# Patient Record
Sex: FEMALE | Race: WHITE | NOT HISPANIC OR LATINO | URBAN - METROPOLITAN AREA
[De-identification: names, ages, dates, MRNs, and addresses within clinical notes are randomized per-mention and may not be internally consistent; named-entity substitution may affect disease eponyms.]

---

## 2021-04-28 ENCOUNTER — INPATIENT (INPATIENT)
Facility: HOSPITAL | Age: 86
LOS: 2 days | Discharge: HOME | DRG: 871 | End: 2021-05-01
Attending: INTERNAL MEDICINE | Admitting: STUDENT IN AN ORGANIZED HEALTH CARE EDUCATION/TRAINING PROGRAM
Payer: MEDICARE

## 2021-04-28 VITALS
DIASTOLIC BLOOD PRESSURE: 73 MMHG | RESPIRATION RATE: 18 BRPM | OXYGEN SATURATION: 95 % | HEIGHT: 64 IN | WEIGHT: 119.93 LBS | TEMPERATURE: 99 F | SYSTOLIC BLOOD PRESSURE: 117 MMHG | HEART RATE: 101 BPM

## 2021-04-28 LAB — GAS PNL BLDV: SIGNIFICANT CHANGE UP

## 2021-04-28 PROCEDURE — 99285 EMERGENCY DEPT VISIT HI MDM: CPT

## 2021-04-28 RX ORDER — SODIUM CHLORIDE 9 MG/ML
1000 INJECTION INTRAMUSCULAR; INTRAVENOUS; SUBCUTANEOUS ONCE
Refills: 0 | Status: COMPLETED | OUTPATIENT
Start: 2021-04-28 | End: 2021-04-28

## 2021-04-28 NOTE — ED ADULT NURSE NOTE - OBJECTIVE STATEMENT
89 year old female presents to ED via walk-in complaining of high WBC count. No PMH, denies daily medications. States she went to primary care doctor and was sent for high WBC count. Upon assessment A&O x4, ambulatory and well appearing. Breathing comfortably on room air, oxygen saturation 97% on room air. Lungs clear. Afebrile at bedside. Refuses rectal temperature at this time. Bed locked and lowered. Comfort and safety measures maintained.

## 2021-04-28 NOTE — ED ADULT NURSE NOTE - NSIMPLEMENTINTERV_GEN_ALL_ED
Implemented All Fall Risk Interventions:  [place] to call system. Call bell, personal items and telephone within reach. Instruct patient to call for assistance. Room bathroom lighting operational. Non-slip footwear when patient is off stretcher. Physically safe environment: no spills, clutter or unnecessary equipment. Stretcher in lowest position, wheels locked, appropriate side rails in place. Provide visual cue, wrist band, yellow gown, etc. Monitor gait and stability. Monitor for mental status changes and reorient to person, place, and time. Review medications for side effects contributing to fall risk. Reinforce activity limits and safety measures with patient and family.

## 2021-04-29 DIAGNOSIS — J90 PLEURAL EFFUSION, NOT ELSEWHERE CLASSIFIED: ICD-10-CM

## 2021-04-29 DIAGNOSIS — K21.9 GASTRO-ESOPHAGEAL REFLUX DISEASE WITHOUT ESOPHAGITIS: ICD-10-CM

## 2021-04-29 DIAGNOSIS — J18.9 PNEUMONIA, UNSPECIFIED ORGANISM: ICD-10-CM

## 2021-04-29 DIAGNOSIS — Z79.899 OTHER LONG TERM (CURRENT) DRUG THERAPY: ICD-10-CM

## 2021-04-29 DIAGNOSIS — D64.9 ANEMIA, UNSPECIFIED: ICD-10-CM

## 2021-04-29 DIAGNOSIS — N39.0 URINARY TRACT INFECTION, SITE NOT SPECIFIED: ICD-10-CM

## 2021-04-29 DIAGNOSIS — Z29.9 ENCOUNTER FOR PROPHYLACTIC MEASURES, UNSPECIFIED: ICD-10-CM

## 2021-04-29 DIAGNOSIS — F39 UNSPECIFIED MOOD [AFFECTIVE] DISORDER: ICD-10-CM

## 2021-04-29 DIAGNOSIS — R05 COUGH: ICD-10-CM

## 2021-04-29 DIAGNOSIS — A41.9 SEPSIS, UNSPECIFIED ORGANISM: ICD-10-CM

## 2021-04-29 LAB
ALBUMIN SERPL ELPH-MCNC: 3.3 G/DL — SIGNIFICANT CHANGE UP (ref 3.3–5)
ALBUMIN SERPL ELPH-MCNC: 3.5 G/DL — SIGNIFICANT CHANGE UP (ref 3.3–5)
ALP SERPL-CCNC: 175 U/L — HIGH (ref 40–120)
ALP SERPL-CCNC: 202 U/L — HIGH (ref 40–120)
ALT FLD-CCNC: 16 U/L — SIGNIFICANT CHANGE UP (ref 10–45)
ALT FLD-CCNC: 18 U/L — SIGNIFICANT CHANGE UP (ref 10–45)
ANION GAP SERPL CALC-SCNC: 14 MMOL/L — SIGNIFICANT CHANGE UP (ref 5–17)
ANION GAP SERPL CALC-SCNC: 17 MMOL/L — SIGNIFICANT CHANGE UP (ref 5–17)
APPEARANCE UR: ABNORMAL
APTT BLD: 33.5 SEC — SIGNIFICANT CHANGE UP (ref 27.5–35.5)
AST SERPL-CCNC: 12 U/L — SIGNIFICANT CHANGE UP (ref 10–40)
AST SERPL-CCNC: 18 U/L — SIGNIFICANT CHANGE UP (ref 10–40)
BACTERIA # UR AUTO: NEGATIVE — SIGNIFICANT CHANGE UP
BASE EXCESS BLDV CALC-SCNC: 1.1 MMOL/L — SIGNIFICANT CHANGE UP (ref -2–2)
BASOPHILS # BLD AUTO: 0.06 K/UL — SIGNIFICANT CHANGE UP (ref 0–0.2)
BASOPHILS # BLD AUTO: 0.07 K/UL — SIGNIFICANT CHANGE UP (ref 0–0.2)
BASOPHILS NFR BLD AUTO: 0.3 % — SIGNIFICANT CHANGE UP (ref 0–2)
BASOPHILS NFR BLD AUTO: 0.3 % — SIGNIFICANT CHANGE UP (ref 0–2)
BILIRUB SERPL-MCNC: 0.3 MG/DL — SIGNIFICANT CHANGE UP (ref 0.2–1.2)
BILIRUB SERPL-MCNC: 0.3 MG/DL — SIGNIFICANT CHANGE UP (ref 0.2–1.2)
BILIRUB UR-MCNC: NEGATIVE — SIGNIFICANT CHANGE UP
BUN SERPL-MCNC: 12 MG/DL — SIGNIFICANT CHANGE UP (ref 7–23)
BUN SERPL-MCNC: 14 MG/DL — SIGNIFICANT CHANGE UP (ref 7–23)
CA-I SERPL-SCNC: 1.33 MMOL/L — HIGH (ref 1.12–1.3)
CALCIUM SERPL-MCNC: 10.5 MG/DL — SIGNIFICANT CHANGE UP (ref 8.4–10.5)
CALCIUM SERPL-MCNC: 9.7 MG/DL — SIGNIFICANT CHANGE UP (ref 8.4–10.5)
CHLORIDE BLDV-SCNC: 105 MMOL/L — SIGNIFICANT CHANGE UP (ref 96–108)
CHLORIDE SERPL-SCNC: 101 MMOL/L — SIGNIFICANT CHANGE UP (ref 96–108)
CHLORIDE SERPL-SCNC: 105 MMOL/L — SIGNIFICANT CHANGE UP (ref 96–108)
CO2 BLDV-SCNC: 27 MMOL/L — SIGNIFICANT CHANGE UP (ref 22–30)
CO2 SERPL-SCNC: 20 MMOL/L — LOW (ref 22–31)
CO2 SERPL-SCNC: 20 MMOL/L — LOW (ref 22–31)
COLOR SPEC: YELLOW — SIGNIFICANT CHANGE UP
CREAT SERPL-MCNC: 0.76 MG/DL — SIGNIFICANT CHANGE UP (ref 0.5–1.3)
CREAT SERPL-MCNC: 0.83 MG/DL — SIGNIFICANT CHANGE UP (ref 0.5–1.3)
DIFF PNL FLD: ABNORMAL
EOSINOPHIL # BLD AUTO: 0.29 K/UL — SIGNIFICANT CHANGE UP (ref 0–0.5)
EOSINOPHIL # BLD AUTO: 0.29 K/UL — SIGNIFICANT CHANGE UP (ref 0–0.5)
EOSINOPHIL NFR BLD AUTO: 1.4 % — SIGNIFICANT CHANGE UP (ref 0–6)
EOSINOPHIL NFR BLD AUTO: 1.7 % — SIGNIFICANT CHANGE UP (ref 0–6)
EPI CELLS # UR: 4 /HPF — SIGNIFICANT CHANGE UP
GAS PNL BLDV: 137 MMOL/L — SIGNIFICANT CHANGE UP (ref 135–145)
GAS PNL BLDV: SIGNIFICANT CHANGE UP
GLUCOSE BLDV-MCNC: 118 MG/DL — HIGH (ref 70–99)
GLUCOSE SERPL-MCNC: 119 MG/DL — HIGH (ref 70–99)
GLUCOSE SERPL-MCNC: 121 MG/DL — HIGH (ref 70–99)
GLUCOSE UR QL: NEGATIVE — SIGNIFICANT CHANGE UP
GRAM STN FLD: SIGNIFICANT CHANGE UP
HCO3 BLDV-SCNC: 26 MMOL/L — SIGNIFICANT CHANGE UP (ref 21–29)
HCT VFR BLD CALC: 32.5 % — LOW (ref 34.5–45)
HCT VFR BLD CALC: 35.7 % — SIGNIFICANT CHANGE UP (ref 34.5–45)
HCT VFR BLDA CALC: 37 % — LOW (ref 39–50)
HGB BLD CALC-MCNC: 11.9 G/DL — SIGNIFICANT CHANGE UP (ref 11.5–15.5)
HGB BLD-MCNC: 10.3 G/DL — LOW (ref 11.5–15.5)
HGB BLD-MCNC: 11.1 G/DL — LOW (ref 11.5–15.5)
HYALINE CASTS # UR AUTO: 7 /LPF — SIGNIFICANT CHANGE UP (ref 0–7)
IMM GRANULOCYTES NFR BLD AUTO: 0.4 % — SIGNIFICANT CHANGE UP (ref 0–1.5)
IMM GRANULOCYTES NFR BLD AUTO: 0.6 % — SIGNIFICANT CHANGE UP (ref 0–1.5)
INR BLD: 1.25 RATIO — HIGH (ref 0.88–1.16)
KETONES UR-MCNC: NEGATIVE — SIGNIFICANT CHANGE UP
LACTATE BLDV-MCNC: 1.5 MMOL/L — SIGNIFICANT CHANGE UP (ref 0.7–2)
LEGIONELLA AG UR QL: NEGATIVE — SIGNIFICANT CHANGE UP
LEUKOCYTE ESTERASE UR-ACNC: ABNORMAL
LYMPHOCYTES # BLD AUTO: 1.47 K/UL — SIGNIFICANT CHANGE UP (ref 1–3.3)
LYMPHOCYTES # BLD AUTO: 10.1 % — LOW (ref 13–44)
LYMPHOCYTES # BLD AUTO: 2.02 K/UL — SIGNIFICANT CHANGE UP (ref 1–3.3)
LYMPHOCYTES # BLD AUTO: 8.6 % — LOW (ref 13–44)
MAGNESIUM SERPL-MCNC: 1.8 MG/DL — SIGNIFICANT CHANGE UP (ref 1.6–2.6)
MCHC RBC-ENTMCNC: 26.2 PG — LOW (ref 27–34)
MCHC RBC-ENTMCNC: 26.2 PG — LOW (ref 27–34)
MCHC RBC-ENTMCNC: 31.1 GM/DL — LOW (ref 32–36)
MCHC RBC-ENTMCNC: 31.7 GM/DL — LOW (ref 32–36)
MCV RBC AUTO: 82.7 FL — SIGNIFICANT CHANGE UP (ref 80–100)
MCV RBC AUTO: 84.4 FL — SIGNIFICANT CHANGE UP (ref 80–100)
MONOCYTES # BLD AUTO: 0.95 K/UL — HIGH (ref 0–0.9)
MONOCYTES # BLD AUTO: 1.22 K/UL — HIGH (ref 0–0.9)
MONOCYTES NFR BLD AUTO: 5.5 % — SIGNIFICANT CHANGE UP (ref 2–14)
MONOCYTES NFR BLD AUTO: 6.1 % — SIGNIFICANT CHANGE UP (ref 2–14)
NEUTROPHILS # BLD AUTO: 14.28 K/UL — HIGH (ref 1.8–7.4)
NEUTROPHILS # BLD AUTO: 16.39 K/UL — HIGH (ref 1.8–7.4)
NEUTROPHILS NFR BLD AUTO: 81.7 % — HIGH (ref 43–77)
NEUTROPHILS NFR BLD AUTO: 83.3 % — HIGH (ref 43–77)
NITRITE UR-MCNC: NEGATIVE — SIGNIFICANT CHANGE UP
NRBC # BLD: 0 /100 WBCS — SIGNIFICANT CHANGE UP (ref 0–0)
NRBC # BLD: 0 /100 WBCS — SIGNIFICANT CHANGE UP (ref 0–0)
PCO2 BLDV: 43 MMHG — SIGNIFICANT CHANGE UP (ref 35–50)
PH BLDV: 7.4 — SIGNIFICANT CHANGE UP (ref 7.35–7.45)
PH UR: 6 — SIGNIFICANT CHANGE UP (ref 5–8)
PHOSPHATE SERPL-MCNC: 2.5 MG/DL — SIGNIFICANT CHANGE UP (ref 2.5–4.5)
PLATELET # BLD AUTO: 325 K/UL — SIGNIFICANT CHANGE UP (ref 150–400)
PLATELET # BLD AUTO: 414 K/UL — HIGH (ref 150–400)
PO2 BLDV: 21 MMHG — LOW (ref 25–45)
POTASSIUM BLDV-SCNC: 3.5 MMOL/L — SIGNIFICANT CHANGE UP (ref 3.5–5.3)
POTASSIUM SERPL-MCNC: 3.8 MMOL/L — SIGNIFICANT CHANGE UP (ref 3.5–5.3)
POTASSIUM SERPL-MCNC: 3.9 MMOL/L — SIGNIFICANT CHANGE UP (ref 3.5–5.3)
POTASSIUM SERPL-SCNC: 3.8 MMOL/L — SIGNIFICANT CHANGE UP (ref 3.5–5.3)
POTASSIUM SERPL-SCNC: 3.9 MMOL/L — SIGNIFICANT CHANGE UP (ref 3.5–5.3)
PROCALCITONIN SERPL-MCNC: 0.19 NG/ML — HIGH (ref 0.02–0.1)
PROT SERPL-MCNC: 6 G/DL — SIGNIFICANT CHANGE UP (ref 6–8.3)
PROT SERPL-MCNC: 6.9 G/DL — SIGNIFICANT CHANGE UP (ref 6–8.3)
PROT UR-MCNC: ABNORMAL
PROTHROM AB SERPL-ACNC: 14.8 SEC — HIGH (ref 10.6–13.6)
RAPID RVP RESULT: SIGNIFICANT CHANGE UP
RBC # BLD: 3.93 M/UL — SIGNIFICANT CHANGE UP (ref 3.8–5.2)
RBC # BLD: 4.23 M/UL — SIGNIFICANT CHANGE UP (ref 3.8–5.2)
RBC # FLD: 17 % — HIGH (ref 10.3–14.5)
RBC # FLD: 17.2 % — HIGH (ref 10.3–14.5)
RBC CASTS # UR COMP ASSIST: 4 /HPF — SIGNIFICANT CHANGE UP (ref 0–4)
SAO2 % BLDV: 29 % — LOW (ref 67–88)
SARS-COV-2 RNA SPEC QL NAA+PROBE: SIGNIFICANT CHANGE UP
SARS-COV-2 RNA SPEC QL NAA+PROBE: SIGNIFICANT CHANGE UP
SODIUM SERPL-SCNC: 138 MMOL/L — SIGNIFICANT CHANGE UP (ref 135–145)
SODIUM SERPL-SCNC: 139 MMOL/L — SIGNIFICANT CHANGE UP (ref 135–145)
SP GR SPEC: 1.03 — HIGH (ref 1.01–1.02)
SPECIMEN SOURCE: SIGNIFICANT CHANGE UP
UROBILINOGEN FLD QL: NEGATIVE — SIGNIFICANT CHANGE UP
WBC # BLD: 17.15 K/UL — HIGH (ref 3.8–10.5)
WBC # BLD: 20.08 K/UL — HIGH (ref 3.8–10.5)
WBC # FLD AUTO: 17.15 K/UL — HIGH (ref 3.8–10.5)
WBC # FLD AUTO: 20.08 K/UL — HIGH (ref 3.8–10.5)
WBC UR QL: 86 /HPF — HIGH (ref 0–5)

## 2021-04-29 PROCEDURE — 71045 X-RAY EXAM CHEST 1 VIEW: CPT | Mod: 26

## 2021-04-29 PROCEDURE — 99223 1ST HOSP IP/OBS HIGH 75: CPT | Mod: GC

## 2021-04-29 PROCEDURE — 71250 CT THORAX DX C-: CPT | Mod: 26

## 2021-04-29 RX ORDER — PANTOPRAZOLE SODIUM 20 MG/1
40 TABLET, DELAYED RELEASE ORAL
Refills: 0 | Status: DISCONTINUED | OUTPATIENT
Start: 2021-04-29 | End: 2021-05-01

## 2021-04-29 RX ORDER — CEFTRIAXONE 500 MG/1
1000 INJECTION, POWDER, FOR SOLUTION INTRAMUSCULAR; INTRAVENOUS ONCE
Refills: 0 | Status: COMPLETED | OUTPATIENT
Start: 2021-04-29 | End: 2021-04-29

## 2021-04-29 RX ORDER — AZITHROMYCIN 500 MG/1
500 TABLET, FILM COATED ORAL EVERY 24 HOURS
Refills: 0 | Status: DISCONTINUED | OUTPATIENT
Start: 2021-04-29 | End: 2021-05-01

## 2021-04-29 RX ORDER — CEFTRIAXONE 500 MG/1
1000 INJECTION, POWDER, FOR SOLUTION INTRAMUSCULAR; INTRAVENOUS EVERY 24 HOURS
Refills: 0 | Status: DISCONTINUED | OUTPATIENT
Start: 2021-04-29 | End: 2021-05-01

## 2021-04-29 RX ORDER — IPRATROPIUM/ALBUTEROL SULFATE 18-103MCG
3 AEROSOL WITH ADAPTER (GRAM) INHALATION EVERY 6 HOURS
Refills: 0 | Status: DISCONTINUED | OUTPATIENT
Start: 2021-04-29 | End: 2021-04-29

## 2021-04-29 RX ORDER — ACETAMINOPHEN 500 MG
650 TABLET ORAL EVERY 6 HOURS
Refills: 0 | Status: DISCONTINUED | OUTPATIENT
Start: 2021-04-29 | End: 2021-05-01

## 2021-04-29 RX ORDER — ESCITALOPRAM OXALATE 10 MG/1
10 TABLET, FILM COATED ORAL DAILY
Refills: 0 | Status: DISCONTINUED | OUTPATIENT
Start: 2021-04-29 | End: 2021-05-01

## 2021-04-29 RX ORDER — AZITHROMYCIN 500 MG/1
500 TABLET, FILM COATED ORAL ONCE
Refills: 0 | Status: COMPLETED | OUTPATIENT
Start: 2021-04-29 | End: 2021-04-29

## 2021-04-29 RX ORDER — IPRATROPIUM/ALBUTEROL SULFATE 18-103MCG
3 AEROSOL WITH ADAPTER (GRAM) INHALATION EVERY 6 HOURS
Refills: 0 | Status: DISCONTINUED | OUTPATIENT
Start: 2021-04-29 | End: 2021-05-01

## 2021-04-29 RX ORDER — ENOXAPARIN SODIUM 100 MG/ML
40 INJECTION SUBCUTANEOUS DAILY
Refills: 0 | Status: DISCONTINUED | OUTPATIENT
Start: 2021-04-29 | End: 2021-05-01

## 2021-04-29 RX ADMIN — ESCITALOPRAM OXALATE 10 MILLIGRAM(S): 10 TABLET, FILM COATED ORAL at 12:08

## 2021-04-29 RX ADMIN — SODIUM CHLORIDE 1000 MILLILITER(S): 9 INJECTION INTRAMUSCULAR; INTRAVENOUS; SUBCUTANEOUS at 00:13

## 2021-04-29 RX ADMIN — ENOXAPARIN SODIUM 40 MILLIGRAM(S): 100 INJECTION SUBCUTANEOUS at 14:05

## 2021-04-29 RX ADMIN — PANTOPRAZOLE SODIUM 40 MILLIGRAM(S): 20 TABLET, DELAYED RELEASE ORAL at 12:09

## 2021-04-29 RX ADMIN — Medication 3 MILLILITER(S): at 14:04

## 2021-04-29 RX ADMIN — Medication 3 MILLILITER(S): at 20:53

## 2021-04-29 RX ADMIN — CEFTRIAXONE 100 MILLIGRAM(S): 500 INJECTION, POWDER, FOR SOLUTION INTRAMUSCULAR; INTRAVENOUS at 01:00

## 2021-04-29 RX ADMIN — Medication 3 MILLILITER(S): at 09:52

## 2021-04-29 RX ADMIN — AZITHROMYCIN 250 MILLIGRAM(S): 500 TABLET, FILM COATED ORAL at 01:29

## 2021-04-29 NOTE — H&P ADULT - NSHPPHYSICALEXAM_GEN_ALL_CORE
Vital Signs Last 24 Hrs  T(C): 37.7 (29 Apr 2021 04:31), Max: 37.7 (29 Apr 2021 04:31)  T(F): 99.9 (29 Apr 2021 04:31), Max: 99.9 (29 Apr 2021 04:31)  HR: 98 (29 Apr 2021 04:31) (97 - 101)  BP: 131/81 (29 Apr 2021 04:31) (116/65 - 131/81)  BP(mean): --  RR: 17 (29 Apr 2021 04:31) (17 - 18)  SpO2: 95% (29 Apr 2021 04:31) (95% - 97%) Vital Signs Last 24 Hrs  T(C): 37.7 (29 Apr 2021 04:31), Max: 37.7 (29 Apr 2021 04:31)  T(F): 99.9 (29 Apr 2021 04:31), Max: 99.9 (29 Apr 2021 04:31)  HR: 98 (29 Apr 2021 04:31) (97 - 101)  BP: 131/81 (29 Apr 2021 04:31) (116/65 - 131/81)  BP(mean): --  RR: 17 (29 Apr 2021 04:31) (17 - 18)  SpO2: 95% (29 Apr 2021 04:31) (95% - 97%)    GENERAL: NAD  HEAD:  Atraumatic, Normocephalic  HEENT: scleral anicteric.   CV: Regular rate and rhythm. No murmurs, rubs, or gallops  Respiratory: + expiratory wheezes   ABDOMEN: Soft, Nontender, Nondistended; Bowel sounds normal  EXTREMITIES: No lower extremity edema. Bilateral LE symmetric in size.   PSYCH: AAOx3.   NEURO: Symmetric facial expressions. Moves 4 extremities spontaneously   Skin: No rashes

## 2021-04-29 NOTE — H&P ADULT - NSHPLABSRESULTS_GEN_ALL_CORE
11.1   20.08 )-----------( 414      ( 2021 23:47 )             35.7     Hgb Trend: 11.1<--      138  |  101  |  14  ----------------------------<  119<H>  3.9   |  20<L>  |  0.83    Ca    10.5      2021 23:47    TPro  6.9  /  Alb  3.5  /  TBili  0.3  /  DBili  x   /  AST  18  /  ALT  18  /  AlkPhos  202<H>      Creatinine Trend: 0.83<--  PT/INR - ( 2021 23:47 )   PT: 14.8 sec;   INR: 1.25 ratio         PTT - ( 2021 23:47 )  PTT:33.5 sec      Urinalysis Basic - ( 2021 01:15 )    Color: Yellow / Appearance: Slightly Turbid / S.026 / pH: x  Gluc: x / Ketone: Negative  / Bili: Negative / Urobili: Negative   Blood: x / Protein: 30 mg/dL / Nitrite: Negative   Leuk Esterase: Large / RBC: 4 /hpf / WBC 86 /HPF   Sq Epi: x / Non Sq Epi: 4 /hpf / Bacteria: Negative    < from: Xray Chest 1 View- PORTABLE-Urgent (21 @ 00:21) >    INTERPRETATION:  Patchy right lung opacities which may represent atelectasis or pneumonia. Associated right pleural effusion.        < end of copied text > 11.1   20.08 )-----------( 414      ( 2021 23:47 )             35.7     Hgb Trend: 11.1<--      138  |  101  |  14  ----------------------------<  119<H>  3.9   |  20<L>  |  0.83    Ca    10.5      2021 23:47    TPro  6.9  /  Alb  3.5  /  TBili  0.3  /  DBili  x   /  AST  18  /  ALT  18  /  AlkPhos  202<H>      Creatinine Trend: 0.83<--  PT/INR - ( 2021 23:47 )   PT: 14.8 sec;   INR: 1.25 ratio         PTT - ( 2021 23:47 )  PTT:33.5 sec      Urinalysis Basic - ( 2021 01:15 )    Color: Yellow / Appearance: Slightly Turbid / S.026 / pH: x  Gluc: x / Ketone: Negative  / Bili: Negative / Urobili: Negative   Blood: x / Protein: 30 mg/dL / Nitrite: Negative   Leuk Esterase: Large / RBC: 4 /hpf / WBC 86 /HPF   Sq Epi: x / Non Sq Epi: 4 /hpf / Bacteria: Negative    < from: Xray Chest 1 View- PORTABLE-Urgent (21 @ 00:21) >    INTERPRETATION:  Patchy right lung opacities which may represent atelectasis or pneumonia. Associated right pleural effusion.  < end of copied text >    -tachycardia, leukocytosis  -CXR notable for

## 2021-04-29 NOTE — H&P ADULT - PROBLEM SELECTOR PLAN 2
-tachycardia, leukocytosis  - source: CXR  patchy R lung opacities vs. UA positive for large leuk esterase and WBC 86 though patient denies dysuria/urinary frequency   - COVID PCR negative   -f/u blood culture, urine culture   -f/u RVP, sputum culture, urine legionella   - s/p 1L NS in ED   -s/p azithromycin + ceftriaxone for community acquired pneumonia  - c/w azithromycin + ceftriaxone -CXR notable for R pleural effusion, unclear chronicity  - repeat CT chest, and obtain outpatient records of CT chest  - consider consult pulm for thoracentesis

## 2021-04-29 NOTE — PROGRESS NOTE ADULT - PROBLEM SELECTOR PLAN 5
-CXR notable for R pleural effusion, unclear chronicity  - repeat CT chest, and obtain outpatient records of CT chest  - most likely parapneumonic effusion  - monitor clinical improvement and consider outpt imaging for resolution.

## 2021-04-29 NOTE — ED PROVIDER NOTE - PROGRESS NOTE DETAILS
Pt does not have any outpatient results or imaging with her, will contact daughter to bring it in. SUZE Parker PGY2

## 2021-04-29 NOTE — ED PROVIDER NOTE - NS ED ROS FT
CONST: no fevers, no chills, no trauma  EYES: no pain, no blurry vision   ENT: no sore throat, no epistaxis, no rhinorrhea  CV: no chest pain, no palpitations, no orthopnea, no extremity pain or swelling  RESP: no shortness of breath, + cough, + sputum, no pleurisy, no wheezing  ABD: no abdominal pain, no nausea, no vomiting, no diarrhea, no black or bloody stool  : no dysuria, no hematuria, no frequency, no urgency  MSK: no back pain, no neck pain, no extremity pain  NEURO: no headache, no sensory disturbances, no focal weakness, no dizziness  HEME: no easy bleeding or bruising  SKIN: no diaphoresis, no rash

## 2021-04-29 NOTE — CHART NOTE - NSCHARTNOTEFT_GEN_A_CORE
16:00 - Called Dr. Zev Larkin, the pt's outpatient pulmonologist, and received the following information:  Pt was seen since 2019 for chronic cough. PFT showing obstructive and restrictive pattern (FEV1 42%, FVC 50, FEV/FVC 24%).  CT was showing persistent RLL consolidation that never cleared despite abx use (most recent 01/2021 doxy and cefdinir), finished prednisone course recently 20mg (3/24-4/23).  She underwent bronchoscopy that was normal with lavage 11/16/2020. Cytology was negative for malignancy but positive for non-specific inflammation, so as biopsy.  One culture out of 2 grew NED, but the smear was negative. Dr. Larkin perceived that as unusual as usually the smear would be positive and culture negative, so regarded as false positive. 16:00 - Called Dr. Zev Larkin, the pt's outpatient pulmonologist, and obtained the following information:  Pt was seen since 2019 for chronic cough. PFT showing obstructive and restrictive pattern (FEV1 42%, FVC 50, FEV/FVC 24%).  CT was showing persistent RLL consolidation that never cleared despite abx use (most recent 01/2021 doxy and cefdinir), finished prednisone course recently 20mg (3/24-4/23).  She underwent bronchoscopy that was normal structurally with lavage 11/16/2020. Cytology was negative for malignancy but positive for non-specific inflammation, so as biopsy (see previous chart note).  One culture out of 2 grew NED, but the smear was negative. Dr. Larkin perceived that as unusual as usually the smear would be positive and culture negative, so regarded as false positive.

## 2021-04-29 NOTE — ED PROVIDER NOTE - ATTENDING CONTRIBUTION TO CARE
pt sent in by pcp due to "high wbc". pt with a cough present for several months otherwise without symptoms. pt afebrile but initially tachycardic, otherwise stable vitals, well appearing, breathing comfortably, not hypoxic. sepsis w/u done which showed RLL effusion vs opacity, equivocal UA. pt received iv abx to cover cap and urinary source, admitted for further care.

## 2021-04-29 NOTE — ED PROVIDER NOTE - PHYSICAL EXAMINATION
Const: Well-nourished, Well-developed, appearing stated age.  Eyes: no conjunctival injection, and symmetrical lids.  HEENT: Head NCAT, no lesions. Atraumatic external nose and ears. Moist MM.  Neck: Symmetric, trachea midline.   CVS: +S1/S2, Peripheral pulses 2+ and equal in all extremities.  RESP: Unlabored respiratory effort. Clear to auscultation bilaterally. No crackles or wheezes b/l.   GI: Nontender/Nondistended, No CVA tenderness b/l.   MSK: Normocephalic/Atraumatic, Lower Extremities w/o calf tenderness or edema b/l.   Skin: Warm, dry and intact.   Neuro: CNs II-XII grossly intact. Motor & Sensation grossly intact.  Psych: Awake, Alert, & Oriented (AAO) x3. Appropriate mood and affect.

## 2021-04-29 NOTE — PROGRESS NOTE ADULT - ASSESSMENT
89F nonsmoker with no significant pmhx pw acute on chronic productive cough, found to be in sepsis 2/2 pneumonia vs. UTI

## 2021-04-29 NOTE — PROGRESS NOTE ADULT - PROBLEM SELECTOR PLAN 1
Tachycardia and leukocytosis, pt is HD stable and afebrile.  -s/p 1L NS in the ED  -Lactate nl 1.5  - DDx: PNA vs. UTI   -COVID PCR negative   -f/u blood culture, urine culture   -s/p azithromycin + ceftriaxone

## 2021-04-29 NOTE — H&P ADULT - PROBLEM SELECTOR PLAN 1
- etiology: COPD vs. community acquired pneumonia   -obtain outpatient records of CT chest, bronchoscopy  -management of CAP as per below - etiology: COPD vs. empyema vs. community acquired pneumonia   -obtain outpatient records of CT chest, bronchoscopy  - repeat CT chest   -management of CAP/pleural effusion as per below  - DUOned q6h standing, tessalon pearls

## 2021-04-29 NOTE — H&P ADULT - ASSESSMENT
89F nonsmoker with no significant pmhx presenting with acute on chronic productive cough, and found to have sepsis likely source pneumonia vs. UTI

## 2021-04-29 NOTE — H&P ADULT - PROBLEM SELECTOR PLAN 3
Patient does not remember her medication list.  Her pharmacy: [facility] [address] [place]  Will to obtain outpatient med list in AM -tachycardia, leukocytosis  - source: CXR  patchy R lung opacities vs empyema vs. UA positive for large leuk esterase and WBC 86 though patient denies dysuria/urinary frequency   - COVID PCR negative   -f/u blood culture, urine culture   -f/u RVP, sputum culture, urine legionella   - s/p 1L NS in ED   -s/p azithromycin + ceftriaxone for community acquired pneumonia  - c/w azithromycin + ceftriaxone

## 2021-04-29 NOTE — H&P ADULT - NSHPREVIEWOFSYSTEMS_GEN_ALL_CORE
CONSTITUTIONAL: No fever/chills.   HEENT: + cough with white sputum. No runny nose.   RESPIRATORY: + wheezes No shortness of breath  CARDIOVASCULAR: No chest pain  GASTROINTESTINAL: No abdominal pain. No nausea/ vomiting. No diarrhea.   GENITOURINARY: No dysuria, frequency or hematuria  NEUROLOGICAL: No headache. No blurry vision.   MSK: No joint pain  HEME: No easy bruising/bleeding   SKIN: No rashes

## 2021-04-29 NOTE — H&P ADULT - ATTENDING COMMENTS
Patient with supposedly no known medical history was being worked up as an outpatient for chronic cough (weeks?) and sent in by PMD for leukocytosis. Patient with productive cough, but does not appear to have much else in terms of systemic symptoms. Patient here not hypoxic and afebrile, but redemonstrated the leukocytosis which is neutrophilic predominant. Labs are otherwise unremarkable.    CXR personally reviewed by me: RLL consolidation with right sided pleural effusion    In the ED patient was given Ceftriaxone & Azithromycin for possible CAP.     Patient is a poor historian and we need to obtain further collateral. I am most interested in the CT Chest and Bronchoscopy results and when it was done to determine if there was any interval change. For now I would cover for CAP, try to obtain urine legionella, sputum cultures. Follow up blood and urine cultures. She does have significant pyuria on UA, but ceftriaxone should cover this as well. I would pursue CT Chest w/o contrast for better delineation. Some concern for empyema given the chronicity of symptoms and the fact that she is a poor historian. If large pleural effusion would attempt a thoracentesis to rule out empyema, otherwise may just need repeat imaging as an outpatient. She has diffuse wheezing on exam so standing DuoNebs.    Rest of plan as documented above, DVT PPx.

## 2021-04-29 NOTE — PROGRESS NOTE ADULT - PROBLEM SELECTOR PLAN 3
-Pt denies any urinary symptoms, however UA moderately positive  -cw CTX to cover common gram neg bacteria  -f/u urine cx  -monitor urine output

## 2021-04-29 NOTE — PROGRESS NOTE ADULT - SUBJECTIVE AND OBJECTIVE BOX
Jordi Reynolds, PGY-1  Pager: [phone] / [phone]    CHIEF COMPLAINT: Patient is a 89y old  Female who presents with a chief complaint of Cough (2021 05:27)    INTERVAL HPI/OVERNIGHT EVENTS:   89F nonsmoker with no significant pmhx presenting with acute on chronic productive cough.    Patient first developed a cough with white sputum production several months ago, associated with wheezing, no hemoptysis or SOB, no fever. Patient was prescribed oral steroid taper x 2 weeks (last steroid taken 1 week ago) which improved her cough. She reported had outpatient workup including CT and bronch though unable to tell the findings. Patient denied ever been prescribed antibiotics. Patient sent in by her PMD due to leukocytosis in outpatient lab and persistent cough.   +weight loss over months    MEDICATIONS (STANDING):  albuterol/ipratropium for Nebulization 3 milliLiter(s) Nebulizer every 6 hours  azithromycin  IVPB 500 milliGRAM(s) IV Intermittent every 24 hours  cefTRIAXone   IVPB 1000 milliGRAM(s) IV Intermittent every 24 hours  enoxaparin Injectable 40 milliGRAM(s) SubCutaneous daily  escitalopram 10 milliGRAM(s) Oral daily  pantoprazole    Tablet 40 milliGRAM(s) Oral before breakfast    MEDICATIONS  (PRN):  acetaminophen   Tablet .. 650 milliGRAM(s) Oral every 6 hours PRN  benzonatate 100 milliGRAM(s) Oral three times a day PRN    REVIEW OF SYSTEMS:  CONSTITUTIONAL: No fever, weight loss, or fatigue  EYES: No eye pain, visual disturbances, or discharge  ENMT:  No difficulty hearing, tinnitus, vertigo; No sinus or throat pain  NECK: No pain or stiffness  RESPIRATORY: +cough, +wheezing, no chills or hemoptysis; No shortness of breath  CARDIOVASCULAR: No chest pain, palpitations, dizziness, or leg swelling  GASTROINTESTINAL: No abdominal or epigastric pain. No nausea, vomiting, or hematemesis; No diarrhea or constipation. No melena or hematochezia.  GENITOURINARY: No dysuria, frequency, hematuria, or incontinence  NEUROLOGICAL: No headaches, memory loss, loss of strength, numbness, or tremors  SKIN: No itching, burning, rashes, or lesions   MUSCULOSKELETAL: No joint pain or swelling; No muscle, back, or extremity pain    VITAL SIGNS:  T(F): 99.5 (21 @ 08:20), Max: 99.9 (21 @ 04:31)  HR: 97 (21 @ 08:20) (97 - 101)  BP: 106/69 (21 @ 08:20) (106/69 - 131/81)  RR: 20 (21 @ 08:20) (17 - 20)  SpO2: 94% (21 @ 08:20) (94% - 97%)    PHYSICAL EXAM:  GENERAL: NAD, well-groomed, well-developed, AOx4  HEAD:  Atraumatic, Normocephalic  EYES: EOMI, PERRLA, conjunctiva and sclera clear  ENMT: No tonsillar erythema, exudates, or enlargement; Moist mucous membranes  NECK: Supple, No JVD, Normal thyroid  NERVOUS SYSTEM:  Good concentration; Motor Strength 5/5 B/L upper and lower extremities  CHEST/LUNG: Reduced breathing sounds and crackles RLL, wheezing b/l  HEART: Regular rate and rhythm; No murmurs, rubs, or gallops  ABDOMEN: Soft, Nontender, Nondistended; Bowel sounds present  EXTREMITIES:  2+ Peripheral Pulses, trace edema  LYMPH: No lymphadenopathy noted  SKIN: No rashes or lesions    LABS:                        10.3   17.15 )-----------( 325      ( 2021 06:47 )             32.5         139  |  105  |  12  ----------------------------<  121<H>  3.8   |  20<L>  |  0.76    Ca    9.7      2021 06:47  Phos  2.5       Mg     1.8         TPro  6.0  /  Alb  3.3  /  TBili  0.3  /  DBili  x   /  AST  12  /  ALT  16  /  AlkPhos  175<H>      PT/INR - ( 2021 23:47 )   PT: 14.8 sec;   INR: 1.25 ratio         PTT - ( 2021 23:47 )  PTT:33.5 sec  Urinalysis Basic - ( 2021 01:15 )    Color: Yellow / Appearance: Slightly Turbid / S.026 / pH: x  Gluc: x / Ketone: Negative  / Bili: Negative / Urobili: Negative   Blood: x / Protein: 30 mg/dL / Nitrite: Negative   Leuk Esterase: Large / RBC: 4 /hpf / WBC 86 /HPF   Sq Epi: x / Non Sq Epi: 4 /hpf / Bacteria: Negative    Procalcitonin, Serum (21 @ 06:47)    Procalcitonin, Serum: 0.19:     Blood Gas Profile - Venous (21 @ 23:49)    pH, Venous: 7.40    pCO2, Venous: 43 mmHg    pO2, Venous: 21 mmHg    HCO3, Venous: 26 mmol/L    Base Excess, Venous: 1.1 mmol/L    Oxygen Saturation, Venous: 29 %    Total CO2, Venous: 27 mmol/L    Blood Gas Source Venous: Venous    Blood Gas Venous - Lactate (21 @ 23:49)    Blood Gas Venous - Lactate: 1.5 mmoL/L    RADIOLOGY & ADDITIONAL TESTS:    < from: Xray Chest 1 View- PORTABLE-Urgent (21 @ 00:21) >  INTERPRETATION:  CLINICAL INDICATION: Sepsis  TECHNIQUE: Portable frontal view of the chest.  COMPARISON: None.    FINDINGS:    Cardiac silhouette is within normal limits.  Patchy right lung opacities with associated right pleural effusion. No pneumothorax.  No acute osseous abnormality.    IMPRESSION:  Patchy right lung opacities which may represent atelectasis or pneumonia. Associated right pleural effusion.  Further information may be obtained from the patient's CT of the chest which was pending at the time of this dictation.    < end of copied text >

## 2021-04-29 NOTE — PROGRESS NOTE ADULT - PROBLEM SELECTOR PLAN 4
- Etiology: COPD vs. empyema vs. community acquired pneumonia   - follows with outpatient pulmonologist Dr. Zev Larkin, s/p month long course of prednisone 20mg (prescribed 3/24)    - Will obtain outpatient records of CT chest, bronchoscopy and possible PFT  - repeat CT chest   -management of CAP/pleural effusion as per above  - DUOned q6h standing, tessalon pearls - Etiology: COPD vs. empyema vs. asthma vs. chronic infection  - follows with outpatient pulmonologist Dr. Zev Larkin, s/p month long course of prednisone 20mg (prescribed 3/24)    - Will obtain outpatient records of CT chest, bronchoscopy and possible PFT  - repeat CT chest   -management of CAP/pleural effusion as per above  - DUOned q6h standing, tessalon pearls

## 2021-04-29 NOTE — CHART NOTE - NSCHARTNOTEFT_GEN_A_CORE
CT chest wo con 3/3/21 [facility] (private)  Comparison CT chest 10/8/2020  Impression:   1. extensive parenchymal consolidation seen in the right lower love of the lung, and small area of parenchymal consolidation the left lower lobe of the lung, both containing air bronchograms grossly stable compared to prior study. Differential diagnosis includes parenchymal consolidation related to infectious/inflammatory etiology including organizin pneumonia. Possibility of bronchoalveolar carcinoma cannot be excluded.   2. Irregular opacity measuring up to 2.4 cm in the right upper lobe which appears stable which may represent localized infiltrate. Possibility of malignancy cannot be excluded.   3. Multiple noncalcified nodules seen throughout both lungs most of which appears to have increased in size or newly developed since prior study.  4. 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  5. For further evaluation, nuclear PET scan may be helpful      Surgical pathology report [facility]  Date of collection 11/16/2020  Right lower lung biopsy:  -superficial bronchial type epithelium with chronic inflammation  -fragments of histologically unremarkable cartilage.    Cytopathology report 11/16/2020 [facility]  -negative for malignant cells  -abundant acute inflammatory cells

## 2021-04-29 NOTE — H&P ADULT - PROBLEM SELECTOR PLAN 4
Diet: Regular diet   DVT prophylaxis: Lovenox subQ   Discharge: pending medical improvement Patient does not remember her medication list.  Her pharmacy: [facility] [address] [place]  Will to obtain outpatient med list in AM
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
## 2021-04-29 NOTE — ED PROVIDER NOTE - CLINICAL SUMMARY MEDICAL DECISION MAKING FREE TEXT BOX
89F presenting for cough. In triage, tachy with known outpatient leukocytosis. Sepsis labs obtained. Source likely pna, will eval w imaging. Will likely need admission and IV abx. SUZE Parker PGY2

## 2021-04-29 NOTE — PROGRESS NOTE ADULT - PROBLEM SELECTOR PLAN 2
Pt with increasingly productive cough, no sick contacts/travel  - CURB-65 score = 1  - CXR showing patchy right lung opacities  - cw CTX and Azithro to cover CAP  - f/u Bcx, RVP, sputum culture, urine legionella

## 2021-04-29 NOTE — H&P ADULT - HISTORY OF PRESENT ILLNESS
89F with no significant pmhx presenting with acute on chronic productive cough. States that she was initially put on antibiotics for it when it first started over a month ago, had extensive w/u including outpatient CT and bronch - no significant findings, but past week coughing again. Outpatient labs showed leukocystosis, sent in for eval.            89F nonsmoker with no significant pmhx presenting with acute on chronic productive cough.           Patient first developed a cough with white sputum production several months ago, associated with wheezing, no hemoptysis or SOB, no fever. Patient was prescribed oral steroid taper x 2 weeks (last steroid taken 1 week ago) which improved her cough. She reported had outpatient workup including CT and bronch though unable to tell the findings. Patient denied ever been prescribed antibiotics. Patient sent in by her PMD due to leukocytosis in outpatient lab and persistent cough.            ED: VS on admission: temp 99.1F, , /73, RR 18, 95% on RA.

## 2021-04-29 NOTE — ED PROVIDER NOTE - OBJECTIVE STATEMENT
89F with no significant pmhx presenting with acute on chronic productive cough. States that she was initially put on antibiotics for it when it first started over a month ago, had extensive w/u including outpatient CT and bronch - no significant findings, but past week coughing again. Outpatient labs showed leukocystosis, sent in for eval. Denies CP, SOB, f/c, n/v, uti symptoms. Has been eating/drinking less than usual. No abd pain, diarrhea.

## 2021-04-30 ENCOUNTER — TRANSCRIPTION ENCOUNTER (OUTPATIENT)
Age: 86
End: 2021-04-30

## 2021-04-30 DIAGNOSIS — J44.9 CHRONIC OBSTRUCTIVE PULMONARY DISEASE, UNSPECIFIED: ICD-10-CM

## 2021-04-30 LAB
ANION GAP SERPL CALC-SCNC: 14 MMOL/L — SIGNIFICANT CHANGE UP (ref 5–17)
BUN SERPL-MCNC: 8 MG/DL — SIGNIFICANT CHANGE UP (ref 7–23)
CALCIUM SERPL-MCNC: 9.4 MG/DL — SIGNIFICANT CHANGE UP (ref 8.4–10.5)
CHLORIDE SERPL-SCNC: 101 MMOL/L — SIGNIFICANT CHANGE UP (ref 96–108)
CO2 SERPL-SCNC: 20 MMOL/L — LOW (ref 22–31)
COVID-19 SPIKE DOMAIN AB INTERP: POSITIVE
COVID-19 SPIKE DOMAIN ANTIBODY RESULT: 84.5 U/ML — HIGH
CREAT SERPL-MCNC: 0.69 MG/DL — SIGNIFICANT CHANGE UP (ref 0.5–1.3)
CULTURE RESULTS: SIGNIFICANT CHANGE UP
FERRITIN SERPL-MCNC: 320 NG/ML — HIGH (ref 15–150)
FOLATE SERPL-MCNC: 16 NG/ML — SIGNIFICANT CHANGE UP
GLUCOSE SERPL-MCNC: 103 MG/DL — HIGH (ref 70–99)
HCT VFR BLD CALC: 31.7 % — LOW (ref 34.5–45)
HGB BLD-MCNC: 10.1 G/DL — LOW (ref 11.5–15.5)
IRON SATN MFR SERPL: 12 UG/DL — LOW (ref 30–160)
IRON SATN MFR SERPL: 6 % — LOW (ref 14–50)
MAGNESIUM SERPL-MCNC: 1.8 MG/DL — SIGNIFICANT CHANGE UP (ref 1.6–2.6)
MCHC RBC-ENTMCNC: 26.6 PG — LOW (ref 27–34)
MCHC RBC-ENTMCNC: 31.9 GM/DL — LOW (ref 32–36)
MCV RBC AUTO: 83.6 FL — SIGNIFICANT CHANGE UP (ref 80–100)
NRBC # BLD: 0 /100 WBCS — SIGNIFICANT CHANGE UP (ref 0–0)
PHOSPHATE SERPL-MCNC: 2.6 MG/DL — SIGNIFICANT CHANGE UP (ref 2.5–4.5)
PLATELET # BLD AUTO: 352 K/UL — SIGNIFICANT CHANGE UP (ref 150–400)
POTASSIUM SERPL-MCNC: 3.5 MMOL/L — SIGNIFICANT CHANGE UP (ref 3.5–5.3)
POTASSIUM SERPL-SCNC: 3.5 MMOL/L — SIGNIFICANT CHANGE UP (ref 3.5–5.3)
RBC # BLD: 3.79 M/UL — LOW (ref 3.8–5.2)
RBC # FLD: 17 % — HIGH (ref 10.3–14.5)
SARS-COV-2 IGG+IGM SERPL QL IA: 84.5 U/ML — HIGH
SARS-COV-2 IGG+IGM SERPL QL IA: POSITIVE
SODIUM SERPL-SCNC: 135 MMOL/L — SIGNIFICANT CHANGE UP (ref 135–145)
SPECIMEN SOURCE: SIGNIFICANT CHANGE UP
TIBC SERPL-MCNC: 213 UG/DL — LOW (ref 220–430)
TSH SERPL-MCNC: 2.83 UIU/ML — SIGNIFICANT CHANGE UP (ref 0.27–4.2)
UIBC SERPL-MCNC: 201 UG/DL — SIGNIFICANT CHANGE UP (ref 110–370)
VIT B12 SERPL-MCNC: 620 PG/ML — SIGNIFICANT CHANGE UP (ref 232–1245)
WBC # BLD: 16.39 K/UL — HIGH (ref 3.8–10.5)
WBC # FLD AUTO: 16.39 K/UL — HIGH (ref 3.8–10.5)

## 2021-04-30 PROCEDURE — 99233 SBSQ HOSP IP/OBS HIGH 50: CPT | Mod: GC

## 2021-04-30 PROCEDURE — 99223 1ST HOSP IP/OBS HIGH 75: CPT | Mod: GC

## 2021-04-30 RX ORDER — POTASSIUM CHLORIDE 20 MEQ
40 PACKET (EA) ORAL ONCE
Refills: 0 | Status: COMPLETED | OUTPATIENT
Start: 2021-04-30 | End: 2021-04-30

## 2021-04-30 RX ORDER — MAGNESIUM OXIDE 400 MG ORAL TABLET 241.3 MG
400 TABLET ORAL
Refills: 0 | Status: COMPLETED | OUTPATIENT
Start: 2021-04-30 | End: 2021-05-01

## 2021-04-30 RX ADMIN — Medication 3 MILLILITER(S): at 12:48

## 2021-04-30 RX ADMIN — Medication 3 MILLILITER(S): at 18:23

## 2021-04-30 RX ADMIN — Medication 3 MILLILITER(S): at 06:12

## 2021-04-30 RX ADMIN — Medication 40 MILLIGRAM(S): at 13:57

## 2021-04-30 RX ADMIN — AZITHROMYCIN 250 MILLIGRAM(S): 500 TABLET, FILM COATED ORAL at 01:57

## 2021-04-30 RX ADMIN — CEFTRIAXONE 100 MILLIGRAM(S): 500 INJECTION, POWDER, FOR SOLUTION INTRAMUSCULAR; INTRAVENOUS at 00:25

## 2021-04-30 RX ADMIN — MAGNESIUM OXIDE 400 MG ORAL TABLET 400 MILLIGRAM(S): 241.3 TABLET ORAL at 18:23

## 2021-04-30 RX ADMIN — MAGNESIUM OXIDE 400 MG ORAL TABLET 400 MILLIGRAM(S): 241.3 TABLET ORAL at 10:33

## 2021-04-30 RX ADMIN — PANTOPRAZOLE SODIUM 40 MILLIGRAM(S): 20 TABLET, DELAYED RELEASE ORAL at 06:12

## 2021-04-30 RX ADMIN — Medication 40 MILLIEQUIVALENT(S): at 10:01

## 2021-04-30 RX ADMIN — ESCITALOPRAM OXALATE 10 MILLIGRAM(S): 10 TABLET, FILM COATED ORAL at 12:47

## 2021-04-30 RX ADMIN — ENOXAPARIN SODIUM 40 MILLIGRAM(S): 100 INJECTION SUBCUTANEOUS at 12:47

## 2021-04-30 RX ADMIN — Medication 3 MILLILITER(S): at 00:25

## 2021-04-30 NOTE — PROGRESS NOTE ADULT - ASSESSMENT
89F nonsmoker with no significant pmhx pw acute on chronic productive cough, found to be in sepsis 2/2 pneumonia vs. UTI  89F nonsmoker with chronic obstructive-restrictive lung disease pw acute on chronic productive cough, found to be in sepsis 2/2 pneumonia

## 2021-04-30 NOTE — PROGRESS NOTE ADULT - PROBLEM SELECTOR PLAN 8
Diet: Regular diet   DVT prophylaxis: Lovenox subQ   Discharge: pending medical improvement Diet: Regular diet   DVT prophylaxis: Lovenox subQ   Discharge: pending medical improvement/pulm work up

## 2021-04-30 NOTE — DISCHARGE NOTE PROVIDER - NSDCCPCAREPLAN_GEN_ALL_CORE_FT
PRINCIPAL DISCHARGE DIAGNOSIS  Diagnosis: Pneumonia  Assessment and Plan of Treatment: You were admitted with elevated white blood cell count (a sign of infection) and CT chest finding suggesting pneumonia. You were treated with an appropriate  IV antibiotic that was switched to oral before discharge. On your CT scan, we witnessed a right lower lobe consolidation that was evident in previous scans. Our pulmonary team evaluated your case and concluded that you'd benefit from a biopsy to diagnose the etiology for the persistent consolidation. You should follow up with your outpatient lung doctor in that matter.      SECONDARY DISCHARGE DIAGNOSES  Diagnosis: Sepsis  Assessment and Plan of Treatment:

## 2021-04-30 NOTE — DISCHARGE NOTE PROVIDER - NSFOLLOWUPCLINICS_GEN_ALL_ED_FT
[facility]  Pulmonology  [address] [place] [address], [place] [address]  Phone: [phone]  Fax:   Follow Up Time: 2 weeks

## 2021-04-30 NOTE — CONSULT NOTE ADULT - TIME BILLING
counseling patient on CT chest findings, implications of findings, possibility this may be malignancy, and diagnostic/treatment options  coordinating care with primary team

## 2021-04-30 NOTE — PROGRESS NOTE ADULT - PROBLEM SELECTOR PLAN 4
- Etiology: COPD vs. asthma vs. chronic infection vs. malignancy  - follows with outpatient pulmonologist Dr. Zev Larkin, s/p month long course of prednisone 20mg (prescribed 3/24)    - Outpt PFT sugggest obstructive and restrictive pattern  -Pt has a h/o persistent RLL consolidation with mediastinal LAD, on the DDx: chronic infection vs. malignany  -11/2020 Outpt bronch was normal structurally, lavage and biopsy positive for inflammation, neg for malignancy. Cx positive for NED (one), smear neg  - repeated CT chest - redemonstrating right mid to lower lung consolidation with multiple other smaller patchy and nodular opacities scattered throughout both lungs with bulky mediastinal LAD. No pleural effusion.   -management of CAP as per above  - DUOned q6h standing, tessalon pearls  -Pulmonary consulted, appreciate recs - Outpt PFT sugggest obstructive and restrictive pattern  - follows with outpatient pulmonologist Dr. Zev Larkin, s/p month long course of prednisone 20mg (prescribed 3/24)    -Pt has a h/o persistent RLL consolidation with mediastinal LAD, on the DDx: chronic infection vs. malignancy  -11/2020 Outpt bronch was normal structurally, lavage and biopsy positive for inflammation, neg for malignancy. Cx positive for NED (one), smear neg  - repeated CT chest - redemonstrating right mid to lower lung consolidation with multiple other smaller patchy and nodular opacities scattered throughout both lungs with bulky mediastinal LAD. No pleural effusion.   -management of CAP as per above  - DUOned q6h standing, tessalon pearls  -Pulmonary consulted, appreciate recs

## 2021-04-30 NOTE — DISCHARGE NOTE PROVIDER - NSDCMRMEDTOKEN_GEN_ALL_CORE_FT
benzonatate 100 mg oral capsule: 1 cap(s) orally 3 times a day, As needed, Cough  Lexapro 10 mg oral tablet: 1 tab(s) orally once a day  omeprazole 20 mg oral delayed release tablet: 1 tab(s) orally once a day   benzonatate 100 mg oral capsule: 1 cap(s) orally 3 times a day, As needed, Cough  levoFLOXacin 750 mg oral tablet: 1 tab(s) orally once a day   Lexapro 10 mg oral tablet: 1 tab(s) orally once a day  omeprazole 20 mg oral delayed release tablet: 1 tab(s) orally once a day  predniSONE 20 mg oral tablet: 2 tab(s) orally once a day

## 2021-04-30 NOTE — CONSULT NOTE ADULT - SUBJECTIVE AND OBJECTIVE BOX
CHIEF COMPLAINT: Cough, abnormal outpatient labs    HPI:  89F nonsmoker with no significant pmhx presenting with acute on chronic productive cough. Pulmonary consulted for recurrent pneumonia and cough. Patient reports she was first diagnosed with pneumonia 2 years ago but she was minimally symptomatic at that time and was treated with a medicine she can't recall. She was again diagnosed with a pneumonia 1 year later with cough. More recently she has had episodes of mostly dry coughing with occasional white sputum production. She denies ever having fevers. She sometimes has wheezing but not currently. Denies SOB, weight loss, hemoptysis, night sweats. She has taken steroids several times with improvement in her cough. She notes last was on prednisone 2 weeks ago (can't recall dose). She was following with a pulmonary doctor who had obtained CT chest which showed extensive RLL consolidation and small areas of consolidation in the LLL, irregular RUL opacity, nodules and mediastinal lymphadenopathy. She had been prescribed courses of abx as well without improvement. Underwent bronchoscopy that was normal structurally with lavage 2020. Cytology was negative for malignancy but positive for non-specific inflammation. 1/2 of cultures grew NED from BAL.     Path from OSH records  Surgical pathology report [facility]  Date of collection 2020  Right lower lung biopsy:  -superficial bronchial type epithelium with chronic inflammation  -fragments of histologically unremarkable cartilage.    Cytopathology report 2020 [facility]  -negative for malignant cells  -abundant acute inflammatory cells.    PFT showing obstructive and restrictive pattern (FEV1 42%, FVC 50, FEV/FVC 24%).     PAST MEDICAL & SURGICAL HISTORY:  No pertinent past medical history    No significant past surgical history        FAMILY HISTORY:  No pertinent family history in first degree relatives        SOCIAL HISTORY:  Smoking: [x ] Never Smoked [ ] Former Smoker (__ packs x ___ years) [ ] Current Smoker        Allergies    No Known Allergies    Intolerances        HOME MEDICATIONS:  Home Medications:  Lexapro 10 mg oral tablet: 1 tab(s) orally once a day (2021 09:10)  omeprazole 20 mg oral delayed release tablet: 1 tab(s) orally once a day (2021 09:11)      REVIEW OF SYSTEMS:  Constitutional: [ ] negative [ ] fevers [ ] chills [ ] weight loss [ ] weight gain [] as noted in the HPI  HEENT: [ ] negative [ ] dry eyes [ ] eye irritation [ ] postnasal drip [ ] nasal congestion  [] as noted in the HPI  CV: [ ] negative  [ ] chest pain [ ] orthopnea [ ] palpitations [ ] murmur [] as noted in the HPI  Resp: [ ] negative [ ] cough [ ] shortness of breath [ ] dyspnea [ ] wheezing [ ] sputum [ ] hemoptysis [] as noted in the HPI  GI: [ ] negative [ ] nausea [ ] vomiting [ ] diarrhea [ ] constipation [ ] abd pain [ ] dysphagia [] as noted in the HPI  : [ ] negative [ ] dysuria [ ] nocturia [ ] hematuria [ ] increased urinary frequency [] as noted in the HPI  Musculoskeletal: [ ] negative [ ] back pain [ ] myalgias [ ] arthralgias [ ] fracture [] as noted in the HPI  Skin: [ ] negative [ ] rash [ ] itch [] as noted in the HPI  Neurological: [ ] negative [ ] headache [ ] dizziness [ ] syncope [ ] weakness [ ] numbness [] as noted in the HPI  Psychiatric: [ ] negative [ ] anxiety [ ] depression [] as noted in the HPI  Endocrine: [ ] negative [ ] diabetes [ ] thyroid problem [] as noted in the HPI  Hematologic/Lymphatic: [ ] negative [ ] anemia [ ] bleeding problem [] as noted in the HPI  Allergic/Immunologic: [ ] negative [ ] itchy eyes [ ] nasal discharge [ ] hives [ ] angioedema [] as noted in the HPI  [x ] All other systems negative except as stated in the HPI  [ ] Unable to assess ROS because ________    OBJECTIVE:  ICU Vital Signs Last 24 Hrs  T(C): 37.4 (2021 05:21), Max: 37.4 (2021 05:21)  T(F): 99.3 (2021 05:21), Max: 99.3 (2021 05:21)  HR: 107 (2021 05:21) (89 - 107)  BP: 102/65 (2021 05:21) (100/66 - 116/72)  BP(mean): --  ABP: --  ABP(mean): --  RR: 18 (2021 05:21) (18 - 20)  SpO2: 94% (2021 05:21) (93% - 99%)        04-29 @ 07:01  -   @ 07:00  --------------------------------------------------------  IN: 360 mL / OUT: 600 mL / NET: -240 mL      CAPILLARY BLOOD GLUCOSE          PHYSICAL EXAM:  General: NAD, speaking in full sentences  HEENT: NC/AT, anicteric  Lymph Nodes: no cervical LAD   Neck: no jvd, supple  Respiratory: respiratory effort normal, decreased on the right with a few crackles  Cardiovascular: rrr, nls1s2, no m/r/g  Abdomen: soft, ntnd  Extremities: no c/c/e  Skin: no rashes, breakdown  Neurological: non-focal, normal muscle tone  Psychiatry: aox3, appropriate affect    HOSPITAL MEDICATIONS:  Standing Meds:  albuterol/ipratropium for Nebulization 3 milliLiter(s) Nebulizer every 6 hours  azithromycin  IVPB 500 milliGRAM(s) IV Intermittent every 24 hours  cefTRIAXone   IVPB 1000 milliGRAM(s) IV Intermittent every 24 hours  enoxaparin Injectable 40 milliGRAM(s) SubCutaneous daily  escitalopram 10 milliGRAM(s) Oral daily  magnesium oxide 400 milliGRAM(s) Oral two times a day with meals  pantoprazole    Tablet 40 milliGRAM(s) Oral before breakfast  potassium chloride   Powder 40 milliEquivalent(s) Oral once      PRN Meds:  acetaminophen   Tablet .. 650 milliGRAM(s) Oral every 6 hours PRN  benzonatate 100 milliGRAM(s) Oral three times a day PRN      LABS:                        10.1   16.39 )-----------( 352      ( 2021 07:18 )             31.7     Hgb Trend: 10.1<--, 10.3<--, 11.1<--      135  |  101  |  8   ----------------------------<  103<H>  3.5   |  20<L>  |  0.69    Ca    9.4      2021 07:11  Phos  2.6       Mg     1.8         TPro  6.0  /  Alb  3.3  /  TBili  0.3  /  DBili  x   /  AST  12  /  ALT  16  /  AlkPhos  175<H>      Creatinine Trend: 0.69<--, 0.76<--, 0.83<--  PT/INR - ( 2021 23:47 )   PT: 14.8 sec;   INR: 1.25 ratio         PTT - ( 2021 23:47 )  PTT:33.5 sec  Urinalysis Basic - ( 2021 01:15 )    Color: Yellow / Appearance: Slightly Turbid / S.026 / pH: x  Gluc: x / Ketone: Negative  / Bili: Negative / Urobili: Negative   Blood: x / Protein: 30 mg/dL / Nitrite: Negative   Leuk Esterase: Large / RBC: 4 /hpf / WBC 86 /HPF   Sq Epi: x / Non Sq Epi: 4 /hpf / Bacteria: Negative        Venous Blood Gas:   @ 23:49  7.40/43/21//29  VBG Lactate: 1.5      MICROBIOLOGY:     Culture - Sputum (collected 2021 17:04)  Source: .Sputum Sputum  Gram Stain (2021 22:22):    Few polymorphonuclear leukocytes per low power field    Few Squamous epithelial cells per low power field    Few Gram positive cocci in pairs per oil power field    Few Gram Positive Rods per oil power field    Culture - Blood (collected 2021 05:34)  Source: .Blood Blood-Peripheral  Preliminary Report (2021 06:01):    No growth to date.    Culture - Blood (collected 2021 05:34)  Source: .Blood Blood-Peripheral  Preliminary Report (2021 06:01):    No growth to date.        =================================================================================================  EKG:    RADIOLOGY:    [ ] Reviewed and interpreted by me  < from: Xray Chest 1 View- PORTABLE-Urgent (21 @ 00:21) >  IMPRESSION:    Patchy right lung opacities which may represent atelectasis or pneumonia. Associated right pleural effusion.    Further information may be obtained from the patient's CT of the chest which was pending at the time of this dictation.    < end of copied text >    < from: CT Chest No Cont (21 @ 16:04) >  INTERPRETATION:  CLINICAL INDICATION: Evaluate for pneumonia, right pleural effusion.    Axial CT images of the chest are obtained without intravenous administration of contrast.    No prior chest CTs are available for comparison.    No enlarged axillary lymph nodes nodes. Multiple enlarged bulky mediastinal lymph nodes with the largest in a subcarinal location measuring about 2.8 cm in anteroposterior dimension. No pericardial effusion. Coronary artery calcification. No pleural effusion.    Evaluation of the upper abdomen demonstrate partially imaged left renal cyst.    Evaluation of the lungs are limited due to superimposed respiratory motion artifact. Dominant large masslike consolidation within the right mid to lower lung with involvement of the right middle and the right lower lobe and associated internal air bronchograms. Smaller patchy consolidation within the left lower lobe.    Multiple other smaller nodular and patchy opacities scattered within the left lower lobe and the upper lobes.    Degenerative changes of the spine and the shoulders. Old healed right rib fractures. Old lower sternal fracture deformity.    Right breast calcification.    IMPRESSION: Dominant right mid to lower lung consolidation with multiple other smaller patchy and nodular opacities scattered throughout both lungs. Differential diagnosis include infectious and/or neoplastic etiologies.    Bulky mediastinal lymphadenopathy.    Comparison with prior studies would be helpful for further evaluation and if images are made available, an addendum can be issued. Alternatively a 1 month follow-up noncontrast chest CT can be performed for further evaluation.    < end of copied text >    =================================================================================================  Point of Care Ultrasound Findings:  Right lower consolidation with air bronchograms  No pleural effusion

## 2021-04-30 NOTE — CONSULT NOTE ADULT - ATTENDING COMMENTS
89 F nonsmoker here with acute on chronic productive cough, found to have abnormal CT chest.  Patient states she has had these findings more or less for the last two years.  Multiple courses of abx and steroids with improvement in symptoms (most with steroids), but recur once steroids are stopped.  She had a bronchoscopy with what sounds like a transbronchial biopsy that showed chronic inflammation, and appeared to be non-diagnostic.  Does not appear to have had mediastinal LN biopsy though.  Had cultures that grew NED, which actually may be a true positive (usually, it would be unusual if the smear was positive and the culture was negative, but if the smear is negative and culture positive, that implies that there really is NED).  Ddx of her consolidation/mediastinal LAD includes malignancy vs cryptogenic organizing pneumonia vs sarcoid.  NED could be a secondary issue here.    She is very clear that she does not want any procedures and wants to go home. She is just wheezing and having a cough, but does not think she needs to stay in the hospital. We offered her even a transthoracic biopsy, explaining that this could be cancer, but she would rather not have anything done even if it is cancer, which is reasonable given her age (which is what she says).    Would start her on a prolonged course of prednisone (40 mg po daily x 7 days, 30 mg po daily x 7 days) for possible cryptogenic organizing pna, and have her follow up with her pulmonologist in 2 weeks, who can decide on a further taper and/or PCP ppx.    Would start her on albuterol prn for her wheezing/asthma component (has obstructive pfts)    She may have a superimposed CAP, but can be switched to po abx today or tomorrow.

## 2021-04-30 NOTE — PROGRESS NOTE ADULT - PROBLEM SELECTOR PLAN 3
-Pt denies any urinary symptoms, however UA moderately positive  -cw CTX to cover common gram neg bacteria  -f/u urine cx  -monitor urine output -Pt denies any urinary symptoms, however UA moderately positive  -cw CTX to cover common gram neg bacteria  -urine cx - neg  -monitor urine output

## 2021-04-30 NOTE — PROGRESS NOTE ADULT - SUBJECTIVE AND OBJECTIVE BOX
Jordi Reynolds, PGY-1  Pager: [phone] / [phone]    CHIEF COMPLAINT: Patient is a 89y old  Female who presents with a chief complaint of Cough (2021 07:22)    INTERVAL HPI/OVERNIGHT EVENTS: AUDRA, pt cough slightly improved. No fever, chills, CP, sob.     MEDICATIONS (STANDING):  albuterol/ipratropium for Nebulization 3 milliLiter(s) Nebulizer every 6 hours  azithromycin  IVPB 500 milliGRAM(s) IV Intermittent every 24 hours  cefTRIAXone   IVPB 1000 milliGRAM(s) IV Intermittent every 24 hours  enoxaparin Injectable 40 milliGRAM(s) SubCutaneous daily  escitalopram 10 milliGRAM(s) Oral daily  magnesium oxide 400 milliGRAM(s) Oral two times a day with meals  pantoprazole    Tablet 40 milliGRAM(s) Oral before breakfast    MEDICATIONS  (PRN):  acetaminophen   Tablet .. 650 milliGRAM(s) Oral every 6 hours PRN  benzonatate 100 milliGRAM(s) Oral three times a day PRN    REVIEW OF SYSTEMS:  CONSTITUTIONAL: No fever, +weight loss, no fatigue  EYES: No eye pain, visual disturbances, or discharge  ENMT:  No difficulty hearing, tinnitus, vertigo; No sinus or throat pain  NECK: No pain or stiffness  RESPIRATORY: +cough, +wheezing, no chills or hemoptysis; No shortness of breath  CARDIOVASCULAR: No chest pain, palpitations, dizziness, or leg swelling  GASTROINTESTINAL: No abdominal or epigastric pain. No nausea, vomiting, or hematemesis; No diarrhea or constipation. No melena or hematochezia.  GENITOURINARY: No dysuria, frequency, hematuria, or incontinence  NEUROLOGICAL: No headaches, memory loss, loss of strength, numbness, or tremors  SKIN: No itching, burning, rashes, or lesions   MUSCULOSKELETAL: No joint pain or swelling; No muscle, back, or extremity pain    VITAL SIGNS:  T(F): 99.3 (21 @ 05:21), Max: 99.3 (21 @ 05:21)  HR: 107 (21 @ 05:21) (89 - 107)  BP: 102/65 (21 @ 05:21) (100/66 - 116/72)  RR: 18 (21 @ 05:21) (18 - 20)  SpO2: 94% (21 @ 05:21) (93% - 99%)    I&O's Summary  2021 07:01  -  2021 07:00  --------------------------------------------------------  IN: 360 mL / OUT: 600 mL / NET: -240 mL      PHYSICAL EXAM:  GENERAL: NAD, well-groomed, well-developed  NERVOUS SYSTEM:  Alert & Oriented X3, Good concentration; Motor Strength 5/5 B/L upper and lower extremities  CHEST/LUNG: Wheezing b/l, reduced breathing sounds R lower field  HEART: Regular rate and rhythm; No murmurs, rubs, or gallops  ABDOMEN: Soft, Nontender, Nondistended; Bowel sounds present  EXTREMITIES:  2+ Peripheral Pulses, No clubbing, cyanosis. trace edema BLE  LYMPH: No lymphadenopathy noted      LABS:                        10.1   16.39 )-----------( 352      ( 2021 07:18 )             31.7     0430    135  |  101  |  8   ----------------------------<  103<H>  3.5   |  20<L>  |  0.69    Ca    9.4      2021 07:11  Phos  2.6       Mg     1.8         TPro  6.0  /  Alb  3.3  /  TBili  0.3  /  DBili  x   /  AST  12  /  ALT  16  /  AlkPhos  175<H>      PT/INR - ( 2021 23:47 )   PT: 14.8 sec;   INR: 1.25 ratio         PTT - ( 2021 23:47 )  PTT:33.5 sec  Urinalysis Basic - ( 2021 01:15 )    Color: Yellow / Appearance: Slightly Turbid / S.026 / pH: x  Gluc: x / Ketone: Negative  / Bili: Negative / Urobili: Negative   Blood: x / Protein: 30 mg/dL / Nitrite: Negative   Leuk Esterase: Large / RBC: 4 /hpf / WBC 86 /HPF   Sq Epi: x / Non Sq Epi: 4 /hpf / Bacteria: Negative    RADIOLOGY & ADDITIONAL TESTS:    < from: CT Chest No Cont (21 @ 16:04) >  INTERPRETATION:  CLINICAL INDICATION: Evaluate for pneumonia, right pleural effusion.    Axial CT images of the chest are obtained without intravenous administration of contrast.  No prior chest CTs are available for comparison.  No enlarged axillary lymph nodes nodes. Multiple enlarged bulky mediastinal lymph nodes with the largest in a subcarinal location measuring about 2.8 cm in anteroposterior dimension. No pericardial effusion. Coronary artery calcification. No pleural effusion.  Evaluation of the upper abdomen demonstrate partially imaged left renal cyst.  Evaluation of the lungs are limited due to superimposed respiratory motion artifact. Dominant large masslike consolidation within the right mid to lower lung with involvement of the right middle and the right lower lobe and associated internal air bronchograms. Smaller patchy consolidation within the left lower lobe.  Multiple other smaller nodular and patchy opacities scattered within the left lower lobe and the upper lobes.  Degenerative changes of the spine and the shoulders. Old healed right rib fractures. Old lower sternal fracture deformity.  Right breast calcification.    IMPRESSION: Dominant right mid to lower lung consolidation with multiple other smaller patchy and nodular opacities scattered throughout both lungs. Differential diagnosis include infectious and/or neoplastic etiologies.  Bulky mediastinal lymphadenopathy.  Comparison with prior studies would be helpful for further evaluation and if images are made available, an addendum can be issued. Alternatively a 1 month follow-up noncontrast chest CT can be performed for further evaluation.      < end of copied text >    Legionella pneumophila Antigen, Urine (21 @ 16:26)    Legionella Antigen, Urine: Negative: Positive Testing method: Immunochromatographic Assay.  Presumptive detection of L. pneumophila serogroup 1 antigen in urine,  suggesting recent or current infection. Order “Culture –Legionella” as  recommended to confirm infection.  Negative Testing method: Immunochromatographic Assay.  L. pneumophila serogroup 1 antigen in urine NOT detected, suggesting NO  recent or current infection. Infection due to Legionella cannot be ruled  out: other serogroups and species may cause disease, antigen may not be  present in urine in early infection, or the level of antigens in urine  may be below the detection limit of the test.  Order “Culture  –Legionella” is recommended for uncommon cases of suspected Legionella  pneumonia due to organisms other thanL. pneumophila serogroup 1.    Culture - Urine (21 @ 04:33)    Specimen Source: .Urine Clean Catch (Midstream)    Culture Results:   >=3 organisms. Probable collection contamination.     Jordi Reynolds, PGY-1  Pager: [phone] / [phone]    CHIEF COMPLAINT: Patient is a 89y old  Female who presents with a chief complaint of Cough (2021 07:22)    INTERVAL HPI/OVERNIGHT EVENTS: AUDRA, pt cough slightly improved. No fever, chills, CP, sob.     MEDICATIONS (STANDING):  albuterol/ipratropium for Nebulization 3 milliLiter(s) Nebulizer every 6 hours  azithromycin  IVPB 500 milliGRAM(s) IV Intermittent every 24 hours  cefTRIAXone   IVPB 1000 milliGRAM(s) IV Intermittent every 24 hours  enoxaparin Injectable 40 milliGRAM(s) SubCutaneous daily  escitalopram 10 milliGRAM(s) Oral daily  magnesium oxide 400 milliGRAM(s) Oral two times a day with meals  pantoprazole    Tablet 40 milliGRAM(s) Oral before breakfast    MEDICATIONS  (PRN):  acetaminophen   Tablet .. 650 milliGRAM(s) Oral every 6 hours PRN  benzonatate 100 milliGRAM(s) Oral three times a day PRN    REVIEW OF SYSTEMS:  CONSTITUTIONAL: No fever, +weight loss, no fatigue  EYES: No eye pain, visual disturbances, or discharge  ENMT:  No difficulty hearing, tinnitus, vertigo; No sinus or throat pain  NECK: No pain or stiffness  RESPIRATORY: +cough, +wheezing, no chills or hemoptysis; No shortness of breath  CARDIOVASCULAR: No chest pain, palpitations, dizziness, or leg swelling  GASTROINTESTINAL: No abdominal or epigastric pain. No nausea, vomiting, or hematemesis; No diarrhea or constipation. No melena or hematochezia.  GENITOURINARY: No dysuria, frequency, hematuria, or incontinence  NEUROLOGICAL: No headaches, memory loss, loss of strength, numbness, or tremors  SKIN: No itching, burning, rashes, or lesions   MUSCULOSKELETAL: No joint pain or swelling; No muscle, back, or extremity pain    VITAL SIGNS:  T(F): 99.3 (21 @ 05:21), Max: 99.3 (21 @ 05:21)  HR: 107 (21 @ 05:21) (89 - 107)  BP: 102/65 (21 @ 05:21) (100/66 - 116/72)  RR: 18 (21 @ 05:21) (18 - 20)  SpO2: 94% (21 @ 05:21) (93% - 99%)    I&O's Summary  2021 07:01  -  2021 07:00  --------------------------------------------------------  IN: 360 mL / OUT: 600 mL / NET: -240 mL      PHYSICAL EXAM:  GENERAL: NAD, well-groomed, well-developed  NERVOUS SYSTEM:  Alert & Oriented X3, Good concentration; Motor Strength 5/5 B/L upper and lower extremities  CHEST/LUNG: Wheezing b/l, reduced breathing sounds R lower field  HEART: Regular rate and rhythm; No murmurs, rubs, or gallops  ABDOMEN: Soft, Nontender, Nondistended; Bowel sounds present  EXTREMITIES:  2+ Peripheral Pulses, No clubbing, cyanosis. trace edema BLE  LYMPH: No lymphadenopathy noted      LABS:                        10.1   16.39 )-----------( 352      ( 2021 07:18 )             31.7     0430    135  |  101  |  8   ----------------------------<  103<H>  3.5   |  20<L>  |  0.69    Ca    9.4      2021 07:11  Phos  2.6       Mg     1.8         TPro  6.0  /  Alb  3.3  /  TBili  0.3  /  DBili  x   /  AST  12  /  ALT  16  /  AlkPhos  175<H>      PT/INR - ( 2021 23:47 )   PT: 14.8 sec;   INR: 1.25 ratio         PTT - ( 2021 23:47 )  PTT:33.5 sec  Urinalysis Basic - ( 2021 01:15 )    Color: Yellow / Appearance: Slightly Turbid / S.026 / pH: x  Gluc: x / Ketone: Negative  / Bili: Negative / Urobili: Negative   Blood: x / Protein: 30 mg/dL / Nitrite: Negative   Leuk Esterase: Large / RBC: 4 /hpf / WBC 86 /HPF   Sq Epi: x / Non Sq Epi: 4 /hpf / Bacteria: Negative    RADIOLOGY & ADDITIONAL TESTS:    < from: CT Chest No Cont (21 @ 16:04) >  INTERPRETATION:  CLINICAL INDICATION: Evaluate for pneumonia, right pleural effusion.    Axial CT images of the chest are obtained without intravenous administration of contrast.  No prior chest CTs are available for comparison.  No enlarged axillary lymph nodes nodes. Multiple enlarged bulky mediastinal lymph nodes with the largest in a subcarinal location measuring about 2.8 cm in anteroposterior dimension. No pericardial effusion. Coronary artery calcification. No pleural effusion.  Evaluation of the upper abdomen demonstrate partially imaged left renal cyst.  Evaluation of the lungs are limited due to superimposed respiratory motion artifact. Dominant large masslike consolidation within the right mid to lower lung with involvement of the right middle and the right lower lobe and associated internal air bronchograms. Smaller patchy consolidation within the left lower lobe.  Multiple other smaller nodular and patchy opacities scattered within the left lower lobe and the upper lobes.  Degenerative changes of the spine and the shoulders. Old healed right rib fractures. Old lower sternal fracture deformity.  Right breast calcification.    IMPRESSION: Dominant right mid to lower lung consolidation with multiple other smaller patchy and nodular opacities scattered throughout both lungs. Differential diagnosis include infectious and/or neoplastic etiologies.  Bulky mediastinal lymphadenopathy.  Comparison with prior studies would be helpful for further evaluation and if images are made available, an addendum can be issued. Alternatively a 1 month follow-up noncontrast chest CT can be performed for further evaluation.      < end of copied text >    Legionella pneumophila Antigen, Urine (21 @ 16:26)    Legionella Antigen, Urine: Negative: Positive Testing method: Immunochromatographic Assay.  Presumptive detection of L. pneumophila serogroup 1 antigen in urine,  suggesting recent or current infection. Order “Culture –Legionella” as  recommended to confirm infection.  Negative Testing method: Immunochromatographic Assay.  L. pneumophila serogroup 1 antigen in urine NOT detected, suggesting NO  recent or current infection. Infection due to Legionella cannot be ruled  out: other serogroups and species may cause disease, antigen may not be  present in urine in early infection, or the level of antigens in urine  may be below the detection limit of the test.  Order “Culture  –Legionella” is recommended for uncommon cases of suspected Legionella  pneumonia due to organisms other thanL. pneumophila serogroup 1.    Culture - Urine (21 @ 04:33)    Specimen Source: .Urine Clean Catch (Midstream)    Culture Results:   >=3 organisms. Probable collection contamination.    Culture - Blood (21 @ 05:34)    Specimen Source: .Blood Blood-Peripheral    Culture Results:   No growth to date.    Culture - Blood (21 @ 05:34)    Specimen Source: .Blood Blood-Peripheral    Culture Results:   No growth to date.    Culture - Sputum . (21 @ 17:04)    Gram Stain:   Few polymorphonuclear leukocytes per low power field  Few Squamous epithelial cells per low power field  Few Gram positive cocci in pairs per oil power field  Few Gram Positive Rods per oil power field    Specimen Source: .Sputum Sputum

## 2021-04-30 NOTE — PROGRESS NOTE ADULT - PROBLEM SELECTOR PLAN 1
Tachycardia and leukocytosis, pt is HD stable and afebrile.  -s/p 1L NS in the ED  -Lactate nl 1.5  - DDx: PNA vs. UTI   -COVID PCR negative   -f/u blood culture, urine culture   -cw azithromycin + ceftriaxone Tachycardia and leukocytosis, pt is HD stable and afebrile.  -s/p 1L NS in the ED  -Lactate nl 1.5  - DDx: PNA vs. UTI   -COVID PCR negative   -f/u blood culture - NGTD, urine culture - NEG  -cw azithromycin + ceftriaxone

## 2021-04-30 NOTE — DISCHARGE NOTE PROVIDER - NSDCCPTREATMENT_GEN_ALL_CORE_FT
PRINCIPAL PROCEDURE  Procedure: CT chest w con  Findings and Treatment: IMPRESSION: Dominant right mid to lower lung consolidation with multiple other smaller patchy and nodular opacities scattered throughout both lungs. Differential diagnosis include infectious and/or neoplastic etiologies.  Bulky mediastinal lymphadenopathy.  Comparison with prior studies would be helpful for further evaluation and if images are made available, an addendum can be issued. Alternatively a 1 month follow-up noncontrast chest CT can be performed for further evaluation.      SECONDARY PROCEDURE  Procedure: CT chest w con  Findings and Treatment: Axial CT images of the chest are obtained without intravenous administration of contrast.  No prior chest CTs are available for comparison.  No enlarged axillary lymph nodes nodes. Multiple enlarged bulky mediastinal lymph nodes with the largest in a subcarinal location measuring about 2.8 cm in anteroposterior dimension. No pericardial effusion. Coronary artery calcification. No pleural effusion.  Evaluation of the upper abdomen demonstrate partially imaged left renal cyst.  Evaluation of the lungs are limited due to superimposed respiratory motion artifact. Dominant large masslike consolidation within the right mid to lower lung with involvement of the right middle and the right lower lobe and associated internal air bronchograms. Smaller patchy consolidation within the left lower lobe.  Multiple other smaller nodular and patchy opacities scattered within the left lower lobe and the upper lobes.  Degenerative changes of the spine and the shoulders. Old healed right rib fractures. Old lower sternal fracture deformity.  Right breast calcification.

## 2021-04-30 NOTE — PROGRESS NOTE ADULT - PROBLEM SELECTOR PLAN 5
-presented with normocytic anemia, however RDW elevated, suggesting mixed etiologies.  -pt is asymptomatic, no changes in BM, melena/bleed, however weight loss noted.  -f/u retic count  -f/u iron studies, B12, folic acid, TSH  -cbc qd -presented with normocytic anemia, however RDW elevated, suggesting mixed etiologies.  -pt is asymptomatic, no changes in BM, melena/bleed, however weight loss noted.  -f/u retic count  -f/u iron studies - not deficient, B12, folic acid - normal, TSH wnl  -cbc qd

## 2021-04-30 NOTE — DISCHARGE NOTE PROVIDER - HOSPITAL COURSE
89F nonsmoker with no significant pmhx presenting with acute on chronic productive cough.           Patient first developed a cough with white sputum production several months ago, associated with wheezing, no hemoptysis or SOB, no fever. Patient was prescribed oral steroid taper x 2 weeks (last steroid taken 1 week ago) which improved her cough. She reported had outpatient workup including CT and bronch though unable to tell the findings. Patient denied ever been prescribed antibiotics. Patient sent in by her PMD due to leukocytosis in outpatient lab and persistent cough.            ED: VS on admission: temp 99.1F, , /73, RR 18, 95% on RA.   CT chest: Dominant right mid to lower lung consolidation with multiple other smaller patchy and nodular opacities scattered throughout both lungs. Differential diagnosis include infectious and/or neoplastic etiologies.  Bulky mediastinal lymphadenopathy.  Comparison with prior studies would be helpful for further evaluation and if images are made available, an addendum can be issued. Alternatively a 1 month follow-up noncontrast chest CT can be performed for further evaluation.    Hospital course: Pt was started on CTX and Azithro to cover PNA and UTI. She clincally improved. Pulm was consulted and rec 2w of steroids and short term abx. Pulm offered biopsy/bronch as her persistent consolidation is suspicious for malignancy/chronic infection, however, the pt refused and asked to be discharged.    On discharge, pt was HD stable, she was examined and found fit to be d/c home with close outpt follow up with pulmonary.         89F nonsmoker with no significant pmhx presenting with acute on chronic productive cough.    Patient first developed a cough with white sputum production several months ago, associated with wheezing, no hemoptysis or SOB, no fever. Patient was prescribed oral steroid taper x 2 weeks (last steroid taken 1 week ago) which improved her cough. She reported had outpatient workup including CT and bronch though unable to tell the findings. Patient denied ever been prescribed antibiotics. Patient sent in by her PMD due to leukocytosis in outpatient lab and persistent cough.     ED: VS on admission: temp 99.1F, , /73, RR 18, 95% on RA.   CT chest: Dominant right mid to lower lung consolidation with multiple other smaller patchy and nodular opacities scattered throughout both lungs. Differential diagnosis include infectious and/or neoplastic etiologies.  Bulky mediastinal lymphadenopathy.  Comparison with prior studies would be helpful for further evaluation and if images are made available, an addendum can be issued. Alternatively a 1 month follow-up noncontrast chest CT can be performed for further evaluation.    Hospital course: Pt was started on CTX and Azithro to cover PNA and UTI. She clincally improved. Pulm was consulted and rec 2w of steroids and short term abx. Pulm offered biopsy/bronch as her persistent consolidation is suspicious for malignancy/chronic infection, however, the pt refused and asked to be discharged.    On discharge, pt was HD stable, she was examined and found fit to be d/c home with close outpt follow up with pulmonary.         89F nonsmoker with no significant pmhx presenting with acute on chronic productive cough.    Patient first developed a cough with white sputum production several months ago, associated with wheezing, no hemoptysis or SOB, no fever. Patient was prescribed oral steroid taper x 2 weeks (last steroid taken 1 week ago) which improved her cough. She reported had outpatient workup including CT and bronch though unable to tell the findings. Patient denied ever been prescribed antibiotics. Patient sent in by her PMD due to leukocytosis in outpatient lab and persistent cough.     ED: VS on admission: temp 99.1F, , /73, RR 18, 95% on RA.   CT chest: Dominant right mid to lower lung consolidation with multiple other smaller patchy and nodular opacities scattered throughout both lungs. Differential diagnosis include infectious and/or neoplastic etiologies.  Bulky mediastinal lymphadenopathy.  Comparison with prior studies would be helpful for further evaluation and if images are made available, an addendum can be issued. Alternatively a 1 month follow-up noncontrast chest CT can be performed for further evaluation.    Hospital course: Pt was started on CTX and Azithro to cover PNA and UTI. She clincally improved. Pulm was consulted and rec 2w of steroids and short term abx. Pulm offered biopsy/bronch as her persistent consolidation is suspicious for malignancy/chronic infection, however, the pt refused and asked to be discharged. She will complete 3 days of Levaquin     On discharge, pt was HD stable, she was examined and found fit to be d/c home with close outpt follow up with pulmonary.

## 2021-04-30 NOTE — PROGRESS NOTE ADULT - PROBLEM SELECTOR PLAN 2
Pt with increasingly productive cough, no sick contacts/travel  - CURB-65 score = 1  - CXR showing patchy right lower lung opacities  - cw CTX and Azithro to cover CAP  - f/u Bcx, RVP, sputum culture, urine legionella Pt with increasingly productive cough, no sick contacts/travel  - CURB-65 score = 1  - CXR showing patchy right lower lung opacities  - cw CTX and Azithro to cover CAP  - f/u Bcx, RVP, sputum culture - pending, urine legionella -neg Pt with increasingly productive cough, no sick contacts/travel  - CURB-65 score = 1  - CXR showing patchy right lower lung opacities  - cw CTX and Azithro to cover CAP  - f/u Bcx, RVP, sputum culture - gram pos cocci and rods, pending final culture, urine legionella -neg

## 2021-04-30 NOTE — CONSULT NOTE ADULT - ASSESSMENT
PRELIM RECS -- FULL NOTE TO FOLLOW    #Nonresolving PNA  DDx broad including organizing pneumonia, malignancy, other inflammatory lung conditions; acute infectious etiologies seem less likely though may be superinfection,  -Would pursue EBUS w/ FNA of LAD and TBBX of RLL however patient declining currently  -She is also not interested in transthoracic biopsy at this time  -Can complete short course of abx  -Would start steroids and have outpatient follow up but told patient I would discuss with daughter and can readdress role of biopsy first  -If she continues to decline, we can see outpatient and if she changes her mind arrange for bronch as above  -Can send repeat sputum AFB for NED, follow up respiratory culture    FULL NOTE TO FOLLOW  89F nonsmoker with no significant pmhx presenting with acute on chronic productive cough. Pulmonary consulted for recurrent pneumonia and cough.     #Nonresolving PNA  Patient presenting with non-resolving vs recurrent pneumonia with large right sided consolidation and smaller opacities in multiple lobes. Associated with lymphadenopathy. DDx broad including organizing pneumonia, malignancy, other inflammatory lung conditions; acute infectious etiologies seem less likely though may be superinfection. While the lymphadenopathy is not typical of organizing pneumonia it can be seen and the patient reports improvement with steroids and is declining biopsy at this time.  -Offered EBUS w/ FNA of LAD and TBBX of RLL however patient declining currently  -She is also not interested in transthoracic biopsy at this time either  -Can complete short course of abx for CAP for possible superimposed infection  -Recommend starting prednisone 40mg PO. She will likely need a prolonged course and consideration to PCP prophylaxis should be considered (can be considered on outpatient follow up)  -She will need good outpatient follow up with pulmonary (can see her own or if she prefers with us) within 1-2 weeks  -Outpatient bronchoscopy/EBUS can be offered if patient changes her mind  -Can send repeat sputum AFB for NED, follow up respiratory culture  -Can switch to PO antibiotics and if stable can likely discharge over the weekend    Medardo Herrera MD, PGY6  Pulmonary and Critical Care Fellow  [phone]

## 2021-04-30 NOTE — DISCHARGE NOTE PROVIDER - PROVIDER TOKENS
FREE:[LAST:[Trae],FIRST:[Zev],PHONE:[phone]],FAX:[(   )    -],FOLLOWUP:[1 week],ESTABLISHEDPATIENT:[T]]

## 2021-05-01 ENCOUNTER — TRANSCRIPTION ENCOUNTER (OUTPATIENT)
Age: 86
End: 2021-05-01

## 2021-05-01 VITALS
DIASTOLIC BLOOD PRESSURE: 64 MMHG | HEART RATE: 91 BPM | OXYGEN SATURATION: 96 % | RESPIRATION RATE: 18 BRPM | TEMPERATURE: 98 F | SYSTOLIC BLOOD PRESSURE: 95 MMHG

## 2021-05-01 LAB
CULTURE RESULTS: SIGNIFICANT CHANGE UP
SPECIMEN SOURCE: SIGNIFICANT CHANGE UP

## 2021-05-01 PROCEDURE — 87449 NOS EACH ORGANISM AG IA: CPT

## 2021-05-01 PROCEDURE — 82947 ASSAY GLUCOSE BLOOD QUANT: CPT

## 2021-05-01 PROCEDURE — 87086 URINE CULTURE/COLONY COUNT: CPT

## 2021-05-01 PROCEDURE — 80053 COMPREHEN METABOLIC PANEL: CPT

## 2021-05-01 PROCEDURE — 85730 THROMBOPLASTIN TIME PARTIAL: CPT

## 2021-05-01 PROCEDURE — 96374 THER/PROPH/DIAG INJ IV PUSH: CPT

## 2021-05-01 PROCEDURE — 82746 ASSAY OF FOLIC ACID SERUM: CPT

## 2021-05-01 PROCEDURE — 82728 ASSAY OF FERRITIN: CPT

## 2021-05-01 PROCEDURE — U0003: CPT

## 2021-05-01 PROCEDURE — 83735 ASSAY OF MAGNESIUM: CPT

## 2021-05-01 PROCEDURE — 84132 ASSAY OF SERUM POTASSIUM: CPT

## 2021-05-01 PROCEDURE — 85027 COMPLETE CBC AUTOMATED: CPT

## 2021-05-01 PROCEDURE — 82435 ASSAY OF BLOOD CHLORIDE: CPT

## 2021-05-01 PROCEDURE — 87070 CULTURE OTHR SPECIMN AEROBIC: CPT

## 2021-05-01 PROCEDURE — 84145 PROCALCITONIN (PCT): CPT

## 2021-05-01 PROCEDURE — 85610 PROTHROMBIN TIME: CPT

## 2021-05-01 PROCEDURE — 87040 BLOOD CULTURE FOR BACTERIA: CPT

## 2021-05-01 PROCEDURE — 84100 ASSAY OF PHOSPHORUS: CPT

## 2021-05-01 PROCEDURE — 99239 HOSP IP/OBS DSCHRG MGMT >30: CPT | Mod: GC

## 2021-05-01 PROCEDURE — 99285 EMERGENCY DEPT VISIT HI MDM: CPT

## 2021-05-01 PROCEDURE — 97161 PT EVAL LOW COMPLEX 20 MIN: CPT

## 2021-05-01 PROCEDURE — 82565 ASSAY OF CREATININE: CPT

## 2021-05-01 PROCEDURE — 83550 IRON BINDING TEST: CPT

## 2021-05-01 PROCEDURE — 85018 HEMOGLOBIN: CPT

## 2021-05-01 PROCEDURE — U0005: CPT

## 2021-05-01 PROCEDURE — 0225U NFCT DS DNA&RNA 21 SARSCOV2: CPT

## 2021-05-01 PROCEDURE — 71250 CT THORAX DX C-: CPT

## 2021-05-01 PROCEDURE — 83605 ASSAY OF LACTIC ACID: CPT

## 2021-05-01 PROCEDURE — 84295 ASSAY OF SERUM SODIUM: CPT

## 2021-05-01 PROCEDURE — 84443 ASSAY THYROID STIM HORMONE: CPT

## 2021-05-01 PROCEDURE — 82607 VITAMIN B-12: CPT

## 2021-05-01 PROCEDURE — 71045 X-RAY EXAM CHEST 1 VIEW: CPT

## 2021-05-01 PROCEDURE — 80048 BASIC METABOLIC PNL TOTAL CA: CPT

## 2021-05-01 PROCEDURE — 82330 ASSAY OF CALCIUM: CPT

## 2021-05-01 PROCEDURE — 85025 COMPLETE CBC W/AUTO DIFF WBC: CPT

## 2021-05-01 PROCEDURE — 94640 AIRWAY INHALATION TREATMENT: CPT

## 2021-05-01 PROCEDURE — 83540 ASSAY OF IRON: CPT

## 2021-05-01 PROCEDURE — 82803 BLOOD GASES ANY COMBINATION: CPT

## 2021-05-01 PROCEDURE — 86769 SARS-COV-2 COVID-19 ANTIBODY: CPT

## 2021-05-01 PROCEDURE — 96375 TX/PRO/DX INJ NEW DRUG ADDON: CPT

## 2021-05-01 PROCEDURE — 85014 HEMATOCRIT: CPT

## 2021-05-01 PROCEDURE — 81001 URINALYSIS AUTO W/SCOPE: CPT

## 2021-05-01 RX ORDER — CIPROFLOXACIN LACTATE 400MG/40ML
1 VIAL (ML) INTRAVENOUS
Qty: 3 | Refills: 0
Start: 2021-05-01 | End: 2021-05-03

## 2021-05-01 RX ADMIN — CEFTRIAXONE 100 MILLIGRAM(S): 500 INJECTION, POWDER, FOR SOLUTION INTRAMUSCULAR; INTRAVENOUS at 00:08

## 2021-05-01 RX ADMIN — Medication 3 MILLILITER(S): at 00:09

## 2021-05-01 RX ADMIN — AZITHROMYCIN 250 MILLIGRAM(S): 500 TABLET, FILM COATED ORAL at 01:30

## 2021-05-01 RX ADMIN — Medication 3 MILLILITER(S): at 13:00

## 2021-05-01 RX ADMIN — ENOXAPARIN SODIUM 40 MILLIGRAM(S): 100 INJECTION SUBCUTANEOUS at 13:01

## 2021-05-01 RX ADMIN — Medication 40 MILLIGRAM(S): at 06:00

## 2021-05-01 RX ADMIN — ESCITALOPRAM OXALATE 10 MILLIGRAM(S): 10 TABLET, FILM COATED ORAL at 13:00

## 2021-05-01 RX ADMIN — MAGNESIUM OXIDE 400 MG ORAL TABLET 400 MILLIGRAM(S): 241.3 TABLET ORAL at 13:00

## 2021-05-01 RX ADMIN — Medication 3 MILLILITER(S): at 06:00

## 2021-05-01 NOTE — PROGRESS NOTE ADULT - PROBLEM SELECTOR PLAN 2
-Pt denies any urinary symptoms, however UA moderately positive  -cw CTX to cover common gram neg bacteria  -urine cx - neg  -monitor urine output

## 2021-05-01 NOTE — DISCHARGE NOTE NURSING/CASE MANAGEMENT/SOCIAL WORK - NSCORESITESY/N_GEN_A_CORE_RD
Referred by: Blaine Hernandez MD; Medical Diagnosis (from order):    Diagnosis Information      Diagnosis    356.9 (ICD-9-CM) - G60.9 (ICD-10-CM) - Idiopathic peripheral neuropathy                Occupational Therapy -  Daily Treatment Note    Visit:  5     SUBJECTIVE                                                                                                             Patient reports improvement in numbness and tingling. Reports anterior shoulder pain is much improved from last session.        Pain / Symptoms:  Pain rating (out of 10): Current: 3     OBJECTIVE                                                                                                                          TREATMENT                                                                                                                  Therapeutic Exercise:  UT and levator stretch   Scapular retraction  Isometrics to the bilateral scapulas  AROM of the right and left shoulder horizontal abduction with support at the gleno humeral joint specifically the right into posterior glide  Ulnar nerve glide  ER isometrics- issued for HEP  Supine rhythmic stabilization in various planes    Manual Therapy:  STM into UT, levator, serratus, subscap  Scapular mobilizations  Gentle wrist flexor stretch  Joint mobs to the right shoulder posterior     Results: decreased pain  Reaction: no adverse reaction to treatment    Skilled input: verbal instruction/cues    Writer verbally educated and received verbal consent for hand placement, positioning of patient, and techniques to be performed today from patient for hand placement and palpation for techniques and therapist position for techniques as described above and how they are pertinent to the patient's plan of care.    Home Exercise Program: (*above indicates provided as part of home exercise program)  Rock tape       ASSESSMENT                                                                                                              Patient demonstrated improvement in shoulder ROM this date. Weakness noted into end ranges with rhythmic stabilization exercises into various planes this date. Plane to focus on ROM and strengthening.     Pain/symptoms after session: 1  Patient Education:   Results of above outlined education: Verbalizes understanding, Demonstrates understanding and Needs reinforcement       Procedures and total treatment time documented Time Entry flowsheet.     No

## 2021-05-01 NOTE — PROGRESS NOTE ADULT - PROBLEM SELECTOR PLAN 6
-presented with normocytic anemia, however RDW elevated, suggesting mixed etiologies.  -pt is asymptomatic, no changes in BM, melena/bleed, however weight loss noted.  -f/u retic count  -f/u iron studies, B12, folic acid, TSH  -cbc qd
-cw omeprazole
-cw omeprazole

## 2021-05-01 NOTE — PROGRESS NOTE ADULT - ASSESSMENT
89F nonsmoker with chronic obstructive-restrictive lung disease pw acute on chronic productive cough, found to be in sepsis 2/2 pneumonia

## 2021-05-01 NOTE — PROGRESS NOTE ADULT - ATTENDING COMMENTS
89F who presents with acute on chronic productive cough, found to be in sepsis 2/2 pneumonia vs. UTI.  Seen and examined at bedside, no acute events or complaints. Not hypoxic or requiring O2, no fevers or chills, no chest pain or palpitations, no urinary symptoms.  Sepsis 2/2 likely CAP, CURB65 score of 1: leukocytosis and tachycardia on admission with source identified; CXR with RLL involvement with R likely parapneumonia effusion; pending CT chest to further delineate CXR findings. +mild wheezing throughout lung fields on exam. Cont ceftriaxone and azithromycin, f/u sputum and blood cx, urinary antigens. Duonebs PRN. Antitussives.  UTI - UA incidentally positive; on antibiotics as above; f/u urine cx.  SHARAD - likely ATN in setting of sepsis and UTI, now resolved. Avoid nephrotoxins.
89F who presents with acute on chronic productive cough, found to be in sepsis 2/2 pneumonia vs. UTI.  Seen and examined at bedside, no acute events or complaints. States that given advanced age, not willing to undergo invasive procedures at this time.  Sepsis 2/2 likely CAP: CT chest reviewed. Cont ceftriaxone and azithromycin, levaquin on DC.   Outpatient records reviewed, questionable +NED via bronchoscopy, though per pulm unlikely. F/U outpatient.  UTI - UA incidentally positive; on antibiotics as above; f/u urine cx.  SHARAD - likely ATN in setting of sepsis and UTI, now resolved. Avoid nephrotoxins.  Dispo: discharge home. 35 minuets spent coordinating discharge
89F who presents with acute on chronic productive cough, found to be in sepsis 2/2 pneumonia vs. UTI.  Seen and examined at bedside, no acute events or complaints. States that given advanced age, not willing to undergo invasive procedures at this time.  Sepsis 2/2 likely CAP: CT chest reviewed. Cont ceftriaxone and azithromycin, day 2. F/u sputum and blood cx.   Outpatient records reviewed, questionable +NED via bronchoscopy. Will repeat sputum cx for NED.  F/u pulm recs.  UTI - UA incidentally positive; on antibiotics as above; f/u urine cx.  SHARAD - likely ATN in setting of sepsis and UTI, now resolved. Avoid nephrotoxins.  Dispo: pending final pulm recs, culture results and PT eval. If pt declines further workup, likely d/c over the weekend. Requesting CM assistance to set up home care.

## 2021-05-01 NOTE — PROGRESS NOTE ADULT - SUBJECTIVE AND OBJECTIVE BOX
Jordi Reynolds, PGY-1  Pager: [phone] / [phone]    CHIEF COMPLAINT: Patient is a 89y old  Female who presents with a chief complaint of Cough (01 May 2021 07:20)    INTERVAL HPI/OVERNIGHT EVENTS: AUDRA, pt was seen comfortable at bedside. She endorse resolution of her cough, denies SOB, cp, abd pain, N/V. Pt wishes to be d/c and continue work up of her persistent RLL finding outpt.  Yesterday conducted a long phone call with pt's daughter and explained the pt's condition and needed workup. The daughter asked questions and was answered. She stated that she will keep the pt compliant with outpt follow up.      MEDICATIONS (STANDING):  albuterol/ipratropium for Nebulization 3 milliLiter(s) Nebulizer every 6 hours  azithromycin  IVPB 500 milliGRAM(s) IV Intermittent every 24 hours  cefTRIAXone   IVPB 1000 milliGRAM(s) IV Intermittent every 24 hours  enoxaparin Injectable 40 milliGRAM(s) SubCutaneous daily  escitalopram 10 milliGRAM(s) Oral daily  magnesium oxide 400 milliGRAM(s) Oral two times a day with meals  pantoprazole    Tablet 40 milliGRAM(s) Oral before breakfast  predniSONE   Tablet 40 milliGRAM(s) Oral daily    MEDICATIONS  (PRN):  acetaminophen   Tablet .. 650 milliGRAM(s) Oral every 6 hours PRN  benzonatate 100 milliGRAM(s) Oral three times a day PRN    REVIEW OF SYSTEMS:  CONSTITUTIONAL: No fever, weight loss, or fatigue  EYES: No eye pain, visual disturbances, or discharge  RESPIRATORY: No cough, wheezing, chills or hemoptysis; No shortness of breath  CARDIOVASCULAR: No chest pain, palpitations, dizziness, or leg swelling  GASTROINTESTINAL: No abdominal or epigastric pain. No nausea, vomiting, or hematemesis; No diarrhea or constipation. No melena or hematochezia.  GENITOURINARY: No dysuria, frequency, hematuria, or incontinence  NEUROLOGICAL: No headaches, memory loss, loss of strength, numbness, or tremors    VITAL SIGNS:  T(F): 97.5 (05-01-21 @ 05:02), Max: 98.8 (04-30-21 @ 13:19)  HR: 101 (05-01-21 @ 05:02) (89 - 104)  BP: 101/63 (05-01-21 @ 05:02) (101/63 - 110/64)  RR: 18 (05-01-21 @ 05:02) (18 - 18)  SpO2: 94% (05-01-21 @ 05:02) (94% - 95%)    PHYSICAL EXAM:  GENERAL: NAD, well-groomed, well-developed  HEAD:  Atraumatic, Normocephalic  NERVOUS SYSTEM:  Alert & Oriented X3, Good concentration; Motor Strength 5/5 B/L upper and lower extremities  CHEST/LUNG: Clear to auscultation bilaterally; No rales, rhonchi, wheezing, or rubs  HEART: Regular rate and rhythm; No murmurs, rubs, or gallops  ABDOMEN: Soft, Nontender, Nondistended; Bowel sounds present  EXTREMITIES:  2+ Peripheral Pulses, No clubbing, cyanosis, or edema    LABS:                        10.1   16.39 )-----------( 352      ( 30 Apr 2021 07:18 )             31.7     04-30    135  |  101  |  8   ----------------------------<  103<H>  3.5   |  20<L>  |  0.69    Ca    9.4      30 Apr 2021 07:11  Phos  2.6     04-30  Mg     1.8     04-30      RADIOLOGY & ADDITIONAL TESTS:    Culture - Sputum . (04.29.21 @ 17:04)    Gram Stain:   Few polymorphonuclear leukocytes per low power field  Few Squamous epithelial cells per low power field  Few Gram positive cocci in pairs per oil power field  Few Gram Positive Rods per oil power field    Specimen Source: .Sputum Sputum    Culture Results:   Normal Respiratory Sharla present    Culture - Blood (04.29.21 @ 05:34)    Specimen Source: .Blood Blood-Peripheral    Culture Results:   No growth to date.    Culture - Blood (04.29.21 @ 05:34)    Specimen Source: .Blood Blood-Peripheral    Culture Results:   No growth to date.    Culture - Urine (04.29.21 @ 04:33)    Specimen Source: .Urine Clean Catch (Midstream)    Culture Results:   >=3 organisms. Probable collection contamination.

## 2021-05-01 NOTE — PROGRESS NOTE ADULT - PROBLEM SELECTOR PLAN 8
Diet: Regular diet   DVT prophylaxis: Lovenox subQ   Discharge: pending family decision if wants to cont work up inpatient. Diet: Regular diet   DVT prophylaxis: Lovenox subQ   Discharge: today

## 2021-05-01 NOTE — PROGRESS NOTE ADULT - PROBLEM SELECTOR PLAN 3
Tachycardia and leukocytosis, pt is HD stable and afebrile. RESOLVED  - s/p 1L NS in the ED  - Lactate nl 1.5  - DDx: PNA vs. UTI   -COVID PCR negative   -f/u blood culture - NGTD, urine culture - NEG  -cw azithromycin + ceftriaxone Tachycardia and leukocytosis, pt is HD stable and afebrile. RESOLVED  - s/p 1L NS in the ED  - Lactate nl 1.5  - DDx: PNA vs. UTI   -COVID PCR negative   -f/u blood culture - NGTD, urine culture - NEG  -cw abx as above

## 2021-05-01 NOTE — PROGRESS NOTE ADULT - PROBLEM SELECTOR PLAN 4
- Outpt PFT sugggest obstructive and restrictive pattern  - follows with outpatient pulmonologist Dr. Zev Larkin, s/p month long course of prednisone 20mg (prescribed 3/24)    -Pt has a h/o persistent RLL consolidation with mediastinal LAD, on the DDx: chronic infection vs. malignancy  -11/2020 Outpt bronch was normal structurally, lavage and biopsy positive for inflammation, neg for malignancy. Cx positive for NED (one), smear neg  - repeated CT chest - redemonstrating right mid to lower lung consolidation with multiple other smaller patchy and nodular opacities scattered throughout both lungs with bulky mediastinal LAD. No pleural effusion.   -management of CAP as per above  -Pulmonary to start prednisone 2w course  -DUOned q6h standing, tessalon pearls  -Pulmonary consulted, appreciate recs: offered the pt transthoracic biopsy/bronchoscopy however pt refuses. Discussed with pt's daughter, will update on decision. - Outpt PFT sugggest obstructive and restrictive pattern  - follows with outpatient pulmonologist Dr. Zev Larkin, s/p month long course of prednisone 20mg (prescribed 3/24)    -Pt has a h/o persistent RLL consolidation with mediastinal LAD, on the DDx: chronic infection vs. malignancy  -11/2020 Outpt bronch was normal structurally, lavage and biopsy positive for inflammation, neg for malignancy. Cx positive for NED (one), smear neg  - repeated CT chest - redemonstrating right mid to lower lung consolidation with multiple other smaller patchy and nodular opacities scattered throughout both lungs with bulky mediastinal LAD. No pleural effusion.   -management of CAP as per above  -Pulmonary to start prednisone 2w course  -DUOned q6h standing, tessalon pearls  -Pulmonary consulted, appreciate recs: offered the pt transthoracic biopsy/bronchoscopy however pt refuses. Discussed with pt's daughter, will follow up outpt

## 2021-05-01 NOTE — DISCHARGE NOTE NURSING/CASE MANAGEMENT/SOCIAL WORK - PATIENT PORTAL LINK FT
You can access the FollowMyHealth Patient Portal offered by [facility] by registering at the following website: [email or web address]. By joining [employer]’s FollowMyHealth portal, you will also be able to view your health information using other applications (apps) compatible with our system.

## 2021-05-01 NOTE — PROGRESS NOTE ADULT - PROBLEM SELECTOR PLAN 5
-presented with normocytic anemia, however RDW elevated, suggesting mixed etiologies.  -pt is asymptomatic, no changes in BM, melena/bleed, however weight loss noted.  -f/u retic count  -f/u iron studies - not deficient, B12, folic acid - normal, TSH wnl  -cbc qd -presented with normocytic anemia, however RDW elevated, suggesting mixed etiologies.  -pt is asymptomatic, no changes in BM, melena/bleed, however weight loss noted.  -f/u iron studies - not deficient, B12, folic acid - normal, TSH wnl  -likley 2/2 chronic disease iso suspected malignancy   -cbc qd

## 2021-05-01 NOTE — PROGRESS NOTE ADULT - PROBLEM SELECTOR PLAN 1
Pt with increasingly productive cough, no sick contacts/travel  - CURB-65 score = 1  - CXR showing patchy right lower lung opacities  - cw CTX and Azithro to cover CAP  - f/u Bcx NGTD, RVP, sputum culture - normal oral shellie, urine legionella -neg Pt with increasingly productive cough, no sick contacts/travel  - CURB-65 score = 1  - CXR showing patchy right lower lung opacities  - cw CTX and Azithro to cover CAP  - f/u Bcx NGTD, sputum culture - normal oral shellie, urine legionella -neg  - will switch to Levaquin to complete outpt

## 2021-05-01 NOTE — PHYSICAL THERAPY INITIAL EVALUATION ADULT - ADDITIONAL COMMENTS
as per pt: PTA pt was living in an apartment + elevator access to living floor and was independent in all functional mobility and ADL's. no AD for gait.

## 2021-05-04 LAB
CULTURE RESULTS: SIGNIFICANT CHANGE UP
CULTURE RESULTS: SIGNIFICANT CHANGE UP
SPECIMEN SOURCE: SIGNIFICANT CHANGE UP
SPECIMEN SOURCE: SIGNIFICANT CHANGE UP

## 2021-06-19 PROBLEM — Z00.00 ENCOUNTER FOR PREVENTIVE HEALTH EXAMINATION: Status: ACTIVE | Noted: 2021-06-19

## 2021-06-23 PROBLEM — R59.0 MEDIASTINAL LYMPHADENOPATHY: Status: ACTIVE | Noted: 2021-06-23

## 2021-06-23 PROBLEM — R91.8 LUNG MASS: Status: ACTIVE | Noted: 2021-06-23

## 2021-06-24 ENCOUNTER — NON-APPOINTMENT (OUTPATIENT)
Age: 86
End: 2021-06-24

## 2021-06-24 ENCOUNTER — APPOINTMENT (OUTPATIENT)
Dept: THORACIC SURGERY | Facility: CLINIC | Age: 86
End: 2021-06-24
Payer: MEDICARE

## 2021-06-24 VITALS
OXYGEN SATURATION: 99 % | RESPIRATION RATE: 18 BRPM | DIASTOLIC BLOOD PRESSURE: 67 MMHG | SYSTOLIC BLOOD PRESSURE: 96 MMHG | TEMPERATURE: 97.5 F | HEIGHT: 63 IN | HEART RATE: 101 BPM | BODY MASS INDEX: 21.26 KG/M2 | WEIGHT: 120 LBS

## 2021-06-24 DIAGNOSIS — Z87.09 PERSONAL HISTORY OF OTHER DISEASES OF THE RESPIRATORY SYSTEM: ICD-10-CM

## 2021-06-24 DIAGNOSIS — R59.0 LOCALIZED ENLARGED LYMPH NODES: ICD-10-CM

## 2021-06-24 DIAGNOSIS — R91.8 OTHER NONSPECIFIC ABNORMAL FINDING OF LUNG FIELD: ICD-10-CM

## 2021-06-24 DIAGNOSIS — Z80.3 FAMILY HISTORY OF MALIGNANT NEOPLASM OF BREAST: ICD-10-CM

## 2021-06-24 DIAGNOSIS — Z23 ENCOUNTER FOR IMMUNIZATION: ICD-10-CM

## 2021-06-24 DIAGNOSIS — Z86.59 PERSONAL HISTORY OF OTHER MENTAL AND BEHAVIORAL DISORDERS: ICD-10-CM

## 2021-06-24 PROCEDURE — 99205 OFFICE O/P NEW HI 60 MIN: CPT

## 2021-06-24 PROCEDURE — 99072 ADDL SUPL MATRL&STAF TM PHE: CPT

## 2021-06-25 PROBLEM — Z86.59 HISTORY OF DEPRESSION: Status: RESOLVED | Noted: 2021-06-25 | Resolved: 2021-06-25

## 2021-06-25 PROBLEM — Z80.3 FAMILY HISTORY OF MALIGNANT NEOPLASM OF BREAST: Status: ACTIVE | Noted: 2021-06-25

## 2021-06-25 PROBLEM — Z87.09 HISTORY OF CHRONIC OBSTRUCTIVE LUNG DISEASE: Status: RESOLVED | Noted: 2021-06-25 | Resolved: 2021-06-25

## 2021-06-25 PROBLEM — Z23 COVID-19 VACCINE ADMINISTERED: Status: RESOLVED | Noted: 2021-06-25 | Resolved: 2021-06-25

## 2021-06-25 RX ORDER — MAGNESIUM OXIDE/MAG AA CHELATE 300 MG
CAPSULE ORAL
Refills: 0 | Status: ACTIVE | COMMUNITY

## 2021-06-25 RX ORDER — COLD-HOT PACK
EACH MISCELLANEOUS
Refills: 0 | Status: ACTIVE | COMMUNITY

## 2021-06-25 RX ORDER — ESCITALOPRAM OXALATE 5 MG/1
TABLET, FILM COATED ORAL
Refills: 0 | Status: ACTIVE | COMMUNITY

## 2021-06-25 RX ORDER — MULTIVITAMIN
TABLET ORAL
Refills: 0 | Status: ACTIVE | COMMUNITY

## 2021-06-25 RX ORDER — FLUTICASONE FUROATE, UMECLIDINIUM BROMIDE AND VILANTEROL TRIFENATATE 100; 62.5; 25 UG/1; UG/1; UG/1
100-62.5-25 POWDER RESPIRATORY (INHALATION)
Refills: 0 | Status: ACTIVE | COMMUNITY

## 2021-06-25 NOTE — ASSESSMENT
[FreeTextEntry1] : Ms. TOMMIE BOONE, 89 year old female, never a smoker, w/ hx of COPD and chronic cough. Referred to office by Dr. Zev Larkin for possible biopsy. \par \par Biopsy of RLL on 11/16/2020 at [facility], ordered by Dr. Zev Larkin. Path showed chronic inflammation.\par \par Patient was hospitalized 4/29-5/1 at [facility] for productive cough x few months, which improved with steroid. She was treated with antibiotics for PNA and UTI. She refused bronch biopsy in-house.\par \par CT Chest on 4/29/21:\par - dominant Rt mid to lower lung masslike consolidation w/ multiple other smaller patchy and nodular opacities scattered throughout both lungs\par - bulky mediastinal lymphadenopathy: largest measuring 2.8cm in the subcarinal region\par \par I have reviewed the patient's medical records and diagnostic images at time of this office consultation and have made the following recommendation:\par 1. Flex bronch, EBUS biopsy on July 15, 2021. Risks, benefits and alternatives explained to patient and daughter; All questions answered and patient agrees to proceed with surgery. \par 2. Cardiac clearance and PET/CT required prior to procedure\par \par Recommendations reviewed with patient during this office visit, and all questions answered; Patient instructed on the importance of follow up and verbalizes understanding.\par \par I personally performed the services described in the documentation, reviewed the documentation recorded by the scribe in my presence and it accurately and completely records my words and actions.\par \par I, JUAN RowellC, am scribing for and the presence of JENNY Collazo, the following sections HISTORY OF PRESENT ILLNESS, PAST MEDICAL/FAMILY/SOCIAL HISTORY; REVIEW OF SYSTEMS; VITAL SIGNS; PHYSICAL EXAM; DISPOSITION.\par \par \par \par \par \par

## 2021-06-25 NOTE — PHYSICAL EXAM
[General Appearance - Alert] : alert [General Appearance - In No Acute Distress] : in no acute distress [General Appearance - Well Nourished] : well nourished [General Appearance - Well Developed] : well developed [General Appearance - Well-Appearing] : healthy appearing [Sclera] : the sclera and conjunctiva were normal [Extraocular Movements] : extraocular movements were intact [PERRL With Normal Accommodation] : pupils were equal in size, round, and reactive to light [Outer Ear] : the ears and nose were normal in appearance [Hearing Threshold Finger Rub Not [place]] : hearing was normal [Examination Of The Oral Cavity] : the lips and gums were normal [Neck Appearance] : the appearance of the neck was normal [Neck Cervical Mass (___cm)] : no neck mass was observed [Respiration, Rhythm And Depth] : normal respiratory rhythm and effort [Exaggerated Use Of Accessory Muscles For Inspiration] : no accessory muscle use [Auscultation Breath Sounds / Voice Sounds] : lungs were clear to auscultation bilaterally [Heart Rate And Rhythm] : heart rate was normal and rhythm regular [Examination Of The Chest] : the chest was normal in appearance [Chest Visual Inspection Thoracic Asymmetry] : no chest asymmetry [Diminished Respiratory Excursion] : normal chest expansion [2+] : left 2+ [Breast Appearance] : normal in appearance [Breast Palpation Mass] : no palpable masses [Bowel Sounds] : normal bowel sounds [Abdomen Soft] : soft [Abdomen Tenderness] : non-tender [Cervical Lymph Nodes Enlarged Posterior Bilaterally] : posterior cervical [Cervical Lymph Nodes Enlarged Anterior Bilaterally] : anterior cervical [Supraclavicular Lymph Nodes Enlarged Bilaterally] : supraclavicular [No CVA Tenderness] : no ~M costovertebral angle tenderness [No Spinal Tenderness] : no spinal tenderness [Abnormal Walk] : normal gait [Nail Clubbing] : no clubbing  or cyanosis of the fingernails [Involuntary Movements] : no involuntary movements were seen [Musculoskeletal - Swelling] : no joint swelling seen [Motor Tone] : muscle strength and tone were normal [Skin Color & Pigmentation] : normal skin color and pigmentation [Skin Turgor] : normal skin turgor [] : no rash [Skin Lesions] : no skin lesions [Deep Tendon Reflexes (DTR)] : deep tendon reflexes were 2+ and symmetric [Sensation] : the sensory exam was normal to light touch and pinprick [Motor Exam] : the motor exam was normal [No Focal Deficits] : no focal deficits [Oriented To Time, Place, And Person] : oriented to person, place, and time [Impaired Insight] : insight and judgment were intact [Affect] : the affect was normal [Mood] : the mood was normal [FreeTextEntry1] : Deferred

## 2021-06-25 NOTE — CONSULT LETTER
[Dear  ___] : Dear  [unfilled], [Consult Letter:] : I had the pleasure of evaluating your patient, [unfilled]. [( Thank you for referring [unfilled] for consultation for _____ )] : Thank you for referring [unfilled] for consultation for [unfilled] [Please see my note below.] : Please see my note below. [Consult Closing:] : Thank you very much for allowing me to participate in the care of this patient.  If you have any questions, please do not hesitate to contact me. [Sincerely,] : Sincerely, [FreeTextEntry2] : Dr. Zev Larkin (Referring) [FreeTextEntry3] : Vicente Coates MD, MPH \par System Director of Thoracic Surgery \par Director of Comprehensive Lung and Foregut [place] \par Professor Cardiovascular & Thoracic Surgery  \par [employer] [facility] [employer] [facility]\par \par [facility]\par [address]\par Oncology [address]\[place] [address]\par Tel: [phone]\par Fax: [phone]\par

## 2021-07-07 ENCOUNTER — APPOINTMENT (OUTPATIENT)
Dept: NUCLEAR MEDICINE | Facility: IMAGING CENTER | Age: 86
End: 2021-07-07
Payer: MEDICARE

## 2021-07-07 ENCOUNTER — APPOINTMENT (OUTPATIENT)
Dept: CARDIOLOGY | Facility: CLINIC | Age: 86
End: 2021-07-07

## 2021-07-07 ENCOUNTER — OUTPATIENT (OUTPATIENT)
Dept: OUTPATIENT SERVICES | Facility: HOSPITAL | Age: 86
LOS: 1 days | End: 2021-07-07

## 2021-07-07 ENCOUNTER — OUTPATIENT (OUTPATIENT)
Dept: OUTPATIENT SERVICES | Facility: HOSPITAL | Age: 86
LOS: 1 days | End: 2021-07-07
Payer: MEDICARE

## 2021-07-07 VITALS
OXYGEN SATURATION: 97 % | SYSTOLIC BLOOD PRESSURE: 126 MMHG | HEART RATE: 98 BPM | TEMPERATURE: 98 F | HEIGHT: 60.25 IN | WEIGHT: 117.07 LBS | DIASTOLIC BLOOD PRESSURE: 72 MMHG | RESPIRATION RATE: 16 BRPM

## 2021-07-07 DIAGNOSIS — R59.0 LOCALIZED ENLARGED LYMPH NODES: ICD-10-CM

## 2021-07-07 DIAGNOSIS — R09.89 OTHER SPECIFIED SYMPTOMS AND SIGNS INVOLVING THE CIRCULATORY AND RESPIRATORY SYSTEMS: ICD-10-CM

## 2021-07-07 DIAGNOSIS — R91.8 OTHER NONSPECIFIC ABNORMAL FINDING OF LUNG FIELD: ICD-10-CM

## 2021-07-07 DIAGNOSIS — Z96.649 PRESENCE OF UNSPECIFIED ARTIFICIAL HIP JOINT: Chronic | ICD-10-CM

## 2021-07-07 DIAGNOSIS — Z01.818 ENCOUNTER FOR OTHER PREPROCEDURAL EXAMINATION: ICD-10-CM

## 2021-07-07 LAB
ANION GAP SERPL CALC-SCNC: 16 MMOL/L — HIGH (ref 7–14)
BUN SERPL-MCNC: 12 MG/DL — SIGNIFICANT CHANGE UP (ref 7–23)
CALCIUM SERPL-MCNC: 11.3 MG/DL — HIGH (ref 8.4–10.5)
CHLORIDE SERPL-SCNC: 94 MMOL/L — LOW (ref 98–107)
CO2 SERPL-SCNC: 24 MMOL/L — SIGNIFICANT CHANGE UP (ref 22–31)
CREAT SERPL-MCNC: 0.84 MG/DL — SIGNIFICANT CHANGE UP (ref 0.5–1.3)
GLUCOSE SERPL-MCNC: 107 MG/DL — HIGH (ref 70–99)
HCT VFR BLD CALC: 36.2 % — SIGNIFICANT CHANGE UP (ref 34.5–45)
HGB BLD-MCNC: 10.8 G/DL — LOW (ref 11.5–15.5)
MCHC RBC-ENTMCNC: 24.3 PG — LOW (ref 27–34)
MCHC RBC-ENTMCNC: 29.8 GM/DL — LOW (ref 32–36)
MCV RBC AUTO: 81.5 FL — SIGNIFICANT CHANGE UP (ref 80–100)
NRBC # BLD: 0 /100 WBCS — SIGNIFICANT CHANGE UP
NRBC # FLD: 0 K/UL — SIGNIFICANT CHANGE UP
PLATELET # BLD AUTO: 484 K/UL — HIGH (ref 150–400)
POTASSIUM SERPL-MCNC: 3.8 MMOL/L — SIGNIFICANT CHANGE UP (ref 3.5–5.3)
POTASSIUM SERPL-SCNC: 3.8 MMOL/L — SIGNIFICANT CHANGE UP (ref 3.5–5.3)
RBC # BLD: 4.44 M/UL — SIGNIFICANT CHANGE UP (ref 3.8–5.2)
RBC # FLD: 16.7 % — HIGH (ref 10.3–14.5)
SODIUM SERPL-SCNC: 134 MMOL/L — LOW (ref 135–145)
WBC # BLD: 31.68 K/UL — HIGH (ref 3.8–10.5)
WBC # FLD AUTO: 31.68 K/UL — HIGH (ref 3.8–10.5)

## 2021-07-07 PROCEDURE — A9552: CPT

## 2021-07-07 PROCEDURE — 78815 PET IMAGE W/CT SKULL-THIGH: CPT | Mod: 26,PI

## 2021-07-07 PROCEDURE — 78815 PET IMAGE W/CT SKULL-THIGH: CPT

## 2021-07-07 RX ORDER — ESCITALOPRAM OXALATE 10 MG/1
1 TABLET, FILM COATED ORAL
Qty: 0 | Refills: 0 | DISCHARGE

## 2021-07-07 RX ORDER — OMEPRAZOLE 10 MG/1
1 CAPSULE, DELAYED RELEASE ORAL
Qty: 0 | Refills: 0 | DISCHARGE

## 2021-07-07 NOTE — H&P PST ADULT - NSICDXPASTMEDICALHX_GEN_ALL_CORE_FT
PAST MEDICAL HISTORY:  Abnormal finding of lung     Cough     Osteoarthritis      PAST MEDICAL HISTORY:  Abnormal finding of lung     COPD, mild     Cough     Osteoarthritis

## 2021-07-07 NOTE — H&P PST ADULT - NSICDXPASTSURGICALHX_GEN_ALL_CORE_FT
PAST SURGICAL HISTORY:  No significant past surgical history      PAST SURGICAL HISTORY:  History of hip replacement right

## 2021-07-07 NOTE — H&P PST ADULT - ATTENDING COMMENTS
PET showed large R pleural effusion, and mediastinal masses/uptake. Plan R vats robotic pleural bx bx of LN today.

## 2021-07-07 NOTE — H&P PST ADULT - HISTORY OF PRESENT ILLNESS
89F nonsmoker with no significant pmhx presenting with acute on chronic productive cough.           Patient first developed a cough with white sputum production several months ago, associated with wheezing, no hemoptysis or SOB, no fever. Patient was prescribed oral steroid taper x 2 weeks (last steroid taken 1 week ago) which improved her cough. She reported had outpatient workup including CT and bronch though unable to tell the findings. Patient denied ever been prescribed antibiotics. Patient sent in by her PMD due to leukocytosis in outpatient lab and persistent cough.            ED: VS on admission: temp 99.1F, , /73, RR 18, 95% on RA.         Pt is a 89 yr old female scheduled for Flexible Bronchoscopy endobronchial U/S Guided Biopsy with Cytology with Dr Coates tentatively 7/15/21 - pt c/o of chronic productive  cough for at least 6 months - pt is non-smoker - pt had Bronchoscopy in [place] 1/21 and then was hospitalized 4/29/21 for several days with increased inflammation RLL - Pt also c/o of mild SOB - hx anxiety  Pt denies COVID   Pt had COVID vaccine 2/21 and 3/21

## 2021-07-07 NOTE — H&P PST ADULT - NSICDXPROBLEM_GEN_ALL_CORE_FT
PROBLEM DIAGNOSES  Problem: Abnormal finding of lung  Assessment and Plan: Pt scheduled for surgery and preop instructions including instructions for taking own GI protection on the day of surgery, given verbally and with use of  written materials, and patient confirming understanding of such instructions using  teach back method.  OR booking notified of darrick precautions and right hip joint   CC from Dr Madison in chart   Pt to take Trelegy , Furosemide , Lexapro  am DOS

## 2021-07-11 ENCOUNTER — NON-APPOINTMENT (OUTPATIENT)
Age: 86
End: 2021-07-11

## 2021-07-12 ENCOUNTER — APPOINTMENT (OUTPATIENT)
Dept: CARDIOLOGY | Facility: CLINIC | Age: 86
End: 2021-07-12

## 2021-07-14 ENCOUNTER — APPOINTMENT (OUTPATIENT)
Dept: THORACIC SURGERY | Facility: HOSPITAL | Age: 86
End: 2021-07-14

## 2021-07-14 ENCOUNTER — RESULT REVIEW (OUTPATIENT)
Age: 86
End: 2021-07-14

## 2021-07-14 ENCOUNTER — INPATIENT (INPATIENT)
Facility: HOSPITAL | Age: 86
LOS: 6 days | Discharge: NURSING, REHAB OR LONG-TERM CARE | End: 2021-07-21
Attending: THORACIC SURGERY (CARDIOTHORACIC VASCULAR SURGERY) | Admitting: THORACIC SURGERY (CARDIOTHORACIC VASCULAR SURGERY)
Payer: MEDICARE

## 2021-07-14 VITALS
OXYGEN SATURATION: 95 % | WEIGHT: 117.07 LBS | RESPIRATION RATE: 14 BRPM | HEART RATE: 116 BPM | DIASTOLIC BLOOD PRESSURE: 81 MMHG | TEMPERATURE: 98 F | HEIGHT: 60.25 IN | SYSTOLIC BLOOD PRESSURE: 104 MMHG

## 2021-07-14 DIAGNOSIS — R91.8 OTHER NONSPECIFIC ABNORMAL FINDING OF LUNG FIELD: ICD-10-CM

## 2021-07-14 DIAGNOSIS — Z96.649 PRESENCE OF UNSPECIFIED ARTIFICIAL HIP JOINT: Chronic | ICD-10-CM

## 2021-07-14 LAB
BLD GP AB SCN SERPL QL: NEGATIVE — SIGNIFICANT CHANGE UP
RH IG SCN BLD-IMP: POSITIVE — SIGNIFICANT CHANGE UP

## 2021-07-14 PROCEDURE — 88341 IMHCHEM/IMCYTCHM EA ADD ANTB: CPT | Mod: 26,59

## 2021-07-14 PROCEDURE — 88112 CYTOPATH CELL ENHANCE TECH: CPT | Mod: 26,59

## 2021-07-14 PROCEDURE — 88365 INSITU HYBRIDIZATION (FISH): CPT | Mod: 26,59

## 2021-07-14 PROCEDURE — 88333 PATH CONSLTJ SURG CYTO XM 1: CPT | Mod: 26

## 2021-07-14 PROCEDURE — 71045 X-RAY EXAM CHEST 1 VIEW: CPT | Mod: 26

## 2021-07-14 PROCEDURE — G0452: CPT | Mod: 26

## 2021-07-14 PROCEDURE — 99233 SBSQ HOSP IP/OBS HIGH 50: CPT

## 2021-07-14 PROCEDURE — 88360 TUMOR IMMUNOHISTOCHEM/MANUAL: CPT | Mod: 26

## 2021-07-14 PROCEDURE — 88342 IMHCHEM/IMCYTCHM 1ST ANTB: CPT | Mod: 26,59

## 2021-07-14 PROCEDURE — 88367 INSITU HYBRIDIZATION AUTO: CPT | Mod: 26

## 2021-07-14 PROCEDURE — 88305 TISSUE EXAM BY PATHOLOGIST: CPT | Mod: 26,59

## 2021-07-14 PROCEDURE — 88305 TISSUE EXAM BY PATHOLOGIST: CPT | Mod: 26

## 2021-07-14 PROCEDURE — 88189 FLOWCYTOMETRY/READ 16 & >: CPT

## 2021-07-14 RX ORDER — PANTOPRAZOLE SODIUM 20 MG/1
40 TABLET, DELAYED RELEASE ORAL
Refills: 0 | Status: DISCONTINUED | OUTPATIENT
Start: 2021-07-15 | End: 2021-07-21

## 2021-07-14 RX ORDER — TIOTROPIUM BROMIDE 18 UG/1
1 CAPSULE ORAL; RESPIRATORY (INHALATION) DAILY
Refills: 0 | Status: DISCONTINUED | OUTPATIENT
Start: 2021-07-15 | End: 2021-07-15

## 2021-07-14 RX ORDER — SODIUM CHLORIDE 9 MG/ML
250 INJECTION, SOLUTION INTRAVENOUS ONCE
Refills: 0 | Status: COMPLETED | OUTPATIENT
Start: 2021-07-14 | End: 2021-07-14

## 2021-07-14 RX ORDER — HYDROMORPHONE HYDROCHLORIDE 2 MG/ML
30 INJECTION INTRAMUSCULAR; INTRAVENOUS; SUBCUTANEOUS
Refills: 0 | Status: DISCONTINUED | OUTPATIENT
Start: 2021-07-14 | End: 2021-07-16

## 2021-07-14 RX ORDER — SODIUM CHLORIDE 9 MG/ML
1000 INJECTION, SOLUTION INTRAVENOUS
Refills: 0 | Status: DISCONTINUED | OUTPATIENT
Start: 2021-07-14 | End: 2021-07-18

## 2021-07-14 RX ORDER — ACETAMINOPHEN 500 MG
650 TABLET ORAL ONCE
Refills: 0 | Status: COMPLETED | OUTPATIENT
Start: 2021-07-14 | End: 2021-07-14

## 2021-07-14 RX ORDER — NALOXONE HYDROCHLORIDE 4 MG/.1ML
0.1 SPRAY NASAL
Refills: 0 | Status: DISCONTINUED | OUTPATIENT
Start: 2021-07-14 | End: 2021-07-20

## 2021-07-14 RX ORDER — ESCITALOPRAM OXALATE 10 MG/1
15 TABLET, FILM COATED ORAL DAILY
Refills: 0 | Status: DISCONTINUED | OUTPATIENT
Start: 2021-07-15 | End: 2021-07-21

## 2021-07-14 RX ORDER — BUDESONIDE AND FORMOTEROL FUMARATE DIHYDRATE 160; 4.5 UG/1; UG/1
2 AEROSOL RESPIRATORY (INHALATION)
Refills: 0 | Status: DISCONTINUED | OUTPATIENT
Start: 2021-07-14 | End: 2021-07-15

## 2021-07-14 RX ORDER — DORNASE ALFA 1 MG/ML
2.5 SOLUTION RESPIRATORY (INHALATION) DAILY
Refills: 0 | Status: DISCONTINUED | OUTPATIENT
Start: 2021-07-14 | End: 2021-07-16

## 2021-07-14 RX ORDER — ALBUMIN HUMAN 25 %
250 VIAL (ML) INTRAVENOUS ONCE
Refills: 0 | Status: COMPLETED | OUTPATIENT
Start: 2021-07-14 | End: 2021-07-14

## 2021-07-14 RX ORDER — BUTORPHANOL TARTRATE 2 MG/ML
0.12 INJECTION, SOLUTION INTRAMUSCULAR; INTRAVENOUS EVERY 6 HOURS
Refills: 0 | Status: DISCONTINUED | OUTPATIENT
Start: 2021-07-14 | End: 2021-07-20

## 2021-07-14 RX ORDER — SODIUM CHLORIDE 9 MG/ML
4 INJECTION INTRAMUSCULAR; INTRAVENOUS; SUBCUTANEOUS EVERY 6 HOURS
Refills: 0 | Status: DISCONTINUED | OUTPATIENT
Start: 2021-07-14 | End: 2021-07-21

## 2021-07-14 RX ORDER — ONDANSETRON 8 MG/1
4 TABLET, FILM COATED ORAL EVERY 6 HOURS
Refills: 0 | Status: DISCONTINUED | OUTPATIENT
Start: 2021-07-14 | End: 2021-07-20

## 2021-07-14 RX ORDER — HEPARIN SODIUM 5000 [USP'U]/ML
5000 INJECTION INTRAVENOUS; SUBCUTANEOUS ONCE
Refills: 0 | Status: COMPLETED | OUTPATIENT
Start: 2021-07-14 | End: 2021-07-14

## 2021-07-14 RX ORDER — HYDROMORPHONE HYDROCHLORIDE 2 MG/ML
0.5 INJECTION INTRAMUSCULAR; INTRAVENOUS; SUBCUTANEOUS
Refills: 0 | Status: DISCONTINUED | OUTPATIENT
Start: 2021-07-14 | End: 2021-07-16

## 2021-07-14 RX ORDER — HEPARIN SODIUM 5000 [USP'U]/ML
5000 INJECTION INTRAVENOUS; SUBCUTANEOUS EVERY 12 HOURS
Refills: 0 | Status: DISCONTINUED | OUTPATIENT
Start: 2021-07-14 | End: 2021-07-21

## 2021-07-14 RX ORDER — IPRATROPIUM/ALBUTEROL SULFATE 18-103MCG
3 AEROSOL WITH ADAPTER (GRAM) INHALATION EVERY 6 HOURS
Refills: 0 | Status: DISCONTINUED | OUTPATIENT
Start: 2021-07-14 | End: 2021-07-21

## 2021-07-14 RX ORDER — HEPARIN SODIUM 5000 [USP'U]/ML
5000 INJECTION INTRAVENOUS; SUBCUTANEOUS EVERY 8 HOURS
Refills: 0 | Status: DISCONTINUED | OUTPATIENT
Start: 2021-07-14 | End: 2021-07-14

## 2021-07-14 RX ORDER — GABAPENTIN 400 MG/1
100 CAPSULE ORAL ONCE
Refills: 0 | Status: DISCONTINUED | OUTPATIENT
Start: 2021-07-14 | End: 2021-07-14

## 2021-07-14 RX ADMIN — HEPARIN SODIUM 5000 UNIT(S): 5000 INJECTION INTRAVENOUS; SUBCUTANEOUS at 13:15

## 2021-07-14 RX ADMIN — Medication 500 MILLILITER(S): at 19:38

## 2021-07-14 RX ADMIN — Medication 650 MILLIGRAM(S): at 13:15

## 2021-07-14 RX ADMIN — HEPARIN SODIUM 5000 UNIT(S): 5000 INJECTION INTRAVENOUS; SUBCUTANEOUS at 18:04

## 2021-07-14 RX ADMIN — HYDROMORPHONE HYDROCHLORIDE 30 MILLILITER(S): 2 INJECTION INTRAMUSCULAR; INTRAVENOUS; SUBCUTANEOUS at 17:51

## 2021-07-14 RX ADMIN — Medication 3 MILLILITER(S): at 22:01

## 2021-07-14 RX ADMIN — SODIUM CHLORIDE 4 MILLILITER(S): 9 INJECTION INTRAMUSCULAR; INTRAVENOUS; SUBCUTANEOUS at 22:01

## 2021-07-14 RX ADMIN — Medication 125 MILLILITER(S): at 17:53

## 2021-07-14 RX ADMIN — Medication 650 MILLIGRAM(S): at 14:00

## 2021-07-14 RX ADMIN — SODIUM CHLORIDE 500 MILLILITER(S): 9 INJECTION, SOLUTION INTRAVENOUS at 17:40

## 2021-07-14 RX ADMIN — HYDROMORPHONE HYDROCHLORIDE 30 MILLILITER(S): 2 INJECTION INTRAMUSCULAR; INTRAVENOUS; SUBCUTANEOUS at 19:22

## 2021-07-14 NOTE — PROGRESS NOTE ADULT - SUBJECTIVE AND OBJECTIVE BOX
TOMMIE BOONE                     [id number]    HPI:    Pt is a 89 yr old female scheduled for Flexible Bronchoscopy endobronchial U/S Guided Biopsy with Cytology with Dr Coates tentatively 7/15/21 - pt c/o of chronic productive  cough for at least 6 months - pt is non-smoker - pt had Bronchoscopy in [place] 1/21 and then was hospitalized 4/29/21 for several days with increased inflammation RLL - Pt also c/o of mild SOB - hx anxiety  Pt denies COVID       Procedure: VATS, with pleural biopsy  drainage of effusion,    Issues:  Lesion of right lung   COPD  post op pain      PAST MEDICAL & SURGICAL HISTORY:  Cough    Osteoarthritis    Abnormal finding of lung    COPD, mild    History of hip replacement  right              VITAL SIGNS:  Vital Signs Last 24 Hrs  T(C): 37.2 (14 Jul 2021 17:35), Max: 37.2 (14 Jul 2021 17:35)  T(F): 98.9 (14 Jul 2021 17:35), Max: 98.9 (14 Jul 2021 17:35)  HR: 93 (14 Jul 2021 17:35) (93 - 116)  BP: 119/58 (14 Jul 2021 17:35) (104/81 - 119/58)  BP(mean): 71 (14 Jul 2021 17:35) (71 - 71)  RR: 17 (14 Jul 2021 17:35) (14 - 17)  SpO2: 97% (14 Jul 2021 17:35) (95% - 97%)    I/Os:   I&O's Detail    14 Jul 2021 07:01  -  14 Jul 2021 17:44  --------------------------------------------------------  IN:  Total IN: 0 mL    OUT:    Chest Tube (mL): 30 mL  Total OUT: 30 mL    Total NET: -30 mL          CAPILLARY BLOOD GLUCOSE          =======================MEDICATIONS===================  MEDICATIONS  (STANDING):  albumin human  5% IVPB 250 milliLiter(s) IV Intermittent once  albuterol/ipratropium for Nebulization 3 milliLiter(s) Nebulizer every 6 hours  budesonide  80 MICROgram(s)/formoterol 4.5 MICROgram(s) Inhaler 2 Puff(s) Inhalation two times a day  dornase el Solution 2.5 milliGRAM(s) Inhalation every 12 hours  heparin   Injectable 5000 Unit(s) SubCutaneous every 12 hours  HYDROmorphone PCA (1 mG/mL) 30 milliLiter(s) PCA Continuous PCA Continuous  lactated ringers Bolus 250 milliLiter(s) IV Bolus once  sodium chloride 3%  Inhalation 4 milliLiter(s) Inhalation every 6 hours    MEDICATIONS  (PRN):  butorphanol Injectable 0.125 milliGRAM(s) IV Push every 6 hours PRN Pruritus  HYDROmorphone PCA (1 mG/mL) Rescue Clinician Bolus 0.5 milliGRAM(s) IV Push every 15 minutes PRN for Pain Scale GREATER THAN 6  naloxone Injectable 0.1 milliGRAM(s) IV Push every 3 minutes PRN For ANY of the following changes in patient status:  A. RR LESS THAN 10 breaths per minute, B. Oxygen saturation LESS THAN 90%, C. Sedation score of 6  ondansetron Injectable 4 milliGRAM(s) IV Push every 6 hours PRN Nausea      PHYSICAL EXAM============================  General:                         Awake, alert, not in any distress  Neuro:                            Moving all extremities to commands.   Respiratory:	Air entry fair and  bilateral conducted sounds                                           Effort even and unlabored.  CV:		Regular rate and rhythm. Normal S1/S2                                          Distal pulses present.  Abdomen:	                     Soft, non-distended. Bowel sounds present   Skin:		No rash.  Extremities:	Warm, no cyanosis or edema.  Palpable pulses      =============================NEUROLOGY============================  Pain control with PCA / Tylenol IV     ==============================RESPIRATORY========================  Pt is on  2 L nasal canula   Comfortable, not in any distress.  Using incentive spirometry   Monitor chest tube output  Chest tube to suction 	  COPD: Continue bronchodilators, pulmonary toilet    ============================CARDIOVASCULAR======================  Continue hemodynamic monitoring.  Hypotension, IVF resuscitation,     =====================RENAL===================  Continue LR 30CC/hr    Monitor I/Os and electrolytes    ====================GASTROINTESTINAL===================  On clears, tolerating  Continue GI prophylaxis with Pepcid   Continue Zofran / Reglan for nausea - PRN	    ========================HEMATOLOGIC/ONCOLOGIC====================  Monitor chest tube output. No signs of active bleeding.   Follow CBC in AM    ============================INFECTIOUS DISEASE========================  Monitor for fever / leukocytosis.  All surgical incision / chest tube  sites look clean      Pt is on GI & DVT prophylaxis  OOB & ambulate       Pertinent clinical, laboratory, radiographic, hemodynamic, echocardiographic, respiratory data, microbiologic data and chart were reviewed and analyzed frequently throughout the course of the day and night  Patient seen, examined and plan discussed with CT Surgery / CTICU team during rounds.    Pt's status discussed with family at bedside, updated status        Ileana Nobles DO, FACEP

## 2021-07-14 NOTE — BRIEF OPERATIVE NOTE - OPERATION/FINDINGS
89F never smoker h/o COPD w/chronic cough, dyspnea on exertion w/RLL consolidation (biopsy c/w chronic inflammation) and mediastinal adenopathy who is now s/p flexible bronchoscopy, right robotic-assisted VATS pleural biopsy, drainage of effusion 89F never smoker h/o COPD w/chronic cough, dyspnea on exertion w/RLL consolidation (biopsy c/w chronic inflammation) and mediastinal adenopathy who is now s/p flexible bronchoscopy, right robotic-assisted VATS pleural biopsy, drainage of effusion, mediastinal lymph node biopsy

## 2021-07-14 NOTE — BRIEF OPERATIVE NOTE - COMMENTS
I first assisted through out the entire case, including port placement, bedside assist while the surgeon at the console, and wound closure, DRAGAN Sanchez

## 2021-07-14 NOTE — ASU PREOP CHECKLIST - COMMENTS
trelegy,omeprazole, lexapro with small sip of water in the am trelegy, omeprazole, Lexapro with small sip of water in the am

## 2021-07-15 LAB
ALBUMIN SERPL ELPH-MCNC: 3.2 G/DL — LOW (ref 3.3–5)
ALP SERPL-CCNC: 92 U/L — SIGNIFICANT CHANGE UP (ref 40–120)
ALT FLD-CCNC: 7 U/L — SIGNIFICANT CHANGE UP (ref 4–33)
ANION GAP SERPL CALC-SCNC: 16 MMOL/L — HIGH (ref 7–14)
AST SERPL-CCNC: 12 U/L — SIGNIFICANT CHANGE UP (ref 4–32)
BILIRUB SERPL-MCNC: 0.3 MG/DL — SIGNIFICANT CHANGE UP (ref 0.2–1.2)
BLOOD GAS ARTERIAL - LYTES,HGB,ICA,LACT RESULT: SIGNIFICANT CHANGE UP
BUN SERPL-MCNC: 15 MG/DL — SIGNIFICANT CHANGE UP (ref 7–23)
CALCIUM SERPL-MCNC: 10.8 MG/DL — HIGH (ref 8.4–10.5)
CHLORIDE SERPL-SCNC: 101 MMOL/L — SIGNIFICANT CHANGE UP (ref 98–107)
CO2 SERPL-SCNC: 25 MMOL/L — SIGNIFICANT CHANGE UP (ref 22–31)
COVID-19 SPIKE DOMAIN AB INTERP: POSITIVE
COVID-19 SPIKE DOMAIN ANTIBODY RESULT: 15.2 U/ML — HIGH
CREAT SERPL-MCNC: 0.91 MG/DL — SIGNIFICANT CHANGE UP (ref 0.5–1.3)
GLUCOSE BLDC GLUCOMTR-MCNC: 116 MG/DL — HIGH (ref 70–99)
GLUCOSE SERPL-MCNC: 120 MG/DL — HIGH (ref 70–99)
HCT VFR BLD CALC: 30.3 % — LOW (ref 34.5–45)
HGB BLD-MCNC: 8.9 G/DL — LOW (ref 11.5–15.5)
MAGNESIUM SERPL-MCNC: 1.7 MG/DL — SIGNIFICANT CHANGE UP (ref 1.6–2.6)
MCHC RBC-ENTMCNC: 24.1 PG — LOW (ref 27–34)
MCHC RBC-ENTMCNC: 29.4 GM/DL — LOW (ref 32–36)
MCV RBC AUTO: 81.9 FL — SIGNIFICANT CHANGE UP (ref 80–100)
NRBC # BLD: 0 /100 WBCS — SIGNIFICANT CHANGE UP
NRBC # FLD: 0 K/UL — SIGNIFICANT CHANGE UP
PHOSPHATE SERPL-MCNC: 3.5 MG/DL — SIGNIFICANT CHANGE UP (ref 2.5–4.5)
PLATELET # BLD AUTO: 278 K/UL — SIGNIFICANT CHANGE UP (ref 150–400)
POTASSIUM SERPL-MCNC: 3.7 MMOL/L — SIGNIFICANT CHANGE UP (ref 3.5–5.3)
POTASSIUM SERPL-SCNC: 3.7 MMOL/L — SIGNIFICANT CHANGE UP (ref 3.5–5.3)
PROT SERPL-MCNC: 5.3 G/DL — LOW (ref 6–8.3)
RBC # BLD: 3.7 M/UL — LOW (ref 3.8–5.2)
RBC # FLD: 16.5 % — HIGH (ref 10.3–14.5)
SARS-COV-2 IGG+IGM SERPL QL IA: 15.2 U/ML — HIGH
SARS-COV-2 IGG+IGM SERPL QL IA: POSITIVE
SODIUM SERPL-SCNC: 142 MMOL/L — SIGNIFICANT CHANGE UP (ref 135–145)
WBC # BLD: 15.89 K/UL — HIGH (ref 3.8–10.5)
WBC # FLD AUTO: 15.89 K/UL — HIGH (ref 3.8–10.5)

## 2021-07-15 PROCEDURE — 99233 SBSQ HOSP IP/OBS HIGH 50: CPT

## 2021-07-15 PROCEDURE — 71045 X-RAY EXAM CHEST 1 VIEW: CPT | Mod: 26,77

## 2021-07-15 PROCEDURE — 71045 X-RAY EXAM CHEST 1 VIEW: CPT | Mod: 26

## 2021-07-15 RX ORDER — MAGNESIUM SULFATE 500 MG/ML
1 VIAL (ML) INJECTION ONCE
Refills: 0 | Status: COMPLETED | OUTPATIENT
Start: 2021-07-15 | End: 2021-07-15

## 2021-07-15 RX ORDER — BUDESONIDE, MICRONIZED 100 %
0.25 POWDER (GRAM) MISCELLANEOUS EVERY 12 HOURS
Refills: 0 | Status: DISCONTINUED | OUTPATIENT
Start: 2021-07-15 | End: 2021-07-21

## 2021-07-15 RX ORDER — ALBUMIN HUMAN 25 %
250 VIAL (ML) INTRAVENOUS ONCE
Refills: 0 | Status: COMPLETED | OUTPATIENT
Start: 2021-07-15 | End: 2021-07-15

## 2021-07-15 RX ORDER — POTASSIUM CHLORIDE 20 MEQ
40 PACKET (EA) ORAL ONCE
Refills: 0 | Status: COMPLETED | OUTPATIENT
Start: 2021-07-15 | End: 2021-07-15

## 2021-07-15 RX ADMIN — SODIUM CHLORIDE 50 MILLILITER(S): 9 INJECTION, SOLUTION INTRAVENOUS at 10:58

## 2021-07-15 RX ADMIN — Medication 100 GRAM(S): at 08:27

## 2021-07-15 RX ADMIN — Medication 125 MILLILITER(S): at 13:53

## 2021-07-15 RX ADMIN — SODIUM CHLORIDE 4 MILLILITER(S): 9 INJECTION INTRAMUSCULAR; INTRAVENOUS; SUBCUTANEOUS at 22:28

## 2021-07-15 RX ADMIN — SODIUM CHLORIDE 4 MILLILITER(S): 9 INJECTION INTRAMUSCULAR; INTRAVENOUS; SUBCUTANEOUS at 09:23

## 2021-07-15 RX ADMIN — SODIUM CHLORIDE 4 MILLILITER(S): 9 INJECTION INTRAMUSCULAR; INTRAVENOUS; SUBCUTANEOUS at 15:26

## 2021-07-15 RX ADMIN — TIOTROPIUM BROMIDE 1 CAPSULE(S): 18 CAPSULE ORAL; RESPIRATORY (INHALATION) at 09:23

## 2021-07-15 RX ADMIN — DORNASE ALFA 2.5 MILLIGRAM(S): 1 SOLUTION RESPIRATORY (INHALATION) at 09:23

## 2021-07-15 RX ADMIN — HYDROMORPHONE HYDROCHLORIDE 30 MILLILITER(S): 2 INJECTION INTRAMUSCULAR; INTRAVENOUS; SUBCUTANEOUS at 07:09

## 2021-07-15 RX ADMIN — ESCITALOPRAM OXALATE 15 MILLIGRAM(S): 10 TABLET, FILM COATED ORAL at 13:46

## 2021-07-15 RX ADMIN — Medication 3 MILLILITER(S): at 03:23

## 2021-07-15 RX ADMIN — SODIUM CHLORIDE 4 MILLILITER(S): 9 INJECTION INTRAMUSCULAR; INTRAVENOUS; SUBCUTANEOUS at 03:24

## 2021-07-15 RX ADMIN — Medication 40 MILLIEQUIVALENT(S): at 08:27

## 2021-07-15 RX ADMIN — Medication 3 MILLILITER(S): at 09:22

## 2021-07-15 RX ADMIN — Medication 3 MILLILITER(S): at 22:23

## 2021-07-15 RX ADMIN — HEPARIN SODIUM 5000 UNIT(S): 5000 INJECTION INTRAVENOUS; SUBCUTANEOUS at 17:02

## 2021-07-15 RX ADMIN — PANTOPRAZOLE SODIUM 40 MILLIGRAM(S): 20 TABLET, DELAYED RELEASE ORAL at 06:58

## 2021-07-15 RX ADMIN — Medication 1 TABLET(S): at 13:46

## 2021-07-15 RX ADMIN — Medication 3 MILLILITER(S): at 15:26

## 2021-07-15 RX ADMIN — HEPARIN SODIUM 5000 UNIT(S): 5000 INJECTION INTRAVENOUS; SUBCUTANEOUS at 06:58

## 2021-07-15 RX ADMIN — BUDESONIDE AND FORMOTEROL FUMARATE DIHYDRATE 2 PUFF(S): 160; 4.5 AEROSOL RESPIRATORY (INHALATION) at 09:34

## 2021-07-15 NOTE — PROGRESS NOTE ADULT - SUBJECTIVE AND OBJECTIVE BOX
Anesthesia Pain Management Service- Attending Addendum    SUBJECTIVE: Pt doing well with IV PCA without problems reported.    Therapy:	  [ X] IV PCA	   [ ] Epidural           [ ] s/p Spinal Opoid              [ ] Postpartum infusion	  [ ] Patient controlled regional anesthesia (PCRA)    [ ] prn Analgesics    Allergies    No Known Allergies    Intolerances      MEDICATIONS  (STANDING):  albuterol/ipratropium for Nebulization 3 milliLiter(s) Nebulizer every 6 hours  buDESOnide    Inhalation Suspension 0.25 milliGRAM(s) Inhalation every 12 hours  dornase el Solution 2.5 milliGRAM(s) Inhalation daily  escitalopram 15 milliGRAM(s) Oral daily  heparin   Injectable 5000 Unit(s) SubCutaneous every 12 hours  HYDROmorphone PCA (1 mG/mL) 30 milliLiter(s) PCA Continuous PCA Continuous  lactated ringers. 1000 milliLiter(s) (50 mL/Hr) IV Continuous <Continuous>  multivitamin 1 Tablet(s) Oral daily  pantoprazole    Tablet 40 milliGRAM(s) Oral before breakfast  sodium chloride 3%  Inhalation 4 milliLiter(s) Inhalation every 6 hours    MEDICATIONS  (PRN):  butorphanol Injectable 0.125 milliGRAM(s) IV Push every 6 hours PRN Pruritus  HYDROmorphone PCA (1 mG/mL) Rescue Clinician Bolus 0.5 milliGRAM(s) IV Push every 15 minutes PRN for Pain Scale GREATER THAN 6  naloxone Injectable 0.1 milliGRAM(s) IV Push every 3 minutes PRN For ANY of the following changes in patient status:  A. RR LESS THAN 10 breaths per minute, B. Oxygen saturation LESS THAN 90%, C. Sedation score of 6  ondansetron Injectable 4 milliGRAM(s) IV Push every 6 hours PRN Nausea      OBJECTIVE:   [X] No new signs     [ ] Other:    Side Effects:  [X ] None			[ ] Other:    Assessment of Catheter Site:		[ ] Intact		[ ] Other:    ASSESSMENT/PLAN  [ X] Continue current therapy    [ ] Therapy changed to:    [ ] IV PCA       [ ] Epidural     [ ] prn Analgesics     Comments:    Note entered after patient seen

## 2021-07-15 NOTE — PHYSICAL THERAPY INITIAL EVALUATION ADULT - PERTINENT HX OF CURRENT PROBLEM, REHAB EVAL
Patient is an 89 year old female admitted to [facility] scheduled for Flexible Bronchoscopy endobronchial U/S Guided Biopsy. Patient with c/o of chronic productive cough for at least 6 months.

## 2021-07-15 NOTE — CONSULT NOTE ADULT - SUBJECTIVE AND OBJECTIVE BOX
CHIEF COMPLAINT:Patient is a 89y old  Female who presents with a chief complaint of     HISTORY OF PRESENT ILLNESS:    89 female known to me from office with history as below  w/chronic cough, dyspnea on exertion w/RLL consolidation (biopsy c/w chronic inflammation) and mediastinal adenopathy who is now s/p flexible bronchoscopy, right robotic-assisted VATS pleural biopsy, drainage of effusion, mediastinal lymph node biopsy  feels ok   no cp   no significant sob   no dizziness     PAST MEDICAL & SURGICAL HISTORY:  Cough    Osteoarthritis    Abnormal finding of lung    COPD, mild    History of hip replacement  right            MEDICATIONS:  heparin   Injectable 5000 Unit(s) SubCutaneous every 12 hours      albuterol/ipratropium for Nebulization 3 milliLiter(s) Nebulizer every 6 hours  budesonide  80 MICROgram(s)/formoterol 4.5 MICROgram(s) Inhaler 2 Puff(s) Inhalation two times a day  dornase el Solution 2.5 milliGRAM(s) Inhalation daily  sodium chloride 3%  Inhalation 4 milliLiter(s) Inhalation every 6 hours  tiotropium 18 MICROgram(s) Capsule 1 Capsule(s) Inhalation daily    butorphanol Injectable 0.125 milliGRAM(s) IV Push every 6 hours PRN  escitalopram 15 milliGRAM(s) Oral daily  HYDROmorphone PCA (1 mG/mL) 30 milliLiter(s) PCA Continuous PCA Continuous  HYDROmorphone PCA (1 mG/mL) Rescue Clinician Bolus 0.5 milliGRAM(s) IV Push every 15 minutes PRN  ondansetron Injectable 4 milliGRAM(s) IV Push every 6 hours PRN    pantoprazole    Tablet 40 milliGRAM(s) Oral before breakfast      lactated ringers. 1000 milliLiter(s) IV Continuous <Continuous>  multivitamin 1 Tablet(s) Oral daily      FAMILY HISTORY:  No pertinent family history in first degree relatives        Non-contributory    SOCIAL HISTORY:    No tobacco, drugs or etoh    Allergies    No Known Allergies    Intolerances    	    REVIEW OF SYSTEMS:  as above  The rest of the 14 points ROS reviewed and except above they are unremarkable.        PHYSICAL EXAM:  T(C): 37.3 (07-14-21 @ 20:00), Max: 37.3 (07-14-21 @ 20:00)  HR: 92 (07-15-21 @ 03:00) (87 - 116)  BP: 135/89 (07-15-21 @ 03:00) (67/41 - 135/89)  RR: 24 (07-15-21 @ 03:00) (14 - 26)  SpO2: 96% (07-15-21 @ 03:00) (94% - 100%)  Wt(kg): --  I&O's Summary    14 Jul 2021 07:01  -  15 Jul 2021 06:46  --------------------------------------------------------  IN: 1000 mL / OUT: 140 mL / NET: 860 mL    JVP: Normal  Neck: supple  Lung: few rhonchi  CV: S1 S2 , Murmur:  Abd: soft  Ext: No edema  neuro: Awake / alert  Psych: flat affect  Skin: normal      LABS/DATA:    TELEMETRY:  sinus 	    ECG:  	   	  CARDIAC MARKERS:                                      8.9    15.89 )-----------( 278      ( 15 Jul 2021 05:11 )             30.3     07-15    142  |  101  |  15  ----------------------------<  120<H>  3.7   |  25  |  0.91    Ca    10.8<H>      15 Jul 2021 05:11  Phos  3.5     07-15  Mg     1.70     07-15    TPro  5.3<L>  /  Alb  3.2<L>  /  TBili  0.3  /  DBili  x   /  AST  12  /  ALT  7   /  AlkPhos  92  07-15    proBNP:   Lipid Profile:   HgA1c:   TSH:

## 2021-07-15 NOTE — PROGRESS NOTE ADULT - SUBJECTIVE AND OBJECTIVE BOX
PROCEDURE: 7/14/21    89F never smoker h/o COPD w/chronic cough, dyspnea on exertion w/RLL consolidation (biopsy c/w chronic inflammation) and mediastinal adenopathy who is now s/p flexible bronchoscopy, right robotic-assisted VATS pleural biopsy, drainage of effusion, mediastinal lymph node biopsy                  ISSUES:                                                   Lesion of right lung, right pleural effusion, mediastinal lymph nodes              COPD  Post op pain  Hypercalcemia      INTERVAL EVENTS:     Right middle and lower lung collapsed, coughing thick mucopurulent secretions, minimally blood tinged  OR today. Extubated in OR. Transferred to CTICU.      HISTORY:     Patient reports moderate pain at chest wall incision sites which is worse with coughing and deep breathing without associated fever or dyspnea. Pain is improved with use of pain meds.     PHYSICAL EXAM:     Gen: Comfortable, No acute distress  Eyes: Sclera white, Conjunctiva normal, Eyelids normal, Pupils symmetrical   ENT: Mucous membranes moist,  ,  ,    Neck: Trachea midline,  ,  ,  ,  ,    CV: Rate regular, Rhythm regular,  ,  ,    Resp: Breath sounds clear, No accessory muscles use, R chest tube in place,    Abd: Soft, Non-distended, Non-tender, Bowel sounds normal,  ,  ,    Skin: Warm, No peripheral edema of lower extremities,  ,    : No kwan  Neuro: Moving all 4 extremities,    Psych: A&Ox3      ASSESSMENT AND PLAN:     NEURO:  Post-operative Pain - Pain control with PCA and Tylenol IV PRN.        RESPIRATORY:    Hypoxia - Wean nasal cannula for goal O2sat above 92. Obtain CXR. Incentive spirometry. Continue bronchodilators. OOB to chair & ambulate w/ assistance. Continuous pulse oximetry for support & to prevent decompensation.  Needs aggressive Chest PT, suction if needed. Ambulating with assistance.  Repeat CXR this afternoon  Chest tube – Pleurevac regulated suctioning. Monitor PleurX output.         CARDIOVASCULAR:  Hemodynamically stable - Not on pressors. Continue hemodynamic monitoring.  Episode of hypotension, given 5% 250ml bolus x 1. Afebrile, Will check CBC if hypotension again. Hold home diuretic       RENAL:  Stable - Monitor IOs and electrolytes. Keep K above 4.0 and Mg above 2.0.  Check PTH, PTHrP, Vit D 25, Vit D (1,25)  Check uric acid      GASTROINTESTINAL:  GI prophylaxis not indicated  Zofran and Reglan IV PRN for nausea  Regular consistency diet        HEMATOLOGIC:  No signs of active bleeding. Monitor Hgb in CBC in AM  DVT prophylaxis with heparin subQ and SCDs.       INFECTIOUS DISEASE:  No signs of active infection. Will monitor for fever and leukocytosis.          ENDOCRINE:  Stable – Monitor glucose fingersticks for goal 120-180.          Pertinent clinical, laboratory, radiographic, hemodynamic,respiratory data, data and chart were reviewed by myself and analyzed frequently throughout the course of the day and night by myself.    Plan discussed at length with the CTICU staff and Attending CT Surgeon.     Patient's status was discussed with patient at bedside.      ________________________________________________  _________________________  VITAL SIGNS:  Vital Signs Last 24 Hrs  T(C): 36.4 (15 Jul 2021 08:00), Max: 37.3 (14 Jul 2021 20:00)  T(F): 97.5 (15 Jul 2021 08:00), Max: 99.1 (14 Jul 2021 20:00)  HR: 94 (15 Jul 2021 11:00) (87 - 116)  BP: 87/54 (15 Jul 2021 11:00) (67/41 - 135/89)  BP(mean): 63 (15 Jul 2021 11:00) (33 - 108)  RR: 23 (15 Jul 2021 11:00) (14 - 29)  SpO2: 97% (15 Jul 2021 11:00) (92% - 100%)  I/Os:   I&O's Detail    14 Jul 2021 07:01  -  15 Jul 2021 07:00  --------------------------------------------------------  IN:    IV PiggyBack: 500 mL    Lactated Ringers: 250 mL    Lactated Ringers Bolus: 250 mL  Total IN: 1000 mL    OUT:    Chest Tube (mL): 140 mL  Total OUT: 140 mL    Total NET: 860 mL      15 Jul 2021 07:01  -  15 Jul 2021 11:32  --------------------------------------------------------  IN:    IV PiggyBack: 350 mL    Lactated Ringers: 200 mL    Oral Fluid: 720 mL  Total IN: 1270 mL    OUT:    Voided (mL): 250 mL  Total OUT: 250 mL    Total NET: 1020 mL              MEDICATIONS:  MEDICATIONS  (STANDING):  albumin human  5% IVPB 250 milliLiter(s) IV Intermittent once  albuterol/ipratropium for Nebulization 3 milliLiter(s) Nebulizer every 6 hours  buDESOnide    Inhalation Suspension 0.25 milliGRAM(s) Inhalation every 12 hours  dornase el Solution 2.5 milliGRAM(s) Inhalation daily  escitalopram 15 milliGRAM(s) Oral daily  heparin   Injectable 5000 Unit(s) SubCutaneous every 12 hours  HYDROmorphone PCA (1 mG/mL) 30 milliLiter(s) PCA Continuous PCA Continuous  lactated ringers. 1000 milliLiter(s) (50 mL/Hr) IV Continuous <Continuous>  multivitamin 1 Tablet(s) Oral daily  pantoprazole    Tablet 40 milliGRAM(s) Oral before breakfast  sodium chloride 3%  Inhalation 4 milliLiter(s) Inhalation every 6 hours    MEDICATIONS  (PRN):  butorphanol Injectable 0.125 milliGRAM(s) IV Push every 6 hours PRN Pruritus  HYDROmorphone PCA (1 mG/mL) Rescue Clinician Bolus 0.5 milliGRAM(s) IV Push every 15 minutes PRN for Pain Scale GREATER THAN 6  naloxone Injectable 0.1 milliGRAM(s) IV Push every 3 minutes PRN For ANY of the following changes in patient status:  A. RR LESS THAN 10 breaths per minute, B. Oxygen saturation LESS THAN 90%, C. Sedation score of 6  ondansetron Injectable 4 milliGRAM(s) IV Push every 6 hours PRN Nausea      LABS:                        8.9    15.89 )-----------( 278      ( 15 Jul 2021 05:11 )             30.3     07-15    142  |  101  |  15  ----------------------------<  120<H>  3.7   |  25  |  0.91    Ca    10.8<H>      15 Jul 2021 05:11  Phos  3.5     07-15  Mg     1.70     07-15    TPro  5.3<L>  /  Alb  3.2<L>  /  TBili  0.3  /  DBili  x   /  AST  12  /  ALT  7   /  AlkPhos  92  07-15    LIVER FUNCTIONS - ( 15 Jul 2021 05:11 )  Alb: 3.2 g/dL / Pro: 5.3 g/dL / ALK PHOS: 92 U/L / ALT: 7 U/L / AST: 12 U/L / GGT: x                 _________________________

## 2021-07-15 NOTE — PHYSICAL THERAPY INITIAL EVALUATION ADULT - ADDITIONAL COMMENTS
Patient is a poor historian. Per chart review patient lives alone. Patient reports being independent in ADLs and ambulation prior to admission.    Patient was left sitting in chair, all lines/tubes intact and call bell within reach, RN aware

## 2021-07-15 NOTE — PROGRESS NOTE ADULT - SUBJECTIVE AND OBJECTIVE BOX
Anesthesia Pain Management Service    SUBJECTIVE: Patient is doing well with IV PCA and no significant problems reported.    Pain Scale Score	At rest: _2__ 	With Activity: ___ 	[X ] Refer to charted pain scores    THERAPY:    [ ] IV PCA Morphine		[ ] 5 mg/mL	[ ] 1 mg/mL  [X ] IV PCA Hydromorphone	[ ] 5 mg/mL	[X ] 1 mg/mL  [ ] IV PCA Fentanyl		[ ] 50 micrograms/mL    Demand dose __0.2_ lockout __6_ (minutes) Continuous Rate _0__ Total: __0.6_  mg used (in past 24 hours)      MEDICATIONS  (STANDING):  albumin human  5% IVPB 250 milliLiter(s) IV Intermittent once  albuterol/ipratropium for Nebulization 3 milliLiter(s) Nebulizer every 6 hours  buDESOnide    Inhalation Suspension 0.25 milliGRAM(s) Inhalation every 12 hours  dornase el Solution 2.5 milliGRAM(s) Inhalation daily  escitalopram 15 milliGRAM(s) Oral daily  heparin   Injectable 5000 Unit(s) SubCutaneous every 12 hours  HYDROmorphone PCA (1 mG/mL) 30 milliLiter(s) PCA Continuous PCA Continuous  lactated ringers. 1000 milliLiter(s) (50 mL/Hr) IV Continuous <Continuous>  multivitamin 1 Tablet(s) Oral daily  pantoprazole    Tablet 40 milliGRAM(s) Oral before breakfast  sodium chloride 3%  Inhalation 4 milliLiter(s) Inhalation every 6 hours    MEDICATIONS  (PRN):  butorphanol Injectable 0.125 milliGRAM(s) IV Push every 6 hours PRN Pruritus  HYDROmorphone PCA (1 mG/mL) Rescue Clinician Bolus 0.5 milliGRAM(s) IV Push every 15 minutes PRN for Pain Scale GREATER THAN 6  naloxone Injectable 0.1 milliGRAM(s) IV Push every 3 minutes PRN For ANY of the following changes in patient status:  A. RR LESS THAN 10 breaths per minute, B. Oxygen saturation LESS THAN 90%, C. Sedation score of 6  ondansetron Injectable 4 milliGRAM(s) IV Push every 6 hours PRN Nausea      OBJECTIVE: laying in bed     Sedation Score:	[ X] Alert	[ ] Drowsy 	[ ] Arousable	[ ] Asleep	[ ] Unresponsive    Side Effects:	[X ] None	[ ] Nausea	[ ] Vomiting	[ ] Pruritus  		[ ] Other:    Vital Signs Last 24 Hrs  T(C): 36.4 (15 Jul 2021 08:00), Max: 37.3 (14 Jul 2021 20:00)  T(F): 97.5 (15 Jul 2021 08:00), Max: 99.1 (14 Jul 2021 20:00)  HR: 95 (15 Jul 2021 10:00) (87 - 116)  BP: 85/73 (15 Jul 2021 10:00) (67/41 - 135/89)  BP(mean): 76 (15 Jul 2021 10:00) (33 - 108)  RR: 26 (15 Jul 2021 10:00) (14 - 29)  SpO2: 100% (15 Jul 2021 10:00) (92% - 100%)    ASSESSMENT/ PLAN    Therapy to  be:	[ X] Continue   [ ] Discontinued   [ ] Change to prn Analgesics    Documentation and Verification of current medications:   [X] Done	[ ] Not done, not elligible    Comments: Recommend non-opioid adjuvant analgesics to be used when possible and when allowed by primary surgical team.    Progress Note written now but Patient was seen earlier.

## 2021-07-15 NOTE — PHYSICAL THERAPY INITIAL EVALUATION ADULT - PATIENT PROFILE REVIEW, REHAB EVAL
PT orders received: ambulate as tolerated. Consult with RN Jackelyn VALERIO, pt may participate in PT evaluation./yes

## 2021-07-15 NOTE — CONSULT NOTE ADULT - ASSESSMENT
copd  nebs    anemia  Monitor hemoglobin, transfuse as needed.  plan as per CTICU    DVT proph  on hep

## 2021-07-15 NOTE — PHYSICAL THERAPY INITIAL EVALUATION ADULT - GAIT DEVIATIONS NOTED, PT EVAL
required frequent cues to ambulate close to assistive device and maintain upright posture/decreased alessio/decreased step length/decreased stride length/decreased weight-shifting ability

## 2021-07-16 LAB
24R-OH-CALCIDIOL SERPL-MCNC: 37.1 NG/ML — SIGNIFICANT CHANGE UP (ref 30–80)
ANION GAP SERPL CALC-SCNC: 14 MMOL/L — SIGNIFICANT CHANGE UP (ref 7–14)
BUN SERPL-MCNC: 10 MG/DL — SIGNIFICANT CHANGE UP (ref 7–23)
CALCIUM SERPL-MCNC: 9.6 MG/DL — SIGNIFICANT CHANGE UP (ref 8.4–10.5)
CHLORIDE SERPL-SCNC: 102 MMOL/L — SIGNIFICANT CHANGE UP (ref 98–107)
CO2 SERPL-SCNC: 21 MMOL/L — LOW (ref 22–31)
CREAT SERPL-MCNC: 0.64 MG/DL — SIGNIFICANT CHANGE UP (ref 0.5–1.3)
GLUCOSE SERPL-MCNC: 78 MG/DL — SIGNIFICANT CHANGE UP (ref 70–99)
HCT VFR BLD CALC: 26.9 % — LOW (ref 34.5–45)
HGB BLD-MCNC: 7.8 G/DL — LOW (ref 11.5–15.5)
MAGNESIUM SERPL-MCNC: 1.4 MG/DL — LOW (ref 1.6–2.6)
MCHC RBC-ENTMCNC: 24.4 PG — LOW (ref 27–34)
MCHC RBC-ENTMCNC: 29 GM/DL — LOW (ref 32–36)
MCV RBC AUTO: 84.1 FL — SIGNIFICANT CHANGE UP (ref 80–100)
NRBC # BLD: 0 /100 WBCS — SIGNIFICANT CHANGE UP
NRBC # FLD: 0 K/UL — SIGNIFICANT CHANGE UP
PHOSPHATE SERPL-MCNC: 1.9 MG/DL — LOW (ref 2.5–4.5)
PLATELET # BLD AUTO: 232 K/UL — SIGNIFICANT CHANGE UP (ref 150–400)
POTASSIUM SERPL-MCNC: 3.8 MMOL/L — SIGNIFICANT CHANGE UP (ref 3.5–5.3)
POTASSIUM SERPL-SCNC: 3.8 MMOL/L — SIGNIFICANT CHANGE UP (ref 3.5–5.3)
RBC # BLD: 3.2 M/UL — LOW (ref 3.8–5.2)
RBC # FLD: 16.5 % — HIGH (ref 10.3–14.5)
SODIUM SERPL-SCNC: 137 MMOL/L — SIGNIFICANT CHANGE UP (ref 135–145)
URATE SERPL-MCNC: 6 MG/DL — SIGNIFICANT CHANGE UP (ref 2.5–7)
VIT D25+D1,25 OH+D1,25 PNL SERPL-MCNC: 94.4 PG/ML — HIGH (ref 19.9–79.3)
WBC # BLD: 16.18 K/UL — HIGH (ref 3.8–10.5)
WBC # FLD AUTO: 16.18 K/UL — HIGH (ref 3.8–10.5)

## 2021-07-16 PROCEDURE — 99233 SBSQ HOSP IP/OBS HIGH 50: CPT

## 2021-07-16 PROCEDURE — 71045 X-RAY EXAM CHEST 1 VIEW: CPT | Mod: 26

## 2021-07-16 RX ORDER — FERROUS SULFATE 325(65) MG
325 TABLET ORAL DAILY
Refills: 0 | Status: DISCONTINUED | OUTPATIENT
Start: 2021-07-16 | End: 2021-07-21

## 2021-07-16 RX ORDER — DILTIAZEM HCL 120 MG
10 CAPSULE, EXT RELEASE 24 HR ORAL ONCE
Refills: 0 | Status: DISCONTINUED | OUTPATIENT
Start: 2021-07-16 | End: 2021-07-16

## 2021-07-16 RX ORDER — AMIODARONE HYDROCHLORIDE 400 MG/1
TABLET ORAL
Refills: 0 | Status: DISCONTINUED | OUTPATIENT
Start: 2021-07-19 | End: 2021-07-21

## 2021-07-16 RX ORDER — THIAMINE MONONITRATE (VIT B1) 100 MG
100 TABLET ORAL DAILY
Refills: 0 | Status: DISCONTINUED | OUTPATIENT
Start: 2021-07-16 | End: 2021-07-21

## 2021-07-16 RX ORDER — MAGNESIUM SULFATE 500 MG/ML
2 VIAL (ML) INJECTION ONCE
Refills: 0 | Status: COMPLETED | OUTPATIENT
Start: 2021-07-16 | End: 2021-07-16

## 2021-07-16 RX ORDER — AMIODARONE HYDROCHLORIDE 400 MG/1
400 TABLET ORAL EVERY 8 HOURS
Refills: 0 | Status: COMPLETED | OUTPATIENT
Start: 2021-07-16 | End: 2021-07-20

## 2021-07-16 RX ORDER — POTASSIUM PHOSPHATE, MONOBASIC POTASSIUM PHOSPHATE, DIBASIC 236; 224 MG/ML; MG/ML
30 INJECTION, SOLUTION INTRAVENOUS ONCE
Refills: 0 | Status: COMPLETED | OUTPATIENT
Start: 2021-07-16 | End: 2021-07-16

## 2021-07-16 RX ORDER — ACETAMINOPHEN 500 MG
650 TABLET ORAL EVERY 6 HOURS
Refills: 0 | Status: COMPLETED | OUTPATIENT
Start: 2021-07-16 | End: 2021-07-18

## 2021-07-16 RX ORDER — ASCORBIC ACID 60 MG
500 TABLET,CHEWABLE ORAL DAILY
Refills: 0 | Status: DISCONTINUED | OUTPATIENT
Start: 2021-07-16 | End: 2021-07-21

## 2021-07-16 RX ORDER — AMIODARONE HYDROCHLORIDE 400 MG/1
150 TABLET ORAL ONCE
Refills: 0 | Status: COMPLETED | OUTPATIENT
Start: 2021-07-16 | End: 2021-07-16

## 2021-07-16 RX ORDER — DORNASE ALFA 1 MG/ML
2.5 SOLUTION RESPIRATORY (INHALATION) EVERY 12 HOURS
Refills: 0 | Status: DISCONTINUED | OUTPATIENT
Start: 2021-07-16 | End: 2021-07-21

## 2021-07-16 RX ORDER — AMIODARONE HYDROCHLORIDE 400 MG/1
200 TABLET ORAL DAILY
Refills: 0 | Status: DISCONTINUED | OUTPATIENT
Start: 2021-07-20 | End: 2021-07-21

## 2021-07-16 RX ADMIN — ESCITALOPRAM OXALATE 15 MILLIGRAM(S): 10 TABLET, FILM COATED ORAL at 13:05

## 2021-07-16 RX ADMIN — DORNASE ALFA 2.5 MILLIGRAM(S): 1 SOLUTION RESPIRATORY (INHALATION) at 09:03

## 2021-07-16 RX ADMIN — HEPARIN SODIUM 5000 UNIT(S): 5000 INJECTION INTRAVENOUS; SUBCUTANEOUS at 06:25

## 2021-07-16 RX ADMIN — Medication 3 MILLILITER(S): at 09:02

## 2021-07-16 RX ADMIN — Medication 3 MILLILITER(S): at 03:53

## 2021-07-16 RX ADMIN — DORNASE ALFA 2.5 MILLIGRAM(S): 1 SOLUTION RESPIRATORY (INHALATION) at 22:12

## 2021-07-16 RX ADMIN — Medication 50 GRAM(S): at 06:25

## 2021-07-16 RX ADMIN — POTASSIUM PHOSPHATE, MONOBASIC POTASSIUM PHOSPHATE, DIBASIC 83.33 MILLIMOLE(S): 236; 224 INJECTION, SOLUTION INTRAVENOUS at 09:25

## 2021-07-16 RX ADMIN — AMIODARONE HYDROCHLORIDE 400 MILLIGRAM(S): 400 TABLET ORAL at 10:40

## 2021-07-16 RX ADMIN — AMIODARONE HYDROCHLORIDE 618 MILLIGRAM(S): 400 TABLET ORAL at 10:02

## 2021-07-16 RX ADMIN — SODIUM CHLORIDE 4 MILLILITER(S): 9 INJECTION INTRAMUSCULAR; INTRAVENOUS; SUBCUTANEOUS at 22:13

## 2021-07-16 RX ADMIN — PANTOPRAZOLE SODIUM 40 MILLIGRAM(S): 20 TABLET, DELAYED RELEASE ORAL at 06:25

## 2021-07-16 RX ADMIN — Medication 3 MILLILITER(S): at 22:12

## 2021-07-16 RX ADMIN — Medication 650 MILLIGRAM(S): at 13:05

## 2021-07-16 RX ADMIN — Medication 650 MILLIGRAM(S): at 13:10

## 2021-07-16 RX ADMIN — Medication 1 TABLET(S): at 13:06

## 2021-07-16 RX ADMIN — HEPARIN SODIUM 5000 UNIT(S): 5000 INJECTION INTRAVENOUS; SUBCUTANEOUS at 17:35

## 2021-07-16 RX ADMIN — Medication 0.25 MILLIGRAM(S): at 09:02

## 2021-07-16 RX ADMIN — Medication 650 MILLIGRAM(S): at 18:30

## 2021-07-16 RX ADMIN — SODIUM CHLORIDE 4 MILLILITER(S): 9 INJECTION INTRAMUSCULAR; INTRAVENOUS; SUBCUTANEOUS at 15:30

## 2021-07-16 RX ADMIN — Medication 0.25 MILLIGRAM(S): at 22:14

## 2021-07-16 RX ADMIN — AMIODARONE HYDROCHLORIDE 400 MILLIGRAM(S): 400 TABLET ORAL at 18:00

## 2021-07-16 RX ADMIN — Medication 3 MILLILITER(S): at 15:30

## 2021-07-16 RX ADMIN — Medication 650 MILLIGRAM(S): at 18:00

## 2021-07-16 RX ADMIN — SODIUM CHLORIDE 50 MILLILITER(S): 9 INJECTION, SOLUTION INTRAVENOUS at 07:16

## 2021-07-16 RX ADMIN — SODIUM CHLORIDE 4 MILLILITER(S): 9 INJECTION INTRAMUSCULAR; INTRAVENOUS; SUBCUTANEOUS at 09:03

## 2021-07-16 RX ADMIN — SODIUM CHLORIDE 4 MILLILITER(S): 9 INJECTION INTRAMUSCULAR; INTRAVENOUS; SUBCUTANEOUS at 03:53

## 2021-07-16 NOTE — PROGRESS NOTE ADULT - [facility]_GEN_ALL_CORE
Anesthesia Evaluation     Patient summary reviewed and Nursing notes reviewed   no history of anesthetic complications:  NPO Solid Status: > 8 hours  NPO Liquid Status: > 8 hours           Airway   Mallampati: II  TM distance: >3 FB  Neck ROM: full  no difficulty expected  Dental - normal exam     Pulmonary - normal exam   (+) pneumonia , asthma,   Cardiovascular - normal exam    Rhythm: regular  Rate: normal    (+) hypertension, hyperlipidemia,       Neuro/Psych  (+) psychiatric history Anxiety and Depression,     GI/Hepatic/Renal/Endo    (+)   diabetes mellitus, hypothyroidism,     Musculoskeletal     Abdominal    Substance History - negative use     OB/GYN negative ob/gyn ROS         Other   (+) arthritis   history of cancer                    Anesthesia Plan    ASA 3     MAC   (Pt told that intravenous sedation will be used as the primary anesthetic along with local anesthesia if necessary. Every effort will be made to make sure the patient is comfortable.     The patient was told they may or may not have recall for the procedure. It was further explained that if the MAC was not adequate that a general anesthetic with either an LMA or endotracheal tube would be required.     Will proceed with the plan of care.)  intravenous induction   Anesthetic plan, all risks, benefits, and alternatives have been provided, discussed and informed consent has been obtained with: patient.      
No
No

## 2021-07-16 NOTE — PROGRESS NOTE ADULT - SUBJECTIVE AND OBJECTIVE BOX
Anesthesia Pain Management Service    SUBJECTIVE: Pt now off IV PCA without problems reported.  Pain Score: 0/10    THERAPY:    [ ] IV PCA Morphine		[ ] 5 mg/mL	[ ] 1 mg/mL  [X ] IV PCA Hydromorphone	[ ] 5 mg/mL	[X ] 1 mg/mL  [ ] IV PCA Fentanyl		[ ] 50 micrograms/mL    Demand dose __0.2_ lockout __6_ (minutes) Continuous Rate _0__ Total: __0.2_  mg used (in past 24 hours)    Therapy:	  [ X] IV PCA	   [ ] Epidural           [ ] s/p Spinal Opoid              [ ] Postpartum infusion	  [ ] Patient controlled regional anesthesia (PCRA)    [ ] prn Analgesics    Allergies  No Known Allergies    Intolerances    MEDICATIONS  (STANDING):  albuterol/ipratropium for Nebulization 3 milliLiter(s) Nebulizer every 6 hours  aMIOdarone    Tablet   Oral   aMIOdarone    Tablet 400 milliGRAM(s) Oral every 8 hours  buDESOnide    Inhalation Suspension 0.25 milliGRAM(s) Inhalation every 12 hours  dornase el Solution 2.5 milliGRAM(s) Inhalation daily  escitalopram 15 milliGRAM(s) Oral daily  heparin   Injectable 5000 Unit(s) SubCutaneous every 12 hours  lactated ringers. 1000 milliLiter(s) (50 mL/Hr) IV Continuous <Continuous>  multivitamin 1 Tablet(s) Oral daily  pantoprazole    Tablet 40 milliGRAM(s) Oral before breakfast  sodium chloride 3%  Inhalation 4 milliLiter(s) Inhalation every 6 hours    MEDICATIONS  (PRN):  butorphanol Injectable 0.125 milliGRAM(s) IV Push every 6 hours PRN Pruritus  naloxone Injectable 0.1 milliGRAM(s) IV Push every 3 minutes PRN For ANY of the following changes in patient status:  A. RR LESS THAN 10 breaths per minute, B. Oxygen saturation LESS THAN 90%, C. Sedation score of 6  ondansetron Injectable 4 milliGRAM(s) IV Push every 6 hours PRN Nausea      OBJECTIVE:   [X] No new signs     [ ] Other:    Side Effects:  [X ] None			[ ] Other:    Assessment of Catheter Site:		[ ] Intact		[ ] Other:    ASSESSMENT/PLAN  [ ] Continue current therapy    [X ] Therapy changed to:    [ ] IV PCA       [ ] Epidural     [ X] prn Analgesics     Comments: IV PCA discontinued by team and they ordered PRN Oral/IV opioids and/or non-opioid adjuvant analgesics to be used at this point.    Progress Note written now but Patient was seen earlier.

## 2021-07-16 NOTE — PROGRESS NOTE ADULT - SUBJECTIVE AND OBJECTIVE BOX
DATE OF SERVICE: 07-16-21 @ 06:27    Subjective: Patient seen and examined. No new events except as noted.     SUBJECTIVE/ROS:  had afib overnight  a bit restless       MEDICATIONS:  MEDICATIONS  (STANDING):  albuterol/ipratropium for Nebulization 3 milliLiter(s) Nebulizer every 6 hours  buDESOnide    Inhalation Suspension 0.25 milliGRAM(s) Inhalation every 12 hours  dornase el Solution 2.5 milliGRAM(s) Inhalation daily  escitalopram 15 milliGRAM(s) Oral daily  heparin   Injectable 5000 Unit(s) SubCutaneous every 12 hours  lactated ringers. 1000 milliLiter(s) (50 mL/Hr) IV Continuous <Continuous>  multivitamin 1 Tablet(s) Oral daily  pantoprazole    Tablet 40 milliGRAM(s) Oral before breakfast  sodium chloride 3%  Inhalation 4 milliLiter(s) Inhalation every 6 hours      PHYSICAL EXAM:  T(C): 36.4 (07-16-21 @ 00:00), Max: 36.6 (07-15-21 @ 13:00)  HR: 97 (07-16-21 @ 03:00) (88 - 102)  BP: 120/58 (07-16-21 @ 03:00) (73/22 - 121/80)  RR: 24 (07-16-21 @ 03:00) (21 - 29)  SpO2: 97% (07-16-21 @ 03:00) (97% - 100%)  Wt(kg): --  I&O's Summary    14 Jul 2021 07:01  -  15 Jul 2021 07:00  --------------------------------------------------------  IN: 1050 mL / OUT: 690 mL / NET: 360 mL    15 Jul 2021 07:01  -  16 Jul 2021 06:27  --------------------------------------------------------  IN: 2060 mL / OUT: 1000 mL / NET: 1060 mL            JVP: Normal  Neck: supple  Lung: few crackles   CV: S1 S2 , Murmur:  Abd: soft  Ext: No edema  neuro: Awake / alert  Psych: flat affect  Skin: normal``    LABS/DATA:    CARDIAC MARKERS:                                7.8    16.18 )-----------( 232      ( 16 Jul 2021 05:10 )             26.9     07-16    137  |  102  |  10  ----------------------------<  78  3.8   |  21<L>  |  0.64    Ca    9.6      16 Jul 2021 05:10  Phos  1.9     07-16  Mg     1.40     07-16    TPro  5.3<L>  /  Alb  3.2<L>  /  TBili  0.3  /  DBili  x   /  AST  12  /  ALT  7   /  AlkPhos  92  07-15    proBNP:   Lipid Profile:   HgA1c:   TSH:     TELE: afib, sinus   EKG:

## 2021-07-16 NOTE — PROGRESS NOTE ADULT - SUBJECTIVE AND OBJECTIVE BOX
Anesthesia Pain Management Service    SUBJECTIVE: Pt now off IV PCA without problems reported.    Therapy:	  [ X] IV PCA	   [ ] Epidural           [ ] s/p Spinal Opoid              [ ] Postpartum infusion	  [ ] Patient controlled regional anesthesia (PCRA)    [ ] prn Analgesics    Allergies    No Known Allergies    Intolerances      MEDICATIONS  (STANDING):  acetaminophen   Tablet .. 650 milliGRAM(s) Oral every 6 hours  albuterol/ipratropium for Nebulization 3 milliLiter(s) Nebulizer every 6 hours  aMIOdarone    Tablet   Oral   aMIOdarone    Tablet 400 milliGRAM(s) Oral every 8 hours  ascorbic acid 500 milliGRAM(s) Oral daily  buDESOnide    Inhalation Suspension 0.25 milliGRAM(s) Inhalation every 12 hours  dornase el Solution 2.5 milliGRAM(s) Inhalation every 12 hours  escitalopram 15 milliGRAM(s) Oral daily  ferrous    sulfate 325 milliGRAM(s) Oral daily  heparin   Injectable 5000 Unit(s) SubCutaneous every 12 hours  lactated ringers. 1000 milliLiter(s) (50 mL/Hr) IV Continuous <Continuous>  multivitamin 1 Tablet(s) Oral daily  pantoprazole    Tablet 40 milliGRAM(s) Oral before breakfast  sodium chloride 3%  Inhalation 4 milliLiter(s) Inhalation every 6 hours  thiamine 100 milliGRAM(s) Oral daily    MEDICATIONS  (PRN):  butorphanol Injectable 0.125 milliGRAM(s) IV Push every 6 hours PRN Pruritus  naloxone Injectable 0.1 milliGRAM(s) IV Push every 3 minutes PRN For ANY of the following changes in patient status:  A. RR LESS THAN 10 breaths per minute, B. Oxygen saturation LESS THAN 90%, C. Sedation score of 6  ondansetron Injectable 4 milliGRAM(s) IV Push every 6 hours PRN Nausea      OBJECTIVE:   [X] No new signs     [ ] Other:    Side Effects:  [X ] None			[ ] Other:    Assessment of Catheter Site:		[ ] Intact		[ ] Other:    ASSESSMENT/PLAN  [ ] Continue current therapy    [X ] Therapy changed to:    [ ] IV PCA       [ ] Epidural     [ X] prn Analgesics     Comments: PRN Oral/IV opioids and/or non-opioid adjuvant analgesics to be used at this point.

## 2021-07-16 NOTE — PROGRESS NOTE ADULT - SUBJECTIVE AND OBJECTIVE BOX
TOMMIE BOONE                     [id number]  HPI:   Pt is a 89 yr old female scheduled for Flexible Bronchoscopy endobronchial U/S Guided Biopsy with Cytology with Dr Coates tentatively 7/15/21 - pt c/o of chronic productive  cough for at least 6 months - pt is non-smoker - pt had Bronchoscopy in [place] 1/21 and then was hospitalized 4/29/21 for several days with increased inflammation RLL - Pt also c/o of mild SOB - hx anxiety  Pt denies COVID       Procedure: VATS, with pleural biopsy  drainage of effusion,    Issues:  Lesion of right lung   COPD  post op pain      PAST MEDICAL & SURGICAL HISTORY:  Cough    Osteoarthritis    Abnormal finding of lung    COPD, mild    History of hip replacement  right              VITAL SIGNS:  Vital Signs Last 24 Hrs  T(C): 36.7 (16 Jul 2021 08:00), Max: 36.7 (16 Jul 2021 08:00)  T(F): 98 (16 Jul 2021 08:00), Max: 98 (16 Jul 2021 08:00)  HR: 100 (16 Jul 2021 09:06) (88 - 108)  BP: 97/50 (16 Jul 2021 09:00) (78/58 - 124/67)  BP(mean): 61 (16 Jul 2021 09:00) (59 - 90)  RR: 20 (16 Jul 2021 09:06) (20 - 31)  SpO2: 98% (16 Jul 2021 09:06) (89% - 100%)    I/Os:   I&O's Detail    15 Jul 2021 07:01  -  16 Jul 2021 07:00  --------------------------------------------------------  IN:    IV PiggyBack: 350 mL    Lactated Ringers: 1000 mL    Oral Fluid: 960 mL  Total IN: 2310 mL    OUT:    Chest Tube (mL): 200 mL    Voided (mL): 1100 mL  Total OUT: 1300 mL    Total NET: 1010 mL          CAPILLARY BLOOD GLUCOSE      POCT Blood Glucose.: 116 mg/dL (15 Jul 2021 12:37)      =======================MEDICATIONS===================  MEDICATIONS  (STANDING):  albuterol/ipratropium for Nebulization 3 milliLiter(s) Nebulizer every 6 hours  aMIOdarone IVPB 150 milliGRAM(s) IV Intermittent once  buDESOnide    Inhalation Suspension 0.25 milliGRAM(s) Inhalation every 12 hours  dornase el Solution 2.5 milliGRAM(s) Inhalation daily  escitalopram 15 milliGRAM(s) Oral daily  heparin   Injectable 5000 Unit(s) SubCutaneous every 12 hours  lactated ringers. 1000 milliLiter(s) (50 mL/Hr) IV Continuous <Continuous>  multivitamin 1 Tablet(s) Oral daily  pantoprazole    Tablet 40 milliGRAM(s) Oral before breakfast  sodium chloride 3%  Inhalation 4 milliLiter(s) Inhalation every 6 hours    MEDICATIONS  (PRN):  butorphanol Injectable 0.125 milliGRAM(s) IV Push every 6 hours PRN Pruritus  naloxone Injectable 0.1 milliGRAM(s) IV Push every 3 minutes PRN For ANY of the following changes in patient status:  A. RR LESS THAN 10 breaths per minute, B. Oxygen saturation LESS THAN 90%, C. Sedation score of 6  ondansetron Injectable 4 milliGRAM(s) IV Push every 6 hours PRN Nausea        PHYSICAL EXAM============================  General:                         Awake, alert, extubated,  not in any distress  Neuro:                            Moving all extremities to commands.   Respiratory:	Air entry fair and  bilateral conducted sounds                                           Effort even and unlabored.  CV:		Regular rate and rhythm. Normal S1/S2                                          Distal pulses present.  Abdomen:	                     Soft, +distended. Bowel sounds present, rectal tube in place, No active blood in stool    Skin:		No rash.  Extremities:	Warm, no cyanosis, + edema.  Palpable pulses    ============================LABS=========================                        7.8    16.18 )-----------( 232      ( 16 Jul 2021 05:10 )             26.9     07-16    137  |  102  |  10  ----------------------------<  78  3.8   |  21<L>  |  0.64    Ca    9.6      16 Jul 2021 05:10  Phos  1.9     07-16  Mg     1.40     07-16    TPro  5.3<L>  /  Alb  3.2<L>  /  TBili  0.3  /  DBili  x   /  AST  12  /  ALT  7   /  AlkPhos  92  07-15    LIVER FUNCTIONS - ( 15 Jul 2021 05:11 )  Alb: 3.2 g/dL / Pro: 5.3 g/dL / ALK PHOS: 92 U/L / ALT: 7 U/L / AST: 12 U/L / GGT: x             ABG - ( 15 Jul 2021 12:55 )  pH, Arterial: 7.38  pH, Blood: x     /  pCO2: 53    /  pO2: 111   / HCO3: 29    / Base Excess: 5.3   /  SaO2: 98.8        A/P:  =============================NEUROLOGY============================  Pain control with PCA / Tylenol IV   Nonfocal  ==============================RESPIRATORY========================  Hypoxia, requiring high flow O2, monitor ABGs, A line,   OOBTC  Using incentive spirometry   Monitor chest tube output  Chest tube to suction 	  COPD: Continue bronchodilators, pulmonary toilet    ============================CARDIOVASCULAR======================  Continue hemodynamic monitoring.  Hypotension, IVF resuscitation,   PAF, now in NSR, cont Monitoring   =====================RENAL===================  Continue LR    Monitor I/Os and electrolytes    ====================GASTROINTESTINAL===================  Tolerating diet  Continue Protonix gtt  Continue Zofran / Reglan for nausea - PRN	    ========================HEMATOLOGIC/ONCOLOGIC====================  Monitor chest tube output. No signs of active bleeding.   Follow CBC in AM    ============================INFECTIOUS DISEASE========================  Monitor for fever / leukocytosis.  All surgical incision / chest tube  sites look clean      Pt is on GI & DVT prophylaxis  OOB & ambulate       Pertinent clinical, laboratory, radiographic, hemodynamic, echocardiographic, respiratory data, microbiologic data and chart were reviewed and analyzed frequently throughout the course of the day and night  Patient seen, examined and plan discussed with CT Surgery / CTICU team during rounds.    Pt's status discussed with family at bedside, updated status      Ileana Nobles DO FACEP     TOMMIE BOONE                     [id number]  HPI:   Pt is a 89 yr old female scheduled for Flexible Bronchoscopy endobronchial U/S Guided Biopsy with Cytology with Dr Coates tentatively 7/15/21 - pt c/o of chronic productive  cough for at least 6 months - pt is non-smoker - pt had Bronchoscopy in [place] 1/21 and then was hospitalized 4/29/21 for several days with increased inflammation RLL - Pt also c/o of mild SOB - hx anxiety  Pt denies COVID       Procedure: VATS, with pleural biopsy  drainage of effusion,    Issues:  Lesion of right lung   COPD  post op pain      PAST MEDICAL & SURGICAL HISTORY:  Cough    Osteoarthritis    Abnormal finding of lung    COPD, mild    History of hip replacement  right              VITAL SIGNS:  Vital Signs Last 24 Hrs  T(C): 36.7 (16 Jul 2021 08:00), Max: 36.7 (16 Jul 2021 08:00)  T(F): 98 (16 Jul 2021 08:00), Max: 98 (16 Jul 2021 08:00)  HR: 100 (16 Jul 2021 09:06) (88 - 108)  BP: 97/50 (16 Jul 2021 09:00) (78/58 - 124/67)  BP(mean): 61 (16 Jul 2021 09:00) (59 - 90)  RR: 20 (16 Jul 2021 09:06) (20 - 31)  SpO2: 98% (16 Jul 2021 09:06) (89% - 100%)    I/Os:   I&O's Detail    15 Jul 2021 07:01  -  16 Jul 2021 07:00  --------------------------------------------------------  IN:    IV PiggyBack: 350 mL    Lactated Ringers: 1000 mL    Oral Fluid: 960 mL  Total IN: 2310 mL    OUT:    Chest Tube (mL): 200 mL    Voided (mL): 1100 mL  Total OUT: 1300 mL    Total NET: 1010 mL          CAPILLARY BLOOD GLUCOSE      POCT Blood Glucose.: 116 mg/dL (15 Jul 2021 12:37)      =======================MEDICATIONS===================  MEDICATIONS  (STANDING):  albuterol/ipratropium for Nebulization 3 milliLiter(s) Nebulizer every 6 hours  aMIOdarone IVPB 150 milliGRAM(s) IV Intermittent once  buDESOnide    Inhalation Suspension 0.25 milliGRAM(s) Inhalation every 12 hours  dornase el Solution 2.5 milliGRAM(s) Inhalation daily  escitalopram 15 milliGRAM(s) Oral daily  heparin   Injectable 5000 Unit(s) SubCutaneous every 12 hours  lactated ringers. 1000 milliLiter(s) (50 mL/Hr) IV Continuous <Continuous>  multivitamin 1 Tablet(s) Oral daily  pantoprazole    Tablet 40 milliGRAM(s) Oral before breakfast  sodium chloride 3%  Inhalation 4 milliLiter(s) Inhalation every 6 hours    MEDICATIONS  (PRN):  butorphanol Injectable 0.125 milliGRAM(s) IV Push every 6 hours PRN Pruritus  naloxone Injectable 0.1 milliGRAM(s) IV Push every 3 minutes PRN For ANY of the following changes in patient status:  A. RR LESS THAN 10 breaths per minute, B. Oxygen saturation LESS THAN 90%, C. Sedation score of 6  ondansetron Injectable 4 milliGRAM(s) IV Push every 6 hours PRN Nausea        PHYSICAL EXAM============================  General:                         Awake, alert, extubated,  not in any distress  Neuro:                            Moving all extremities to commands.   Respiratory:	Air entry fair and  bilateral conducted sounds                                           Effort even and unlabored.  CV:		Regular rate and rhythm. Normal S1/S2                                          Distal pulses present.  Abdomen:	                     Soft, non-distended. Bowel sounds present  Skin:		No rash.  Extremities:	Warm, no cyanosis or edema.  Palpable pulses    ============================LABS=========================                        7.8    16.18 )-----------( 232      ( 16 Jul 2021 05:10 )             26.9     07-16    137  |  102  |  10  ----------------------------<  78  3.8   |  21<L>  |  0.64    Ca    9.6      16 Jul 2021 05:10  Phos  1.9     07-16  Mg     1.40     07-16    TPro  5.3<L>  /  Alb  3.2<L>  /  TBili  0.3  /  DBili  x   /  AST  12  /  ALT  7   /  AlkPhos  92  07-15    LIVER FUNCTIONS - ( 15 Jul 2021 05:11 )  Alb: 3.2 g/dL / Pro: 5.3 g/dL / ALK PHOS: 92 U/L / ALT: 7 U/L / AST: 12 U/L / GGT: x             ABG - ( 15 Jul 2021 12:55 )  pH, Arterial: 7.38  pH, Blood: x     /  pCO2: 53    /  pO2: 111   / HCO3: 29    / Base Excess: 5.3   /  SaO2: 98.8        A/P:  =============================NEUROLOGY============================  Pain control with PCA / Tylenol IV   Nonfocal  ==============================RESPIRATORY========================  Hypoxia, requiring N/C O2,    OOBTC  Using incentive spirometry   Monitor chest tube output  Chest tube to suction 	  COPD: Continue bronchodilators, pulmonary toilet    ============================CARDIOVASCULAR======================  Continue hemodynamic monitoring.  Hypotension, IVF resuscitation,   PAF, now in NSR, cont Monitoring   =====================RENAL===================  Continue LR    Monitor I/Os and electrolytes    ====================GASTROINTESTINAL===================  Tolerating diet  Continue Protonix gtt  Continue Zofran / Reglan for nausea - PRN	    ========================HEMATOLOGIC/ONCOLOGIC====================  Monitor chest tube output. No signs of active bleeding.   Follow CBC in AM    ============================INFECTIOUS DISEASE========================  Monitor for fever / leukocytosis.  All surgical incision / chest tube  sites look clean      Pt is on GI & DVT prophylaxis  OOB & ambulate       Pertinent clinical, laboratory, radiographic, hemodynamic, echocardiographic, respiratory data, microbiologic data and chart were reviewed and analyzed frequently throughout the course of the day and night  Patient seen, examined and plan discussed with CT Surgery / CTICU team during rounds.    Pt's status discussed with family at bedside, updated status      Ileana Nobles DO FACEP

## 2021-07-17 LAB
AMMONIA BLD-MCNC: 14 UMOL/L — SIGNIFICANT CHANGE UP (ref 11–55)
ANION GAP SERPL CALC-SCNC: 8 MMOL/L — SIGNIFICANT CHANGE UP (ref 7–14)
BUN SERPL-MCNC: 14 MG/DL — SIGNIFICANT CHANGE UP (ref 7–23)
CA-I BLD-SCNC: 1.39 MMOL/L — HIGH (ref 1.15–1.29)
CALCIUM SERPL-MCNC: 10.6 MG/DL — HIGH (ref 8.4–10.5)
CHLORIDE SERPL-SCNC: 102 MMOL/L — SIGNIFICANT CHANGE UP (ref 98–107)
CO2 SERPL-SCNC: 27 MMOL/L — SIGNIFICANT CHANGE UP (ref 22–31)
CREAT SERPL-MCNC: 0.78 MG/DL — SIGNIFICANT CHANGE UP (ref 0.5–1.3)
GLUCOSE SERPL-MCNC: 104 MG/DL — HIGH (ref 70–99)
HCT VFR BLD CALC: 31.5 % — LOW (ref 34.5–45)
HGB BLD-MCNC: 9.2 G/DL — LOW (ref 11.5–15.5)
MCHC RBC-ENTMCNC: 24.3 PG — LOW (ref 27–34)
MCHC RBC-ENTMCNC: 29.2 GM/DL — LOW (ref 32–36)
MCV RBC AUTO: 83.3 FL — SIGNIFICANT CHANGE UP (ref 80–100)
NRBC # BLD: 0 /100 WBCS — SIGNIFICANT CHANGE UP
NRBC # FLD: 0 K/UL — SIGNIFICANT CHANGE UP
PLATELET # BLD AUTO: 251 K/UL — SIGNIFICANT CHANGE UP (ref 150–400)
POTASSIUM SERPL-MCNC: 4.1 MMOL/L — SIGNIFICANT CHANGE UP (ref 3.5–5.3)
POTASSIUM SERPL-SCNC: 4.1 MMOL/L — SIGNIFICANT CHANGE UP (ref 3.5–5.3)
RBC # BLD: 3.78 M/UL — LOW (ref 3.8–5.2)
RBC # FLD: 16.4 % — HIGH (ref 10.3–14.5)
SODIUM SERPL-SCNC: 137 MMOL/L — SIGNIFICANT CHANGE UP (ref 135–145)
WBC # BLD: 19 K/UL — HIGH (ref 3.8–10.5)
WBC # FLD AUTO: 19 K/UL — HIGH (ref 3.8–10.5)

## 2021-07-17 PROCEDURE — 99233 SBSQ HOSP IP/OBS HIGH 50: CPT

## 2021-07-17 PROCEDURE — 71045 X-RAY EXAM CHEST 1 VIEW: CPT | Mod: 26

## 2021-07-17 RX ADMIN — ESCITALOPRAM OXALATE 15 MILLIGRAM(S): 10 TABLET, FILM COATED ORAL at 15:16

## 2021-07-17 RX ADMIN — Medication 0.25 MILLIGRAM(S): at 11:11

## 2021-07-17 RX ADMIN — AMIODARONE HYDROCHLORIDE 400 MILLIGRAM(S): 400 TABLET ORAL at 01:18

## 2021-07-17 RX ADMIN — AMIODARONE HYDROCHLORIDE 400 MILLIGRAM(S): 400 TABLET ORAL at 12:14

## 2021-07-17 RX ADMIN — DORNASE ALFA 2.5 MILLIGRAM(S): 1 SOLUTION RESPIRATORY (INHALATION) at 22:22

## 2021-07-17 RX ADMIN — Medication 0.25 MILLIGRAM(S): at 22:23

## 2021-07-17 RX ADMIN — Medication 650 MILLIGRAM(S): at 18:40

## 2021-07-17 RX ADMIN — Medication 650 MILLIGRAM(S): at 01:18

## 2021-07-17 RX ADMIN — AMIODARONE HYDROCHLORIDE 400 MILLIGRAM(S): 400 TABLET ORAL at 18:39

## 2021-07-17 RX ADMIN — SODIUM CHLORIDE 50 MILLILITER(S): 9 INJECTION, SOLUTION INTRAVENOUS at 23:16

## 2021-07-17 RX ADMIN — Medication 3 MILLILITER(S): at 04:26

## 2021-07-17 RX ADMIN — Medication 500 MILLIGRAM(S): at 15:15

## 2021-07-17 RX ADMIN — Medication 325 MILLIGRAM(S): at 15:16

## 2021-07-17 RX ADMIN — DORNASE ALFA 2.5 MILLIGRAM(S): 1 SOLUTION RESPIRATORY (INHALATION) at 11:11

## 2021-07-17 RX ADMIN — HEPARIN SODIUM 5000 UNIT(S): 5000 INJECTION INTRAVENOUS; SUBCUTANEOUS at 05:43

## 2021-07-17 RX ADMIN — Medication 100 MILLIGRAM(S): at 15:17

## 2021-07-17 RX ADMIN — Medication 650 MILLIGRAM(S): at 08:19

## 2021-07-17 RX ADMIN — Medication 3 MILLILITER(S): at 11:12

## 2021-07-17 RX ADMIN — Medication 650 MILLIGRAM(S): at 13:15

## 2021-07-17 RX ADMIN — HEPARIN SODIUM 5000 UNIT(S): 5000 INJECTION INTRAVENOUS; SUBCUTANEOUS at 18:39

## 2021-07-17 RX ADMIN — SODIUM CHLORIDE 4 MILLILITER(S): 9 INJECTION INTRAMUSCULAR; INTRAVENOUS; SUBCUTANEOUS at 22:22

## 2021-07-17 RX ADMIN — Medication 1 TABLET(S): at 15:16

## 2021-07-17 RX ADMIN — Medication 650 MILLIGRAM(S): at 23:13

## 2021-07-17 RX ADMIN — Medication 3 MILLILITER(S): at 22:21

## 2021-07-17 RX ADMIN — PANTOPRAZOLE SODIUM 40 MILLIGRAM(S): 20 TABLET, DELAYED RELEASE ORAL at 09:20

## 2021-07-17 RX ADMIN — SODIUM CHLORIDE 4 MILLILITER(S): 9 INJECTION INTRAMUSCULAR; INTRAVENOUS; SUBCUTANEOUS at 11:12

## 2021-07-17 RX ADMIN — SODIUM CHLORIDE 4 MILLILITER(S): 9 INJECTION INTRAMUSCULAR; INTRAVENOUS; SUBCUTANEOUS at 04:26

## 2021-07-17 RX ADMIN — Medication 650 MILLIGRAM(S): at 08:13

## 2021-07-17 NOTE — PROGRESS NOTE ADULT - SUBJECTIVE AND OBJECTIVE BOX
DATE OF SERVICE: 07-17-21 @ 10:43    Subjective: Patient seen and examined. No new events except as noted.     SUBJECTIVE/ROS:    much more comfortable  sitting on a chair     MEDICATIONS:  MEDICATIONS  (STANDING):  acetaminophen   Tablet .. 650 milliGRAM(s) Oral every 6 hours  albuterol/ipratropium for Nebulization 3 milliLiter(s) Nebulizer every 6 hours  aMIOdarone    Tablet   Oral   aMIOdarone    Tablet 400 milliGRAM(s) Oral every 8 hours  ascorbic acid 500 milliGRAM(s) Oral daily  buDESOnide    Inhalation Suspension 0.25 milliGRAM(s) Inhalation every 12 hours  dornase el Solution 2.5 milliGRAM(s) Inhalation every 12 hours  escitalopram 15 milliGRAM(s) Oral daily  ferrous    sulfate 325 milliGRAM(s) Oral daily  heparin   Injectable 5000 Unit(s) SubCutaneous every 12 hours  lactated ringers. 1000 milliLiter(s) (50 mL/Hr) IV Continuous <Continuous>  multivitamin 1 Tablet(s) Oral daily  pantoprazole    Tablet 40 milliGRAM(s) Oral before breakfast  sodium chloride 3%  Inhalation 4 milliLiter(s) Inhalation every 6 hours  thiamine 100 milliGRAM(s) Oral daily      PHYSICAL EXAM:  T(C): 36.6 (07-17-21 @ 04:00), Max: 37.1 (07-16-21 @ 12:00)  HR: 81 (07-17-21 @ 09:00) (70 - 100)  BP: 97/49 (07-17-21 @ 09:00) (86/47 - 137/48)  RR: 22 (07-17-21 @ 09:00) (16 - 30)  SpO2: 98% (07-17-21 @ 09:00) (94% - 100%)  Wt(kg): --  I&O's Summary    16 Jul 2021 07:01  -  17 Jul 2021 07:00  --------------------------------------------------------  IN: 3118 mL / OUT: 1030 mL / NET: 2088 mL            JVP: Normal  Neck: supple  Lung: clear   CV: S1 S2 , Murmur:  Abd: soft  Ext: No edema  neuro: Awake / alert  Psych: flat affect  Skin: normal``    LABS/DATA:    CARDIAC MARKERS:                                9.2    19.00 )-----------( 251      ( 17 Jul 2021 04:59 )             31.5     07-17    137  |  102  |  14  ----------------------------<  104<H>  4.1   |  27  |  0.78    Ca    10.6<H>      17 Jul 2021 04:59  Phos  1.9     07-16  Mg     1.40     07-16      proBNP:   Lipid Profile:   HgA1c:   TSH:     TELE:  EKG:

## 2021-07-17 NOTE — PROGRESS NOTE ADULT - SUBJECTIVE AND OBJECTIVE BOX
PAOLOTOMMIE      89y   Female   [id number]         No Known Allergies             Daily     Daily Drug Dosing Weight  Height (cm): 153 (14 Jul 2021 12:37)  Weight (kg): 53.1 (14 Jul 2021 12:37)  BMI (kg/m2): 22.7 (14 Jul 2021 12:37)  BSA (m2): 1.49 (14 Jul 2021 12:37)     Pt is a 89 yr old female scheduled for Flexible Bronchoscopy endobronchial U/S Guided Biopsy with Cytology with Dr Coates tentatively 7/15/21 - pt c/o of chronic productive  cough for at least 6 months - pt is non-smoker - pt had Bronchoscopy in [place] 1/21 and then was hospitalized 4/29/21 for several days with increased inflammation RLL       Procedure: VATS, with pleural biopsy, drainage of effusion 7/14/2021                         Issues:              LPleural effusion              Postop pain  Chest tube in place  COPD  Right lower Atelectasis  PAF                 Home Medications:  Calcium, magnesium, zinc: 1 tab(s) orally once a day (14 Jul 2021 13:08)  Cranberry Pill: 1 tab(s) orally once a day, last dose 7/7/21 (14 Jul 2021 13:08)  furosemide 20 mg oral tablet: 1 tab(s) orally once a day, am (14 Jul 2021 13:08)  Lexapro: 15 milligram(s) orally once a day, am (14 Jul 2021 13:08)  Multiple Vitamins oral capsule: 1 cap(s) orally once a day, am, last dose 7/7/21 (14 Jul 2021 13:08)  omeprazole 20 mg oral delayed release capsule: 1 cap(s) orally once a day, am (14 Jul 2021 13:08)  Trelegy Ellipta 100 mcg-62.5 mcg-25 mcg/inh inhalation powder: 1 puff(s) inhaled once a day, am (14 Jul 2021 13:08)  Vitamin D3: 1 tab(s) orally once a day (14 Jul 2021 13:08)      PAST MEDICAL & SURGICAL HISTORY:  Cough    Osteoarthritis    Abnormal finding of lung    COPD, mild    History of hip replacement  right      Vital Signs Last 24 Hrs  T(C): 36.6 (17 Jul 2021 04:00), Max: 37.1 (16 Jul 2021 12:00)  T(F): 97.8 (17 Jul 2021 04:00), Max: 98.8 (16 Jul 2021 12:00)  HR: 81 (17 Jul 2021 09:00) (70 - 100)  BP: 97/49 (17 Jul 2021 09:00) (86/47 - 137/48)  BP(mean): 61 (17 Jul 2021 09:00) (53 - 99)  RR: 22 (17 Jul 2021 09:00) (16 - 30)  SpO2: 98% (17 Jul 2021 09:00) (94% - 100%)  I&O's Detail    16 Jul 2021 07:01  -  17 Jul 2021 07:00  --------------------------------------------------------  IN:    IV PiggyBack: 598 mL    Lactated Ringers: 1200 mL    Oral Fluid: 1320 mL  Total IN: 3118 mL    OUT:    Chest Tube (mL): 30 mL    Voided (mL): 1000 mL  Total OUT: 1030 mL    Total NET: 2088 mL    CAPILLARY BLOOD GLUCOSE    POCT Blood Glucose.: 116 mg/dL (15 Jul 2021 12:37)      Home Medications:  Calcium, magnesium, zinc: 1 tab(s) orally once a day (14 Jul 2021 13:08)  Cranberry Pill: 1 tab(s) orally once a day, last dose 7/7/21 (14 Jul 2021 13:08)  furosemide 20 mg oral tablet: 1 tab(s) orally once a day, am (14 Jul 2021 13:08)  Lexapro: 15 milligram(s) orally once a day, am (14 Jul 2021 13:08)  Multiple Vitamins oral capsule: 1 cap(s) orally once a day, am, last dose 7/7/21 (14 Jul 2021 13:08)  omeprazole 20 mg oral delayed release capsule: 1 cap(s) orally once a day, am (14 Jul 2021 13:08)  Trelegy Ellipta 100 mcg-62.5 mcg-25 mcg/inh inhalation powder: 1 puff(s) inhaled once a day, am (14 Jul 2021 13:08)  Vitamin D3: 1 tab(s) orally once a day (14 Jul 2021 13:08)      MEDICATIONS  (STANDING):  acetaminophen   Tablet .. 650 milliGRAM(s) Oral every 6 hours  albuterol/ipratropium for Nebulization 3 milliLiter(s) Nebulizer every 6 hours  aMIOdarone    Tablet   Oral   aMIOdarone    Tablet 400 milliGRAM(s) Oral every 8 hours  ascorbic acid 500 milliGRAM(s) Oral daily  buDESOnide    Inhalation Suspension 0.25 milliGRAM(s) Inhalation every 12 hours  dornase el Solution 2.5 milliGRAM(s) Inhalation every 12 hours  escitalopram 15 milliGRAM(s) Oral daily  ferrous    sulfate 325 milliGRAM(s) Oral daily  heparin   Injectable 5000 Unit(s) SubCutaneous every 12 hours  lactated ringers. 1000 milliLiter(s) (50 mL/Hr) IV Continuous <Continuous>  multivitamin 1 Tablet(s) Oral daily  pantoprazole    Tablet 40 milliGRAM(s) Oral before breakfast  sodium chloride 3%  Inhalation 4 milliLiter(s) Inhalation every 6 hours  thiamine 100 milliGRAM(s) Oral daily    MEDICATIONS  (PRN):  butorphanol Injectable 0.125 milliGRAM(s) IV Push every 6 hours PRN Pruritus  naloxone Injectable 0.1 milliGRAM(s) IV Push every 3 minutes PRN For ANY of the following changes in patient status:  A. RR LESS THAN 10 breaths per minute, B. Oxygen saturation LESS THAN 90%, C. Sedation score of 6  ondansetron Injectable 4 milliGRAM(s) IV Push every 6 hours PRN Nausea          Physical exam:                             General:               Pt appears sleepy but answering questions appropriately,  appears to be in pain but not in  distress                                                  Neuro:                  Nonfocal                             Cardiovascular:   S1 & S2, regular                           Respiratory:         Air entry is fair and equal on both sides, has bilateral conducted sounds                           GI:                          Soft, nondistended and nontender, Bowel sounds active                            Ext:                        No cyanosis or edema     Labs:                                                                           9.2    19.00 )-----------( 251      ( 17 Jul 2021 04:59 )             31.5             07-17    137  |  102  |  14  ----------------------------<  104<H>  4.1   |  27  |  0.78    Ca    10.6<H>      17 Jul 2021 04:59  Phos  1.9     07-16  Mg     1.40     07-16        CXR:  < from: Xray Chest 1 View- PORTABLE-Routine (Xray Chest 1 View- PORTABLE-Routine in AM.) (07.16.21 @ 04:58) >  Frontal expiratory view of the chest shows the heart to be similar in size. Right Pleurx catheter is again noted.    The lungs show similar right effusion and similar left base infiltrate. There is no evidence of pneumothorax or left effusion or left effusion.    IMPRESSION:  Similar right effusion.      Plan:    General: 89yFemale s/p VATS, with pleural biopsy, drainage of effusion 7/14/2021 , experiencing  pain with deep breathing.                             Neuro:                                         Pain control with  Tylenol q6hrs PRN                            Cardiovascular:                                          Continue hemodynamic monitoring.    PAF: Currently in NSR  Continue Amio PO load  No A.C for now                            Respiratory:                                         Pt is on 2L  nasal canula, wean off as tolerated                                          Comfortable, not in any distress.                                         Encourage incentive spirometry                                          Monitor chest tube output                                         Pleural effusion: Chest tube to suction, monitor output                                                                   RLL Atelectasis: Continue bronchodilators, Pulmozyme,  aggressive pulmonary toilet                            GI                                         On clear liquids, advance to DASH / diabetic / regular diet as tolerated                                         Continue Zofran / Reglan for nausea - PRN	                                                                 Renal:                                         Continue LR  30cc/hr                                         Monitor I/Os and electrolytes                                                                                        Hem/ Onc:                                                                                  Monitor chest tube output &  signs of bleeding.                                          Follow CBC in AM                           Infectious disease:                                            Monitor for fever / leukocytosis.                                          All surgical incision / chest tube  sites look clean                            Endocrine                                           Continue Accu-Checks with coverage    Pt is on SQ Heparin and Venodyne boots for DVT prophylaxis.     Pertinent clinical, laboratory, radiographic, hemodynamic, echocardiographic, respiratory data, microbiologic data and chart were reviewed and analyzed frequently throughout the course of the day and night  Patient seen, examined and plan discussed with CT Surgeon Dr. Quiñonez  / CTICU team during rounds.    Status discussed with patient /  updated plan of care          Trip Silva MD

## 2021-07-18 LAB
ANION GAP SERPL CALC-SCNC: 14 MMOL/L — SIGNIFICANT CHANGE UP (ref 7–14)
APPEARANCE UR: CLEAR — SIGNIFICANT CHANGE UP
BACTERIA # UR AUTO: NEGATIVE — SIGNIFICANT CHANGE UP
BILIRUB UR-MCNC: NEGATIVE — SIGNIFICANT CHANGE UP
BUN SERPL-MCNC: 14 MG/DL — SIGNIFICANT CHANGE UP (ref 7–23)
CALCIUM SERPL-MCNC: 11.4 MG/DL — HIGH (ref 8.4–10.5)
CHLORIDE SERPL-SCNC: 97 MMOL/L — LOW (ref 98–107)
CO2 SERPL-SCNC: 26 MMOL/L — SIGNIFICANT CHANGE UP (ref 22–31)
COLOR SPEC: YELLOW — SIGNIFICANT CHANGE UP
CREAT SERPL-MCNC: 0.77 MG/DL — SIGNIFICANT CHANGE UP (ref 0.5–1.3)
DIFF PNL FLD: NEGATIVE — SIGNIFICANT CHANGE UP
EPI CELLS # UR: 14 /HPF — HIGH (ref 0–5)
GLUCOSE SERPL-MCNC: 96 MG/DL — SIGNIFICANT CHANGE UP (ref 70–99)
GLUCOSE UR QL: NEGATIVE — SIGNIFICANT CHANGE UP
GRAM STN FLD: SIGNIFICANT CHANGE UP
HCT VFR BLD CALC: 34.2 % — LOW (ref 34.5–45)
HCT VFR BLD CALC: 40.4 % — SIGNIFICANT CHANGE UP (ref 34.5–45)
HGB BLD-MCNC: 10.2 G/DL — LOW (ref 11.5–15.5)
HGB BLD-MCNC: 11.9 G/DL — SIGNIFICANT CHANGE UP (ref 11.5–15.5)
HYALINE CASTS # UR AUTO: 2 /LPF — SIGNIFICANT CHANGE UP (ref 0–7)
KETONES UR-MCNC: NEGATIVE — SIGNIFICANT CHANGE UP
LEUKOCYTE ESTERASE UR-ACNC: ABNORMAL
MAGNESIUM SERPL-MCNC: 1.9 MG/DL — SIGNIFICANT CHANGE UP (ref 1.6–2.6)
MCHC RBC-ENTMCNC: 24.1 PG — LOW (ref 27–34)
MCHC RBC-ENTMCNC: 24.2 PG — LOW (ref 27–34)
MCHC RBC-ENTMCNC: 29.5 GM/DL — LOW (ref 32–36)
MCHC RBC-ENTMCNC: 29.8 GM/DL — LOW (ref 32–36)
MCV RBC AUTO: 81.2 FL — SIGNIFICANT CHANGE UP (ref 80–100)
MCV RBC AUTO: 81.8 FL — SIGNIFICANT CHANGE UP (ref 80–100)
NITRITE UR-MCNC: NEGATIVE — SIGNIFICANT CHANGE UP
NRBC # BLD: 0 /100 WBCS — SIGNIFICANT CHANGE UP
NRBC # BLD: 0 /100 WBCS — SIGNIFICANT CHANGE UP
NRBC # FLD: 0 K/UL — SIGNIFICANT CHANGE UP
NRBC # FLD: 0 K/UL — SIGNIFICANT CHANGE UP
PH UR: 6.5 — SIGNIFICANT CHANGE UP (ref 5–8)
PHOSPHATE SERPL-MCNC: 2.5 MG/DL — SIGNIFICANT CHANGE UP (ref 2.5–4.5)
PLATELET # BLD AUTO: 347 K/UL — SIGNIFICANT CHANGE UP (ref 150–400)
PLATELET # BLD AUTO: 425 K/UL — HIGH (ref 150–400)
POTASSIUM SERPL-MCNC: 3.7 MMOL/L — SIGNIFICANT CHANGE UP (ref 3.5–5.3)
POTASSIUM SERPL-SCNC: 3.7 MMOL/L — SIGNIFICANT CHANGE UP (ref 3.5–5.3)
PROT UR-MCNC: ABNORMAL
RBC # BLD: 4.21 M/UL — SIGNIFICANT CHANGE UP (ref 3.8–5.2)
RBC # BLD: 4.94 M/UL — SIGNIFICANT CHANGE UP (ref 3.8–5.2)
RBC # FLD: 16.4 % — HIGH (ref 10.3–14.5)
RBC # FLD: 16.5 % — HIGH (ref 10.3–14.5)
RBC CASTS # UR COMP ASSIST: 7 /HPF — HIGH (ref 0–4)
SODIUM SERPL-SCNC: 137 MMOL/L — SIGNIFICANT CHANGE UP (ref 135–145)
SP GR SPEC: 1.02 — SIGNIFICANT CHANGE UP (ref 1.01–1.02)
SPECIMEN SOURCE: SIGNIFICANT CHANGE UP
UROBILINOGEN FLD QL: SIGNIFICANT CHANGE UP
WBC # BLD: 25.88 K/UL — HIGH (ref 3.8–10.5)
WBC # BLD: 30.13 K/UL — HIGH (ref 3.8–10.5)
WBC # FLD AUTO: 25.88 K/UL — HIGH (ref 3.8–10.5)
WBC # FLD AUTO: 30.13 K/UL — HIGH (ref 3.8–10.5)
WBC UR QL: 7 /HPF — HIGH (ref 0–5)

## 2021-07-18 PROCEDURE — 71045 X-RAY EXAM CHEST 1 VIEW: CPT | Mod: 26

## 2021-07-18 PROCEDURE — 99233 SBSQ HOSP IP/OBS HIGH 50: CPT

## 2021-07-18 RX ORDER — FOLIC ACID 0.8 MG
1 TABLET ORAL DAILY
Refills: 0 | Status: DISCONTINUED | OUTPATIENT
Start: 2021-07-18 | End: 2021-07-21

## 2021-07-18 RX ORDER — ACETAMINOPHEN 500 MG
650 TABLET ORAL ONCE
Refills: 0 | Status: COMPLETED | OUTPATIENT
Start: 2021-07-18 | End: 2021-07-18

## 2021-07-18 RX ORDER — PIPERACILLIN AND TAZOBACTAM 4; .5 G/20ML; G/20ML
3.38 INJECTION, POWDER, LYOPHILIZED, FOR SOLUTION INTRAVENOUS EVERY 8 HOURS
Refills: 0 | Status: DISCONTINUED | OUTPATIENT
Start: 2021-07-18 | End: 2021-07-19

## 2021-07-18 RX ORDER — PIPERACILLIN AND TAZOBACTAM 4; .5 G/20ML; G/20ML
3.38 INJECTION, POWDER, LYOPHILIZED, FOR SOLUTION INTRAVENOUS ONCE
Refills: 0 | Status: COMPLETED | OUTPATIENT
Start: 2021-07-18 | End: 2021-07-18

## 2021-07-18 RX ORDER — METOPROLOL TARTRATE 50 MG
2.5 TABLET ORAL ONCE
Refills: 0 | Status: COMPLETED | OUTPATIENT
Start: 2021-07-18 | End: 2021-07-18

## 2021-07-18 RX ADMIN — PIPERACILLIN AND TAZOBACTAM 200 GRAM(S): 4; .5 INJECTION, POWDER, LYOPHILIZED, FOR SOLUTION INTRAVENOUS at 14:37

## 2021-07-18 RX ADMIN — Medication 3 MILLILITER(S): at 21:57

## 2021-07-18 RX ADMIN — AMIODARONE HYDROCHLORIDE 400 MILLIGRAM(S): 400 TABLET ORAL at 22:17

## 2021-07-18 RX ADMIN — Medication 0.25 MILLIGRAM(S): at 22:05

## 2021-07-18 RX ADMIN — Medication 1 MILLIGRAM(S): at 14:37

## 2021-07-18 RX ADMIN — SODIUM CHLORIDE 4 MILLILITER(S): 9 INJECTION INTRAMUSCULAR; INTRAVENOUS; SUBCUTANEOUS at 22:22

## 2021-07-18 RX ADMIN — Medication 650 MILLIGRAM(S): at 22:30

## 2021-07-18 RX ADMIN — Medication 2.5 MILLIGRAM(S): at 05:53

## 2021-07-18 RX ADMIN — Medication 3 MILLILITER(S): at 09:20

## 2021-07-18 RX ADMIN — HEPARIN SODIUM 5000 UNIT(S): 5000 INJECTION INTRAVENOUS; SUBCUTANEOUS at 17:04

## 2021-07-18 RX ADMIN — Medication 1 TABLET(S): at 14:37

## 2021-07-18 RX ADMIN — Medication 0.25 MILLIGRAM(S): at 09:21

## 2021-07-18 RX ADMIN — HEPARIN SODIUM 5000 UNIT(S): 5000 INJECTION INTRAVENOUS; SUBCUTANEOUS at 05:07

## 2021-07-18 RX ADMIN — DORNASE ALFA 2.5 MILLIGRAM(S): 1 SOLUTION RESPIRATORY (INHALATION) at 22:13

## 2021-07-18 RX ADMIN — Medication 500 MILLIGRAM(S): at 14:37

## 2021-07-18 RX ADMIN — AMIODARONE HYDROCHLORIDE 400 MILLIGRAM(S): 400 TABLET ORAL at 14:37

## 2021-07-18 RX ADMIN — Medication 325 MILLIGRAM(S): at 14:37

## 2021-07-18 RX ADMIN — ESCITALOPRAM OXALATE 15 MILLIGRAM(S): 10 TABLET, FILM COATED ORAL at 14:37

## 2021-07-18 RX ADMIN — SODIUM CHLORIDE 4 MILLILITER(S): 9 INJECTION INTRAMUSCULAR; INTRAVENOUS; SUBCUTANEOUS at 15:17

## 2021-07-18 RX ADMIN — SODIUM CHLORIDE 4 MILLILITER(S): 9 INJECTION INTRAMUSCULAR; INTRAVENOUS; SUBCUTANEOUS at 09:21

## 2021-07-18 RX ADMIN — PANTOPRAZOLE SODIUM 40 MILLIGRAM(S): 20 TABLET, DELAYED RELEASE ORAL at 05:07

## 2021-07-18 RX ADMIN — Medication 100 MILLIGRAM(S): at 14:36

## 2021-07-18 RX ADMIN — Medication 650 MILLIGRAM(S): at 05:07

## 2021-07-18 RX ADMIN — Medication 3 MILLILITER(S): at 15:17

## 2021-07-18 RX ADMIN — AMIODARONE HYDROCHLORIDE 400 MILLIGRAM(S): 400 TABLET ORAL at 05:07

## 2021-07-18 RX ADMIN — Medication 3 MILLILITER(S): at 03:22

## 2021-07-18 RX ADMIN — Medication 10 MILLIGRAM(S): at 02:23

## 2021-07-18 RX ADMIN — DORNASE ALFA 2.5 MILLIGRAM(S): 1 SOLUTION RESPIRATORY (INHALATION) at 09:21

## 2021-07-18 RX ADMIN — Medication 650 MILLIGRAM(S): at 23:30

## 2021-07-18 RX ADMIN — PIPERACILLIN AND TAZOBACTAM 25 GRAM(S): 4; .5 INJECTION, POWDER, LYOPHILIZED, FOR SOLUTION INTRAVENOUS at 22:17

## 2021-07-18 RX ADMIN — SODIUM CHLORIDE 4 MILLILITER(S): 9 INJECTION INTRAMUSCULAR; INTRAVENOUS; SUBCUTANEOUS at 03:22

## 2021-07-18 NOTE — DIETITIAN INITIAL EVALUATION ADULT. - PERTINENT MEDS FT
MEDICATIONS  (STANDING):  albuterol/ipratropium for Nebulization 3 milliLiter(s) Nebulizer every 6 hours  aMIOdarone    Tablet   Oral   aMIOdarone    Tablet 400 milliGRAM(s) Oral every 8 hours  ascorbic acid 500 milliGRAM(s) Oral daily  buDESOnide    Inhalation Suspension 0.25 milliGRAM(s) Inhalation every 12 hours  dornase el Solution 2.5 milliGRAM(s) Inhalation every 12 hours  escitalopram 15 milliGRAM(s) Oral daily  ferrous    sulfate 325 milliGRAM(s) Oral daily  folic acid 1 milliGRAM(s) Oral daily  heparin   Injectable 5000 Unit(s) SubCutaneous every 12 hours  multivitamin 1 Tablet(s) Oral daily  pantoprazole    Tablet 40 milliGRAM(s) Oral before breakfast  piperacillin/tazobactam IVPB.. 3.375 Gram(s) IV Intermittent every 8 hours  sodium chloride 3%  Inhalation 4 milliLiter(s) Inhalation every 6 hours  thiamine 100 milliGRAM(s) Oral daily

## 2021-07-18 NOTE — DIETITIAN INITIAL EVALUATION ADULT. - OTHER INFO
Pt seen sitting in chair receiving nebulizer treatment. s/p VATS, pleural biopsy, and drainage of effusion on 7/14. Reporting poor PO during admission. Pt requesting Ensure; food preferences noted. We discussed importance of small frequent meals and strategies to optimize protein/energy intake.  Denies any GI issues (nausea/vomiting/diarrhea/constipation.) Denies any chewing or swallowing difficulties at this time.     Pt states her  pounds. Wt loss in setting of poor PO.

## 2021-07-18 NOTE — DIETITIAN INITIAL EVALUATION ADULT. - ADD RECOMMEND
RD to provide Magic Cup 1x daily. Encourage PO intake and honor food preferences as able. Bowel regimen PRN.

## 2021-07-18 NOTE — PROGRESS NOTE ADULT - SUBJECTIVE AND OBJECTIVE BOX
DATE OF SERVICE: 07-18-21 @ 06:56    Subjective: Patient seen and examined. No new events except as noted.     SUBJECTIVE/ROS:  no cp   has sob , cough       MEDICATIONS:  MEDICATIONS  (STANDING):  acetaminophen   Tablet .. 650 milliGRAM(s) Oral every 6 hours  albuterol/ipratropium for Nebulization 3 milliLiter(s) Nebulizer every 6 hours  aMIOdarone    Tablet   Oral   aMIOdarone    Tablet 400 milliGRAM(s) Oral every 8 hours  ascorbic acid 500 milliGRAM(s) Oral daily  buDESOnide    Inhalation Suspension 0.25 milliGRAM(s) Inhalation every 12 hours  dornase el Solution 2.5 milliGRAM(s) Inhalation every 12 hours  escitalopram 15 milliGRAM(s) Oral daily  ferrous    sulfate 325 milliGRAM(s) Oral daily  heparin   Injectable 5000 Unit(s) SubCutaneous every 12 hours  lactated ringers. 1000 milliLiter(s) (50 mL/Hr) IV Continuous <Continuous>  multivitamin 1 Tablet(s) Oral daily  pantoprazole    Tablet 40 milliGRAM(s) Oral before breakfast  sodium chloride 3%  Inhalation 4 milliLiter(s) Inhalation every 6 hours  thiamine 100 milliGRAM(s) Oral daily      PHYSICAL EXAM:  T(C): 36.4 (07-18-21 @ 04:00), Max: 37.1 (07-17-21 @ 12:00)  HR: 89 (07-18-21 @ 06:00) (71 - 126)  BP: 111/65 (07-18-21 @ 06:00) (93/50 - 148/104)  RR: 19 (07-18-21 @ 06:00) (19 - 34)  SpO2: 97% (07-18-21 @ 06:00) (92% - 100%)  Wt(kg): --  I&O's Summary    16 Jul 2021 07:01  -  17 Jul 2021 07:00  --------------------------------------------------------  IN: 3118 mL / OUT: 1030 mL / NET: 2088 mL    17 Jul 2021 07:01  -  18 Jul 2021 06:56  --------------------------------------------------------  IN: 1200 mL / OUT: 1400 mL / NET: -200 mL            JVP: Normal  Neck: supple  Lung: rhonchi   CV: S1 S2 , Murmur:  Abd: soft  Ext: No edema  neuro: Awake / alert  Psych: flat affect  Skin: normal``    LABS/DATA:    CARDIAC MARKERS:                                11.9   30.13 )-----------( 425      ( 18 Jul 2021 05:00 )             40.4     07-18    137  |  97<L>  |  14  ----------------------------<  96  3.7   |  26  |  0.77    Ca    11.4<H>      18 Jul 2021 05:00  Phos  2.5     07-18  Mg     1.90     07-18      proBNP:   Lipid Profile:   HgA1c:   TSH:     TELE:  EKG:

## 2021-07-18 NOTE — DIETITIAN INITIAL EVALUATION ADULT. - ORAL INTAKE PTA/DIET HISTORY
pt states she hasn't been eating well "for a while." Pt was nonspecific; stating she just has no appetite.

## 2021-07-18 NOTE — DIETITIAN NUTRITION RISK NOTIFICATION - TREATMENT: THE FOLLOWING DIET HAS BEEN RECOMMENDED
Diet, Soft:   Supplement Feeding Modality:  Oral  Ensure Surgery Cans or Servings Per Day:  1       Frequency:  Three Times a day (07-18-21 @ 09:17) [Active]  Diet, NPO after Midnight:      NPO Start Date: 18-Jul-2021,   NPO Start Time: 23:59 (07-18-21 @ 08:31) [Active]

## 2021-07-18 NOTE — PROGRESS NOTE ADULT - SUBJECTIVE AND OBJECTIVE BOX
PAOLOTOMMIE                     [id number]    HPI:  Pt is a 89 yr old female scheduled for Flexible Bronchoscopy endobronchial U/S Guided Biopsy with Cytology with Dr Coates tentatively 7/15/21 - pt c/o of chronic productive  cough for at least 6 months - pt is non-smoker - pt had Bronchoscopy in [place] 1/21 and then was hospitalized 4/29/21 for several days with increased inflammation RLL       Procedure: VATS, with pleural biopsy, drainage of effusion 7/14/2021                         Issues:              LPleural effusion              Postop pain  Chest tube in place  COPD  Right lower Atelectasis  PAF                 Home Medications:  Calcium, magnesium, zinc: 1 tab(s) orally once a day (14 Jul 2021 13:08)  Cranberry Pill: 1 tab(s) orally once a day, last dose 7/7/21 (14 Jul 2021 13:08)  furosemide 20 mg oral tablet: 1 tab(s) orally once a day, am (14 Jul 2021 13:08)  Lexapro: 15 milligram(s) orally once a day, am (14 Jul 2021 13:08)  Multiple Vitamins oral capsule: 1 cap(s) orally once a day, am, last dose 7/7/21 (14 Jul 2021 13:08)  omeprazole 20 mg oral delayed release capsule: 1 cap(s) orally once a day, am (14 Jul 2021 13:08)  Trelegy Ellipta 100 mcg-62.5 mcg-25 mcg/inh inhalation powder: 1 puff(s) inhaled once a day, am (14 Jul 2021 13:08)  Vitamin D3: 1 tab(s) orally once a day (14 Jul 2021 13:08)      PAST MEDICAL & SURGICAL HISTORY:  Cough    Osteoarthritis    Abnormal finding of lung    COPD, mild    History of hip replacement  right              VITAL SIGNS:  Vital Signs Last 24 Hrs  T(C): 36.3 (18 Jul 2021 08:00), Max: 37.1 (17 Jul 2021 12:00)  T(F): 97.3 (18 Jul 2021 08:00), Max: 98.8 (17 Jul 2021 12:00)  HR: 82 (18 Jul 2021 09:24) (74 - 126)  BP: 114/65 (18 Jul 2021 09:00) (92/52 - 148/104)  BP(mean): 76 (18 Jul 2021 09:00) (60 - 112)  RR: 22 (18 Jul 2021 09:00) (19 - 34)  SpO2: 91% (18 Jul 2021 09:00) (91% - 100%)    I/Os:   I&O's Detail    17 Jul 2021 07:01  -  18 Jul 2021 07:00  --------------------------------------------------------  IN:    Lactated Ringers: 1200 mL  Total IN: 1200 mL    OUT:    Chest Tube (mL): 0 mL    Voided (mL): 1400 mL  Total OUT: 1400 mL    Total NET: -200 mL          CAPILLARY BLOOD GLUCOSE          =======================MEDICATIONS===================  MEDICATIONS  (STANDING):  albuterol/ipratropium for Nebulization 3 milliLiter(s) Nebulizer every 6 hours  aMIOdarone    Tablet   Oral   aMIOdarone    Tablet 400 milliGRAM(s) Oral every 8 hours  ascorbic acid 500 milliGRAM(s) Oral daily  buDESOnide    Inhalation Suspension 0.25 milliGRAM(s) Inhalation every 12 hours  dornase el Solution 2.5 milliGRAM(s) Inhalation every 12 hours  escitalopram 15 milliGRAM(s) Oral daily  ferrous    sulfate 325 milliGRAM(s) Oral daily  folic acid 1 milliGRAM(s) Oral daily  heparin   Injectable 5000 Unit(s) SubCutaneous every 12 hours  multivitamin 1 Tablet(s) Oral daily  pantoprazole    Tablet 40 milliGRAM(s) Oral before breakfast  piperacillin/tazobactam IVPB. 3.375 Gram(s) IV Intermittent once  piperacillin/tazobactam IVPB.. 3.375 Gram(s) IV Intermittent every 8 hours  sodium chloride 3%  Inhalation 4 milliLiter(s) Inhalation every 6 hours  thiamine 100 milliGRAM(s) Oral daily    MEDICATIONS  (PRN):  bisacodyl Suppository 10 milliGRAM(s) Rectal daily PRN Constipation  butorphanol Injectable 0.125 milliGRAM(s) IV Push every 6 hours PRN Pruritus  naloxone Injectable 0.1 milliGRAM(s) IV Push every 3 minutes PRN For ANY of the following changes in patient status:  A. RR LESS THAN 10 breaths per minute, B. Oxygen saturation LESS THAN 90%, C. Sedation score of 6  ondansetron Injectable 4 milliGRAM(s) IV Push every 6 hours PRN Nausea      PHYSICAL EXAM============================  General:                         Awake, alert, generally weak and deconditioned,  not in any distress  Neuro:                            Moving all extremities to commands.   Respiratory:	Air entry fair and  bilateral conducted sounds                                           Effort even and unlabored.  CV:		Regular rate and rhythm. Normal S1/S2, PAF                                          Distal pulses present.  Abdomen:	                     Soft, non-distended. Bowel sounds present   Skin:		No rash.  Extremities:	Warm, no cyanosis or edema.  Palpable pulses    ============================LABS=========================                        10.2   25.88 )-----------( 347      ( 18 Jul 2021 07:59 )             34.2     07-18    137  |  97<L>  |  14  ----------------------------<  96  3.7   |  26  |  0.77    Ca    11.4<H>      18 Jul 2021 05:00  Phos  2.5     07-18  Mg     1.90     07-18      A/P   89yFemale s/p VATS, with pleural biopsy, drainage of effusion 7/14/2021 , experiencing  pain with deep breathing.                             Neuro:   Nonfocal                                        Pain control with  Tylenol q6hrs PRN                            Cardiovascular:                                          Continue hemodynamic monitoring.    PAF: Currently in NSR  Continue Amio PO load  No A.C for now                            Respiratory:                                         Pt is on 2L  nasal canula, wean off as tolerated                                          Comfortable, not in any distress.                                         Encourage incentive spirometry                                          Monitor chest tube output                                         Pleural effusion: Chest tube to suction, monitor output                                                                   RLL Atelectasis: Continue bronchodilators, Pulmozyme,  aggressive pulmonary toilet                            GI                                         On  DASH / diabetic / regular diet as tolerated                                         Continue Zofran / Reglan for nausea - PRN	                                                                 Renal:                                         Continue LR                                           Monitor I/Os and electrolytes                                                                                        Hem/ Onc:                                                                                  Monitor chest tube output &  signs of bleeding.                                          Follow CBC in AM                           Infectious disease:                                            Monitor for fever / leukocytosis.                                          All surgical incision / chest tube  sites look clean                            Endocrine                                           Continue Accu-Checks with coverage    Pt is on SQ Heparin and Venodyne boots for DVT prophylaxis.     Pertinent clinical, laboratory, radiographic, hemodynamic, echocardiographic, respiratory data, microbiologic data and chart were reviewed and analyzed frequently throughout the course of the day and night  Patient seen, examined and plan discussed with CT Surgeon Dr. Quiñonez  / CTICU team during rounds. SW consulted for safe discharge placement     Status discussed with patient /  updated plan of care      Ileana Nobles DO, FACEP

## 2021-07-19 LAB
ANION GAP SERPL CALC-SCNC: 13 MMOL/L — SIGNIFICANT CHANGE UP (ref 7–14)
BUN SERPL-MCNC: 16 MG/DL — SIGNIFICANT CHANGE UP (ref 7–23)
CA-I BLD-SCNC: 1.37 MMOL/L — HIGH (ref 1.15–1.29)
CALCIUM SERPL-MCNC: 10.9 MG/DL — HIGH (ref 8.4–10.5)
CHLORIDE SERPL-SCNC: 98 MMOL/L — SIGNIFICANT CHANGE UP (ref 98–107)
CO2 SERPL-SCNC: 26 MMOL/L — SIGNIFICANT CHANGE UP (ref 22–31)
CREAT SERPL-MCNC: 0.73 MG/DL — SIGNIFICANT CHANGE UP (ref 0.5–1.3)
GLUCOSE SERPL-MCNC: 103 MG/DL — HIGH (ref 70–99)
HCT VFR BLD CALC: 31.4 % — LOW (ref 34.5–45)
HGB BLD-MCNC: 9.6 G/DL — LOW (ref 11.5–15.5)
MCHC RBC-ENTMCNC: 24.6 PG — LOW (ref 27–34)
MCHC RBC-ENTMCNC: 30.6 GM/DL — LOW (ref 32–36)
MCV RBC AUTO: 80.3 FL — SIGNIFICANT CHANGE UP (ref 80–100)
NRBC # BLD: 0 /100 WBCS — SIGNIFICANT CHANGE UP
NRBC # FLD: 0 K/UL — SIGNIFICANT CHANGE UP
PLATELET # BLD AUTO: 319 K/UL — SIGNIFICANT CHANGE UP (ref 150–400)
POTASSIUM SERPL-MCNC: 3.5 MMOL/L — SIGNIFICANT CHANGE UP (ref 3.5–5.3)
POTASSIUM SERPL-SCNC: 3.5 MMOL/L — SIGNIFICANT CHANGE UP (ref 3.5–5.3)
RBC # BLD: 3.91 M/UL — SIGNIFICANT CHANGE UP (ref 3.8–5.2)
RBC # FLD: 16.6 % — HIGH (ref 10.3–14.5)
SARS-COV-2 RNA SPEC QL NAA+PROBE: SIGNIFICANT CHANGE UP
SODIUM SERPL-SCNC: 137 MMOL/L — SIGNIFICANT CHANGE UP (ref 135–145)
TM INTERPRETATION: SIGNIFICANT CHANGE UP
WBC # BLD: 21.15 K/UL — HIGH (ref 3.8–10.5)
WBC # FLD AUTO: 21.15 K/UL — HIGH (ref 3.8–10.5)

## 2021-07-19 PROCEDURE — 99233 SBSQ HOSP IP/OBS HIGH 50: CPT

## 2021-07-19 PROCEDURE — 71045 X-RAY EXAM CHEST 1 VIEW: CPT | Mod: 26

## 2021-07-19 RX ORDER — FUROSEMIDE 40 MG
10 TABLET ORAL ONCE
Refills: 0 | Status: COMPLETED | OUTPATIENT
Start: 2021-07-19 | End: 2021-07-19

## 2021-07-19 RX ORDER — DIPHENHYDRAMINE HCL 50 MG
25 CAPSULE ORAL ONCE
Refills: 0 | Status: COMPLETED | OUTPATIENT
Start: 2021-07-19 | End: 2021-07-20

## 2021-07-19 RX ORDER — POTASSIUM CHLORIDE 20 MEQ
40 PACKET (EA) ORAL ONCE
Refills: 0 | Status: COMPLETED | OUTPATIENT
Start: 2021-07-19 | End: 2021-07-19

## 2021-07-19 RX ADMIN — AMIODARONE HYDROCHLORIDE 400 MILLIGRAM(S): 400 TABLET ORAL at 22:11

## 2021-07-19 RX ADMIN — PANTOPRAZOLE SODIUM 40 MILLIGRAM(S): 20 TABLET, DELAYED RELEASE ORAL at 05:30

## 2021-07-19 RX ADMIN — Medication 3 MILLILITER(S): at 09:37

## 2021-07-19 RX ADMIN — Medication 1 MILLIGRAM(S): at 11:19

## 2021-07-19 RX ADMIN — Medication 0.25 MILLIGRAM(S): at 09:37

## 2021-07-19 RX ADMIN — AMIODARONE HYDROCHLORIDE 400 MILLIGRAM(S): 400 TABLET ORAL at 14:34

## 2021-07-19 RX ADMIN — DORNASE ALFA 2.5 MILLIGRAM(S): 1 SOLUTION RESPIRATORY (INHALATION) at 21:40

## 2021-07-19 RX ADMIN — AMIODARONE HYDROCHLORIDE 400 MILLIGRAM(S): 400 TABLET ORAL at 05:28

## 2021-07-19 RX ADMIN — Medication 10 MILLIGRAM(S): at 06:49

## 2021-07-19 RX ADMIN — PIPERACILLIN AND TAZOBACTAM 25 GRAM(S): 4; .5 INJECTION, POWDER, LYOPHILIZED, FOR SOLUTION INTRAVENOUS at 05:28

## 2021-07-19 RX ADMIN — SODIUM CHLORIDE 4 MILLILITER(S): 9 INJECTION INTRAMUSCULAR; INTRAVENOUS; SUBCUTANEOUS at 03:44

## 2021-07-19 RX ADMIN — Medication 3 MILLILITER(S): at 21:33

## 2021-07-19 RX ADMIN — SODIUM CHLORIDE 4 MILLILITER(S): 9 INJECTION INTRAMUSCULAR; INTRAVENOUS; SUBCUTANEOUS at 16:25

## 2021-07-19 RX ADMIN — HEPARIN SODIUM 5000 UNIT(S): 5000 INJECTION INTRAVENOUS; SUBCUTANEOUS at 05:28

## 2021-07-19 RX ADMIN — HEPARIN SODIUM 5000 UNIT(S): 5000 INJECTION INTRAVENOUS; SUBCUTANEOUS at 17:37

## 2021-07-19 RX ADMIN — SODIUM CHLORIDE 4 MILLILITER(S): 9 INJECTION INTRAMUSCULAR; INTRAVENOUS; SUBCUTANEOUS at 21:33

## 2021-07-19 RX ADMIN — Medication 100 MILLIGRAM(S): at 11:18

## 2021-07-19 RX ADMIN — Medication 3 MILLILITER(S): at 03:44

## 2021-07-19 RX ADMIN — Medication 1 TABLET(S): at 11:18

## 2021-07-19 RX ADMIN — Medication 3 MILLILITER(S): at 16:25

## 2021-07-19 RX ADMIN — Medication 325 MILLIGRAM(S): at 11:18

## 2021-07-19 RX ADMIN — Medication 40 MILLIEQUIVALENT(S): at 06:47

## 2021-07-19 RX ADMIN — SODIUM CHLORIDE 4 MILLILITER(S): 9 INJECTION INTRAMUSCULAR; INTRAVENOUS; SUBCUTANEOUS at 10:13

## 2021-07-19 RX ADMIN — ESCITALOPRAM OXALATE 15 MILLIGRAM(S): 10 TABLET, FILM COATED ORAL at 11:19

## 2021-07-19 RX ADMIN — Medication 500 MILLIGRAM(S): at 11:18

## 2021-07-19 RX ADMIN — DORNASE ALFA 2.5 MILLIGRAM(S): 1 SOLUTION RESPIRATORY (INHALATION) at 09:40

## 2021-07-19 NOTE — PROGRESS NOTE ADULT - SUBJECTIVE AND OBJECTIVE BOX
PAOLOTOMMIE      89y   Female   MRN-[id number]         No Known Allergies             Daily     Daily Drug Dosing Weight  Height (cm): 153 (2021 12:37)  Weight (kg): 53.1 (2021 12:37)  BMI (kg/m2): 22.7 (2021 12:37)  BSA (m2): 1.49 (2021 12:37)      Pt is a 89 yr old female scheduled for Flexible Bronchoscopy endobronchial U/S Guided Biopsy with Cytology with Dr Coates tentatively 7/15/21 - pt c/o of chronic productive  cough for at least 6 months - pt is non-smoker - pt had Bronchoscopy in [place]  and then was hospitalized 21 for several days with increased inflammation RLL       Procedure: VATS, with pleural biopsy, drainage of effusion 2021                         Issues:              Right Pleural effusion              Postop pain  Chest tube in place  COPD  Right lower Atelectasis  PAF               Home Medications:  Calcium, magnesium, zinc: 1 tab(s) orally once a day (2021 13:08)  Cranberry Pill: 1 tab(s) orally once a day, last dose 21 (2021 13:08)  furosemide 20 mg oral tablet: 1 tab(s) orally once a day, am (2021 13:08)  Lexapro: 15 milligram(s) orally once a day, am (2021 13:08)  Multiple Vitamins oral capsule: 1 cap(s) orally once a day, am, last dose 21 (2021 13:08)  omeprazole 20 mg oral delayed release capsule: 1 cap(s) orally once a day, am (2021 13:08)  Trelegy Ellipta 100 mcg-62.5 mcg-25 mcg/inh inhalation powder: 1 puff(s) inhaled once a day, am (2021 13:08)  Vitamin D3: 1 tab(s) orally once a day (2021 13:08)      PAST MEDICAL & SURGICAL HISTORY:  Cough    Osteoarthritis    Abnormal finding of lung    COPD, mild    History of hip replacement  right      Vital Signs Last 24 Hrs  T(C): 36.3 (2021 04:00), Max: 36.7 (2021 12:00)  T(F): 97.4 (2021 04:00), Max: 98 (2021 12:00)  HR: 73 (2021 07:00) (68 - 103)  BP: 134/58 (2021 07:00) (90/57 - 156/104)  BP(mean): 77 (2021 07:00) (62 - 114)  RR: 23 (2021 07:00) (20 - 37)  SpO2: 99% (2021 07:00) (91% - 100%)  I&O's Detail    2021 07:01  -  2021 07:00  --------------------------------------------------------  IN:    IV PiggyBack: 100 mL    Oral Fluid: 1080 mL  Total IN: 1180 mL    OUT:    Voided (mL): 1000 mL  Total OUT: 1000 mL    Total NET: 180 mL        CAPILLARY BLOOD GLUCOSE      Home Medications:  Calcium, magnesium, zinc: 1 tab(s) orally once a day (2021 13:08)  Cranberry Pill: 1 tab(s) orally once a day, last dose 21 (2021 13:08)  furosemide 20 mg oral tablet: 1 tab(s) orally once a day, am (2021 13:08)  Lexapro: 15 milligram(s) orally once a day, am (2021 13:08)  Multiple Vitamins oral capsule: 1 cap(s) orally once a day, am, last dose 21 (2021 13:08)  omeprazole 20 mg oral delayed release capsule: 1 cap(s) orally once a day, am (2021 13:08)  Trelegy Ellipta 100 mcg-62.5 mcg-25 mcg/inh inhalation powder: 1 puff(s) inhaled once a day, am (2021 13:08)  Vitamin D3: 1 tab(s) orally once a day (2021 13:08)      MEDICATIONS  (STANDING):  albuterol/ipratropium for Nebulization 3 milliLiter(s) Nebulizer every 6 hours  aMIOdarone    Tablet   Oral   aMIOdarone    Tablet 400 milliGRAM(s) Oral every 8 hours  ascorbic acid 500 milliGRAM(s) Oral daily  buDESOnide    Inhalation Suspension 0.25 milliGRAM(s) Inhalation every 12 hours  dornase el Solution 2.5 milliGRAM(s) Inhalation every 12 hours  escitalopram 15 milliGRAM(s) Oral daily  ferrous    sulfate 325 milliGRAM(s) Oral daily  folic acid 1 milliGRAM(s) Oral daily  heparin   Injectable 5000 Unit(s) SubCutaneous every 12 hours  multivitamin 1 Tablet(s) Oral daily  pantoprazole    Tablet 40 milliGRAM(s) Oral before breakfast  sodium chloride 3%  Inhalation 4 milliLiter(s) Inhalation every 6 hours  thiamine 100 milliGRAM(s) Oral daily    MEDICATIONS  (PRN):  bisacodyl Suppository 10 milliGRAM(s) Rectal daily PRN Constipation  butorphanol Injectable 0.125 milliGRAM(s) IV Push every 6 hours PRN Pruritus  naloxone Injectable 0.1 milliGRAM(s) IV Push every 3 minutes PRN For ANY of the following changes in patient status:  A. RR LESS THAN 10 breaths per minute, B. Oxygen saturation LESS THAN 90%, C. Sedation score of 6  ondansetron Injectable 4 milliGRAM(s) IV Push every 6 hours PRN Nausea          Physical exam:   General:               Pt appears fairly comfortable,  answering questions appropriately,  appears to be in pain but not in  distress                                                  Neuro:                  Nonfocal                             Cardiovascular:   S1 & S2, regular                           Respiratory:         Air entry is fair and equal on both sides, has bilateral conducted sounds                           GI:                          Soft, nondistended and nontender, Bowel sounds active                            Ext:                        No cyanosis or edema                              Labs:                                                                           9.6    21.15 )-----------( 319      ( 2021 03:15 )             31.4             07-19    137  |  98  |  16  ----------------------------<  103<H>  3.5   |  26  |  0.73    Ca    10.9<H>      2021 03:15  Phos  2.5     07-18  Mg     1.90     07-18                        Urinalysis Basic - ( 2021 14:41 )    Color: Yellow / Appearance: Clear / S.017 / pH: x  Gluc: x / Ketone: Negative  / Bili: Negative / Urobili: <2 mg/dL   Blood: x / Protein: Trace / Nitrite: Negative   Leuk Esterase: Small / RBC: 7 /HPF / WBC 7 /HPF   Sq Epi: x / Non Sq Epi: 14 /HPF / Bacteria: Negative        Culture - Sputum (collected 2021 10:32)  Source: .Sputum Sputum  Gram Stain (2021 15:41):    Moderate polymorphonuclear leukocytes per low power field    Rare Squamous epithelial cells per low power field    Moderate Gram Positive Rods per oil power field    Moderate Gram positive cocci in pairs, chains and clusters per oil power    field    Rare Gram Negative Rods per oil power field        CXR:  < from: Xray Chest 1 View- PORTABLE-Routine (Xray Chest 1 View- PORTABLE-Routine in AM.) (21 @ 04:36) >  Right-sided chest tube is again noted. There is no evidence of pneumothorax. There is unchanged right-sided pleural effusion. More prominent airspace opacity seen at the left lung base. There is redemonstration of airspace opacity in the right perihilar region. Severe right glenohumeral arthrosis is noted. Mild right-sided subcutaneous emphysema is noted.      Plan:    General: 89yFemale s/p VATS, with pleural biopsy, drainage of effusion 2021 , experiencing  pain with deep breathing.                             Neuro:                                         Pain control with  Tylenol q6hrs PRN                            Cardiovascular:                                          Continue hemodynamic monitoring.    PAF: Currently in NSR  Continue Amio PO load  No A.C for now                            Respiratory:                                         Pt is on 2L  nasal canula, wean off as tolerated                                          Comfortable, not in any distress.                                         Encourage incentive spirometry                                          Monitor chest tube output                                         Pleural effusion: Has pleurex & capped now                                                                  RLL Atelectasis: Continue bronchodilators, Pulmozyme,  aggressive pulmonary toilet                            GI                                         On  regular diet as tolerated                                         Continue Zofran / Reglan for nausea - PRN	                                                                 Renal:                                         Monitor I/Os and electrolytes                                                                                        Hem/ Onc:                                                                                  PleurX is capped                                         Follow CBC in AM                           Infectious disease:      Leukocytosis is due to lymphoma. No fevers                                          All surgical incision / chest tube  sites look clean                            Endocrine                                           Continue Accu-Checks with coverage    Pt is on SQ Heparin and Venodyne boots for DVT prophylaxis.     Pertinent clinical, laboratory, radiographic, hemodynamic, echocardiographic, respiratory data, microbiologic data and chart were reviewed and analyzed frequently throughout the course of the day and night  Patient seen, examined and plan discussed with CT Surgeon Dr. Coates  / CTICU team during rounds.    Status discussed with patient /  updated plan of care     is working on rehab placement.            Trip Silva MD

## 2021-07-19 NOTE — PROGRESS NOTE ADULT - SUBJECTIVE AND OBJECTIVE BOX
DATE OF SERVICE: 07-19-21 @ 10:00    Subjective: Patient seen and examined. No new events except as noted.     SUBJECTIVE/ROS:  No chest pain, dyspnea, palpitation, or dizziness.       MEDICATIONS:  MEDICATIONS  (STANDING):  albuterol/ipratropium for Nebulization 3 milliLiter(s) Nebulizer every 6 hours  aMIOdarone    Tablet   Oral   aMIOdarone    Tablet 400 milliGRAM(s) Oral every 8 hours  ascorbic acid 500 milliGRAM(s) Oral daily  buDESOnide    Inhalation Suspension 0.25 milliGRAM(s) Inhalation every 12 hours  dornase el Solution 2.5 milliGRAM(s) Inhalation every 12 hours  escitalopram 15 milliGRAM(s) Oral daily  ferrous    sulfate 325 milliGRAM(s) Oral daily  folic acid 1 milliGRAM(s) Oral daily  heparin   Injectable 5000 Unit(s) SubCutaneous every 12 hours  multivitamin 1 Tablet(s) Oral daily  pantoprazole    Tablet 40 milliGRAM(s) Oral before breakfast  sodium chloride 3%  Inhalation 4 milliLiter(s) Inhalation every 6 hours  thiamine 100 milliGRAM(s) Oral daily      PHYSICAL EXAM:  T(C): 36.3 (07-19-21 @ 04:00), Max: 36.7 (07-18-21 @ 12:00)  HR: 72 (07-19-21 @ 09:46) (68 - 103)  BP: 117/71 (07-19-21 @ 08:00) (90/57 - 156/104)  RR: 24 (07-19-21 @ 09:00) (20 - 37)  SpO2: 100% (07-19-21 @ 09:46) (92% - 100%)  Wt(kg): --  I&O's Summary    18 Jul 2021 07:01  -  19 Jul 2021 07:00  --------------------------------------------------------  IN: 1180 mL / OUT: 1000 mL / NET: 180 mL            JVP: Normal  Neck: supple  Lung: clear   CV: S1 S2 , Murmur:  Abd: soft  Ext: No edema  neuro: Awake / alert  Psych: flat affect  Skin: normal``    LABS/DATA:    CARDIAC MARKERS:                                9.6    21.15 )-----------( 319      ( 19 Jul 2021 03:15 )             31.4     07-19    137  |  98  |  16  ----------------------------<  103<H>  3.5   |  26  |  0.73    Ca    10.9<H>      19 Jul 2021 03:15  Phos  2.5     07-18  Mg     1.90     07-18      proBNP:   Lipid Profile:   HgA1c:   TSH:     TELE:  EKG:

## 2021-07-20 ENCOUNTER — TRANSCRIPTION ENCOUNTER (OUTPATIENT)
Age: 86
End: 2021-07-20

## 2021-07-20 LAB
ANION GAP SERPL CALC-SCNC: 11 MMOL/L — SIGNIFICANT CHANGE UP (ref 7–14)
BUN SERPL-MCNC: 13 MG/DL — SIGNIFICANT CHANGE UP (ref 7–23)
CALCIUM SERPL-MCNC: 10.9 MG/DL — HIGH (ref 8.4–10.5)
CHLORIDE SERPL-SCNC: 99 MMOL/L — SIGNIFICANT CHANGE UP (ref 98–107)
CO2 SERPL-SCNC: 29 MMOL/L — SIGNIFICANT CHANGE UP (ref 22–31)
CREAT SERPL-MCNC: 0.81 MG/DL — SIGNIFICANT CHANGE UP (ref 0.5–1.3)
CULTURE RESULTS: SIGNIFICANT CHANGE UP
GLUCOSE SERPL-MCNC: 97 MG/DL — SIGNIFICANT CHANGE UP (ref 70–99)
HCT VFR BLD CALC: 32.1 % — LOW (ref 34.5–45)
HGB BLD-MCNC: 9.9 G/DL — LOW (ref 11.5–15.5)
MCHC RBC-ENTMCNC: 24.2 PG — LOW (ref 27–34)
MCHC RBC-ENTMCNC: 30.8 GM/DL — LOW (ref 32–36)
MCV RBC AUTO: 78.5 FL — LOW (ref 80–100)
NRBC # BLD: 0 /100 WBCS — SIGNIFICANT CHANGE UP
NRBC # FLD: 0 K/UL — SIGNIFICANT CHANGE UP
PLATELET # BLD AUTO: 333 K/UL — SIGNIFICANT CHANGE UP (ref 150–400)
POTASSIUM SERPL-MCNC: 3.3 MMOL/L — LOW (ref 3.5–5.3)
POTASSIUM SERPL-SCNC: 3.3 MMOL/L — LOW (ref 3.5–5.3)
RBC # BLD: 4.09 M/UL — SIGNIFICANT CHANGE UP (ref 3.8–5.2)
RBC # FLD: 16.9 % — HIGH (ref 10.3–14.5)
SODIUM SERPL-SCNC: 139 MMOL/L — SIGNIFICANT CHANGE UP (ref 135–145)
SPECIMEN SOURCE: SIGNIFICANT CHANGE UP
WBC # BLD: 20.34 K/UL — HIGH (ref 3.8–10.5)
WBC # FLD AUTO: 20.34 K/UL — HIGH (ref 3.8–10.5)

## 2021-07-20 PROCEDURE — 93306 TTE W/DOPPLER COMPLETE: CPT | Mod: 26

## 2021-07-20 PROCEDURE — 71045 X-RAY EXAM CHEST 1 VIEW: CPT | Mod: 26

## 2021-07-20 RX ORDER — LACTOBACILLUS ACIDOPHILUS 100MM CELL
1 CAPSULE ORAL
Qty: 0 | Refills: 0 | DISCHARGE
Start: 2021-07-20

## 2021-07-20 RX ORDER — LACTOBACILLUS ACIDOPHILUS 100MM CELL
1 CAPSULE ORAL DAILY
Refills: 0 | Status: DISCONTINUED | OUTPATIENT
Start: 2021-07-20 | End: 2021-07-21

## 2021-07-20 RX ORDER — AMIODARONE HYDROCHLORIDE 400 MG/1
0 TABLET ORAL
Qty: 0 | Refills: 0 | DISCHARGE
Start: 2021-07-20

## 2021-07-20 RX ORDER — FERROUS SULFATE 325(65) MG
1 TABLET ORAL
Qty: 0 | Refills: 0 | DISCHARGE
Start: 2021-07-20

## 2021-07-20 RX ORDER — FOLIC ACID 0.8 MG
1 TABLET ORAL
Qty: 0 | Refills: 0 | DISCHARGE
Start: 2021-07-20

## 2021-07-20 RX ORDER — POTASSIUM CHLORIDE 20 MEQ
40 PACKET (EA) ORAL ONCE
Refills: 0 | Status: COMPLETED | OUTPATIENT
Start: 2021-07-20 | End: 2021-07-20

## 2021-07-20 RX ORDER — THIAMINE MONONITRATE (VIT B1) 100 MG
1 TABLET ORAL
Qty: 0 | Refills: 0 | DISCHARGE
Start: 2021-07-20

## 2021-07-20 RX ORDER — ASCORBIC ACID 60 MG
1 TABLET,CHEWABLE ORAL
Qty: 0 | Refills: 0 | DISCHARGE
Start: 2021-07-20

## 2021-07-20 RX ADMIN — Medication 1 TABLET(S): at 14:53

## 2021-07-20 RX ADMIN — Medication 0.25 MILLIGRAM(S): at 10:12

## 2021-07-20 RX ADMIN — Medication 3 MILLILITER(S): at 10:11

## 2021-07-20 RX ADMIN — SODIUM CHLORIDE 4 MILLILITER(S): 9 INJECTION INTRAMUSCULAR; INTRAVENOUS; SUBCUTANEOUS at 04:32

## 2021-07-20 RX ADMIN — ESCITALOPRAM OXALATE 15 MILLIGRAM(S): 10 TABLET, FILM COATED ORAL at 11:02

## 2021-07-20 RX ADMIN — Medication 500 MILLIGRAM(S): at 11:03

## 2021-07-20 RX ADMIN — Medication 3 MILLILITER(S): at 04:32

## 2021-07-20 RX ADMIN — Medication 100 MILLIGRAM(S): at 11:03

## 2021-07-20 RX ADMIN — PANTOPRAZOLE SODIUM 40 MILLIGRAM(S): 20 TABLET, DELAYED RELEASE ORAL at 06:40

## 2021-07-20 RX ADMIN — Medication 1 MILLIGRAM(S): at 11:03

## 2021-07-20 RX ADMIN — Medication 325 MILLIGRAM(S): at 11:03

## 2021-07-20 RX ADMIN — HEPARIN SODIUM 5000 UNIT(S): 5000 INJECTION INTRAVENOUS; SUBCUTANEOUS at 06:40

## 2021-07-20 RX ADMIN — Medication 25 MILLIGRAM(S): at 00:14

## 2021-07-20 RX ADMIN — AMIODARONE HYDROCHLORIDE 400 MILLIGRAM(S): 400 TABLET ORAL at 06:40

## 2021-07-20 RX ADMIN — SODIUM CHLORIDE 4 MILLILITER(S): 9 INJECTION INTRAMUSCULAR; INTRAVENOUS; SUBCUTANEOUS at 10:11

## 2021-07-20 RX ADMIN — AMIODARONE HYDROCHLORIDE 200 MILLIGRAM(S): 400 TABLET ORAL at 06:40

## 2021-07-20 RX ADMIN — Medication 40 MILLIEQUIVALENT(S): at 17:46

## 2021-07-20 RX ADMIN — Medication 1 TABLET(S): at 11:03

## 2021-07-20 RX ADMIN — HEPARIN SODIUM 5000 UNIT(S): 5000 INJECTION INTRAVENOUS; SUBCUTANEOUS at 17:46

## 2021-07-20 RX ADMIN — DORNASE ALFA 2.5 MILLIGRAM(S): 1 SOLUTION RESPIRATORY (INHALATION) at 10:12

## 2021-07-20 NOTE — DISCHARGE NOTE PROVIDER - NSDCMRMEDTOKEN_GEN_ALL_CORE_FT
Calcium, magnesium, zinc: 1 tab(s) orally once a day  Cranberry Pill: 1 tab(s) orally once a day, last dose 7/7/21  furosemide 20 mg oral tablet: 1 tab(s) orally once a day, am  Lexapro: 15 milligram(s) orally once a day, am  Multiple Vitamins oral capsule: 1 cap(s) orally once a day, am, last dose 7/7/21  omeprazole 20 mg oral delayed release capsule: 1 cap(s) orally once a day, am  Trelegy Ellipta 100 mcg-62.5 mcg-25 mcg/inh inhalation powder: 1 puff(s) inhaled once a day, am  Vitamin D3: 1 tab(s) orally once a day   amiodarone 200 mg oral tablet: orally once a day  ascorbic acid 500 mg oral tablet: 1 tab(s) orally once a day  Calcium, magnesium, zinc: 1 tab(s) orally once a day  chest xray PA and lateral: Dx s/p  R pleurX  fax   Cranberry Pill: 1 tab(s) orally once a day, last dose 7/7/21  ferrous sulfate 325 mg (65 mg elemental iron) oral tablet: 1 tab(s) orally once a day  folic acid 1 mg oral tablet: 1 tab(s) orally once a day  furosemide 20 mg oral tablet: 1 tab(s) orally once a day, am  lactobacillus acidophilus oral capsule: 1  orally once a day  Lexapro: 15 milligram(s) orally once a day, am  Multiple Vitamins oral capsule: 1 cap(s) orally once a day, am, last dose 7/7/21  omeprazole 20 mg oral delayed release capsule: 1 cap(s) orally once a day, am  thiamine 100 mg oral tablet: 1 tab(s) orally once a day  Trelegy Ellipta 100 mcg-62.5 mcg-25 mcg/inh inhalation powder: 1 puff(s) inhaled once a day, am  Tylenol 325 mg oral tablet: 2 tab(s) orally every 4 hours as needed for pain control  Vitamin D3: 1 tab(s) orally once a day   amiodarone 200 mg oral tablet: orally once a day  ascorbic acid 500 mg oral tablet: 1 tab(s) orally once a day  Calcium, magnesium, zinc: 1 tab(s) orally once a day  chest xray PA and lateral: Dx s/p  R pleurX  fax   Cranberry Pill: 1 tab(s) orally once a day,  ferrous sulfate 325 mg (65 mg elemental iron) oral tablet: 1 tab(s) orally once a day  folic acid 1 mg oral tablet: 1 tab(s) orally once a day  furosemide 20 mg oral tablet: 1 tab(s) orally once a day, am  Imodium 2 mg oral capsule: 1 cap(s) orally every 4 hours, As Needed for  diarrhea  lactobacillus acidophilus oral capsule: 1  orally once a day  Lexapro: 15 milligram(s) orally once a day, am  Multiple Vitamins oral capsule: 1 cap(s) orally once a day, am,  omeprazole 20 mg oral delayed release capsule: 1 cap(s) orally once a day, am  thiamine 100 mg oral tablet: 1 tab(s) orally once a day  Trelegy Ellipta 100 mcg-62.5 mcg-25 mcg/inh inhalation powder: 1 puff(s) inhaled once a day, am  Tylenol 325 mg oral tablet: 2 tab(s) orally every 6 hours, As Needed  Vitamin D3: 1 tab(s) orally once a day

## 2021-07-20 NOTE — DISCHARGE NOTE PROVIDER - NSDCFUADDAPPT_GEN_ALL_CORE_FT
See Dr Coates in 2 weeks- call for an appointment and bring a new chest X-ray when you come.   See Dr Coates in 2 weeks- call for an appointment [phone] Have a new chest X-ray done prior to your apt. with him.   See your Cardiologist Dr. Madison in 1-2 weeks.

## 2021-07-20 NOTE — PROGRESS NOTE ADULT - NUTRITIONAL ASSESSMENT
This patient has been assessed with a concern for Malnutrition and has been determined to have a diagnosis/diagnoses of Moderate protein-calorie malnutrition.    This patient is being managed with:   Diet Soft-  Supplement Feeding Modality:  Oral  Ensure Surgery Cans or Servings Per Day:  1       Frequency:  Three Times a day  Entered: Jul 18 2021  9:16AM    
This patient has been assessed with a concern for Malnutrition and has been determined to have a diagnosis/diagnoses of Moderate protein-calorie malnutrition.    This patient is being managed with:   Diet Soft-  Supplement Feeding Modality:  Oral  Ensure Surgery Cans or Servings Per Day:  1       Frequency:  Three Times a day  Entered: Jul 18 2021  9:16AM    Diet NPO after Midnight-     NPO Start Date: 18-Jul-2021   NPO Start Time: 23:59  Entered: Jul 18 2021  8:31AM

## 2021-07-20 NOTE — PROGRESS NOTE ADULT - SUBJECTIVE AND OBJECTIVE BOX
Subjective: pt OOB in chair, no complaints  agreeable to go to rehab once approved  coughing, deep breathing, using incentive spirometer    Vital Signs:  Vital Signs Last 24 Hrs  T(C): 36.7 (07-20-21 @ 14:19), Max: 37.1 (07-20-21 @ 05:15)  T(F): 98.1 (07-20-21 @ 14:19), Max: 98.7 (07-20-21 @ 05:15)  HR: 67 (07-20-21 @ 14:19) (67 - 89)  BP: 111/57 (07-20-21 @ 14:19) (111/57 - 154/80)  RR: 17 (07-20-21 @ 14:19) (16 - 22)  SpO2: 100% (07-20-21 @ 14:19) (95% - 100%) on (O2)    Telemetry/Alarms:  General: WN/WD NAD  Neurology: Awake, nonfocal, HILL x 4  Eyes: Scleras clear, PERRLA/ EOMI, Gross vision intact  ENT:Gross hearing intact, grossly patent pharynx, no stridor  Neck: Neck supple, trachea midline, No JVD,   Respiratory: CTA B/L, No wheezing, rales, rhonchi  CV: RRR, S1S2, no murmurs, rubs or gallops  Abdominal: Soft, NT, ND +BS,   Extremities: No edema, + peripheral pulses  Skin: No Rashes, Hematoma, Ecchymosis  Lymphatic: No Neck, axilla, groin LAD  Psych: Oriented x 3, normal affect  Incisions: c,d,i  Tubes: pleurX drained 10cc today; new cap and dressing placed  Relevant labs, radiology and Medications reviewed                        9.9    20.34 )-----------( 333      ( 20 Jul 2021 07:26 )             32.1     07-20    139  |  99  |  13  ----------------------------<  97  3.3<L>   |  29  |  0.81    Ca    10.9<H>      20 Jul 2021 07:26        MEDICATIONS  (STANDING):  albuterol/ipratropium for Nebulization 3 milliLiter(s) Nebulizer every 6 hours  aMIOdarone    Tablet 200 milliGRAM(s) Oral daily  aMIOdarone    Tablet   Oral   ascorbic acid 500 milliGRAM(s) Oral daily  buDESOnide    Inhalation Suspension 0.25 milliGRAM(s) Inhalation every 12 hours  dornase el Solution 2.5 milliGRAM(s) Inhalation every 12 hours  escitalopram 15 milliGRAM(s) Oral daily  ferrous    sulfate 325 milliGRAM(s) Oral daily  folic acid 1 milliGRAM(s) Oral daily  heparin   Injectable 5000 Unit(s) SubCutaneous every 12 hours  lactobacillus acidophilus 1 Tablet(s) Oral daily  multivitamin 1 Tablet(s) Oral daily  pantoprazole    Tablet 40 milliGRAM(s) Oral before breakfast  sodium chloride 3%  Inhalation 4 milliLiter(s) Inhalation every 6 hours  thiamine 100 milliGRAM(s) Oral daily    MEDICATIONS  (PRN):  bisacodyl Suppository 10 milliGRAM(s) Rectal daily PRN Constipation    Pertinent Physical Exam  I&O's Summary    19 Jul 2021 07:01  -  20 Jul 2021 07:00  --------------------------------------------------------  IN: 600 mL / OUT: 2000 mL / NET: -1400 mL        Assessment  89y Female  w/ PAST MEDICAL & SURGICAL HISTORY:  Cough  Osteoarthritis  Abnormal finding of lung  COPD, mild  History of hip replacement right  RLL consolidation, right pleural effusion, bulky mediastinal lymph nodes (18 Jul 2021 15:47)  .  On (7/14/21), patient underwent RVATS, drainage of effusion, with pleural biopsy, pleurX catheter placement    . Postoperative course/issues: pt awaiting rehab placement     PLAN  Neuro: Pain management  Pulm: Encourage coughing, deep breathing and use of incentive spirometry. Wean off supplemental oxygen as able. Daily CXR.   Cardio: Monitor telemetry/alarms  GI: Tolerating diet. Continue stool softeners.  Renal: monitor urine output, supplement electrolytes as needed  Vasc: Heparin SC/SCDs for DVT prophylaxis  Heme: Stable H/H. .   ID: Off antibiotics. Stable.  Therapy: OOB/ambulate  Disposition: Aim to D/C to rehab once approved  Discussed with Cardiothoracic Team at AM rounds.

## 2021-07-20 NOTE — DISCHARGE NOTE PROVIDER - CARE PROVIDER_API CALL
Vicente Coates (MD)  Surgery; Thoracic Surgery  [phone] [place] [address]  Phone: [phone]  Fax: [phone]  Established Patient  Follow Up Time: 2 weeks

## 2021-07-20 NOTE — DISCHARGE NOTE PROVIDER - DETAILS OF MALNUTRITION DIAGNOSIS/DIAGNOSES
This patient has been assessed with a concern for Malnutrition and was treated during this hospitalization for the following Nutrition diagnosis/diagnoses:     -  07/18/2021: Moderate protein-calorie malnutrition

## 2021-07-20 NOTE — PROVIDER CONTACT NOTE (CHANGE IN STATUS NOTIFICATION) - ASSESSMENT
Afebrile, continues to have watery dark green stool. Denies abdominal pain. Abd + bowel sounds/ soft/ non tender. VSS

## 2021-07-20 NOTE — DISCHARGE NOTE PROVIDER - HOSPITAL COURSE
This 89 yr old female underwent a robotic R VATS, drainage of pleural effusion (1.5L), pleural bx, and placement of a PleurX cathter on 7/14/21.  The pt had c/o a chronic productive cough for at least 6 months.  In 4/21, she had been hospitalized for increased RLL inflammation,  She was discharged when a bed was found for her at a rehab facility. This 89 yr old female underwent a robotic R VATS, drainage of pleural effusion (1.5L), pleural bx, and placement of a PleurX cathter on 7/14/21.  The pt had c/o a chronic productive cough for at least 6 months.  Post op Pt   seen by PT. Deemed to need Rehab upon discharge. Rt. Pleurx eventually capped. DRained yesterday. VATS c/d/i. Pt with chronic leukocytosis due to lymphoma history. Afebrile. NO s/s of   infection. Pt given brief course of Zosyn but then d/c'd. Pt developed some diarrhea, all stool softners d/c'd. C diff sent was negative. Now pt w only 1 loose stool past 12hrs. Given Imodium.   Rt. VATS c/d/i. Pt states her SOB has completely resolved. No CP. Rt. No LE edema. Pt off oxygen. O2 sat on RA at rest 93%. Cleared for d/c to rehab by Dr. Coatse  Vital Signs Last 24 Hrs  T(C): 36.8 (21 Jul 2021 09:08), Max: 36.8 (21 Jul 2021 09:08)  T(F): 98.2 (21 Jul 2021 09:08), Max: 98.2 (21 Jul 2021 09:08)  HR: 76 (21 Jul 2021 09:08) (67 - 84)  BP: 120/50 (21 Jul 2021 09:08) (111/57 - 144/64)  BP(mean): --  RR: 16 (21 Jul 2021 09:16) (16 - 18)  SpO2: 96% (21 Jul 2021 09:16) (90% - 100%)

## 2021-07-20 NOTE — PROGRESS NOTE ADULT - SUBJECTIVE AND OBJECTIVE BOX
DATE OF SERVICE: 07-20-21 @ 08:57    Subjective: Patient seen and examined. No new events except as noted.     SUBJECTIVE/ROS:  pos diarrhea       MEDICATIONS:  MEDICATIONS  (STANDING):  albuterol/ipratropium for Nebulization 3 milliLiter(s) Nebulizer every 6 hours  aMIOdarone    Tablet   Oral   aMIOdarone    Tablet 200 milliGRAM(s) Oral daily  ascorbic acid 500 milliGRAM(s) Oral daily  buDESOnide    Inhalation Suspension 0.25 milliGRAM(s) Inhalation every 12 hours  dornase el Solution 2.5 milliGRAM(s) Inhalation every 12 hours  escitalopram 15 milliGRAM(s) Oral daily  ferrous    sulfate 325 milliGRAM(s) Oral daily  folic acid 1 milliGRAM(s) Oral daily  heparin   Injectable 5000 Unit(s) SubCutaneous every 12 hours  multivitamin 1 Tablet(s) Oral daily  pantoprazole    Tablet 40 milliGRAM(s) Oral before breakfast  sodium chloride 3%  Inhalation 4 milliLiter(s) Inhalation every 6 hours  thiamine 100 milliGRAM(s) Oral daily      PHYSICAL EXAM:  T(C): 37.1 (07-20-21 @ 05:15), Max: 37.1 (07-20-21 @ 05:15)  HR: 76 (07-20-21 @ 05:15) (66 - 89)  BP: 138/56 (07-20-21 @ 05:15) (110/50 - 154/80)  RR: 18 (07-20-21 @ 05:15) (16 - 26)  SpO2: 100% (07-20-21 @ 05:15) (95% - 100%)  Wt(kg): --  I&O's Summary    19 Jul 2021 07:01  -  20 Jul 2021 07:00  --------------------------------------------------------  IN: 600 mL / OUT: 2000 mL / NET: -1400 mL            JVP: Normal  Neck: supple  Lung: clear   CV: S1 S2 , Murmur:  Abd: soft  Ext: No edema  neuro: Awake / alert  Psych: flat affect  Skin: normal``    LABS/DATA:    CARDIAC MARKERS:                                9.9    20.34 )-----------( 333      ( 20 Jul 2021 07:26 )             32.1     07-19    137  |  98  |  16  ----------------------------<  103<H>  3.5   |  26  |  0.73    Ca    10.9<H>      19 Jul 2021 03:15      proBNP:   Lipid Profile:   HgA1c:   TSH:     TELE:  EKG:

## 2021-07-20 NOTE — DISCHARGE NOTE PROVIDER - NSDCFUADDINST_GEN_ALL_CORE_FT
Watch the site for increased redness or pus, watch for fevers or difficulty breathing and of noted, call Dr Coates.   Watch the site for increased redness or pus, watch for fevers or difficulty breathing and of noted, call Dr Coates.      PleurX catheter to be drained 3x weekly  no more than 1L at a time Watch the site for increased redness or pus, watch for fevers or difficulty breathing and of noted, call Dr Coates.    Ambulation and Recovery as per Rehab facility    PleurX catheter to be drained 3x weekly while in rehab and then upon d/c to home w VNS. Change dressing with each drainage.   no more than 1L at a time

## 2021-07-20 NOTE — DISCHARGE NOTE PROVIDER - NSDCCPCAREPLAN_GEN_ALL_CORE_FT
PRINCIPAL DISCHARGE DIAGNOSIS  Diagnosis: Pleural effusion, right  Assessment and Plan of Treatment:

## 2021-07-21 ENCOUNTER — TRANSCRIPTION ENCOUNTER (OUTPATIENT)
Age: 86
End: 2021-07-21

## 2021-07-21 VITALS
RESPIRATION RATE: 17 BRPM | SYSTOLIC BLOOD PRESSURE: 124 MMHG | DIASTOLIC BLOOD PRESSURE: 51 MMHG | TEMPERATURE: 98 F | OXYGEN SATURATION: 96 % | HEART RATE: 76 BPM

## 2021-07-21 LAB
ANION GAP SERPL CALC-SCNC: 15 MMOL/L — HIGH (ref 7–14)
BUN SERPL-MCNC: 12 MG/DL — SIGNIFICANT CHANGE UP (ref 7–23)
C DIFF BY PCR RESULT: SIGNIFICANT CHANGE UP
C DIFF TOX GENS STL QL NAA+PROBE: SIGNIFICANT CHANGE UP
CALCIUM SERPL-MCNC: 11 MG/DL — HIGH (ref 8.4–10.5)
CHLORIDE SERPL-SCNC: 100 MMOL/L — SIGNIFICANT CHANGE UP (ref 98–107)
CO2 SERPL-SCNC: 26 MMOL/L — SIGNIFICANT CHANGE UP (ref 22–31)
CREAT SERPL-MCNC: 0.86 MG/DL — SIGNIFICANT CHANGE UP (ref 0.5–1.3)
GLUCOSE SERPL-MCNC: 94 MG/DL — SIGNIFICANT CHANGE UP (ref 70–99)
POTASSIUM SERPL-MCNC: 3.5 MMOL/L — SIGNIFICANT CHANGE UP (ref 3.5–5.3)
POTASSIUM SERPL-SCNC: 3.5 MMOL/L — SIGNIFICANT CHANGE UP (ref 3.5–5.3)
SODIUM SERPL-SCNC: 141 MMOL/L — SIGNIFICANT CHANGE UP (ref 135–145)

## 2021-07-21 PROCEDURE — 71045 X-RAY EXAM CHEST 1 VIEW: CPT | Mod: 26

## 2021-07-21 PROCEDURE — 99238 HOSP IP/OBS DSCHRG MGMT 30/<: CPT

## 2021-07-21 RX ORDER — LOPERAMIDE HCL 2 MG
2 TABLET ORAL ONCE
Refills: 0 | Status: COMPLETED | OUTPATIENT
Start: 2021-07-21 | End: 2021-07-21

## 2021-07-21 RX ORDER — ACETAMINOPHEN 500 MG
2 TABLET ORAL
Qty: 0 | Refills: 0 | DISCHARGE

## 2021-07-21 RX ADMIN — Medication 100 MILLIGRAM(S): at 12:26

## 2021-07-21 RX ADMIN — ESCITALOPRAM OXALATE 15 MILLIGRAM(S): 10 TABLET, FILM COATED ORAL at 12:25

## 2021-07-21 RX ADMIN — DORNASE ALFA 2.5 MILLIGRAM(S): 1 SOLUTION RESPIRATORY (INHALATION) at 00:38

## 2021-07-21 RX ADMIN — HEPARIN SODIUM 5000 UNIT(S): 5000 INJECTION INTRAVENOUS; SUBCUTANEOUS at 05:34

## 2021-07-21 RX ADMIN — SODIUM CHLORIDE 4 MILLILITER(S): 9 INJECTION INTRAMUSCULAR; INTRAVENOUS; SUBCUTANEOUS at 09:28

## 2021-07-21 RX ADMIN — SODIUM CHLORIDE 4 MILLILITER(S): 9 INJECTION INTRAMUSCULAR; INTRAVENOUS; SUBCUTANEOUS at 00:37

## 2021-07-21 RX ADMIN — Medication 1 TABLET(S): at 12:26

## 2021-07-21 RX ADMIN — Medication 2 MILLIGRAM(S): at 08:38

## 2021-07-21 RX ADMIN — Medication 0.25 MILLIGRAM(S): at 09:28

## 2021-07-21 RX ADMIN — PANTOPRAZOLE SODIUM 40 MILLIGRAM(S): 20 TABLET, DELAYED RELEASE ORAL at 05:34

## 2021-07-21 RX ADMIN — Medication 325 MILLIGRAM(S): at 12:26

## 2021-07-21 RX ADMIN — SODIUM CHLORIDE 4 MILLILITER(S): 9 INJECTION INTRAMUSCULAR; INTRAVENOUS; SUBCUTANEOUS at 15:09

## 2021-07-21 RX ADMIN — SODIUM CHLORIDE 4 MILLILITER(S): 9 INJECTION INTRAMUSCULAR; INTRAVENOUS; SUBCUTANEOUS at 04:17

## 2021-07-21 RX ADMIN — Medication 1 MILLIGRAM(S): at 12:26

## 2021-07-21 RX ADMIN — DORNASE ALFA 2.5 MILLIGRAM(S): 1 SOLUTION RESPIRATORY (INHALATION) at 09:29

## 2021-07-21 RX ADMIN — Medication 500 MILLIGRAM(S): at 12:26

## 2021-07-21 RX ADMIN — Medication 3 MILLILITER(S): at 09:27

## 2021-07-21 RX ADMIN — Medication 3 MILLILITER(S): at 04:17

## 2021-07-21 RX ADMIN — Medication 3 MILLILITER(S): at 15:09

## 2021-07-21 RX ADMIN — Medication 3 MILLILITER(S): at 00:37

## 2021-07-21 RX ADMIN — AMIODARONE HYDROCHLORIDE 200 MILLIGRAM(S): 400 TABLET ORAL at 05:34

## 2021-07-21 NOTE — PROGRESS NOTE ADULT - SUBJECTIVE AND OBJECTIVE BOX
DATE OF SERVICE: 07-21-21 @ 07:11    Subjective: Patient seen and examined. No new events except as noted.     SUBJECTIVE/ROS:        MEDICATIONS:  MEDICATIONS  (STANDING):  albuterol/ipratropium for Nebulization 3 milliLiter(s) Nebulizer every 6 hours  aMIOdarone    Tablet   Oral   aMIOdarone    Tablet 200 milliGRAM(s) Oral daily  ascorbic acid 500 milliGRAM(s) Oral daily  buDESOnide    Inhalation Suspension 0.25 milliGRAM(s) Inhalation every 12 hours  dornase el Solution 2.5 milliGRAM(s) Inhalation every 12 hours  escitalopram 15 milliGRAM(s) Oral daily  ferrous    sulfate 325 milliGRAM(s) Oral daily  folic acid 1 milliGRAM(s) Oral daily  heparin   Injectable 5000 Unit(s) SubCutaneous every 12 hours  lactobacillus acidophilus 1 Tablet(s) Oral daily  multivitamin 1 Tablet(s) Oral daily  pantoprazole    Tablet 40 milliGRAM(s) Oral before breakfast  sodium chloride 3%  Inhalation 4 milliLiter(s) Inhalation every 6 hours  thiamine 100 milliGRAM(s) Oral daily      PHYSICAL EXAM:  T(C): 36.5 (07-21-21 @ 04:55), Max: 36.7 (07-20-21 @ 09:43)  HR: 78 (07-21-21 @ 04:55) (67 - 84)  BP: 124/51 (07-21-21 @ 04:55) (111/57 - 144/64)  RR: 16 (07-21-21 @ 04:55) (16 - 18)  SpO2: 100% (07-21-21 @ 04:55) (90% - 100%)  Wt(kg): --  I&O's Summary    20 Jul 2021 07:01  -  21 Jul 2021 07:00  --------------------------------------------------------  IN: 240 mL / OUT: 850 mL / NET: -610 mL            JVP: Normal  Neck: supple  Lung: dec bS   CV: S1 S2 , Murmur:  Abd: soft  Ext: No edema  neuro: Awake / alert  Psych: flat affect  Skin: normal``    LABS/DATA:    CARDIAC MARKERS:      < from: Transthoracic Echocardiogram (07.20.21 @ 16:26) >  CONCLUSIONS:  1. Mitral annular calcification, otherwise normal mitral  valve. Minimal mitral regurgitation.  2. Normal left ventricular systolic function. No segmental  wall motion abnormalities.  3. Normal right ventricular size and function.  4. Estimated pulmonary artery systolic pressure equals 50  mm Hg, assuming right atrial pressure equals 10  mm Hg,  consistent with moderate pulmonary hypertension.  ------------------------------------------------------------------------  Confirmed on  7/20/2021 - 17:43:01 by Ghulam Tate,    < end of copied text >                            9.9    20.34 )-----------( 333      ( 20 Jul 2021 07:26 )             32.1     07-20    139  |  99  |  13  ----------------------------<  97  3.3<L>   |  29  |  0.81    Ca    10.9<H>      20 Jul 2021 07:26      proBNP:   Lipid Profile:   HgA1c:   TSH:     TELE:  EKG:

## 2021-07-21 NOTE — DISCHARGE NOTE NURSING/CASE MANAGEMENT/SOCIAL WORK - NSDCFUADDAPPT_GEN_ALL_CORE_FT
See Dr Coates in 2 weeks- call for an appointment [phone] Have a new chest X-ray done prior to your apt. with him.   See your Cardiologist Dr. Madison in 1-2 weeks.

## 2021-07-21 NOTE — PROGRESS NOTE ADULT - PROVIDER SPECIALTY LIST ADULT
Critical Care
Critical Care
Pain Medicine
Cardiology
Critical Care
Critical Care
Cardiology
Critical Care
Critical Care
Pain Medicine
Thoracic Surgery

## 2021-07-21 NOTE — PROGRESS NOTE ADULT - ASSESSMENT
PAF  HR stable   in sinus now   not a candidate for a/c for now given acute anemia and post op state   can consider a/c in near future pending her clinical status   cont amio for short term     copd / hypoxia   much improved   nebs  plan as per CTS    anemia  Monitor hemoglobin, transfuse as needed.    Diarrhea   plan as per primary team   rule out C Diff   
PAF  has rapid afib overnight  in sinus now   not a candidate for a/c for now given acute anemia and post op state   can consider a/c in near future pending her clinical status   cont amio for short term   can add metoprolol if pt goes back in afib again   obtain echo     copd / hypoxia   much improved   nebs  plan as per CTICU    anemia  Monitor hemoglobin, transfuse as needed.  plan as per CTICU    DVT proph  on hep  
PAF  in sinus now   not a candidate for a/c for now given acute anemia and post op state   can consider a/c in near future pending her clinical status   cont amio for short term   obtain echo     copd / hypoxia   much improved   nebs  plan as per CTICU    anemia  Monitor hemoglobin, transfuse as needed.  plan as per CTICU    DVT proph  on hep  
PAF  in sinus now   not a candidate for a/c for now given acute anemia and post op state   can use dig for rate control given low Bp or amio if needed for short term   obtain echo     copd / hypoxia   nebs  plan as per CTICU    anemia  Monitor hemoglobin, transfuse as needed.  plan as per CTICU    DVT proph  on hep  
PAF  HR stable   in sinus now   not a candidate for a/c for now given acute anemia and post op state / pl effusion   can consider a/c in near future pending her clinical status   cont amio for short term     copd / hypoxia   has pl effusion   nebs  plan as per CTS    anemia  Monitor hemoglobin, transfuse as needed.    pulm HTN   secondary to underlying pulmonary disease     I will be away till Sunday July 25. Dr Todd Colvin is covering me for any urgent issues.   
PAF  has rapid afib overnight  in sinus now   not a candidate for a/c for now given acute anemia and post op state   can consider a/c in near future pending her clinical status   cont amio for short term   can add metoprolol if pt goes back in afib again   obtain echo   Check TSH    copd / hypoxia   much improved   nebs  plan as per CTICU    anemia  Monitor hemoglobin, transfuse as needed.  plan as per CTICU    DVT proph  on hep

## 2021-07-21 NOTE — DISCHARGE NOTE NURSING/CASE MANAGEMENT/SOCIAL WORK - PATIENT PORTAL LINK FT
You can access the FollowMyHealth Patient Portal offered by [facility] by registering at the following website: [email or web address]. By joining [facility]’s FollowMyHealth portal, you will also be able to view your health information using other applications (apps) compatible with our system.

## 2021-07-21 NOTE — PROGRESS NOTE ADULT - NSICDXPILOT_GEN_ALL_CORE
[place]

## 2021-07-26 PROCEDURE — G0452: CPT | Mod: 26

## 2021-07-27 ENCOUNTER — INPATIENT (INPATIENT)
Facility: HOSPITAL | Age: 86
LOS: 16 days | Discharge: NURSING, REHAB OR LONG-TERM CARE | End: 2021-08-13
Attending: STUDENT IN AN ORGANIZED HEALTH CARE EDUCATION/TRAINING PROGRAM | Admitting: STUDENT IN AN ORGANIZED HEALTH CARE EDUCATION/TRAINING PROGRAM
Payer: MEDICARE

## 2021-07-27 ENCOUNTER — OUTPATIENT (OUTPATIENT)
Dept: OUTPATIENT SERVICES | Facility: HOSPITAL | Age: 86
LOS: 1 days | Discharge: HOME | End: 2021-07-27
Payer: MEDICARE

## 2021-07-27 VITALS
HEART RATE: 126 BPM | DIASTOLIC BLOOD PRESSURE: 71 MMHG | TEMPERATURE: 103 F | OXYGEN SATURATION: 97 % | SYSTOLIC BLOOD PRESSURE: 116 MMHG | RESPIRATION RATE: 24 BRPM | HEIGHT: 60.25 IN

## 2021-07-27 DIAGNOSIS — Z96.649 PRESENCE OF UNSPECIFIED ARTIFICIAL HIP JOINT: Chronic | ICD-10-CM

## 2021-07-27 DIAGNOSIS — Z13.83 ENCOUNTER FOR SCREENING FOR RESPIRATORY DISORDER NEC: ICD-10-CM

## 2021-07-27 LAB
ALBUMIN SERPL ELPH-MCNC: 2.9 G/DL — LOW (ref 3.3–5)
ALP SERPL-CCNC: 162 U/L — HIGH (ref 40–120)
ALT FLD-CCNC: 29 U/L — SIGNIFICANT CHANGE UP (ref 4–33)
ANION GAP SERPL CALC-SCNC: 15 MMOL/L — HIGH (ref 7–14)
APPEARANCE UR: ABNORMAL
AST SERPL-CCNC: 28 U/L — SIGNIFICANT CHANGE UP (ref 4–32)
BACTERIA # UR AUTO: ABNORMAL
BASE EXCESS BLDV CALC-SCNC: 5.5 MMOL/L — HIGH (ref -3–2)
BILIRUB SERPL-MCNC: 0.5 MG/DL — SIGNIFICANT CHANGE UP (ref 0.2–1.2)
BILIRUB UR-MCNC: NEGATIVE — SIGNIFICANT CHANGE UP
BLOOD GAS VENOUS - CREATININE: 0.9 MG/DL — SIGNIFICANT CHANGE UP (ref 0.5–1.3)
BLOOD GAS VENOUS COMPREHENSIVE RESULT: SIGNIFICANT CHANGE UP
BUN SERPL-MCNC: 19 MG/DL — SIGNIFICANT CHANGE UP (ref 7–23)
CALCIUM SERPL-MCNC: 11.6 MG/DL — HIGH (ref 8.4–10.5)
CHLORIDE BLDV-SCNC: 95 MMOL/L — LOW (ref 96–108)
CHLORIDE SERPL-SCNC: 91 MMOL/L — LOW (ref 98–107)
CO2 SERPL-SCNC: 24 MMOL/L — SIGNIFICANT CHANGE UP (ref 22–31)
COLOR SPEC: YELLOW — SIGNIFICANT CHANGE UP
CREAT SERPL-MCNC: 0.92 MG/DL — SIGNIFICANT CHANGE UP (ref 0.5–1.3)
DIFF PNL FLD: NEGATIVE — SIGNIFICANT CHANGE UP
EPI CELLS # UR: SIGNIFICANT CHANGE UP
GAS PNL BLDV: 127 MMOL/L — LOW (ref 136–146)
GLUCOSE BLDV-MCNC: 122 MG/DL — HIGH (ref 70–99)
GLUCOSE SERPL-MCNC: 116 MG/DL — HIGH (ref 70–99)
GLUCOSE UR QL: NEGATIVE — SIGNIFICANT CHANGE UP
HCO3 BLDV-SCNC: 28 MMOL/L — HIGH (ref 20–27)
HCT VFR BLD CALC: 31.3 % — LOW (ref 34.5–45)
HCT VFR BLDA CALC: 31.2 % — LOW (ref 34.5–46.5)
HGB BLD CALC-MCNC: 10.1 G/DL — LOW (ref 11.5–15.5)
HGB BLD-MCNC: 9.9 G/DL — LOW (ref 11.5–15.5)
IANC: 32.37 K/UL — HIGH (ref 1.5–8.5)
KETONES UR-MCNC: NEGATIVE — SIGNIFICANT CHANGE UP
LACTATE BLDV-MCNC: 1.5 MMOL/L — SIGNIFICANT CHANGE UP (ref 0.5–2)
LEUKOCYTE ESTERASE UR-ACNC: ABNORMAL
MCHC RBC-ENTMCNC: 24.4 PG — LOW (ref 27–34)
MCHC RBC-ENTMCNC: 31.6 GM/DL — LOW (ref 32–36)
MCV RBC AUTO: 77.1 FL — LOW (ref 80–100)
NITRITE UR-MCNC: NEGATIVE — SIGNIFICANT CHANGE UP
PCO2 BLDV: 44 MMHG — SIGNIFICANT CHANGE UP (ref 41–51)
PH BLDV: 7.44 — HIGH (ref 7.32–7.43)
PH UR: 6 — SIGNIFICANT CHANGE UP (ref 5–8)
PLATELET # BLD AUTO: 434 K/UL — HIGH (ref 150–400)
PO2 BLDV: 25 MMHG — LOW (ref 35–40)
POTASSIUM BLDV-SCNC: 3.1 MMOL/L — LOW (ref 3.4–4.5)
POTASSIUM SERPL-MCNC: 3.2 MMOL/L — LOW (ref 3.5–5.3)
POTASSIUM SERPL-SCNC: 3.2 MMOL/L — LOW (ref 3.5–5.3)
PROT SERPL-MCNC: 5.6 G/DL — LOW (ref 6–8.3)
PROT UR-MCNC: ABNORMAL
RBC # BLD: 4.06 M/UL — SIGNIFICANT CHANGE UP (ref 3.8–5.2)
RBC # FLD: 17.8 % — HIGH (ref 10.3–14.5)
RBC CASTS # UR COMP ASSIST: SIGNIFICANT CHANGE UP /HPF (ref 0–4)
SAO2 % BLDV: 41.1 % — LOW (ref 60–85)
SODIUM SERPL-SCNC: 130 MMOL/L — LOW (ref 135–145)
SP GR SPEC: 1.02 — SIGNIFICANT CHANGE UP (ref 1.01–1.02)
UROBILINOGEN FLD QL: SIGNIFICANT CHANGE UP
WBC # BLD: 36.12 K/UL — HIGH (ref 3.8–10.5)
WBC # FLD AUTO: 36.12 K/UL — HIGH (ref 3.8–10.5)
WBC UR QL: >50 /HPF — SIGNIFICANT CHANGE UP (ref 0–5)

## 2021-07-27 PROCEDURE — 71045 X-RAY EXAM CHEST 1 VIEW: CPT | Mod: 26

## 2021-07-27 PROCEDURE — 99308 SBSQ NF CARE LOW MDM 20: CPT

## 2021-07-27 PROCEDURE — 71045 X-RAY EXAM CHEST 1 VIEW: CPT | Mod: 26,77

## 2021-07-27 RX ORDER — ACETAMINOPHEN 500 MG
1000 TABLET ORAL ONCE
Refills: 0 | Status: COMPLETED | OUTPATIENT
Start: 2021-07-27 | End: 2021-07-27

## 2021-07-27 RX ORDER — VANCOMYCIN HCL 1 G
1000 VIAL (EA) INTRAVENOUS ONCE
Refills: 0 | Status: COMPLETED | OUTPATIENT
Start: 2021-07-27 | End: 2021-07-27

## 2021-07-27 RX ORDER — PIPERACILLIN AND TAZOBACTAM 4; .5 G/20ML; G/20ML
3.38 INJECTION, POWDER, LYOPHILIZED, FOR SOLUTION INTRAVENOUS ONCE
Refills: 0 | Status: COMPLETED | OUTPATIENT
Start: 2021-07-28 | End: 2021-07-28

## 2021-07-27 RX ORDER — SODIUM CHLORIDE 9 MG/ML
1000 INJECTION INTRAMUSCULAR; INTRAVENOUS; SUBCUTANEOUS ONCE
Refills: 0 | Status: COMPLETED | OUTPATIENT
Start: 2021-07-27 | End: 2021-07-27

## 2021-07-27 RX ORDER — PIPERACILLIN AND TAZOBACTAM 4; .5 G/20ML; G/20ML
3.38 INJECTION, POWDER, LYOPHILIZED, FOR SOLUTION INTRAVENOUS ONCE
Refills: 0 | Status: COMPLETED | OUTPATIENT
Start: 2021-07-27 | End: 2021-07-27

## 2021-07-27 RX ADMIN — Medication 400 MILLIGRAM(S): at 23:08

## 2021-07-27 RX ADMIN — PIPERACILLIN AND TAZOBACTAM 200 GRAM(S): 4; .5 INJECTION, POWDER, LYOPHILIZED, FOR SOLUTION INTRAVENOUS at 23:08

## 2021-07-27 RX ADMIN — SODIUM CHLORIDE 1000 MILLILITER(S): 9 INJECTION INTRAMUSCULAR; INTRAVENOUS; SUBCUTANEOUS at 23:00

## 2021-07-27 NOTE — ED ADULT NURSE NOTE - CHIEF COMPLAINT QUOTE
pt is sent from [facility] rehab for fever, hypercalcemia, conjunctivitis and cough. Pt is lethargic. -135. Will obtain EKG in triage

## 2021-07-27 NOTE — ED PROVIDER NOTE - CLINICAL SUMMARY MEDICAL DECISION MAKING FREE TEXT BOX
Pt recently here for VATS and biopsy of lungs sent in for fever and new diagnosis of bcell lymphoma. Febrile. Exam w/ dimished breath sounds on R. Concern for pleural effusion vs ptx given recent instrumentation but pleurex catheter in place. Labs, CXR, ekg, vanc zosyn, small fluid bolus, UA,ucx, bcx, tylenol. Reassess.

## 2021-07-27 NOTE — ED ADULT NURSE NOTE - OBJECTIVE STATEMENT
Pt alertx2 at baseline c/c fever 1xdays. Pt arrives via EMS from [facility]. skin intact. Breathing unlabored with productive cough yellow sputum. arrives with 2L nasal cannula, 24G IV left arm patent flushed with saline. 20G IV placed right AC. Sinus rhythm on cardiac monitor. Fall precautions in place. Safety education reinforced with call bell within reach. Patient verbalizes understanding of use. Will continue to monitor.

## 2021-07-27 NOTE — ED PROVIDER NOTE - PROGRESS NOTE DETAILS
RAQUEL Coates (Resident) - seen by CT surg. Would like CT non-con, pleurex in place w/ mild drainage. Medicine admission for urosepsis given UA results.

## 2021-07-27 NOTE — ED ADULT TRIAGE NOTE - CHIEF COMPLAINT QUOTE
pt is sent from [facility] rehab for fever, hypercalcemia, conjunctivitis and cough. Pt is lethargic. -135. Will obtain EKG in triage

## 2021-07-27 NOTE — ED PROVIDER NOTE - ATTENDING CONTRIBUTION TO CARE
Agree with above except noted:     recent VATS for pleural effusion p.w worsening shortness of breath and fever, concerning for sepsis. will give judicious IVF due to CHF. cxr, ekg, labs. consult CT for post surgical infection? will get straight cath for UA, abdominal soft and non tender, unlikely source of etiology at this time, tachypnea but no sign of pending airway compromise. possible thoracentesis? with fever, acs is less likely in the setting of fever. will get ekg for screening.

## 2021-07-27 NOTE — ED PROVIDER NOTE - PHYSICAL EXAMINATION
General: chronically ill appearing  HEENT: NCAT, PERRL  Cardiac: RRR, no murmurs, 2+ peripheral pulses  Chest: +diminished breath sounds on R +pleurex catheter on R  Abdomen: soft, non-distended, bowel sounds present, no ttp, no rebound or guarding  Extremities: no peripheral edema, calf tenderness, or leg size discrepancies  Skin: no rashes  Neuro: AAOx2, motor sensory grossly intact  Psych: mood and affect appropriate

## 2021-07-27 NOTE — ED PROVIDER NOTE - OBJECTIVE STATEMENT
88yo F hx of COPD, CHF w/ decreased ejection fraction, R hip replacement here for fever. Pt recently here 2 weeks ago for VATS and biopsy of R lung mass, has Pleurx catheter in R. Discharged to acute rehab 1 week ago. today she had fever and sob there. Presented to ED as sepsis w/ t of 103. Pt alert and awake but disoriented. States she has trouble breathing. Spoke to son confirming that she has been more altered as of late. Denies abdominal pain, n/v or issues with bowel or dysuria.

## 2021-07-28 DIAGNOSIS — I50.20 UNSPECIFIED SYSTOLIC (CONGESTIVE) HEART FAILURE: ICD-10-CM

## 2021-07-28 DIAGNOSIS — E87.8 OTHER DISORDERS OF ELECTROLYTE AND FLUID BALANCE, NOT ELSEWHERE CLASSIFIED: ICD-10-CM

## 2021-07-28 DIAGNOSIS — A41.9 SEPSIS, UNSPECIFIED ORGANISM: ICD-10-CM

## 2021-07-28 DIAGNOSIS — J18.9 PNEUMONIA, UNSPECIFIED ORGANISM: ICD-10-CM

## 2021-07-28 DIAGNOSIS — C85.90 NON-HODGKIN LYMPHOMA, UNSPECIFIED, UNSPECIFIED SITE: ICD-10-CM

## 2021-07-28 DIAGNOSIS — Z29.9 ENCOUNTER FOR PROPHYLACTIC MEASURES, UNSPECIFIED: ICD-10-CM

## 2021-07-28 DIAGNOSIS — J44.9 CHRONIC OBSTRUCTIVE PULMONARY DISEASE, UNSPECIFIED: ICD-10-CM

## 2021-07-28 PROBLEM — M19.90 UNSPECIFIED OSTEOARTHRITIS, UNSPECIFIED SITE: Chronic | Status: ACTIVE | Noted: 2021-07-07

## 2021-07-28 PROBLEM — R05 COUGH: Chronic | Status: ACTIVE | Noted: 2021-07-07

## 2021-07-28 PROBLEM — R09.89 OTHER SPECIFIED SYMPTOMS AND SIGNS INVOLVING THE CIRCULATORY AND RESPIRATORY SYSTEMS: Chronic | Status: ACTIVE | Noted: 2021-07-07

## 2021-07-28 LAB
ALBUMIN SERPL ELPH-MCNC: 2.5 G/DL — LOW (ref 3.3–5)
ALP SERPL-CCNC: 136 U/L — HIGH (ref 40–120)
ALT FLD-CCNC: 24 U/L — SIGNIFICANT CHANGE UP (ref 4–33)
ANION GAP SERPL CALC-SCNC: 12 MMOL/L — SIGNIFICANT CHANGE UP (ref 7–14)
ANISOCYTOSIS BLD QL: SLIGHT — SIGNIFICANT CHANGE UP
AST SERPL-CCNC: 19 U/L — SIGNIFICANT CHANGE UP (ref 4–32)
BASOPHILS # BLD AUTO: 0 K/UL — SIGNIFICANT CHANGE UP (ref 0–0.2)
BASOPHILS NFR BLD AUTO: 0 % — SIGNIFICANT CHANGE UP (ref 0–2)
BILIRUB SERPL-MCNC: 0.4 MG/DL — SIGNIFICANT CHANGE UP (ref 0.2–1.2)
BUN SERPL-MCNC: 17 MG/DL — SIGNIFICANT CHANGE UP (ref 7–23)
CALCIUM SERPL-MCNC: 10.7 MG/DL — HIGH (ref 8.4–10.5)
CHLORIDE SERPL-SCNC: 96 MMOL/L — LOW (ref 98–107)
CO2 SERPL-SCNC: 24 MMOL/L — SIGNIFICANT CHANGE UP (ref 22–31)
CREAT SERPL-MCNC: 0.87 MG/DL — SIGNIFICANT CHANGE UP (ref 0.5–1.3)
EOSINOPHIL # BLD AUTO: 0 K/UL — SIGNIFICANT CHANGE UP (ref 0–0.5)
EOSINOPHIL NFR BLD AUTO: 0 % — SIGNIFICANT CHANGE UP (ref 0–6)
GLUCOSE SERPL-MCNC: 99 MG/DL — SIGNIFICANT CHANGE UP (ref 70–99)
HCT VFR BLD CALC: 27 % — LOW (ref 34.5–45)
HGB BLD-MCNC: 8.4 G/DL — LOW (ref 11.5–15.5)
LYMPHOCYTES # BLD AUTO: 1.3 K/UL — SIGNIFICANT CHANGE UP (ref 1–3.3)
LYMPHOCYTES # BLD AUTO: 3.6 % — LOW (ref 13–44)
MAGNESIUM SERPL-MCNC: 1.7 MG/DL — SIGNIFICANT CHANGE UP (ref 1.6–2.6)
MANUAL SMEAR VERIFICATION: SIGNIFICANT CHANGE UP
MCHC RBC-ENTMCNC: 24.2 PG — LOW (ref 27–34)
MCHC RBC-ENTMCNC: 31.1 GM/DL — LOW (ref 32–36)
MCV RBC AUTO: 77.8 FL — LOW (ref 80–100)
MICROCYTES BLD QL: SLIGHT — SIGNIFICANT CHANGE UP
MONOCYTES # BLD AUTO: 1.59 K/UL — HIGH (ref 0–0.9)
MONOCYTES NFR BLD AUTO: 4.4 % — SIGNIFICANT CHANGE UP (ref 2–14)
NEUTROPHILS # BLD AUTO: 32.58 K/UL — HIGH (ref 1.8–7.4)
NEUTROPHILS NFR BLD AUTO: 89.3 % — HIGH (ref 43–77)
NEUTS BAND # BLD: 0.9 % — SIGNIFICANT CHANGE UP (ref 0–6)
NRBC # BLD: 0 /100 WBCS — SIGNIFICANT CHANGE UP
NRBC # FLD: 0 K/UL — SIGNIFICANT CHANGE UP
OVALOCYTES BLD QL SMEAR: SLIGHT — SIGNIFICANT CHANGE UP
PHOSPHATE SERPL-MCNC: 2.7 MG/DL — SIGNIFICANT CHANGE UP (ref 2.5–4.5)
PLAT MORPH BLD: NORMAL — SIGNIFICANT CHANGE UP
PLATELET # BLD AUTO: 329 K/UL — SIGNIFICANT CHANGE UP (ref 150–400)
PLATELET COUNT - ESTIMATE: NORMAL — SIGNIFICANT CHANGE UP
POIKILOCYTOSIS BLD QL AUTO: SLIGHT — SIGNIFICANT CHANGE UP
POLYCHROMASIA BLD QL SMEAR: SLIGHT — SIGNIFICANT CHANGE UP
POTASSIUM SERPL-MCNC: 3.4 MMOL/L — LOW (ref 3.5–5.3)
POTASSIUM SERPL-SCNC: 3.4 MMOL/L — LOW (ref 3.5–5.3)
PROT SERPL-MCNC: 4.9 G/DL — LOW (ref 6–8.3)
RBC # BLD: 3.47 M/UL — LOW (ref 3.8–5.2)
RBC # FLD: 17.7 % — HIGH (ref 10.3–14.5)
RBC BLD AUTO: ABNORMAL
SARS-COV-2 RNA SPEC QL NAA+PROBE: SIGNIFICANT CHANGE UP
SODIUM SERPL-SCNC: 132 MMOL/L — LOW (ref 135–145)
VARIANT LYMPHS # BLD: 1.8 % — SIGNIFICANT CHANGE UP (ref 0–6)
WBC # BLD: 34.64 K/UL — HIGH (ref 3.8–10.5)
WBC # FLD AUTO: 34.64 K/UL — HIGH (ref 3.8–10.5)

## 2021-07-28 PROCEDURE — 99233 SBSQ HOSP IP/OBS HIGH 50: CPT

## 2021-07-28 PROCEDURE — 99223 1ST HOSP IP/OBS HIGH 75: CPT

## 2021-07-28 PROCEDURE — 99024 POSTOP FOLLOW-UP VISIT: CPT

## 2021-07-28 PROCEDURE — 99223 1ST HOSP IP/OBS HIGH 75: CPT | Mod: GC

## 2021-07-28 PROCEDURE — 71250 CT THORAX DX C-: CPT | Mod: 26

## 2021-07-28 RX ORDER — ENOXAPARIN SODIUM 100 MG/ML
40 INJECTION SUBCUTANEOUS DAILY
Refills: 0 | Status: DISCONTINUED | OUTPATIENT
Start: 2021-07-28 | End: 2021-08-13

## 2021-07-28 RX ORDER — PIPERACILLIN AND TAZOBACTAM 4; .5 G/20ML; G/20ML
3.38 INJECTION, POWDER, LYOPHILIZED, FOR SOLUTION INTRAVENOUS EVERY 8 HOURS
Refills: 0 | Status: DISCONTINUED | OUTPATIENT
Start: 2021-07-28 | End: 2021-08-01

## 2021-07-28 RX ORDER — VANCOMYCIN HCL 1 G
1000 VIAL (EA) INTRAVENOUS EVERY 12 HOURS
Refills: 0 | Status: DISCONTINUED | OUTPATIENT
Start: 2021-07-28 | End: 2021-07-28

## 2021-07-28 RX ORDER — VANCOMYCIN HCL 1 G
1000 VIAL (EA) INTRAVENOUS DAILY
Refills: 0 | Status: DISCONTINUED | OUTPATIENT
Start: 2021-07-28 | End: 2021-08-01

## 2021-07-28 RX ORDER — ACETAMINOPHEN 500 MG
650 TABLET ORAL EVERY 6 HOURS
Refills: 0 | Status: DISCONTINUED | OUTPATIENT
Start: 2021-07-28 | End: 2021-08-11

## 2021-07-28 RX ORDER — AMIODARONE HYDROCHLORIDE 400 MG/1
200 TABLET ORAL DAILY
Refills: 0 | Status: DISCONTINUED | OUTPATIENT
Start: 2021-07-28 | End: 2021-08-13

## 2021-07-28 RX ORDER — SODIUM CHLORIDE 9 MG/ML
1000 INJECTION INTRAMUSCULAR; INTRAVENOUS; SUBCUTANEOUS
Refills: 0 | Status: DISCONTINUED | OUTPATIENT
Start: 2021-07-28 | End: 2021-08-03

## 2021-07-28 RX ORDER — OMEPRAZOLE 10 MG/1
1 CAPSULE, DELAYED RELEASE ORAL
Qty: 0 | Refills: 0 | DISCHARGE

## 2021-07-28 RX ORDER — LANOLIN ALCOHOL/MO/W.PET/CERES
3 CREAM (GRAM) TOPICAL AT BEDTIME
Refills: 0 | Status: DISCONTINUED | OUTPATIENT
Start: 2021-07-28 | End: 2021-08-13

## 2021-07-28 RX ORDER — ONDANSETRON 8 MG/1
4 TABLET, FILM COATED ORAL EVERY 8 HOURS
Refills: 0 | Status: DISCONTINUED | OUTPATIENT
Start: 2021-07-28 | End: 2021-08-13

## 2021-07-28 RX ORDER — FUROSEMIDE 40 MG
1 TABLET ORAL
Qty: 0 | Refills: 0 | DISCHARGE

## 2021-07-28 RX ORDER — LOPERAMIDE HCL 2 MG
1 TABLET ORAL
Qty: 0 | Refills: 0 | DISCHARGE

## 2021-07-28 RX ORDER — POTASSIUM CHLORIDE 20 MEQ
10 PACKET (EA) ORAL
Refills: 0 | Status: DISCONTINUED | OUTPATIENT
Start: 2021-07-28 | End: 2021-08-02

## 2021-07-28 RX ORDER — IPRATROPIUM/ALBUTEROL SULFATE 18-103MCG
3 AEROSOL WITH ADAPTER (GRAM) INHALATION EVERY 6 HOURS
Refills: 0 | Status: DISCONTINUED | OUTPATIENT
Start: 2021-07-28 | End: 2021-08-07

## 2021-07-28 RX ORDER — PIPERACILLIN AND TAZOBACTAM 4; .5 G/20ML; G/20ML
3.38 INJECTION, POWDER, LYOPHILIZED, FOR SOLUTION INTRAVENOUS EVERY 8 HOURS
Refills: 0 | Status: DISCONTINUED | OUTPATIENT
Start: 2021-07-28 | End: 2021-07-28

## 2021-07-28 RX ORDER — ESCITALOPRAM OXALATE 10 MG/1
15 TABLET, FILM COATED ORAL
Qty: 0 | Refills: 0 | DISCHARGE

## 2021-07-28 RX ADMIN — Medication 3 MILLILITER(S): at 04:55

## 2021-07-28 RX ADMIN — Medication 3 MILLILITER(S): at 11:10

## 2021-07-28 RX ADMIN — PIPERACILLIN AND TAZOBACTAM 25 GRAM(S): 4; .5 INJECTION, POWDER, LYOPHILIZED, FOR SOLUTION INTRAVENOUS at 03:07

## 2021-07-28 RX ADMIN — Medication 250 MILLIGRAM(S): at 00:35

## 2021-07-28 RX ADMIN — SODIUM CHLORIDE 75 MILLILITER(S): 9 INJECTION INTRAMUSCULAR; INTRAVENOUS; SUBCUTANEOUS at 14:59

## 2021-07-28 RX ADMIN — ENOXAPARIN SODIUM 40 MILLIGRAM(S): 100 INJECTION SUBCUTANEOUS at 11:04

## 2021-07-28 RX ADMIN — Medication 100 MILLIEQUIVALENT(S): at 06:00

## 2021-07-28 RX ADMIN — Medication 100 MILLIEQUIVALENT(S): at 04:42

## 2021-07-28 RX ADMIN — Medication 250 MILLIGRAM(S): at 23:02

## 2021-07-28 RX ADMIN — Medication 3 MILLIGRAM(S): at 22:26

## 2021-07-28 RX ADMIN — Medication 3 MILLILITER(S): at 22:18

## 2021-07-28 RX ADMIN — PIPERACILLIN AND TAZOBACTAM 25 GRAM(S): 4; .5 INJECTION, POWDER, LYOPHILIZED, FOR SOLUTION INTRAVENOUS at 19:16

## 2021-07-28 RX ADMIN — Medication 3 MILLILITER(S): at 16:26

## 2021-07-28 RX ADMIN — PIPERACILLIN AND TAZOBACTAM 25 GRAM(S): 4; .5 INJECTION, POWDER, LYOPHILIZED, FOR SOLUTION INTRAVENOUS at 11:04

## 2021-07-28 NOTE — H&P ADULT - PROBLEM SELECTOR PLAN 1
New severe exacerbation  Vitals in the .2, , /49, RR 29 saturating 98% on 3L.   WBC 36.12 (more elevated than usual), CXR with whiteout of right lung  UA leuk + nitrites -  Bcx and Ucx pending   In the ED received Zosyn x2 and Vanc  Continue vanc and zosyn, MRSA swab New severe exacerbation  Vitals in the .2, , /49, RR 29 saturating 98% on 3L.   WBC 36.12 (more elevated than usual), CXR with whiteout of right lung  UA leuk + nitrites -  Bcx and Ucx pending   In the ED received Zosyn x2 and Vanc  Continue vanc and zosyn, MRSA swab  CT surgery attempted to manipulate pleurx.   CT chest pending

## 2021-07-28 NOTE — CONSULT NOTE ADULT - SUBJECTIVE AND OBJECTIVE BOX
Hematology/Oncology Consult Note    HPI:  89 year old woman with a pmh of COPD and HFrEF (EF 26%) that presents as a transfer [facility] for evaluation of possible DLBCL. Pt with cough for several months. She was treated for possible COPD exacerbation with steroids with minimal improvement. She was admitted to [facility] 04/2021 and treated for PNA. Imaging there showed Bulky mediastinal lymphadenopathy, pulmonary was consulted and recommend bronch, however pt refused and asked to be discharged. Pt then had flex bronch       s/p right robot VATS/pleural biopsy with placement of pleurex catheter 7/14/21 prior to discharged was treated with a brief course of zosyn and discharged to rehab 7/21/21. In rehab she developed a fever. 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        Allergies: No Known Allergies          MEDICATIONS  (STANDING):  albuterol/ipratropium for Nebulization 3 milliLiter(s) Nebulizer every 6 hours  aMIOdarone    Tablet 200 milliGRAM(s) Oral daily  enoxaparin Injectable 40 milliGRAM(s) SubCutaneous daily  piperacillin/tazobactam IVPB.. 3.375 Gram(s) IV Intermittent every 8 hours  potassium chloride  10 mEq/100 mL IVPB 10 milliEquivalent(s) IV Intermittent every 1 hour  vancomycin  IVPB 1000 milliGRAM(s) IV Intermittent daily    MEDICATIONS  (PRN):  acetaminophen   Tablet .. 650 milliGRAM(s) Oral every 6 hours PRN Temp greater or equal to 38.5C (101.3F), Mild Pain (1 - 3)  aluminum hydroxide/magnesium hydroxide/simethicone Suspension 30 milliLiter(s) Oral every 4 hours PRN Dyspepsia  melatonin 3 milliGRAM(s) Oral at bedtime PRN Insomnia  ondansetron Injectable 4 milliGRAM(s) IV Push every 8 hours PRN Nausea and/or Vomiting      PAST MEDICAL & SURGICAL HISTORY:  CHFrEF  Osteoarthritis  Abnormal finding of lung  COPD, mild  History of hip replacement  right        FAMILY HISTORY:  No pertinent family history in first degree relatives        SOCIAL HISTORY: No EtOH, no tobacco    REVIEW OF SYSTEMS:    CONSTITUTIONAL: No weakness, fevers or chills  EYES/ENT: No visual changes;  No vertigo or throat pain   NECK: No pain or stiffness  RESPIRATORY: No cough, wheezing, hemoptysis; No shortness of breath  CARDIOVASCULAR: No chest pain or palpitations  GASTROINTESTINAL: No abdominal or epigastric pain. No nausea, vomiting, or hematemesis; No diarrhea or constipation. No melena or hematochezia.  GENITOURINARY: No dysuria, frequency or hematuria  NEUROLOGICAL: No numbness or weakness  SKIN: No itching, burning, rashes, or lesions   All other review of systems is negative unless indicated above.    Height (cm): 153 (07-27 @ 21:30)    T(F): 98 (07-28-21 @ 09:26), Max: 103.2 (07-27-21 @ 23:04)  HR: 75 (07-28-21 @ 09:26) (69 - 126)  BP: 124/63 (07-28-21 @ 09:26)  RR: 20 (07-28-21 @ 09:26)  SpO2: 100% (07-28-21 @ 09:26) (97% - 100%)    Physical Exam  GENERAL: NAD, well-developed  HEAD:  Atraumatic, Normocephalic  EYES: EOMI, PERRLA, conjunctiva and sclera clear  NECK: Supple, No JVD  CHEST/LUNG: Clear to auscultation bilaterally; No wheeze  HEART: Regular rate and rhythm; No murmurs, rubs, or gallops  ABDOMEN: Soft, Nontender, Nondistended; Bowel sounds present  EXTREMITIES:  2+ Peripheral Pulses, No clubbing, cyanosis, or edema  NEUROLOGY: non-focal  SKIN: No rashes or lesions                          9.9    36.12 )-----------( 434      ( 27 Jul 2021 23:02 )             31.3       07-27    130<L>  |  91<L>  |  19  ----------------------------<  116<H>  3.2<L>   |  24  |  0.92    Ca    11.6<H>      27 Jul 2021 23:02  Mg     1.70     07-27    TPro  5.6<L>  /  Alb  2.9<L>  /  TBili  0.5  /  DBili  x   /  AST  28  /  ALT  29  /  AlkPhos  162<H>  07-27    Magnesium, Serum: 1.70 mg/dL (07-27 @ 23:08)        Imaging:  < from: Xray Chest 1 View- PORTABLE-Urgent (07.27.21 @ 23:16) >    IMPRESSION:  Near-complete opacification of the right lung, likely due to known right pleural effusion and associated atelectasis/consolidation. Small rounded opacities in the left lower lung, similar appearing to 7/21/2021, likely secondary to consolidation.    < end of copied text >  < from: NM PET/CT Onc FDG Skull to Thigh, Inital (07.07.21 @ 16:06) >  IMPRESSION: Abnormal FDG-PET/CT scan.    1. FDG-avid bilateral lower lobe consolidations and bilateral upper lobe nodular opacities, not significantly changed as compared to CT chest dated 4/29/2021, are suspicious for malignancy.  2. Hypermetabolic bilateral supraclavicular, bilateral mediastinal, and perigastric lymphadenopathy, also unchanged as compared to recent CT, are suspicious for malignancy. Supraclavicular nodes are accessible to an ultrasound-guided percutaneous needle biopsy.  3. New large right pleural effusion.  4. Hepatomegaly without associated hypermetabolism.     Hematology/Oncology Consult Note    HPI:  89 year old woman with a pmh of COPD and HFrEF (EF 26%) [facility]. Pt with cough for several months. She was treated for possible COPD exacerbation with steroids with minimal improvement. She was admitted to [facility] 04/2021 and treated for PNA. Imaging there showed Bulky mediastinal lymphadenopathy, pulmonary was consulted and recommend bronch, however pt refused and asked to be discharged. Pt then had R robotic VATS/pleural bx with placement of pleurex catheter on 07/15/21 after PET from 07/07/21 reveal b/l upper lobe opacities concerning for malignancy with pleural effusion. She was discharged to [facility] rehabilitation where she continued to have fevers. Preliminary pathology revealed DLBCL, which is why hematology was consulted.       Allergies: No Known Allergies          MEDICATIONS  (STANDING):  albuterol/ipratropium for Nebulization 3 milliLiter(s) Nebulizer every 6 hours  aMIOdarone    Tablet 200 milliGRAM(s) Oral daily  enoxaparin Injectable 40 milliGRAM(s) SubCutaneous daily  piperacillin/tazobactam IVPB.. 3.375 Gram(s) IV Intermittent every 8 hours  potassium chloride  10 mEq/100 mL IVPB 10 milliEquivalent(s) IV Intermittent every 1 hour  vancomycin  IVPB 1000 milliGRAM(s) IV Intermittent daily    MEDICATIONS  (PRN):  acetaminophen   Tablet .. 650 milliGRAM(s) Oral every 6 hours PRN Temp greater or equal to 38.5C (101.3F), Mild Pain (1 - 3)  aluminum hydroxide/magnesium hydroxide/simethicone Suspension 30 milliLiter(s) Oral every 4 hours PRN Dyspepsia  melatonin 3 milliGRAM(s) Oral at bedtime PRN Insomnia  ondansetron Injectable 4 milliGRAM(s) IV Push every 8 hours PRN Nausea and/or Vomiting      PAST MEDICAL & SURGICAL HISTORY:  CHFrEF  Osteoarthritis  Abnormal finding of lung  COPD, mild  History of hip replacement  right      FAMILY HISTORY:  No pertinent family history in first degree relatives        SOCIAL HISTORY: No EtOH, no tobacco    REVIEW OF SYSTEMS:    CONSTITUTIONAL: No weakness, fevers or chills  EYES/ENT: No visual changes;  No vertigo or throat pain   NECK: No pain or stiffness  RESPIRATORY: + cough, with no wheezing or hemoptysis; No shortness of breath  CARDIOVASCULAR: No chest pain or palpitations  GASTROINTESTINAL: No abdominal or epigastric pain. No nausea, vomiting, or hematemesis; No diarrhea or constipation. No melena or hematochezia.  GENITOURINARY: No dysuria, frequency or hematuria  NEUROLOGICAL: No numbness or weakness  SKIN: No itching, burning, rashes, or lesions   All other review of systems is negative unless indicated above.    Height (cm): 153 (07-27 @ 21:30)    T(F): 98 (07-28-21 @ 09:26), Max: 103.2 (07-27-21 @ 23:04)  HR: 75 (07-28-21 @ 09:26) (69 - 126)  BP: 124/63 (07-28-21 @ 09:26)  RR: 20 (07-28-21 @ 09:26)  SpO2: 100% (07-28-21 @ 09:26) (97% - 100%)    Physical Exam  GENERAL: NAD, well-developed  HEAD:  Atraumatic, Normocephalic  EYES: EOMI, PERRLA, conjunctiva and sclera clear  NECK: Supple, No JVD  CHEST/LUNG: Clear to auscultation bilaterally; No wheeze  HEART: Regular rate and rhythm; No murmurs, rubs, or gallops  ABDOMEN: Soft, Nontender, Nondistended; Bowel sounds present  EXTREMITIES:  2+ Peripheral Pulses, No clubbing, cyanosis, or edema  NEUROLOGY: non-focal  SKIN: No rashes or lesions                          9.9    36.12 )-----------( 434      ( 27 Jul 2021 23:02 )             31.3       07-27    130<L>  |  91<L>  |  19  ----------------------------<  116<H>  3.2<L>   |  24  |  0.92    Ca    11.6<H>      27 Jul 2021 23:02  Mg     1.70     07-27    TPro  5.6<L>  /  Alb  2.9<L>  /  TBili  0.5  /  DBili  x   /  AST  28  /  ALT  29  /  AlkPhos  162<H>  07-27    Magnesium, Serum: 1.70 mg/dL (07-27 @ 23:08)        Imaging:  < from: Xray Chest 1 View- PORTABLE-Urgent (07.27.21 @ 23:16) >    IMPRESSION:  Near-complete opacification of the right lung, likely due to known right pleural effusion and associated atelectasis/consolidation. Small rounded opacities in the left lower lung, similar appearing to 7/21/2021, likely secondary to consolidation.    < end of copied text >  < from: NM PET/CT Onc FDG Skull to Thigh, Inital (07.07.21 @ 16:06) >  IMPRESSION: Abnormal FDG-PET/CT scan.    1. FDG-avid bilateral lower lobe consolidations and bilateral upper lobe nodular opacities, not significantly changed as compared to CT chest dated 4/29/2021, are suspicious for malignancy.  2. Hypermetabolic bilateral supraclavicular, bilateral mediastinal, and perigastric lymphadenopathy, also unchanged as compared to recent CT, are suspicious for malignancy. Supraclavicular nodes are accessible to an ultrasound-guided percutaneous needle biopsy.  3. New large right pleural effusion.  4. Hepatomegaly without associated hypermetabolism.     Hematology/Oncology Consult Note    HPI:  89 year old woman with a pmh of COPD and HFrEF (EF 26%) [facility]. Pt with cough for several months. She was treated for possible COPD exacerbation with steroids with minimal improvement. She was admitted to [facility] 04/2021 and treated for PNA. Imaging there showed Bulky mediastinal lymphadenopathy, pulmonary was consulted and recommend bronch, however pt refused and asked to be discharged. Pt then had R robotic VATS/pleural bx with placement of pleurex catheter on 07/15/21 after PET from 07/07/21 reveal b/l upper lobe opacities concerning for malignancy with pleural effusion. She was discharged to [facility] rehabilitation where she continued to have fevers. 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 Hematology consulted for possible malignancy.      Allergies: No Known Allergies          MEDICATIONS  (STANDING):  albuterol/ipratropium for Nebulization 3 milliLiter(s) Nebulizer every 6 hours  aMIOdarone    Tablet 200 milliGRAM(s) Oral daily  enoxaparin Injectable 40 milliGRAM(s) SubCutaneous daily  piperacillin/tazobactam IVPB.. 3.375 Gram(s) IV Intermittent every 8 hours  potassium chloride  10 mEq/100 mL IVPB 10 milliEquivalent(s) IV Intermittent every 1 hour  vancomycin  IVPB 1000 milliGRAM(s) IV Intermittent daily    MEDICATIONS  (PRN):  acetaminophen   Tablet .. 650 milliGRAM(s) Oral every 6 hours PRN Temp greater or equal to 38.5C (101.3F), Mild Pain (1 - 3)  aluminum hydroxide/magnesium hydroxide/simethicone Suspension 30 milliLiter(s) Oral every 4 hours PRN Dyspepsia  melatonin 3 milliGRAM(s) Oral at bedtime PRN Insomnia  ondansetron Injectable 4 milliGRAM(s) IV Push every 8 hours PRN Nausea and/or Vomiting      PAST MEDICAL & SURGICAL HISTORY:  CHFrEF  Osteoarthritis  Abnormal finding of lung  COPD, mild  History of hip replacement  right      FAMILY HISTORY:  No pertinent family history in first degree relatives        SOCIAL HISTORY: No EtOH, no tobacco    REVIEW OF SYSTEMS:    CONSTITUTIONAL: No weakness, fevers or chills  EYES/ENT: No visual changes;  No vertigo or throat pain   NECK: No pain or stiffness  RESPIRATORY: + cough, with no wheezing or hemoptysis; No shortness of breath  CARDIOVASCULAR: No chest pain or palpitations  GASTROINTESTINAL: No abdominal or epigastric pain. No nausea, vomiting, or hematemesis; No diarrhea or constipation. No melena or hematochezia.  GENITOURINARY: No dysuria, frequency or hematuria  NEUROLOGICAL: No numbness or weakness  SKIN: No itching, burning, rashes, or lesions   All other review of systems is negative unless indicated above.    Height (cm): 153 (07-27 @ 21:30)    T(F): 98 (07-28-21 @ 09:26), Max: 103.2 (07-27-21 @ 23:04)  HR: 75 (07-28-21 @ 09:26) (69 - 126)  BP: 124/63 (07-28-21 @ 09:26)  RR: 20 (07-28-21 @ 09:26)  SpO2: 100% (07-28-21 @ 09:26) (97% - 100%)    Physical Exam  GENERAL: NAD, well-developed  HEAD:  Atraumatic, Normocephalic  EYES: EOMI, PERRLA, conjunctiva and sclera clear  NECK: Supple, No JVD  CHEST/LUNG: Rales b/l  HEART: Regular rate and rhythm; No murmurs, rubs, or gallops  ABDOMEN: Soft, Nontender, Nondistended; Bowel sounds present  EXTREMITIES:  2+ Peripheral Pulses, No clubbing, cyanosis, or edema. Lymph node detected L axillary. Several palpable small lymph nodes detected inguinal L sided.  NEUROLOGY: non-focal  SKIN: No rashes or lesions                          9.9    36.12 )-----------( 434      ( 27 Jul 2021 23:02 )             31.3       07-27    130<L>  |  91<L>  |  19  ----------------------------<  116<H>  3.2<L>   |  24  |  0.92    Ca    11.6<H>      27 Jul 2021 23:02  Mg     1.70     07-27    TPro  5.6<L>  /  Alb  2.9<L>  /  TBili  0.5  /  DBili  x   /  AST  28  /  ALT  29  /  AlkPhos  162<H>  07-27    Magnesium, Serum: 1.70 mg/dL (07-27 @ 23:08)        Imaging:  < from: Xray Chest 1 View- PORTABLE-Urgent (07.27.21 @ 23:16) >    IMPRESSION:  Near-complete opacification of the right lung, likely due to known right pleural effusion and associated atelectasis/consolidation. Small rounded opacities in the left lower lung, similar appearing to 7/21/2021, likely secondary to consolidation.    < end of copied text >  < from: NM PET/CT Onc FDG Skull to Thigh, Inital (07.07.21 @ 16:06) >  IMPRESSION: Abnormal FDG-PET/CT scan.    1. FDG-avid bilateral lower lobe consolidations and bilateral upper lobe nodular opacities, not significantly changed as compared to CT chest dated 4/29/2021, are suspicious for malignancy.  2. Hypermetabolic bilateral supraclavicular, bilateral mediastinal, and perigastric lymphadenopathy, also unchanged as compared to recent CT, are suspicious for malignancy. Supraclavicular nodes are accessible to an ultrasound-guided percutaneous needle biopsy.  3. New large right pleural effusion.  4. Hepatomegaly without associated hypermetabolism.

## 2021-07-28 NOTE — PROGRESS NOTE ADULT - PROBLEM SELECTOR PLAN 2
Recent discharge from hospital 7/21/21  - concern for pna given oxygen requirements and imaging so far  - would continue with miller/zosyn  - follow up with CTS  - check CT chest

## 2021-07-28 NOTE — CONSULT NOTE ADULT - ATTENDING COMMENTS
89 y.o w multiple LAD (many hypermetabolic areas on PET). Biopsy is pending, but suspect underlying lymphoma, awaiting final pathology. She is febrile, could be B sx from underlying lymphoma vs infection--need to rule out infection given recent surgery and presence of bilateral upper lobe opacities. if not done recently, would recommend an echo. will f/u with you. 89 y.o w multiple LAD (many hypermetabolic areas on PET). Biopsy is pending, but suspect underlying lymphoma, awaiting final pathology. She is febrile, could be B sx from underlying lymphoma vs infection--need to rule out infection given recent VATS and presence of bilateral upper lobe opacities. will f/u with you.

## 2021-07-28 NOTE — PHYSICAL THERAPY INITIAL EVALUATION ADULT - PERTINENT HX OF CURRENT PROBLEM, REHAB EVAL
89 yr old female with a pmh of COPD, HFrEF (EF 26%), s/p right robot VATS/pleural biopsy with placement of pleurex catheter 7/14/21 prior to discharged was treated with a brief course of zosyn and discharged to rehab 7/21/21. In rehab she developed a fever.

## 2021-07-28 NOTE — H&P ADULT - NSHPPHYSICALEXAM_GEN_ALL_CORE
PHYSICAL EXAM:  GENERAL: NAD, thin and frail female   HEAD:  Atraumatic, Normocephalic  EYES: EOMI, PERRL, conjunctiva and sclera clear  NECK: Supple, No JVD  CHEST/LUNG: no air entry on the right; No wheezes, rales or rhonchi  HEART: Regular rate and rhythm; No murmurs, rubs, or gallops, (+)S1, S2  ABDOMEN: Soft, Nontender, Nondistended; Normal Bowel sounds   EXTREMITIES:  2+ Peripheral Pulses, No clubbing, cyanosis, or edema  PSYCH: normal mood and affect  NEUROLOGY: non-focal, moves all limbs   SKIN: slight erythema around pleurx

## 2021-07-28 NOTE — H&P ADULT - PROBLEM SELECTOR PLAN 4
new severe exacerbation   Na 130, K 3.2   received 1L IVF in ED and 30mEq of KCL IV  Check Mg  Likely secondary to dehydration  Monitor - be cautious with fluids as patient has an EF 26%

## 2021-07-28 NOTE — H&P ADULT - NSHPREVIEWOFSYSTEMS_GEN_ALL_CORE
REVIEW OF SYSTEMS:    CONSTITUTIONAL: No weakness, + fevers no chills  EYES/ENT: No visual changes;  No dysphagia; No sore throat; No rhinorrhea; No sinus pain/pressure  NECK: No pain or stiffness  RESPIRATORY: + cough, no wheezing, hemoptysis; + shortness of breath  CARDIOVASCULAR: No chest pain or palpitations; No lower extremity edema  GASTROINTESTINAL: No abdominal or epigastric pain. No nausea, vomiting, or hematemesis; No diarrhea or constipation. No melena or hematochezia.  GENITOURINARY: No dysuria, frequency or hematuria  NEUROLOGICAL: No numbness or weakness  MSK: ambulates with assistance   SKIN: No itching, burning, rashes, or lesions   All other review of systems is negative unless indicated above.

## 2021-07-28 NOTE — CONSULT NOTE ADULT - SUBJECTIVE AND OBJECTIVE BOX
CHIEF COMPLAINT:Patient is a 89y old  Female who presents with a chief complaint of SOB (28 Jul 2021 03:52)      HISTORY OF PRESENT ILLNESS:    89 female with history as below , copd, s/p right VATS and pl. catheter placement with post op afib controlled with amio discharged to rehab now returns with sob and right sided pleural effusion   no cp   no dizziness  pos fatigue   pos sob     PAST MEDICAL & SURGICAL HISTORY:  Cough    Osteoarthritis    Abnormal finding of lung    COPD, mild    History of hip replacement  right            MEDICATIONS:  enoxaparin Injectable 40 milliGRAM(s) SubCutaneous daily    piperacillin/tazobactam IVPB.. 3.375 Gram(s) IV Intermittent every 8 hours  vancomycin  IVPB 1000 milliGRAM(s) IV Intermittent daily    albuterol/ipratropium for Nebulization 3 milliLiter(s) Nebulizer every 6 hours    acetaminophen   Tablet .. 650 milliGRAM(s) Oral every 6 hours PRN  melatonin 3 milliGRAM(s) Oral at bedtime PRN  ondansetron Injectable 4 milliGRAM(s) IV Push every 8 hours PRN    aluminum hydroxide/magnesium hydroxide/simethicone Suspension 30 milliLiter(s) Oral every 4 hours PRN      potassium chloride  10 mEq/100 mL IVPB 10 milliEquivalent(s) IV Intermittent every 1 hour      FAMILY HISTORY:  No pertinent family history in first degree relatives        Non-contributory    SOCIAL HISTORY:    No tobacco, drugs or etoh    Allergies    No Known Allergies    Intolerances    	    REVIEW OF SYSTEMS:  as above  The rest of the 14 points ROS reviewed and except above they are unremarkable.        PHYSICAL EXAM:  T(C): 36.6 (07-28-21 @ 03:10), Max: 39.6 (07-27-21 @ 23:04)  HR: 76 (07-28-21 @ 03:10) (76 - 126)  BP: 111/56 (07-28-21 @ 03:10) (107/54 - 122/49)  RR: 20 (07-28-21 @ 03:10) (20 - 30)  SpO2: 99% (07-28-21 @ 03:10) (97% - 99%)  Wt(kg): --  I&O's Summary    JVP: elevated   Neck: supple  Lung: right dec bs   CV: S1 S2 , Murmur:  Abd: soft  Ext: No edema  neuro: Awake / alert  Psych: flat affect  Skin: normal      LABS/DATA:    TELEMETRY: 	  sinus   ECG:  	   	  CARDIAC MARKERS:                                      9.9    36.12 )-----------( 434      ( 27 Jul 2021 23:02 )             31.3     07-27    130<L>  |  91<L>  |  19  ----------------------------<  116<H>  3.2<L>   |  24  |  0.92    Ca    11.6<H>      27 Jul 2021 23:02  Mg     1.70     07-27    TPro  5.6<L>  /  Alb  2.9<L>  /  TBili  0.5  /  DBili  x   /  AST  28  /  ALT  29  /  AlkPhos  162<H>  07-27    proBNP:   Lipid Profile:   HgA1c:   TSH:

## 2021-07-28 NOTE — CONSULT NOTE ADULT - SUBJECTIVE AND OBJECTIVE BOX
Patient is a 89y old  Female who presents with a chief complaint of SOB (2021 13:53)      HPI:  89 yr old female with a pmh of COPD, HFrEF (EF 26%), s/p right robot VATS/pleural biopsy with placement of pleurex catheter 21 prior to discharged was treated with a brief course of zosyn and discharged to rehab 21. In rehab she developed a fever. 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  CT surgery saw the patient in the ED and tried repositioning the Pleurx     Vitals in the .2, , /49, RR 29 saturating 98% on 3L.       (2021 03:52)      PAST MEDICAL & SURGICAL HISTORY:  Cough    Osteoarthritis    Abnormal finding of lung    COPD, mild    History of hip replacement  right        MEDICATIONS  (STANDING):  albuterol/ipratropium for Nebulization 3 milliLiter(s) Nebulizer every 6 hours  aMIOdarone    Tablet 200 milliGRAM(s) Oral daily  enoxaparin Injectable 40 milliGRAM(s) SubCutaneous daily  piperacillin/tazobactam IVPB.. 3.375 Gram(s) IV Intermittent every 8 hours  potassium chloride  10 mEq/100 mL IVPB 10 milliEquivalent(s) IV Intermittent every 1 hour  sodium chloride 0.9%. 1000 milliLiter(s) (75 mL/Hr) IV Continuous <Continuous>  vancomycin  IVPB 1000 milliGRAM(s) IV Intermittent daily    MEDICATIONS  (PRN):  acetaminophen   Tablet .. 650 milliGRAM(s) Oral every 6 hours PRN Temp greater or equal to 38.5C (101.3F), Mild Pain (1 - 3)  aluminum hydroxide/magnesium hydroxide/simethicone Suspension 30 milliLiter(s) Oral every 4 hours PRN Dyspepsia  melatonin 3 milliGRAM(s) Oral at bedtime PRN Insomnia  ondansetron Injectable 4 milliGRAM(s) IV Push every 8 hours PRN Nausea and/or Vomiting      Allergies    No Known Allergies    Intolerances        FAMILY HISTORY:  No pertinent family history in first degree relatives      Vital Signs Last 24 Hrs  T(C): 36.9 (2021 13:41), Max: 39.6 (2021 23:04)  T(F): 98.5 (2021 13:41), Max: 103.2 (2021 23:04)  HR: 74 (2021 13:41) (69 - 126)  BP: 100/45 (2021 13:41) (92/60 - 124/63)  BP(mean): 68 (2021 23:45) (68 - 71)  RR: 19 (2021 13:41) (18 - 30)  SpO2: 100% (2021 13:41) (97% - 100%)    EOE:  TMJ (-) clicks                    (-) pops                    (-) crepitus             Mandible FROM             Facial bones and MOM grossly intact             (-) trismus             (-) LAD             (-) swelling             (-) asymmetry             (-) palpation             (-) SOB             (-) dysphagia             (-) LOC    IOE:  permanent dentition: grossly intact           hard/soft palate:  (-) palatal torus           tongue/FOM WNL           labial/buccal mucosa WNL           (-) percussion           (-) palpation           (-) swelling     Generalized plaque accumulation observed. Severe lower anterior crowding present. Generalized recession of approximately 2-3 mm and grade 0-1 mobility recorded on dentition. Hairy tongue noted. No purulence or fluctuance detected on bedside exam.      LABS:                        8.4    34.64 )-----------( 329      ( 2021 10:52 )             27.0         132<L>  |  96<L>  |  17  ----------------------------<  99  3.4<L>   |  24  |  0.87    Ca    10.7<H>      2021 10:52  Phos  2.7       Mg     1.70         TPro  4.9<L>  /  Alb  2.5<L>  /  TBili  0.4  /  DBili  x   /  AST  19  /  ALT  24  /  AlkPhos  136<H>      WBC Count: 34.64 K/uL *H* [3.80 - 10.50] ( @ 10:52)  Platelet Count - Automated: 329 K/uL [150 - 400] ( @ 10:52)  Platelet Count - Automated: 434 K/uL *H* [150 - 400] ( @ 23:02)  WBC Count: 36.12 K/uL *H* [3.80 - 10.50] ( @ 23:02)    Urinalysis Basic - ( 2021 23:28 )    Color: Yellow / Appearance: Turbid / S.020 / pH: x  Gluc: x / Ketone: Negative  / Bili: Negative / Urobili: <2 mg/dL   Blood: x / Protein: 30 mg/dL / Nitrite: Negative   Leuk Esterase: Large / RBC: 0-2 /HPF / WBC >50 /HPF   Sq Epi: x / Non Sq Epi: Few / Bacteria: Many    *DENTAL RADIOGRAPHS: None obtained    ASSESSMENT: No evidence of odontogenic infection on clinical exam. No swelling, purulence, fluctuance, gross decay noted. Patient denies pain or sensitivity. Since patient unable to be transported in wheelchair, next available dental appointment for radiographic clearance would be this , at 3 PM. Requested that nurse presiding over patient's care consult with patient's attending to determine if Zometa treatment is urgently indicated given that odontogenic infection is unlikely based upon clinical exam. Attending communicated to nurse that given patient's current calcium level, they feel comfortable with waiting until Friday to fully rule out odontogenic infection prior to bisphosphonate treatment. Scheduled patient accordingly in [facility].     RECOMMENDATIONS:  1) Full intraoral series of radiographs to rule out odontogenic infection prior to bisphosphonate treatment. Patient currently scheduled for , at 3 PM in [facility]. Patient to be transported via stretcher.   2) Dental F/U with outpatient dentist for comprehensive dental care.   3) If any difficulty swallowing/breathing, fever occur, page dental.     Roman Tello, DMD #[id number]  Jose R Jha DDS #[id number]

## 2021-07-28 NOTE — ED ADULT NURSE REASSESSMENT NOTE - NS ED NURSE REASSESS COMMENT FT1
Patient sleeping comfortably. Respiration even and unlabored. Cardiac monitor in progress with NSR. Fever resolved. Pending bed placement. Will monitor

## 2021-07-28 NOTE — H&P ADULT - HISTORY OF PRESENT ILLNESS
89 yr old female with a pmh of COPD, HFrEF (EF 26%), s/p right robot VATS/pleural biopsy with placement of pleurex catheter 7/14/21 prior to discharged was treated with a brief course of zosyn and discharged to rehab 7/21/21. In rehab she developed a fever. 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  CT surgery saw the patient in the ED and tried repositioning the Pleurx     Vitals in the .2, , /49, RR 29 saturating 98% on 3L.

## 2021-07-28 NOTE — CONSULT NOTE ADULT - SUBJECTIVE AND OBJECTIVE BOX
89 yr old female non smoker complain of chronic productive  cough for at least 6 months, pt had Bronchoscopy in [place]  and then was hospitalized 21 for several days with increased inflammation RLL - Pt also c/o of mild SOB - hx anxiety  Pt had COVID vaccine  and 3/21. She underwent a robotic R VATS, drainage of pleural effusion (1.5L), pleural bx, and placement of a PleurX cathter on 21.  Rt. Pleurx capped. Pt with chronic leukocytosis due to lymphoma history.  Pt given brief course of Zosyn but then d/c'd.  Patient was discharged to rehab on 21.  She was sent from rehab tonight with fever.  Patient had temp of 103.2 in ER, given Vanco and Zosyn.  Pleurx site with mild redness, no drainage around site noted.  Drained 15cc of straw colored drainage from pleurx cath.    HPI:      PAST MEDICAL & SURGICAL HISTORY:  Cough    Osteoarthritis    Abnormal finding of lung    COPD, mild    History of hip replacement  right        REVIEW OF SYSTEMS      General:No Weight change/ Fatigue/ HA/Dizzy	    Skin/Breast: No Rashes/ Lesions/ Masses  	  Ophthalmologic: No Blurry vision/ Glaucoma/ Blindness  	  ENMT: No Hearing loss/ Drainage/ Lesions	    Respiratory and Thorax: +Cpugh/  SOB/  Sputum production  	  Cardiovascular: No Chest pain/ Palpitations/ Diaphoresis	    Gastrointestinal: No Nausea/ Vomiting/ Constipation/ Appetite Change	    Genitourinary: No Heamturia/ Dysuria/ Frequency change/ Impotence	    Musculoskeletal: No Pain/ Weakness/ Claudication	    Neurological: No Seizures/ TIA/CVA/ Parastesias	    Psychiatric: No Dementia/ Depression/ SI/HI	    Hematology/Lymphatics: No hx of bleeding/ Edema	    Endocrine:	No Hyperglycemia/ Hypoglycemia    Allergic/Immunologic:	 No Anaphylaxis/ Intolerance/ Recent illnesses    MEDICATIONS  (STANDING):  piperacillin/tazobactam IVPB.. 3.375 Gram(s) IV Intermittent once    MEDICATIONS  (PRN):      Allergies    No Known Allergies    Intolerances        SOCIAL HISTORY:    FAMILY HISTORY:  No pertinent family history in first degree relatives        Vital Signs Last 24 Hrs  T(C): 39.6 (2021 23:04), Max: 39.6 (2021 23:04)  T(F): 103.2 (2021 23:04), Max: 103.2 (2021 23:04)  HR: 86 (2021 23:45) (80 - 126)  BP: 113/50 (2021 23:45) (107/54 - 122/49)  BP(mean): 68 (2021 23:45) (68 - 71)  RR: 30 (2021 23:45) (24 - 30)  SpO2: 99% (2021 23:45) (97% - 99%)    General: WN/WD NAD  Neurology: Awake, nonfocal, HILL x 4  Eyes: Scleras clear, PERRLA/ EOMI, Gross vision intact  ENT:Gross hearing intact, grossly patent pharynx, no stridor  Neck: Neck supple, trachea midline, No JVD,   Respiratory: decreased right base, No wheezing, rales, rhonchi  CV: RRR, S1S2, no murmurs, rubs or gallops  Abdominal: Soft, NT, ND +BS,   Extremities: No edema, + peripheral pulses  Skin: No Rashes, Hematoma, Ecchymosis  Psych: Oriented, normal affect  Incisions: pleurx site with mild redness, no drainage noted   Tubes: Drained 15cc of straw colored fluid from pleurx cath     LABS:                        9.9    36.12 )-----------( 434      ( 2021 23:02 )             31.3     07-27    130<L>  |  91<L>  |  19  ----------------------------<  116<H>  3.2<L>   |  24  |  0.92    Ca    11.6<H>      2021 23:02    TPro  5.6<L>  /  Alb  2.9<L>  /  TBili  0.5  /  DBili  x   /  AST  28  /  ALT  29  /  AlkPhos  162<H>  07-27      Urinalysis Basic - ( 2021 23:28 )    Color: Yellow / Appearance: Turbid / S.020 / pH: x  Gluc: x / Ketone: Negative  / Bili: Negative / Urobili: <2 mg/dL   Blood: x / Protein: 30 mg/dL / Nitrite: Negative   Leuk Esterase: Large / RBC: 0-2 /HPF / WBC >50 /HPF   Sq Epi: x / Non Sq Epi: Few / Bacteria: Many        RADIOLOGY & ADDITIONAL STUDIES:    ASSESSMENT:   89yFemalePAST MEDICAL & SURGICAL HISTORY:  Cough    Osteoarthritis    Abnormal finding of lung    COPD, mild    History of hip replacement  right    HEALTH ISSUES - PROBLEM Dx:  Fever   UTI     HEALTH ISSUES - R/O PROBLEM Dx:  Fever   UTI   PLAN:  CT Chest   IV antibiotics   Medical management   Above discussed with Dr. Quiñonez

## 2021-07-28 NOTE — PHYSICAL THERAPY INITIAL EVALUATION ADULT - ADDITIONAL COMMENTS
Patient presents from rehab on this admission. Patient states she was not using assistive device prior to admission. Patient was getting assist with ADL while admitted to rehab.

## 2021-07-28 NOTE — PROGRESS NOTE ADULT - PROBLEM SELECTOR PLAN 4
- slight improvement in electrolytes  - would aggressively replete but also encourage po intake   Likely secondary to dehydration    hypercalcemia could be due to malignancy - corrected calcium is 11.9 - would do IVF and consider zometa after dental clearance    monitor fluid status closely

## 2021-07-28 NOTE — H&P ADULT - NSHPLABSRESULTS_GEN_ALL_CORE
9.9    36.12 )-----------( 434      ( 2021 23:02 )             31.3           130<L>  |  91<L>  |  19  ----------------------------<  116<H>  3.2<L>   |  24  |  0.92    Ca    11.6<H>      2021 23:02    TPro  5.6<L>  /  Alb  2.9<L>  /  TBili  0.5  /  DBili  x   /  AST  28  /  ALT  29  /  AlkPhos  162<H>      23:02 - VBG - pH: 7.44  | pCO2: 44    | pO2: 25    | Lactate: 1.5        Urinalysis Basic - ( 2021 23:28 )    Color: Yellow / Appearance: Turbid / S.020 / pH: x  Gluc: x / Ketone: Negative  / Bili: Negative / Urobili: <2 mg/dL   Blood: x / Protein: 30 mg/dL / Nitrite: Negative   Leuk Esterase: Large / RBC: 0-2 /HPF / WBC >50 /HPF   Sq Epi: x / Non Sq Epi: Few / Bacteria: Many      EKG reviewed by myself: NSR  CXR reviewed by myself: white out of majority of the right lung

## 2021-07-28 NOTE — H&P ADULT - PROBLEM SELECTOR PLAN 2
New severe exacerbation  Recent discharge from hospital 7/21/21  Vitals in the .2, , /49, RR 29 saturating 98% on 3L.   WBC 36.12 (more elevated than usual), CXR with whiteout of right lung  In the ED received Zosyn x2, vanc x1 and 1L IVF   Continue vanc and zosyn, MRSA swab

## 2021-07-28 NOTE — PROGRESS NOTE ADULT - SUBJECTIVE AND OBJECTIVE BOX
[facility] Division of Hospital Medicine  Najma Pollock MD  Pager (M-F, 8A-5P): [phone]  Other Times:      Patient is a 89y old  Female who presents with a chief complaint of SOB (2021 10:43)    SUBJECTIVE / OVERNIGHT EVENTS:  Pt continues to feel sob     MEDICATIONS  (STANDING):  albuterol/ipratropium for Nebulization 3 milliLiter(s) Nebulizer every 6 hours  aMIOdarone    Tablet 200 milliGRAM(s) Oral daily  enoxaparin Injectable 40 milliGRAM(s) SubCutaneous daily  piperacillin/tazobactam IVPB.. 3.375 Gram(s) IV Intermittent every 8 hours  potassium chloride  10 mEq/100 mL IVPB 10 milliEquivalent(s) IV Intermittent every 1 hour  vancomycin  IVPB 1000 milliGRAM(s) IV Intermittent daily    MEDICATIONS  (PRN):  acetaminophen   Tablet .. 650 milliGRAM(s) Oral every 6 hours PRN Temp greater or equal to 38.5C (101.3F), Mild Pain (1 - 3)  aluminum hydroxide/magnesium hydroxide/simethicone Suspension 30 milliLiter(s) Oral every 4 hours PRN Dyspepsia  melatonin 3 milliGRAM(s) Oral at bedtime PRN Insomnia  ondansetron Injectable 4 milliGRAM(s) IV Push every 8 hours PRN Nausea and/or Vomiting      CAPILLARY BLOOD GLUCOSE    I&O's Summary      PHYSICAL EXAM:  Vital Signs Last 24 Hrs  T(C): 36.9 (2021 13:41), Max: 39.6 (2021 23:04)  T(F): 98.5 (2021 13:41), Max: 103.2 (2021 23:04)  HR: 74 (2021 13:41) (69 - 126)  BP: 100/45 (2021 13:41) (92/60 - 124/63)  BP(mean): 68 (2021 23:45) (68 - 71)  RR: 19 (2021 13:41) (18 - 30)  SpO2: 100% (2021 13:41) (97% - 100%)    CONSTITUTIONAL: NAD, well-developed, well-groomed  EYES: EOMI; conjunctiva and sclera clear  ENMT: Moist oral mucosa  RESPIRATORY: Normal respiratory effort; diminished BS on R side   CARDIOVASCULAR: Regular rate and rhythm, normal S1 and S2, no murmur; No lower extremity edema  ABDOMEN: Nontender to palpation, normoactive bowel sounds, no rebound/guarding  MUSCULOSKELETAL:   no clubbing or cyanosis of digits; no joint swelling or tenderness to palpation  PSYCH: A+O to person, place, and time; affect appropriate  NEUROLOGY: CN 2-12 are intact and symmetric; no gross sensory deficits   SKIN: No rashes; no palpable lesions    LABS:                        8.4    34.64 )-----------( 329      ( 2021 10:52 )             27.0     07-28    132<L>  |  96<L>  |  17  ----------------------------<  99  3.4<L>   |  24  |  0.87    Ca    10.7<H>      2021 10:52  Phos  2.7       Mg     1.70         TPro  4.9<L>  /  Alb  2.5<L>  /  TBili  0.4  /  DBili  x   /  AST  19  /  ALT  24  /  AlkPhos  136<H>      Urinalysis Basic - ( 2021 23:28 )    Color: Yellow / Appearance: Turbid / S.020 / pH: x  Gluc: x / Ketone: Negative  / Bili: Negative / Urobili: <2 mg/dL   Blood: x / Protein: 30 mg/dL / Nitrite: Negative   Leuk Esterase: Large / RBC: 0-2 /HPF / WBC >50 /HPF   Sq Epi: x / Non Sq Epi: Few / Bacteria: Many    RADIOLOGY & ADDITIONAL TESTS:  Results Reviewed:   Imaging Personally Reviewed:  Electrocardiogram Personally Reviewed:    COORDINATION OF CARE:  Care Discussed with Consultants/Other Providers [Y/N]: Y  Prior or Outpatient Records Reviewed [Y/N]: Y

## 2021-07-28 NOTE — CONSULT NOTE ADULT - ASSESSMENT
89 year old woman with a pmh of COPD and HFrEF (EF 26%) that with chronic cough, not improved with steroids nor Abx. Now s/p R robotic VATS/pleural bx with placement of pleurex catheter on 07/15/21 after PET from 07/07/21 reveal b/l upper lobe opacities concerning for malignancy with pleural effusion. Hematology consulted for possible DLBCL as per prilimary pathology    #Mediastinal lymphadenopathy  -Preliminary pathology report reveals DLBCL, will speak to pathology to expedite offical report    #Leukocytosis  -Appears to be mainly neutrophils.  -Febrile to 103 in ED  -Would rule out infection at this point given hx of pleurex and recent procedure  -f/u Bcx/Ucx  -Will consider sending Flow    Please page with questions or concerns. Will Follow with you.      Nas Villanueva M.D.  Hematology/Oncology Fellow PGY4  Pager [phone]  After 5pm, please contact on-call team.   89 year old woman with a pmh of COPD and HFrEF (EF 26%) that with chronic cough, not improved with steroids nor Abx. Now s/p R robotic VATS/pleural bx with placement of pleurex catheter on 07/15/21 after PET from 07/07/21 reveal b/l upper lobe opacities concerning for malignancy with pleural effusion. Hematology consulted for possible DLBCL as per prilimary pathology    #Mediastinal lymphadenopathy  -S/p Level 4 LN Bx  -Preliminary pathology report reveals DLBCL however in a background of T cells, pathology pending stains, may be available by Thursday night.    #Leukocytosis  -Appears to be mainly neutrophils.  -Febrile to 103 in ED  -Would rule out infection at this point given hx of pleurex and recent procedure  -f/u Bcx/Ucx  -Recommend Flow for analysis if infectious etiology inconclusive, pending BCx results.    #Hypercalcemia  -Corrected Calcium 11.4  -C/w maintenance IVF @ 75cc/hr given HF hx  -Recommend Nephrology consult    Please page with questions or concerns. Will Follow with you.      Nas Villanueva M.D.  Hematology/Oncology Fellow PGY4  Pager [phone]  After 5pm, please contact on-call team.

## 2021-07-28 NOTE — PROGRESS NOTE ADULT - PROBLEM SELECTOR PLAN 1
pt meets criteria for sepsis  Bcx and Ucx pending   In the ED received Zosyn x2 and Vanc; continue for now; check vanco trough  CT chest pending  - follow up with CTS

## 2021-07-28 NOTE — CONSULT NOTE ADULT - ATTENDING COMMENTS
Patient seen and examined agree with above note as modified, where appropriate, by me. s/p right pleurX with minimal output. CT chest shows minimal Fluid. No surgical intervention at this time. would continue drainage 1 time per week.

## 2021-07-29 DIAGNOSIS — T45.2X1A POISONING BY VITAMINS, ACCIDENTAL (UNINTENTIONAL), INITIAL ENCOUNTER: ICD-10-CM

## 2021-07-29 DIAGNOSIS — C83.34 DIFFUSE LARGE B-CELL LYMPHOMA, LYMPH NODES OF AXILLA AND UPPER LIMB: ICD-10-CM

## 2021-07-29 DIAGNOSIS — R53.81 OTHER MALAISE: ICD-10-CM

## 2021-07-29 DIAGNOSIS — F41.9 ANXIETY DISORDER, UNSPECIFIED: ICD-10-CM

## 2021-07-29 DIAGNOSIS — E87.1 HYPO-OSMOLALITY AND HYPONATREMIA: ICD-10-CM

## 2021-07-29 DIAGNOSIS — E83.52 HYPERCALCEMIA: ICD-10-CM

## 2021-07-29 DIAGNOSIS — Z51.5 ENCOUNTER FOR PALLIATIVE CARE: ICD-10-CM

## 2021-07-29 DIAGNOSIS — E07.9 DISORDER OF THYROID, UNSPECIFIED: ICD-10-CM

## 2021-07-29 LAB
ALBUMIN SERPL ELPH-MCNC: 2.3 G/DL — LOW (ref 3.3–5)
ALP SERPL-CCNC: 181 U/L — HIGH (ref 40–120)
ALT FLD-CCNC: 26 U/L — SIGNIFICANT CHANGE UP (ref 4–33)
ANION GAP SERPL CALC-SCNC: 14 MMOL/L — SIGNIFICANT CHANGE UP (ref 7–14)
AST SERPL-CCNC: 32 U/L — SIGNIFICANT CHANGE UP (ref 4–32)
BILIRUB SERPL-MCNC: 0.4 MG/DL — SIGNIFICANT CHANGE UP (ref 0.2–1.2)
BUN SERPL-MCNC: 13 MG/DL — SIGNIFICANT CHANGE UP (ref 7–23)
CALCIUM SERPL-MCNC: 10.5 MG/DL — SIGNIFICANT CHANGE UP (ref 8.4–10.5)
CHLORIDE SERPL-SCNC: 98 MMOL/L — SIGNIFICANT CHANGE UP (ref 98–107)
CO2 SERPL-SCNC: 20 MMOL/L — LOW (ref 22–31)
COVID-19 SPIKE DOMAIN AB INTERP: POSITIVE
COVID-19 SPIKE DOMAIN ANTIBODY RESULT: 12.5 U/ML — HIGH
CREAT SERPL-MCNC: 0.78 MG/DL — SIGNIFICANT CHANGE UP (ref 0.5–1.3)
CULTURE RESULTS: SIGNIFICANT CHANGE UP
DNA PLOIDY SPEC FC-IMP: SIGNIFICANT CHANGE UP
DNA PLOIDY SPEC FC-IMP: SIGNIFICANT CHANGE UP
GLUCOSE SERPL-MCNC: 81 MG/DL — SIGNIFICANT CHANGE UP (ref 70–99)
HCT VFR BLD CALC: 27.9 % — LOW (ref 34.5–45)
HGB BLD-MCNC: 8.6 G/DL — LOW (ref 11.5–15.5)
LDH SERPL L TO P-CCNC: 262 U/L — HIGH (ref 135–225)
MAGNESIUM SERPL-MCNC: 1.6 MG/DL — SIGNIFICANT CHANGE UP (ref 1.6–2.6)
MCHC RBC-ENTMCNC: 24.6 PG — LOW (ref 27–34)
MCHC RBC-ENTMCNC: 30.8 GM/DL — LOW (ref 32–36)
MCV RBC AUTO: 79.7 FL — LOW (ref 80–100)
NRBC # BLD: 0 /100 WBCS — SIGNIFICANT CHANGE UP
NRBC # FLD: 0 K/UL — SIGNIFICANT CHANGE UP
PHOSPHATE SERPL-MCNC: 2.7 MG/DL — SIGNIFICANT CHANGE UP (ref 2.5–4.5)
PLATELET # BLD AUTO: 391 K/UL — SIGNIFICANT CHANGE UP (ref 150–400)
POTASSIUM SERPL-MCNC: 3.3 MMOL/L — LOW (ref 3.5–5.3)
POTASSIUM SERPL-SCNC: 3.3 MMOL/L — LOW (ref 3.5–5.3)
PROT SERPL-MCNC: 5.1 G/DL — LOW (ref 6–8.3)
RBC # BLD: 3.5 M/UL — LOW (ref 3.8–5.2)
RBC # FLD: 17.9 % — HIGH (ref 10.3–14.5)
SARS-COV-2 IGG+IGM SERPL QL IA: 12.5 U/ML — HIGH
SARS-COV-2 IGG+IGM SERPL QL IA: POSITIVE
SODIUM SERPL-SCNC: 132 MMOL/L — LOW (ref 135–145)
SPECIMEN SOURCE: SIGNIFICANT CHANGE UP
SURGICAL PATHOLOGY STUDY: SIGNIFICANT CHANGE UP
URATE SERPL-MCNC: 4 MG/DL — SIGNIFICANT CHANGE UP (ref 2.5–7)
WBC # BLD: 28.59 K/UL — HIGH (ref 3.8–10.5)
WBC # FLD AUTO: 28.59 K/UL — HIGH (ref 3.8–10.5)

## 2021-07-29 PROCEDURE — 99222 1ST HOSP IP/OBS MODERATE 55: CPT | Mod: GC

## 2021-07-29 PROCEDURE — 99233 SBSQ HOSP IP/OBS HIGH 50: CPT | Mod: GC

## 2021-07-29 PROCEDURE — 88189 FLOWCYTOMETRY/READ 16 & >: CPT

## 2021-07-29 PROCEDURE — 99233 SBSQ HOSP IP/OBS HIGH 50: CPT

## 2021-07-29 PROCEDURE — 99223 1ST HOSP IP/OBS HIGH 75: CPT | Mod: GC

## 2021-07-29 RX ORDER — POTASSIUM CHLORIDE 20 MEQ
40 PACKET (EA) ORAL ONCE
Refills: 0 | Status: COMPLETED | OUTPATIENT
Start: 2021-07-29 | End: 2021-07-29

## 2021-07-29 RX ADMIN — SODIUM CHLORIDE 75 MILLILITER(S): 9 INJECTION INTRAMUSCULAR; INTRAVENOUS; SUBCUTANEOUS at 17:21

## 2021-07-29 RX ADMIN — PIPERACILLIN AND TAZOBACTAM 25 GRAM(S): 4; .5 INJECTION, POWDER, LYOPHILIZED, FOR SOLUTION INTRAVENOUS at 02:56

## 2021-07-29 RX ADMIN — Medication 40 MILLIEQUIVALENT(S): at 17:22

## 2021-07-29 RX ADMIN — Medication 250 MILLIGRAM(S): at 11:28

## 2021-07-29 RX ADMIN — Medication 3 MILLILITER(S): at 11:08

## 2021-07-29 RX ADMIN — SODIUM CHLORIDE 75 MILLILITER(S): 9 INJECTION INTRAMUSCULAR; INTRAVENOUS; SUBCUTANEOUS at 02:51

## 2021-07-29 RX ADMIN — Medication 3 MILLILITER(S): at 03:52

## 2021-07-29 RX ADMIN — ENOXAPARIN SODIUM 40 MILLIGRAM(S): 100 INJECTION SUBCUTANEOUS at 11:20

## 2021-07-29 RX ADMIN — AMIODARONE HYDROCHLORIDE 200 MILLIGRAM(S): 400 TABLET ORAL at 05:15

## 2021-07-29 RX ADMIN — Medication 3 MILLILITER(S): at 15:28

## 2021-07-29 RX ADMIN — Medication 3 MILLILITER(S): at 22:13

## 2021-07-29 RX ADMIN — PIPERACILLIN AND TAZOBACTAM 25 GRAM(S): 4; .5 INJECTION, POWDER, LYOPHILIZED, FOR SOLUTION INTRAVENOUS at 12:21

## 2021-07-29 RX ADMIN — PIPERACILLIN AND TAZOBACTAM 25 GRAM(S): 4; .5 INJECTION, POWDER, LYOPHILIZED, FOR SOLUTION INTRAVENOUS at 20:16

## 2021-07-29 NOTE — PROGRESS NOTE ADULT - PROBLEM SELECTOR PLAN 2
new diagnosis  - path confirmed DLBCL  - CT chest reviewed - will follow up with CTS regarding obtaining more fluid for malignancy workup - will clarify what studies onc is requesting  - will check TLS labs   - renal eval as requested by onc

## 2021-07-29 NOTE — PROGRESS NOTE ADULT - SUBJECTIVE AND OBJECTIVE BOX
Hematology Follow-up    INTERVAL HPI/OVERNIGHT EVENTS:  Patient S&E at bedside. No o/n events, patient resting comfortably.     VITAL SIGNS:  T(F): 98 (21 @ 05:00)  HR: 85 (21 @ 05:00)  BP: 115/58 (21 @ 05:00)  RR: 19 (21 @ 05:00)  SpO2: 99% (21 @ 05:00)  Wt(kg): --    PHYSICAL EXAM:    Constitutional: AAOx3, NAD  Eyes: EOMI, sclera non-icteric  Neck: supple, no masses, no JVD  Respiratory: Rt pleurex catheter. Decreased breathing sound.  Cardiovascular: RRR, normal S1S2  Gastrointestinal: soft, NTND, no masses palpable, BS normal in all four quadrants  Extremities: no c/c/e  Neurological: Grossly intact  Skin: Normal temperature    MEDICATIONS  (STANDING):  albuterol/ipratropium for Nebulization 3 milliLiter(s) Nebulizer every 6 hours  aMIOdarone    Tablet 200 milliGRAM(s) Oral daily  enoxaparin Injectable 40 milliGRAM(s) SubCutaneous daily  piperacillin/tazobactam IVPB.. 3.375 Gram(s) IV Intermittent every 8 hours  potassium chloride   Powder 40 milliEquivalent(s) Oral once  potassium chloride  10 mEq/100 mL IVPB 10 milliEquivalent(s) IV Intermittent every 1 hour  sodium chloride 0.9%. 1000 milliLiter(s) (75 mL/Hr) IV Continuous <Continuous>  vancomycin  IVPB 1000 milliGRAM(s) IV Intermittent daily    MEDICATIONS  (PRN):  acetaminophen   Tablet .. 650 milliGRAM(s) Oral every 6 hours PRN Temp greater or equal to 38.5C (101.3F), Mild Pain (1 - 3)  aluminum hydroxide/magnesium hydroxide/simethicone Suspension 30 milliLiter(s) Oral every 4 hours PRN Dyspepsia  melatonin 3 milliGRAM(s) Oral at bedtime PRN Insomnia  ondansetron Injectable 4 milliGRAM(s) IV Push every 8 hours PRN Nausea and/or Vomiting      No Known Allergies      LABS:                        8.6    28.59 )-----------( 391      ( 2021 07:08 )             27.9     07-    132<L>  |  98  |  13  ----------------------------<  81  3.3<L>   |  20<L>  |  0.78    Ca    10.5      2021 07:08  Phos  2.7       Mg     1.70         TPro  5.1<L>  /  Alb  2.3<L>  /  TBili  0.4  /  DBili  x   /  AST  32  /  ALT  26  /  AlkPhos  181<H>         Urinalysis Basic - ( 2021 23:28 )    Color: Yellow / Appearance: Turbid / S.020 / pH: x  Gluc: x / Ketone: Negative  / Bili: Negative / Urobili: <2 mg/dL   Blood: x / Protein: 30 mg/dL / Nitrite: Negative   Leuk Esterase: Large / RBC: 0-2 /HPF / WBC >50 /HPF   Sq Epi: x / Non Sq Epi: Few / Bacteria: Many            RADIOLOGY & ADDITIONAL TESTS:  Studies reviewed.      < from: NM PET/CT Onc FDG Skull to Thigh, Inital (21 @ 16:06) >  IMPRESSION: Abnormal FDG-PET/CT scan.    1. FDG-avid bilateral lower lobe consolidations and bilateral upper lobe nodular opacities, not significantly changed as compared to CT chest dated 2021, are suspicious for malignancy.    2. Hypermetabolic bilateral supraclavicular, bilateral mediastinal, and perigastric lymphadenopathy, also unchanged as compared to recent CT, are suspicious for malignancy. Supraclavicular nodes are accessible to an ultrasound-guided percutaneous needle biopsy.    3. New large right pleural effusion.    4. Hepatomegaly without associated hypermetabolism.    < end of copied text >      PATH  Final Diagnosis    1-Right pleural biopsy         - Fatty tissue with granulation tissue    See note and description.         2-Right pleural nodule         - Lymphohistiocytic proliferation with scattered large cells    See note and description.         3-Right peritracheal lymph node, level 4 for molecular testing         - Lymphohistiocytic proliferation with scattered large cells    See note and description.         4-Right peritracheal lymph node, level 4 for molecular testing         - Lymphohistiocytic proliferation with increased large cells    See note and description.         5-Right peritracheal lymph node, level 4         - Diffuse large B cell lymphoma, non-germinal center B cell like immunophenotype    See note and description.         Diagnostic note:  Per chart review, patient presents with bilateral supraclavicular, bilateral mediastinal, and perigastric lymphadenopathy. 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 FISH studies for MYC, BCL2 and BCL6 gene rearrangements are in process and will be reported as an addendum.         Microscopic description:  Histologic sections of right peritracheal level 4 lymph node show clusters of large, atypical lymphocytes with round irregular/ some with multilobated nuclei, moderately dispersed chromatin and prominent nucleoli in a background of small lymphocytes and histiocytes. The large cells are forming large clusters and also found scattered in some areas of the biopsy.         Immunohistochemical stains for CD2, CD3, CD4, CD5, CD7, CD8, CD20, PAX5, CD10, BCL6, BCL2, MUM1, Ki67, c-MYC, CyclinD1, EBV encoded RNA (NICK) in situ hybridization,  CD21, CD23, CD30, p53, CD45, OCT2, BOB1, CD79a, CD56, ALK1, PD1, CD25, TIA-1, granzyme B stains performed on formalin fixed paraffin embedded tissue, block 5B.         Neoplastic cells are:  _    Positive:  CD20, PAX5, CD79a, OCT2, BOB1, CD45, BCL2, MUM1, MYC (30-40%), CD30, p53 (rare large cells are brightly positive)    Negative:  CD5, CD10, BCL6, CD15    Other:  Ki67 proliferation index is 70%    CD21 and CD23 stains highlight residual small follicular dendritic cell meshwork         CD3 stain highlights T cells in the background. T cells show no loss of pan-T cell antigen, they are positive for CD2, CD5, CD7, CD25, TIA-1(subset) and negative for CD56, ALK1, granzyme B. CD4 to CD8 ratio is within normal limits. A small subset of T cells are positive for PD1.         EBV encoded RNA (NICK) in situ hybridization is negative         Flow cytometry analysis (04-RX-): Lymphocytes (49% of cells): Heterogeneous population of T-cells (with normal CD4 to CD8 ratio) and rare polytypic B-cells.  T cells (5% of cells): positive for CD2, CD5, CD7, CD8; negative CD3, CD4.           Cytogenetic studies    Karyotype ([id number]): Failure         Molecular studies:  _    B Cell Gene Rearrangement ([id number])    IgH gene Status:  POSITIVE    IgK gene Status: POSITIVE         T Cell Gene Rearrangement ([id number])    TCR-beta:       NEGATIVE    TCR-gamma: NEGATIVE      Hematology Follow-up    INTERVAL HPI/OVERNIGHT EVENTS:  Patient S&E at bedside. No o/n events, patient resting comfortably.     VITAL SIGNS:  T(F): 98 (21 @ 05:00)  HR: 85 (21 @ 05:00)  BP: 115/58 (21 @ 05:00)  RR: 19 (21 @ 05:00)  SpO2: 99% (21 @ 05:00)  Wt(kg): --    PHYSICAL EXAM:    Constitutional: AAOx3, NAD, ill looking  Eyes: EOMI, sclera non-icteric  Neck: supple, no masses, no JVD  Respiratory: Rt pleurex catheter. Decreased breathing sound.  Cardiovascular: RRR, normal S1S2  Gastrointestinal: soft, NTND, no masses palpable, BS normal in all four quadrants  Extremities: no c/c/e  Neurological: Grossly intact  Skin: Normal temperature    MEDICATIONS  (STANDING):  albuterol/ipratropium for Nebulization 3 milliLiter(s) Nebulizer every 6 hours  aMIOdarone    Tablet 200 milliGRAM(s) Oral daily  enoxaparin Injectable 40 milliGRAM(s) SubCutaneous daily  piperacillin/tazobactam IVPB.. 3.375 Gram(s) IV Intermittent every 8 hours  potassium chloride   Powder 40 milliEquivalent(s) Oral once  potassium chloride  10 mEq/100 mL IVPB 10 milliEquivalent(s) IV Intermittent every 1 hour  sodium chloride 0.9%. 1000 milliLiter(s) (75 mL/Hr) IV Continuous <Continuous>  vancomycin  IVPB 1000 milliGRAM(s) IV Intermittent daily    MEDICATIONS  (PRN):  acetaminophen   Tablet .. 650 milliGRAM(s) Oral every 6 hours PRN Temp greater or equal to 38.5C (101.3F), Mild Pain (1 - 3)  aluminum hydroxide/magnesium hydroxide/simethicone Suspension 30 milliLiter(s) Oral every 4 hours PRN Dyspepsia  melatonin 3 milliGRAM(s) Oral at bedtime PRN Insomnia  ondansetron Injectable 4 milliGRAM(s) IV Push every 8 hours PRN Nausea and/or Vomiting      No Known Allergies      LABS:                        8.6    28.59 )-----------( 391      ( 2021 07:08 )             27.9     07-    132<L>  |  98  |  13  ----------------------------<  81  3.3<L>   |  20<L>  |  0.78    Ca    10.5      2021 07:08  Phos  2.7       Mg     1.70         TPro  5.1<L>  /  Alb  2.3<L>  /  TBili  0.4  /  DBili  x   /  AST  32  /  ALT  26  /  AlkPhos  181<H>         Urinalysis Basic - ( 2021 23:28 )    Color: Yellow / Appearance: Turbid / S.020 / pH: x  Gluc: x / Ketone: Negative  / Bili: Negative / Urobili: <2 mg/dL   Blood: x / Protein: 30 mg/dL / Nitrite: Negative   Leuk Esterase: Large / RBC: 0-2 /HPF / WBC >50 /HPF   Sq Epi: x / Non Sq Epi: Few / Bacteria: Many            RADIOLOGY & ADDITIONAL TESTS:  Studies reviewed.      < from: NM PET/CT Onc FDG Skull to Thigh, Inital (21 @ 16:06) >  IMPRESSION: Abnormal FDG-PET/CT scan.    1. FDG-avid bilateral lower lobe consolidations and bilateral upper lobe nodular opacities, not significantly changed as compared to CT chest dated 2021, are suspicious for malignancy.    2. Hypermetabolic bilateral supraclavicular, bilateral mediastinal, and perigastric lymphadenopathy, also unchanged as compared to recent CT, are suspicious for malignancy. Supraclavicular nodes are accessible to an ultrasound-guided percutaneous needle biopsy.    3. New large right pleural effusion.    4. Hepatomegaly without associated hypermetabolism.    < end of copied text >      PATH  Final Diagnosis    1-Right pleural biopsy         - Fatty tissue with granulation tissue    See note and description.         2-Right pleural nodule         - Lymphohistiocytic proliferation with scattered large cells    See note and description.         3-Right peritracheal lymph node, level 4 for molecular testing         - Lymphohistiocytic proliferation with scattered large cells    See note and description.         4-Right peritracheal lymph node, level 4 for molecular testing         - Lymphohistiocytic proliferation with increased large cells    See note and description.         5-Right peritracheal lymph node, level 4         - Diffuse large B cell lymphoma, non-germinal center B cell like immunophenotype    See note and description.         Diagnostic note:  Per chart review, patient presents with bilateral supraclavicular, bilateral mediastinal, and perigastric lymphadenopathy. 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 FISH studies for MYC, BCL2 and BCL6 gene rearrangements are in process and will be reported as an addendum.         Microscopic description:  Histologic sections of right peritracheal level 4 lymph node show clusters of large, atypical lymphocytes with round irregular/ some with multilobated nuclei, moderately dispersed chromatin and prominent nucleoli in a background of small lymphocytes and histiocytes. The large cells are forming large clusters and also found scattered in some areas of the biopsy.         Immunohistochemical stains for CD2, CD3, CD4, CD5, CD7, CD8, CD20, PAX5, CD10, BCL6, BCL2, MUM1, Ki67, c-MYC, CyclinD1, EBV encoded RNA (NICK) in situ hybridization,  CD21, CD23, CD30, p53, CD45, OCT2, BOB1, CD79a, CD56, ALK1, PD1, CD25, TIA-1, granzyme B stains performed on formalin fixed paraffin embedded tissue, block 5B.         Neoplastic cells are:  _    Positive:  CD20, PAX5, CD79a, OCT2, BOB1, CD45, BCL2, MUM1, MYC (30-40%), CD30, p53 (rare large cells are brightly positive)    Negative:  CD5, CD10, BCL6, CD15    Other:  Ki67 proliferation index is 70%    CD21 and CD23 stains highlight residual small follicular dendritic cell meshwork         CD3 stain highlights T cells in the background. T cells show no loss of pan-T cell antigen, they are positive for CD2, CD5, CD7, CD25, TIA-1(subset) and negative for CD56, ALK1, granzyme B. CD4 to CD8 ratio is within normal limits. A small subset of T cells are positive for PD1.         EBV encoded RNA (NICK) in situ hybridization is negative         Flow cytometry analysis (01-VB-): Lymphocytes (49% of cells): Heterogeneous population of T-cells (with normal CD4 to CD8 ratio) and rare polytypic B-cells.  T cells (5% of cells): positive for CD2, CD5, CD7, CD8; negative CD3, CD4.           Cytogenetic studies    Karyotype ([id number]): Failure         Molecular studies:  _    B Cell Gene Rearrangement ([id number])    IgH gene Status:  POSITIVE    IgK gene Status: POSITIVE         T Cell Gene Rearrangement ([id number])    TCR-beta:       NEGATIVE    TCR-gamma: NEGATIVE

## 2021-07-29 NOTE — CONSULT NOTE ADULT - PROBLEM SELECTOR RECOMMENDATION 2
pt prior independent, lived alone with no significant care  now kps 50%  daughter states she was significantly improved with steroids in past

## 2021-07-29 NOTE — CHART NOTE - NSCHARTNOTEFT_GEN_A_CORE
A long phone conversation (46minutes) was made with both the daughter and the son (Narciso: [phone]) in [place]. Explained the nature of the disease that this is potentially curable condition by the chemotherapy, but also discussed with them that given her age and performance status palliative/hospice care is also reasonable option. They want to discuss with their mother further and talk to us back on Monday. I provided my email in case they come up with any questions. Based on the family decision will proceed with the next step.      Conchis Dobbs MD  PGY 6, Oncology/Hematology fellow  (P) [phone]  After 5pm, please contact on-call team.

## 2021-07-29 NOTE — CONSULT NOTE ADULT - SUBJECTIVE AND OBJECTIVE BOX
Endocrine New Consult   HPI:  89 yr old female with a pmh of COPD, HFrEF (EF 26%), s/p right robot VATS/pleural biopsy with placement of pleurex catheter 7/14/21 readmitted for rehab for fever/sob, admitted for sepsis 2/2 PNA. Endocrine consulted for hypercalcemia, suspected to be secondary to malignancy.     Endocrine History:     PAST MEDICAL & SURGICAL HISTORY:  Cough  Osteoarthritis  Abnormal finding of lung  COPD, mild  History of hip replacement  right      FAMILY HISTORY:  No pertinent family history in first degree relatives    Social History:  Does not use tobacco products, consume alcohol or partake in illicit drug use (28 Jul 2021 03:52)    Home Medications:  ALPRAZolam 0.25 mg oral tablet: Take 1/2 tab (0.125 mg) - 1 tab (0.25 mg) orally twice daily as needed     **Per [facility] (reference #[id number]), alprazolam filled on 7/11/21 for 10 day supply (28 Jul 2021 15:01)  ascorbic acid 500 mg oral tablet: 1 tab(s) orally once a day (28 Jul 2021 15:01)  Cranberry oral tablet: 1 tab(s) orally once a day (28 Jul 2021 15:01)  escitalopram 10 mg oral tablet: 1.5 tab (15 mg) orally once a day    **Per pharmacy, rx directions state to take escitalopram 10 mg - 1 tablet orally once daily (28 Jul 2021 15:01)  ferrous sulfate 325 mg (65 mg elemental iron) oral tablet: 1 tab(s) orally once a day (28 Jul 2021 15:01)  folic acid 1 mg oral tablet: 1 tab(s) orally once a day (28 Jul 2021 15:01)  lactobacillus acidophilus oral capsule: 1  orally once a day (28 Jul 2021 15:01)  Multiple Vitamins oral capsule: 1 cap(s) orally once a day (28 Jul 2021 15:01)  omeprazole 20 mg oral delayed release capsule: 1 cap(s) orally once a day (in the morning) (28 Jul 2021 15:01)  thiamine 100 mg oral tablet: 1 tab(s) orally once a day (28 Jul 2021 15:01)  Trelegy Ellipta 100 mcg-62.5 mcg-25 mcg/inh inhalation powder: 1 puff(s) inhaled once a day (28 Jul 2021 15:01)  Tylenol 325 mg oral tablet: 2 tab(s) orally every 6 hours, As Needed (28 Jul 2021 15:01)  Vitamin D3: 1 tab(s) orally once a day (28 Jul 2021 15:01)    MEDICATIONS  (STANDING):  albuterol/ipratropium for Nebulization 3 milliLiter(s) Nebulizer every 6 hours  aMIOdarone    Tablet 200 milliGRAM(s) Oral daily  enoxaparin Injectable 40 milliGRAM(s) SubCutaneous daily  piperacillin/tazobactam IVPB.. 3.375 Gram(s) IV Intermittent every 8 hours  potassium chloride   Powder 40 milliEquivalent(s) Oral once  potassium chloride  10 mEq/100 mL IVPB 10 milliEquivalent(s) IV Intermittent every 1 hour  sodium chloride 0.9%. 1000 milliLiter(s) (75 mL/Hr) IV Continuous <Continuous>  vancomycin  IVPB 1000 milliGRAM(s) IV Intermittent daily    MEDICATIONS  (PRN):  acetaminophen   Tablet .. 650 milliGRAM(s) Oral every 6 hours PRN Temp greater or equal to 38.5C (101.3F), Mild Pain (1 - 3)  aluminum hydroxide/magnesium hydroxide/simethicone Suspension 30 milliLiter(s) Oral every 4 hours PRN Dyspepsia  melatonin 3 milliGRAM(s) Oral at bedtime PRN Insomnia  ondansetron Injectable 4 milliGRAM(s) IV Push every 8 hours PRN Nausea and/or Vomiting      Allergies  No Known Allergies    Review of Systems:  Constitutional: No fever  Eyes: No blurry vision  Neuro: No tremors  HEENT: No pain  Cardiovascular: No chest pain, palpitations  Respiratory: No SOB, no cough  GI: No nausea, vomiting, abdominal pain  : No dysuria  Skin: no rash  Psych: no depression  Endocrine: no polyuria, polydipsia  Hem/lymph: no swelling  Osteoporosis: no fractures    ALL OTHER SYSTEMS REVIEWED AND NEGATIVE    UNABLE TO OBTAIN    PHYSICAL EXAM:  VITALS: T(C): 36.6 (07-29-21 @ 12:14)  T(F): 97.8 (07-29-21 @ 12:14), Max: 98.3 (07-28-21 @ 18:34)  HR: 90 (07-29-21 @ 12:14) (85 - 90)  BP: 119/64 (07-29-21 @ 12:14) (114/45 - 137/72)  RR:  (18 - 20)  SpO2:  (99% - 100%)  Wt(kg): --  GENERAL: NAD, well-groomed, well-developed  EYES: No proptosis, no lid lag, anicteric  HEENT:  Atraumatic, Normocephalic, moist mucous membranes  THYROID: Normal size, no palpable nodules  RESPIRATORY: Clear to auscultation bilaterally; No rales, rhonchi, wheezing  CARDIOVASCULAR: Regular rate and rhythm; No murmurs; no peripheral edema  GI: Soft, nontender, non distended, normal bowel sounds  SKIN: Dry, intact, No rashes or lesions  MUSCULOSKELETAL: Full range of motion, normal strength  NEURO: sensation intact, extraocular movements intact, no tremor  PSYCH: Alert and oriented x 3, normal affect, normal mood  CUSHING'S SIGNS: no striae                          8.6    28.59 )-----------( 391      ( 29 Jul 2021 07:08 )             27.9       07-29    132<L>  |  98  |  13  ----------------------------<  81  3.3<L>   |  20<L>  |  0.78    EGFR if : 78  EGFR if non : 67    Ca    10.5      07-29  Mg     1.60     07-29  Phos  2.7     07-29    TPro  5.1<L>  /  Alb  2.3<L>  /  TBili  0.4  /  DBili  x   /  AST  32  /  ALT  26  /  AlkPhos  181<H>  07-29      Thyroid Function Tests:                Radiology:              Endocrine New Consult   HPI:  89 yr old female with a pmh of COPD, HFrEF (EF 26%), s/p right robot VATS/pleural biopsy with placement of pleurex catheter 7/14/21 readmitted for rehab for fever/sob, admitted for sepsis 2/2 PNA. Endocrine consulted for hypercalcemia, suspected to be secondary to malignancy.     Endocrine History:   Patient denies prior knowledge of calcium abnormalities, states that she just found out today that she had lymphoma and it is also news to her that her calcium levels are now high.   Used to take vitamin d supplements but this was stopped once she was at the rehab and she has been told that she does not need it anymore.   No calcium supplements or use of antacids/tums regularly.   +Prior hx of R. THR approximately 5 years ago but no prior fractures or hx of osteoporosis/osteopenia. No prior treatment for any bone disorders.   No prior hx of parathyroid abnormalities or kidney disease to patient's knowledge.   Admits to fatigue and recent immobility since discharge to rehab; states that she used to walk but has not been walking since she has been there; denies abdominal pain, constipation, hx of kidney stones. +Increased thirst and requests water/ensure. Denies feeling depressed/confused. Pt admits that she does not know the date as she has not been keeping track while at the rehab facility but aware that it is summer and 2021. Also endorses that she feels lonely and is still grieving the loss of her spouse who passed a year ago, they used to live in [place] but since his passing she has been on her own. Her health has also taken a toll recently and she never had all these problems before, per pt.     PAST MEDICAL & SURGICAL HISTORY:  Cough  Osteoarthritis  Abnormal finding of lung  COPD, mild  History of hip replacement, right    FAMILY HISTORY:  No pertinent family history in first degree relatives    Social History:  Does not use tobacco products, consume alcohol or partake in illicit drug use (28 Jul 2021 03:52)   passed last year, used to live with spouse in [place], currently at rehab facility prior to admission     Home Medications:  ALPRAZolam 0.25 mg oral tablet: Take 1/2 tab (0.125 mg) - 1 tab (0.25 mg) orally twice daily as needed     **Per [facility] (reference #[id number]), alprazolam filled on 7/11/21 for 10 day supply (28 Jul 2021 15:01)  ascorbic acid 500 mg oral tablet: 1 tab(s) orally once a day (28 Jul 2021 15:01)  Cranberry oral tablet: 1 tab(s) orally once a day (28 Jul 2021 15:01)  escitalopram 10 mg oral tablet: 1.5 tab (15 mg) orally once a day    **Per pharmacy, rx directions state to take escitalopram 10 mg - 1 tablet orally once daily (28 Jul 2021 15:01)  ferrous sulfate 325 mg (65 mg elemental iron) oral tablet: 1 tab(s) orally once a day (28 Jul 2021 15:01)  folic acid 1 mg oral tablet: 1 tab(s) orally once a day (28 Jul 2021 15:01)  lactobacillus acidophilus oral capsule: 1  orally once a day (28 Jul 2021 15:01)  Multiple Vitamins oral capsule: 1 cap(s) orally once a day (28 Jul 2021 15:01)  omeprazole 20 mg oral delayed release capsule: 1 cap(s) orally once a day (in the morning) (28 Jul 2021 15:01)  thiamine 100 mg oral tablet: 1 tab(s) orally once a day (28 Jul 2021 15:01)  Trelegy Ellipta 100 mcg-62.5 mcg-25 mcg/inh inhalation powder: 1 puff(s) inhaled once a day (28 Jul 2021 15:01)  Tylenol 325 mg oral tablet: 2 tab(s) orally every 6 hours, As Needed (28 Jul 2021 15:01)  Vitamin D3: 1 tab(s) orally once a day (28 Jul 2021 15:01)    MEDICATIONS  (STANDING):  albuterol/ipratropium for Nebulization 3 milliLiter(s) Nebulizer every 6 hours  aMIOdarone    Tablet 200 milliGRAM(s) Oral daily  enoxaparin Injectable 40 milliGRAM(s) SubCutaneous daily  piperacillin/tazobactam IVPB.. 3.375 Gram(s) IV Intermittent every 8 hours  potassium chloride   Powder 40 milliEquivalent(s) Oral once  potassium chloride  10 mEq/100 mL IVPB 10 milliEquivalent(s) IV Intermittent every 1 hour  sodium chloride 0.9%. 1000 milliLiter(s) (75 mL/Hr) IV Continuous <Continuous>  vancomycin  IVPB 1000 milliGRAM(s) IV Intermittent daily    MEDICATIONS  (PRN):  acetaminophen   Tablet .. 650 milliGRAM(s) Oral every 6 hours PRN Temp greater or equal to 38.5C (101.3F), Mild Pain (1 - 3)  aluminum hydroxide/magnesium hydroxide/simethicone Suspension 30 milliLiter(s) Oral every 4 hours PRN Dyspepsia  melatonin 3 milliGRAM(s) Oral at bedtime PRN Insomnia  ondansetron Injectable 4 milliGRAM(s) IV Push every 8 hours PRN Nausea and/or Vomiting    Allergies  No Known Allergies    Review of Systems:  Constitutional: +fatigue, +fever, no chills   Eyes: No blurry vision  Neuro: No tremors  HEENT: No pain  Cardiovascular: No chest pain, palpitations  Respiratory: +SOB at times, no cough  GI: No nausea, vomiting, abdominal pain, diarrhea, constipation   : No dysuria  Psych: no depression per pt, only "lonely"   Endocrine: no polyuria, +polydipsia  Hem/lymph: no swelling  Osteoporosis: no fractures  ALL OTHER SYSTEMS REVIEWED AND NEGATIVE    PHYSICAL EXAM:  VITALS: T(C): 36.6 (07-29-21 @ 12:14)  T(F): 97.8 (07-29-21 @ 12:14), Max: 98.3 (07-28-21 @ 18:34)  HR: 90 (07-29-21 @ 12:14) (85 - 90)  BP: 119/64 (07-29-21 @ 12:14) (114/45 - 137/72)  RR:  (18 - 20)  SpO2:  (99% - 100%)  Wt(kg): --  GENERAL: frail, elderly female, laying in bed, nad, appears fatigued   EYES: No proptosis, anicteric  HEENT:  Atraumatic, Normocephalic, moist mucous membranes  THYROID: Normal size, no palpable nodules, nontender   RESPIRATORY: Clear to auscultation bilaterally; No rales, rhonchi, wheezing; +R. lateral chest wall Pleurx catheter noted  CARDIOVASCULAR: Regular rate and rhythm; no peripheral edema  GI: Soft, nontender, non distended, normal bowel sounds  SKIN: Dry, intact   MUSCULOSKELETAL: Full range of motion, normal strength  NEURO: sensation intact, extraocular movements intact, no tremor  PSYCH: Alert and oriented x 3, normal affect, normal mood  CUSHING'S SIGNS: no striae                          8.6    28.59 )-----------( 391      ( 29 Jul 2021 07:08 )             27.9       07-29    132<L>  |  98  |  13  ----------------------------<  81  3.3<L>   |  20<L>  |  0.78    EGFR if : 78  EGFR if non : 67    Ca    10.5      07-29  Mg     1.60     07-29  Phos  2.7     07-29    TPro  5.1<L>  /  Alb  2.3<L>  /  TBili  0.4  /  DBili  x   /  AST  32  /  ALT  26  /  AlkPhos  181<H>  07-29      Thyroid Function Tests:

## 2021-07-29 NOTE — PROGRESS NOTE ADULT - SUBJECTIVE AND OBJECTIVE BOX
DATE OF SERVICE: 07-29-21 @ 06:59    Subjective: Patient seen and examined. No new events except as noted.     SUBJECTIVE/ROS:    no new events     MEDICATIONS:  MEDICATIONS  (STANDING):  albuterol/ipratropium for Nebulization 3 milliLiter(s) Nebulizer every 6 hours  aMIOdarone    Tablet 200 milliGRAM(s) Oral daily  enoxaparin Injectable 40 milliGRAM(s) SubCutaneous daily  piperacillin/tazobactam IVPB.. 3.375 Gram(s) IV Intermittent every 8 hours  potassium chloride  10 mEq/100 mL IVPB 10 milliEquivalent(s) IV Intermittent every 1 hour  sodium chloride 0.9%. 1000 milliLiter(s) (75 mL/Hr) IV Continuous <Continuous>  vancomycin  IVPB 1000 milliGRAM(s) IV Intermittent daily      PHYSICAL EXAM:  T(C): 36.7 (07-29-21 @ 05:00), Max: 36.9 (07-28-21 @ 13:41)  HR: 85 (07-29-21 @ 05:00) (70 - 89)  BP: 115/58 (07-29-21 @ 05:00) (100/45 - 137/72)  RR: 19 (07-29-21 @ 05:00) (19 - 20)  SpO2: 99% (07-29-21 @ 05:00) (99% - 100%)  Wt(kg): --  I&O's Summary      Weight (kg): 49.8 (07-28 @ 20:30)      JVP: Normal  Neck: supple  Lung: dec bs   CV: S1 S2 , Murmur:  Abd: soft  Ext: No edema  neuro: Awake / alert  Psych: flat affect  Skin: normal``    LABS/DATA:    CARDIAC MARKERS:                                8.4    34.64 )-----------( 329      ( 28 Jul 2021 10:52 )             27.0     07-28    132<L>  |  96<L>  |  17  ----------------------------<  99  3.4<L>   |  24  |  0.87    Ca    10.7<H>      28 Jul 2021 10:52  Phos  2.7     07-27  Mg     1.70     07-27    TPro  4.9<L>  /  Alb  2.5<L>  /  TBili  0.4  /  DBili  x   /  AST  19  /  ALT  24  /  AlkPhos  136<H>  07-28    proBNP:   Lipid Profile:   HgA1c:   TSH:     TELE:  EKG:

## 2021-07-29 NOTE — CONSULT NOTE ADULT - ATTENDING COMMENTS
Hypercalcemia with elevated 1,25D likely secondary to recently diagnosed lymphoma.  Defer need for bisphosphonate instead prefer to use prednisone if ok per oncology.  Will follow.    Kenney Irizarry MD  Division of Endocrinology  Pager: [phone]    If after 6PM or before 9AM, or on weekends/holidays, please call endocrine answering service for assistance ([phone]).  For nonurgent matters email [email or web address] for assistance.

## 2021-07-29 NOTE — PROGRESS NOTE ADULT - ASSESSMENT
Right Pl effusion / PNA / Sepsis     anbx  CT reviewed has malignant process  fu with onc, cts     PAF post op   in sinus  cont amio for short term , DC after few weeks   off a/c   poor candidate for a/c high bleed risk given sepsis , pl effusion and recent surgery

## 2021-07-29 NOTE — CONSULT NOTE ADULT - PROBLEM SELECTOR RECOMMENDATION 3
discussed with daughter pt's diagnosis and started to explain treatment  daughter concerned about side effects of dmt  encouraged her to speak with hematologist risks vs benefits  would embrace steroids if possible to be started

## 2021-07-29 NOTE — CONSULT NOTE ADULT - PROBLEM SELECTOR RECOMMENDATION 9
pt with h/o anxiety and depression  previously on lexapro and xanax   would reinstate if ok with primary team

## 2021-07-29 NOTE — PROGRESS NOTE ADULT - PROBLEM SELECTOR PLAN 1
pt meets criteria for sepsis  Bcx and Ucx pending   In the ED received Zosyn x2 and Vanc; continue for now; check vanco trough  CT chest reviewed - will follow up with CTS regarding obtaining more fluid for malignancy workup - will clarify what studies onc is requesting

## 2021-07-29 NOTE — PROGRESS NOTE ADULT - SUBJECTIVE AND OBJECTIVE BOX
Subjective: 88 y/o female seen at bedside with Dr. Quiñonez. Pt seen supine in bed on 2L NC in NAD. Patient states her breathing in improved from admission.     Vital Signs:  Vital Signs Last 24 Hrs  T(C): 36.6 (07-29-21 @ 12:14), Max: 36.8 (07-28-21 @ 18:34)  T(F): 97.8 (07-29-21 @ 12:14), Max: 98.3 (07-28-21 @ 18:34)  HR: 90 (07-29-21 @ 12:14) (85 - 90)  BP: 119/64 (07-29-21 @ 12:14) (114/45 - 137/72)  RR: 18 (07-29-21 @ 12:14) (18 - 20)  SpO2: 99% (07-29-21 @ 12:14) (99% - 100%) on (O2)    Pertinent Physical Exam:  Neuro: A+O x 3  CV: regular rate, regular rhythm  Pulm/chest: no accessory muscle use noted  Abd: soft, NT, ND, +BS    Relevant labs, radiology and Medications reviewed                        8.6    28.59 )-----------( 391      ( 29 Jul 2021 07:08 )             27.9     07-29    132<L>  |  98  |  13  ----------------------------<  81  3.3<L>   |  20<L>  |  0.78    Ca    10.5      29 Jul 2021 07:08  Phos  2.7     07-27  Mg     1.70     07-27    TPro  5.1<L>  /  Alb  2.3<L>  /  TBili  0.4  /  DBili  x   /  AST  32  /  ALT  26  /  AlkPhos  181<H>  07-29      CT Chest:   < from: CT Chest No Cont (07.28.21 @ 09:14) >  IMPRESSION:  Increased large consolidative process in the right lower, middle and upper lobes, and nodules and consolidation in the left lung, likely neoplastic.  Increased small circumferential right pleural effusion, likely malignant.  Increased mediastinal and supraclavicular lymphadenopathy.  --- End of Report ---  < end of copied text >      Assessment  89 Female with PMH asthma, depression, GERD, OA, chronic cough, right pleural effusion with mediastinal/supraclavicular adenopathy now s/p Robotic Right VATS, drainage of pleural effusion (1.5L), pleural bx and placement of pleurX catheter 7/14/21. Discharged to rehab 7/21.. In rehab she developed a fever. 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      PLAN    CT chest images reviewed by Dr. Quiñonez. Increasing large right consolidative mass with small effusion.   At this time there is no surgical thoracic intervention warranted  PleurX catheter drained by thoracic team on 7/28 with minimal to no output  Onc requesting pleural fluid samples, will attempt to drain and send out once team clarifies on what studies are needed to be sent  Patient breathing appears to be improved, cont with duonebs for COPD  Cont with heart failure management  Cont care per primary team   Discussed with Cardiothoracic Team at AM rounds.

## 2021-07-29 NOTE — PROGRESS NOTE ADULT - ASSESSMENT
89 year old woman with a pmh of COPD and HFrEF (EF 26%) that with chronic cough, not improved with steroids nor Abx. Now s/p R robotic VATS/pleural bx with placement of pleurex catheter on 07/15/21 after PET from 07/07/21 reveal b/l upper lobe opacities concerning for malignancy with pleural effusion. Hematology consulted for possible DLBCL as per prilimary pathology    #DLBCL  -S/p Right peritracheal lymph node, level 4 biopsy: Diffuse large B cell lymphoma, non-germinal center B cell like immunophenotype  -Please send pleural fluid cytopath and flow. Will need staging bone marrow as well.  -If pt and family agree will need first cycle chemo inpatient.  -Peripheral flow cytometry to be sent today. A written requisition and tubes were given to the floor nurse.    #Leukocytosis  -Leukocytosis with left shift with many neutrophils seen on the peripheral smear.  -Febrile to 103 in ED  -Would rule out infection at this point given hx of pleurex and recent procedure  -f/u Bcx/Ucx      #Hypercalcemia  -Corrected Calcium 11.4  -C/w maintenance IVF @ 75cc/hr given HF hx  -Recommend Nephrology consult for bisphosphonate therapy.    Please page with questions or concerns. Will Follow with you.      Conchis Dobbs MD  PGY 6, Oncology/Hematology fellow  (P) [phone]  After 5pm, please contact on-call team.   89 year old woman with a pmh of COPD and HFrEF (EF 26%) that with chronic cough, not improved with steroids nor Abx. Now s/p R robotic VATS/pleural bx with placement of pleurex catheter on 07/15/21 after PET from 07/07/21 reveal b/l upper lobe opacities concerning for malignancy with pleural effusion. Hematology consulted for newly diagnosed DLBCL.    #DLBCL  -S/p Right peritracheal lymph node, level 4 biopsy: Diffuse large B cell lymphoma, non-germinal center B cell like immunophenotype  -Please send pleural fluid cytopath and flow. Will need staging bone marrow as well.  -If pt and family agree will need first cycle chemo inpatient.  -Peripheral flow cytometry to be sent today. A written requisition and tubes were given to the floor nurse.    #Leukocytosis  -Leukocytosis with left shift with many neutrophils seen on the peripheral smear.  -Febrile to 103 in ED  -Would rule out infection at this point given hx of pleurex and recent procedure  -f/u Bcx/Ucx    #Hypercalcemia  -Corrected Calcium 11.4  -C/w maintenance IVF @ 75cc/hr given HF hx  -Recommend Nephrology consult for bisphosphonate therapy.    Please page with questions or concerns. Will Follow with you.      Conchis Dobbs MD  PGY 6, Oncology/Hematology fellow  (P) [phone]  After 5pm, please contact on-call team.

## 2021-07-29 NOTE — CONSULT NOTE ADULT - PROBLEM SELECTOR RECOMMENDATION 4
daughter lives in [place]  pt lives in [place]  daughter working on community medicaid but is willing to private hire if needed for short term  agreed to follow up next week after discussion with hematology re: dmt

## 2021-07-29 NOTE — CONSULT NOTE ADULT - ASSESSMENT
89 yr old female with a pmh of COPD, HFrEF (EF 26%), s/p right robot VATS/pleural biopsy with placement of pleurex catheter 7/14/21 prior to discharged was treated with a brief course of zosyn and discharged to rehab 7/21/21. In rehab she developed a fever. 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  CT surgery saw the patient in the ED and tried repositioning the Pleurx   Asked to see for goc and symptom management

## 2021-07-29 NOTE — PROGRESS NOTE ADULT - PROBLEM SELECTOR PLAN 3
hypercalcemia could be due to malignancy - corrected calcium is 11.9 again today - would continue with IVF and consider zometa after dental clearance; endo eval  monitor fluid status closely

## 2021-07-29 NOTE — CONSULT NOTE ADULT - ASSESSMENT
89 yr old female with a pmh of Lymphoma, COPD, HFrEF (EF 26%), s/p right robot VATS/pleural biopsy with placement of pleurex catheter 7/14/21 readmitted for rehab for fever/sob, admitted for sepsis 2/2 PNA. Endocrine consulted for hypercalcemia, suspected to be secondary to malignancy.     Hypercalcemia, likely 2/2 Malignancy in the setting of Lymphoma  Corrected Ca 12.86 > 11.86 w/ Mg 1.6 and Phos 2.7  On chart review, hypercalcemia also noted on recent admission  7/16 Vitamin D 25 OH 37.1  7/16 Vitamin D 1,25 Dihydroxy elevated to 94.4   7/16 TSH 2.83  Currently on IVF NS at 75cc/hr     Recommendations:   - Check Intact PTH   -     Discussed with Dr. Franky Zhang DO   Endocrinology Fellow   Pager [phone]  Please call [phone] after hours and on weekends/holidays.  89 yr old female with a pmh of Lymphoma, COPD, HFrEF (EF 26%), s/p right robot VATS/pleural biopsy with placement of pleurex catheter 7/14/21 readmitted for rehab for fever/sob, admitted for sepsis 2/2 PNA. Endocrine consulted for hypercalcemia, suspected to be secondary to malignancy.     Hypercalcemia, likely 2/2 Malignancy in the setting of Lymphoma  Corrected Ca 12.86 > 11.86 w/ Mg 1.6 and Phos 2.7  On chart review, hypercalcemia also noted on recent admission  7/16 Vitamin D 25 OH 37.1  7/16 Vitamin D 1,25 Dihydroxy elevated to 94.4   7/16 TSH 2.83  Currently on IVF NS at 75cc/hr   Recommendations:   - Check Intact PTH   - Continue IVF   - Continue to monitor CMP daily   - Consider tele monitoring if any concern for hypercalcemia related EKG changes   - Would hold off on starting bisphosphonates/dental consult at this time given bisphosphonates only a temporary solution for hypercalcemia & treatment for hypercalcemia of malignancy is steroids  - Would need to clarify with hematology/oncology if any plans to initiate treatment for DLBCL and if treatment plan includes steroids as this would in turn also treat patient's hypercalcemia; typically prednisone 20 to 40mg PO daily is used for treatment of hypercalcemia of malignancy.   - Given recent procedure/pleurx, agree with ruling out infectious process at this time.     Hyponatremia in the setting of Amiodarone Use, r/o Thyroid Dysfunction   Na 132  Recommendations:   - Check repeat TSH level     Vitamin D Toxicity   7/16 Vitamin D 25 OH 37.1  7/16 Vitamin D 1,25 Dihydroxy elevated to 94.4   Recommendations:   - Continue to hold vitamin D supplementation at this time   - Elevated Vitamin D 1, 25 Dihydroxy level likely related to DLBCL     Discussed with Dr. Wilkins & YODIT Beltran.     Becca Zhang DO   Endocrinology Fellow   Pager [phone]  Please call [phone] after hours and on weekends/holidays.  89 yr old female with a pmh of Lymphoma, COPD, HFrEF (EF 26%), s/p right robot VATS/pleural biopsy with placement of pleurex catheter 7/14/21 readmitted for rehab for fever/sob, admitted for sepsis 2/2 PNA. Endocrine consulted for hypercalcemia, suspected to be secondary to malignancy.     Hypercalcemia, likely 2/2 Malignancy in the setting of Lymphoma  Corrected Ca 12.86 > 11.86 w/ Mg 1.6 and Phos 2.7  On chart review, hypercalcemia also noted on recent admission  7/16 Vitamin D 25 OH 37.1  7/16 Vitamin D 1,25 Dihydroxy elevated to 94.4   7/16 TSH 2.83  Currently on IVF NS at 75cc/hr   Recommendations:   - Check Intact PTH   - Continue IVF   - Continue to monitor CMP daily   - Consider tele monitoring if any concern for hypercalcemia related EKG changes   - Would hold off on starting bisphosphonates/dental consult at this time given bisphosphonates only a temporary solution for hypercalcemia & treatment for hypercalcemia due to lymphoma is steroids  - Would need to clarify with hematology/oncology if any plans to initiate treatment for DLBCL and if treatment plan includes steroids as this would in turn also treat patient's hypercalcemia; typically prednisone 20 to 40mg PO daily is used for treatment of hypercalcemia of malignancy.   - Given recent procedure/pleurx, agree with ruling out infectious process at this time.     Hyponatremia in the setting of Amiodarone Use, r/o Thyroid Dysfunction   Na 132  Recommendations:   - Check repeat TSH level     Vitamin D Toxicity   7/16 Vitamin D 25 OH 37.1  7/16 Vitamin D 1,25 Dihydroxy elevated to 94.4   Recommendations:   - Continue to hold vitamin D supplementation at this time   - Elevated Vitamin D 1, 25 Dihydroxy level likely related to DLBCL     Discussed with Dr. Wilkins & YODIT Beltran.     Becca Zhang DO   Endocrinology Fellow   Pager [phone]  Please call [phone] after hours and on weekends/holidays.

## 2021-07-29 NOTE — CONSULT NOTE ADULT - SUBJECTIVE AND OBJECTIVE BOX
HPI:  89 yr old female with a pmh of COPD, HFrEF (EF 26%), s/p right robot VATS/pleural biopsy with placement of pleurex catheter 21 prior to discharged was treated with a brief course of zosyn and discharged to rehab 21. In rehab she developed a fever. 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  CT surgery saw the patient in the ED and tried repositioning the Pleurx     Pt awake, alert states she is anxious.  Denies pain, sob    PERTINENT PM/SXH:   No pertinent past medical history    Cough    Osteoarthritis    Abnormal finding of lung    COPD, mild      No significant past surgical history    History of hip replacement      FAMILY HISTORY:  No pertinent family history in first degree relatives      ITEMS NOT CHECKED ARE NOT PRESENT    SOCIAL HISTORY:   Significant other/partner:  [ ]  Children:  [x]  Catholic/Spirituality:  Substance hx:  [ ]   Tobacco hx:  [ ]   Alcohol hx: [ ]   Home Opioid hx:  [ ] I-Stop Reference No:  Living Situation: [x ]Home  [ ]Long term care  [ ]Rehab [ ]Other    ADVANCE DIRECTIVES:    DNR  MOLST  [ ]  Living Will  [ ]   DECISION MAKER(s):  [ ] Health Care Proxy(s)  [ ] Surrogate(s)  [ ] Guardian           Name(s): Phone Number(s): kiana- daughter, son lives in [place]    BASELINE (I)ADL(s) (prior to admission):  [place]: [ x]Total  [ ] Moderate [ ]Dependent    Allergies    No Known Allergies    Intolerances    MEDICATIONS  (STANDING):  albuterol/ipratropium for Nebulization 3 milliLiter(s) Nebulizer every 6 hours  aMIOdarone    Tablet 200 milliGRAM(s) Oral daily  enoxaparin Injectable 40 milliGRAM(s) SubCutaneous daily  piperacillin/tazobactam IVPB.. 3.375 Gram(s) IV Intermittent every 8 hours  potassium chloride   Powder 40 milliEquivalent(s) Oral once  potassium chloride  10 mEq/100 mL IVPB 10 milliEquivalent(s) IV Intermittent every 1 hour  sodium chloride 0.9%. 1000 milliLiter(s) (75 mL/Hr) IV Continuous <Continuous>  vancomycin  IVPB 1000 milliGRAM(s) IV Intermittent daily    MEDICATIONS  (PRN):  acetaminophen   Tablet .. 650 milliGRAM(s) Oral every 6 hours PRN Temp greater or equal to 38.5C (101.3F), Mild Pain (1 - 3)  aluminum hydroxide/magnesium hydroxide/simethicone Suspension 30 milliLiter(s) Oral every 4 hours PRN Dyspepsia  melatonin 3 milliGRAM(s) Oral at bedtime PRN Insomnia  ondansetron Injectable 4 milliGRAM(s) IV Push every 8 hours PRN Nausea and/or Vomiting    PRESENT SYMPTOMS: [ ]Unable to obtain due to poor mentation   Source if other than patient:  [ ]Family   [ ]Team     Pain (Impact on QOL):  denies  Location -         Minimal acceptable level (0-10 scale):                    Aggrevating factors -  Quality -  Radiation -  Severity (0-10 scale) -    Timing -    PAIN AD Score:     [email or web address] (press ctrl +  left click to view)    Dyspnea:                           [ ]Mild [ ]Moderate [ ]Severe  Anxiety:                             [ ]Mild [ ]Moderate [ x]Severe  Fatigue:                             [ ]Mild [ ]Moderate [x ]Severe  Nausea:                             [ ]Mild [ ]Moderate [ ]Severe  Loss of appetite:              [ ]Mild [ ]Moderate [x ]Severe  Constipation:                    [ ]Mild [ ]Moderate [ ]Severe    Other Symptoms:  [x ]All other review of systems negative     Karnofsky Performance Score/Palliative Performance Status Version 2:    50     %  PHYSICAL EXAM:  Vital Signs Last 24 Hrs  T(C): 36.6 (2021 12:14), Max: 36.8 (2021 18:34)  T(F): 97.8 (2021 12:14), Max: 98.3 (2021 18:34)  HR: 90 (2021 12:14) (85 - 90)  BP: 119/64 (2021 12:14) (114/45 - 137/72)  BP(mean): --  RR: 18 (2021 12:14) (18 - 20)  SpO2: 99% (2021 12:14) (99% - 100%) I&O's Summary  GENERAL:  [ x]Alert  [ x]Oriented x 3  [ ]Lethargic  [ ]Cachexia  [ ]Unarousable  [ ]Verbal  [ ]Non-Verbal  Behavioral:   [ ] Anxiety  [ ] Delirium [ ] Agitation [ ] Other  HEENT:  [x ]Normal   [ ]Dry mouth   [ ]ET Tube/Trach  [ ]Oral lesions  PULMONARY:   [ x]Clear [ ]Tachypnea  [ ]Audible excessive secretions  +pleurex  [ ]Rhonchi        [ ]Right [ ]Left [ ]Bilateral  [ ]Crackles        [ ]Right [ ]Left [ ]Bilateral  [ ]Wheezing     [ ]Right [ ]Left [ ]Bilateral  CARDIOVASCULAR:    [x ]Regular [ ]Irregular [ ]Tachy  [ ]Alvin [ ]Murmur [ ]Other  GASTROINTESTINAL:  [x ]Soft  [ ]Distended   [x ]+BS  [ x]Non tender [ ]Tender  [ ]PEG [ ]OGT/ NGT  Last BM: GENITOURINARY:  [ ]Normal [ x] Incontinent   [ ]Oliguria/Anuria   [ ][place]  MUSCULOSKELETAL:   [ ]Normal   [x ]Weakness  [ ]Bed/Wheelchair bound [ ]Edema  NEUROLOGIC:   [x ]No focal deficits  [ ] Cognitive impairment  [ ] Dysphagia [ ]Dysarthria [ ] Paresis [ ]Other   SKIN:   [ x]Normal   [ ]Pressure ulcer(s)  [ ]Rash    CRITICAL CARE:  [ ] Shock Present  [ ]Septic [ ]Cardiogenic [ ]Neurologic [ ]Hypovolemic  [ ]  Vasopressors [ ]  Inotropes   [ ] Respiratory failure present  [ ] Acute  [ ] Chronic [ ] Hypoxic  [ ] Hypercarbic [ ] Other  [ ] Other organ failure     LABS:                        8.6    28.59 )-----------( 391      ( 2021 07:08 )             27.9   07-    132<L>  |  98  |  13  ----------------------------<  81  3.3<L>   |  20<L>  |  0.78    Ca    10.5      2021 07:08  Phos  2.7     07-  Mg     1.70     07-    TPro  5.1<L>  /  Alb  2.3<L>  /  TBili  0.4  /  DBili  x   /  AST  32  /  ALT  26  /  AlkPhos  181<H>  -      Urinalysis Basic - ( 2021 23:28 )    Color: Yellow / Appearance: Turbid / S.020 / pH: x  Gluc: x / Ketone: Negative  / Bili: Negative / Urobili: <2 mg/dL   Blood: x / Protein: 30 mg/dL / Nitrite: Negative   Leuk Esterase: Large / RBC: 0-2 /HPF / WBC >50 /HPF   Sq Epi: x / Non Sq Epi: Few / Bacteria: Many      RADIOLOGY & ADDITIONAL STUDIES:    PROTEIN CALORIE MALNUTRITION:   [ ] PPSV2 < or = to 30% [ ] significant weight loss  [ ] poor nutritional intake [ ] catabolic state [ ] anasarca     Artificial Nutrition [ ]     REFERRALS:   [ ]Chaplaincy  [ ] Hospice  [ ]Child Life  [ ]Social Work  [ ]Case management [ ]Holistic Therapy   Goals of Care Discussion Document:

## 2021-07-30 LAB
ANION GAP SERPL CALC-SCNC: 13 MMOL/L — SIGNIFICANT CHANGE UP (ref 7–14)
BUN SERPL-MCNC: 10 MG/DL — SIGNIFICANT CHANGE UP (ref 7–23)
CA-I BLD-SCNC: 1.45 MMOL/L — HIGH (ref 1.15–1.29)
CALCIUM SERPL-MCNC: 10.5 MG/DL — SIGNIFICANT CHANGE UP (ref 8.4–10.5)
CHLORIDE SERPL-SCNC: 99 MMOL/L — SIGNIFICANT CHANGE UP (ref 98–107)
CO2 SERPL-SCNC: 22 MMOL/L — SIGNIFICANT CHANGE UP (ref 22–31)
CREAT SERPL-MCNC: 0.79 MG/DL — SIGNIFICANT CHANGE UP (ref 0.5–1.3)
GLUCOSE SERPL-MCNC: 107 MG/DL — HIGH (ref 70–99)
HCT VFR BLD CALC: 26.5 % — LOW (ref 34.5–45)
HGB BLD-MCNC: 8.1 G/DL — LOW (ref 11.5–15.5)
LDH SERPL L TO P-CCNC: 143 U/L — SIGNIFICANT CHANGE UP (ref 135–225)
MAGNESIUM SERPL-MCNC: 1.5 MG/DL — LOW (ref 1.6–2.6)
MCHC RBC-ENTMCNC: 24.2 PG — LOW (ref 27–34)
MCHC RBC-ENTMCNC: 30.6 GM/DL — LOW (ref 32–36)
MCV RBC AUTO: 79.1 FL — LOW (ref 80–100)
NRBC # BLD: 0 /100 WBCS — SIGNIFICANT CHANGE UP
NRBC # FLD: 0 K/UL — SIGNIFICANT CHANGE UP
PHOSPHATE SERPL-MCNC: 2.3 MG/DL — LOW (ref 2.5–4.5)
PLATELET # BLD AUTO: 401 K/UL — HIGH (ref 150–400)
POTASSIUM SERPL-MCNC: 3 MMOL/L — LOW (ref 3.5–5.3)
POTASSIUM SERPL-SCNC: 3 MMOL/L — LOW (ref 3.5–5.3)
PTH-INTACT FLD-MCNC: 10 PG/ML — LOW (ref 15–65)
RBC # BLD: 3.35 M/UL — LOW (ref 3.8–5.2)
RBC # FLD: 17.7 % — HIGH (ref 10.3–14.5)
SODIUM SERPL-SCNC: 134 MMOL/L — LOW (ref 135–145)
T4 FREE SERPL-MCNC: 1 NG/DL — SIGNIFICANT CHANGE UP (ref 0.9–1.8)
TSH SERPL-MCNC: 5.89 UIU/ML — HIGH (ref 0.27–4.2)
URATE SERPL-MCNC: 3.4 MG/DL — SIGNIFICANT CHANGE UP (ref 2.5–7)
WBC # BLD: 23.54 K/UL — HIGH (ref 3.8–10.5)
WBC # FLD AUTO: 23.54 K/UL — HIGH (ref 3.8–10.5)

## 2021-07-30 PROCEDURE — 93010 ELECTROCARDIOGRAM REPORT: CPT

## 2021-07-30 PROCEDURE — 99233 SBSQ HOSP IP/OBS HIGH 50: CPT

## 2021-07-30 PROCEDURE — 99232 SBSQ HOSP IP/OBS MODERATE 35: CPT | Mod: GC

## 2021-07-30 RX ORDER — POTASSIUM CHLORIDE 20 MEQ
40 PACKET (EA) ORAL EVERY 4 HOURS
Refills: 0 | Status: COMPLETED | OUTPATIENT
Start: 2021-07-30 | End: 2021-07-30

## 2021-07-30 RX ORDER — ALPRAZOLAM 0.25 MG
0.25 TABLET ORAL
Refills: 0 | Status: DISCONTINUED | OUTPATIENT
Start: 2021-07-30 | End: 2021-08-05

## 2021-07-30 RX ADMIN — SODIUM CHLORIDE 75 MILLILITER(S): 9 INJECTION INTRAMUSCULAR; INTRAVENOUS; SUBCUTANEOUS at 23:19

## 2021-07-30 RX ADMIN — Medication 3 MILLILITER(S): at 22:20

## 2021-07-30 RX ADMIN — PIPERACILLIN AND TAZOBACTAM 25 GRAM(S): 4; .5 INJECTION, POWDER, LYOPHILIZED, FOR SOLUTION INTRAVENOUS at 11:54

## 2021-07-30 RX ADMIN — Medication 40 MILLIEQUIVALENT(S): at 15:08

## 2021-07-30 RX ADMIN — AMIODARONE HYDROCHLORIDE 200 MILLIGRAM(S): 400 TABLET ORAL at 05:21

## 2021-07-30 RX ADMIN — Medication 3 MILLILITER(S): at 09:00

## 2021-07-30 RX ADMIN — Medication 0.25 MILLIGRAM(S): at 10:39

## 2021-07-30 RX ADMIN — Medication 40 MILLIEQUIVALENT(S): at 10:37

## 2021-07-30 RX ADMIN — PIPERACILLIN AND TAZOBACTAM 25 GRAM(S): 4; .5 INJECTION, POWDER, LYOPHILIZED, FOR SOLUTION INTRAVENOUS at 03:09

## 2021-07-30 RX ADMIN — PIPERACILLIN AND TAZOBACTAM 25 GRAM(S): 4; .5 INJECTION, POWDER, LYOPHILIZED, FOR SOLUTION INTRAVENOUS at 18:05

## 2021-07-30 RX ADMIN — Medication 3 MILLILITER(S): at 04:06

## 2021-07-30 RX ADMIN — Medication 250 MILLIGRAM(S): at 11:58

## 2021-07-30 RX ADMIN — ENOXAPARIN SODIUM 40 MILLIGRAM(S): 100 INJECTION SUBCUTANEOUS at 11:55

## 2021-07-30 NOTE — PROGRESS NOTE ADULT - PROBLEM SELECTOR PLAN 1
pt meets criteria for sepsis- now resolved   In the ED received Zosyn x2 and Vanc; continue for now; check vanco trough  - culture neg to date - would do 5 days of antibiotics given sepsis cascade on admission.   CT chest reviewed

## 2021-07-30 NOTE — PROGRESS NOTE ADULT - SUBJECTIVE AND OBJECTIVE BOX
Endocrine Follow Up:     Interval History: Patient seen and examined at bedside today afternoon.     MEDICATIONS  (STANDING):  albuterol/ipratropium for Nebulization 3 milliLiter(s) Nebulizer every 6 hours  aMIOdarone    Tablet 200 milliGRAM(s) Oral daily  enoxaparin Injectable 40 milliGRAM(s) SubCutaneous daily  piperacillin/tazobactam IVPB.. 3.375 Gram(s) IV Intermittent every 8 hours  potassium chloride   Powder 40 milliEquivalent(s) Oral every 4 hours  potassium chloride  10 mEq/100 mL IVPB 10 milliEquivalent(s) IV Intermittent every 1 hour  sodium chloride 0.9%. 1000 milliLiter(s) (75 mL/Hr) IV Continuous <Continuous>  vancomycin  IVPB 1000 milliGRAM(s) IV Intermittent daily    MEDICATIONS  (PRN):  acetaminophen   Tablet .. 650 milliGRAM(s) Oral every 6 hours PRN Temp greater or equal to 38.5C (101.3F), Mild Pain (1 - 3)  ALPRAZolam 0.25 milliGRAM(s) Oral two times a day PRN anxiety  aluminum hydroxide/magnesium hydroxide/simethicone Suspension 30 milliLiter(s) Oral every 4 hours PRN Dyspepsia  melatonin 3 milliGRAM(s) Oral at bedtime PRN Insomnia  ondansetron Injectable 4 milliGRAM(s) IV Push every 8 hours PRN Nausea and/or Vomiting      Allergies  No Known Allergies  Intolerances    ROS: All other systems reviewed and negative    PHYSICAL EXAM:  VITALS: T(C): 36.7 (07-30-21 @ 04:11)  T(F): 98 (07-30-21 @ 04:11), Max: 98.1 (07-29-21 @ 20:11)  HR: 76 (07-30-21 @ 09:01) (74 - 88)  BP: 132/57 (07-30-21 @ 04:11) (132/57 - 134/68)  RR:  (17 - 18)  SpO2:  (97% - 100%)  Wt(kg): --  GENERAL: NAD, well-groomed, well-developed  EYES: No proptosis,  anicteric  HEENT:  Atraumatic, Normocephalic, moist mucous membranes  THYROID: Normal size, no palpable nodules  RESPIRATORY: Clear to auscultation bilaterally; No rales, rhonchi, wheezing, or rubs  CARDIOVASCULAR: Regular rate and rhythm; No murmurs  GI: Soft, nontender, non distended, normal bowel sounds  SKIN: Dry, intact, No rashes or lesions  MUSCULOSKELETAL: Full range of motion, normal strength  NEURO: AOx3, moves all extremities spontaenuously   PSYCH:  reactive affect, euthymic mood      07-30    134<L>  |  99  |  10  ----------------------------<  107<H>  3.0<L>   |  22  |  0.79    EGFR if : 77  EGFR if non : 66    Ca    10.5      07-30  Mg     1.50     07-30  Phos  2.3     07-30    TPro  5.1<L>  /  Alb  2.3<L>  /  TBili  0.4  /  DBili  x   /  AST  32  /  ALT  26  /  AlkPhos  181<H>  07-29    Thyroid Function Tests:  07-30 @ 07:28 TSH 5.89 FreeT4 -- T3 -- Anti TPO -- Anti Thyroglobulin Ab -- TSI --             Endocrine Follow Up:     Interval History: Patient seen and examined at bedside today afternoon. Reports that she is feeling tired and no one is letting her sleep.     MEDICATIONS  (STANDING):  albuterol/ipratropium for Nebulization 3 milliLiter(s) Nebulizer every 6 hours  aMIOdarone    Tablet 200 milliGRAM(s) Oral daily  enoxaparin Injectable 40 milliGRAM(s) SubCutaneous daily  piperacillin/tazobactam IVPB.. 3.375 Gram(s) IV Intermittent every 8 hours  potassium chloride   Powder 40 milliEquivalent(s) Oral every 4 hours  potassium chloride  10 mEq/100 mL IVPB 10 milliEquivalent(s) IV Intermittent every 1 hour  sodium chloride 0.9%. 1000 milliLiter(s) (75 mL/Hr) IV Continuous <Continuous>  vancomycin  IVPB 1000 milliGRAM(s) IV Intermittent daily    MEDICATIONS  (PRN):  acetaminophen   Tablet .. 650 milliGRAM(s) Oral every 6 hours PRN Temp greater or equal to 38.5C (101.3F), Mild Pain (1 - 3)  ALPRAZolam 0.25 milliGRAM(s) Oral two times a day PRN anxiety  aluminum hydroxide/magnesium hydroxide/simethicone Suspension 30 milliLiter(s) Oral every 4 hours PRN Dyspepsia  melatonin 3 milliGRAM(s) Oral at bedtime PRN Insomnia  ondansetron Injectable 4 milliGRAM(s) IV Push every 8 hours PRN Nausea and/or Vomiting    Allergies  No Known Allergies  Intolerances    ROS: All other systems reviewed and negative    PHYSICAL EXAM:  VITALS: T(C): 36.7 (07-30-21 @ 04:11)  T(F): 98 (07-30-21 @ 04:11), Max: 98.1 (07-29-21 @ 20:11)  HR: 76 (07-30-21 @ 09:01) (74 - 88)  BP: 132/57 (07-30-21 @ 04:11) (132/57 - 134/68)  RR:  (17 - 18)  SpO2:  (97% - 100%)  Wt(kg): --  GENERAL: NAD, well-groomed, well-developed  EYES: No proptosis,  anicteric  HEENT:  Atraumatic, Normocephalic, moist mucous membranes  THYROID: Normal size, no palpable nodules  RESPIRATORY: Clear to auscultation bilaterally; No rales, rhonchi, wheezing, or rubs  CARDIOVASCULAR: Regular rate and rhythm; No murmurs  GI: Soft, nontender, non distended, normal bowel sounds  SKIN: Dry, intact, No rashes or lesions  MUSCULOSKELETAL: Full range of motion, normal strength  NEURO: AOx3, moves all extremities spontaenuously   PSYCH:  reactive affect, euthymic mood      07-30    134<L>  |  99  |  10  ----------------------------<  107<H>  3.0<L>   |  22  |  0.79    EGFR if : 77  EGFR if non : 66    Ca    10.5      07-30  Mg     1.50     07-30  Phos  2.3     07-30    TPro  5.1<L>  /  Alb  2.3<L>  /  TBili  0.4  /  DBili  x   /  AST  32  /  ALT  26  /  AlkPhos  181<H>  07-29    Thyroid Function Tests:  07-30 @ 07:28 TSH 5.89 FreeT4 -- T3 -- Anti TPO -- Anti Thyroglobulin Ab -- TSI --             Endocrine Follow Up:     Interval History: Patient seen and examined at bedside today afternoon. Reports that she is feeling tired and no one is letting her sleep.     MEDICATIONS  (STANDING):  albuterol/ipratropium for Nebulization 3 milliLiter(s) Nebulizer every 6 hours  aMIOdarone    Tablet 200 milliGRAM(s) Oral daily  enoxaparin Injectable 40 milliGRAM(s) SubCutaneous daily  piperacillin/tazobactam IVPB.. 3.375 Gram(s) IV Intermittent every 8 hours  potassium chloride   Powder 40 milliEquivalent(s) Oral every 4 hours  potassium chloride  10 mEq/100 mL IVPB 10 milliEquivalent(s) IV Intermittent every 1 hour  sodium chloride 0.9%. 1000 milliLiter(s) (75 mL/Hr) IV Continuous <Continuous>  vancomycin  IVPB 1000 milliGRAM(s) IV Intermittent daily    MEDICATIONS  (PRN):  acetaminophen   Tablet .. 650 milliGRAM(s) Oral every 6 hours PRN Temp greater or equal to 38.5C (101.3F), Mild Pain (1 - 3)  ALPRAZolam 0.25 milliGRAM(s) Oral two times a day PRN anxiety  aluminum hydroxide/magnesium hydroxide/simethicone Suspension 30 milliLiter(s) Oral every 4 hours PRN Dyspepsia  melatonin 3 milliGRAM(s) Oral at bedtime PRN Insomnia  ondansetron Injectable 4 milliGRAM(s) IV Push every 8 hours PRN Nausea and/or Vomiting    Allergies  No Known Allergies  Intolerances    ROS: All other systems reviewed and negative    PHYSICAL EXAM:  VITALS: T(C): 36.7 (07-30-21 @ 04:11)  T(F): 98 (07-30-21 @ 04:11), Max: 98.1 (07-29-21 @ 20:11)  HR: 76 (07-30-21 @ 09:01) (74 - 88)  BP: 132/57 (07-30-21 @ 04:11) (132/57 - 134/68)  RR:  (17 - 18)  SpO2:  (97% - 100%)  Wt(kg): --  GENERAL: NAD, fatigued appearing elderly female   EYES: No proptosis,  anicteric  HEENT:  Atraumatic, Normocephalic, moist mucous membranes  THYROID: Normal size, no palpable nodules  RESPIRATORY: Clear to auscultation bilaterally; No rales, rhonchi, wheezing, or rubs  CARDIOVASCULAR: Regular rate and rhythm; No murmurs  GI: Soft, nontender, non distended, normal bowel sounds  MUSCULOSKELETAL: Full range of motion, normal strength  NEURO: AOx2-3, moves all extremities spontaneously       07-30    134<L>  |  99  |  10  ----------------------------<  107<H>  3.0<L>   |  22  |  0.79    EGFR if : 77  EGFR if non : 66    Ca    10.5      07-30  Mg     1.50     07-30  Phos  2.3     07-30    TPro  5.1<L>  /  Alb  2.3<L>  /  TBili  0.4  /  DBili  x   /  AST  32  /  ALT  26  /  AlkPhos  181<H>  07-29    Thyroid Function Tests:  07-30 @ 07:28 TSH 5.89 FreeT4 -- T3 -- Anti TPO -- Anti Thyroglobulin Ab -- TSI --

## 2021-07-30 NOTE — PROGRESS NOTE ADULT - SUBJECTIVE AND OBJECTIVE BOX
[facility] Division of Hospital Medicine  Najma Pollock MD  Pager (M-F, 8A-5P): [phone]  Other Times:  [phone]    Patient is a 89y old  Female who presents with a chief complaint of SOB (30 Jul 2021 07:03)    SUBJECTIVE / OVERNIGHT EVENTS:  pt comfortable - feels sob is the same as when she came in to the hospital.    MEDICATIONS  (STANDING):  albuterol/ipratropium for Nebulization 3 milliLiter(s) Nebulizer every 6 hours  aMIOdarone    Tablet 200 milliGRAM(s) Oral daily  enoxaparin Injectable 40 milliGRAM(s) SubCutaneous daily  piperacillin/tazobactam IVPB.. 3.375 Gram(s) IV Intermittent every 8 hours  potassium chloride   Powder 40 milliEquivalent(s) Oral every 4 hours  potassium chloride  10 mEq/100 mL IVPB 10 milliEquivalent(s) IV Intermittent every 1 hour  sodium chloride 0.9%. 1000 milliLiter(s) (75 mL/Hr) IV Continuous <Continuous>  vancomycin  IVPB 1000 milliGRAM(s) IV Intermittent daily    MEDICATIONS  (PRN):  acetaminophen   Tablet .. 650 milliGRAM(s) Oral every 6 hours PRN Temp greater or equal to 38.5C (101.3F), Mild Pain (1 - 3)  aluminum hydroxide/magnesium hydroxide/simethicone Suspension 30 milliLiter(s) Oral every 4 hours PRN Dyspepsia  melatonin 3 milliGRAM(s) Oral at bedtime PRN Insomnia  ondansetron Injectable 4 milliGRAM(s) IV Push every 8 hours PRN Nausea and/or Vomiting      CAPILLARY BLOOD GLUCOSE        I&O's Summary      PHYSICAL EXAM:  Vital Signs Last 24 Hrs  T(C): 36.7 (30 Jul 2021 04:11), Max: 36.7 (29 Jul 2021 20:11)  T(F): 98 (30 Jul 2021 04:11), Max: 98.1 (29 Jul 2021 20:11)  HR: 88 (30 Jul 2021 04:11) (69 - 90)  BP: 132/57 (30 Jul 2021 04:11) (119/64 - 134/68)  BP(mean): --  RR: 17 (30 Jul 2021 04:11) (17 - 18)  SpO2: 100% (30 Jul 2021 04:11) (98% - 100%)    CONSTITUTIONAL: NAD, well-developed, well-groomed  EYES: EOMI; conjunctiva and sclera clear  ENMT: Moist oral mucosa  RESPIRATORY: Normal respiratory effort; diminished BS on R side, + pleurex catheter   CARDIOVASCULAR: Regular rate and rhythm, normal S1 and S2, no murmur; No lower extremity edema  ABDOMEN: Nontender to palpation, normoactive bowel sounds, no rebound/guarding  MUSCULOSKELETAL:   no clubbing or cyanosis of digits; no joint swelling or tenderness to palpation  PSYCH: A+O to person, place, and time; affect appropriate  NEUROLOGY: CN 2-12 are intact and symmetric; no gross sensory deficits   SKIN: No rashes; no palpable lesions    LABS:                        8.1    23.54 )-----------( 401      ( 30 Jul 2021 07:28 )             26.5     07-30    134<L>  |  99  |  10  ----------------------------<  107<H>  3.0<L>   |  22  |  0.79    Ca    10.5      30 Jul 2021 07:28  Phos  2.3     07-30  Mg     1.50     07-30    TPro  5.1<L>  /  Alb  2.3<L>  /  TBili  0.4  /  DBili  x   /  AST  32  /  ALT  26  /  AlkPhos  181<H>  07-29        Culture - Blood (collected 28 Jul 2021 06:41)  Source: .Blood Blood-Peripheral  Preliminary Report (29 Jul 2021 07:01):    No growth to date.    Culture - Blood (collected 28 Jul 2021 06:41)  Source: .Blood Blood-Peripheral  Preliminary Report (29 Jul 2021 07:01):    No growth to date.    Culture - Urine (collected 27 Jul 2021 23:45)  Source: Clean Catch Clean Catch (Midstream)  Final Report (29 Jul 2021 03:43):    >=3 organisms. Probable collection contamination.      RADIOLOGY & ADDITIONAL TESTS:  Results Reviewed:   Imaging Personally Reviewed:  Electrocardiogram Personally Reviewed:    COORDINATION OF CARE:  Care Discussed with Consultants/Other Providers [Y/N]: Y  Prior or Outpatient Records Reviewed [Y/N]: Y

## 2021-07-30 NOTE — PROGRESS NOTE ADULT - SUBJECTIVE AND OBJECTIVE BOX
DATE OF SERVICE: 07-30-21 @ 07:03    Subjective: Patient seen and examined. No new events except as noted.     SUBJECTIVE/ROS:        MEDICATIONS:  MEDICATIONS  (STANDING):  albuterol/ipratropium for Nebulization 3 milliLiter(s) Nebulizer every 6 hours  aMIOdarone    Tablet 200 milliGRAM(s) Oral daily  enoxaparin Injectable 40 milliGRAM(s) SubCutaneous daily  piperacillin/tazobactam IVPB.. 3.375 Gram(s) IV Intermittent every 8 hours  potassium chloride  10 mEq/100 mL IVPB 10 milliEquivalent(s) IV Intermittent every 1 hour  sodium chloride 0.9%. 1000 milliLiter(s) (75 mL/Hr) IV Continuous <Continuous>  vancomycin  IVPB 1000 milliGRAM(s) IV Intermittent daily      PHYSICAL EXAM:  T(C): 36.7 (07-30-21 @ 04:11), Max: 36.7 (07-29-21 @ 20:11)  HR: 88 (07-30-21 @ 04:11) (69 - 90)  BP: 132/57 (07-30-21 @ 04:11) (119/64 - 134/68)  RR: 17 (07-30-21 @ 04:11) (17 - 18)  SpO2: 100% (07-30-21 @ 04:11) (98% - 100%)  Wt(kg): --  I&O's Summary          JVP: Normal  Neck: supple  Lung: dec bs   CV: S1 S2 , Murmur:  Abd: soft  Ext: No edema  neuro: Awake   Psych: flat affect  Skin: normal``    LABS/DATA:    CARDIAC MARKERS:                                8.6    28.59 )-----------( 391      ( 29 Jul 2021 07:08 )             27.9     07-29    132<L>  |  98  |  13  ----------------------------<  81  3.3<L>   |  20<L>  |  0.78    Ca    10.5      29 Jul 2021 07:08  Phos  2.7     07-29  Mg     1.60     07-29    TPro  5.1<L>  /  Alb  2.3<L>  /  TBili  0.4  /  DBili  x   /  AST  32  /  ALT  26  /  AlkPhos  181<H>  07-29    proBNP:   Lipid Profile:   HgA1c:   TSH:     TELE:  EKG:

## 2021-07-30 NOTE — PROGRESS NOTE ADULT - PROBLEM SELECTOR PLAN 3
hypercalcemia could be due to malignancy - corrected calcium is 11.9 today - would continue with IVF - appreciate endo eval, prefer to use steroids if ok with onc   monitor fluid status closely

## 2021-07-30 NOTE — PROGRESS NOTE ADULT - ASSESSMENT
89 yr old female presenting with sepsis secondary to PNA and new diagnosis of DLBCL with hypercalcemia.

## 2021-07-30 NOTE — PROGRESS NOTE ADULT - ASSESSMENT
89 yr old female with a pmh of Lymphoma, COPD, HFrEF (EF 26%), s/p right robot VATS/pleural biopsy with placement of pleurex catheter 7/14/21 readmitted for rehab for fever/sob, admitted for sepsis 2/2 PNA. Endocrine consulted for hypercalcemia, suspected to be secondary to malignancy.     Hypercalcemia, likely 2/2 Malignancy in the setting of Lymphoma  Corrected Ca 12.86 > 11.86 w/ Mg 1.5 and Phos 2.3  On chart review, hypercalcemia also noted on recent admission  7/16 Vitamin D 25 OH 37.1  7/16 Vitamin D 1,25 Dihydroxy elevated to 94.4   7/16 TSH 2.83  Intact PTH suppressed at 10  Currently on IVF NS at 75cc/hr   Recommendations:   - Continue IVF   - Continue to monitor CMP daily   - Consider tele monitoring if any concern for hypercalcemia related EKG changes   - Would hold off on starting bisphosphonates/dental consult at this time given bisphosphonates only a temporary solution for hypercalcemia & treatment for hypercalcemia due to lymphoma is steroids  - Would need to clarify with hematology/oncology if any plans to initiate treatment for DLBCL and if treatment plan includes steroids as this would in turn also treat patient's hypercalcemia; typically prednisone 20 to 40mg PO daily is used for treatment of hypercalcemia of malignancy.     Hyponatremia in the setting of Amiodarone Use, r/o Thyroid Dysfunction   Na 132  Recommendations:   - Follow Up Free T4 level     Vitamin D Toxicity   7/16 Vitamin D 25 OH 37.1  7/16 Vitamin D 1,25 Dihydroxy elevated to 94.4   Recommendations:   - Continue to hold vitamin D supplementation at this time   - Elevated Vitamin D 1, 25 Dihydroxy level likely related to DLBCL     Discussed with Dr. Wilkins &     Becca Zhang DO   Endocrinology Fellow   Pager [phone]  Please call [phone] after hours and on weekends/holidays.  89 yr old female with a pmh of Lymphoma, COPD, HFrEF (EF 26%), s/p right robot VATS/pleural biopsy with placement of pleurex catheter 7/14/21 readmitted for rehab for fever/sob, admitted for sepsis 2/2 PNA. Endocrine consulted for hypercalcemia, suspected to be secondary to malignancy.     Hypercalcemia, likely 2/2 Malignancy in the setting of Lymphoma  Corrected Ca 12.86 > 11.86 w/ Mg 1.5 and Phos 2.3  On chart review, hypercalcemia also noted on recent admission  7/16 Vitamin D 25 OH 37.1  7/16 Vitamin D 1,25 Dihydroxy elevated to 94.4   7/16 TSH 2.83  Intact PTH suppressed at 10  Currently on IVF NS at 75cc/hr   Recommendations:   - Continue IVF, remains on NS 75ml/hr   - Continue to monitor CMP daily   - Consider tele monitoring if any concern for hypercalcemia related EKG changes   - Would hold off on starting bisphosphonates/dental consult at this time given bisphosphonates only a temporary solution for hypercalcemia & treatment for hypercalcemia due to lymphoma is steroids  - Would need to clarify with hematology/oncology if any plans to initiate treatment for DLBCL and if treatment plan includes steroids as this would in turn also treat patient's hypercalcemia; typically prednisone 20 to 40mg PO daily is used for treatment of hypercalcemia of malignancy.     Hyponatremia in the setting of Amiodarone Use, r/o Thyroid Dysfunction   Na 132 >> 134  TSH 5.89  Recommendations:   - Follow Up Free T4 level (added on)   - Can repeat TSH as outpatient     Vitamin D Toxicity   7/16 Vitamin D 25 OH 37.1  7/16 Vitamin D 1,25 Dihydroxy elevated to 94.4   Recommendations:   - Continue to hold vitamin D supplementation at this time   - Elevated Vitamin D 1, 25 Dihydroxy level likely related to DLBCL     Discussed with Dr. Franky Zhang,    Endocrinology Fellow   Pager [phone]  Please call [phone] after hours and on weekends/holidays.

## 2021-07-30 NOTE — PROGRESS NOTE ADULT - PROBLEM SELECTOR PLAN 2
new diagnosis  - path confirmed DLBCL  - CT chest reviewed - CTS to draw off fluid and send for cytopath and flow   - will check TLS labs   - renal eval as requested by onc  - awaiting family decision regarding chemo

## 2021-07-30 NOTE — PROGRESS NOTE ADULT - ASSESSMENT
Right Pl effusion / PNA / Sepsis     anbx  CT reviewed has malignant process  fu with onc, cts   palliative care input appreciated     PAF post op   in sinus  cont amio for short term , DC after few weeks   off a/c   poor candidate for a/c high bleed risk given sepsis , pl effusion and recent surgery

## 2021-07-31 DIAGNOSIS — R74.01 ELEVATION OF LEVELS OF LIVER TRANSAMINASE LEVELS: ICD-10-CM

## 2021-07-31 DIAGNOSIS — E03.9 HYPOTHYROIDISM, UNSPECIFIED: ICD-10-CM

## 2021-07-31 LAB
ALBUMIN SERPL ELPH-MCNC: 2.7 G/DL — LOW (ref 3.3–5)
ALP SERPL-CCNC: 194 U/L — HIGH (ref 40–120)
ALT FLD-CCNC: 34 U/L — HIGH (ref 4–33)
ANION GAP SERPL CALC-SCNC: 14 MMOL/L — SIGNIFICANT CHANGE UP (ref 7–14)
AST SERPL-CCNC: 40 U/L — HIGH (ref 4–32)
BASOPHILS # BLD AUTO: 0.08 K/UL — SIGNIFICANT CHANGE UP (ref 0–0.2)
BASOPHILS NFR BLD AUTO: 0.3 % — SIGNIFICANT CHANGE UP (ref 0–2)
BILIRUB SERPL-MCNC: 0.4 MG/DL — SIGNIFICANT CHANGE UP (ref 0.2–1.2)
BUN SERPL-MCNC: 7 MG/DL — SIGNIFICANT CHANGE UP (ref 7–23)
CA-I BLD-SCNC: 1.32 MMOL/L — HIGH (ref 1.15–1.29)
CALCIUM SERPL-MCNC: 10.6 MG/DL — HIGH (ref 8.4–10.5)
CHLORIDE SERPL-SCNC: 100 MMOL/L — SIGNIFICANT CHANGE UP (ref 98–107)
CO2 SERPL-SCNC: 21 MMOL/L — LOW (ref 22–31)
CREAT SERPL-MCNC: 0.66 MG/DL — SIGNIFICANT CHANGE UP (ref 0.5–1.3)
EOSINOPHIL # BLD AUTO: 0.66 K/UL — HIGH (ref 0–0.5)
EOSINOPHIL NFR BLD AUTO: 2.8 % — SIGNIFICANT CHANGE UP (ref 0–6)
GLUCOSE SERPL-MCNC: 90 MG/DL — SIGNIFICANT CHANGE UP (ref 70–99)
HCT VFR BLD CALC: 30.9 % — LOW (ref 34.5–45)
HGB BLD-MCNC: 9.4 G/DL — LOW (ref 11.5–15.5)
IANC: 20.53 K/UL — HIGH (ref 1.5–8.5)
IMM GRANULOCYTES NFR BLD AUTO: 0.8 % — SIGNIFICANT CHANGE UP (ref 0–1.5)
LDH SERPL L TO P-CCNC: 157 U/L — SIGNIFICANT CHANGE UP (ref 135–225)
LYMPHOCYTES # BLD AUTO: 1.1 K/UL — SIGNIFICANT CHANGE UP (ref 1–3.3)
LYMPHOCYTES # BLD AUTO: 4.7 % — LOW (ref 13–44)
MAGNESIUM SERPL-MCNC: 1.5 MG/DL — LOW (ref 1.6–2.6)
MCHC RBC-ENTMCNC: 24.3 PG — LOW (ref 27–34)
MCHC RBC-ENTMCNC: 30.4 GM/DL — LOW (ref 32–36)
MCV RBC AUTO: 79.8 FL — LOW (ref 80–100)
MONOCYTES # BLD AUTO: 1.09 K/UL — HIGH (ref 0–0.9)
MONOCYTES NFR BLD AUTO: 4.6 % — SIGNIFICANT CHANGE UP (ref 2–14)
NEUTROPHILS # BLD AUTO: 20.53 K/UL — HIGH (ref 1.8–7.4)
NEUTROPHILS NFR BLD AUTO: 86.8 % — HIGH (ref 43–77)
NRBC # BLD: 0 /100 WBCS — SIGNIFICANT CHANGE UP
NRBC # FLD: 0 K/UL — SIGNIFICANT CHANGE UP
PHOSPHATE SERPL-MCNC: 2.3 MG/DL — LOW (ref 2.5–4.5)
PLATELET # BLD AUTO: 442 K/UL — HIGH (ref 150–400)
POTASSIUM SERPL-MCNC: 3 MMOL/L — LOW (ref 3.5–5.3)
POTASSIUM SERPL-SCNC: 3 MMOL/L — LOW (ref 3.5–5.3)
PROT SERPL-MCNC: 5.9 G/DL — LOW (ref 6–8.3)
RBC # BLD: 3.87 M/UL — SIGNIFICANT CHANGE UP (ref 3.8–5.2)
RBC # FLD: 17.8 % — HIGH (ref 10.3–14.5)
SODIUM SERPL-SCNC: 135 MMOL/L — SIGNIFICANT CHANGE UP (ref 135–145)
T4 FREE SERPL-MCNC: 1 NG/DL — SIGNIFICANT CHANGE UP (ref 0.9–1.8)
URATE SERPL-MCNC: 2.8 MG/DL — SIGNIFICANT CHANGE UP (ref 2.5–7)
VANCOMYCIN TROUGH SERPL-MCNC: 8.2 UG/ML — LOW (ref 10–20)
WBC # BLD: 23.64 K/UL — HIGH (ref 3.8–10.5)
WBC # FLD AUTO: 23.64 K/UL — HIGH (ref 3.8–10.5)

## 2021-07-31 PROCEDURE — 99233 SBSQ HOSP IP/OBS HIGH 50: CPT

## 2021-07-31 RX ORDER — MAGNESIUM SULFATE 500 MG/ML
2 VIAL (ML) INJECTION ONCE
Refills: 0 | Status: COMPLETED | OUTPATIENT
Start: 2021-07-31 | End: 2021-07-31

## 2021-07-31 RX ORDER — POTASSIUM CHLORIDE 20 MEQ
40 PACKET (EA) ORAL EVERY 4 HOURS
Refills: 0 | Status: COMPLETED | OUTPATIENT
Start: 2021-07-31 | End: 2021-07-31

## 2021-07-31 RX ADMIN — Medication 250 MILLIGRAM(S): at 13:47

## 2021-07-31 RX ADMIN — Medication 3 MILLILITER(S): at 09:29

## 2021-07-31 RX ADMIN — AMIODARONE HYDROCHLORIDE 200 MILLIGRAM(S): 400 TABLET ORAL at 05:19

## 2021-07-31 RX ADMIN — PIPERACILLIN AND TAZOBACTAM 25 GRAM(S): 4; .5 INJECTION, POWDER, LYOPHILIZED, FOR SOLUTION INTRAVENOUS at 18:50

## 2021-07-31 RX ADMIN — PIPERACILLIN AND TAZOBACTAM 25 GRAM(S): 4; .5 INJECTION, POWDER, LYOPHILIZED, FOR SOLUTION INTRAVENOUS at 12:09

## 2021-07-31 RX ADMIN — Medication 3 MILLILITER(S): at 15:20

## 2021-07-31 RX ADMIN — ENOXAPARIN SODIUM 40 MILLIGRAM(S): 100 INJECTION SUBCUTANEOUS at 12:09

## 2021-07-31 RX ADMIN — Medication 3 MILLILITER(S): at 21:47

## 2021-07-31 RX ADMIN — Medication 50 GRAM(S): at 18:07

## 2021-07-31 RX ADMIN — PIPERACILLIN AND TAZOBACTAM 25 GRAM(S): 4; .5 INJECTION, POWDER, LYOPHILIZED, FOR SOLUTION INTRAVENOUS at 02:39

## 2021-07-31 RX ADMIN — Medication 3 MILLILITER(S): at 04:52

## 2021-07-31 RX ADMIN — Medication 40 MILLIEQUIVALENT(S): at 21:39

## 2021-07-31 RX ADMIN — Medication 40 MILLIEQUIVALENT(S): at 18:08

## 2021-07-31 NOTE — PROGRESS NOTE ADULT - PROBLEM SELECTOR PLAN 3
hypercalcemia could be due to malignancy - corrected calcium is 11.7 today - would continue with IVF, increase to 100/hr - appreciate endo eval, prefer to use steroids if ok with onc- waiting for family decision regarding chemo   monitor fluid status closely

## 2021-07-31 NOTE — PROGRESS NOTE ADULT - PROBLEM SELECTOR PLAN 1
pt meets criteria for sepsis- now resolved   In the ED received Zosyn and Vanc; continue for now; check vanco trough now   - culture neg to date - would do 5 days of antibiotics given sepsis cascade on admission.   CT chest reviewed

## 2021-07-31 NOTE — PROGRESS NOTE ADULT - ASSESSMENT
Right Pl effusion / PNA / Sepsis     anbx  CT reviewed has malignant process  fu with onc, cts   palliative care input appreciated     PAF post op   in sinus  cont amio for short term , DC after few weeks   off a/c   poor candidate for a/c high bleed risk given sepsis , pl effusion and recent surgery     Echo personally reviewed EF is incorrectly reported, pt has normal LV function

## 2021-07-31 NOTE — PROGRESS NOTE ADULT - SUBJECTIVE AND OBJECTIVE BOX
DATE OF SERVICE: 07-31-21 @ 12:38    Subjective: Patient seen and examined. No new events except as noted.     SUBJECTIVE/ROS:    no new events     MEDICATIONS:  MEDICATIONS  (STANDING):  albuterol/ipratropium for Nebulization 3 milliLiter(s) Nebulizer every 6 hours  aMIOdarone    Tablet 200 milliGRAM(s) Oral daily  enoxaparin Injectable 40 milliGRAM(s) SubCutaneous daily  piperacillin/tazobactam IVPB.. 3.375 Gram(s) IV Intermittent every 8 hours  potassium chloride  10 mEq/100 mL IVPB 10 milliEquivalent(s) IV Intermittent every 1 hour  sodium chloride 0.9%. 1000 milliLiter(s) (75 mL/Hr) IV Continuous <Continuous>  vancomycin  IVPB 1000 milliGRAM(s) IV Intermittent daily      PHYSICAL EXAM:  T(C): 37.1 (07-31-21 @ 04:50), Max: 37.1 (07-31-21 @ 04:50)  HR: 76 (07-31-21 @ 09:29) (74 - 89)  BP: 158/73 (07-31-21 @ 04:50) (138/64 - 158/73)  RR: 19 (07-31-21 @ 04:50) (19 - 20)  SpO2: 96% (07-31-21 @ 09:29) (96% - 100%)  Wt(kg): --  I&O's Summary          JVP: Normal  Neck: supple  Lung: clear   CV: S1 S2 , Murmur:  Abd: soft  Ext: No edema  neuro: Awake / alert  Psych: flat affect  Skin: normal``    LABS/DATA:    CARDIAC MARKERS:                                9.4    23.64 )-----------( 442      ( 31 Jul 2021 06:28 )             30.9     07-31    135  |  100  |  7   ----------------------------<  90  3.0<L>   |  21<L>  |  0.66    Ca    10.6<H>      31 Jul 2021 06:28  Phos  2.3     07-31  Mg     1.50     07-31    TPro  5.9<L>  /  Alb  2.7<L>  /  TBili  0.4  /  DBili  x   /  AST  40<H>  /  ALT  34<H>  /  AlkPhos  194<H>  07-31    proBNP:   Lipid Profile:   HgA1c:   TSH:     TELE:  EKG:

## 2021-07-31 NOTE — PROGRESS NOTE ADULT - PROBLEM SELECTOR PLAN 2
new diagnosis  - path confirmed DLBCL  - CT chest reviewed - pleural fluid sent for cytopath and flow on 7/30  - will check TLS labs   - renal eval as requested by onc  - awaiting family decision regarding chemo

## 2021-07-31 NOTE — PROGRESS NOTE ADULT - SUBJECTIVE AND OBJECTIVE BOX
[facility] of [facility] Medicine  Najma Pollock MD  Pager (M-F, 8A-5P): [phone]  Other Times:  [phone]    Patient is a 89y old  Female who presents with a chief complaint of SOB (30 Jul 2021 12:37)    SUBJECTIVE / OVERNIGHT EVENTS:  continues to feel tired - states that she keeps getting interrupted sleep.  denies n/v/abd pain     MEDICATIONS  (STANDING):  albuterol/ipratropium for Nebulization 3 milliLiter(s) Nebulizer every 6 hours  aMIOdarone    Tablet 200 milliGRAM(s) Oral daily  enoxaparin Injectable 40 milliGRAM(s) SubCutaneous daily  piperacillin/tazobactam IVPB.. 3.375 Gram(s) IV Intermittent every 8 hours  potassium chloride  10 mEq/100 mL IVPB 10 milliEquivalent(s) IV Intermittent every 1 hour  sodium chloride 0.9%. 1000 milliLiter(s) (75 mL/Hr) IV Continuous <Continuous>  vancomycin  IVPB 1000 milliGRAM(s) IV Intermittent daily    MEDICATIONS  (PRN):  acetaminophen   Tablet .. 650 milliGRAM(s) Oral every 6 hours PRN Temp greater or equal to 38.5C (101.3F), Mild Pain (1 - 3)  ALPRAZolam 0.25 milliGRAM(s) Oral two times a day PRN anxiety  aluminum hydroxide/magnesium hydroxide/simethicone Suspension 30 milliLiter(s) Oral every 4 hours PRN Dyspepsia  melatonin 3 milliGRAM(s) Oral at bedtime PRN Insomnia  ondansetron Injectable 4 milliGRAM(s) IV Push every 8 hours PRN Nausea and/or Vomiting      CAPILLARY BLOOD GLUCOSE        I&O's Summary      PHYSICAL EXAM:  Vital Signs Last 24 Hrs  T(C): 37.1 (31 Jul 2021 04:50), Max: 37.1 (31 Jul 2021 04:50)  T(F): 98.7 (31 Jul 2021 04:50), Max: 98.7 (31 Jul 2021 04:50)  HR: 76 (31 Jul 2021 09:29) (74 - 89)  BP: 158/73 (31 Jul 2021 04:50) (138/64 - 158/73)  BP(mean): --  RR: 19 (31 Jul 2021 04:50) (18 - 20)  SpO2: 96% (31 Jul 2021 09:29) (96% - 100%)    CONSTITUTIONAL: NAD, well-developed, well-groomed  EYES: EOMI; conjunctiva and sclera clear  ENMT: Moist oral mucosa  RESPIRATORY: Normal respiratory effort; diminished BS on R side, + pleurex catheter   CARDIOVASCULAR: Regular rate and rhythm, normal S1 and S2, no murmur; No lower extremity edema  ABDOMEN: Nontender to palpation, normoactive bowel sounds, no rebound/guarding  MUSCULOSKELETAL:   no clubbing or cyanosis of digits; no joint swelling or tenderness to palpation  PSYCH: A+O to person, place, and time; affect appropriate  NEUROLOGY: CN 2-12 are intact and symmetric; no gross sensory deficits   SKIN: No rashes; no palpable lesions    LABS:                        9.4    23.64 )-----------( 442      ( 31 Jul 2021 06:28 )             30.9     07-31    135  |  100  |  7   ----------------------------<  90  3.0<L>   |  21<L>  |  0.66    Ca    10.6<H>      31 Jul 2021 06:28  Phos  2.3     07-31  Mg     1.50     07-31    TPro  5.9<L>  /  Alb  2.7<L>  /  TBili  0.4  /  DBili  x   /  AST  40<H>  /  ALT  34<H>  /  AlkPhos  194<H>  07-31      RADIOLOGY & ADDITIONAL TESTS:  Results Reviewed:   Imaging Personally Reviewed:  Electrocardiogram Personally Reviewed:    COORDINATION OF CARE:  Care Discussed with Consultants/Other Providers [Y/N]: Y  Prior or Outpatient Records Reviewed [Y/N]: Y

## 2021-08-01 LAB
ALBUMIN SERPL ELPH-MCNC: 2.4 G/DL — LOW (ref 3.3–5)
ALP SERPL-CCNC: 171 U/L — HIGH (ref 40–120)
ALT FLD-CCNC: 28 U/L — SIGNIFICANT CHANGE UP (ref 4–33)
ANION GAP SERPL CALC-SCNC: 12 MMOL/L — SIGNIFICANT CHANGE UP (ref 7–14)
AST SERPL-CCNC: 29 U/L — SIGNIFICANT CHANGE UP (ref 4–32)
BASOPHILS # BLD AUTO: 0.08 K/UL — SIGNIFICANT CHANGE UP (ref 0–0.2)
BASOPHILS NFR BLD AUTO: 0.4 % — SIGNIFICANT CHANGE UP (ref 0–2)
BILIRUB SERPL-MCNC: 0.2 MG/DL — SIGNIFICANT CHANGE UP (ref 0.2–1.2)
BUN SERPL-MCNC: 8 MG/DL — SIGNIFICANT CHANGE UP (ref 7–23)
CALCIUM SERPL-MCNC: 10.6 MG/DL — HIGH (ref 8.4–10.5)
CHLORIDE SERPL-SCNC: 102 MMOL/L — SIGNIFICANT CHANGE UP (ref 98–107)
CO2 SERPL-SCNC: 25 MMOL/L — SIGNIFICANT CHANGE UP (ref 22–31)
CREAT SERPL-MCNC: 0.81 MG/DL — SIGNIFICANT CHANGE UP (ref 0.5–1.3)
EOSINOPHIL # BLD AUTO: 0.66 K/UL — HIGH (ref 0–0.5)
EOSINOPHIL NFR BLD AUTO: 3.4 % — SIGNIFICANT CHANGE UP (ref 0–6)
GLUCOSE SERPL-MCNC: 101 MG/DL — HIGH (ref 70–99)
HCT VFR BLD CALC: 30.4 % — LOW (ref 34.5–45)
HGB BLD-MCNC: 9.2 G/DL — LOW (ref 11.5–15.5)
IANC: 16.01 K/UL — HIGH (ref 1.5–8.5)
IMM GRANULOCYTES NFR BLD AUTO: 0.6 % — SIGNIFICANT CHANGE UP (ref 0–1.5)
LDH SERPL L TO P-CCNC: 141 U/L — SIGNIFICANT CHANGE UP (ref 135–225)
LYMPHOCYTES # BLD AUTO: 1.26 K/UL — SIGNIFICANT CHANGE UP (ref 1–3.3)
LYMPHOCYTES # BLD AUTO: 6.5 % — LOW (ref 13–44)
MAGNESIUM SERPL-MCNC: 2 MG/DL — SIGNIFICANT CHANGE UP (ref 1.6–2.6)
MCHC RBC-ENTMCNC: 24.1 PG — LOW (ref 27–34)
MCHC RBC-ENTMCNC: 30.3 GM/DL — LOW (ref 32–36)
MCV RBC AUTO: 79.6 FL — LOW (ref 80–100)
MONOCYTES # BLD AUTO: 1.14 K/UL — HIGH (ref 0–0.9)
MONOCYTES NFR BLD AUTO: 5.9 % — SIGNIFICANT CHANGE UP (ref 2–14)
NEUTROPHILS # BLD AUTO: 16.01 K/UL — HIGH (ref 1.8–7.4)
NEUTROPHILS NFR BLD AUTO: 83.2 % — HIGH (ref 43–77)
NRBC # BLD: 0 /100 WBCS — SIGNIFICANT CHANGE UP
NRBC # FLD: 0 K/UL — SIGNIFICANT CHANGE UP
PHOSPHATE SERPL-MCNC: 2.8 MG/DL — SIGNIFICANT CHANGE UP (ref 2.5–4.5)
PLATELET # BLD AUTO: 415 K/UL — HIGH (ref 150–400)
POTASSIUM SERPL-MCNC: 3.3 MMOL/L — LOW (ref 3.5–5.3)
POTASSIUM SERPL-SCNC: 3.3 MMOL/L — LOW (ref 3.5–5.3)
PROT SERPL-MCNC: 5.1 G/DL — LOW (ref 6–8.3)
RBC # BLD: 3.82 M/UL — SIGNIFICANT CHANGE UP (ref 3.8–5.2)
RBC # FLD: 17.8 % — HIGH (ref 10.3–14.5)
SODIUM SERPL-SCNC: 139 MMOL/L — SIGNIFICANT CHANGE UP (ref 135–145)
URATE SERPL-MCNC: 3.2 MG/DL — SIGNIFICANT CHANGE UP (ref 2.5–7)
WBC # BLD: 19.26 K/UL — HIGH (ref 3.8–10.5)
WBC # FLD AUTO: 19.26 K/UL — HIGH (ref 3.8–10.5)

## 2021-08-01 PROCEDURE — 99233 SBSQ HOSP IP/OBS HIGH 50: CPT

## 2021-08-01 RX ORDER — BENZOCAINE AND MENTHOL 5; 1 G/100ML; G/100ML
1 LIQUID ORAL ONCE
Refills: 0 | Status: COMPLETED | OUTPATIENT
Start: 2021-08-01 | End: 2021-08-04

## 2021-08-01 RX ORDER — MAGNESIUM SULFATE 500 MG/ML
1 VIAL (ML) INJECTION ONCE
Refills: 0 | Status: COMPLETED | OUTPATIENT
Start: 2021-08-01 | End: 2021-08-01

## 2021-08-01 RX ORDER — POTASSIUM CHLORIDE 20 MEQ
40 PACKET (EA) ORAL EVERY 4 HOURS
Refills: 0 | Status: COMPLETED | OUTPATIENT
Start: 2021-08-01 | End: 2021-08-01

## 2021-08-01 RX ADMIN — Medication 100 GRAM(S): at 13:37

## 2021-08-01 RX ADMIN — Medication 40 MILLIEQUIVALENT(S): at 17:36

## 2021-08-01 RX ADMIN — Medication 40 MILLIEQUIVALENT(S): at 13:37

## 2021-08-01 RX ADMIN — PIPERACILLIN AND TAZOBACTAM 25 GRAM(S): 4; .5 INJECTION, POWDER, LYOPHILIZED, FOR SOLUTION INTRAVENOUS at 02:03

## 2021-08-01 RX ADMIN — Medication 3 MILLILITER(S): at 11:24

## 2021-08-01 RX ADMIN — AMIODARONE HYDROCHLORIDE 200 MILLIGRAM(S): 400 TABLET ORAL at 05:09

## 2021-08-01 RX ADMIN — Medication 3 MILLILITER(S): at 04:27

## 2021-08-01 RX ADMIN — ENOXAPARIN SODIUM 40 MILLIGRAM(S): 100 INJECTION SUBCUTANEOUS at 11:48

## 2021-08-01 RX ADMIN — Medication 3 MILLILITER(S): at 22:15

## 2021-08-01 RX ADMIN — Medication 3 MILLILITER(S): at 16:01

## 2021-08-01 NOTE — PROGRESS NOTE ADULT - SUBJECTIVE AND OBJECTIVE BOX
[facility]  Najma Pollock MD  Pager (M-F, 8A-5P): [phone]  Other Times:  [phone]    Patient is a 89y old  Female who presents with a chief complaint of SOB (31 Jul 2021 12:37)    SUBJECTIVE / OVERNIGHT EVENTS:  comfortable - states she is not getting good sleep overnight.     MEDICATIONS  (STANDING):  albuterol/ipratropium for Nebulization 3 milliLiter(s) Nebulizer every 6 hours  aMIOdarone    Tablet 200 milliGRAM(s) Oral daily  enoxaparin Injectable 40 milliGRAM(s) SubCutaneous daily  piperacillin/tazobactam IVPB.. 3.375 Gram(s) IV Intermittent every 8 hours  potassium chloride  10 mEq/100 mL IVPB 10 milliEquivalent(s) IV Intermittent every 1 hour  sodium chloride 0.9%. 1000 milliLiter(s) (75 mL/Hr) IV Continuous <Continuous>  vancomycin  IVPB 1000 milliGRAM(s) IV Intermittent daily    MEDICATIONS  (PRN):  acetaminophen   Tablet .. 650 milliGRAM(s) Oral every 6 hours PRN Temp greater or equal to 38.5C (101.3F), Mild Pain (1 - 3)  ALPRAZolam 0.25 milliGRAM(s) Oral two times a day PRN anxiety  aluminum hydroxide/magnesium hydroxide/simethicone Suspension 30 milliLiter(s) Oral every 4 hours PRN Dyspepsia  melatonin 3 milliGRAM(s) Oral at bedtime PRN Insomnia  ondansetron Injectable 4 milliGRAM(s) IV Push every 8 hours PRN Nausea and/or Vomiting      CAPILLARY BLOOD GLUCOSE        I&O's Summary    31 Jul 2021 07:01  -  01 Aug 2021 07:00  --------------------------------------------------------  IN: 1025 mL / OUT: 0 mL / NET: 1025 mL    01 Aug 2021 07:01  -  01 Aug 2021 10:51  --------------------------------------------------------  IN: 343 mL / OUT: 0 mL / NET: 343 mL        PHYSICAL EXAM:  Vital Signs Last 24 Hrs  T(C): 36.8 (01 Aug 2021 05:08), Max: 37 (31 Jul 2021 12:45)  T(F): 98.2 (01 Aug 2021 05:08), Max: 98.6 (31 Jul 2021 12:45)  HR: 85 (01 Aug 2021 05:08) (79 - 85)  BP: 147/80 (01 Aug 2021 05:08) (145/60 - 150/72)  BP(mean): --  RR: 18 (01 Aug 2021 05:08) (18 - 20)  SpO2: 98% (01 Aug 2021 05:08) (96% - 99%)    CONSTITUTIONAL: NAD, well-developed, well-groomed  EYES: EOMI; conjunctiva and sclera clear  ENMT: Moist oral mucosa  RESPIRATORY: Normal respiratory effort; diminished BS on R side, + pleurex catheter   CARDIOVASCULAR: Regular rate and rhythm, normal S1 and S2, no murmur; No lower extremity edema  ABDOMEN: Nontender to palpation, normoactive bowel sounds, no rebound/guarding  MUSCULOSKELETAL:   no clubbing or cyanosis of digits; no joint swelling or tenderness to palpation  PSYCH: A+O to person, place, and time; affect appropriate  NEUROLOGY: CN 2-12 are intact and symmetric; no gross sensory deficits   SKIN: No rashes; no palpable lesions    LABS:                        9.2    19.26 )-----------( 415      ( 01 Aug 2021 06:21 )             30.4     08-01    139  |  102  |  8   ----------------------------<  101<H>  3.3<L>   |  25  |  0.81    Ca    10.6<H>      01 Aug 2021 06:21  Phos  2.8     08-01  Mg     2.00     08-01    TPro  5.1<L>  /  Alb  2.4<L>  /  TBili  0.2  /  DBili  x   /  AST  29  /  ALT  28  /  AlkPhos  171<H>  08-01      RADIOLOGY & ADDITIONAL TESTS:  Results Reviewed:   Imaging Personally Reviewed:  Electrocardiogram Personally Reviewed:    COORDINATION OF CARE:  Care Discussed with Consultants/Other Providers [Y/N]: Y  Prior or Outpatient Records Reviewed [Y/N]: Y

## 2021-08-01 NOTE — PROGRESS NOTE ADULT - SUBJECTIVE AND OBJECTIVE BOX
DATE OF SERVICE: 08-01-21 @ 12:33    Subjective: Patient seen and examined. No new events except as noted.     SUBJECTIVE/ROS:        MEDICATIONS:  MEDICATIONS  (STANDING):  albuterol/ipratropium for Nebulization 3 milliLiter(s) Nebulizer every 6 hours  aMIOdarone    Tablet 200 milliGRAM(s) Oral daily  enoxaparin Injectable 40 milliGRAM(s) SubCutaneous daily  magnesium sulfate  IVPB 1 Gram(s) IV Intermittent once  potassium chloride    Tablet ER 40 milliEquivalent(s) Oral every 4 hours  potassium chloride  10 mEq/100 mL IVPB 10 milliEquivalent(s) IV Intermittent every 1 hour  sodium chloride 0.9%. 1000 milliLiter(s) (75 mL/Hr) IV Continuous <Continuous>      PHYSICAL EXAM:  T(C): 36.8 (08-01-21 @ 05:08), Max: 37 (07-31-21 @ 12:45)  HR: 85 (08-01-21 @ 05:08) (79 - 85)  BP: 147/80 (08-01-21 @ 05:08) (145/60 - 150/72)  RR: 18 (08-01-21 @ 05:08) (18 - 20)  SpO2: 98% (08-01-21 @ 05:08) (96% - 99%)  Wt(kg): --  I&O's Summary    31 Jul 2021 07:01  -  01 Aug 2021 07:00  --------------------------------------------------------  IN: 1025 mL / OUT: 0 mL / NET: 1025 mL    01 Aug 2021 07:01  -  01 Aug 2021 12:33  --------------------------------------------------------  IN: 343 mL / OUT: 0 mL / NET: 343 mL            JVP: Normal  Neck: supple  Lung: clear   CV: S1 S2 , Murmur:  Abd: soft  Ext: No edema  neuro: Awake / alert  Psych: flat affect  Skin: normal``    LABS/DATA:    CARDIAC MARKERS:                                9.2    19.26 )-----------( 415      ( 01 Aug 2021 06:21 )             30.4     08-01    139  |  102  |  8   ----------------------------<  101<H>  3.3<L>   |  25  |  0.81    Ca    10.6<H>      01 Aug 2021 06:21  Phos  2.8     08-01  Mg     2.00     08-01    TPro  5.1<L>  /  Alb  2.4<L>  /  TBili  0.2  /  DBili  x   /  AST  29  /  ALT  28  /  AlkPhos  171<H>  08-01    proBNP:   Lipid Profile:   HgA1c:   TSH:     TELE:  EKG:

## 2021-08-01 NOTE — PROGRESS NOTE ADULT - PROBLEM SELECTOR PLAN 3
hypercalcemia could be due to malignancy - corrected calcium is 11.9 today - would continue with IVF, continue at 100/hr - appreciate endo eval, prefer to use steroids if ok with onc- waiting for family decision regarding chemo   monitor fluid status closely

## 2021-08-01 NOTE — PROGRESS NOTE ADULT - PROBLEM SELECTOR PLAN 1
pt meets criteria for sepsis- now resolved   s/p 5 days of vanco/zosyn   - cultures neg to date  CT chest reviewed

## 2021-08-01 NOTE — PROGRESS NOTE ADULT - PROBLEM SELECTOR PLAN 2
new diagnosis  - path confirmed DLBCL  - CT chest reviewed - pleural fluid sent for cytopath and flow on 7/30  - monitor TLS labs   - renal eval requested by onc  - awaiting family decision regarding chemo

## 2021-08-02 DIAGNOSIS — E67.3 HYPERVITAMINOSIS D: ICD-10-CM

## 2021-08-02 LAB
ALBUMIN SERPL ELPH-MCNC: 2.6 G/DL — LOW (ref 3.3–5)
ALP SERPL-CCNC: 150 U/L — HIGH (ref 40–120)
ALT FLD-CCNC: 31 U/L — SIGNIFICANT CHANGE UP (ref 4–33)
ANION GAP SERPL CALC-SCNC: 11 MMOL/L — SIGNIFICANT CHANGE UP (ref 7–14)
AST SERPL-CCNC: 29 U/L — SIGNIFICANT CHANGE UP (ref 4–32)
BASOPHILS # BLD AUTO: 0.06 K/UL — SIGNIFICANT CHANGE UP (ref 0–0.2)
BASOPHILS NFR BLD AUTO: 0.3 % — SIGNIFICANT CHANGE UP (ref 0–2)
BILIRUB SERPL-MCNC: 0.2 MG/DL — SIGNIFICANT CHANGE UP (ref 0.2–1.2)
BUN SERPL-MCNC: 7 MG/DL — SIGNIFICANT CHANGE UP (ref 7–23)
CALCIUM SERPL-MCNC: 10.4 MG/DL — SIGNIFICANT CHANGE UP (ref 8.4–10.5)
CHLORIDE SERPL-SCNC: 101 MMOL/L — SIGNIFICANT CHANGE UP (ref 98–107)
CO2 SERPL-SCNC: 26 MMOL/L — SIGNIFICANT CHANGE UP (ref 22–31)
CREAT SERPL-MCNC: 0.72 MG/DL — SIGNIFICANT CHANGE UP (ref 0.5–1.3)
CULTURE RESULTS: SIGNIFICANT CHANGE UP
CULTURE RESULTS: SIGNIFICANT CHANGE UP
EOSINOPHIL # BLD AUTO: 0.41 K/UL — SIGNIFICANT CHANGE UP (ref 0–0.5)
EOSINOPHIL NFR BLD AUTO: 2.1 % — SIGNIFICANT CHANGE UP (ref 0–6)
GLUCOSE SERPL-MCNC: 112 MG/DL — HIGH (ref 70–99)
HCT VFR BLD CALC: 29.9 % — LOW (ref 34.5–45)
HGB BLD-MCNC: 8.9 G/DL — LOW (ref 11.5–15.5)
IANC: 16.99 K/UL — HIGH (ref 1.5–8.5)
IMM GRANULOCYTES NFR BLD AUTO: 0.8 % — SIGNIFICANT CHANGE UP (ref 0–1.5)
LDH SERPL L TO P-CCNC: 136 U/L — SIGNIFICANT CHANGE UP (ref 135–225)
LYMPHOCYTES # BLD AUTO: 1.14 K/UL — SIGNIFICANT CHANGE UP (ref 1–3.3)
LYMPHOCYTES # BLD AUTO: 5.7 % — LOW (ref 13–44)
MAGNESIUM SERPL-MCNC: 1.9 MG/DL — SIGNIFICANT CHANGE UP (ref 1.6–2.6)
MCHC RBC-ENTMCNC: 23.9 PG — LOW (ref 27–34)
MCHC RBC-ENTMCNC: 29.8 GM/DL — LOW (ref 32–36)
MCV RBC AUTO: 80.2 FL — SIGNIFICANT CHANGE UP (ref 80–100)
MONOCYTES # BLD AUTO: 1.12 K/UL — HIGH (ref 0–0.9)
MONOCYTES NFR BLD AUTO: 5.6 % — SIGNIFICANT CHANGE UP (ref 2–14)
NEUTROPHILS # BLD AUTO: 16.99 K/UL — HIGH (ref 1.8–7.4)
NEUTROPHILS NFR BLD AUTO: 85.5 % — HIGH (ref 43–77)
NRBC # BLD: 0 /100 WBCS — SIGNIFICANT CHANGE UP
NRBC # FLD: 0 K/UL — SIGNIFICANT CHANGE UP
PHOSPHATE SERPL-MCNC: 3 MG/DL — SIGNIFICANT CHANGE UP (ref 2.5–4.5)
PLATELET # BLD AUTO: 403 K/UL — HIGH (ref 150–400)
POTASSIUM SERPL-MCNC: 3.1 MMOL/L — LOW (ref 3.5–5.3)
POTASSIUM SERPL-SCNC: 3.1 MMOL/L — LOW (ref 3.5–5.3)
PROT SERPL-MCNC: 5 G/DL — LOW (ref 6–8.3)
RBC # BLD: 3.73 M/UL — LOW (ref 3.8–5.2)
RBC # FLD: 17.7 % — HIGH (ref 10.3–14.5)
SODIUM SERPL-SCNC: 138 MMOL/L — SIGNIFICANT CHANGE UP (ref 135–145)
SPECIMEN SOURCE: SIGNIFICANT CHANGE UP
SPECIMEN SOURCE: SIGNIFICANT CHANGE UP
TM INTERPRETATION: SIGNIFICANT CHANGE UP
URATE SERPL-MCNC: 3.7 MG/DL — SIGNIFICANT CHANGE UP (ref 2.5–7)
WBC # BLD: 19.87 K/UL — HIGH (ref 3.8–10.5)
WBC # FLD AUTO: 19.87 K/UL — HIGH (ref 3.8–10.5)

## 2021-08-02 PROCEDURE — 99232 SBSQ HOSP IP/OBS MODERATE 35: CPT | Mod: GC

## 2021-08-02 PROCEDURE — 99233 SBSQ HOSP IP/OBS HIGH 50: CPT

## 2021-08-02 RX ORDER — POTASSIUM CHLORIDE 20 MEQ
40 PACKET (EA) ORAL EVERY 4 HOURS
Refills: 0 | Status: COMPLETED | OUTPATIENT
Start: 2021-08-02 | End: 2021-08-02

## 2021-08-02 RX ORDER — ALLOPURINOL 300 MG
300 TABLET ORAL DAILY
Refills: 0 | Status: DISCONTINUED | OUTPATIENT
Start: 2021-08-02 | End: 2021-08-05

## 2021-08-02 RX ADMIN — Medication 3 MILLILITER(S): at 23:32

## 2021-08-02 RX ADMIN — AMIODARONE HYDROCHLORIDE 200 MILLIGRAM(S): 400 TABLET ORAL at 05:24

## 2021-08-02 RX ADMIN — Medication 0.25 MILLIGRAM(S): at 22:57

## 2021-08-02 RX ADMIN — Medication 40 MILLIEQUIVALENT(S): at 14:19

## 2021-08-02 RX ADMIN — Medication 3 MILLILITER(S): at 05:16

## 2021-08-02 RX ADMIN — Medication 40 MILLIEQUIVALENT(S): at 10:07

## 2021-08-02 RX ADMIN — Medication 3 MILLILITER(S): at 09:21

## 2021-08-02 RX ADMIN — Medication 3 MILLILITER(S): at 16:10

## 2021-08-02 RX ADMIN — ENOXAPARIN SODIUM 40 MILLIGRAM(S): 100 INJECTION SUBCUTANEOUS at 11:18

## 2021-08-02 NOTE — PROGRESS NOTE ADULT - SUBJECTIVE AND OBJECTIVE BOX
Chief Complaint: Hypercalcemia    History: Has been having polyuria for the past few days. No constipation. Reports a bit of confusion intermittently. Not much appetite but trying to have Ensure supplements. Calcium noted to be slowly improving.    MEDICATIONS  (STANDING):  albuterol/ipratropium for Nebulization 3 milliLiter(s) Nebulizer every 6 hours  aMIOdarone    Tablet 200 milliGRAM(s) Oral daily  benzocaine 15 mG/menthol 3.6 mG (Sugar-Free) Lozenge 1 Lozenge Oral once  enoxaparin Injectable 40 milliGRAM(s) SubCutaneous daily  sodium chloride 0.9%. 1000 milliLiter(s) (75 mL/Hr) IV Continuous <Continuous>    MEDICATIONS  (PRN):  acetaminophen   Tablet .. 650 milliGRAM(s) Oral every 6 hours PRN Temp greater or equal to 38.5C (101.3F), Mild Pain (1 - 3)  ALPRAZolam 0.25 milliGRAM(s) Oral two times a day PRN anxiety  aluminum hydroxide/magnesium hydroxide/simethicone Suspension 30 milliLiter(s) Oral every 4 hours PRN Dyspepsia  melatonin 3 milliGRAM(s) Oral at bedtime PRN Insomnia  ondansetron Injectable 4 milliGRAM(s) IV Push every 8 hours PRN Nausea and/or Vomiting    PHYSICAL EXAM:  VITALS: T(C): 37 (08-02-21 @ 11:25)  T(F): 98.6 (08-02-21 @ 11:25), Max: 98.8 (08-01-21 @ 21:45)  HR: 90 (08-02-21 @ 16:10) (74 - 96)  BP: 133/62 (08-02-21 @ 11:25) (133/62 - 157/68)  RR:  (17 - 18)  SpO2:  (98% - 100%)  Wt(kg): --  General: Cachectic female, No acute distress, Speaking full sentences.   Eye:  Extraocular movements are intact, No proptosis or lid lag, conjunctival pallor noted, No scleral icterus.   HENT:  Normocephalic, Oral mucosa is moist.   Neck:  Supple, Non-tender, No thyromegaly or thyroid tenderness.   Respiratory:  Respirations are non-labored, Symmetric chest wall expansion, Breath sounds are equal.   Cardiovascular:  Normal rate, Regular rhythm, No edema.  Gastrointestinal:  Soft, Non-tender, Non-distended.   Musculoskeletal:  Normal range of motion, No gross joint swelling.   Mental Status Exam:  Orientedx4, Speech clear and coherent.   Neurologic:  Alert, Orientedx4, Normal motor function, No focal deficits, Cranial Nerves II-XII are grossly intact bilaterally.   Psychiatric:  Cooperative, Appropriate mood & affect.       08-02    138  |  101  |  7   ----------------------------<  112<H>  3.1<L>   |  26  |  0.72    EGFR if : 86  EGFR if non : 74    Ca    10.4      08-02  Mg     1.90     08-02  Phos  3.0     08-02    TPro  5.0<L>  /  Alb  2.6<L>  /  TBili  0.2  /  DBili  x   /  AST  29  /  ALT  31  /  AlkPhos  150<H>  08-02          Thyroid Function Tests:  07-31 @ 06:28 TSH -- FreeT4 1.0 T3 -- Anti TPO -- Anti Thyroglobulin Ab -- TSI --  07-30 @ 08:58 TSH -- FreeT4 1.0 T3 -- Anti TPO -- Anti Thyroglobulin Ab -- TSI --

## 2021-08-02 NOTE — PROGRESS NOTE ADULT - ASSESSMENT
89 yr old female with a pmh of Lymphoma, COPD, HFrEF (EF 26%), s/p right robot VATS/pleural biopsy with placement of pleurex catheter 7/14/21 readmitted for rehab for fever/sob, admitted for sepsis 2/2 PNA. Endocrine consulted for hypercalcemia, suspected to be secondary to malignancy.     Hypercalcemia, likely 2/2 Malignancy in the setting of Lymphoma  Corrected Ca 12.86 down to 10.4 with Mg 1.9 and Phos 3.0  On chart review, hypercalcemia also noted on recent admission  7/16 Vitamin D 25 OH 37.1  7/16 Vitamin D 1,25 Dihydroxy elevated to 94.4   7/16 TSH 2.83  Intact PTH suppressed at 10  Currently on IVF NS at 75cc/hr   Recommendations:   - Continue IVF, remains on NS 75ml/hr   - Continue to monitor CMP daily   - Consider tele monitoring if any concern for hypercalcemia related EKG changes   - Would hold off on starting bisphosphonates/dental consult at this time given bisphosphonates only a temporary solution for hypercalcemia & treatment for hypercalcemia due to lymphoma is steroids  - Would need to clarify with hematology/oncology if any plans to initiate treatment for DLBCL and if treatment plan includes steroids as this would in turn also treat patient's hypercalcemia; typically prednisone 20 to 40mg PO daily is used for treatment of hypercalcemia of malignancy.     Hyponatremia in the setting of Amiodarone Use, r/o Thyroid Dysfunction   Na 132 >> 138  TSH 5.89  FT4 1.0  Recommendations:   - Repeat TSH as outpatient     Hypervitaminosis D   7/16 Vitamin D 25 OH 37.1  7/16 Vitamin D 1,25 Dihydroxy elevated to 94.4   Recommendations:   - Continue to hold vitamin D supplementation at this time   - Elevated Vitamin D 1, 25 Dihydroxy level likely related to DLBCL     Jesus Smith DO, Endocrinology Fellow  Pager [phone] from 9am to 5pm. After hours and on weekends, please call [phone].

## 2021-08-02 NOTE — PROGRESS NOTE ADULT - PROBLEM SELECTOR PLAN 2
new diagnosis  - path confirmed DLBCL  - CT chest reviewed - pleural fluid sent for cytopath and flow on 7/30  - monitor TLS labs   - Patient and family agreeable to starting IP chemo, hematology aware   [ ] Hepatitis and HIV labs ordered for AM   - TTE noted from 7/21, normal EF

## 2021-08-02 NOTE — PROGRESS NOTE ADULT - PROBLEM SELECTOR PLAN 1
pt meets criteria for sepsis- now resolved   s/p 5 days of vanco/zosyn for PNA.   - cultures neg to date  CT chest reviewed

## 2021-08-02 NOTE — PROGRESS NOTE ADULT - SUBJECTIVE AND OBJECTIVE BOX
DATE OF SERVICE: 08-02-21 @ 06:54    Subjective: Patient seen and examined. No new events except as noted.     SUBJECTIVE/ROS:      MEDICATIONS:  MEDICATIONS  (STANDING):  albuterol/ipratropium for Nebulization 3 milliLiter(s) Nebulizer every 6 hours  aMIOdarone    Tablet 200 milliGRAM(s) Oral daily  benzocaine 15 mG/menthol 3.6 mG (Sugar-Free) Lozenge 1 Lozenge Oral once  enoxaparin Injectable 40 milliGRAM(s) SubCutaneous daily  potassium chloride  10 mEq/100 mL IVPB 10 milliEquivalent(s) IV Intermittent every 1 hour  sodium chloride 0.9%. 1000 milliLiter(s) (75 mL/Hr) IV Continuous <Continuous>      PHYSICAL EXAM:  T(C): 36.9 (08-02-21 @ 05:22), Max: 37.2 (08-01-21 @ 13:10)  HR: 77 (08-02-21 @ 05:22) (77 - 88)  BP: 157/68 (08-02-21 @ 05:22) (144/74 - 157/68)  RR: 17 (08-02-21 @ 05:22) (17 - 19)  SpO2: 98% (08-02-21 @ 05:22) (98% - 99%)  Wt(kg): --  I&O's Summary    31 Jul 2021 07:01  -  01 Aug 2021 07:00  --------------------------------------------------------  IN: 1025 mL / OUT: 0 mL / NET: 1025 mL    01 Aug 2021 07:01  -  02 Aug 2021 06:54  --------------------------------------------------------  IN: 1498 mL / OUT: 0 mL / NET: 1498 mL            JVP: Normal  Neck: supple  Lung: clear   CV: S1 S2 , Murmur:  Abd: soft  Ext: No edema  neuro: Awake / alert  Psych: flat affect  Skin: normal``    LABS/DATA:    CARDIAC MARKERS:                                9.2    19.26 )-----------( 415      ( 01 Aug 2021 06:21 )             30.4     08-01    139  |  102  |  8   ----------------------------<  101<H>  3.3<L>   |  25  |  0.81    Ca    10.6<H>      01 Aug 2021 06:21  Phos  2.8     08-01  Mg     2.00     08-01    TPro  5.1<L>  /  Alb  2.4<L>  /  TBili  0.2  /  DBili  x   /  AST  29  /  ALT  28  /  AlkPhos  171<H>  08-01    proBNP:   Lipid Profile:   HgA1c:   TSH:     TELE:  EKG:

## 2021-08-02 NOTE — CHART NOTE - NSCHARTNOTEFT_GEN_A_CORE
[facility].    Paged by Heme/Onc to discuss plan. As per Hemes discussion with family, patient and son both want to pursue chemotherapy. Pt A&Ox3. As per their recs, following labs pending: Hepatitis B Serology, HIV [facility].    Paged by Heme/Onc to discuss plan. As per Hemes discussion with family, patient and son both want to pursue chemotherapy. Pt A&Ox3. As per their recs, following labs pending: Hepatitis B Serology, HIV and Repeat Echo to reassess EF. Pt ordered for Allopurinol 300mg PO QD; Prednisone 20mg PO QD x 2days. IVF to be continued with minimum rate of 50cc/hr. AM Labs to be obtained: CMP w/ Mg & PO4; Uric Acid, and LDH (ordered). As per heme, patient with high potential for TLS. Will continue to monitor overnight.     Elis Lopez PA  Medicine [id number]

## 2021-08-02 NOTE — PROGRESS NOTE ADULT - ASSESSMENT
Right Pl effusion     CT reviewed has malignant process  fu with onc, cts for plan   palliative care input appreciated     PAF post op   in sinus  cont amio for short term , DC after few weeks   off a/c   poor candidate for a/c high bleed risk given sepsis , pl effusion and recent surgery     Echo personally reviewed EF is incorrectly reported, pt has normal LV function

## 2021-08-02 NOTE — PROGRESS NOTE ADULT - SUBJECTIVE AND OBJECTIVE BOX
[facility]    Patient is a 89y old  Female who presents with a chief complaint of SOB (31 Jul 2021 12:37)    SUBJECTIVE / OVERNIGHT EVENTS:  Patient is getting up to urinate frequently 2/2 IVF  Feels weak but overall no complaints. Is agreeable to starting chemo    MEDICATIONS  (STANDING):  albuterol/ipratropium for Nebulization 3 milliLiter(s) Nebulizer every 6 hours  aMIOdarone    Tablet 200 milliGRAM(s) Oral daily  enoxaparin Injectable 40 milliGRAM(s) SubCutaneous daily  piperacillin/tazobactam IVPB.. 3.375 Gram(s) IV Intermittent every 8 hours  potassium chloride  10 mEq/100 mL IVPB 10 milliEquivalent(s) IV Intermittent every 1 hour  sodium chloride 0.9%. 1000 milliLiter(s) (75 mL/Hr) IV Continuous <Continuous>  vancomycin  IVPB 1000 milliGRAM(s) IV Intermittent daily    MEDICATIONS  (PRN):  acetaminophen   Tablet .. 650 milliGRAM(s) Oral every 6 hours PRN Temp greater or equal to 38.5C (101.3F), Mild Pain (1 - 3)  ALPRAZolam 0.25 milliGRAM(s) Oral two times a day PRN anxiety  aluminum hydroxide/magnesium hydroxide/simethicone Suspension 30 milliLiter(s) Oral every 4 hours PRN Dyspepsia  melatonin 3 milliGRAM(s) Oral at bedtime PRN Insomnia  ondansetron Injectable 4 milliGRAM(s) IV Push every 8 hours PRN Nausea and/or Vomiting      CAPILLARY BLOOD GLUCOSE        I&O's Summary    31 Jul 2021 07:01  -  01 Aug 2021 07:00  --------------------------------------------------------  IN: 1025 mL / OUT: 0 mL / NET: 1025 mL    01 Aug 2021 07:01  -  01 Aug 2021 10:51  --------------------------------------------------------  IN: 343 mL / OUT: 0 mL / NET: 343 mL        PHYSICAL EXAM:  Vital Signs Last 24 Hrs  T(C): 36.8 (01 Aug 2021 05:08), Max: 37 (31 Jul 2021 12:45)  T(F): 98.2 (01 Aug 2021 05:08), Max: 98.6 (31 Jul 2021 12:45)  HR: 85 (01 Aug 2021 05:08) (79 - 85)  BP: 147/80 (01 Aug 2021 05:08) (145/60 - 150/72)  BP(mean): --  RR: 18 (01 Aug 2021 05:08) (18 - 20)  SpO2: 98% (01 Aug 2021 05:08) (96% - 99%)    CONSTITUTIONAL: NAD, well-developed, well-groomed  EYES: EOMI; conjunctiva and sclera clear  ENMT: Moist oral mucosa  RESPIRATORY: Normal respiratory effort; diminished BS on R side, + pleurex catheter   CARDIOVASCULAR: Regular rate and rhythm, normal S1 and S2, no murmur; No lower extremity edema  ABDOMEN: Nontender to palpation, normoactive bowel sounds, no rebound/guarding  MUSCULOSKELETAL:   no clubbing or cyanosis of digits; no joint swelling or tenderness to palpation  PSYCH: A+O to person, place, and time; affect appropriate  NEUROLOGY: CN 2-12 are intact and symmetric; no gross sensory deficits   SKIN: No rashes; no palpable lesions    LABS:                        8.9    19.87 )-----------( 403      ( 02 Aug 2021 07:41 )             29.9     02 Aug 2021 07:21    138    |  101    |  7      ----------------------------<  112    3.1     |  26     |  0.72     Ca    10.4       02 Aug 2021 07:21  Phos  3.0       02 Aug 2021 07:21  Mg     1.90      02 Aug 2021 07:21    TPro  5.0    /  Alb  2.6    /  TBili  0.2    /  DBili  x      /  AST  29     /  ALT  31     /  AlkPhos  150    02 Aug 2021 07:21        RADIOLOGY & ADDITIONAL TESTS:  Results Reviewed:   Imaging Personally Reviewed:  Electrocardiogram Personally Reviewed:    COORDINATION OF CARE:  Care Discussed with Consultants/Other Providers [Y/N]: Y  Prior or Outpatient Records Reviewed [Y/N]: Y

## 2021-08-02 NOTE — PROGRESS NOTE ADULT - ASSESSMENT
89 year old woman with a pmh of COPD and HFrEF (EF 26%) that with chronic cough, not improved with steroids nor Abx. Now s/p R robotic VATS/pleural bx with placement of pleurex catheter on 07/15/21 after PET from 07/07/21 reveal b/l upper lobe opacities concerning for malignancy with pleural effusion. Hematology consulted for newly diagnosed DLBCL.    #DLBCL  -S/p Right peritracheal lymph node, level 4 biopsy: Diffuse large B cell lymphoma, non-germinal center B cell like immunophenotype  -Will need pleural fluid cytopath and flow. Will need staging bone marrow as well.  -Patient and Family both confirm wanting treatment; however discussed with patient regarding intensity of treatment regimen. For now, given patient performance status, EF of 26% and continuing to require 02 (only since VATS procedure), will start only on Prednisone 20mg Daily for two days starting in AM tomorrow. Depending on response, patient status, will consider additional therapy. High risk for TLS; will need to start regimen in stages with q 8 hour TLS lab monitoring starting in AM. (CMP, Phos, Mag, LDH, Uric Acid)  -Will start on allopurinol 300mg Daily for TLS prophylaxis. Patient is on gentle hydration from primary team and would recommend continuing at minimum 50cc per hour, if patient can tolerate.   - Repeat Echo pendings  -Peripheral flow cytometry 7-; pending. . A written requisition and tubes were given to the floor nurse.    #Leukocytosis  -Leukocytosis with left shift with many neutrophils seen on the peripheral smear.  -Febrile to 103 in ED  - completed 5 days vanco and Zosyn;   -Bcx/Ucx negtive; no clear source of infection identified     #Hypercalcemia  -Corrected Calcium 11.4  -C/w maintenance IVF @ 75cc/hr given HF hx  -Recommend Nephrology consult for bisphosphonate therapy.    Please page with questions or concerns. Will Follow with you.      Jess Nelson MD  PGY 4, Oncology/Hematology fellow  (P) [phone]  After 5pm, please contact on-call team.

## 2021-08-02 NOTE — PROGRESS NOTE ADULT - SUBJECTIVE AND OBJECTIVE BOX
Hematology Oncology Follow-up    INTERVAL HPI/OVERNIGHT EVENTS:  No o/n events, patient resting , on nasal cannula. continues to have mild SOB and requiring 02, poor appetite.     Review of Systems:  General: denies fevers/chills, night sweats, malaise, changes in appetite  Head: denies HA  Eyes: denies vision change  ENT: denies oral lesions, rhinorrhea, epistaxys, sore throat, dysphagia  Respiratory: denies cough, shortness of breath, pleurisy  Cardiovascular: denies chest pain, palpitaitons, [place]  Gastrointestinal: denies nausea, vomiting, abdominal pain, constipation, diarrhea, melena, hematochezia  MSK: denies joint pain or muscle pain  Neuro: denies headache, weakness, or parasthesias  Skin: denies rash, petichiae, echymoses  Psych: denies anxiety or sleep disturbances    VITAL SIGNS:  T(F): 98.6 (08-02-21 @ 11:25)  HR: 90 (08-02-21 @ 16:10)  BP: 133/62 (08-02-21 @ 11:25)  RR: 18 (08-02-21 @ 11:25)  SpO2: 100% (08-02-21 @ 11:25)  Wt(kg): --    08-01-21 @ 07:01  -  08-02-21 @ 07:00  --------------------------------------------------------  IN: 1498 mL / OUT: 0 mL / NET: 1498 mL    08-02-21 @ 07:01  -  08-02-21 @ 19:05  --------------------------------------------------------  IN: 930 mL / OUT: 0 mL / NET: 930 mL        PHYSICAL EXAM:    Constitutional: AAOx3, NAD  Eyes: PERRL, EOMI, sclera non-icteric  Neck: supple, no masses, no JVD, no lymphadenopathy  Respiratory: CTA b/l, no wheezing, rhonchi, rales, with normal respiratory effort  Cardiovascular: RRR, normal S1S2, no M/R/G  Gastrointestinal: soft, NTND, no masses palpable, BS normal in all four quadrants, no HSM  Extremities:  no edema  MSK: no obvious abnormalities, normal ROM, no lymphadenopathy  Neurological: Grossly intact  Skin: Normal temperature, no rash, no echymoses, no petichiae  Psych: normal affect    MEDICATIONS  (STANDING):  albuterol/ipratropium for Nebulization 3 milliLiter(s) Nebulizer every 6 hours  aMIOdarone    Tablet 200 milliGRAM(s) Oral daily  benzocaine 15 mG/menthol 3.6 mG (Sugar-Free) Lozenge 1 Lozenge Oral once  enoxaparin Injectable 40 milliGRAM(s) SubCutaneous daily  sodium chloride 0.9%. 1000 milliLiter(s) (75 mL/Hr) IV Continuous <Continuous>    MEDICATIONS  (PRN):  acetaminophen   Tablet .. 650 milliGRAM(s) Oral every 6 hours PRN Temp greater or equal to 38.5C (101.3F), Mild Pain (1 - 3)  ALPRAZolam 0.25 milliGRAM(s) Oral two times a day PRN anxiety  aluminum hydroxide/magnesium hydroxide/simethicone Suspension 30 milliLiter(s) Oral every 4 hours PRN Dyspepsia  melatonin 3 milliGRAM(s) Oral at bedtime PRN Insomnia  ondansetron Injectable 4 milliGRAM(s) IV Push every 8 hours PRN Nausea and/or Vomiting      No Known Allergies      LABS:                        8.9    19.87 )-----------( 403      ( 02 Aug 2021 07:41 )             29.9     08-02    138  |  101  |  7   ----------------------------<  112<H>  3.1<L>   |  26  |  0.72    Ca    10.4      02 Aug 2021 07:21  Phos  3.0     08-02  Mg     1.90     08-02    TPro  5.0<L>  /  Alb  2.6<L>  /  TBili  0.2  /  DBili  x   /  AST  29  /  ALT  31  /  AlkPhos  150<H>  08-02     Lactate Dehydrogenase, Serum: 136 U/L (08-02 @ 07:21)                  RADIOLOGY & ADDITIONAL TESTS:  Studies reviewed. Hematology Oncology Follow-up    INTERVAL HPI/OVERNIGHT EVENTS:  No o/n events, patient resting , on nasal cannula. continues to have mild SOB and requiring 02, poor appetite. Appears sleepy in bed         VITAL SIGNS:  T(F): 98.6 (08-02-21 @ 11:25)  HR: 90 (08-02-21 @ 16:10)  BP: 133/62 (08-02-21 @ 11:25)  RR: 18 (08-02-21 @ 11:25)  SpO2: 100% (08-02-21 @ 11:25)  Wt(kg): --    08-01-21 @ 07:01  -  08-02-21 @ 07:00  --------------------------------------------------------  IN: 1498 mL / OUT: 0 mL / NET: 1498 mL    08-02-21 @ 07:01  -  08-02-21 @ 19:05  --------------------------------------------------------  IN: 930 mL / OUT: 0 mL / NET: 930 mL        PHYSICAL EXAM:    Constitutional: AAOx3, NAD, emanciated  Eyes: PERRL, EOMI, sclera non-icteric  Neck: supple, no masses, no JVD, no lymphadenopathy  Respiratory: decreased breath sounds bilaterally  Cardiovascular: RRR, normal S1S2, no M/R/G  Gastrointestinal: soft, NTND, no masses palpable, BS normal in all four quadrants, no HSM  Extremities:  no edema  MSK: no obvious abnormalities, normal ROM, no lymphadenopathy  Neurological: Grossly intact; appears sleepy but maintains conversation  Skin: Normal temperature, no rash, no echymoses, no petichiae  Psych: normal affect    MEDICATIONS  (STANDING):  albuterol/ipratropium for Nebulization 3 milliLiter(s) Nebulizer every 6 hours  aMIOdarone    Tablet 200 milliGRAM(s) Oral daily  benzocaine 15 mG/menthol 3.6 mG (Sugar-Free) Lozenge 1 Lozenge Oral once  enoxaparin Injectable 40 milliGRAM(s) SubCutaneous daily  sodium chloride 0.9%. 1000 milliLiter(s) (75 mL/Hr) IV Continuous <Continuous>    MEDICATIONS  (PRN):  acetaminophen   Tablet .. 650 milliGRAM(s) Oral every 6 hours PRN Temp greater or equal to 38.5C (101.3F), Mild Pain (1 - 3)  ALPRAZolam 0.25 milliGRAM(s) Oral two times a day PRN anxiety  aluminum hydroxide/magnesium hydroxide/simethicone Suspension 30 milliLiter(s) Oral every 4 hours PRN Dyspepsia  melatonin 3 milliGRAM(s) Oral at bedtime PRN Insomnia  ondansetron Injectable 4 milliGRAM(s) IV Push every 8 hours PRN Nausea and/or Vomiting      No Known Allergies      LABS:                        8.9    19.87 )-----------( 403      ( 02 Aug 2021 07:41 )             29.9     08-02    138  |  101  |  7   ----------------------------<  112<H>  3.1<L>   |  26  |  0.72    Ca    10.4      02 Aug 2021 07:21  Phos  3.0     08-02  Mg     1.90     08-02    TPro  5.0<L>  /  Alb  2.6<L>  /  TBili  0.2  /  DBili  x   /  AST  29  /  ALT  31  /  AlkPhos  150<H>  08-02     Lactate Dehydrogenase, Serum: 136 U/L (08-02 @ 07:21)        RADIOLOGY & ADDITIONAL TESTS:  Studies reviewed.

## 2021-08-03 DIAGNOSIS — R09.02 HYPOXEMIA: ICD-10-CM

## 2021-08-03 DIAGNOSIS — R73.9 HYPERGLYCEMIA, UNSPECIFIED: ICD-10-CM

## 2021-08-03 LAB
ALBUMIN SERPL ELPH-MCNC: 2.5 G/DL — LOW (ref 3.3–5)
ALBUMIN SERPL ELPH-MCNC: 2.7 G/DL — LOW (ref 3.3–5)
ALP SERPL-CCNC: 140 U/L — HIGH (ref 40–120)
ALP SERPL-CCNC: 150 U/L — HIGH (ref 40–120)
ALT FLD-CCNC: 49 U/L — HIGH (ref 4–33)
ALT FLD-CCNC: 52 U/L — HIGH (ref 4–33)
ANION GAP SERPL CALC-SCNC: 12 MMOL/L — SIGNIFICANT CHANGE UP (ref 7–14)
ANION GAP SERPL CALC-SCNC: 12 MMOL/L — SIGNIFICANT CHANGE UP (ref 7–14)
AST SERPL-CCNC: 51 U/L — HIGH (ref 4–32)
AST SERPL-CCNC: 55 U/L — HIGH (ref 4–32)
BASE EXCESS BLDV CALC-SCNC: 2.2 MMOL/L — HIGH (ref -3–2)
BILIRUB SERPL-MCNC: <0.2 MG/DL — SIGNIFICANT CHANGE UP (ref 0.2–1.2)
BILIRUB SERPL-MCNC: <0.2 MG/DL — SIGNIFICANT CHANGE UP (ref 0.2–1.2)
BLOOD GAS VENOUS - CREATININE: 0.8 MG/DL — SIGNIFICANT CHANGE UP (ref 0.5–1.3)
BLOOD GAS VENOUS COMPREHENSIVE RESULT: SIGNIFICANT CHANGE UP
BUN SERPL-MCNC: 10 MG/DL — SIGNIFICANT CHANGE UP (ref 7–23)
BUN SERPL-MCNC: 12 MG/DL — SIGNIFICANT CHANGE UP (ref 7–23)
CALCIUM SERPL-MCNC: 10.1 MG/DL — SIGNIFICANT CHANGE UP (ref 8.4–10.5)
CALCIUM SERPL-MCNC: 10.3 MG/DL — SIGNIFICANT CHANGE UP (ref 8.4–10.5)
CHLORIDE BLDV-SCNC: 104 MMOL/L — SIGNIFICANT CHANGE UP (ref 96–108)
CHLORIDE SERPL-SCNC: 100 MMOL/L — SIGNIFICANT CHANGE UP (ref 98–107)
CHLORIDE SERPL-SCNC: 104 MMOL/L — SIGNIFICANT CHANGE UP (ref 98–107)
CO2 SERPL-SCNC: 23 MMOL/L — SIGNIFICANT CHANGE UP (ref 22–31)
CO2 SERPL-SCNC: 23 MMOL/L — SIGNIFICANT CHANGE UP (ref 22–31)
CREAT SERPL-MCNC: 0.66 MG/DL — SIGNIFICANT CHANGE UP (ref 0.5–1.3)
CREAT SERPL-MCNC: 0.67 MG/DL — SIGNIFICANT CHANGE UP (ref 0.5–1.3)
GAS PNL BLDV: 129 MMOL/L — LOW (ref 136–146)
GLUCOSE BLDV-MCNC: 155 MG/DL — HIGH (ref 70–99)
GLUCOSE SERPL-MCNC: 112 MG/DL — HIGH (ref 70–99)
GLUCOSE SERPL-MCNC: 163 MG/DL — HIGH (ref 70–99)
HBV CORE AB SER-ACNC: SIGNIFICANT CHANGE UP
HBV CORE IGM SER-ACNC: SIGNIFICANT CHANGE UP
HBV SURFACE AG SER-ACNC: SIGNIFICANT CHANGE UP
HCO3 BLDV-SCNC: 26 MMOL/L — SIGNIFICANT CHANGE UP (ref 20–27)
HCT VFR BLD CALC: 27.2 % — LOW (ref 34.5–45)
HCT VFR BLDA CALC: 27.4 % — LOW (ref 34.5–46.5)
HCV AB S/CO SERPL IA: 0.05 S/CO — SIGNIFICANT CHANGE UP (ref 0–0.99)
HCV AB SERPL-IMP: SIGNIFICANT CHANGE UP
HGB BLD CALC-MCNC: 8.8 G/DL — LOW (ref 11.5–15.5)
HGB BLD-MCNC: 8.5 G/DL — LOW (ref 11.5–15.5)
HIV 1+2 AB+HIV1 P24 AG SERPL QL IA: SIGNIFICANT CHANGE UP
LACTATE BLDV-MCNC: 1.4 MMOL/L — SIGNIFICANT CHANGE UP (ref 0.5–2)
LDH SERPL L TO P-CCNC: 137 U/L — SIGNIFICANT CHANGE UP (ref 135–225)
LDH SERPL L TO P-CCNC: 142 U/L — SIGNIFICANT CHANGE UP (ref 135–225)
MAGNESIUM SERPL-MCNC: 1.7 MG/DL — SIGNIFICANT CHANGE UP (ref 1.6–2.6)
MAGNESIUM SERPL-MCNC: 1.8 MG/DL — SIGNIFICANT CHANGE UP (ref 1.6–2.6)
MCHC RBC-ENTMCNC: 24.3 PG — LOW (ref 27–34)
MCHC RBC-ENTMCNC: 31.3 GM/DL — LOW (ref 32–36)
MCV RBC AUTO: 77.7 FL — LOW (ref 80–100)
NON-GYNECOLOGICAL CYTOLOGY STUDY: SIGNIFICANT CHANGE UP
NRBC # BLD: 0 /100 WBCS — SIGNIFICANT CHANGE UP
NRBC # FLD: 0 K/UL — SIGNIFICANT CHANGE UP
OTHER CELLS CSF MANUAL: 11.8 ML/DL — LOW (ref 16–21.5)
PCO2 BLDV: 56 MMHG — HIGH (ref 41–51)
PH BLDV: 7.32 — SIGNIFICANT CHANGE UP (ref 7.32–7.43)
PHOSPHATE SERPL-MCNC: 3 MG/DL — SIGNIFICANT CHANGE UP (ref 2.5–4.5)
PHOSPHATE SERPL-MCNC: 4 MG/DL — SIGNIFICANT CHANGE UP (ref 2.5–4.5)
PLATELET # BLD AUTO: 368 K/UL — SIGNIFICANT CHANGE UP (ref 150–400)
PO2 BLDV: 81 MMHG — HIGH (ref 35–40)
POTASSIUM BLDV-SCNC: 3.4 MMOL/L — SIGNIFICANT CHANGE UP (ref 3.4–4.5)
POTASSIUM SERPL-MCNC: 3.2 MMOL/L — LOW (ref 3.5–5.3)
POTASSIUM SERPL-MCNC: 3.6 MMOL/L — SIGNIFICANT CHANGE UP (ref 3.5–5.3)
POTASSIUM SERPL-SCNC: 3.2 MMOL/L — LOW (ref 3.5–5.3)
POTASSIUM SERPL-SCNC: 3.6 MMOL/L — SIGNIFICANT CHANGE UP (ref 3.5–5.3)
PROT SERPL-MCNC: 5.1 G/DL — LOW (ref 6–8.3)
PROT SERPL-MCNC: 5.1 G/DL — LOW (ref 6–8.3)
RBC # BLD: 3.5 M/UL — LOW (ref 3.8–5.2)
RBC # FLD: 17.9 % — HIGH (ref 10.3–14.5)
SAO2 % BLDV: 96 % — HIGH (ref 60–85)
SODIUM SERPL-SCNC: 135 MMOL/L — SIGNIFICANT CHANGE UP (ref 135–145)
SODIUM SERPL-SCNC: 139 MMOL/L — SIGNIFICANT CHANGE UP (ref 135–145)
URATE SERPL-MCNC: 3.7 MG/DL — SIGNIFICANT CHANGE UP (ref 2.5–7)
URATE SERPL-MCNC: 3.9 MG/DL — SIGNIFICANT CHANGE UP (ref 2.5–7)
WBC # BLD: 21.18 K/UL — HIGH (ref 3.8–10.5)
WBC # FLD AUTO: 21.18 K/UL — HIGH (ref 3.8–10.5)

## 2021-08-03 PROCEDURE — 93306 TTE W/DOPPLER COMPLETE: CPT | Mod: 26

## 2021-08-03 PROCEDURE — 99233 SBSQ HOSP IP/OBS HIGH 50: CPT

## 2021-08-03 PROCEDURE — 71045 X-RAY EXAM CHEST 1 VIEW: CPT | Mod: 26

## 2021-08-03 PROCEDURE — 99232 SBSQ HOSP IP/OBS MODERATE 35: CPT | Mod: GC

## 2021-08-03 RX ORDER — POTASSIUM CHLORIDE 20 MEQ
40 PACKET (EA) ORAL ONCE
Refills: 0 | Status: COMPLETED | OUTPATIENT
Start: 2021-08-03 | End: 2021-08-03

## 2021-08-03 RX ORDER — SODIUM CHLORIDE 9 MG/ML
1000 INJECTION INTRAMUSCULAR; INTRAVENOUS; SUBCUTANEOUS
Refills: 0 | Status: DISCONTINUED | OUTPATIENT
Start: 2021-08-03 | End: 2021-08-13

## 2021-08-03 RX ADMIN — Medication 300 MILLIGRAM(S): at 11:41

## 2021-08-03 RX ADMIN — AMIODARONE HYDROCHLORIDE 200 MILLIGRAM(S): 400 TABLET ORAL at 05:04

## 2021-08-03 RX ADMIN — Medication 3 MILLILITER(S): at 09:55

## 2021-08-03 RX ADMIN — Medication 40 MILLIEQUIVALENT(S): at 11:40

## 2021-08-03 RX ADMIN — Medication 0.25 MILLIGRAM(S): at 08:29

## 2021-08-03 RX ADMIN — Medication 3 MILLILITER(S): at 21:52

## 2021-08-03 RX ADMIN — ENOXAPARIN SODIUM 40 MILLIGRAM(S): 100 INJECTION SUBCUTANEOUS at 11:40

## 2021-08-03 RX ADMIN — SODIUM CHLORIDE 50 MILLILITER(S): 9 INJECTION INTRAMUSCULAR; INTRAVENOUS; SUBCUTANEOUS at 19:45

## 2021-08-03 RX ADMIN — Medication 20 MILLIGRAM(S): at 05:04

## 2021-08-03 RX ADMIN — Medication 3 MILLILITER(S): at 04:08

## 2021-08-03 RX ADMIN — Medication 600 MILLIGRAM(S): at 19:46

## 2021-08-03 NOTE — PROGRESS NOTE ADULT - ASSESSMENT
89 yr old female with a pmh of Lymphoma, COPD, HFrEF (EF 26%), s/p right robot VATS/pleural biopsy with placement of pleurex catheter 7/14/21 readmitted for rehab for fever/sob, admitted for sepsis 2/2 PNA. Endocrine consulted for hypercalcemia, suspected to be secondary to malignancy.     Hypercalcemia, likely 2/2 Malignancy in the setting of Lymphoma  Corrected Ca 12.86 down to 11.1  On chart review, hypercalcemia also noted on recent admission  7/16 Vitamin D 25 OH 37.1  7/16 Vitamin D 1,25 Dihydroxy elevated to 94.4   7/16 TSH 2.83  Intact PTH suppressed at 10  Currently on IVF NS at 75cc/hr, though primary team plans to cut back on this  Recommendations:   - Okay to reduce IVF  - Continue to monitor CMP daily   - Consider tele monitoring if any concern for hypercalcemia related EKG changes   - Would hold off on starting bisphosphonates/dental consult at this time given bisphosphonates only a temporary solution for hypercalcemia & treatment for hypercalcemia due to lymphoma is steroids  - Prednisone 20mg PO started 8/3/21 around 0500. Will get 2 doses total.     Hyponatremia in the setting of Amiodarone Use, r/o Thyroid Dysfunction   Na 132 >> 139  TSH 5.89  FT4 1.0  Recommendations:   - Repeat TSH as outpatient     Hypervitaminosis D   7/16 Vitamin D 25 OH 37.1  7/16 Vitamin D 1,25 Dihydroxy elevated to 94.4   Recommendations:   - Continue to hold vitamin D supplementation at this time   - Elevated Vitamin D 1, 25 Dihydroxy level likely related to DLBCL     Jesus Smith DO, Endocrinology Fellow  Pager [phone] from 9am to 5pm. After hours and on weekends, please call [phone].   89 yr old female with a pmh of Lymphoma, COPD, HFrEF (EF 26%), s/p right robot VATS/pleural biopsy with placement of pleurex catheter 7/14/21 readmitted for rehab for fever/sob, admitted for sepsis 2/2 PNA. Endocrine consulted for hypercalcemia, suspected to be secondary to malignancy.     Hypercalcemia, likely 2/2 Malignancy in the setting of Lymphoma  Corrected Ca 12.86 down to 11.1  On chart review, hypercalcemia also noted on recent admission  7/16 Vitamin D 25 OH 37.1  7/16 Vitamin D 1,25 Dihydroxy elevated to 94.4   7/16 TSH 2.83  Intact PTH suppressed at 10  Currently on IVF NS at 75cc/hr, though primary team plans to cut back on this  Recommendations:   - Okay to reduce IVF  - Continue to monitor CMP daily   - Consider tele monitoring if any concern for hypercalcemia related EKG changes   - Would hold off on starting bisphosphonates/dental consult at this time given bisphosphonates only a temporary solution for hypercalcemia & treatment for hypercalcemia due to lymphoma is steroids  - Prednisone 20mg PO started 8/3/21 around 0500. Will get 2 doses total.     Steroid-induced hyperglycemia   on BMP on 8/3/21 about 12 hours after first dose of prednisone.   -Recommend obtaining HbA1c  -Monitor BSR with BMPs for now    Hyponatremia in the setting of Amiodarone Use, r/o Thyroid Dysfunction   Na 132 >> 139  TSH 5.89  FT4 1.0  Recommendations:   - Repeat TSH as outpatient     Hypervitaminosis D   7/16 Vitamin D 25 OH 37.1  7/16 Vitamin D 1,25 Dihydroxy elevated to 94.4   Recommendations:   - Continue to hold vitamin D supplementation at this time   - Elevated Vitamin D 1, 25 Dihydroxy level likely related to DLBCL     Jesus Smith DO, Endocrinology Fellow  Pager [phone] from 9am to 5pm. After hours and on weekends, please call [phone].

## 2021-08-03 NOTE — PROGRESS NOTE ADULT - SUBJECTIVE AND OBJECTIVE BOX
DATE OF SERVICE: 08-03-21 @ 15:28    Subjective: Patient seen and examined. No new events except as noted.     SUBJECTIVE/ROS:        MEDICATIONS:  MEDICATIONS  (STANDING):  albuterol/ipratropium for Nebulization 3 milliLiter(s) Nebulizer every 6 hours  allopurinol 300 milliGRAM(s) Oral daily  aMIOdarone    Tablet 200 milliGRAM(s) Oral daily  benzocaine 15 mG/menthol 3.6 mG (Sugar-Free) Lozenge 1 Lozenge Oral once  enoxaparin Injectable 40 milliGRAM(s) SubCutaneous daily  guaiFENesin  milliGRAM(s) Oral every 12 hours  predniSONE   Tablet 20 milliGRAM(s) Oral daily  sodium chloride 0.9%. 1000 milliLiter(s) (50 mL/Hr) IV Continuous <Continuous>      PHYSICAL EXAM:  T(C): 36.3 (08-03-21 @ 11:55), Max: 36.9 (08-02-21 @ 21:30)  HR: 75 (08-03-21 @ 11:55) (75 - 96)  BP: 119/64 (08-03-21 @ 11:55) (119/64 - 151/79)  RR: 16 (08-03-21 @ 11:55) (16 - 18)  SpO2: 100% (08-03-21 @ 11:55) (100% - 100%)  Wt(kg): --  I&O's Summary    02 Aug 2021 07:01  -  03 Aug 2021 07:00  --------------------------------------------------------  IN: 1830 mL / OUT: 0 mL / NET: 1830 mL    03 Aug 2021 07:01  -  03 Aug 2021 15:28  --------------------------------------------------------  IN: 520 mL / OUT: 0 mL / NET: 520 mL            JVP: Normal  Neck: supple  Lung: clear   CV: S1 S2 , Murmur:  Abd: soft  Ext: No edema  Psych: flat affect  Skin: normal``    LABS/DATA:    CARDIAC MARKERS:                                8.5    21.18 )-----------( 368      ( 03 Aug 2021 07:06 )             27.2     08-03    135  |  100  |  10  ----------------------------<  112<H>  3.2<L>   |  23  |  0.66    Ca    10.3      03 Aug 2021 07:06  Phos  3.0     08-03  Mg     1.70     08-03    TPro  5.1<L>  /  Alb  2.5<L>  /  TBili  <0.2  /  DBili  x   /  AST  55<H>  /  ALT  52<H>  /  AlkPhos  150<H>  08-03    proBNP:   Lipid Profile:   HgA1c:   TSH:     TELE:  EKG:

## 2021-08-03 NOTE — PROGRESS NOTE ADULT - SUBJECTIVE AND OBJECTIVE BOX
Merlin Mathew, MD   Hospitalist  Pager #[phone]    PROGRESS NOTE:     Patient is a 89y old  Female who presents with a chief complaint of SOB (03 Aug 2021 11:25)      SUBJECTIVE / OVERNIGHT EVENTS:    ADDITIONAL REVIEW OF SYSTEMS:    MEDICATIONS  (STANDING):  albuterol/ipratropium for Nebulization 3 milliLiter(s) Nebulizer every 6 hours  allopurinol 300 milliGRAM(s) Oral daily  aMIOdarone    Tablet 200 milliGRAM(s) Oral daily  benzocaine 15 mG/menthol 3.6 mG (Sugar-Free) Lozenge 1 Lozenge Oral once  enoxaparin Injectable 40 milliGRAM(s) SubCutaneous daily  predniSONE   Tablet 20 milliGRAM(s) Oral daily  sodium chloride 0.9%. 1000 milliLiter(s) (50 mL/Hr) IV Continuous <Continuous>    MEDICATIONS  (PRN):  acetaminophen   Tablet .. 650 milliGRAM(s) Oral every 6 hours PRN Temp greater or equal to 38.5C (101.3F), Mild Pain (1 - 3)  ALPRAZolam 0.25 milliGRAM(s) Oral two times a day PRN anxiety  aluminum hydroxide/magnesium hydroxide/simethicone Suspension 30 milliLiter(s) Oral every 4 hours PRN Dyspepsia  melatonin 3 milliGRAM(s) Oral at bedtime PRN Insomnia  ondansetron Injectable 4 milliGRAM(s) IV Push every 8 hours PRN Nausea and/or Vomiting      CAPILLARY BLOOD GLUCOSE        I&O's Summary    02 Aug 2021 07:01  -  03 Aug 2021 07:00  --------------------------------------------------------  IN: 1830 mL / OUT: 0 mL / NET: 1830 mL    03 Aug 2021 07:01  -  03 Aug 2021 12:10  --------------------------------------------------------  IN: 320 mL / OUT: 0 mL / NET: 320 mL        PHYSICAL EXAM:  Vital Signs Last 24 Hrs  T(C): 36.3 (03 Aug 2021 11:55), Max: 36.9 (02 Aug 2021 21:30)  T(F): 97.3 (03 Aug 2021 11:55), Max: 98.5 (02 Aug 2021 21:30)  HR: 75 (03 Aug 2021 11:55) (75 - 96)  BP: 119/64 (03 Aug 2021 11:55) (119/64 - 151/79)  BP(mean): --  RR: 16 (03 Aug 2021 11:55) (16 - 18)  SpO2: 100% (03 Aug 2021 11:55) (100% - 100%)    CONSTITUTIONAL: NAD, well-developed woman on 2L NC   RESPIRATORY: Normal respiratory effort; diminished breath sounds bilaterally, R pleurex   CARDIOVASCULAR: Regular rate and rhythm, normal S1 and S2, no murmur/rub/gallop; No lower extremity edema; Peripheral pulses are 2+ bilaterally  ABDOMEN: Nontender to palpation, normoactive bowel sounds, no rebound/guarding; No hepatosplenomegaly  MUSCULOSKELETAL no clubbing or cyanosis of digits; no joint swelling or tenderness to palpation  PSYCH: Alert and oriented, lethargic       LABS:                        8.5    21.18 )-----------( 368      ( 03 Aug 2021 07:06 )             27.2     08-03    135  |  100  |  10  ----------------------------<  112<H>  3.2<L>   |  23  |  0.66    Ca    10.3      03 Aug 2021 07:06  Phos  3.0     08-03  Mg     1.70     08-03    TPro  5.1<L>  /  Alb  2.5<L>  /  TBili  <0.2  /  DBili  x   /  AST  55<H>  /  ALT  52<H>  /  AlkPhos  150<H>  08-03                RADIOLOGY & ADDITIONAL TESTS:  Results Reviewed:   Imaging Personally Reviewed:  Electrocardiogram Personally Reviewed:    COORDINATION OF CARE:  Care Discussed with Consultants/Other Providers [Y/N]:  Prior or Outpatient Records Reviewed [Y/N]:

## 2021-08-03 NOTE — PROGRESS NOTE ADULT - SUBJECTIVE AND OBJECTIVE BOX
Hematology Oncology Follow-up    INTERVAL HPI/OVERNIGHT EVENTS:  No o/n events, patient resting comfortably. Started on prednisone this am;         VITAL SIGNS:  T(F): 98.3 (08-03-21 @ 05:02)  HR: 87 (08-03-21 @ 05:02)  BP: 147/70 (08-03-21 @ 05:02)  RR: 18 (08-03-21 @ 05:02)  SpO2: 100% (08-03-21 @ 05:02)  Wt(kg): --    08-02-21 @ 07:01  -  08-03-21 @ 07:00  --------------------------------------------------------  IN: 1830 mL / OUT: 0 mL / NET: 1830 mL    08-03-21 @ 07:01  -  08-03-21 @ 11:26  --------------------------------------------------------  IN: 320 mL / OUT: 0 mL / NET: 320 mL        PHYSICAL EXAM:    Constitutional: AAOx3, NAD  Eyes: PERRL, EOMI, sclera non-icteric  Neck: supple, no masses, no JVD, no lymphadenopathy  Respiratory: CTA b/l, no wheezing, rhonchi, rales, with normal respiratory effort  Cardiovascular: RRR, normal S1S2, no M/R/G  Gastrointestinal: soft, NTND, no masses palpable, BS normal in all four quadrants, no HSM  Extremities:  no edema  MSK: no obvious abnormalities, normal ROM, no lymphadenopathy  Neurological: Grossly intact  Skin: Normal temperature, no rash, no echymoses, no petichiae  Psych: normal affect    MEDICATIONS  (STANDING):  albuterol/ipratropium for Nebulization 3 milliLiter(s) Nebulizer every 6 hours  allopurinol 300 milliGRAM(s) Oral daily  aMIOdarone    Tablet 200 milliGRAM(s) Oral daily  benzocaine 15 mG/menthol 3.6 mG (Sugar-Free) Lozenge 1 Lozenge Oral once  enoxaparin Injectable 40 milliGRAM(s) SubCutaneous daily  potassium chloride   Powder 40 milliEquivalent(s) Oral once  predniSONE   Tablet 20 milliGRAM(s) Oral daily  sodium chloride 0.9%. 1000 milliLiter(s) (50 mL/Hr) IV Continuous <Continuous>    MEDICATIONS  (PRN):  acetaminophen   Tablet .. 650 milliGRAM(s) Oral every 6 hours PRN Temp greater or equal to 38.5C (101.3F), Mild Pain (1 - 3)  ALPRAZolam 0.25 milliGRAM(s) Oral two times a day PRN anxiety  aluminum hydroxide/magnesium hydroxide/simethicone Suspension 30 milliLiter(s) Oral every 4 hours PRN Dyspepsia  melatonin 3 milliGRAM(s) Oral at bedtime PRN Insomnia  ondansetron Injectable 4 milliGRAM(s) IV Push every 8 hours PRN Nausea and/or Vomiting      No Known Allergies      LABS:                        8.5    21.18 )-----------( 368      ( 03 Aug 2021 07:06 )             27.2     08-03    135  |  100  |  10  ----------------------------<  112<H>  3.2<L>   |  23  |  0.66    Ca    10.3      03 Aug 2021 07:06  Phos  3.0     08-03  Mg     1.70     08-03    TPro  5.1<L>  /  Alb  2.5<L>  /  TBili  <0.2  /  DBili  x   /  AST  55<H>  /  ALT  52<H>  /  AlkPhos  150<H>  08-03     Lactate Dehydrogenase, Serum: 142 U/L (08-03 @ 07:06)                  RADIOLOGY & ADDITIONAL TESTS:  Studies reviewed. Hematology Oncology Follow-up    INTERVAL HPI/OVERNIGHT EVENTS:  Patient is seen very sleepy. Arousable. Nurse reports that she was given xanax last night and was groggy this morning; patient continued to be anxious and received a second dose. She denies any new symptoms.         VITAL SIGNS:  T(F): 98.3 (08-03-21 @ 05:02)  HR: 87 (08-03-21 @ 05:02)  BP: 147/70 (08-03-21 @ 05:02)  RR: 18 (08-03-21 @ 05:02)  SpO2: 100% (08-03-21 @ 05:02)  Wt(kg): --    08-02-21 @ 07:01  -  08-03-21 @ 07:00  --------------------------------------------------------  IN: 1830 mL / OUT: 0 mL / NET: 1830 mL    08-03-21 @ 07:01  -  08-03-21 @ 11:26  --------------------------------------------------------  IN: 320 mL / OUT: 0 mL / NET: 320 mL        PHYSICAL EXAM:    Constitutional: AAOx3, NAD, emanciated  Eyes: PERRL, EOMI, sclera non-icteric  Neck: supple, no masses, no JVD, no lymphadenopathy  Respiratory: decreased breath sounds bilaterally  Cardiovascular: RRR, normal S1S2, no M/R/G  Gastrointestinal: soft, NTND, no masses palpable, BS normal in all four quadrants, no HSM  Extremities:  no edema  MSK: no obvious abnormalities, normal ROM, no lymphadenopathy  Neurological: Grossly intact; appears sleepy but maintains conversation  Skin: Normal temperature, echymoses and senile purpura bilateral extremities   Psych: normal affect      MEDICATIONS  (STANDING):  albuterol/ipratropium for Nebulization 3 milliLiter(s) Nebulizer every 6 hours  allopurinol 300 milliGRAM(s) Oral daily  aMIOdarone    Tablet 200 milliGRAM(s) Oral daily  benzocaine 15 mG/menthol 3.6 mG (Sugar-Free) Lozenge 1 Lozenge Oral once  enoxaparin Injectable 40 milliGRAM(s) SubCutaneous daily  potassium chloride   Powder 40 milliEquivalent(s) Oral once  predniSONE   Tablet 20 milliGRAM(s) Oral daily  sodium chloride 0.9%. 1000 milliLiter(s) (50 mL/Hr) IV Continuous <Continuous>    MEDICATIONS  (PRN):  acetaminophen   Tablet .. 650 milliGRAM(s) Oral every 6 hours PRN Temp greater or equal to 38.5C (101.3F), Mild Pain (1 - 3)  ALPRAZolam 0.25 milliGRAM(s) Oral two times a day PRN anxiety  aluminum hydroxide/magnesium hydroxide/simethicone Suspension 30 milliLiter(s) Oral every 4 hours PRN Dyspepsia  melatonin 3 milliGRAM(s) Oral at bedtime PRN Insomnia  ondansetron Injectable 4 milliGRAM(s) IV Push every 8 hours PRN Nausea and/or Vomiting      No Known Allergies      LABS:                        8.5    21.18 )-----------( 368      ( 03 Aug 2021 07:06 )             27.2     08-03    135  |  100  |  10  ----------------------------<  112<H>  3.2<L>   |  23  |  0.66    Ca    10.3      03 Aug 2021 07:06  Phos  3.0     08-03  Mg     1.70     08-03    TPro  5.1<L>  /  Alb  2.5<L>  /  TBili  <0.2  /  DBili  x   /  AST  55<H>  /  ALT  52<H>  /  AlkPhos  150<H>  08-03     Lactate Dehydrogenase, Serum: 142 U/L (08-03 @ 07:06)                  RADIOLOGY & ADDITIONAL TESTS:  Studies reviewed.

## 2021-08-03 NOTE — PROGRESS NOTE ADULT - ASSESSMENT
89 year old woman with a pmh of COPD and HFrEF (EF 26%) that with chronic cough, not improved with steroids nor Abx. Now s/p R robotic VATS/pleural bx with placement of pleurex catheter on 07/15/21 after PET from 07/07/21 reveal b/l upper lobe opacities concerning for malignancy with pleural effusion. Hematology consulted for newly diagnosed DLBCL.    #DLBCL  -S/p Right peritracheal lymph node, level 4 biopsy: Diffuse large B cell lymphoma, non-germinal center B cell like immunophenotype  -Will need pleural fluid cytopath and flow. Will need staging bone marrow as well.  -Patient and Family both confirm wanting treatment; however discussed with patient regarding intensity of treatment regimen. For now, given patient performance status, EF of 26% and continuing to require 02 (only since VATS procedure), started 8/2/2021 only on Prednisone 20mg Daily for two days. Depending on response, patient status, will consider additional therapy.  -High risk for TLS; will need to start regimen in stages with q 8 hour TLS lab monitoring (CMP, Phos, Mag, LDH, Uric Acid)  -continue allopurinol 300mg Daily for TLS prophylaxis. Patient is on gentle hydration from primary team and would recommend continuing at minimum 50cc per hour, if patient can tolerate.   - Repeat Echo pending- per cardiology, EF was normal from their reading and the radiologist reading was erroneous. However, will need official addedum or repeat ECHO to confirm heart function status.   -Peripheral flow cytometry 7-; pending. . A written requisition and tubes were given to the floor nurse.    #Leukocytosis  -Leukocytosis with left shift with many neutrophils seen on the peripheral smear.  -Febrile to 103 in ED  - completed 5 days vanco and Zosyn;   -Bcx/Ucx negtive; no clear source of infection identified   - per pulm/primary team     #Hypercalcemia  -Corrected Calcium 11.4  -C/w maintenance IVF @ 75cc/hr given HF hx  -Nephrology consult for bisphosphonate therapy.    Please page with questions or concerns. Will Follow with you.      Jess Nelson MD  PGY 4, Oncology/Hematology fellow  (P) [phone]  After 5pm, please contact on-call team.   89 year old woman with a pmh of COPD and HFrEF (EF 26%) that with chronic cough, not improved with steroids nor Abx. Now s/p R robotic VATS/pleural bx with placement of pleurex catheter on 07/15/21 after PET from 07/07/21 reveal b/l upper lobe opacities concerning for malignancy with pleural effusion. Hematology consulted for newly diagnosed DLBCL.    #DLBCL  -S/p Right peritracheal lymph node, level 4 biopsy: Diffuse large B cell lymphoma, non-germinal center B cell like immunophenotype  -Will need pleural fluid cytopath and flow. Will need staging bone marrow as well.  -Patient and Family both confirm wanting treatment; however discussed with patient regarding intensity of treatment regimen. Given performance status, questionable HF (pending repeat echo) and requiring O2; will start only prednisone 20mg daily x 2 days and evaluate patient response.   - Prednisone AM 8/3 and 8/4  -High risk for TLS; will need to start regimen in stages with q 8 hour TLS lab monitoring (CMP, Phos, Mag, LDH, Uric Acid).   -continue allopurinol 300mg Daily for TLS prophylaxis. Patient is on gentle hydration from primary team and would recommend continuing at minimum 50cc per hour, if patient can tolerate.   - Repeat Echo pending- per cardiology, EF was normal from their reading and the radiologist reading was erroneous. However, will need official addedum or repeat ECHO to confirm heart function status.   -Peripheral flow cytometry 7-; pending. . A written requisition and tubes were given to the floor nurse.    #Leukocytosis  -Leukocytosis with left shift with many neutrophils seen on the peripheral smear.  -Febrile to 103 in ED  - completed 5 days vanco and Zosyn;   -Bcx/Ucx negtive; no clear source of infection identified   - per pulm/primary team     #Hypercalcemia  -Corrected Calcium 11.4  -C/w maintenance IVF @ 75cc/hr given HF hx      Jess Nelson MD  PGY 4, Oncology/Hematology fellow  (P) [phone]  After 5pm, please contact on-call team.

## 2021-08-03 NOTE — CHART NOTE - NSCHARTNOTEFT_GEN_A_CORE
Goal to start dmt.  will sign off for now.  please reconsult if goals or symptoms change. Mary Mcdonald DO

## 2021-08-03 NOTE — PROVIDER CONTACT NOTE (OTHER) - BACKGROUND
Patient admitted for fever s/p VATS with pleural cath 2 weeks ago
Hx of COPD, CHF with decreased EF, s/p VATS last week
pt admitted for sepsis, has chronic leukocytosis due to lymphoma

## 2021-08-03 NOTE — PROGRESS NOTE ADULT - PROBLEM SELECTOR PLAN 2
Requiring 2L NC, s/p VATS with pleurex placement, possible PNA s/p therapy   - Drain Pleurex 2-3x/week  - Mucinex, chest PT, IS, OOB into chair as possible   [ ] CXray to eval for pulmonary edema

## 2021-08-03 NOTE — PROGRESS NOTE ADULT - ASSESSMENT
Right Pl effusion     CT reviewed has malignant process  fu with onc, cts    PAF post op   in sinus  cont amio for short term , DC after few weeks   off a/c   poor candidate for a/c high bleed risk    Echo personally reviewed EF is incorrectly reported, pt has normal LV function

## 2021-08-03 NOTE — PROGRESS NOTE ADULT - PROBLEM SELECTOR PLAN 1
new diagnosis of DLBCL, confirmed on path   - Initiating Prednisone 20mg 8/3 & 8/4 and monitoring response   - [ ] Pleural fluid cytopath sent 7/30  - monitor TLS labs Q8H   - IVF @ 50cc/h   - HIV negative  [ ] Hepatitis labs pending   [ ] Repeat limited TTE to assess EF   - Hematology following

## 2021-08-03 NOTE — PROGRESS NOTE ADULT - PROBLEM SELECTOR PLAN 3
Likely 2/2 malignancy, corrected calcium 11.4   - Cont IVF   - Will monitor Ca while on Prednisone   - Endocrine following   - Monitor fluid status

## 2021-08-03 NOTE — PROGRESS NOTE ADULT - SUBJECTIVE AND OBJECTIVE BOX
Chief Complaint: Hypercalcemia    History: Started prednisone today. Feels anxious. Denies confusion though to me patient does seem a bit confused today. Starting to eat solid foods. Reports dysphonia x several days though patient had normal speech yesterday. No dysphagia, odynophagia or trouble breathing.    MEDICATIONS  (STANDING):  albuterol/ipratropium for Nebulization 3 milliLiter(s) Nebulizer every 6 hours  allopurinol 300 milliGRAM(s) Oral daily  aMIOdarone    Tablet 200 milliGRAM(s) Oral daily  benzocaine 15 mG/menthol 3.6 mG (Sugar-Free) Lozenge 1 Lozenge Oral once  enoxaparin Injectable 40 milliGRAM(s) SubCutaneous daily  guaiFENesin  milliGRAM(s) Oral every 12 hours  predniSONE   Tablet 20 milliGRAM(s) Oral daily  sodium chloride 0.9%. 1000 milliLiter(s) (50 mL/Hr) IV Continuous <Continuous>    MEDICATIONS  (PRN):  acetaminophen   Tablet .. 650 milliGRAM(s) Oral every 6 hours PRN Temp greater or equal to 38.5C (101.3F), Mild Pain (1 - 3)  ALPRAZolam 0.25 milliGRAM(s) Oral two times a day PRN anxiety  aluminum hydroxide/magnesium hydroxide/simethicone Suspension 30 milliLiter(s) Oral every 4 hours PRN Dyspepsia  melatonin 3 milliGRAM(s) Oral at bedtime PRN Insomnia  ondansetron Injectable 4 milliGRAM(s) IV Push every 8 hours PRN Nausea and/or Vomiting    PHYSICAL EXAM:  VITALS: T(C): 36.3 (08-03-21 @ 11:55)  T(F): 97.3 (08-03-21 @ 11:55), Max: 98.5 (08-02-21 @ 21:30)  HR: 75 (08-03-21 @ 11:55) (75 - 87)  BP: 119/64 (08-03-21 @ 11:55) (119/64 - 151/79)  RR:  (16 - 18)  SpO2:  (100% - 100%)  Wt(kg): --  General: Cachectic female, No acute distress, Speaking full sentences.   Eye:  Extraocular movements are intact, No proptosis or lid lag, conjunctival pallor noted, No scleral icterus.   HENT:  Normocephalic, Oral mucosa is moist.   Neck:  Supple, Non-tender.   Respiratory:  Respirations are non-labored, Symmetric chest wall expansion, Breath sounds are equal.   Cardiovascular:  Normal rate, Regular rhythm, No edema.  Gastrointestinal:  Soft, Non-tender, Non-distended.   Musculoskeletal:  Normal range of motion, No gross joint swelling.   Mental Status Exam:  Speech clear and coherent.   Neurologic:  Alert, Normal motor function, No focal deficits, Cranial Nerves II-XII are grossly intact bilaterally.         08-03    139  |  104  |  12  ----------------------------<  163<H>  3.6   |  23  |  0.67    EGFR if : 90  EGFR if non : 78    Ca    10.1      08-03  Mg     1.80     08-03  Phos  4.0     08-03    TPro  5.1<L>  /  Alb  2.7<L>  /  TBili  <0.2  /  DBili  x   /  AST  51<H>  /  ALT  49<H>  /  AlkPhos  140<H>  08-03          Thyroid Function Tests:  07-31 @ 06:28 TSH -- FreeT4 1.0 T3 -- Anti TPO -- Anti Thyroglobulin Ab -- TSI --  07-30 @ 08:58 TSH -- FreeT4 1.0 T3 -- Anti TPO -- Anti Thyroglobulin Ab -- TSI --

## 2021-08-04 LAB
A1C WITH ESTIMATED AVERAGE GLUCOSE RESULT: 5.2 % — SIGNIFICANT CHANGE UP (ref 4–5.6)
ALBUMIN SERPL ELPH-MCNC: 2.7 G/DL — LOW (ref 3.3–5)
ALP SERPL-CCNC: 143 U/L — HIGH (ref 40–120)
ALP SERPL-CCNC: 146 U/L — HIGH (ref 40–120)
ALP SERPL-CCNC: 155 U/L — HIGH (ref 40–120)
ALT FLD-CCNC: 66 U/L — HIGH (ref 4–33)
ALT FLD-CCNC: 70 U/L — HIGH (ref 4–33)
ALT FLD-CCNC: 88 U/L — HIGH (ref 4–33)
ANION GAP SERPL CALC-SCNC: 12 MMOL/L — SIGNIFICANT CHANGE UP (ref 7–14)
AST SERPL-CCNC: 75 U/L — HIGH (ref 4–32)
AST SERPL-CCNC: 77 U/L — HIGH (ref 4–32)
AST SERPL-CCNC: 94 U/L — HIGH (ref 4–32)
BASOPHILS # BLD AUTO: 0.05 K/UL — SIGNIFICANT CHANGE UP (ref 0–0.2)
BASOPHILS NFR BLD AUTO: 0.4 % — SIGNIFICANT CHANGE UP (ref 0–2)
BILIRUB SERPL-MCNC: <0.2 MG/DL — SIGNIFICANT CHANGE UP (ref 0.2–1.2)
BUN SERPL-MCNC: 16 MG/DL — SIGNIFICANT CHANGE UP (ref 7–23)
CALCIUM SERPL-MCNC: 10.2 MG/DL — SIGNIFICANT CHANGE UP (ref 8.4–10.5)
CALCIUM SERPL-MCNC: 10.3 MG/DL — SIGNIFICANT CHANGE UP (ref 8.4–10.5)
CALCIUM SERPL-MCNC: 10.3 MG/DL — SIGNIFICANT CHANGE UP (ref 8.4–10.5)
CHLORIDE SERPL-SCNC: 103 MMOL/L — SIGNIFICANT CHANGE UP (ref 98–107)
CHLORIDE SERPL-SCNC: 103 MMOL/L — SIGNIFICANT CHANGE UP (ref 98–107)
CHLORIDE SERPL-SCNC: 104 MMOL/L — SIGNIFICANT CHANGE UP (ref 98–107)
CO2 SERPL-SCNC: 25 MMOL/L — SIGNIFICANT CHANGE UP (ref 22–31)
CO2 SERPL-SCNC: 26 MMOL/L — SIGNIFICANT CHANGE UP (ref 22–31)
CO2 SERPL-SCNC: 26 MMOL/L — SIGNIFICANT CHANGE UP (ref 22–31)
CREAT SERPL-MCNC: 0.72 MG/DL — SIGNIFICANT CHANGE UP (ref 0.5–1.3)
CREAT SERPL-MCNC: 0.75 MG/DL — SIGNIFICANT CHANGE UP (ref 0.5–1.3)
CREAT SERPL-MCNC: 0.77 MG/DL — SIGNIFICANT CHANGE UP (ref 0.5–1.3)
EOSINOPHIL # BLD AUTO: 0.22 K/UL — SIGNIFICANT CHANGE UP (ref 0–0.5)
EOSINOPHIL NFR BLD AUTO: 1.7 % — SIGNIFICANT CHANGE UP (ref 0–6)
ESTIMATED AVERAGE GLUCOSE: 103 — SIGNIFICANT CHANGE UP
GLUCOSE SERPL-MCNC: 100 MG/DL — HIGH (ref 70–99)
GLUCOSE SERPL-MCNC: 128 MG/DL — HIGH (ref 70–99)
GLUCOSE SERPL-MCNC: 148 MG/DL — HIGH (ref 70–99)
HCT VFR BLD CALC: 28.9 % — LOW (ref 34.5–45)
HGB BLD-MCNC: 8.6 G/DL — LOW (ref 11.5–15.5)
IANC: 10.77 K/UL — HIGH (ref 1.5–8.5)
IMM GRANULOCYTES NFR BLD AUTO: 0.4 % — SIGNIFICANT CHANGE UP (ref 0–1.5)
LDH SERPL L TO P-CCNC: 138 U/L — SIGNIFICANT CHANGE UP (ref 135–225)
LDH SERPL L TO P-CCNC: 153 U/L — SIGNIFICANT CHANGE UP (ref 135–225)
LDH SERPL L TO P-CCNC: 176 U/L — SIGNIFICANT CHANGE UP (ref 135–225)
LYMPHOCYTES # BLD AUTO: 1.25 K/UL — SIGNIFICANT CHANGE UP (ref 1–3.3)
LYMPHOCYTES # BLD AUTO: 9.6 % — LOW (ref 13–44)
MAGNESIUM SERPL-MCNC: 1.8 MG/DL — SIGNIFICANT CHANGE UP (ref 1.6–2.6)
MAGNESIUM SERPL-MCNC: 1.8 MG/DL — SIGNIFICANT CHANGE UP (ref 1.6–2.6)
MAGNESIUM SERPL-MCNC: 1.9 MG/DL — SIGNIFICANT CHANGE UP (ref 1.6–2.6)
MCHC RBC-ENTMCNC: 24 PG — LOW (ref 27–34)
MCHC RBC-ENTMCNC: 29.8 GM/DL — LOW (ref 32–36)
MCV RBC AUTO: 80.7 FL — SIGNIFICANT CHANGE UP (ref 80–100)
MONOCYTES # BLD AUTO: 0.67 K/UL — SIGNIFICANT CHANGE UP (ref 0–0.9)
MONOCYTES NFR BLD AUTO: 5.1 % — SIGNIFICANT CHANGE UP (ref 2–14)
NEUTROPHILS # BLD AUTO: 10.77 K/UL — HIGH (ref 1.8–7.4)
NEUTROPHILS NFR BLD AUTO: 82.8 % — HIGH (ref 43–77)
NRBC # BLD: 0 /100 WBCS — SIGNIFICANT CHANGE UP
NRBC # FLD: 0 K/UL — SIGNIFICANT CHANGE UP
PHOSPHATE SERPL-MCNC: 3.3 MG/DL — SIGNIFICANT CHANGE UP (ref 2.5–4.5)
PHOSPHATE SERPL-MCNC: 3.7 MG/DL — SIGNIFICANT CHANGE UP (ref 2.5–4.5)
PHOSPHATE SERPL-MCNC: 3.9 MG/DL — SIGNIFICANT CHANGE UP (ref 2.5–4.5)
PLATELET # BLD AUTO: 339 K/UL — SIGNIFICANT CHANGE UP (ref 150–400)
POTASSIUM SERPL-MCNC: 3.4 MMOL/L — LOW (ref 3.5–5.3)
POTASSIUM SERPL-MCNC: 3.4 MMOL/L — LOW (ref 3.5–5.3)
POTASSIUM SERPL-MCNC: 3.5 MMOL/L — SIGNIFICANT CHANGE UP (ref 3.5–5.3)
POTASSIUM SERPL-SCNC: 3.4 MMOL/L — LOW (ref 3.5–5.3)
POTASSIUM SERPL-SCNC: 3.4 MMOL/L — LOW (ref 3.5–5.3)
POTASSIUM SERPL-SCNC: 3.5 MMOL/L — SIGNIFICANT CHANGE UP (ref 3.5–5.3)
PROT SERPL-MCNC: 5.1 G/DL — LOW (ref 6–8.3)
PROT SERPL-MCNC: 5.1 G/DL — LOW (ref 6–8.3)
PROT SERPL-MCNC: 5.3 G/DL — LOW (ref 6–8.3)
RBC # BLD: 3.58 M/UL — LOW (ref 3.8–5.2)
RBC # FLD: 17.7 % — HIGH (ref 10.3–14.5)
SODIUM SERPL-SCNC: 141 MMOL/L — SIGNIFICANT CHANGE UP (ref 135–145)
URATE SERPL-MCNC: 3.7 MG/DL — SIGNIFICANT CHANGE UP (ref 2.5–7)
URATE SERPL-MCNC: 3.7 MG/DL — SIGNIFICANT CHANGE UP (ref 2.5–7)
URATE SERPL-MCNC: 3.8 MG/DL — SIGNIFICANT CHANGE UP (ref 2.5–7)
WBC # BLD: 13.01 K/UL — HIGH (ref 3.8–10.5)
WBC # FLD AUTO: 13.01 K/UL — HIGH (ref 3.8–10.5)

## 2021-08-04 PROCEDURE — 99233 SBSQ HOSP IP/OBS HIGH 50: CPT

## 2021-08-04 PROCEDURE — 99232 SBSQ HOSP IP/OBS MODERATE 35: CPT | Mod: GC

## 2021-08-04 RX ORDER — FUROSEMIDE 40 MG
20 TABLET ORAL ONCE
Refills: 0 | Status: COMPLETED | OUTPATIENT
Start: 2021-08-04 | End: 2021-08-04

## 2021-08-04 RX ADMIN — BENZOCAINE AND MENTHOL 1 LOZENGE: 5; 1 LIQUID ORAL at 05:34

## 2021-08-04 RX ADMIN — Medication 3 MILLILITER(S): at 15:15

## 2021-08-04 RX ADMIN — Medication 20 MILLIGRAM(S): at 05:30

## 2021-08-04 RX ADMIN — Medication 3 MILLILITER(S): at 03:39

## 2021-08-04 RX ADMIN — Medication 600 MILLIGRAM(S): at 16:55

## 2021-08-04 RX ADMIN — Medication 3 MILLILITER(S): at 21:08

## 2021-08-04 RX ADMIN — Medication 600 MILLIGRAM(S): at 05:30

## 2021-08-04 RX ADMIN — Medication 300 MILLIGRAM(S): at 11:42

## 2021-08-04 RX ADMIN — Medication 30 MILLIGRAM(S): at 16:49

## 2021-08-04 RX ADMIN — Medication 20 MILLIGRAM(S): at 16:26

## 2021-08-04 RX ADMIN — ENOXAPARIN SODIUM 40 MILLIGRAM(S): 100 INJECTION SUBCUTANEOUS at 11:42

## 2021-08-04 RX ADMIN — Medication 3 MILLIGRAM(S): at 21:13

## 2021-08-04 RX ADMIN — Medication 3 MILLILITER(S): at 10:24

## 2021-08-04 RX ADMIN — Medication 0.25 MILLIGRAM(S): at 22:48

## 2021-08-04 RX ADMIN — AMIODARONE HYDROCHLORIDE 200 MILLIGRAM(S): 400 TABLET ORAL at 05:30

## 2021-08-04 NOTE — PROGRESS NOTE ADULT - SUBJECTIVE AND OBJECTIVE BOX
DATE OF SERVICE: 08-04-21 @ 06:39    Subjective: Patient seen and examined. No new events except as noted.     SUBJECTIVE/ROS:  NO new events       MEDICATIONS:  MEDICATIONS  (STANDING):  albuterol/ipratropium for Nebulization 3 milliLiter(s) Nebulizer every 6 hours  allopurinol 300 milliGRAM(s) Oral daily  aMIOdarone    Tablet 200 milliGRAM(s) Oral daily  enoxaparin Injectable 40 milliGRAM(s) SubCutaneous daily  guaiFENesin  milliGRAM(s) Oral every 12 hours  sodium chloride 0.9%. 1000 milliLiter(s) (50 mL/Hr) IV Continuous <Continuous>      PHYSICAL EXAM:  T(C): 36.9 (08-04-21 @ 05:27), Max: 36.9 (08-04-21 @ 05:27)  HR: 81 (08-04-21 @ 05:27) (75 - 81)  BP: 144/68 (08-04-21 @ 05:27) (119/64 - 144/68)  RR: 18 (08-04-21 @ 05:27) (16 - 18)  SpO2: 96% (08-04-21 @ 05:27) (96% - 100%)  Wt(kg): --  I&O's Summary    02 Aug 2021 07:01  -  03 Aug 2021 07:00  --------------------------------------------------------  IN: 1830 mL / OUT: 0 mL / NET: 1830 mL    03 Aug 2021 07:01  -  04 Aug 2021 06:39  --------------------------------------------------------  IN: 990 mL / OUT: 0 mL / NET: 990 mL            JVP: Normal  Neck: supple  Lung: few crackles   CV: S1 S2 , Murmur:  Abd: soft  Ext: No edema  neuro: Awake / alert  Psych: flat affect  Skin: normal``    LABS/DATA:    CARDIAC MARKERS:                                8.6    13.01 )-----------( 339      ( 04 Aug 2021 02:42 )             28.9     08-04    141  |  103  |  16  ----------------------------<  100<H>  3.4<L>   |  26  |  0.77    Ca    10.3      04 Aug 2021 02:42  Phos  3.7     08-04  Mg     1.90     08-04    TPro  5.1<L>  /  Alb  2.7<L>  /  TBili  <0.2  /  DBili  x   /  AST  77<H>  /  ALT  70<H>  /  AlkPhos  146<H>  08-04    proBNP:   Lipid Profile:   HgA1c:   TSH:     TELE:  EKG:

## 2021-08-04 NOTE — PROGRESS NOTE ADULT - SUBJECTIVE AND OBJECTIVE BOX
Merlin Mathew, MD   Hospitalist  Pager #[phone]    PROGRESS NOTE:     Patient is a 89y old  Female who presents with a chief complaint of SOB (04 Aug 2021 12:07)      SUBJECTIVE / OVERNIGHT EVENTS: No overnight events   Patient awake, feels okay, no specific complaints. Has been eating some. Denies any SOB.     ADDITIONAL REVIEW OF SYSTEMS:    MEDICATIONS  (STANDING):  albuterol/ipratropium for Nebulization 3 milliLiter(s) Nebulizer every 6 hours  allopurinol 300 milliGRAM(s) Oral daily  aMIOdarone    Tablet 200 milliGRAM(s) Oral daily  enoxaparin Injectable 40 milliGRAM(s) SubCutaneous daily  guaiFENesin  milliGRAM(s) Oral every 12 hours  sodium chloride 0.9%. 1000 milliLiter(s) (50 mL/Hr) IV Continuous <Continuous>    MEDICATIONS  (PRN):  acetaminophen   Tablet .. 650 milliGRAM(s) Oral every 6 hours PRN Temp greater or equal to 38.5C (101.3F), Mild Pain (1 - 3)  ALPRAZolam 0.25 milliGRAM(s) Oral two times a day PRN anxiety  aluminum hydroxide/magnesium hydroxide/simethicone Suspension 30 milliLiter(s) Oral every 4 hours PRN Dyspepsia  melatonin 3 milliGRAM(s) Oral at bedtime PRN Insomnia  ondansetron Injectable 4 milliGRAM(s) IV Push every 8 hours PRN Nausea and/or Vomiting      CAPILLARY BLOOD GLUCOSE        I&O's Summary    03 Aug 2021 07:01  -  04 Aug 2021 07:00  --------------------------------------------------------  IN: 1540 mL / OUT: 600 mL / NET: 940 mL        PHYSICAL EXAM:  Vital Signs Last 24 Hrs  T(C): 36.9 (04 Aug 2021 11:37), Max: 36.9 (04 Aug 2021 05:27)  T(F): 98.5 (04 Aug 2021 11:37), Max: 98.5 (04 Aug 2021 11:37)  HR: 77 (04 Aug 2021 11:37) (77 - 84)  BP: 131/76 (04 Aug 2021 11:37) (131/76 - 144/68)  BP(mean): --  RR: 18 (04 Aug 2021 11:37) (18 - 18)  SpO2: 99% (04 Aug 2021 11:37) (96% - 100%)    CONSTITUTIONAL: NAD, well-developed woman on 2L NC   RESPIRATORY: Normal respiratory effort; diminished breath sounds w/ crackles bilaterally, R pleurex   CARDIOVASCULAR: Regular rate and rhythm, normal S1 and S2, no murmur/rub/gallop; No lower extremity edema; Peripheral pulses are 2+ bilaterally  ABDOMEN: Nontender to palpation, normoactive bowel sounds, no rebound/guarding; No hepatosplenomegaly  MUSCULOSKELETAL no clubbing or cyanosis of digits; no joint swelling or tenderness to palpation  PSYCH: Alert and oriented, lethargic     LABS:                        8.6    13.01 )-----------( 339      ( 04 Aug 2021 02:42 )             28.9     08-04    141  |  103  |  16  ----------------------------<  100<H>  3.4<L>   |  26  |  0.77    Ca    10.3      04 Aug 2021 02:42  Phos  3.7     08-04  Mg     1.90     08-04    TPro  5.1<L>  /  Alb  2.7<L>  /  TBili  <0.2  /  DBili  x   /  AST  77<H>  /  ALT  70<H>  /  AlkPhos  146<H>  08-04                RADIOLOGY & ADDITIONAL TESTS:  Results Reviewed:   Imaging Personally Reviewed:  Electrocardiogram Personally Reviewed:    COORDINATION OF CARE:  Care Discussed with Consultants/Other Providers [Y/N]: Y- BEATRIZ, CM, ACP and Psych- Dr. Corrigan   Prior or Outpatient Records Reviewed [Y/N]:

## 2021-08-04 NOTE — PROGRESS NOTE ADULT - PROBLEM SELECTOR PLAN 1
new diagnosis of DLBCL, confirmed on path   - Initiating Prednisone 20mg 8/3 & 8/4 and monitoring response   - monitor TLS labs Q8H   - IVF @ 50cc/h   - HIV, hepatitis labs negative, repeat TTE with normal EF   [ ] Pleural fluid cytopath sent 7/30  - Hematology following

## 2021-08-04 NOTE — PROGRESS NOTE ADULT - ASSESSMENT
Right Pl effusion     CT reviewed has malignant process  fu with onc, cts    PAF post op   in sinus  cont amio for short term , DC after few weeks   off a/c   poor candidate for a/c high bleed risk    replete K     Echo personally reviewed EF is incorrectly reported, pt has normal LV function

## 2021-08-04 NOTE — DIETITIAN INITIAL EVALUATION ADULT. - OTHER INFO
Pt 88 yo female presented with sepsis secondary to PNA and new diagnosis of DLBCL with hypercalcemia - per chart review.     At time of visit, Pt awake, alert but weak. Per Pt, her appetite not good. Pt ate <50% of breakfast this morning per tray waste observation. Pt likes to drink PO supplement: Ensure Enlive reported. No report of chewing or swallowing difficulty; no report of nausea, vomiting or diarrhea @ this time.        Of note, Pt's weights: 49.8 Kg (7/28), 54.4 Kg (HIE - 6/24/21) --> weight loss of 4.6 Kg (wt change: 8.4% x 1 month). Will recommend to add appetite stimulant to boost Pt's PO intake/appetite. Case discussed with nurse. RDN remains available, nurse made aware.

## 2021-08-04 NOTE — PROGRESS NOTE ADULT - SUBJECTIVE AND OBJECTIVE BOX
Chief Complaint: Hypercalcemia of malignancy    History: Feeling better today, eating more, no dysphagia.    MEDICATIONS  (STANDING):  albuterol/ipratropium for Nebulization 3 milliLiter(s) Nebulizer every 6 hours  allopurinol 300 milliGRAM(s) Oral daily  aMIOdarone    Tablet 200 milliGRAM(s) Oral daily  enoxaparin Injectable 40 milliGRAM(s) SubCutaneous daily  guaiFENesin  milliGRAM(s) Oral every 12 hours  multivitamin 1 Tablet(s) Oral daily  sodium chloride 0.9%. 1000 milliLiter(s) (50 mL/Hr) IV Continuous <Continuous>    MEDICATIONS  (PRN):  acetaminophen   Tablet .. 650 milliGRAM(s) Oral every 6 hours PRN Temp greater or equal to 38.5C (101.3F), Mild Pain (1 - 3)  ALPRAZolam 0.25 milliGRAM(s) Oral two times a day PRN anxiety  aluminum hydroxide/magnesium hydroxide/simethicone Suspension 30 milliLiter(s) Oral every 4 hours PRN Dyspepsia  melatonin 3 milliGRAM(s) Oral at bedtime PRN Insomnia  ondansetron Injectable 4 milliGRAM(s) IV Push every 8 hours PRN Nausea and/or Vomiting    PHYSICAL EXAM:  VITALS: T(C): 36.7 (08-04-21 @ 14:20)  T(F): 98.1 (08-04-21 @ 14:20), Max: 98.5 (08-04-21 @ 11:37)  HR: 71 (08-04-21 @ 16:24) (71 - 88)  BP: 144/68 (08-04-21 @ 16:24) (131/76 - 150/79)  RR:  (18 - 18)  SpO2:  (96% - 100%)  Wt(kg): --  General: Cachectic female, No acute distress, Speaking full sentences.   Eye:  Extraocular movements are intact, No proptosis or lid lag, No scleral icterus.   HENT:  Normocephalic, Oral mucosa is moist.   Neck:  Supple, Non-tender.   Respiratory:  Respirations are non-labored, Symmetric chest wall expansion, Breath sounds are equal.   Cardiovascular:  Normal rate, Regular rhythm, No edema.  Gastrointestinal:  Soft, Non-tender, Non-distended.   Musculoskeletal:  Normal range of motion, No gross joint swelling.   Mental Status Exam:  Speech clear and coherent.   Neurologic:  Alert, Normal motor function, No focal deficits, Cranial Nerves II-XII are grossly intact bilaterally.         08-04    141  |  104  |  16  ----------------------------<  148<H>  3.5   |  25  |  0.72    EGFR if : 86  EGFR if non : 74    Ca    10.3      08-04  Mg     1.80     08-04  Phos  3.3     08-04    TPro  5.3<L>  /  Alb  2.7<L>  /  TBili  <0.2  /  DBili  x   /  AST  94<H>  /  ALT  88<H>  /  AlkPhos  155<H>  08-04          Thyroid Function Tests:  07-31 @ 06:28 TSH -- FreeT4 1.0 T3 -- Anti TPO -- Anti Thyroglobulin Ab -- TSI --  07-30 @ 08:58 TSH -- FreeT4 1.0 T3 -- Anti TPO -- Anti Thyroglobulin Ab -- TSI --

## 2021-08-04 NOTE — PROGRESS NOTE ADULT - ASSESSMENT
89 year old woman with a pmh of COPD and HFrEF (EF 26%) that with chronic cough, not improved with steroids nor Abx. Now s/p R robotic VATS/pleural bx with placement of pleurex catheter on 07/15/21 after PET from 07/07/21 reveal b/l upper lobe opacities concerning for malignancy with pleural effusion. Hematology consulted for newly diagnosed DLBCL.    #DLBCL  -S/p Right peritracheal lymph node, level 4 biopsy: Diffuse large B cell lymphoma, non-germinal center B cell like immunophenotype  -Will need pleural fluid cytopath and flow. Will need staging bone marrow as well.  -Patient and Family both confirm wanting treatment; however discussed with patient regarding intensity of treatment regimen. Given performance status, questionable HF (pending repeat echo) and requiring O2; will start only prednisone 20mg daily x 2 days and evaluate patient response.   - Prednisone AM 8/3 and 8/4  -High risk for TLS; will need to start regimen in stages with q 8 hour TLS lab monitoring (CMP, Phos, Mag, LDH, Uric Acid).   -continue allopurinol 300mg Daily for TLS prophylaxis. Patient is on gentle hydration from primary team and would recommend continuing at minimum 50cc per hour, if patient can tolerate.   - Repeat Echo pending- per cardiology, EF was normal from their reading and the radiologist reading was erroneous. However, will need official addedum or repeat ECHO to confirm heart function status.   -Peripheral flow cytometry 7-; pending. . A written requisition and tubes were given to the floor nurse.    #Leukocytosis  -Leukocytosis with left shift with many neutrophils seen on the peripheral smear.  -Febrile to 103 in ED  - completed 5 days vanco and Zosyn;   -Bcx/Ucx negtive; no clear source of infection identified   - per pulm/primary team     #Hypercalcemia  -Corrected Calcium 11.4  -C/w maintenance IVF @ 75cc/hr given HF hx      Jess Nelson MD  PGY 4, Oncology/Hematology fellow  (P) [phone]  After 5pm, please contact on-call team.   89 year old woman with a pmh of COPD and HFrEF (EF 26%) that with chronic cough, not improved with steroids nor Abx. Now s/p R robotic VATS/pleural bx with placement of pleurex catheter on 07/15/21 after PET from 07/07/21 reveal b/l upper lobe opacities concerning for malignancy with pleural effusion. Hematology consulted for newly diagnosed DLBCL.    #DLBCL  -S/p Right peritracheal lymph node, level 4 biopsy: Diffuse large B cell lymphoma, non-germinal center B cell like immunophenotype  -Will need pleural fluid cytopath and flow. Will need staging bone marrow as well.  -Patient and Family both confirm wanting treatment; however discussed with patient regarding intensity of treatment regimen. Given performance status, and requiring O2; S/P prednison 20mg x 2 days (8/3/2021) with good patient response. Will increase prednisone to 50mg daily x 5 days. Discussed with patient starting cyclophosphamide x 3 doses. W  - Prednisone AM 8/3 and 8/4  -High risk for TLS; will need to start regimen in stages with q 8 hour TLS lab monitoring (CMP, Phos, Mag, LDH, Uric Acid).   -continue allopurinol 300mg Daily for TLS prophylaxis. Patient is on gentle hydration from primary team and would recommend continuing at minimum 50cc per hour, if patient can tolerate.   - Repeat Echo showing EF of 65%;   -Peripheral flow cytometry 7-; pending. . A written requisition and tubes were given to the floor nurse.    #Leukocytosis  -Leukocytosis with left shift with many neutrophils seen on the peripheral smear.  -Febrile to 103 in ED  - completed 5 days vanco and Zosyn;   -Bcx/Ucx negtive; no clear source of infection identified   - per pulm/primary team     #Hypercalcemia  -Corrected Calcium 11.4  -C/w maintenance IVF @ 75cc/hr given HF hx      Jess Nelson MD  PGY 4, Oncology/Hematology fellow  (P) [phone]  After 5pm, please contact on-call team.   89 year old woman with a pmh of COPD and HFrEF (EF 26%) that with chronic cough, not improved with steroids nor Abx. Now s/p R robotic VATS/pleural bx with placement of pleurex catheter on 07/15/21 after PET from 07/07/21 reveal b/l upper lobe opacities concerning for malignancy with pleural effusion. Hematology consulted for newly diagnosed DLBCL.    #DLBCL  -S/p Right peritracheal lymph node, level 4 biopsy: Diffuse large B cell lymphoma, non-germinal center B cell like immunophenotype  -Will need pleural fluid cytopath and flow. Will need staging bone marrow as well.  -Patient and Family both confirm wanting treatment; however discussed with patient regarding intensity of treatment regimen. Given performance status, and requiring O2; S/P prednison 20mg x 2 days (8/3/2021) with good patient response. Will increase prednisone to 50mg daily x 5 days. Discussion with patient regarding chemotherapy regimen possibilities. PAtient wishing treatment however would like to limit hospital visits due to severe neutropenia, fevers ect. Discussed several regimens but asked to defer final regimen decision to her daughter.   - D1  8/3  Prednisone 20mg AM; D2 8/4 Prednisone 20mg AM, 30mg PM  D3-D7 Prednisone 50mg (end on aug 9)  - Tentatively plan cyclophosphamide 200mg x 3 doses to be given inpatient starting tomorrow once daughter consent obtained.   -High risk for TLS; will need to start regimen in stages with q 8 hour TLS lab monitoring (CMP, Phos, Mag, LDH, Uric Acid).   -continue allopurinol 300mg Daily for TLS prophylaxis. Patient is on gentle hydration from primary team and would recommend continuing at minimum 50cc per hour, if patient can tolerate.   - Repeat Echo showing EF of 65%;   -Peripheral flow cytometry 7-; pending. . A written requisition and tubes were given to the floor nurse.    #Leukocytosis  -Leukocytosis with left shift with many neutrophils seen on the peripheral smear. Improved today   -Febrile to 103 in ED  - completed 5 days vanco and Zosyn;   -Bcx/Ucx negtive; no clear source of infection identified   - per pulm/primary team     #Hypercalcemia  -C/w maintenance IVF @ 75cc/hr      Jess Nelson MD  PGY 4, Oncology/Hematology fellow  (P) [phone]  After 5pm, please contact on-call team.

## 2021-08-04 NOTE — PROGRESS NOTE ADULT - ASSESSMENT
89 yr old female with a pmh of Lymphoma, COPD, HFrEF (EF 26%), s/p right robot VATS/pleural biopsy with placement of pleurex catheter 7/14/21 readmitted for rehab for fever/sob, admitted for sepsis 2/2 PNA. Endocrine consulted for hypercalcemia, suspected to be secondary to malignancy.     Hypercalcemia, likely 2/2 Malignancy in the setting of Lymphoma  Corrected Ca 12.86 down to 11.34  On chart review, hypercalcemia also noted on recent admission  7/16 Vitamin D 25 OH 37.1  7/16 Vitamin D 1,25 Dihydroxy elevated to 94.4   7/16 TSH 2.83  Intact PTH suppressed at 10  Currently on IVF NS at 50cc/hr  Recommendations:   - Continue IVF for now  - Continue to monitor CMP daily   - Consider tele monitoring if any concern for hypercalcemia related EKG changes   - Would hold off on starting bisphosphonates/dental consult at this time given bisphosphonates only a temporary solution for hypercalcemia & treatment for hypercalcemia due to lymphoma is steroids  -Received 20mg prednisone once yesterday (8/3/21) and received a total of 50mg prednisone today. Will Receive 4 more days of prednisone 50mg.    Steroid-induced hyperglycemia   on BMP on 8/3/21 about 12 hours after first dose of prednisone. BSR on next 3 BMPs was 128, 100 and 148. A1c 5.2%.   -Monitor BSR with BMPs for now    Hyponatremia in the setting of Amiodarone Use, r/o Thyroid Dysfunction   Na 132 >> 141  TSH 5.89  FT4 1.0  Recommendations:   - Repeat TSH as outpatient     Hypervitaminosis D   7/16 Vitamin D 25 OH 37.1  7/16 Vitamin D 1,25 Dihydroxy elevated to 94.4   Recommendations:   - Continue to hold vitamin D supplementation at this time   - Elevated Vitamin D 1, 25 Dihydroxy level likely related to DLBCL     Endocrine will sign off. Defer further management of prednisone/hypercalcemia of malignancy to heme/onc. If further assistance needed, please call back.    Jesus Smith DO, Endocrinology Fellow  Pager [phone] from 9am to 5pm. After hours and on weekends, please call [phone].   89 yr old female with a pmh of Lymphoma, COPD, HFrEF (EF 26%), s/p right robot VATS/pleural biopsy with placement of pleurex catheter 7/14/21 readmitted for rehab for fever/sob, admitted for sepsis 2/2 PNA. Endocrine consulted for hypercalcemia, suspected to be secondary to malignancy.     Hypercalcemia, likely 2/2 Malignancy in the setting of Lymphoma  Corrected Ca 12.86 down to 11.34  On chart review, hypercalcemia also noted on recent admission  7/16 Vitamin D 25 OH 37.1  7/16 Vitamin D 1,25 Dihydroxy elevated to 94.4   7/16 TSH 2.83  Intact PTH suppressed at 10  Currently on IVF NS at 50cc/hr  Recommendations:   - Continue IVF for now  - Continue to monitor CMP daily   - Consider tele monitoring if any concern for hypercalcemia related EKG changes   - Would hold off on starting bisphosphonates/dental consult at this time given bisphosphonates only a temporary solution for hypercalcemia & treatment for hypercalcemia due to lymphoma is steroids  -Received 20mg prednisone once yesterday (8/3/21) and received a total of 50mg prednisone today. Will Receive 4 more days of prednisone 50mg.    Steroid-induced hyperglycemia   on BMP on 8/3/21 about 12 hours after first dose of prednisone. BG on next 3 BMPs was 128, 100 and 148. A1c 5.2%.   -Monitor BG with BMPs for now    Hyponatremia in the setting of Amiodarone Use, r/o Thyroid Dysfunction   Na 132 >> 141  TSH 5.89  FT4 1.0  Recommendations:   - Repeat TSH as outpatient     Hypervitaminosis D   7/16 Vitamin D 25 OH 37.1  7/16 Vitamin D 1,25 Dihydroxy elevated to 94.4   Recommendations:   - Continue to hold vitamin D supplementation at this time   - Elevated Vitamin D 1, 25 Dihydroxy level likely related to DLBCL     Endocrine will sign off. Defer further management of prednisone/hypercalcemia of malignancy to heme/onc. If further assistance needed, please call back.    Jesus Smith DO, Endocrinology Fellow  Pager [phone] from 9am to 5pm. After hours and on weekends, please call [phone].

## 2021-08-04 NOTE — PROGRESS NOTE ADULT - SUBJECTIVE AND OBJECTIVE BOX
Hematology Oncology Follow-up    INTERVAL HPI/OVERNIGHT EVENTS:  No o/n events, patient resting comfortably. No complaints at this time. Patient specifically denies fever, chills, dizziness, weakness, CP, palpitations, SOB, cough, N/V/D/C, dysuria, changes in bowel movements, LE edema.    Review of Systems:  General: denies fevers/chills, night sweats, malaise, changes in appetite  Head: denies HA  Eyes: denies vision change  ENT: denies oral lesions, rhinorrhea, epistaxys, sore throat, dysphagia  Respiratory: denies cough, shortness of breath, pleurisy  Cardiovascular: denies chest pain, palpitaitons, [place]  Gastrointestinal: denies nausea, vomiting, abdominal pain, constipation, diarrhea, melena, hematochezia  MSK: denies joint pain or muscle pain  Neuro: denies headache, weakness, or parasthesias  Skin: denies rash, petichiae, echymoses  Psych: denies anxiety or sleep disturbances    VITAL SIGNS:  T(F): 98.5 (08-04-21 @ 11:37)  HR: 77 (08-04-21 @ 11:37)  BP: 131/76 (08-04-21 @ 11:37)  RR: 18 (08-04-21 @ 11:37)  SpO2: 99% (08-04-21 @ 11:37)  Wt(kg): --    08-03-21 @ 07:01  -  08-04-21 @ 07:00  --------------------------------------------------------  IN: 1540 mL / OUT: 600 mL / NET: 940 mL        PHYSICAL EXAM:    Constitutional: AAOx3, NAD  Eyes: PERRL, EOMI, sclera non-icteric  Neck: supple, no masses, no JVD, no lymphadenopathy  Respiratory: CTA b/l, no wheezing, rhonchi, rales, with normal respiratory effort  Cardiovascular: RRR, normal S1S2, no M/R/G  Gastrointestinal: soft, NTND, no masses palpable, BS normal in all four quadrants, no HSM  Extremities:  no edema  MSK: no obvious abnormalities, normal ROM, no lymphadenopathy  Neurological: Grossly intact  Skin: Normal temperature, no rash, no echymoses, no petichiae  Psych: normal affect    MEDICATIONS  (STANDING):  albuterol/ipratropium for Nebulization 3 milliLiter(s) Nebulizer every 6 hours  allopurinol 300 milliGRAM(s) Oral daily  aMIOdarone    Tablet 200 milliGRAM(s) Oral daily  enoxaparin Injectable 40 milliGRAM(s) SubCutaneous daily  guaiFENesin  milliGRAM(s) Oral every 12 hours  sodium chloride 0.9%. 1000 milliLiter(s) (50 mL/Hr) IV Continuous <Continuous>    MEDICATIONS  (PRN):  acetaminophen   Tablet .. 650 milliGRAM(s) Oral every 6 hours PRN Temp greater or equal to 38.5C (101.3F), Mild Pain (1 - 3)  ALPRAZolam 0.25 milliGRAM(s) Oral two times a day PRN anxiety  aluminum hydroxide/magnesium hydroxide/simethicone Suspension 30 milliLiter(s) Oral every 4 hours PRN Dyspepsia  melatonin 3 milliGRAM(s) Oral at bedtime PRN Insomnia  ondansetron Injectable 4 milliGRAM(s) IV Push every 8 hours PRN Nausea and/or Vomiting      No Known Allergies      LABS:                        8.6    13.01 )-----------( 339      ( 04 Aug 2021 02:42 )             28.9     08-04    141  |  103  |  16  ----------------------------<  100<H>  3.4<L>   |  26  |  0.77    Ca    10.3      04 Aug 2021 02:42  Phos  3.7     08-04  Mg     1.90     08-04    TPro  5.1<L>  /  Alb  2.7<L>  /  TBili  <0.2  /  DBili  x   /  AST  77<H>  /  ALT  70<H>  /  AlkPhos  146<H>  08-04     Lactate Dehydrogenase, Serum: 153 U/L (08-04 @ 02:42)  Lactate Dehydrogenase, Serum: 138 U/L (08-04 @ 00:26)  Lactate Dehydrogenase, Serum: 137 U/L (08-03 @ 14:55)                  RADIOLOGY & ADDITIONAL TESTS:  Studies reviewed. Hematology Oncology Follow-up    INTERVAL HPI/OVERNIGHT EVENTS:  No o/n events, patient sitting in chair. reports feeling better with improvement in her voice after the steroids. She reports starting to feel better     VITAL SIGNS:  T(F): 98.5 (08-04-21 @ 11:37)  HR: 77 (08-04-21 @ 11:37)  BP: 131/76 (08-04-21 @ 11:37)  RR: 18 (08-04-21 @ 11:37)  SpO2: 99% (08-04-21 @ 11:37)  Wt(kg): --    08-03-21 @ 07:01  -  08-04-21 @ 07:00  --------------------------------------------------------  IN: 1540 mL / OUT: 600 mL / NET: 940 mL        PHYSICAL EXAM:    Constitutional: AAOx3, NAD  Eyes: PERRL, EOMI, sclera non-icteric  Neck: supple, no masses, no JVD, no lymphadenopathy  Respiratory: on oxygen; decreased breath sounds bilaterally   Cardiovascular: RRR, normal S1S2, no M/R/G  Gastrointestinal: soft, NTND, no masses palpable, BS normal in all four quadrants, no HSM  Extremities:  no edema  MSK: no obvious abnormalities, normal ROM, no lymphadenopathy  Neurological: Grossly intact  Skin: Normal temperature,  no petichiae, senile purpura  generalized macular rash on back -  Psych: normal affect    MEDICATIONS  (STANDING):  albuterol/ipratropium for Nebulization 3 milliLiter(s) Nebulizer every 6 hours  allopurinol 300 milliGRAM(s) Oral daily  aMIOdarone    Tablet 200 milliGRAM(s) Oral daily  enoxaparin Injectable 40 milliGRAM(s) SubCutaneous daily  guaiFENesin  milliGRAM(s) Oral every 12 hours  sodium chloride 0.9%. 1000 milliLiter(s) (50 mL/Hr) IV Continuous <Continuous>    MEDICATIONS  (PRN):  acetaminophen   Tablet .. 650 milliGRAM(s) Oral every 6 hours PRN Temp greater or equal to 38.5C (101.3F), Mild Pain (1 - 3)  ALPRAZolam 0.25 milliGRAM(s) Oral two times a day PRN anxiety  aluminum hydroxide/magnesium hydroxide/simethicone Suspension 30 milliLiter(s) Oral every 4 hours PRN Dyspepsia  melatonin 3 milliGRAM(s) Oral at bedtime PRN Insomnia  ondansetron Injectable 4 milliGRAM(s) IV Push every 8 hours PRN Nausea and/or Vomiting      No Known Allergies      LABS:                        8.6    13.01 )-----------( 339      ( 04 Aug 2021 02:42 )             28.9     08-04    141  |  103  |  16  ----------------------------<  100<H>  3.4<L>   |  26  |  0.77    Ca    10.3      04 Aug 2021 02:42  Phos  3.7     08-04  Mg     1.90     08-04    TPro  5.1<L>  /  Alb  2.7<L>  /  TBili  <0.2  /  DBili  x   /  AST  77<H>  /  ALT  70<H>  /  AlkPhos  146<H>  08-04     Lactate Dehydrogenase, Serum: 153 U/L (08-04 @ 02:42)  Lactate Dehydrogenase, Serum: 138 U/L (08-04 @ 00:26)  Lactate Dehydrogenase, Serum: 137 U/L (08-03 @ 14:55)                  RADIOLOGY & ADDITIONAL TESTS:  Studies reviewed.

## 2021-08-04 NOTE — DIETITIAN INITIAL EVALUATION ADULT. - ADD RECOMMEND
1. Encourage & assist Pt with meals; Monitor PO diet tolerance; Honor food preferences;         2. Add Multivitamins with minerals 1 tab daily for micronutrient coverage;           3. Monitor labs, hydration status;

## 2021-08-05 LAB
ALBUMIN SERPL ELPH-MCNC: 2.5 G/DL — LOW (ref 3.3–5)
ALBUMIN SERPL ELPH-MCNC: 2.6 G/DL — LOW (ref 3.3–5)
ALBUMIN SERPL ELPH-MCNC: 2.8 G/DL — LOW (ref 3.3–5)
ALBUMIN SERPL ELPH-MCNC: 3 G/DL — LOW (ref 3.3–5)
ALP SERPL-CCNC: 159 U/L — HIGH (ref 40–120)
ALP SERPL-CCNC: 162 U/L — HIGH (ref 40–120)
ALP SERPL-CCNC: 170 U/L — HIGH (ref 40–120)
ALP SERPL-CCNC: 173 U/L — HIGH (ref 40–120)
ALT FLD-CCNC: 109 U/L — HIGH (ref 4–33)
ALT FLD-CCNC: 123 U/L — HIGH (ref 4–33)
ALT FLD-CCNC: 125 U/L — HIGH (ref 4–33)
ALT FLD-CCNC: 132 U/L — HIGH (ref 4–33)
ANION GAP SERPL CALC-SCNC: 10 MMOL/L — SIGNIFICANT CHANGE UP (ref 7–14)
ANION GAP SERPL CALC-SCNC: 12 MMOL/L — SIGNIFICANT CHANGE UP (ref 7–14)
ANION GAP SERPL CALC-SCNC: 12 MMOL/L — SIGNIFICANT CHANGE UP (ref 7–14)
ANION GAP SERPL CALC-SCNC: 13 MMOL/L — SIGNIFICANT CHANGE UP (ref 7–14)
AST SERPL-CCNC: 113 U/L — HIGH (ref 4–32)
AST SERPL-CCNC: 148 U/L — HIGH (ref 4–32)
AST SERPL-CCNC: 68 U/L — HIGH (ref 4–32)
AST SERPL-CCNC: 89 U/L — HIGH (ref 4–32)
BASOPHILS # BLD AUTO: 0.02 K/UL — SIGNIFICANT CHANGE UP (ref 0–0.2)
BASOPHILS NFR BLD AUTO: 0.1 % — SIGNIFICANT CHANGE UP (ref 0–2)
BILIRUB SERPL-MCNC: <0.2 MG/DL — SIGNIFICANT CHANGE UP (ref 0.2–1.2)
BUN SERPL-MCNC: 23 MG/DL — SIGNIFICANT CHANGE UP (ref 7–23)
BUN SERPL-MCNC: 23 MG/DL — SIGNIFICANT CHANGE UP (ref 7–23)
BUN SERPL-MCNC: 24 MG/DL — HIGH (ref 7–23)
BUN SERPL-MCNC: 26 MG/DL — HIGH (ref 7–23)
CALCIUM SERPL-MCNC: 10.1 MG/DL — SIGNIFICANT CHANGE UP (ref 8.4–10.5)
CALCIUM SERPL-MCNC: 10.1 MG/DL — SIGNIFICANT CHANGE UP (ref 8.4–10.5)
CALCIUM SERPL-MCNC: 10.6 MG/DL — HIGH (ref 8.4–10.5)
CALCIUM SERPL-MCNC: 10.8 MG/DL — HIGH (ref 8.4–10.5)
CHLORIDE SERPL-SCNC: 103 MMOL/L — SIGNIFICANT CHANGE UP (ref 98–107)
CHLORIDE SERPL-SCNC: 103 MMOL/L — SIGNIFICANT CHANGE UP (ref 98–107)
CHLORIDE SERPL-SCNC: 104 MMOL/L — SIGNIFICANT CHANGE UP (ref 98–107)
CHLORIDE SERPL-SCNC: 104 MMOL/L — SIGNIFICANT CHANGE UP (ref 98–107)
CO2 SERPL-SCNC: 24 MMOL/L — SIGNIFICANT CHANGE UP (ref 22–31)
CO2 SERPL-SCNC: 25 MMOL/L — SIGNIFICANT CHANGE UP (ref 22–31)
CO2 SERPL-SCNC: 26 MMOL/L — SIGNIFICANT CHANGE UP (ref 22–31)
CO2 SERPL-SCNC: 26 MMOL/L — SIGNIFICANT CHANGE UP (ref 22–31)
CREAT SERPL-MCNC: 0.71 MG/DL — SIGNIFICANT CHANGE UP (ref 0.5–1.3)
CREAT SERPL-MCNC: 0.77 MG/DL — SIGNIFICANT CHANGE UP (ref 0.5–1.3)
CREAT SERPL-MCNC: 0.8 MG/DL — SIGNIFICANT CHANGE UP (ref 0.5–1.3)
CREAT SERPL-MCNC: 0.83 MG/DL — SIGNIFICANT CHANGE UP (ref 0.5–1.3)
EOSINOPHIL # BLD AUTO: 0 K/UL — SIGNIFICANT CHANGE UP (ref 0–0.5)
EOSINOPHIL NFR BLD AUTO: 0 % — SIGNIFICANT CHANGE UP (ref 0–6)
GLUCOSE SERPL-MCNC: 136 MG/DL — HIGH (ref 70–99)
GLUCOSE SERPL-MCNC: 143 MG/DL — HIGH (ref 70–99)
GLUCOSE SERPL-MCNC: 153 MG/DL — HIGH (ref 70–99)
GLUCOSE SERPL-MCNC: 155 MG/DL — HIGH (ref 70–99)
HAV IGM SER-ACNC: SIGNIFICANT CHANGE UP
HCT VFR BLD CALC: 28.7 % — LOW (ref 34.5–45)
HGB BLD-MCNC: 8.5 G/DL — LOW (ref 11.5–15.5)
IANC: 11.96 K/UL — HIGH (ref 1.5–8.5)
IMM GRANULOCYTES NFR BLD AUTO: 0.8 % — SIGNIFICANT CHANGE UP (ref 0–1.5)
LDH SERPL L TO P-CCNC: 132 U/L — LOW (ref 135–225)
LDH SERPL L TO P-CCNC: 138 U/L — SIGNIFICANT CHANGE UP (ref 135–225)
LDH SERPL L TO P-CCNC: 147 U/L — SIGNIFICANT CHANGE UP (ref 135–225)
LDH SERPL L TO P-CCNC: 161 U/L — SIGNIFICANT CHANGE UP (ref 135–225)
LYMPHOCYTES # BLD AUTO: 1.08 K/UL — SIGNIFICANT CHANGE UP (ref 1–3.3)
LYMPHOCYTES # BLD AUTO: 8 % — LOW (ref 13–44)
MAGNESIUM SERPL-MCNC: 1.6 MG/DL — SIGNIFICANT CHANGE UP (ref 1.6–2.6)
MAGNESIUM SERPL-MCNC: 1.8 MG/DL — SIGNIFICANT CHANGE UP (ref 1.6–2.6)
MAGNESIUM SERPL-MCNC: 1.8 MG/DL — SIGNIFICANT CHANGE UP (ref 1.6–2.6)
MCHC RBC-ENTMCNC: 24.2 PG — LOW (ref 27–34)
MCHC RBC-ENTMCNC: 29.6 GM/DL — LOW (ref 32–36)
MCV RBC AUTO: 81.8 FL — SIGNIFICANT CHANGE UP (ref 80–100)
MONOCYTES # BLD AUTO: 0.3 K/UL — SIGNIFICANT CHANGE UP (ref 0–0.9)
MONOCYTES NFR BLD AUTO: 2.2 % — SIGNIFICANT CHANGE UP (ref 2–14)
NEUTROPHILS # BLD AUTO: 11.96 K/UL — HIGH (ref 1.8–7.4)
NEUTROPHILS NFR BLD AUTO: 88.9 % — HIGH (ref 43–77)
NRBC # BLD: 0 /100 WBCS — SIGNIFICANT CHANGE UP
NRBC # FLD: 0 K/UL — SIGNIFICANT CHANGE UP
PHOSPHATE SERPL-MCNC: 3.3 MG/DL — SIGNIFICANT CHANGE UP (ref 2.5–4.5)
PHOSPHATE SERPL-MCNC: 3.4 MG/DL — SIGNIFICANT CHANGE UP (ref 2.5–4.5)
PHOSPHATE SERPL-MCNC: 3.8 MG/DL — SIGNIFICANT CHANGE UP (ref 2.5–4.5)
PHOSPHATE SERPL-MCNC: 4 MG/DL — SIGNIFICANT CHANGE UP (ref 2.5–4.5)
PLATELET # BLD AUTO: 352 K/UL — SIGNIFICANT CHANGE UP (ref 150–400)
POTASSIUM SERPL-MCNC: 3.3 MMOL/L — LOW (ref 3.5–5.3)
POTASSIUM SERPL-MCNC: 3.3 MMOL/L — LOW (ref 3.5–5.3)
POTASSIUM SERPL-MCNC: 3.7 MMOL/L — SIGNIFICANT CHANGE UP (ref 3.5–5.3)
POTASSIUM SERPL-MCNC: 3.7 MMOL/L — SIGNIFICANT CHANGE UP (ref 3.5–5.3)
POTASSIUM SERPL-SCNC: 3.3 MMOL/L — LOW (ref 3.5–5.3)
POTASSIUM SERPL-SCNC: 3.3 MMOL/L — LOW (ref 3.5–5.3)
POTASSIUM SERPL-SCNC: 3.7 MMOL/L — SIGNIFICANT CHANGE UP (ref 3.5–5.3)
POTASSIUM SERPL-SCNC: 3.7 MMOL/L — SIGNIFICANT CHANGE UP (ref 3.5–5.3)
PROT SERPL-MCNC: 4.8 G/DL — LOW (ref 6–8.3)
PROT SERPL-MCNC: 5 G/DL — LOW (ref 6–8.3)
PROT SERPL-MCNC: 5.1 G/DL — LOW (ref 6–8.3)
PROT SERPL-MCNC: 5.2 G/DL — LOW (ref 6–8.3)
RBC # BLD: 3.51 M/UL — LOW (ref 3.8–5.2)
RBC # FLD: 17.6 % — HIGH (ref 10.3–14.5)
SODIUM SERPL-SCNC: 139 MMOL/L — SIGNIFICANT CHANGE UP (ref 135–145)
SODIUM SERPL-SCNC: 140 MMOL/L — SIGNIFICANT CHANGE UP (ref 135–145)
SODIUM SERPL-SCNC: 141 MMOL/L — SIGNIFICANT CHANGE UP (ref 135–145)
SODIUM SERPL-SCNC: 142 MMOL/L — SIGNIFICANT CHANGE UP (ref 135–145)
URATE SERPL-MCNC: 3.1 MG/DL — SIGNIFICANT CHANGE UP (ref 2.5–7)
URATE SERPL-MCNC: 3.4 MG/DL — SIGNIFICANT CHANGE UP (ref 2.5–7)
URATE SERPL-MCNC: 3.5 MG/DL — SIGNIFICANT CHANGE UP (ref 2.5–7)
URATE SERPL-MCNC: 3.6 MG/DL — SIGNIFICANT CHANGE UP (ref 2.5–7)
WBC # BLD: 13.47 K/UL — HIGH (ref 3.8–10.5)
WBC # FLD AUTO: 13.47 K/UL — HIGH (ref 3.8–10.5)

## 2021-08-05 PROCEDURE — 99233 SBSQ HOSP IP/OBS HIGH 50: CPT

## 2021-08-05 PROCEDURE — 99232 SBSQ HOSP IP/OBS MODERATE 35: CPT | Mod: GC

## 2021-08-05 PROCEDURE — 76705 ECHO EXAM OF ABDOMEN: CPT | Mod: 26

## 2021-08-05 PROCEDURE — 99222 1ST HOSP IP/OBS MODERATE 55: CPT

## 2021-08-05 RX ORDER — ONDANSETRON 8 MG/1
8 TABLET, FILM COATED ORAL ONCE
Refills: 0 | Status: COMPLETED | OUTPATIENT
Start: 2021-08-05 | End: 2021-08-05

## 2021-08-05 RX ORDER — POTASSIUM CHLORIDE 20 MEQ
40 PACKET (EA) ORAL ONCE
Refills: 0 | Status: COMPLETED | OUTPATIENT
Start: 2021-08-05 | End: 2021-08-05

## 2021-08-05 RX ORDER — ALPRAZOLAM 0.25 MG
0.25 TABLET ORAL
Refills: 0 | Status: DISCONTINUED | OUTPATIENT
Start: 2021-08-05 | End: 2021-08-12

## 2021-08-05 RX ORDER — FEBUXOSTAT 40 MG/1
40 TABLET ORAL DAILY
Refills: 0 | Status: DISCONTINUED | OUTPATIENT
Start: 2021-08-05 | End: 2021-08-13

## 2021-08-05 RX ORDER — CYCLOPHOSPHAMIDE 100 MG
200 VIAL (EA) INTRAVENOUS DAILY
Refills: 0 | Status: COMPLETED | OUTPATIENT
Start: 2021-08-05 | End: 2021-08-07

## 2021-08-05 RX ORDER — ONDANSETRON 8 MG/1
6 TABLET, FILM COATED ORAL EVERY 8 HOURS
Refills: 0 | Status: DISCONTINUED | OUTPATIENT
Start: 2021-08-05 | End: 2021-08-13

## 2021-08-05 RX ADMIN — Medication 650 MILLIGRAM(S): at 20:39

## 2021-08-05 RX ADMIN — Medication 500 MILLIGRAM(S): at 19:39

## 2021-08-05 RX ADMIN — Medication 50 MILLIGRAM(S): at 05:08

## 2021-08-05 RX ADMIN — FEBUXOSTAT 40 MILLIGRAM(S): 40 TABLET ORAL at 22:43

## 2021-08-05 RX ADMIN — Medication 650 MILLIGRAM(S): at 19:39

## 2021-08-05 RX ADMIN — ONDANSETRON 8 MILLIGRAM(S): 8 TABLET, FILM COATED ORAL at 19:13

## 2021-08-05 RX ADMIN — Medication 0.25 MILLIGRAM(S): at 22:52

## 2021-08-05 RX ADMIN — Medication 3 MILLIGRAM(S): at 22:53

## 2021-08-05 RX ADMIN — Medication 3 MILLILITER(S): at 16:28

## 2021-08-05 RX ADMIN — Medication 300 MILLIGRAM(S): at 12:00

## 2021-08-05 RX ADMIN — ENOXAPARIN SODIUM 40 MILLIGRAM(S): 100 INJECTION SUBCUTANEOUS at 12:01

## 2021-08-05 RX ADMIN — SODIUM CHLORIDE 50 MILLILITER(S): 9 INJECTION INTRAMUSCULAR; INTRAVENOUS; SUBCUTANEOUS at 19:39

## 2021-08-05 RX ADMIN — Medication 600 MILLIGRAM(S): at 17:37

## 2021-08-05 RX ADMIN — Medication 1 APPLICATION(S): at 22:26

## 2021-08-05 RX ADMIN — Medication 600 MILLIGRAM(S): at 05:09

## 2021-08-05 RX ADMIN — Medication 40 MILLIEQUIVALENT(S): at 16:03

## 2021-08-05 RX ADMIN — AMIODARONE HYDROCHLORIDE 200 MILLIGRAM(S): 400 TABLET ORAL at 05:09

## 2021-08-05 RX ADMIN — Medication 3 MILLILITER(S): at 03:20

## 2021-08-05 RX ADMIN — Medication 1 TABLET(S): at 12:00

## 2021-08-05 RX ADMIN — Medication 3 MILLILITER(S): at 10:07

## 2021-08-05 NOTE — PROGRESS NOTE ADULT - SUBJECTIVE AND OBJECTIVE BOX
Merlin Mathew, MD   Hospitalist  Pager #[phone]    PROGRESS NOTE:     Patient is a 89y old  Female who presents with a chief complaint of SOB (05 Aug 2021 09:13)      SUBJECTIVE / OVERNIGHT EVENTS:  No overnight events   Patient ate some breakfast this AM, denies any SOB, abdominal pain. Has no complaints/questions.     ADDITIONAL REVIEW OF SYSTEMS:    MEDICATIONS  (STANDING):  albuterol/ipratropium for Nebulization 3 milliLiter(s) Nebulizer every 6 hours  allopurinol 300 milliGRAM(s) Oral daily  aMIOdarone    Tablet 200 milliGRAM(s) Oral daily  enoxaparin Injectable 40 milliGRAM(s) SubCutaneous daily  guaiFENesin  milliGRAM(s) Oral every 12 hours  multivitamin 1 Tablet(s) Oral daily  predniSONE   Tablet 50 milliGRAM(s) Oral daily  sodium chloride 0.9%. 1000 milliLiter(s) (50 mL/Hr) IV Continuous <Continuous>    MEDICATIONS  (PRN):  acetaminophen   Tablet .. 650 milliGRAM(s) Oral every 6 hours PRN Temp greater or equal to 38.5C (101.3F), Mild Pain (1 - 3)  ALPRAZolam 0.25 milliGRAM(s) Oral two times a day PRN anxiety  aluminum hydroxide/magnesium hydroxide/simethicone Suspension 30 milliLiter(s) Oral every 4 hours PRN Dyspepsia  melatonin 3 milliGRAM(s) Oral at bedtime PRN Insomnia  ondansetron Injectable 4 milliGRAM(s) IV Push every 8 hours PRN Nausea and/or Vomiting      CAPILLARY BLOOD GLUCOSE        I&O's Summary    04 Aug 2021 07:01  -  05 Aug 2021 07:00  --------------------------------------------------------  IN: 0 mL / OUT: 5 mL / NET: -5 mL        PHYSICAL EXAM:  Vital Signs Last 24 Hrs  T(C): 36.4 (05 Aug 2021 11:28), Max: 36.8 (04 Aug 2021 14:00)  T(F): 97.5 (05 Aug 2021 11:28), Max: 98.2 (04 Aug 2021 14:00)  HR: 78 (05 Aug 2021 11:28) (71 - 91)  BP: 142/75 (05 Aug 2021 11:28) (142/75 - 150/79)  BP(mean): --  RR: 17 (05 Aug 2021 11:28) (17 - 18)  SpO2: 99% (05 Aug 2021 11:28) (98% - 99%)    CONSTITUTIONAL: NAD, well-developed woman on 2L NC   RESPIRATORY: Normal respiratory effort; diminished breath sounds w/ crackles bilaterally, R pleurex   CARDIOVASCULAR: Regular rate and rhythm, normal S1 and S2, no murmur/rub/gallop; No lower extremity edema; Peripheral pulses are 2+ bilaterally  ABDOMEN: Nontender to palpation, normoactive bowel sounds, no rebound/guarding; No hepatosplenomegaly  MUSCULOSKELETAL no clubbing or cyanosis of digits; no joint swelling or tenderness to palpation  PSYCH: Alert and oriented, lethargic but wakes up with verbal stimuli     LABS:                        8.5    13.47 )-----------( 352      ( 05 Aug 2021 06:57 )             28.7     05 Aug 2021 06:57    142    |  104    |  24     ----------------------------<  136    3.7     |  25     |  0.80     Ca    10.1       05 Aug 2021 06:57  Phos  4.0       05 Aug 2021 06:57  Mg     1.80      05 Aug 2021 06:57    TPro  4.8    /  Alb  2.5    /  TBili  <0.2   /  DBili  x      /  AST  113    /  ALT  123    /  AlkPhos  170    05 Aug 2021 06:57        RADIOLOGY & ADDITIONAL TESTS:  Results Reviewed:   Imaging Personally Reviewed:  Electrocardiogram Personally Reviewed:    COORDINATION OF CARE:  Care Discussed with Consultants/Other Providers [Y/N]:  Prior or Outpatient Records Reviewed [Y/N]:

## 2021-08-05 NOTE — CONSULT NOTE ADULT - SUBJECTIVE AND OBJECTIVE BOX
HPI:  89 yr old female with a pmh of COPD, HFrEF (EF 26%), s/p right robot VATS/pleural biopsy with placement of pleurex catheter 7/14/21 prior to discharged was treated with a brief course of zosyn and discharged to rehab 7/21/21. In rehab she developed a fever which was her reason for re-admission. Since admission, patient treated with 5 day course of zosyn and vanc with resolution of sepsis. Of note, patient also recently diagnosed with diffuse large b cell lymphoma and planned to start chemotherapy tomorrow.    Dermatology consulted for itchy rash that was first noticed yesterday. Initially started on abdomen, now with involvement of back, chest, and upper arms as well. No skin pain. Afebrile (only febrile to 103.2F on day of arrival). No eye pain, mouth pain, or pain with urination. Does not feel that face is swollen. Only recent medication changes were course of Zosyn (on Zosyn one day during last admission on 7/18, from 7/28-8/1 this admission), vanc (7/28-8/1), and allopurinol started on 8/2 (plan on discontinuing per team.)       PAST MEDICAL & SURGICAL HISTORY:  Cough    Osteoarthritis    Abnormal finding of lung    COPD, mild    History of hip replacement  right        REVIEW OF SYSTEMS      General: no fevers/chills, no lethargy	    Skin/Breast: see HPI    Respiratory and Thorax: +mild SOB  	  Cardiovascular: no palpitations or chest pain      MEDICATIONS  (STANDING):  albuterol/ipratropium for Nebulization 3 milliLiter(s) Nebulizer every 6 hours  aMIOdarone    Tablet 200 milliGRAM(s) Oral daily  cyclophosphamide IVPB (eMAR) 200 milliGRAM(s) IV Intermittent daily  enoxaparin Injectable 40 milliGRAM(s) SubCutaneous daily  febuxostat 40 milliGRAM(s) Oral daily  guaiFENesin  milliGRAM(s) Oral every 12 hours  multivitamin 1 Tablet(s) Oral daily  ondansetron Injectable 8 milliGRAM(s) IV Push once  predniSONE   Tablet 50 milliGRAM(s) Oral daily  sodium chloride 0.9%. 1000 milliLiter(s) (50 mL/Hr) IV Continuous <Continuous>  triamcinolone 0.1% Cream 1 Application(s) Topical two times a day    MEDICATIONS  (PRN):  acetaminophen   Tablet .. 650 milliGRAM(s) Oral every 6 hours PRN Temp greater or equal to 38.5C (101.3F), Mild Pain (1 - 3)  ALPRAZolam 0.25 milliGRAM(s) Oral two times a day PRN anxiety  aluminum hydroxide/magnesium hydroxide/simethicone Suspension 30 milliLiter(s) Oral every 4 hours PRN Dyspepsia  melatonin 3 milliGRAM(s) Oral at bedtime PRN Insomnia  ondansetron Injectable 4 milliGRAM(s) IV Push every 8 hours PRN Nausea and/or Vomiting  ondansetron Injectable 6 milliGRAM(s) IV Push every 8 hours PRN Nausea      Allergies    No Known Allergies    Intolerances        SOCIAL HISTORY: non contributory     FAMILY HISTORY:  No pertinent family history in first degree relatives        Vital Signs Last 24 Hrs  T(C): 36.4 (05 Aug 2021 11:28), Max: 36.8 (04 Aug 2021 20:45)  T(F): 97.5 (05 Aug 2021 11:28), Max: 98.2 (04 Aug 2021 20:45)  HR: 78 (05 Aug 2021 16:29) (71 - 91)  BP: 142/75 (05 Aug 2021 11:28) (142/75 - 144/74)  BP(mean): --  RR: 17 (05 Aug 2021 11:28) (17 - 18)  SpO2: 98% (05 Aug 2021 16:29) (98% - 99%)    PHYSICAL EXAM:     The patient was alert and oriented X 3, well nourished, and in no  apparent distress.  OP showed no ulcerations  There was no visible lymphadenopathy.  Conjunctiva were non injected  There was no clubbing or edema of extremities.  The scalp, hair, face, eyebrows, lips, OP, neck, chest, back,   extremities X 4, nails were examined.  There was no hyperhidrosis or bromhidrosis.    Of note on skin exam:   Several pink blanching macules and papules over chest, abdomen, back, and b/l upper arms  Few pink purpuric macules on left upper arm  No facial edema  No cervical LAD    LABS:                        8.5    13.47 )-----------( 352      ( 05 Aug 2021 06:57 )             28.7     08-05    141  |  103  |  23  ----------------------------<  143<H>  3.3<L>   |  26  |  0.71    Ca    10.8<H>      05 Aug 2021 14:26  Phos  3.4     08-05  Mg     1.80     08-05    TPro  5.2<L>  /  Alb  3.0<L>  /  TBili  <0.2  /  DBili  x   /  AST  89<H>  /  ALT  125<H>  /  AlkPhos  162<H>  08-05          RADIOLOGY & ADDITIONAL STUDIES:

## 2021-08-05 NOTE — PROGRESS NOTE ADULT - PROBLEM SELECTOR PLAN 2
Requiring 2L NC, s/p VATS with pleurex placement, possible PNA s/p therapy   - Drain Pleurex 2-3x/week- plan to drain today  - Mucinex, chest PT, IS, OOB into chair as possible   - S/p Lasix 20 IV w/o dramatic improvement though strict I/O's not recorded

## 2021-08-05 NOTE — PROGRESS NOTE ADULT - ASSESSMENT
89 year old woman with a pmh of COPD and HFrEF (EF 26%) that with chronic cough, not improved with steroids nor Abx. Now s/p R robotic VATS/pleural bx with placement of pleurex catheter on 07/15/21 after PET from 07/07/21 reveal b/l upper lobe opacities concerning for malignancy with pleural effusion. Hematology consulted for newly diagnosed DLBCL.    #DLBCL  -S/p Right peritracheal lymph node, level 4 biopsy: Diffuse large B cell lymphoma, non-germinal center B cell like immunophenotype  -Will need pleural fluid cytopath and flow. Will need staging bone marrow as well.  -Patient and Family both confirm wanting treatment; however discussed with patient regarding intensity of treatment regimen. Given performance status, and requiring O2; S/P prednison 20mg x 2 days (8/3/2021) with good patient response. Will increase prednisone to 50mg daily x 5 days. Discussion with patient regarding chemotherapy regimen possibilities. PAtient wishing treatment however would like to limit hospital visits due to severe neutropenia, fevers ect. Discussed several regimens but asked to defer final regimen decision to her daughter.   - D1  8/3  Prednisone 20mg AM; D2 8/4 Prednisone 20mg AM, 30mg PM  D3-D7 Prednisone 50mg (end on aug 9)  - Tentatively plan cyclophosphamide 200mg x 3 doses to be given inpatient today once daughter consent obtained.   -High risk for TLS; will need to start regimen in stages with q 8 hour TLS lab monitoring (CMP, Phos, Mag, LDH, Uric Acid).   -continue allopurinol 300mg Daily for TLS prophylaxis. Patient is on gentle hydration from primary team and would recommend continuing at minimum 50cc per hour, if patient can tolerate.   - Repeat Echo showing EF of 65%;   -Peripheral flow cytometry 7-; pending. . A written requisition and tubes were given to the floor nurse.    #Leukocytosis  -Leukocytosis with left shift with many neutrophils seen on the peripheral smear. Improved today   -Febrile to 103 in ED  - completed 5 days vanco and Zosyn;   -Bcx/Ucx negtive; no clear source of infection identified   - per pulm/primary team     #Hypercalcemia  -C/w maintenance IVF @ 75cc/hr      Jess Nelson MD  PGY 4, Oncology/Hematology fellow  (P) [phone]  After 5pm, please contact on-call team.   89 year old woman with a pmh of COPD and HFrEF (EF 26%) that with chronic cough, not improved with steroids nor Abx. Now s/p R robotic VATS/pleural bx with placement of pleurex catheter on 07/15/21 after PET from 07/07/21 reveal b/l upper lobe opacities concerning for malignancy with pleural effusion. Hematology consulted for newly diagnosed DLBCL.    #DLBCL  -S/p Right peritracheal lymph node, level 4 biopsy: Diffuse large B cell lymphoma, non-germinal center B cell like immunophenotype  -Will need pleural fluid cytopath and flow if tapped again  -Peripheral blood flow cytometry: absolute monocytosis with marked leukocytosis and neutrophilia  -Bone marrow biopsy not performed given that it would not  at this time.   - Will need to get LP with IT chemo eventually   - Repeat Echo showing EF of 65%;   -Patient and Family both confirm wanting treatment; however discussed with patient regarding intensity of treatment regimen. Given performance status, and requiring O2; S/P prednison 20mg x 2 days (8/3/2021) with good patient response. Will increase prednisone to 50mg daily x 5 days. Discussion with patient regarding chemotherapy regimen possibilities. Patient wishing treatment however would like to limit hospital visits due to severe neutropenia, fevers ect. Discussed several regimens but asked to defer final regimen decision to her daughter. Discussed at length with daughter Dinorah, chemotherapy regimens, possible AEs, benefits vs risks. Agreed on gentle initiation of Prednisone and cyclophosphamide. Will need to discuss Rituxan with daughter again prior to initiation.   - D1  8/3  Prednisone 20mg AM; D2 8/4 Prednisone 20mg AM, 30mg PM  D3-D7 Prednisone 50mg (end on aug 9)  - Cyclophosphamide 200mg x 3 doses D1 8/5/2021. Can be administered through IV Consent obtained from daughter through phone. Please see chart.   -High risk for TLS; will need to start regimen in stages with q 8 hour TLS lab monitoring (CMP, Phos, Mag, LDH, Uric Acid).   -continue allopurinol 300mg Daily for TLS prophylaxis. Patient is on gentle hydration from primary team and would recommend continuing at minimum 50cc per hour, if patient can tolerate.       #Leukocytosis  -Leukocytosis with left shift with many neutrophils seen on the peripheral smear. Improving since on steroids  -Febrile to 103 on admission; no clear source of infection found with work up.   - completed 5 days vanco and Zosyn;   - per pulm/primary team     #Hypercalcemia  -C/w maintenance IVF @ 75cc/hr    Jess Nelson MD  PGY 4, Oncology/Hematology fellow  (P) [phone]  After 5pm, please contact on-call team.   89 year old woman with a pmh of COPD and HFrEF (EF 26%) that with chronic cough, not improved with steroids nor Abx. Now s/p R robotic VATS/pleural bx with placement of pleurex catheter on 07/15/21 after PET from 07/07/21 reveal b/l upper lobe opacities concerning for malignancy with pleural effusion. Hematology consulted for newly diagnosed DLBCL.    #DLBCL  -S/p Right peritracheal lymph node, level 4 biopsy: Diffuse large B cell lymphoma, non-germinal center B cell like immunophenotype  -Will need pleural fluid cytopath and flow if tapped again  -Peripheral blood flow cytometry: absolute monocytosis with marked leukocytosis and neutrophilia  -Bone marrow biopsy not performed given that it would not  at this time.   - Will need to get LP with IT chemo eventually   - Repeat Echo showing EF of 65%;   -Patient and Family both confirm wanting treatment; however discussed with patient regarding intensity of treatment regimen. Given performance status, and requiring O2; S/P prednison 20mg x 2 days (8/3/2021) with good patient response. Will increase prednisone to 50mg daily x 5 days. Discussion with patient regarding chemotherapy regimen possibilities. Patient wishing treatment however would like to limit hospital visits due to severe neutropenia, fevers ect. Discussed several regimens but asked to defer final regimen decision to her daughter. Discussed at length with daughter Dinorah, chemotherapy regimens, possible AEs, benefits vs risks. Agreed on gentle initiation of Prednisone and cyclophosphamide. Will need to discuss Rituxan with daughter again prior to initiation.   - D1  8/3  Prednisone 20mg AM; D2 8/4 Prednisone 20mg AM, 30mg PM  D3-D7 Prednisone 50mg (end on aug 9)  - Cyclophosphamide 200mg x 3 doses D1 8/5/2021. Can be administered through IV Consent obtained from daughter through phone. Please see chart.   -High risk for TLS; will need to start regimen in stages with q 8 hour TLS lab monitoring (CMP, Phos, Mag, LDH, Uric Acid). Normal until now  -continue allopurinol 300mg Daily for TLS prophylaxis. Patient is on gentle hydration from primary team and would recommend continuing at minimum 50cc per hour, if patient can tolerate.     #Leukocytosis  -Leukocytosis with left shift with many neutrophils seen on the peripheral smear. Improving since on steroids  -Febrile to 103 on admission; no clear source of infection found with work up.   - completed 5 days vanco and Zosyn;   - per pulm/primary team     #Hypercalcemia  -C/w maintenance IVF @ 75cc/hr    Jess Nelson MD  PGY 4, Oncology/Hematology fellow  (P) [phone]  After 5pm, please contact on-call team.   89 year old woman with a pmh of COPD and HFrEF (EF 26%) that with chronic cough, not improved with steroids nor Abx. Now s/p R robotic VATS/pleural bx with placement of pleurex catheter on 07/15/21 after PET from 07/07/21 reveal b/l upper lobe opacities concerning for malignancy with pleural effusion. Hematology consulted for newly diagnosed DLBCL.    #DLBCL  -S/p Right peritracheal lymph node, level 4 biopsy: Diffuse large B cell lymphoma, non-germinal center B cell like immunophenotype  -Will need pleural fluid cytopath and flow if tapped again  -Peripheral blood flow cytometry: absolute monocytosis with marked leukocytosis and neutrophilia  -Bone marrow biopsy not performed given that it would not  at this time.   - Will need to get LP with IT chemo eventually   - Repeat Echo showing EF of 65%;   -Patient and Family both confirm wanting treatment; however discussed with patient regarding intensity of treatment regimen. Given performance status, and requiring O2; S/P prednison 20mg x 2 days (8/3/2021) with good patient response. Will increase prednisone to 50mg daily x 5 days. Discussion with patient regarding chemotherapy regimen possibilities. Patient wishing treatment however would like to limit hospital visits due to severe neutropenia, fevers ect. Discussed several regimens but asked to defer final regimen decision to her daughter. Discussed at length with daughter Dinorah, chemotherapy regimens, possible AEs, benefits vs risks. Agreed on gentle initiation of Prednisone and cyclophosphamide. Will need to discuss Rituxan with daughter again prior to initiation.   - D1  8/3  Prednisone 20mg AM; D2 8/4 Prednisone 20mg AM, 30mg PM  D3-D7 Prednisone 50mg (end on aug 9)  - Cyclophosphamide 200mg x 3 doses D1 8/5/2021. Can be administered through IV Consent obtained from daughter through phone. Please see chart.   -High risk for TLS; will need to start regimen in stages with q 8 hour TLS lab monitoring (CMP, Phos, Mag, LDH, Uric Acid). Normal until now  -Change allopurinol to febuxostat considering transaminitis three x the upper limit of normal and rash since starting allopurinol. For TLS prophylaxis. Patient is on gentle hydration from primary team and would recommend continuing at minimum 50cc per hour, if patient can tolerate.     New onset rash on back; transaminitis 3x upper limit of normal  -Unclear etiology; significantly improved since yesterday   -Dermatology consulted by Primary team   -With transaminitis and rash; concern for DRESS, especially since start of allopurinol few days prior however no eosinophils on differential. discontinued allopurinol.   - continue to monitor.     #Leukocytosis  -Leukocytosis with left shift with many neutrophils seen on the peripheral smear. Improving since on steroids  -Febrile to 103 on admission; no clear source of infection found with work up.   - completed 5 days vanco and Zosyn;   - per pulm/primary team     #Hypercalcemia  -C/w maintenance IVF @ 75cc/hr    Jess Nelson MD  PGY 4, Oncology/Hematology fellow  (P) [phone]  After 5pm, please contact on-call team.

## 2021-08-05 NOTE — CONSULT NOTE ADULT - CONSULT REQUESTED DATE/TIME
28-Jul-2021 17:39
28-Jul-2021 10:44
05-Aug-2021 18:33
29-Jul-2021 15:52
28-Jul-2021 01:52
28-Jul-2021 06:37
29-Jul-2021 14:31

## 2021-08-05 NOTE — PROGRESS NOTE ADULT - SUBJECTIVE AND OBJECTIVE BOX
DATE OF SERVICE: 08-05-21 @ 06:47    Subjective: Patient seen and examined. No new events except as noted.     SUBJECTIVE/ROS:  no new events       MEDICATIONS:  MEDICATIONS  (STANDING):  albuterol/ipratropium for Nebulization 3 milliLiter(s) Nebulizer every 6 hours  allopurinol 300 milliGRAM(s) Oral daily  aMIOdarone    Tablet 200 milliGRAM(s) Oral daily  enoxaparin Injectable 40 milliGRAM(s) SubCutaneous daily  guaiFENesin  milliGRAM(s) Oral every 12 hours  multivitamin 1 Tablet(s) Oral daily  predniSONE   Tablet 50 milliGRAM(s) Oral daily  sodium chloride 0.9%. 1000 milliLiter(s) (50 mL/Hr) IV Continuous <Continuous>      PHYSICAL EXAM:  T(C): 36.7 (08-05-21 @ 05:05), Max: 36.9 (08-04-21 @ 11:37)  HR: 71 (08-05-21 @ 05:05) (71 - 91)  BP: 144/74 (08-05-21 @ 05:05) (131/76 - 150/79)  RR: 17 (08-05-21 @ 05:05) (17 - 18)  SpO2: 99% (08-05-21 @ 05:05) (98% - 99%)  Wt(kg): --  I&O's Summary    03 Aug 2021 07:01  -  04 Aug 2021 07:00  --------------------------------------------------------  IN: 1540 mL / OUT: 600 mL / NET: 940 mL    04 Aug 2021 07:01  -  05 Aug 2021 06:47  --------------------------------------------------------  IN: 0 mL / OUT: 5 mL / NET: -5 mL            JVP: Normal  Neck: supple  Lung: clear   CV: S1 S2 , Murmur:  Abd: soft  Ext: No edema  neuro: Awake / alert  Psych: flat affect  Skin: normal``    LABS/DATA:    CARDIAC MARKERS:                                8.6    13.01 )-----------( 339      ( 04 Aug 2021 02:42 )             28.9     08-05    140  |  104  |  23  ----------------------------<  155<H>  3.7   |  26  |  0.83    Ca    10.1      05 Aug 2021 01:00  Phos  3.8     08-05  Mg     1.80     08-04    TPro  5.0<L>  /  Alb  2.8<L>  /  TBili  <0.2  /  DBili  x   /  AST  148<H>  /  ALT  132<H>  /  AlkPhos  173<H>  08-05    proBNP:   Lipid Profile:   HgA1c:   TSH:     TELE:  EKG:

## 2021-08-05 NOTE — CONSULT NOTE ADULT - ASSESSMENT
Assessment/Plan  1) Favor morbilliform drug rash secondary to a medication (zosyn favored given common offending agent and timeline.) At this time, do not suspect DRESS given lack of facial edema and no eosinophilia. However, patient with transaminitis that is new and has developed during this admission.     At this time:  - Please obtain daily cbc with differential and cmp to monitor for possible evolution into systemic involvement  - Start triamcinolone 0.1% ointment to AA bid for up to 2 weeks at a time  - If no clear etiology for new transaminitis, recommend consulting GI/hepatology   - Agree with holding off on allopurinol (patient also currently off of antibiotics)    Arthur Griffith MD  Resident Physician, PGY3  [facility]  Pager: [phone]  Office: [phone]    The patient's chart and photos were reviewed with the dermatology attending Dr. Mariam Larsen  Recommendations were communicated with the primary team.  Please page [phone] for further related questions.

## 2021-08-05 NOTE — PROGRESS NOTE ADULT - PROBLEM SELECTOR PLAN 3
Likely 2/2 malignancy, corrected calcium 11.3 today  - Cont IVF   - Will monitor Ca while on Prednisone   - Monitor fluid status

## 2021-08-05 NOTE — PROGRESS NOTE ADULT - SUBJECTIVE AND OBJECTIVE BOX
Hematology Oncology Follow-up    INTERVAL HPI/OVERNIGHT EVENTS:  No o/n events, patient resting comfortably. No complaints at this time. Patient specifically denies fever, chills, dizziness, weakness, CP, palpitations, SOB, cough, N/V/D/C, dysuria, changes in bowel movements, LE edema.    Review of Systems:  General: denies fevers/chills, night sweats, malaise, changes in appetite  Head: denies HA  Eyes: denies vision change  ENT: denies oral lesions, rhinorrhea, epistaxys, sore throat, dysphagia  Respiratory: denies cough, shortness of breath, pleurisy  Cardiovascular: denies chest pain, palpitaitons, [place]  Gastrointestinal: denies nausea, vomiting, abdominal pain, constipation, diarrhea, melena, hematochezia  MSK: denies joint pain or muscle pain  Neuro: denies headache, weakness, or parasthesias  Skin: denies rash, petichiae, echymoses  Psych: denies anxiety or sleep disturbances    VITAL SIGNS:  T(F): 98 (08-05-21 @ 05:05)  HR: 71 (08-05-21 @ 05:05)  BP: 144/74 (08-05-21 @ 05:05)  RR: 17 (08-05-21 @ 05:05)  SpO2: 99% (08-05-21 @ 05:05)  Wt(kg): --    08-04-21 @ 07:01  -  08-05-21 @ 07:00  --------------------------------------------------------  IN: 0 mL / OUT: 5 mL / NET: -5 mL        PHYSICAL EXAM:    Constitutional: AAOx3, NAD  Eyes: PERRL, EOMI, sclera non-icteric  Neck: supple, no masses, no JVD, no lymphadenopathy  Respiratory: CTA b/l, no wheezing, rhonchi, rales, with normal respiratory effort  Cardiovascular: RRR, normal S1S2, no M/R/G  Gastrointestinal: soft, NTND, no masses palpable, BS normal in all four quadrants, no HSM  Extremities:  no edema  MSK: no obvious abnormalities, normal ROM, no lymphadenopathy  Neurological: Grossly intact  Skin: Normal temperature, no rash, no echymoses, no petichiae  Psych: normal affect    MEDICATIONS  (STANDING):  albuterol/ipratropium for Nebulization 3 milliLiter(s) Nebulizer every 6 hours  allopurinol 300 milliGRAM(s) Oral daily  aMIOdarone    Tablet 200 milliGRAM(s) Oral daily  enoxaparin Injectable 40 milliGRAM(s) SubCutaneous daily  guaiFENesin  milliGRAM(s) Oral every 12 hours  multivitamin 1 Tablet(s) Oral daily  predniSONE   Tablet 50 milliGRAM(s) Oral daily  sodium chloride 0.9%. 1000 milliLiter(s) (50 mL/Hr) IV Continuous <Continuous>    MEDICATIONS  (PRN):  acetaminophen   Tablet .. 650 milliGRAM(s) Oral every 6 hours PRN Temp greater or equal to 38.5C (101.3F), Mild Pain (1 - 3)  ALPRAZolam 0.25 milliGRAM(s) Oral two times a day PRN anxiety  aluminum hydroxide/magnesium hydroxide/simethicone Suspension 30 milliLiter(s) Oral every 4 hours PRN Dyspepsia  melatonin 3 milliGRAM(s) Oral at bedtime PRN Insomnia  ondansetron Injectable 4 milliGRAM(s) IV Push every 8 hours PRN Nausea and/or Vomiting      No Known Allergies      LABS:                        8.5    13.47 )-----------( 352      ( 05 Aug 2021 06:57 )             28.7     08-05    142  |  104  |  24<H>  ----------------------------<  136<H>  3.7   |  25  |  0.80    Ca    10.1      05 Aug 2021 06:57  Phos  4.0     08-05  Mg     1.80     08-05    TPro  4.8<L>  /  Alb  2.5<L>  /  TBili  <0.2  /  DBili  x   /  AST  113<H>  /  ALT  123<H>  /  AlkPhos  170<H>  08-05     Lactate Dehydrogenase, Serum: 147 U/L (08-05 @ 06:57)  Lactate Dehydrogenase, Serum: 161 U/L (08-05 @ 01:00)  Lactate Dehydrogenase, Serum: 176 U/L (08-04 @ 13:36)                  RADIOLOGY & ADDITIONAL TESTS:  Studies reviewed. Hematology Oncology Follow-up    INTERVAL HPI/OVERNIGHT EVENTS:  No o/n events, patient resting comfortably. Patient is eating on her own. She reports to be doing okay, mild improvement from yesterday; enforces improvement in her voice. Denies fever, SOB, cough, chills.         VITAL SIGNS:  T(F): 98 (08-05-21 @ 05:05)  HR: 71 (08-05-21 @ 05:05)  BP: 144/74 (08-05-21 @ 05:05)  RR: 17 (08-05-21 @ 05:05)  SpO2: 99% (08-05-21 @ 05:05)  Wt(kg): --    08-04-21 @ 07:01  -  08-05-21 @ 07:00  --------------------------------------------------------  IN: 0 mL / OUT: 5 mL / NET: -5 mL        PHYSICAL EXAM:    Constitutional: AAOx3, NAD  Eyes: PERRL, EOMI, sclera non-icteric  Neck: supple, no masses, no JVD, no lymphadenopathy  Respiratory: CTA b/l, no wheezing, rhonchi, rales, with normal respiratory effort  Cardiovascular: RRR, normal S1S2, no M/R/G  Gastrointestinal: soft, NTND, no masses palpable, BS normal in all four quadrants, no HSM  Extremities:  no edema  MSK: no obvious abnormalities, normal ROM, no lymphadenopathy  Neurological: Grossly intact  Skin: Normal temperature, senile purpura, purpuric rash on back slightly improved from yesterday  Psych: normal affect    MEDICATIONS  (STANDING):  albuterol/ipratropium for Nebulization 3 milliLiter(s) Nebulizer every 6 hours  allopurinol 300 milliGRAM(s) Oral daily  aMIOdarone    Tablet 200 milliGRAM(s) Oral daily  enoxaparin Injectable 40 milliGRAM(s) SubCutaneous daily  guaiFENesin  milliGRAM(s) Oral every 12 hours  multivitamin 1 Tablet(s) Oral daily  predniSONE   Tablet 50 milliGRAM(s) Oral daily  sodium chloride 0.9%. 1000 milliLiter(s) (50 mL/Hr) IV Continuous <Continuous>    MEDICATIONS  (PRN):  acetaminophen   Tablet .. 650 milliGRAM(s) Oral every 6 hours PRN Temp greater or equal to 38.5C (101.3F), Mild Pain (1 - 3)  ALPRAZolam 0.25 milliGRAM(s) Oral two times a day PRN anxiety  aluminum hydroxide/magnesium hydroxide/simethicone Suspension 30 milliLiter(s) Oral every 4 hours PRN Dyspepsia  melatonin 3 milliGRAM(s) Oral at bedtime PRN Insomnia  ondansetron Injectable 4 milliGRAM(s) IV Push every 8 hours PRN Nausea and/or Vomiting      No Known Allergies      LABS:                        8.5    13.47 )-----------( 352      ( 05 Aug 2021 06:57 )             28.7     08-05    142  |  104  |  24<H>  ----------------------------<  136<H>  3.7   |  25  |  0.80    Ca    10.1      05 Aug 2021 06:57  Phos  4.0     08-05  Mg     1.80     08-05    TPro  4.8<L>  /  Alb  2.5<L>  /  TBili  <0.2  /  DBili  x   /  AST  113<H>  /  ALT  123<H>  /  AlkPhos  170<H>  08-05     Lactate Dehydrogenase, Serum: 147 U/L (08-05 @ 06:57)  Lactate Dehydrogenase, Serum: 161 U/L (08-05 @ 01:00)  Lactate Dehydrogenase, Serum: 176 U/L (08-04 @ 13:36)          RADIOLOGY & ADDITIONAL TESTS:  Studies reviewed. Hematology Oncology Follow-up    INTERVAL HPI/OVERNIGHT EVENTS:  No o/n events, patient resting comfortably. Patient is eating on her own. She reports to be doing okay, mild improvement from yesterday; enforces improvement in her voice. Denies fever, SOB, cough, chills. Rash on back much improved         VITAL SIGNS:  T(F): 98 (08-05-21 @ 05:05)  HR: 71 (08-05-21 @ 05:05)  BP: 144/74 (08-05-21 @ 05:05)  RR: 17 (08-05-21 @ 05:05)  SpO2: 99% (08-05-21 @ 05:05)  Wt(kg): --    08-04-21 @ 07:01  -  08-05-21 @ 07:00  --------------------------------------------------------  IN: 0 mL / OUT: 5 mL / NET: -5 mL        PHYSICAL EXAM:    Constitutional: AAOx3, NAD  Eyes: PERRL, EOMI, sclera non-icteric  Neck: supple, no masses, no JVD, no lymphadenopathy  Respiratory: CTA b/l, no wheezing, rhonchi, rales, with normal respiratory effort  Cardiovascular: RRR, normal S1S2, no M/R/G  Gastrointestinal: soft, NTND, no masses palpable, BS normal in all four quadrants, no HSM  Extremities:  no edema  MSK: no obvious abnormalities, normal ROM, no lymphadenopathy  Neurological: Grossly intact  Skin: Normal temperature, senile purpura, purpuric rash on back improved from yesterday  Psych: normal affect    MEDICATIONS  (STANDING):  albuterol/ipratropium for Nebulization 3 milliLiter(s) Nebulizer every 6 hours  allopurinol 300 milliGRAM(s) Oral daily  aMIOdarone    Tablet 200 milliGRAM(s) Oral daily  enoxaparin Injectable 40 milliGRAM(s) SubCutaneous daily  guaiFENesin  milliGRAM(s) Oral every 12 hours  multivitamin 1 Tablet(s) Oral daily  predniSONE   Tablet 50 milliGRAM(s) Oral daily  sodium chloride 0.9%. 1000 milliLiter(s) (50 mL/Hr) IV Continuous <Continuous>    MEDICATIONS  (PRN):  acetaminophen   Tablet .. 650 milliGRAM(s) Oral every 6 hours PRN Temp greater or equal to 38.5C (101.3F), Mild Pain (1 - 3)  ALPRAZolam 0.25 milliGRAM(s) Oral two times a day PRN anxiety  aluminum hydroxide/magnesium hydroxide/simethicone Suspension 30 milliLiter(s) Oral every 4 hours PRN Dyspepsia  melatonin 3 milliGRAM(s) Oral at bedtime PRN Insomnia  ondansetron Injectable 4 milliGRAM(s) IV Push every 8 hours PRN Nausea and/or Vomiting      No Known Allergies      LABS:                        8.5    13.47 )-----------( 352      ( 05 Aug 2021 06:57 )             28.7     08-05    142  |  104  |  24<H>  ----------------------------<  136<H>  3.7   |  25  |  0.80    Ca    10.1      05 Aug 2021 06:57  Phos  4.0     08-05  Mg     1.80     08-05    TPro  4.8<L>  /  Alb  2.5<L>  /  TBili  <0.2  /  DBili  x   /  AST  113<H>  /  ALT  123<H>  /  AlkPhos  170<H>  08-05     Lactate Dehydrogenase, Serum: 147 U/L (08-05 @ 06:57)  Lactate Dehydrogenase, Serum: 161 U/L (08-05 @ 01:00)  Lactate Dehydrogenase, Serum: 176 U/L (08-04 @ 13:36)          RADIOLOGY & ADDITIONAL TESTS:  Studies reviewed.

## 2021-08-05 NOTE — CONSULT NOTE ADULT - CONSULT REASON
Cardiac eval
Rash
hypercalcemia
Lymphoma
Dental Clearance for Bisphosphonate Treatment
Fever  S/P Right Vats, drainage of effusion, BX and placement of pleurx cath
goc

## 2021-08-05 NOTE — PROGRESS NOTE ADULT - PROBLEM SELECTOR PLAN 1
new diagnosis of DLBCL, confirmed on path   - Prednisone 20mg on 8/3 & 8/4   - Prednisone 50mg x 5 days and Cyclophosphamide initiation 8/5-    - monitor TLS labs Q8H   - IVF @ 50cc/h   - HIV, hepatitis labs negative, repeat TTE with normal EF   [ ] Pleural fluid cytopath sent 7/30  - Hematology following

## 2021-08-05 NOTE — PROGRESS NOTE ADULT - ASSESSMENT
PAF post op   in sinus  cont amio for short term , DC after few weeks   off a/c   poor candidate for a/c high bleed risk

## 2021-08-05 NOTE — PROGRESS NOTE ADULT - PROBLEM SELECTOR PLAN 4
pt meets criteria for sepsis- now resolved   s/p 5 days of vanco/zosyn for PNA.   - cultures neg to date  CT chest reviewed aortic aneurysm

## 2021-08-06 DIAGNOSIS — R21 RASH AND OTHER NONSPECIFIC SKIN ERUPTION: ICD-10-CM

## 2021-08-06 LAB
ALBUMIN SERPL ELPH-MCNC: 2.6 G/DL — LOW (ref 3.3–5)
ALBUMIN SERPL ELPH-MCNC: 2.8 G/DL — LOW (ref 3.3–5)
ALBUMIN SERPL ELPH-MCNC: 3.1 G/DL — LOW (ref 3.3–5)
ALP SERPL-CCNC: 156 U/L — HIGH (ref 40–120)
ALP SERPL-CCNC: 163 U/L — HIGH (ref 40–120)
ALP SERPL-CCNC: 174 U/L — HIGH (ref 40–120)
ALT FLD-CCNC: 110 U/L — HIGH (ref 4–33)
ALT FLD-CCNC: 113 U/L — HIGH (ref 4–33)
ALT FLD-CCNC: 92 U/L — HIGH (ref 4–33)
ANION GAP SERPL CALC-SCNC: 10 MMOL/L — SIGNIFICANT CHANGE UP (ref 7–14)
ANION GAP SERPL CALC-SCNC: 10 MMOL/L — SIGNIFICANT CHANGE UP (ref 7–14)
ANION GAP SERPL CALC-SCNC: 9 MMOL/L — SIGNIFICANT CHANGE UP (ref 7–14)
AST SERPL-CCNC: 35 U/L — HIGH (ref 4–32)
AST SERPL-CCNC: 53 U/L — HIGH (ref 4–32)
AST SERPL-CCNC: 68 U/L — HIGH (ref 4–32)
BASOPHILS # BLD AUTO: 0.04 K/UL — SIGNIFICANT CHANGE UP (ref 0–0.2)
BASOPHILS NFR BLD AUTO: 0.2 % — SIGNIFICANT CHANGE UP (ref 0–2)
BILIRUB SERPL-MCNC: <0.2 MG/DL — SIGNIFICANT CHANGE UP (ref 0.2–1.2)
BUN SERPL-MCNC: 24 MG/DL — HIGH (ref 7–23)
BUN SERPL-MCNC: 26 MG/DL — HIGH (ref 7–23)
BUN SERPL-MCNC: 27 MG/DL — HIGH (ref 7–23)
CALCIUM SERPL-MCNC: 10.3 MG/DL — SIGNIFICANT CHANGE UP (ref 8.4–10.5)
CALCIUM SERPL-MCNC: 10.5 MG/DL — SIGNIFICANT CHANGE UP (ref 8.4–10.5)
CALCIUM SERPL-MCNC: 10.8 MG/DL — HIGH (ref 8.4–10.5)
CHLORIDE SERPL-SCNC: 105 MMOL/L — SIGNIFICANT CHANGE UP (ref 98–107)
CHLORIDE SERPL-SCNC: 105 MMOL/L — SIGNIFICANT CHANGE UP (ref 98–107)
CHLORIDE SERPL-SCNC: 109 MMOL/L — HIGH (ref 98–107)
CO2 SERPL-SCNC: 24 MMOL/L — SIGNIFICANT CHANGE UP (ref 22–31)
CO2 SERPL-SCNC: 24 MMOL/L — SIGNIFICANT CHANGE UP (ref 22–31)
CO2 SERPL-SCNC: 25 MMOL/L — SIGNIFICANT CHANGE UP (ref 22–31)
CREAT SERPL-MCNC: 0.73 MG/DL — SIGNIFICANT CHANGE UP (ref 0.5–1.3)
CREAT SERPL-MCNC: 0.75 MG/DL — SIGNIFICANT CHANGE UP (ref 0.5–1.3)
CREAT SERPL-MCNC: 0.78 MG/DL — SIGNIFICANT CHANGE UP (ref 0.5–1.3)
EOSINOPHIL # BLD AUTO: 0.02 K/UL — SIGNIFICANT CHANGE UP (ref 0–0.5)
EOSINOPHIL NFR BLD AUTO: 0.1 % — SIGNIFICANT CHANGE UP (ref 0–6)
GLUCOSE SERPL-MCNC: 106 MG/DL — HIGH (ref 70–99)
GLUCOSE SERPL-MCNC: 151 MG/DL — HIGH (ref 70–99)
GLUCOSE SERPL-MCNC: 156 MG/DL — HIGH (ref 70–99)
HCT VFR BLD CALC: 30.2 % — LOW (ref 34.5–45)
HGB BLD-MCNC: 8.8 G/DL — LOW (ref 11.5–15.5)
IANC: 17.69 K/UL — HIGH (ref 1.5–8.5)
IMM GRANULOCYTES NFR BLD AUTO: 1.1 % — SIGNIFICANT CHANGE UP (ref 0–1.5)
LDH SERPL L TO P-CCNC: 123 U/L — LOW (ref 135–225)
LDH SERPL L TO P-CCNC: 128 U/L — LOW (ref 135–225)
LDH SERPL L TO P-CCNC: 167 U/L — SIGNIFICANT CHANGE UP (ref 135–225)
LYMPHOCYTES # BLD AUTO: 1.07 K/UL — SIGNIFICANT CHANGE UP (ref 1–3.3)
LYMPHOCYTES # BLD AUTO: 5.3 % — LOW (ref 13–44)
MAGNESIUM SERPL-MCNC: 1.7 MG/DL — SIGNIFICANT CHANGE UP (ref 1.6–2.6)
MAGNESIUM SERPL-MCNC: 2.1 MG/DL — SIGNIFICANT CHANGE UP (ref 1.6–2.6)
MAGNESIUM SERPL-MCNC: 2.2 MG/DL — SIGNIFICANT CHANGE UP (ref 1.6–2.6)
MCHC RBC-ENTMCNC: 24 PG — LOW (ref 27–34)
MCHC RBC-ENTMCNC: 29.1 GM/DL — LOW (ref 32–36)
MCV RBC AUTO: 82.3 FL — SIGNIFICANT CHANGE UP (ref 80–100)
MONOCYTES # BLD AUTO: 0.96 K/UL — HIGH (ref 0–0.9)
MONOCYTES NFR BLD AUTO: 4.8 % — SIGNIFICANT CHANGE UP (ref 2–14)
NEUTROPHILS # BLD AUTO: 17.69 K/UL — HIGH (ref 1.8–7.4)
NEUTROPHILS NFR BLD AUTO: 88.5 % — HIGH (ref 43–77)
NRBC # BLD: 0 /100 WBCS — SIGNIFICANT CHANGE UP
NRBC # FLD: 0 K/UL — SIGNIFICANT CHANGE UP
PHOSPHATE SERPL-MCNC: 2.7 MG/DL — SIGNIFICANT CHANGE UP (ref 2.5–4.5)
PHOSPHATE SERPL-MCNC: 2.7 MG/DL — SIGNIFICANT CHANGE UP (ref 2.5–4.5)
PHOSPHATE SERPL-MCNC: 3.4 MG/DL — SIGNIFICANT CHANGE UP (ref 2.5–4.5)
PLATELET # BLD AUTO: 417 K/UL — HIGH (ref 150–400)
POTASSIUM SERPL-MCNC: 4.1 MMOL/L — SIGNIFICANT CHANGE UP (ref 3.5–5.3)
POTASSIUM SERPL-MCNC: 4.4 MMOL/L — SIGNIFICANT CHANGE UP (ref 3.5–5.3)
POTASSIUM SERPL-MCNC: 5.5 MMOL/L — HIGH (ref 3.5–5.3)
POTASSIUM SERPL-SCNC: 4.1 MMOL/L — SIGNIFICANT CHANGE UP (ref 3.5–5.3)
POTASSIUM SERPL-SCNC: 4.4 MMOL/L — SIGNIFICANT CHANGE UP (ref 3.5–5.3)
POTASSIUM SERPL-SCNC: 5.5 MMOL/L — HIGH (ref 3.5–5.3)
PROT SERPL-MCNC: 4.7 G/DL — LOW (ref 6–8.3)
PROT SERPL-MCNC: 5 G/DL — LOW (ref 6–8.3)
PROT SERPL-MCNC: 5.5 G/DL — LOW (ref 6–8.3)
RBC # BLD: 3.67 M/UL — LOW (ref 3.8–5.2)
RBC # FLD: 17.7 % — HIGH (ref 10.3–14.5)
SODIUM SERPL-SCNC: 139 MMOL/L — SIGNIFICANT CHANGE UP (ref 135–145)
SODIUM SERPL-SCNC: 140 MMOL/L — SIGNIFICANT CHANGE UP (ref 135–145)
SODIUM SERPL-SCNC: 142 MMOL/L — SIGNIFICANT CHANGE UP (ref 135–145)
URATE SERPL-MCNC: 2.3 MG/DL — LOW (ref 2.5–7)
URATE SERPL-MCNC: 2.7 MG/DL — SIGNIFICANT CHANGE UP (ref 2.5–7)
URATE SERPL-MCNC: 2.9 MG/DL — SIGNIFICANT CHANGE UP (ref 2.5–7)
WBC # BLD: 20.01 K/UL — HIGH (ref 3.8–10.5)
WBC # FLD AUTO: 20.01 K/UL — HIGH (ref 3.8–10.5)

## 2021-08-06 PROCEDURE — 99233 SBSQ HOSP IP/OBS HIGH 50: CPT

## 2021-08-06 PROCEDURE — 99232 SBSQ HOSP IP/OBS MODERATE 35: CPT

## 2021-08-06 RX ORDER — POTASSIUM CHLORIDE 20 MEQ
40 PACKET (EA) ORAL EVERY 4 HOURS
Refills: 0 | Status: COMPLETED | OUTPATIENT
Start: 2021-08-06 | End: 2021-08-06

## 2021-08-06 RX ORDER — MAGNESIUM SULFATE 500 MG/ML
2 VIAL (ML) INJECTION ONCE
Refills: 0 | Status: COMPLETED | OUTPATIENT
Start: 2021-08-06 | End: 2021-08-06

## 2021-08-06 RX ORDER — ONDANSETRON 8 MG/1
8 TABLET, FILM COATED ORAL DAILY
Refills: 0 | Status: COMPLETED | OUTPATIENT
Start: 2021-08-06 | End: 2021-08-07

## 2021-08-06 RX ORDER — POTASSIUM CHLORIDE 20 MEQ
20 PACKET (EA) ORAL ONCE
Refills: 0 | Status: COMPLETED | OUTPATIENT
Start: 2021-08-06 | End: 2021-08-06

## 2021-08-06 RX ORDER — ONDANSETRON 8 MG/1
8 TABLET, FILM COATED ORAL DAILY
Refills: 0 | Status: DISCONTINUED | OUTPATIENT
Start: 2021-08-06 | End: 2021-08-06

## 2021-08-06 RX ADMIN — ONDANSETRON 8 MILLIGRAM(S): 8 TABLET, FILM COATED ORAL at 17:30

## 2021-08-06 RX ADMIN — Medication 3 MILLILITER(S): at 16:34

## 2021-08-06 RX ADMIN — Medication 1 TABLET(S): at 11:33

## 2021-08-06 RX ADMIN — Medication 1 APPLICATION(S): at 05:31

## 2021-08-06 RX ADMIN — FEBUXOSTAT 40 MILLIGRAM(S): 40 TABLET ORAL at 13:53

## 2021-08-06 RX ADMIN — ENOXAPARIN SODIUM 40 MILLIGRAM(S): 100 INJECTION SUBCUTANEOUS at 11:33

## 2021-08-06 RX ADMIN — Medication 600 MILLIGRAM(S): at 17:16

## 2021-08-06 RX ADMIN — Medication 650 MILLIGRAM(S): at 20:30

## 2021-08-06 RX ADMIN — Medication 20 MILLIEQUIVALENT(S): at 00:28

## 2021-08-06 RX ADMIN — Medication 40 MILLIEQUIVALENT(S): at 07:14

## 2021-08-06 RX ADMIN — Medication 500 MILLIGRAM(S): at 18:12

## 2021-08-06 RX ADMIN — Medication 650 MILLIGRAM(S): at 19:49

## 2021-08-06 RX ADMIN — Medication 50 GRAM(S): at 07:14

## 2021-08-06 RX ADMIN — Medication 3 MILLILITER(S): at 10:19

## 2021-08-06 RX ADMIN — Medication 40 MILLIEQUIVALENT(S): at 11:33

## 2021-08-06 RX ADMIN — AMIODARONE HYDROCHLORIDE 200 MILLIGRAM(S): 400 TABLET ORAL at 05:31

## 2021-08-06 RX ADMIN — Medication 1 APPLICATION(S): at 17:16

## 2021-08-06 RX ADMIN — Medication 3 MILLILITER(S): at 22:31

## 2021-08-06 RX ADMIN — Medication 50 MILLIGRAM(S): at 05:31

## 2021-08-06 RX ADMIN — Medication 600 MILLIGRAM(S): at 05:32

## 2021-08-06 NOTE — PROGRESS NOTE ADULT - PROBLEM SELECTOR PLAN 6
Chronic moderate exacerbation  - Normal EF on recent TTE  monitor fluid status closely - continue with current levothyroxine dose  - TFTs checked   - appreciate endo recs

## 2021-08-06 NOTE — PROGRESS NOTE ADULT - SUBJECTIVE AND OBJECTIVE BOX
Hematology Oncology Follow-up    INTERVAL HPI/OVERNIGHT EVENTS:  No o/n events, patient resting comfortably. No complaints at this time. Patient specifically denies fever, chills, dizziness, weakness, CP, palpitations, SOB, cough, N/V/D/C, dysuria, changes in bowel movements, LE edema.    Review of Systems:  General: denies fevers/chills, night sweats, malaise, changes in appetite  Head: denies HA  Eyes: denies vision change  ENT: denies oral lesions, rhinorrhea, epistaxys, sore throat, dysphagia  Respiratory: denies cough, shortness of breath, pleurisy  Cardiovascular: denies chest pain, palpitaitons, DESHPANDE  Gastrointestinal: denies nausea, vomiting, abdominal pain, constipation, diarrhea, melena, hematochezia  MSK: denies joint pain or muscle pain  Neuro: denies headache, weakness, or parasthesias  Skin: denies rash, petichiae, echymoses  Psych: denies anxiety or sleep disturbances    VITAL SIGNS:  T(F): 98 (08-06-21 @ 05:29)  HR: 78 (08-06-21 @ 10:20)  BP: 126/71 (08-06-21 @ 05:29)  RR: 18 (08-06-21 @ 05:29)  SpO2: 100% (08-06-21 @ 05:29)  Wt(kg): --      PHYSICAL EXAM:    Constitutional: AAOx3, NAD  Eyes: PERRL, EOMI, sclera non-icteric  Neck: supple, no masses, no JVD, no lymphadenopathy  Respiratory: CTA b/l, no wheezing, rhonchi, rales, with normal respiratory effort  Cardiovascular: RRR, normal S1S2, no M/R/G  Gastrointestinal: soft, NTND, no masses palpable, BS normal in all four quadrants, no HSM  Extremities:  no edema  MSK: no obvious abnormalities, normal ROM, no lymphadenopathy  Neurological: Grossly intact  Skin: Normal temperature, no rash, no echymoses, no petichiae  Psych: normal affect    MEDICATIONS  (STANDING):  albuterol/ipratropium for Nebulization 3 milliLiter(s) Nebulizer every 6 hours  aMIOdarone    Tablet 200 milliGRAM(s) Oral daily  cyclophosphamide IVPB (eMAR) 200 milliGRAM(s) IV Intermittent daily  enoxaparin Injectable 40 milliGRAM(s) SubCutaneous daily  febuxostat 40 milliGRAM(s) Oral daily  guaiFENesin  milliGRAM(s) Oral every 12 hours  multivitamin 1 Tablet(s) Oral daily  potassium chloride    Tablet ER 40 milliEquivalent(s) Oral every 4 hours  predniSONE   Tablet 50 milliGRAM(s) Oral daily  sodium chloride 0.9%. 1000 milliLiter(s) (50 mL/Hr) IV Continuous <Continuous>  triamcinolone 0.1% Cream 1 Application(s) Topical two times a day    MEDICATIONS  (PRN):  acetaminophen   Tablet .. 650 milliGRAM(s) Oral every 6 hours PRN Temp greater or equal to 38.5C (101.3F), Mild Pain (1 - 3)  ALPRAZolam 0.25 milliGRAM(s) Oral two times a day PRN anxiety  aluminum hydroxide/magnesium hydroxide/simethicone Suspension 30 milliLiter(s) Oral every 4 hours PRN Dyspepsia  melatonin 3 milliGRAM(s) Oral at bedtime PRN Insomnia  ondansetron Injectable 6 milliGRAM(s) IV Push every 8 hours PRN Nausea  ondansetron Injectable 4 milliGRAM(s) IV Push every 8 hours PRN Nausea and/or Vomiting      No Known Allergies      LABS:                        8.8    20.01 )-----------( 417      ( 06 Aug 2021 06:35 )             30.2     08-06    139  |  105  |  26<H>  ----------------------------<  106<H>  4.1   |  24  |  0.75    Ca    10.3      06 Aug 2021 06:35  Phos  3.4     08-06  Mg     1.70     08-06    TPro  4.7<L>  /  Alb  2.6<L>  /  TBili  <0.2  /  DBili  x   /  AST  68<H>  /  ALT  110<H>  /  AlkPhos  163<H>  08-06     Lactate Dehydrogenase, Serum: 167 U/L (08-06 @ 06:35)  Lactate Dehydrogenase, Serum: 132 U/L (08-05 @ 22:39)  Lactate Dehydrogenase, Serum: 138 U/L (08-05 @ 14:26)                  RADIOLOGY & ADDITIONAL TESTS:  Studies reviewed. Hematology Oncology Follow-up    INTERVAL HPI/OVERNIGHT EVENTS:  No o/n events, patient resting comfortably. reports feeling okay; denies any nausea or vomiting. States that she has good appetite. Does report feeling tired though. reports rash improved    Review of Systems:  General: denies fevers/chills, night sweats, malaise, changes in appetite  Head: denies HA  Eyes: denies vision change  ENT: denies oral lesions, rhinorrhea, epistaxys, sore throat, dysphagia  Respiratory: denies cough, shortness of breath, pleurisy  Cardiovascular: denies chest pain, palpitaitons, DESHPANDE  Gastrointestinal: denies nausea, vomiting, abdominal pain, constipation, diarrhea, melena, hematochezia  MSK: denies joint pain or muscle pain  Neuro: denies headache, weakness, or parasthesias  Skin: denies rash, petichiae, echymoses  Psych: denies anxiety or sleep disturbances    VITAL SIGNS:  T(F): 98 (08-06-21 @ 05:29)  HR: 78 (08-06-21 @ 10:20)  BP: 126/71 (08-06-21 @ 05:29)  RR: 18 (08-06-21 @ 05:29)  SpO2: 100% (08-06-21 @ 05:29)  Wt(kg): --      PHYSICAL EXAM:    Constitutional: AAOx3, NAD  Eyes: PERRL, EOMI, sclera non-icteric  Neck: supple, no masses, no JVD, no lymphadenopathy  Respiratory: CTA b/l, no wheezing, rhonchi, rales, with normal respiratory effort  Cardiovascular: RRR, normal S1S2, no M/R/G  Gastrointestinal: soft, NTND, no masses palpable, BS normal in all four quadrants, no HSM  Extremities:  no edema  MSK: no obvious abnormalities, normal ROM, no lymphadenopathy  Neurological: Grossly intact  Skin: Normal temperature, no rash, no echymoses, no petichiae  Psych: normal affect    MEDICATIONS  (STANDING):  albuterol/ipratropium for Nebulization 3 milliLiter(s) Nebulizer every 6 hours  aMIOdarone    Tablet 200 milliGRAM(s) Oral daily  cyclophosphamide IVPB (eMAR) 200 milliGRAM(s) IV Intermittent daily  enoxaparin Injectable 40 milliGRAM(s) SubCutaneous daily  febuxostat 40 milliGRAM(s) Oral daily  guaiFENesin  milliGRAM(s) Oral every 12 hours  multivitamin 1 Tablet(s) Oral daily  potassium chloride    Tablet ER 40 milliEquivalent(s) Oral every 4 hours  predniSONE   Tablet 50 milliGRAM(s) Oral daily  sodium chloride 0.9%. 1000 milliLiter(s) (50 mL/Hr) IV Continuous <Continuous>  triamcinolone 0.1% Cream 1 Application(s) Topical two times a day    MEDICATIONS  (PRN):  acetaminophen   Tablet .. 650 milliGRAM(s) Oral every 6 hours PRN Temp greater or equal to 38.5C (101.3F), Mild Pain (1 - 3)  ALPRAZolam 0.25 milliGRAM(s) Oral two times a day PRN anxiety  aluminum hydroxide/magnesium hydroxide/simethicone Suspension 30 milliLiter(s) Oral every 4 hours PRN Dyspepsia  melatonin 3 milliGRAM(s) Oral at bedtime PRN Insomnia  ondansetron Injectable 6 milliGRAM(s) IV Push every 8 hours PRN Nausea  ondansetron Injectable 4 milliGRAM(s) IV Push every 8 hours PRN Nausea and/or Vomiting      No Known Allergies      LABS:                        8.8    20.01 )-----------( 417      ( 06 Aug 2021 06:35 )             30.2     08-06    139  |  105  |  26<H>  ----------------------------<  106<H>  4.1   |  24  |  0.75    Ca    10.3      06 Aug 2021 06:35  Phos  3.4     08-06  Mg     1.70     08-06    TPro  4.7<L>  /  Alb  2.6<L>  /  TBili  <0.2  /  DBili  x   /  AST  68<H>  /  ALT  110<H>  /  AlkPhos  163<H>  08-06     Lactate Dehydrogenase, Serum: 167 U/L (08-06 @ 06:35)  Lactate Dehydrogenase, Serum: 132 U/L (08-05 @ 22:39)  Lactate Dehydrogenase, Serum: 138 U/L (08-05 @ 14:26)                  RADIOLOGY & ADDITIONAL TESTS:  Studies reviewed. Hematology Oncology Follow-up    INTERVAL HPI/OVERNIGHT EVENTS:  No o/n events, patient resting comfortably. reports feeling okay; denies any nausea or vomiting. States that she has good appetite. Does report feeling tired though. reports rash improved      VITAL SIGNS:  T(F): 98 (08-06-21 @ 05:29)  HR: 78 (08-06-21 @ 10:20)  BP: 126/71 (08-06-21 @ 05:29)  RR: 18 (08-06-21 @ 05:29)  SpO2: 100% (08-06-21 @ 05:29)  Wt(kg): --      PHYSICAL EXAM:    Constitutional: AAOx3, NAD  Eyes: PERRL, EOMI, sclera non-icteric  Neck: supple, no masses, no JVD, no lymphadenopathy  Respiratory: CTA b/l, no wheezing, rhonchi, rales, with normal respiratory effort  Cardiovascular: RRR, normal S1S2, no M/R/G  Gastrointestinal: soft, NTND, no masses palpable, BS normal in all four quadrants, no HSM  Extremities:  no edema  MSK: no obvious abnormalities, normal ROM, no lymphadenopathy  Neurological: Grossly intact  Skin: Normal temperature, senile purpura, purpuric rash on back improved from yesterday  Psych: normal affect      MEDICATIONS  (STANDING):  albuterol/ipratropium for Nebulization 3 milliLiter(s) Nebulizer every 6 hours  aMIOdarone    Tablet 200 milliGRAM(s) Oral daily  cyclophosphamide IVPB (eMAR) 200 milliGRAM(s) IV Intermittent daily  enoxaparin Injectable 40 milliGRAM(s) SubCutaneous daily  febuxostat 40 milliGRAM(s) Oral daily  guaiFENesin  milliGRAM(s) Oral every 12 hours  multivitamin 1 Tablet(s) Oral daily  potassium chloride    Tablet ER 40 milliEquivalent(s) Oral every 4 hours  predniSONE   Tablet 50 milliGRAM(s) Oral daily  sodium chloride 0.9%. 1000 milliLiter(s) (50 mL/Hr) IV Continuous <Continuous>  triamcinolone 0.1% Cream 1 Application(s) Topical two times a day    MEDICATIONS  (PRN):  acetaminophen   Tablet .. 650 milliGRAM(s) Oral every 6 hours PRN Temp greater or equal to 38.5C (101.3F), Mild Pain (1 - 3)  ALPRAZolam 0.25 milliGRAM(s) Oral two times a day PRN anxiety  aluminum hydroxide/magnesium hydroxide/simethicone Suspension 30 milliLiter(s) Oral every 4 hours PRN Dyspepsia  melatonin 3 milliGRAM(s) Oral at bedtime PRN Insomnia  ondansetron Injectable 6 milliGRAM(s) IV Push every 8 hours PRN Nausea  ondansetron Injectable 4 milliGRAM(s) IV Push every 8 hours PRN Nausea and/or Vomiting      No Known Allergies      LABS:                        8.8    20.01 )-----------( 417      ( 06 Aug 2021 06:35 )             30.2     08-06    139  |  105  |  26<H>  ----------------------------<  106<H>  4.1   |  24  |  0.75    Ca    10.3      06 Aug 2021 06:35  Phos  3.4     08-06  Mg     1.70     08-06    TPro  4.7<L>  /  Alb  2.6<L>  /  TBili  <0.2  /  DBili  x   /  AST  68<H>  /  ALT  110<H>  /  AlkPhos  163<H>  08-06     Lactate Dehydrogenase, Serum: 167 U/L (08-06 @ 06:35)  Lactate Dehydrogenase, Serum: 132 U/L (08-05 @ 22:39)  Lactate Dehydrogenase, Serum: 138 U/L (08-05 @ 14:26)          RADIOLOGY & ADDITIONAL TESTS:  Studies reviewed.

## 2021-08-06 NOTE — PROGRESS NOTE ADULT - PROBLEM SELECTOR PLAN 4
pt meets criteria for sepsis- now resolved   s/p 5 days of vanco/zosyn for PNA.   - cultures neg to date  CT chest reviewed Likely 2/2 malignancy, corrected calcium 11.4 today  - Cont IVF   - Will monitor Ca while on Prednisone   - Monitor fluid status

## 2021-08-06 NOTE — PROGRESS NOTE ADULT - PROBLEM SELECTOR PLAN 2
Requiring 2L NC, s/p VATS with pleurex placement, possible PNA s/p therapy   - Drain Pleurex 2-3x/week- plan to drain today  - Mucinex, chest PT, IS, OOB into chair as possible   - S/p Lasix 20 IV w/o dramatic improvement though strict I/O's not recorded Morbilliform rash noted over back, trunk and legs associated with rise in LFT's  - Derm consulted: May be 2/2 Zosyn v DRESS   - Given association w/ transaminitis, c/f DRESS 2/2 allopurinol though no eosinophils noted on diff  - Check CBC + diff, LFT's daily   - Monitor off Allopurinol (last dose 8/5), switch to Febuxostat

## 2021-08-06 NOTE — PROGRESS NOTE ADULT - ASSESSMENT
pending 89 year old woman with a pmh of COPD and HFrEF (EF 26%) that with chronic cough, not improved with steroids nor Abx. Now s/p R robotic VATS/pleural bx with placement of pleurex catheter on 07/15/21 after PET from 07/07/21 reveal b/l upper lobe opacities concerning for malignancy with pleural effusion. Hematology consulted for newly diagnosed DLBCL.    #DLBCL  -S/p Right peritracheal lymph node, level 4 biopsy: Diffuse large B cell lymphoma, non-germinal center B cell like immunophenotype  -Will need pleural fluid cytopath and flow if tapped again  -Peripheral blood flow cytometry: absolute monocytosis with marked leukocytosis and neutrophilia  -Bone marrow biopsy not performed given that it would not  at this time.   - Will need to get LP with IT chemo eventually   - Repeat Echo showing EF of 65%;   -Patient and Family both confirm wanting treatment; however discussed with patient regarding intensity of treatment regimen. Given performance status, and requiring O2; S/P prednison 20mg x 2 days (8/3/2021) with good patient response. Will increase prednisone to 50mg daily x 5 days. Discussion with patient regarding chemotherapy regimen possibilities. Patient wishing treatment however would like to limit hospital visits due to severe neutropenia, fevers ect. Discussed several regimens but asked to defer final regimen decision to her daughter. Discussed at length with daughter Dinorah, chemotherapy regimens, possible AEs, benefits vs risks. Agreed on gentle initiation of Prednisone and cyclophosphamide. Will need to discuss Rituxan with daughter again prior to initiation.   - D1  8/3  Prednisone 20mg AM; D2 8/4 Prednisone 20mg AM, 30mg PM  D3-D7 Prednisone 50mg (end on aug 9)  - Cyclophosphamide 200mg x 3 doses D1 8/5/2021. D2 8/6/2021; Can be administered through IV Consent obtained from daughter through phone. Please see chart.   -High risk for TLS; will need to start regimen in stages with q 8 hour TLS lab monitoring (CMP, Phos, Mag, LDH, Uric Acid). Normal until now  -Change allopurinol to febuxostat considering transaminitis three x the upper limit of normal and rash since starting allopurinol. For TLS prophylaxis. Patient is on gentle hydration from primary team and would recommend continuing at minimum 50cc per hour, if patient can tolerate.   - patient tolerated dose 1 of cyclophosphamide; will continue with planned regimen for now. Follow TLS labs     New onset rash on back; transaminitis 3x upper limit of normal  -Unclear etiology; significantly improved since yesterday   -Dermatology consulted by Primary team; considering possible antibiotic as culprit;   -With transaminitis and rash; concern for DRESS, especially since start of allopurinol few days prior however no eosinophils on differential. discontinued allopurinol.   - continue to monitor.     #Leukocytosis  -Leukocytosis with left shift with many neutrophils seen on the peripheral smear. Improving since on steroids  -Febrile to 103 on admission; no clear source of infection found with work up.   - completed 5 days vanco and Zosyn;   - per pulm/primary team     #Hypercalcemia  -C/w maintenance IVF @ 75cc/hr    Jess Nelson MD  PGY 4, Oncology/Hematology fellow  (P) [phone]  After 5pm, please contact on-call team.

## 2021-08-06 NOTE — PROGRESS NOTE ADULT - PROBLEM SELECTOR PLAN 7
chronic moderate exacerbation  Duonebs Q6 Chronic moderate exacerbation  - Normal EF on recent TTE  monitor fluid status closely

## 2021-08-06 NOTE — PROGRESS NOTE ADULT - PROBLEM SELECTOR PROBLEM 5
Hypothyroidism, unspecified type Sepsis without acute organ dysfunction, due to unspecified organism

## 2021-08-06 NOTE — PROGRESS NOTE ADULT - SUBJECTIVE AND OBJECTIVE BOX
DATE OF SERVICE: 08-06-21 @ 07:00    Subjective: Patient seen and examined. No new events except as noted.     SUBJECTIVE/ROS:        MEDICATIONS:  MEDICATIONS  (STANDING):  albuterol/ipratropium for Nebulization 3 milliLiter(s) Nebulizer every 6 hours  aMIOdarone    Tablet 200 milliGRAM(s) Oral daily  cyclophosphamide IVPB (eMAR) 200 milliGRAM(s) IV Intermittent daily  enoxaparin Injectable 40 milliGRAM(s) SubCutaneous daily  febuxostat 40 milliGRAM(s) Oral daily  guaiFENesin  milliGRAM(s) Oral every 12 hours  multivitamin 1 Tablet(s) Oral daily  predniSONE   Tablet 50 milliGRAM(s) Oral daily  sodium chloride 0.9%. 1000 milliLiter(s) (50 mL/Hr) IV Continuous <Continuous>  triamcinolone 0.1% Cream 1 Application(s) Topical two times a day      PHYSICAL EXAM:  T(C): 36.7 (08-06-21 @ 05:29), Max: 36.7 (08-05-21 @ 19:18)  HR: 76 (08-06-21 @ 05:29) (76 - 93)  BP: 126/71 (08-06-21 @ 05:29) (126/60 - 142/75)  RR: 18 (08-06-21 @ 05:29) (17 - 19)  SpO2: 100% (08-06-21 @ 05:29) (97% - 100%)  Wt(kg): --  I&O's Summary    Height (cm): 154.9 (08-05 @ 19:18)  Weight (kg): 61.4 (08-05 @ 19:18)  BMI (kg/m2): 25.6 (08-05 @ 19:18)  BSA (m2): 1.6 (08-05 @ 19:18)      JVP: Normal  Neck: supple  Lung: clear   CV: S1 S2 , Murmur:  Abd: soft  Ext: No edema  neuro: Awake / alert  Psych: flat affect  Skin: pos rash    LABS/DATA:    CARDIAC MARKERS:                                8.8    x     )-----------( 417      ( 06 Aug 2021 06:35 )             30.2     08-05    139  |  103  |  26<H>  ----------------------------<  153<H>  3.3<L>   |  24  |  0.77    Ca    10.6<H>      05 Aug 2021 22:39  Phos  3.3     08-05  Mg     1.60     08-05    TPro  5.1<L>  /  Alb  2.6<L>  /  TBili  <0.2  /  DBili  x   /  AST  68<H>  /  ALT  109<H>  /  AlkPhos  159<H>  08-05    proBNP:   Lipid Profile:   HgA1c:   TSH:     TELE:  EKG:

## 2021-08-06 NOTE — PROGRESS NOTE ADULT - SUBJECTIVE AND OBJECTIVE BOX
Merlin Mathew, MD   Hospitalist  Pager #[phone]    PROGRESS NOTE:     Patient is a 89y old  Female who presents with a chief complaint of SOB (05 Aug 2021 09:13)      SUBJECTIVE / OVERNIGHT EVENTS:  No overnight events   Patient feels well, denies any SOB, does not think rash is worsening   ADDITIONAL REVIEW OF SYSTEMS:    MEDICATIONS  (STANDING):  albuterol/ipratropium for Nebulization 3 milliLiter(s) Nebulizer every 6 hours  allopurinol 300 milliGRAM(s) Oral daily  aMIOdarone    Tablet 200 milliGRAM(s) Oral daily  enoxaparin Injectable 40 milliGRAM(s) SubCutaneous daily  guaiFENesin  milliGRAM(s) Oral every 12 hours  multivitamin 1 Tablet(s) Oral daily  predniSONE   Tablet 50 milliGRAM(s) Oral daily  sodium chloride 0.9%. 1000 milliLiter(s) (50 mL/Hr) IV Continuous <Continuous>    MEDICATIONS  (PRN):  acetaminophen   Tablet .. 650 milliGRAM(s) Oral every 6 hours PRN Temp greater or equal to 38.5C (101.3F), Mild Pain (1 - 3)  ALPRAZolam 0.25 milliGRAM(s) Oral two times a day PRN anxiety  aluminum hydroxide/magnesium hydroxide/simethicone Suspension 30 milliLiter(s) Oral every 4 hours PRN Dyspepsia  melatonin 3 milliGRAM(s) Oral at bedtime PRN Insomnia  ondansetron Injectable 4 milliGRAM(s) IV Push every 8 hours PRN Nausea and/or Vomiting      CAPILLARY BLOOD GLUCOSE        I&O's Summary    04 Aug 2021 07:01  -  05 Aug 2021 07:00  --------------------------------------------------------  IN: 0 mL / OUT: 5 mL / NET: -5 mL        PHYSICAL EXAM:  Vital Signs Last 24 Hrs  T(C): 36.4 (05 Aug 2021 11:28), Max: 36.8 (04 Aug 2021 14:00)  T(F): 97.5 (05 Aug 2021 11:28), Max: 98.2 (04 Aug 2021 14:00)  HR: 78 (05 Aug 2021 11:28) (71 - 91)  BP: 142/75 (05 Aug 2021 11:28) (142/75 - 150/79)  BP(mean): --  RR: 17 (05 Aug 2021 11:28) (17 - 18)  SpO2: 99% (05 Aug 2021 11:28) (98% - 99%)    CONSTITUTIONAL: NAD, well-developed woman on 2L NC   RESPIRATORY: Normal respiratory effort; diminished breath sounds w/ crackles bilaterally, R pleurex   CARDIOVASCULAR: Regular rate and rhythm, normal S1 and S2, no murmur/rub/gallop; No lower extremity edema; Peripheral pulses are 2+ bilaterally  ABDOMEN: Nontender to palpation, normoactive bowel sounds, no rebound/guarding; No hepatosplenomegaly  MUSCULOSKELETAL no clubbing or cyanosis of digits; no joint swelling or tenderness to palpation  PSYCH: Alert and oriented, lethargic but wakes up with verbal stimuli     LABS:                        8.5    13.47 )-----------( 352      ( 05 Aug 2021 06:57 )             28.7     05 Aug 2021 06:57    142    |  104    |  24     ----------------------------<  136    3.7     |  25     |  0.80     Ca    10.1       05 Aug 2021 06:57  Phos  4.0       05 Aug 2021 06:57  Mg     1.80      05 Aug 2021 06:57    TPro  4.8    /  Alb  2.5    /  TBili  <0.2   /  DBili  x      /  AST  113    /  ALT  123    /  AlkPhos  170    05 Aug 2021 06:57        RADIOLOGY & ADDITIONAL TESTS:  Results Reviewed:   Imaging Personally Reviewed:  Electrocardiogram Personally Reviewed:    COORDINATION OF CARE:  Care Discussed with Consultants/Other Providers [Y/N]:  Prior or Outpatient Records Reviewed [Y/N]:   Merlin Mathew, MD   Hospitalist  Pager #[phone]    PROGRESS NOTE:     Patient is a 89y old  Female who presents with a chief complaint of SOB (05 Aug 2021 09:13)      SUBJECTIVE / OVERNIGHT EVENTS:  No overnight events   Patient feels well, denies any SOB, does not think rash is worsening     ADDITIONAL REVIEW OF SYSTEMS:    MEDICATIONS  (STANDING):  albuterol/ipratropium for Nebulization 3 milliLiter(s) Nebulizer every 6 hours  allopurinol 300 milliGRAM(s) Oral daily  aMIOdarone    Tablet 200 milliGRAM(s) Oral daily  enoxaparin Injectable 40 milliGRAM(s) SubCutaneous daily  guaiFENesin  milliGRAM(s) Oral every 12 hours  multivitamin 1 Tablet(s) Oral daily  predniSONE   Tablet 50 milliGRAM(s) Oral daily  sodium chloride 0.9%. 1000 milliLiter(s) (50 mL/Hr) IV Continuous <Continuous>    MEDICATIONS  (PRN):  acetaminophen   Tablet .. 650 milliGRAM(s) Oral every 6 hours PRN Temp greater or equal to 38.5C (101.3F), Mild Pain (1 - 3)  ALPRAZolam 0.25 milliGRAM(s) Oral two times a day PRN anxiety  aluminum hydroxide/magnesium hydroxide/simethicone Suspension 30 milliLiter(s) Oral every 4 hours PRN Dyspepsia  melatonin 3 milliGRAM(s) Oral at bedtime PRN Insomnia  ondansetron Injectable 4 milliGRAM(s) IV Push every 8 hours PRN Nausea and/or Vomiting      CAPILLARY BLOOD GLUCOSE        I&O's Summary    04 Aug 2021 07:01  -  05 Aug 2021 07:00  --------------------------------------------------------  IN: 0 mL / OUT: 5 mL / NET: -5 mL        PHYSICAL EXAM:  Vital Signs Last 24 Hrs  T(C): 36.4 (05 Aug 2021 11:28), Max: 36.8 (04 Aug 2021 14:00)  T(F): 97.5 (05 Aug 2021 11:28), Max: 98.2 (04 Aug 2021 14:00)  HR: 78 (05 Aug 2021 11:28) (71 - 91)  BP: 142/75 (05 Aug 2021 11:28) (142/75 - 150/79)  BP(mean): --  RR: 17 (05 Aug 2021 11:28) (17 - 18)  SpO2: 99% (05 Aug 2021 11:28) (98% - 99%)    CONSTITUTIONAL: NAD, well-developed woman on 2L NC   RESPIRATORY: Normal respiratory effort; diminished breath sounds w/ crackles bilaterally, R pleurex   CARDIOVASCULAR: Regular rate and rhythm, normal S1 and S2, no murmur/rub/gallop; No lower extremity edema; Peripheral pulses are 2+ bilaterally  ABDOMEN: Nontender to palpation, normoactive bowel sounds, no rebound/guarding; No hepatosplenomegaly  MUSCULOSKELETAL no clubbing or cyanosis of digits; no joint swelling or tenderness to palpation  PSYCH: Alert and oriented, lethargic but wakes up with verbal stimuli   Skin: Morbilliform rash over back, trunk, thighs, arms    LABS:                        8.8    20.01 )-----------( 417      ( 06 Aug 2021 06:35 )             30.2     06 Aug 2021 06:35    139    |  105    |  26     ----------------------------<  106    4.1     |  24     |  0.75     Ca    10.3       06 Aug 2021 06:35  Phos  3.4       06 Aug 2021 06:35  Mg     1.70      06 Aug 2021 06:35    TPro  4.7    /  Alb  2.6    /  TBili  <0.2   /  DBili  x      /  AST  68     /  ALT  110    /  AlkPhos  163    06 Aug 2021 06:35          RADIOLOGY & ADDITIONAL TESTS:  Results Reviewed:   Imaging Personally Reviewed:  Electrocardiogram Personally Reviewed:    COORDINATION OF CARE:  Care Discussed with Consultants/Other Providers [Y/N]:  Prior or Outpatient Records Reviewed [Y/N]:

## 2021-08-06 NOTE — PROGRESS NOTE ADULT - PROBLEM SELECTOR PLAN 3
Likely 2/2 malignancy, corrected calcium 11.3 today  - Cont IVF   - Will monitor Ca while on Prednisone   - Monitor fluid status Requiring 2L NC, s/p VATS with pleurex placement, possible PNA s/p therapy   - Drain Pleurex 2x/week (Wednesday & Sunday)   - Mucinex, chest PT, IS, OOB into chair as possible, strict I/O's

## 2021-08-06 NOTE — PROGRESS NOTE ADULT - PROBLEM SELECTOR PLAN 5
- continue with current levothyroxine dose  - TFTs checked   - appreciate endo recs pt meets criteria for sepsis- now resolved   s/p 5 days of vanco/zosyn for PNA.   - cultures neg to date  CT chest reviewed

## 2021-08-06 NOTE — PROGRESS NOTE ADULT - ASSESSMENT
89 yr old female presenting with sepsis secondary to PNA and new diagnosis of DLBCL with hypercalcemia.  89 yr old female presenting with sepsis secondary to PNA and new diagnosis of DLBCL with hypercalcemia currently starting therapy with Prednisone and Cyclophosphamide.

## 2021-08-06 NOTE — PROGRESS NOTE ADULT - ASSESSMENT
PAF post op   in sinus  cont amio for short term , DC after few weeks   off a/c   poor candidate for a/c high bleed risk    rash   drug eruption as per derm

## 2021-08-06 NOTE — PROGRESS NOTE ADULT - PROBLEM SELECTOR PLAN 1
new diagnosis of DLBCL, confirmed on path   - Prednisone 20mg on 8/3 & 8/4   - Prednisone 50mg x 5 days and Cyclophosphamide initiation 8/5-    - monitor TLS labs Q8H   - IVF @ 50cc/h   - HIV, hepatitis labs negative, repeat TTE with normal EF   [ ] Pleural fluid cytopath sent 7/30  - Hematology following new diagnosis of DLBCL, confirmed on path   - Prednisone 20mg on 8/3 & 8/4   - Prednisone 50mg x 5 days and Cyclophosphamide initiation 8/5- 8/7   - monitor TLS labs Q8H   - IVF @ 50cc/h   - HIV, hepatitis labs negative, repeat TTE with normal EF   [ ] Will need pleural fluid cytopath sent off for flow on Sunday 8/8 when  pleurex drained next  - Hematology following

## 2021-08-07 ENCOUNTER — TRANSCRIPTION ENCOUNTER (OUTPATIENT)
Age: 86
End: 2021-08-07

## 2021-08-07 LAB
ALBUMIN SERPL ELPH-MCNC: 2.5 G/DL — LOW (ref 3.3–5)
ALBUMIN SERPL ELPH-MCNC: 2.8 G/DL — LOW (ref 3.3–5)
ALP SERPL-CCNC: 140 U/L — HIGH (ref 40–120)
ALP SERPL-CCNC: 150 U/L — HIGH (ref 40–120)
ALT FLD-CCNC: 75 U/L — HIGH (ref 4–33)
ALT FLD-CCNC: 81 U/L — HIGH (ref 4–33)
ANION GAP SERPL CALC-SCNC: 10 MMOL/L — SIGNIFICANT CHANGE UP (ref 7–14)
ANION GAP SERPL CALC-SCNC: 9 MMOL/L — SIGNIFICANT CHANGE UP (ref 7–14)
AST SERPL-CCNC: 24 U/L — SIGNIFICANT CHANGE UP (ref 4–32)
AST SERPL-CCNC: 36 U/L — HIGH (ref 4–32)
BASOPHILS # BLD AUTO: 0.02 K/UL — SIGNIFICANT CHANGE UP (ref 0–0.2)
BASOPHILS # BLD AUTO: 0.02 K/UL — SIGNIFICANT CHANGE UP (ref 0–0.2)
BASOPHILS NFR BLD AUTO: 0.1 % — SIGNIFICANT CHANGE UP (ref 0–2)
BASOPHILS NFR BLD AUTO: 0.1 % — SIGNIFICANT CHANGE UP (ref 0–2)
BILIRUB SERPL-MCNC: <0.2 MG/DL — SIGNIFICANT CHANGE UP (ref 0.2–1.2)
BILIRUB SERPL-MCNC: <0.2 MG/DL — SIGNIFICANT CHANGE UP (ref 0.2–1.2)
BUN SERPL-MCNC: 26 MG/DL — HIGH (ref 7–23)
BUN SERPL-MCNC: 29 MG/DL — HIGH (ref 7–23)
CALCIUM SERPL-MCNC: 10.3 MG/DL — SIGNIFICANT CHANGE UP (ref 8.4–10.5)
CALCIUM SERPL-MCNC: 10.3 MG/DL — SIGNIFICANT CHANGE UP (ref 8.4–10.5)
CHLORIDE SERPL-SCNC: 106 MMOL/L — SIGNIFICANT CHANGE UP (ref 98–107)
CHLORIDE SERPL-SCNC: 107 MMOL/L — SIGNIFICANT CHANGE UP (ref 98–107)
CO2 SERPL-SCNC: 22 MMOL/L — SIGNIFICANT CHANGE UP (ref 22–31)
CO2 SERPL-SCNC: 23 MMOL/L — SIGNIFICANT CHANGE UP (ref 22–31)
CREAT SERPL-MCNC: 0.76 MG/DL — SIGNIFICANT CHANGE UP (ref 0.5–1.3)
CREAT SERPL-MCNC: 0.83 MG/DL — SIGNIFICANT CHANGE UP (ref 0.5–1.3)
EOSINOPHIL # BLD AUTO: 0.01 K/UL — SIGNIFICANT CHANGE UP (ref 0–0.5)
EOSINOPHIL # BLD AUTO: 0.04 K/UL — SIGNIFICANT CHANGE UP (ref 0–0.5)
EOSINOPHIL NFR BLD AUTO: 0.1 % — SIGNIFICANT CHANGE UP (ref 0–6)
EOSINOPHIL NFR BLD AUTO: 0.3 % — SIGNIFICANT CHANGE UP (ref 0–6)
GLUCOSE SERPL-MCNC: 102 MG/DL — HIGH (ref 70–99)
GLUCOSE SERPL-MCNC: 132 MG/DL — HIGH (ref 70–99)
HCT VFR BLD CALC: 28.8 % — LOW (ref 34.5–45)
HCT VFR BLD CALC: 29 % — LOW (ref 34.5–45)
HGB BLD-MCNC: 8.3 G/DL — LOW (ref 11.5–15.5)
HGB BLD-MCNC: 8.4 G/DL — LOW (ref 11.5–15.5)
IANC: 11.06 K/UL — HIGH (ref 1.5–8.5)
IANC: 14.44 K/UL — HIGH (ref 1.5–8.5)
IMM GRANULOCYTES NFR BLD AUTO: 1.3 % — SIGNIFICANT CHANGE UP (ref 0–1.5)
IMM GRANULOCYTES NFR BLD AUTO: 1.5 % — SIGNIFICANT CHANGE UP (ref 0–1.5)
LDH SERPL L TO P-CCNC: 128 U/L — LOW (ref 135–225)
LDH SERPL L TO P-CCNC: 148 U/L — SIGNIFICANT CHANGE UP (ref 135–225)
LYMPHOCYTES # BLD AUTO: 1.08 K/UL — SIGNIFICANT CHANGE UP (ref 1–3.3)
LYMPHOCYTES # BLD AUTO: 1.34 K/UL — SIGNIFICANT CHANGE UP (ref 1–3.3)
LYMPHOCYTES # BLD AUTO: 6.6 % — LOW (ref 13–44)
LYMPHOCYTES # BLD AUTO: 9.9 % — LOW (ref 13–44)
MAGNESIUM SERPL-MCNC: 2.2 MG/DL — SIGNIFICANT CHANGE UP (ref 1.6–2.6)
MAGNESIUM SERPL-MCNC: 2.2 MG/DL — SIGNIFICANT CHANGE UP (ref 1.6–2.6)
MCHC RBC-ENTMCNC: 23.8 PG — LOW (ref 27–34)
MCHC RBC-ENTMCNC: 23.9 PG — LOW (ref 27–34)
MCHC RBC-ENTMCNC: 28.8 GM/DL — LOW (ref 32–36)
MCHC RBC-ENTMCNC: 29 GM/DL — LOW (ref 32–36)
MCV RBC AUTO: 82.2 FL — SIGNIFICANT CHANGE UP (ref 80–100)
MCV RBC AUTO: 83 FL — SIGNIFICANT CHANGE UP (ref 80–100)
MONOCYTES # BLD AUTO: 0.53 K/UL — SIGNIFICANT CHANGE UP (ref 0–0.9)
MONOCYTES # BLD AUTO: 0.92 K/UL — HIGH (ref 0–0.9)
MONOCYTES NFR BLD AUTO: 3.2 % — SIGNIFICANT CHANGE UP (ref 2–14)
MONOCYTES NFR BLD AUTO: 6.8 % — SIGNIFICANT CHANGE UP (ref 2–14)
NEUTROPHILS # BLD AUTO: 11.06 K/UL — HIGH (ref 1.8–7.4)
NEUTROPHILS # BLD AUTO: 14.44 K/UL — HIGH (ref 1.8–7.4)
NEUTROPHILS NFR BLD AUTO: 81.6 % — HIGH (ref 43–77)
NEUTROPHILS NFR BLD AUTO: 88.5 % — HIGH (ref 43–77)
NRBC # BLD: 0 /100 WBCS — SIGNIFICANT CHANGE UP
NRBC # BLD: 0 /100 WBCS — SIGNIFICANT CHANGE UP
NRBC # FLD: 0 K/UL — SIGNIFICANT CHANGE UP
NRBC # FLD: 0 K/UL — SIGNIFICANT CHANGE UP
PHOSPHATE SERPL-MCNC: 2.8 MG/DL — SIGNIFICANT CHANGE UP (ref 2.5–4.5)
PHOSPHATE SERPL-MCNC: 2.9 MG/DL — SIGNIFICANT CHANGE UP (ref 2.5–4.5)
PLATELET # BLD AUTO: 379 K/UL — SIGNIFICANT CHANGE UP (ref 150–400)
PLATELET # BLD AUTO: 387 K/UL — SIGNIFICANT CHANGE UP (ref 150–400)
POTASSIUM SERPL-MCNC: 5.4 MMOL/L — HIGH (ref 3.5–5.3)
POTASSIUM SERPL-MCNC: 5.4 MMOL/L — HIGH (ref 3.5–5.3)
POTASSIUM SERPL-SCNC: 5.4 MMOL/L — HIGH (ref 3.5–5.3)
POTASSIUM SERPL-SCNC: 5.4 MMOL/L — HIGH (ref 3.5–5.3)
PROT SERPL-MCNC: 4.7 G/DL — LOW (ref 6–8.3)
PROT SERPL-MCNC: 5.1 G/DL — LOW (ref 6–8.3)
RBC # BLD: 3.47 M/UL — LOW (ref 3.8–5.2)
RBC # BLD: 3.53 M/UL — LOW (ref 3.8–5.2)
RBC # FLD: 17.9 % — HIGH (ref 10.3–14.5)
RBC # FLD: 18.1 % — HIGH (ref 10.3–14.5)
SODIUM SERPL-SCNC: 138 MMOL/L — SIGNIFICANT CHANGE UP (ref 135–145)
SODIUM SERPL-SCNC: 139 MMOL/L — SIGNIFICANT CHANGE UP (ref 135–145)
URATE SERPL-MCNC: 1.6 MG/DL — LOW (ref 2.5–7)
URATE SERPL-MCNC: 2.1 MG/DL — LOW (ref 2.5–7)
WBC # BLD: 13.56 K/UL — HIGH (ref 3.8–10.5)
WBC # BLD: 16.32 K/UL — HIGH (ref 3.8–10.5)
WBC # FLD AUTO: 13.56 K/UL — HIGH (ref 3.8–10.5)
WBC # FLD AUTO: 16.32 K/UL — HIGH (ref 3.8–10.5)

## 2021-08-07 PROCEDURE — 99233 SBSQ HOSP IP/OBS HIGH 50: CPT

## 2021-08-07 PROCEDURE — 99232 SBSQ HOSP IP/OBS MODERATE 35: CPT | Mod: GC

## 2021-08-07 RX ORDER — SODIUM ZIRCONIUM CYCLOSILICATE 10 G/10G
5 POWDER, FOR SUSPENSION ORAL ONCE
Refills: 0 | Status: COMPLETED | OUTPATIENT
Start: 2021-08-07 | End: 2021-08-07

## 2021-08-07 RX ADMIN — Medication 600 MILLIGRAM(S): at 18:27

## 2021-08-07 RX ADMIN — Medication 3 MILLIGRAM(S): at 01:50

## 2021-08-07 RX ADMIN — Medication 1 APPLICATION(S): at 18:38

## 2021-08-07 RX ADMIN — Medication 50 MILLIGRAM(S): at 06:12

## 2021-08-07 RX ADMIN — AMIODARONE HYDROCHLORIDE 200 MILLIGRAM(S): 400 TABLET ORAL at 06:13

## 2021-08-07 RX ADMIN — FEBUXOSTAT 40 MILLIGRAM(S): 40 TABLET ORAL at 11:08

## 2021-08-07 RX ADMIN — Medication 650 MILLIGRAM(S): at 20:40

## 2021-08-07 RX ADMIN — ENOXAPARIN SODIUM 40 MILLIGRAM(S): 100 INJECTION SUBCUTANEOUS at 11:09

## 2021-08-07 RX ADMIN — Medication 600 MILLIGRAM(S): at 06:13

## 2021-08-07 RX ADMIN — Medication 1 TABLET(S): at 11:09

## 2021-08-07 RX ADMIN — Medication 3 MILLILITER(S): at 16:14

## 2021-08-07 RX ADMIN — Medication 500 MILLIGRAM(S): at 18:28

## 2021-08-07 RX ADMIN — Medication 650 MILLIGRAM(S): at 20:11

## 2021-08-07 RX ADMIN — Medication 3 MILLILITER(S): at 10:34

## 2021-08-07 RX ADMIN — Medication 30 MILLILITER(S): at 01:55

## 2021-08-07 RX ADMIN — Medication 3 MILLILITER(S): at 03:54

## 2021-08-07 RX ADMIN — ONDANSETRON 8 MILLIGRAM(S): 8 TABLET, FILM COATED ORAL at 18:28

## 2021-08-07 RX ADMIN — Medication 1 APPLICATION(S): at 06:13

## 2021-08-07 RX ADMIN — SODIUM ZIRCONIUM CYCLOSILICATE 5 GRAM(S): 10 POWDER, FOR SUSPENSION ORAL at 11:04

## 2021-08-07 NOTE — PROGRESS NOTE ADULT - PROBLEM SELECTOR PLAN 1
new diagnosis of DLBCL, confirmed on path   - Prednisone 20mg on 8/3 & 8/4   - Prednisone 50mg x 5 days and Cyclophosphamide initiation 8/5- 8/7   - monitor TLS labs Q8H   - IVF @ 50cc/h   - HIV, hepatitis labs negative, repeat TTE with normal EF   [ ] Will need pleural fluid cytopath sent off for flow on Sunday 8/8 when  pleurex drained next  - Hematology following

## 2021-08-07 NOTE — PROGRESS NOTE ADULT - SUBJECTIVE AND OBJECTIVE BOX
DATE OF SERVICE: 08-07-21 @ 10:39    Subjective: Patient seen and examined. No new events except as noted.     SUBJECTIVE/ROS:        MEDICATIONS:  MEDICATIONS  (STANDING):  albuterol/ipratropium for Nebulization 3 milliLiter(s) Nebulizer every 6 hours  aMIOdarone    Tablet 200 milliGRAM(s) Oral daily  cyclophosphamide IVPB (eMAR) 200 milliGRAM(s) IV Intermittent daily  enoxaparin Injectable 40 milliGRAM(s) SubCutaneous daily  febuxostat 40 milliGRAM(s) Oral daily  guaiFENesin  milliGRAM(s) Oral every 12 hours  multivitamin 1 Tablet(s) Oral daily  ondansetron Injectable 8 milliGRAM(s) IV Push daily  predniSONE   Tablet 50 milliGRAM(s) Oral daily  sodium chloride 0.9%. 1000 milliLiter(s) (50 mL/Hr) IV Continuous <Continuous>  sodium zirconium cyclosilicate 5 Gram(s) Oral once  triamcinolone 0.1% Cream 1 Application(s) Topical two times a day      PHYSICAL EXAM:  T(C): 36.8 (08-07-21 @ 06:10), Max: 36.8 (08-07-21 @ 06:10)  HR: 71 (08-07-21 @ 06:10) (71 - 80)  BP: 119/70 (08-07-21 @ 06:10) (107/53 - 119/70)  RR: 18 (08-07-21 @ 06:10) (18 - 18)  SpO2: 100% (08-07-21 @ 06:10) (100% - 100%)  Wt(kg): --  I&O's Summary          JVP: Normal  Neck: supple  Lung: clear   CV: S1 S2 , Murmur:  Abd: soft  Ext: No edema  neuro: Awake   Psych: flat affect    LABS/DATA:    CARDIAC MARKERS:                                8.3    13.56 )-----------( 379      ( 07 Aug 2021 07:13 )             28.8     08-07    139  |  107  |  26<H>  ----------------------------<  102<H>  5.4<H>   |  23  |  0.76    Ca    10.3      07 Aug 2021 07:13  Phos  2.9     08-07  Mg     2.20     08-07    TPro  4.7<L>  /  Alb  2.5<L>  /  TBili  <0.2  /  DBili  x   /  AST  36<H>  /  ALT  81<H>  /  AlkPhos  140<H>  08-07    proBNP:   Lipid Profile:   HgA1c:   TSH:     TELE:  EKG:

## 2021-08-07 NOTE — PROGRESS NOTE ADULT - ASSESSMENT
89 yr old female presenting with sepsis secondary to PNA and new diagnosis of DLBCL with hypercalcemia currently starting therapy with Prednisone and Cyclophosphamide.

## 2021-08-07 NOTE — PROGRESS NOTE ADULT - ASSESSMENT
89 year old woman with a pmh of COPD and HFrEF (EF 26%) that with chronic cough, not improved with steroids nor Abx. Now s/p R robotic VATS/pleural bx with placement of pleurex catheter on 07/15/21 after PET from 07/07/21 reveal b/l upper lobe opacities concerning for malignancy with pleural effusion. Hematology consulted for newly diagnosed DLBCL.    #DLBCL  -S/p Right peritracheal lymph node, level 4 biopsy: Diffuse large B cell lymphoma, non-germinal center B cell like immunophenotype  -Will need pleural fluid cytopath and flow if tapped again  -Peripheral blood flow cytometry: absolute monocytosis with marked leukocytosis and neutrophilia  -Bone marrow biopsy not performed given that it would not  at this time.   - Will need to get LP with IT chemo eventually   - Repeat Echo showing EF of 65%;      - D1  8/3  Prednisone 20mg AM; D2 8/4 Prednisone 20mg AM, 30mg PM  D3-D7 Prednisone 50mg (end on aug 9)  - Cyclophosphamide 200mg x 3 doses D1 8/5/2021. D2 8/6/2021; Can be administered through IV Consent obtained from daughter through phone. Please see chart.   -High risk for TLS; will need to start regimen in stages with q 8 hour TLS lab monitoring (CMP, Phos, Mag, LDH, Uric Acid). Normal until now  -Change allopurinol to febuxostat considering transaminitis three x the upper limit of normal and rash since starting allopurinol. For TLS prophylaxis. Patient is on gentle hydration from primary team and would recommend continuing at minimum 50cc per hour, if patient can tolerate.   - patient tolerated dose 1 of cyclophosphamide; will continue with planned regimen for now. Follow TLS labs     New onset rash on back; transaminitis 3x upper limit of normal  -Unclear etiology; significantly improved since yesterday   -Dermatology consulted by Primary team; considering possible antibiotic as culprit;   -With transaminitis and rash; concern for DRESS, especially since start of allopurinol few days prior however no eosinophils on differential. discontinued allopurinol.   - continue to monitor.     #Leukocytosis  -Leukocytosis with left shift with many neutrophils seen on the peripheral smear. Improving since on steroids  -Febrile to 103 on admission; no clear source of infection found with work up.   - completed 5 days vanco and Zosyn;   - per pulm/primary team

## 2021-08-07 NOTE — PROGRESS NOTE ADULT - PROBLEM SELECTOR PLAN 7
Chronic moderate exacerbation  - Normal EF on recent TTE  monitor fluid status closely  - per cards dc amio on discharge

## 2021-08-07 NOTE — DISCHARGE NOTE PROVIDER - NSDCMRMEDTOKEN_GEN_ALL_CORE_FT
ALPRAZolam 0.25 mg oral tablet: Take 1/2 tab (0.125 mg) - 1 tab (0.25 mg) orally twice daily as needed     **Per [facility] (reference #[id number]), alprazolam filled on 7/11/21 for 10 day supply  ascorbic acid 500 mg oral tablet: 1 tab(s) orally once a day  Cranberry oral tablet: 1 tab(s) orally once a day  escitalopram 10 mg oral tablet: 1.5 tab (15 mg) orally once a day    **Per pharmacy, rx directions state to take escitalopram 10 mg - 1 tablet orally once daily  ferrous sulfate 325 mg (65 mg elemental iron) oral tablet: 1 tab(s) orally once a day  folic acid 1 mg oral tablet: 1 tab(s) orally once a day  lactobacillus acidophilus oral capsule: 1  orally once a day  Multiple Vitamins oral capsule: 1 cap(s) orally once a day  omeprazole 20 mg oral delayed release capsule: 1 cap(s) orally once a day (in the morning)  thiamine 100 mg oral tablet: 1 tab(s) orally once a day  Trelegy Ellipta 100 mcg-62.5 mcg-25 mcg/inh inhalation powder: 1 puff(s) inhaled once a day  Tylenol 325 mg oral tablet: 2 tab(s) orally every 6 hours, As Needed  Vitamin D3: 1 tab(s) orally once a day   ALPRAZolam 0.25 mg oral tablet: Take 1/2 tab (0.125 mg) - 1 tab (0.25 mg) orally twice daily as needed     **Per [facility] (reference #[id number]), alprazolam filled on 7/11/21 for 10 day supply  amiodarone 200 mg oral tablet: 1 tab(s) orally once a day  ascorbic acid 500 mg oral tablet: 1 tab(s) orally once a day  Cranberry oral tablet: 1 tab(s) orally once a day  escitalopram 10 mg oral tablet: 1.5 tab (15 mg) orally once a day    **Per pharmacy, rx directions state to take escitalopram 10 mg - 1 tablet orally once daily  ferrous sulfate 325 mg (65 mg elemental iron) oral tablet: 1 tab(s) orally once a day  folic acid 1 mg oral tablet: 1 tab(s) orally once a day  lactobacillus acidophilus oral capsule: 1  orally once a day  Multiple Vitamins oral capsule: 1 cap(s) orally once a day  omeprazole 20 mg oral delayed release capsule: 1 cap(s) orally once a day (in the morning)  polyethylene glycol 3350 oral powder for reconstitution: 17 gram(s) orally once a day, As needed, Constipation  senna oral tablet: 2 tab(s) orally once a day (at bedtime), As needed, Constipation  thiamine 100 mg oral tablet: 1 tab(s) orally once a day  Trelegy Ellipta 100 mcg-62.5 mcg-25 mcg/inh inhalation powder: 1 puff(s) inhaled once a day  triamcinolone 0.1% topical cream: 1 application topically 2 times a day  Tylenol 325 mg oral tablet: 2 tab(s) orally every 6 hours, As Needed  Vitamin D3: 1 tab(s) orally once a day   ALPRAZolam 0.25 mg oral tablet: Take 1/2 tab (0.125 mg) - 1 tab (0.25 mg) orally twice daily as needed     **Per [facility] (reference #[id number]), alprazolam filled on 7/11/21 for 10 day supply  ascorbic acid 500 mg oral tablet: 1 tab(s) orally once a day  Cranberry oral tablet: 1 tab(s) orally once a day  escitalopram 10 mg oral tablet: 1.5 tab (15 mg) orally once a day    **Per pharmacy, rx directions state to take escitalopram 10 mg - 1 tablet orally once daily  ferrous sulfate 325 mg (65 mg elemental iron) oral tablet: 1 tab(s) orally once a day  folic acid 1 mg oral tablet: 1 tab(s) orally once a day  lactobacillus acidophilus oral capsule: 1  orally once a day  Multiple Vitamins oral capsule: 1 cap(s) orally once a day  omeprazole 20 mg oral delayed release capsule: 1 cap(s) orally once a day (in the morning)  polyethylene glycol 3350 oral powder for reconstitution: 17 gram(s) orally once a day, As needed, Constipation  senna oral tablet: 2 tab(s) orally once a day (at bedtime), As needed, Constipation  thiamine 100 mg oral tablet: 1 tab(s) orally once a day  Trelegy Ellipta 100 mcg-62.5 mcg-25 mcg/inh inhalation powder: 1 puff(s) inhaled once a day  triamcinolone 0.1% topical cream: 1 application topically 2 times a day  Tylenol 325 mg oral tablet: 2 tab(s) orally every 6 hours, As Needed  Vitamin D3: 1 tab(s) orally once a day

## 2021-08-07 NOTE — DISCHARGE NOTE PROVIDER - HOSPITAL COURSE
89F h/o COPD, HFrEF (EF 26%), s/p right robot VATS/pleural biopsy s/p Pleurex 7/14/21 prior  then in rehab developed a fever. She has a history of chronic leukocytosis due to lymphoma (biopsy per chart review B cell lymphoma    Hospital Course:     Lymphoma, unspecified body region, unspecified lymphoma type.  Plan: new diagnosis of DLBCL, confirmed on path   - Prednisone 20mg on 8/3 & 8/4   - Prednisone 50mg x 5 days and Cyclophosphamide initiation 8/5- 8/7   - monitor TLS labs Q8H   - IVF @ 50cc/h   - HIV, hepatitis labs negative, repeat TTE with normal EF   [ ] Will need pleural fluid cytopath sent off for flow on Sunday 8/8 when  pleurex drained next  - Hematology following.      Morbilliform rash. Plan: Morbilliform rash noted over back, trunk and legs associated with rise in LFT's  - Derm consulted: May be 2/2 Zosyn v DRESS   - Given association w/ transaminitis, c/f DRESS 2/2 allopurinol though no eosinophils noted on diff  - Check CBC + diff, LFT's daily   - Monitor off Allopurinol (last dose 8/5), switch to Febuxostat.     Hypoxia. Plan: Requiring 2L NC, s/p VATS with pleurex placement, possible PNA s/p therapy   - Drain Pleurex 2x/week (Wednesday & Sunday)   - Mucinex, chest PT, IS, OOB into chair as possible, strict I/O's.    Hypercalcemia. Plan: Likely 2/2 malignancy, corrected calcium 11.4 today  - Cont IVF   - Will monitor Ca while on Prednisone   - Monitor fluid status.    Sepsis without acute organ dysfunction, due to unspecified organism. Plan: pt meets criteria for sepsis- now resolved   s/p 5 days of vanco/zosyn for PNA.   - cultures neg to date  CT chest reviewed.    Hypothyroidism, unspecified type.Plan: - continue with current levothyroxine dose     HFrEF (heart failure with reduced ejection fraction). Plan: Chronic moderate exacerbation  - Normal EF on recent TTE  monitor fluid status closely       89F h/o COPD, HFrEF (EF 26%), s/p right robot VATS/pleural biopsy s/p Pleurex 7/14/21 prior  then in rehab developed a fever. She has a history of chronic leukocytosis due to lymphoma (biopsy per chart review B cell lymphoma    Hospital Course:     Lymphoma, unspecified body region, unspecified lymphoma type.    - new diagnosis of DLBCL, confirmed on path   - Prednisone 20mg on 8/3 & 8/4   - Prednisone 50mg x 5 days and Cyclophosphamide initiation 8/5- 8/7   - monitor TLS labs Q8H   - IVF @ 50cc/h   - HIV, hepatitis labs negative, repeat TTE with normal EF   - Pleural fluid cytopath sent off for flow on Sunday 8/8 when pleurex drained   - Hematology following.      Morbilliform rash.   - Morbilliform rash noted over back, trunk and legs associated with rise in LFT's  - Derm consulted: May be 2/2 Zosyn v DRESS   - Given association w/ transaminitis, c/f DRESS 2/2 allopurinol though no eosinophils noted on diff  - Check CBC + diff, LFT's daily   - Monitor off Allopurinol (last dose 8/5), switch to Febuxostat.     Hypoxia.   - Requiring 2L NC, s/p VATS with pleurex placement, possible PNA s/p therapy   - Drain Pleurex 2x/week (Wednesday & Sunday)   - Mucinex, chest PT, IS, OOB into chair as possible, strict I/O's.    Hypercalcemia.   - Likely 2/2 malignancy, corrected calcium 11.4 today  - Cont IVF   - Will monitor Ca while on Prednisone   - Monitor fluid status.    Sepsis without acute organ dysfunction, due to unspecified organism.   - pt meets criteria for sepsis- now resolved   - s/p 5 days of vanco/zosyn for PNA.   - cultures neg to date  - CT chest reviewed.    Hypothyroidism, unspecified type.  - continue with current levothyroxine dose     HFrEF (heart failure with reduced ejection fraction).   - Chronic moderate exacerbation  - Normal EF on recent TTE  - monitor fluid status closely    Dispo: rehab Orc    On 8/13/2021, case was discussed with Dr. Genie Flores, patient is medically cleared and optimized for discharge today. All medications were reviewed with attending, and sent to mutually agreed upon pharmacy.

## 2021-08-07 NOTE — DISCHARGE NOTE PROVIDER - NSDCFUADDAPPT_GEN_ALL_CORE_FT
Continue medications as prescribed. Follow up with your primary care doctor for further evaluation and management. Please call to make an appointment within 1-2 weeks of discharge.

## 2021-08-07 NOTE — PROGRESS NOTE ADULT - SUBJECTIVE AND OBJECTIVE BOX
INTERVAL HPI/OVERNIGHT EVENTS:  Patient S&E at bedside. No o/n events, patient resting comfortably. No complaints at this time. Patient denies fever, chills, dizziness, weakness, CP, palpitations, SOB, cough, N/V/D/C, dysuria, changes in bowel movements, LE edema.    VITAL SIGNS:  T(F): 98.2 (08-07-21 @ 06:10)  HR: 71 (08-07-21 @ 06:10)  BP: 119/70 (08-07-21 @ 06:10)  RR: 18 (08-07-21 @ 06:10)  SpO2: 100% (08-07-21 @ 06:10)  Wt(kg): --    PHYSICAL EXAM:    Constitutional: WDWN, NAD,   Eyes: PERRL, EOMI, sclera non-icteric  Neck: supple, no masses, no JVD  Respiratory: CTA b/l, good air entry b/l, no wheezing, rhonchi, rales, with normal respiratory effort and no intercostal retractions  Cardiovascular: RRR, normal S1S2, no M/R/G  Gastrointestinal: soft, NTND, no masses palpable, BS normal in all four quadrants, no HSM  Extremities: WWP, no c/c/e  Neurological: AAOx3  Skin: Normal temperature    MEDICATIONS  (STANDING):  albuterol/ipratropium for Nebulization 3 milliLiter(s) Nebulizer every 6 hours  aMIOdarone    Tablet 200 milliGRAM(s) Oral daily  cyclophosphamide IVPB (eMAR) 200 milliGRAM(s) IV Intermittent daily  enoxaparin Injectable 40 milliGRAM(s) SubCutaneous daily  febuxostat 40 milliGRAM(s) Oral daily  guaiFENesin  milliGRAM(s) Oral every 12 hours  multivitamin 1 Tablet(s) Oral daily  ondansetron Injectable 8 milliGRAM(s) IV Push daily  predniSONE   Tablet 50 milliGRAM(s) Oral daily  sodium chloride 0.9%. 1000 milliLiter(s) (50 mL/Hr) IV Continuous <Continuous>  sodium zirconium cyclosilicate 5 Gram(s) Oral once  triamcinolone 0.1% Cream 1 Application(s) Topical two times a day    MEDICATIONS  (PRN):  acetaminophen   Tablet .. 650 milliGRAM(s) Oral every 6 hours PRN Temp greater or equal to 38.5C (101.3F), Mild Pain (1 - 3)  ALPRAZolam 0.25 milliGRAM(s) Oral two times a day PRN anxiety  aluminum hydroxide/magnesium hydroxide/simethicone Suspension 30 milliLiter(s) Oral every 4 hours PRN Dyspepsia  melatonin 3 milliGRAM(s) Oral at bedtime PRN Insomnia  ondansetron Injectable 6 milliGRAM(s) IV Push every 8 hours PRN Nausea  ondansetron Injectable 4 milliGRAM(s) IV Push every 8 hours PRN Nausea and/or Vomiting      Allergies    No Known Allergies    Intolerances        LABS:                        8.3    13.56 )-----------( 379      ( 07 Aug 2021 07:13 )             28.8     08-07    139  |  107  |  26<H>  ----------------------------<  102<H>  5.4<H>   |  23  |  0.76    Ca    10.3      07 Aug 2021 07:13  Phos  2.9     08-07  Mg     2.20     08-07    TPro  4.7<L>  /  Alb  2.5<L>  /  TBili  <0.2  /  DBili  x   /  AST  36<H>  /  ALT  81<H>  /  AlkPhos  140<H>  08-07          RADIOLOGY & ADDITIONAL TESTS:  Studies reviewed.

## 2021-08-07 NOTE — DISCHARGE NOTE PROVIDER - NSDCCPCAREPLAN_GEN_ALL_CORE_FT
PRINCIPAL DISCHARGE DIAGNOSIS  Diagnosis: Sepsis  Assessment and Plan of Treatment: You have been treated with IV antibiotics during hospitalization.      SECONDARY DISCHARGE DIAGNOSES  Diagnosis: Lymphoma, unspecified body region, unspecified lymphoma type  Assessment and Plan of Treatment: You completed 3 days of Cytoxin while hospitalized.  you will need to follow up with your Hemotologist/Oncologist within __of discharge for further medical management.    Diagnosis: HFrEF (heart failure with reduced ejection fraction)  Assessment and Plan of Treatment: Continue regimen from hospital. Follow heart healthy diet. Monitor weight. If you develop severe lower extremity swelling and shortness of breath please seek medial attention. Follow up with your PCP and cardiologist for further evaluation and managment. Please call to make an appointment.     PRINCIPAL DISCHARGE DIAGNOSIS  Diagnosis: Sepsis  Assessment and Plan of Treatment: You have been treated with IV antibiotics during hospitalization.      SECONDARY DISCHARGE DIAGNOSES  Diagnosis: Lymphoma, unspecified body region, unspecified lymphoma type  Assessment and Plan of Treatment: You completed 3 days of Cytoxin while hospitalized.  you will need to follow up with your Hemotologist/Oncologist within 2 weeks of discharge for further medical management. You will need to have blood work drawn weekly while a rehab and your next chemotherapy will be on August 23rd.    Diagnosis: HFrEF (heart failure with reduced ejection fraction)  Assessment and Plan of Treatment: Continue regimen from hospital. Follow heart healthy diet. Monitor weight. If you develop severe lower extremity swelling and shortness of breath please seek medial attention. Follow up with your PCP and cardiologist for further evaluation and managment. Please call to make an appointment.

## 2021-08-07 NOTE — PROGRESS NOTE ADULT - PROBLEM SELECTOR PLAN 2
Morbilliform rash noted over back, trunk and legs associated with rise in LFT's  - Derm consulted: May be 2/2 Zosyn v DRESS   - Given association w/ transaminitis, c/f DRESS 2/2 allopurinol though no eosinophils noted on diff  - Check CBC + diff, LFT's daily   - Monitor off Allopurinol (last dose 8/5), switch to Febuxostat

## 2021-08-07 NOTE — PROGRESS NOTE ADULT - ASSESSMENT
PAF post op   in sinus  cont amio for short term   DC amio upon discharge   off a/c   poor candidate for a/c high bleed risk

## 2021-08-07 NOTE — PROGRESS NOTE ADULT - SUBJECTIVE AND OBJECTIVE BOX
Patient is a 89y old  Female who presents with a chief complaint of SOB (07 Aug 2021 11:13)      SUBJECTIVE / OVERNIGHT EVENTS: No events overnight. Pt reports sob has improved. has no other medical complaints.    MEDICATIONS  (STANDING):  albuterol/ipratropium for Nebulization 3 milliLiter(s) Nebulizer every 6 hours  aMIOdarone    Tablet 200 milliGRAM(s) Oral daily  cyclophosphamide IVPB (eMAR) 200 milliGRAM(s) IV Intermittent daily  enoxaparin Injectable 40 milliGRAM(s) SubCutaneous daily  febuxostat 40 milliGRAM(s) Oral daily  guaiFENesin  milliGRAM(s) Oral every 12 hours  multivitamin 1 Tablet(s) Oral daily  ondansetron Injectable 8 milliGRAM(s) IV Push daily  predniSONE   Tablet 50 milliGRAM(s) Oral daily  sodium chloride 0.9%. 1000 milliLiter(s) (50 mL/Hr) IV Continuous <Continuous>  triamcinolone 0.1% Cream 1 Application(s) Topical two times a day    MEDICATIONS  (PRN):  acetaminophen   Tablet .. 650 milliGRAM(s) Oral every 6 hours PRN Temp greater or equal to 38.5C (101.3F), Mild Pain (1 - 3)  ALPRAZolam 0.25 milliGRAM(s) Oral two times a day PRN anxiety  aluminum hydroxide/magnesium hydroxide/simethicone Suspension 30 milliLiter(s) Oral every 4 hours PRN Dyspepsia  melatonin 3 milliGRAM(s) Oral at bedtime PRN Insomnia  ondansetron Injectable 6 milliGRAM(s) IV Push every 8 hours PRN Nausea  ondansetron Injectable 4 milliGRAM(s) IV Push every 8 hours PRN Nausea and/or Vomiting        Vital Signs Last 24 Hrs  T(C): 36.2 (07 Aug 2021 13:03), Max: 36.8 (07 Aug 2021 06:10)  T(F): 97.2 (07 Aug 2021 13:03), Max: 98.2 (07 Aug 2021 06:10)  HR: 73 (07 Aug 2021 13:03) (71 - 80)  BP: 107/72 (07 Aug 2021 13:03) (107/53 - 119/70)  BP(mean): --  RR: 17 (07 Aug 2021 13:03) (17 - 18)  SpO2: 100% (07 Aug 2021 13:03) (98% - 100%)    PHYSICAL EXAM:  CONSTITUTIONAL: NAD, well-developed woman on 2L NC   RESPIRATORY: Normal respiratory effort; diminished breath sounds w/ crackles bilaterally, R pleurex   CARDIOVASCULAR: Regular rate and rhythm, normal S1 and S2, no murmur/rub/gallop; No lower extremity edema; Peripheral pulses are 2+ bilaterally  ABDOMEN: Nontender to palpation, normoactive bowel sounds, no rebound/guarding; No hepatosplenomegaly  MUSCULOSKELETAL no clubbing or cyanosis of digits; no joint swelling or tenderness to palpation  PSYCH: Alert and oriented, lethargic but wakes up with verbal stimuli   Skin: Morbilliform rash over back, trunk, thighs, arms - improving    LABS:                        8.3    13.56 )-----------( 379      ( 07 Aug 2021 07:13 )             28.8     08-07    139  |  107  |  26<H>  ----------------------------<  102<H>  5.4<H>   |  23  |  0.76    Ca    10.3      07 Aug 2021 07:13  Phos  2.9     08-07  Mg     2.20     08-07    TPro  4.7<L>  /  Alb  2.5<L>  /  TBili  <0.2  /  DBili  x   /  AST  36<H>  /  ALT  81<H>  /  AlkPhos  140<H>  08-07              RADIOLOGY & ADDITIONAL TESTS:    Imaging Personally Reviewed:    Consultant(s) Notes Reviewed:      Care Discussed with Consultants/Other Providers:

## 2021-08-07 NOTE — DISCHARGE NOTE PROVIDER - CARE PROVIDER_API CALL
Rossy Esparza)  Internal Medicine  [address] [facility], [address] [place] [address]  Phone: [phone]  Fax: ()-  Follow Up Time:

## 2021-08-07 NOTE — PROGRESS NOTE ADULT - PROBLEM SELECTOR PLAN 4
Likely 2/2 malignancy, corrected calcium 11.4 today  - Cont IVF   - Will monitor Ca while on Prednisone   - Monitor fluid status

## 2021-08-07 NOTE — PROGRESS NOTE ADULT - NSPROGADDITIONALINFOA_GEN_ALL_CORE
mildly hyperkalemic yesterday and today on non hemolyzed specimen, give 1 dose of PO lokelma 5 mg. repeat bmp marixa AM

## 2021-08-07 NOTE — PROGRESS NOTE ADULT - PROBLEM SELECTOR PLAN 3
Requiring 2L NC, s/p VATS with pleurex placement, possible PNA s/p therapy   - Drain Pleurex 2x/week (Wednesday & Sunday)   - Mucinex, chest PT, IS, OOB into chair as possible, strict I/O's

## 2021-08-08 LAB
ALBUMIN SERPL ELPH-MCNC: 2.8 G/DL — LOW (ref 3.3–5)
ALP SERPL-CCNC: 139 U/L — HIGH (ref 40–120)
ALT FLD-CCNC: 66 U/L — HIGH (ref 4–33)
ANION GAP SERPL CALC-SCNC: 10 MMOL/L — SIGNIFICANT CHANGE UP (ref 7–14)
AST SERPL-CCNC: 19 U/L — SIGNIFICANT CHANGE UP (ref 4–32)
BASOPHILS # BLD AUTO: 0.05 K/UL — SIGNIFICANT CHANGE UP (ref 0–0.2)
BASOPHILS NFR BLD AUTO: 0.2 % — SIGNIFICANT CHANGE UP (ref 0–2)
BILIRUB SERPL-MCNC: <0.2 MG/DL — SIGNIFICANT CHANGE UP (ref 0.2–1.2)
BUN SERPL-MCNC: 29 MG/DL — HIGH (ref 7–23)
CALCIUM SERPL-MCNC: 10.3 MG/DL — SIGNIFICANT CHANGE UP (ref 8.4–10.5)
CHLORIDE SERPL-SCNC: 103 MMOL/L — SIGNIFICANT CHANGE UP (ref 98–107)
CO2 SERPL-SCNC: 24 MMOL/L — SIGNIFICANT CHANGE UP (ref 22–31)
CREAT SERPL-MCNC: 0.78 MG/DL — SIGNIFICANT CHANGE UP (ref 0.5–1.3)
EOSINOPHIL # BLD AUTO: 0.11 K/UL — SIGNIFICANT CHANGE UP (ref 0–0.5)
EOSINOPHIL NFR BLD AUTO: 0.5 % — SIGNIFICANT CHANGE UP (ref 0–6)
GLUCOSE SERPL-MCNC: 78 MG/DL — SIGNIFICANT CHANGE UP (ref 70–99)
HCT VFR BLD CALC: 30.8 % — LOW (ref 34.5–45)
HGB BLD-MCNC: 8.9 G/DL — LOW (ref 11.5–15.5)
IANC: 17.39 K/UL — HIGH (ref 1.5–8.5)
IMM GRANULOCYTES NFR BLD AUTO: 1.6 % — HIGH (ref 0–1.5)
LDH SERPL L TO P-CCNC: 141 U/L — SIGNIFICANT CHANGE UP (ref 135–225)
LYMPHOCYTES # BLD AUTO: 10 % — LOW (ref 13–44)
LYMPHOCYTES # BLD AUTO: 2.13 K/UL — SIGNIFICANT CHANGE UP (ref 1–3.3)
MAGNESIUM SERPL-MCNC: 2.1 MG/DL — SIGNIFICANT CHANGE UP (ref 1.6–2.6)
MCHC RBC-ENTMCNC: 24.1 PG — LOW (ref 27–34)
MCHC RBC-ENTMCNC: 28.9 GM/DL — LOW (ref 32–36)
MCV RBC AUTO: 83.2 FL — SIGNIFICANT CHANGE UP (ref 80–100)
MONOCYTES # BLD AUTO: 1.21 K/UL — HIGH (ref 0–0.9)
MONOCYTES NFR BLD AUTO: 5.7 % — SIGNIFICANT CHANGE UP (ref 2–14)
NEUTROPHILS # BLD AUTO: 17.39 K/UL — HIGH (ref 1.8–7.4)
NEUTROPHILS NFR BLD AUTO: 82 % — HIGH (ref 43–77)
NRBC # BLD: 0 /100 WBCS — SIGNIFICANT CHANGE UP
NRBC # FLD: 0 K/UL — SIGNIFICANT CHANGE UP
PHOSPHATE SERPL-MCNC: 3.2 MG/DL — SIGNIFICANT CHANGE UP (ref 2.5–4.5)
PLATELET # BLD AUTO: 428 K/UL — HIGH (ref 150–400)
POTASSIUM SERPL-MCNC: 5.1 MMOL/L — SIGNIFICANT CHANGE UP (ref 3.5–5.3)
POTASSIUM SERPL-SCNC: 5.1 MMOL/L — SIGNIFICANT CHANGE UP (ref 3.5–5.3)
PROT SERPL-MCNC: 4.9 G/DL — LOW (ref 6–8.3)
RBC # BLD: 3.7 M/UL — LOW (ref 3.8–5.2)
RBC # FLD: 18.2 % — HIGH (ref 10.3–14.5)
SODIUM SERPL-SCNC: 137 MMOL/L — SIGNIFICANT CHANGE UP (ref 135–145)
URATE SERPL-MCNC: 1.6 MG/DL — LOW (ref 2.5–7)
WBC # BLD: 21.22 K/UL — HIGH (ref 3.8–10.5)
WBC # FLD AUTO: 21.22 K/UL — HIGH (ref 3.8–10.5)

## 2021-08-08 PROCEDURE — 99232 SBSQ HOSP IP/OBS MODERATE 35: CPT | Mod: GC

## 2021-08-08 PROCEDURE — 99233 SBSQ HOSP IP/OBS HIGH 50: CPT

## 2021-08-08 RX ADMIN — Medication 650 MILLIGRAM(S): at 06:50

## 2021-08-08 RX ADMIN — Medication 650 MILLIGRAM(S): at 22:12

## 2021-08-08 RX ADMIN — Medication 50 MILLIGRAM(S): at 05:29

## 2021-08-08 RX ADMIN — AMIODARONE HYDROCHLORIDE 200 MILLIGRAM(S): 400 TABLET ORAL at 05:29

## 2021-08-08 RX ADMIN — Medication 1 APPLICATION(S): at 17:44

## 2021-08-08 RX ADMIN — SODIUM CHLORIDE 50 MILLILITER(S): 9 INJECTION INTRAMUSCULAR; INTRAVENOUS; SUBCUTANEOUS at 04:33

## 2021-08-08 RX ADMIN — Medication 650 MILLIGRAM(S): at 21:12

## 2021-08-08 RX ADMIN — ENOXAPARIN SODIUM 40 MILLIGRAM(S): 100 INJECTION SUBCUTANEOUS at 11:30

## 2021-08-08 RX ADMIN — Medication 30 MILLILITER(S): at 22:04

## 2021-08-08 RX ADMIN — Medication 1 TABLET(S): at 11:31

## 2021-08-08 RX ADMIN — Medication 650 MILLIGRAM(S): at 06:22

## 2021-08-08 RX ADMIN — Medication 1 APPLICATION(S): at 05:29

## 2021-08-08 RX ADMIN — Medication 600 MILLIGRAM(S): at 05:29

## 2021-08-08 RX ADMIN — FEBUXOSTAT 40 MILLIGRAM(S): 40 TABLET ORAL at 11:30

## 2021-08-08 NOTE — PROGRESS NOTE ADULT - ASSESSMENT
89 y.o female with DLBCL started on Prednisone and cytoxan .  Tolerating treatment well.  F/u TLS labs and replete lytes prn.  Transfuse blood and platelets prn.  Heme team to decide about Rituxan this week.    Beulah Blanco MD  Attending Physician  [facility]  [phone]

## 2021-08-08 NOTE — PROGRESS NOTE ADULT - PROBLEM SELECTOR PLAN 1
new diagnosis of DLBCL, confirmed on path   - Prednisone 20mg on 8/3 & 8/4   - Prednisone 50mg x 5 days and Cyclophosphamide initiation 8/5- 8/7   - monitor TLS labs Q8H   - IVF @ 50cc/h   - HIV, hepatitis labs negative, repeat TTE with normal EF   [ ] unable to send pleural fluid cytopath on sunday, will need to resend when pleurex drained next   - Hematology following

## 2021-08-08 NOTE — CHART NOTE - NSCHARTNOTEFT_GEN_A_CORE
Writer was notified that pleurex was drained by OVERNIGHT RN shift - with only 5cc. This is not sufficient amount to send off cytology. Day Shift RN Joan today also notified to monitor closely on drain output as there's currently no drainage noted in the system. will likely need to drain pleurex tomorrow in order to send off fluid cytology. Attending Dr Spencer notified and agreed.

## 2021-08-08 NOTE — PROGRESS NOTE ADULT - SUBJECTIVE AND OBJECTIVE BOX
Patient is a 89y old  Female who presents with a chief complaint of SOB (08 Aug 2021 08:19)      SUBJECTIVE / OVERNIGHT EVENTS: Overnight pt's pleural fluid was drained ~3-5 cc, no cytology was sent. Today AM pt with no drainage. Currently has no medical complaints. Reports rash has improved.    MEDICATIONS  (STANDING):  aMIOdarone    Tablet 200 milliGRAM(s) Oral daily  enoxaparin Injectable 40 milliGRAM(s) SubCutaneous daily  febuxostat 40 milliGRAM(s) Oral daily  multivitamin 1 Tablet(s) Oral daily  predniSONE   Tablet 50 milliGRAM(s) Oral daily  sodium chloride 0.9%. 1000 milliLiter(s) (50 mL/Hr) IV Continuous <Continuous>  triamcinolone 0.1% Cream 1 Application(s) Topical two times a day    MEDICATIONS  (PRN):  acetaminophen   Tablet .. 650 milliGRAM(s) Oral every 6 hours PRN Temp greater or equal to 38.5C (101.3F), Mild Pain (1 - 3)  ALPRAZolam 0.25 milliGRAM(s) Oral two times a day PRN anxiety  aluminum hydroxide/magnesium hydroxide/simethicone Suspension 30 milliLiter(s) Oral every 4 hours PRN Dyspepsia  melatonin 3 milliGRAM(s) Oral at bedtime PRN Insomnia  ondansetron Injectable 4 milliGRAM(s) IV Push every 8 hours PRN Nausea and/or Vomiting  ondansetron Injectable 6 milliGRAM(s) IV Push every 8 hours PRN Nausea        CAPILLARY BLOOD GLUCOSE        I&O's Summary    07 Aug 2021 07:01  -  08 Aug 2021 07:00  --------------------------------------------------------  IN: 0 mL / OUT: 3 mL / NET: -3 mL    Vital Signs Last 24 Hrs  T(C): 36.4 (08 Aug 2021 05:25), Max: 36.4 (07 Aug 2021 21:48)  T(F): 97.5 (08 Aug 2021 05:25), Max: 97.5 (07 Aug 2021 21:48)  HR: 72 (08 Aug 2021 05:25) (72 - 78)  BP: 136/71 (08 Aug 2021 05:25) (132/70 - 136/71)  BP(mean): --  RR: 19 (08 Aug 2021 05:25) (19 - 20)  SpO2: 100% (08 Aug 2021 05:25) (97% - 100%)    PHYSICAL EXAM:  CONSTITUTIONAL: NAD, well-developed woman on 2L NC   RESPIRATORY: Normal respiratory effort; diminished breath sounds w/ crackles bilaterally, R pleurex   CARDIOVASCULAR: Regular rate and rhythm, normal S1 and S2, no murmur/rub/gallop; No lower extremity edema; Peripheral pulses are 2+ bilaterally  ABDOMEN: Nontender to palpation, normoactive bowel sounds, no rebound/guarding; No hepatosplenomegaly  MUSCULOSKELETAL no clubbing or cyanosis of digits; no joint swelling or tenderness to palpation  PSYCH: Alert and oriented, lethargic but wakes up with verbal stimuli   Skin: Morbilliform rash over back, trunk, thighs, arms - improving    LABS:                        8.9    21.22 )-----------( 428      ( 08 Aug 2021 07:34 )             30.8     08-08    137  |  103  |  29<H>  ----------------------------<  78  5.1   |  24  |  0.78    Ca    10.3      08 Aug 2021 07:34  Phos  3.2     08-08  Mg     2.10     08-08    TPro  4.9<L>  /  Alb  2.8<L>  /  TBili  <0.2  /  DBili  x   /  AST  19  /  ALT  66<H>  /  AlkPhos  139<H>  08-08              RADIOLOGY & ADDITIONAL TESTS:    Imaging Personally Reviewed:    Consultant(s) Notes Reviewed:      Care Discussed with Consultants/Other Providers:

## 2021-08-08 NOTE — PROGRESS NOTE ADULT - SUBJECTIVE AND OBJECTIVE BOX
INTERVAL HPI/OVERNIGHT EVENTS:  Patient S&E at bedside. No o/n events, patient resting comfortably. No complaints at this time. Patient denies fever, chills, dizziness, weakness, CP, palpitations, SOB, cough, N/V/D/C, dysuria, changes in bowel movements, LE edema.    VITAL SIGNS:  T(F): 97.5 (08-08-21 @ 05:25)  HR: 72 (08-08-21 @ 05:25)  BP: 136/71 (08-08-21 @ 05:25)  RR: 19 (08-08-21 @ 05:25)  SpO2: 100% (08-08-21 @ 05:25)  Wt(kg): --    PHYSICAL EXAM:    Constitutional: WDWN, NAD,   Eyes: PERRL, EOMI, sclera non-icteric  Neck: supple, no masses, no JVD  Respiratory: CTA b/l, good air entry b/l, no wheezing, rhonchi, rales, with normal respiratory effort and no intercostal retractions  Cardiovascular: RRR, normal S1S2, no M/R/G  Gastrointestinal: soft, NTND, no masses palpable, BS normal in all four quadrants, no HSM  Extremities: WWP, no c/c/e  Neurological: AAOx3  Skin: Normal temperature    MEDICATIONS  (STANDING):  aMIOdarone    Tablet 200 milliGRAM(s) Oral daily  enoxaparin Injectable 40 milliGRAM(s) SubCutaneous daily  febuxostat 40 milliGRAM(s) Oral daily  multivitamin 1 Tablet(s) Oral daily  predniSONE   Tablet 50 milliGRAM(s) Oral daily  sodium chloride 0.9%. 1000 milliLiter(s) (50 mL/Hr) IV Continuous <Continuous>  triamcinolone 0.1% Cream 1 Application(s) Topical two times a day    MEDICATIONS  (PRN):  acetaminophen   Tablet .. 650 milliGRAM(s) Oral every 6 hours PRN Temp greater or equal to 38.5C (101.3F), Mild Pain (1 - 3)  ALPRAZolam 0.25 milliGRAM(s) Oral two times a day PRN anxiety  aluminum hydroxide/magnesium hydroxide/simethicone Suspension 30 milliLiter(s) Oral every 4 hours PRN Dyspepsia  melatonin 3 milliGRAM(s) Oral at bedtime PRN Insomnia  ondansetron Injectable 6 milliGRAM(s) IV Push every 8 hours PRN Nausea  ondansetron Injectable 4 milliGRAM(s) IV Push every 8 hours PRN Nausea and/or Vomiting      Allergies    No Known Allergies    Intolerances        LABS:                        8.9    21.22 )-----------( 428      ( 08 Aug 2021 07:34 )             30.8     08-08    137  |  103  |  29<H>  ----------------------------<  78  5.1   |  24  |  0.78    Ca    10.3      08 Aug 2021 07:34  Phos  3.2     08-08  Mg     2.10     08-08    TPro  4.9<L>  /  Alb  2.8<L>  /  TBili  <0.2  /  DBili  x   /  AST  19  /  ALT  66<H>  /  AlkPhos  139<H>  08-08          RADIOLOGY & ADDITIONAL TESTS:  Studies reviewed.

## 2021-08-08 NOTE — PROGRESS NOTE ADULT - SUBJECTIVE AND OBJECTIVE BOX
DATE OF SERVICE: 08-08-21 @ 07:23    Subjective: Patient seen and examined. No new events except as noted.     SUBJECTIVE/ROS:  no new events       MEDICATIONS:  MEDICATIONS  (STANDING):  aMIOdarone    Tablet 200 milliGRAM(s) Oral daily  enoxaparin Injectable 40 milliGRAM(s) SubCutaneous daily  febuxostat 40 milliGRAM(s) Oral daily  multivitamin 1 Tablet(s) Oral daily  predniSONE   Tablet 50 milliGRAM(s) Oral daily  sodium chloride 0.9%. 1000 milliLiter(s) (50 mL/Hr) IV Continuous <Continuous>  triamcinolone 0.1% Cream 1 Application(s) Topical two times a day      PHYSICAL EXAM:  T(C): 36.4 (08-08-21 @ 05:25), Max: 36.4 (08-07-21 @ 21:48)  HR: 72 (08-08-21 @ 05:25) (72 - 80)  BP: 136/71 (08-08-21 @ 05:25) (107/72 - 136/71)  RR: 19 (08-08-21 @ 05:25) (17 - 20)  SpO2: 100% (08-08-21 @ 05:25) (97% - 100%)  Wt(kg): --  I&O's Summary    07 Aug 2021 07:01  -  08 Aug 2021 07:00  --------------------------------------------------------  IN: 0 mL / OUT: 3 mL / NET: -3 mL            JVP: Normal  Neck: supple  Lung: clear   CV: S1 S2 , Murmur:  Abd: soft  Ext: No edema  neuro: Awake / alert  Psych: flat affect  Skin: normal``    LABS/DATA:    CARDIAC MARKERS:                                8.4    16.32 )-----------( 387      ( 07 Aug 2021 22:22 )             29.0     08-07    138  |  106  |  29<H>  ----------------------------<  132<H>  5.4<H>   |  22  |  0.83    Ca    10.3      07 Aug 2021 22:22  Phos  2.8     08-07  Mg     2.20     08-07    TPro  5.1<L>  /  Alb  2.8<L>  /  TBili  <0.2  /  DBili  x   /  AST  24  /  ALT  75<H>  /  AlkPhos  150<H>  08-07    proBNP:   Lipid Profile:   HgA1c:   TSH:     TELE:  EKG:

## 2021-08-09 ENCOUNTER — RESULT REVIEW (OUTPATIENT)
Age: 86
End: 2021-08-09

## 2021-08-09 LAB
ALBUMIN SERPL ELPH-MCNC: 2.6 G/DL — LOW (ref 3.3–5)
ALP SERPL-CCNC: 125 U/L — HIGH (ref 40–120)
ALT FLD-CCNC: 47 U/L — HIGH (ref 4–33)
ANION GAP SERPL CALC-SCNC: 12 MMOL/L — SIGNIFICANT CHANGE UP (ref 7–14)
AST SERPL-CCNC: 13 U/L — SIGNIFICANT CHANGE UP (ref 4–32)
BASOPHILS # BLD AUTO: 0.04 K/UL — SIGNIFICANT CHANGE UP (ref 0–0.2)
BASOPHILS NFR BLD AUTO: 0.2 % — SIGNIFICANT CHANGE UP (ref 0–2)
BILIRUB SERPL-MCNC: <0.2 MG/DL — SIGNIFICANT CHANGE UP (ref 0.2–1.2)
BUN SERPL-MCNC: 31 MG/DL — HIGH (ref 7–23)
CALCIUM SERPL-MCNC: 10 MG/DL — SIGNIFICANT CHANGE UP (ref 8.4–10.5)
CHLORIDE SERPL-SCNC: 104 MMOL/L — SIGNIFICANT CHANGE UP (ref 98–107)
CO2 SERPL-SCNC: 23 MMOL/L — SIGNIFICANT CHANGE UP (ref 22–31)
CREAT SERPL-MCNC: 0.7 MG/DL — SIGNIFICANT CHANGE UP (ref 0.5–1.3)
EOSINOPHIL # BLD AUTO: 0.27 K/UL — SIGNIFICANT CHANGE UP (ref 0–0.5)
EOSINOPHIL NFR BLD AUTO: 1.2 % — SIGNIFICANT CHANGE UP (ref 0–6)
GLUCOSE SERPL-MCNC: 83 MG/DL — SIGNIFICANT CHANGE UP (ref 70–99)
HCT VFR BLD CALC: 30.3 % — LOW (ref 34.5–45)
HGB BLD-MCNC: 8.9 G/DL — LOW (ref 11.5–15.5)
IANC: 18.88 K/UL — HIGH (ref 1.5–8.5)
IMM GRANULOCYTES NFR BLD AUTO: 1 % — SIGNIFICANT CHANGE UP (ref 0–1.5)
LDH SERPL L TO P-CCNC: 138 U/L — SIGNIFICANT CHANGE UP (ref 135–225)
LYMPHOCYTES # BLD AUTO: 1.42 K/UL — SIGNIFICANT CHANGE UP (ref 1–3.3)
LYMPHOCYTES # BLD AUTO: 6.5 % — LOW (ref 13–44)
MAGNESIUM SERPL-MCNC: 2.1 MG/DL — SIGNIFICANT CHANGE UP (ref 1.6–2.6)
MCHC RBC-ENTMCNC: 24.2 PG — LOW (ref 27–34)
MCHC RBC-ENTMCNC: 29.4 GM/DL — LOW (ref 32–36)
MCV RBC AUTO: 82.3 FL — SIGNIFICANT CHANGE UP (ref 80–100)
MONOCYTES # BLD AUTO: 0.88 K/UL — SIGNIFICANT CHANGE UP (ref 0–0.9)
MONOCYTES NFR BLD AUTO: 4.1 % — SIGNIFICANT CHANGE UP (ref 2–14)
NEUTROPHILS # BLD AUTO: 18.88 K/UL — HIGH (ref 1.8–7.4)
NEUTROPHILS NFR BLD AUTO: 87 % — HIGH (ref 43–77)
NRBC # BLD: 0 /100 WBCS — SIGNIFICANT CHANGE UP
NRBC # FLD: 0 K/UL — SIGNIFICANT CHANGE UP
PHOSPHATE SERPL-MCNC: 2.8 MG/DL — SIGNIFICANT CHANGE UP (ref 2.5–4.5)
PLATELET # BLD AUTO: 381 K/UL — SIGNIFICANT CHANGE UP (ref 150–400)
POTASSIUM SERPL-MCNC: 4.4 MMOL/L — SIGNIFICANT CHANGE UP (ref 3.5–5.3)
POTASSIUM SERPL-SCNC: 4.4 MMOL/L — SIGNIFICANT CHANGE UP (ref 3.5–5.3)
PROT SERPL-MCNC: 4.7 G/DL — LOW (ref 6–8.3)
RBC # BLD: 3.68 M/UL — LOW (ref 3.8–5.2)
RBC # FLD: 18.5 % — HIGH (ref 10.3–14.5)
SODIUM SERPL-SCNC: 139 MMOL/L — SIGNIFICANT CHANGE UP (ref 135–145)
URATE SERPL-MCNC: 1.5 MG/DL — LOW (ref 2.5–7)
WBC # BLD: 21.7 K/UL — HIGH (ref 3.8–10.5)
WBC # FLD AUTO: 21.7 K/UL — HIGH (ref 3.8–10.5)

## 2021-08-09 PROCEDURE — 99232 SBSQ HOSP IP/OBS MODERATE 35: CPT | Mod: GC

## 2021-08-09 PROCEDURE — 88305 TISSUE EXAM BY PATHOLOGIST: CPT | Mod: 26

## 2021-08-09 PROCEDURE — 99233 SBSQ HOSP IP/OBS HIGH 50: CPT

## 2021-08-09 PROCEDURE — 88112 CYTOPATH CELL ENHANCE TECH: CPT | Mod: 26

## 2021-08-09 RX ADMIN — Medication 1 APPLICATION(S): at 10:06

## 2021-08-09 RX ADMIN — SODIUM CHLORIDE 50 MILLILITER(S): 9 INJECTION INTRAMUSCULAR; INTRAVENOUS; SUBCUTANEOUS at 23:07

## 2021-08-09 RX ADMIN — Medication 50 MILLIGRAM(S): at 06:07

## 2021-08-09 RX ADMIN — Medication 650 MILLIGRAM(S): at 15:05

## 2021-08-09 RX ADMIN — Medication 650 MILLIGRAM(S): at 23:14

## 2021-08-09 RX ADMIN — Medication 650 MILLIGRAM(S): at 23:44

## 2021-08-09 RX ADMIN — ENOXAPARIN SODIUM 40 MILLIGRAM(S): 100 INJECTION SUBCUTANEOUS at 12:21

## 2021-08-09 RX ADMIN — Medication 1 APPLICATION(S): at 19:48

## 2021-08-09 RX ADMIN — Medication 3 MILLIGRAM(S): at 23:07

## 2021-08-09 RX ADMIN — FEBUXOSTAT 40 MILLIGRAM(S): 40 TABLET ORAL at 14:02

## 2021-08-09 RX ADMIN — Medication 650 MILLIGRAM(S): at 14:05

## 2021-08-09 RX ADMIN — AMIODARONE HYDROCHLORIDE 200 MILLIGRAM(S): 400 TABLET ORAL at 06:08

## 2021-08-09 RX ADMIN — Medication 1 TABLET(S): at 12:21

## 2021-08-09 NOTE — PROGRESS NOTE ADULT - SUBJECTIVE AND OBJECTIVE BOX
DATE OF SERVICE: 08-09-21 @ 15:44    Subjective: Patient seen and examined. No new events except as noted.     SUBJECTIVE/ROS:        MEDICATIONS:  MEDICATIONS  (STANDING):  aMIOdarone    Tablet 200 milliGRAM(s) Oral daily  enoxaparin Injectable 40 milliGRAM(s) SubCutaneous daily  febuxostat 40 milliGRAM(s) Oral daily  multivitamin 1 Tablet(s) Oral daily  sodium chloride 0.9%. 1000 milliLiter(s) (50 mL/Hr) IV Continuous <Continuous>  triamcinolone 0.1% Cream 1 Application(s) Topical two times a day      PHYSICAL EXAM:  T(C): 36.7 (08-09-21 @ 13:54), Max: 36.7 (08-09-21 @ 13:54)  HR: 89 (08-09-21 @ 13:54) (72 - 89)  BP: 112/77 (08-09-21 @ 13:54) (112/77 - 141/62)  RR: 18 (08-09-21 @ 13:54) (18 - 18)  SpO2: 99% (08-09-21 @ 13:54) (99% - 100%)  Wt(kg): --  I&O's Summary          JVP: Normal  Neck: supple  Lung: clear   CV: S1 S2 , Murmur:  Abd: soft  Ext: No edema  neuro: Awake / alert  Psych: flat affect      LABS/DATA:    CARDIAC MARKERS:                                8.9    21.70 )-----------( 381      ( 09 Aug 2021 08:24 )             30.3     08-09    139  |  104  |  31<H>  ----------------------------<  83  4.4   |  23  |  0.70    Ca    10.0      09 Aug 2021 08:24  Phos  2.8     08-09  Mg     2.10     08-09    TPro  4.7<L>  /  Alb  2.6<L>  /  TBili  <0.2  /  DBili  x   /  AST  13  /  ALT  47<H>  /  AlkPhos  125<H>  08-09    proBNP:   Lipid Profile:   HgA1c:   TSH:     TELE:  EKG:

## 2021-08-09 NOTE — PROGRESS NOTE ADULT - SUBJECTIVE AND OBJECTIVE BOX
[facility]  Division of Hospital Medicine  Genie Flores MD  Pager: [phone]      Patient is a 89y old  Female who presents with a chief complaint of SOB (08 Aug 2021 13:30)      SUBJECTIVE / OVERNIGHT EVENTS: Patient examined at bedside. No chest pain, SOB, or trouble breathing. Reports generalized weakness. Discussed plan for trying to get back in [place]. Rash continues to improve.   ADDITIONAL REVIEW OF SYSTEMS:    MEDICATIONS  (STANDING):  aMIOdarone    Tablet 200 milliGRAM(s) Oral daily  enoxaparin Injectable 40 milliGRAM(s) SubCutaneous daily  febuxostat 40 milliGRAM(s) Oral daily  multivitamin 1 Tablet(s) Oral daily  sodium chloride 0.9%. 1000 milliLiter(s) (50 mL/Hr) IV Continuous <Continuous>  triamcinolone 0.1% Cream 1 Application(s) Topical two times a day    MEDICATIONS  (PRN):  acetaminophen   Tablet .. 650 milliGRAM(s) Oral every 6 hours PRN Temp greater or equal to 38.5C (101.3F), Mild Pain (1 - 3)  ALPRAZolam 0.25 milliGRAM(s) Oral two times a day PRN anxiety  aluminum hydroxide/magnesium hydroxide/simethicone Suspension 30 milliLiter(s) Oral every 4 hours PRN Dyspepsia  melatonin 3 milliGRAM(s) Oral at bedtime PRN Insomnia  ondansetron Injectable 6 milliGRAM(s) IV Push every 8 hours PRN Nausea  ondansetron Injectable 4 milliGRAM(s) IV Push every 8 hours PRN Nausea and/or Vomiting      CAPILLARY BLOOD GLUCOSE        I&O's Summary      PHYSICAL EXAM:  Vital Signs Last 24 Hrs  T(C): 36.7 (09 Aug 2021 13:54), Max: 36.7 (09 Aug 2021 13:54)  T(F): 98 (09 Aug 2021 13:54), Max: 98 (09 Aug 2021 13:54)  HR: 89 (09 Aug 2021 13:54) (72 - 89)  BP: 112/77 (09 Aug 2021 13:54) (112/77 - 141/62)  BP(mean): --  RR: 18 (09 Aug 2021 13:54) (18 - 18)  SpO2: 99% (09 Aug 2021 13:54) (99% - 100%)  CONSTITUTIONAL: NAD, well-developed woman on 2L NC   RESPIRATORY: Normal respiratory effort; diminished breath sounds w/ crackles bilaterally, R pleurex   CARDIOVASCULAR: Regular rate and rhythm, normal S1 and S2, no murmur/rub/gallop; No lower extremity edema; Peripheral pulses are 2+ bilaterally  ABDOMEN: Nontender to palpation, normoactive bowel sounds, no rebound/guarding; No hepatosplenomegaly  MUSCULOSKELETAL no clubbing or cyanosis of digits; no joint swelling or tenderness to palpation  PSYCH: Alert and oriented, lethargic but wakes up with verbal stimuli   Skin: Morbilliform rash over back, trunk, thighs, arms - improving      LABS:                        8.9    21.70 )-----------( 381      ( 09 Aug 2021 08:24 )             30.3     08-09    139  |  104  |  31<H>  ----------------------------<  83  4.4   |  23  |  0.70    Ca    10.0      09 Aug 2021 08:24  Phos  2.8     08-09  Mg     2.10     08-09    TPro  4.7<L>  /  Alb  2.6<L>  /  TBili  <0.2  /  DBili  x   /  AST  13  /  ALT  47<H>  /  AlkPhos  125<H>  08-09                RADIOLOGY & ADDITIONAL TESTS:  Results Reviewed:   Imaging Personally Reviewed:  Electrocardiogram Personally Reviewed:    COORDINATION OF CARE:  Care Discussed with Consultants/Other Providers [Y/N]:  Prior or Outpatient Records Reviewed [Y/N]:

## 2021-08-09 NOTE — PROGRESS NOTE ADULT - ASSESSMENT
PAF post op     in sinus  cont amio for short term   DC amio upon discharge   poor candidate for a/c high bleed risk

## 2021-08-09 NOTE — PROGRESS NOTE ADULT - PROBLEM SELECTOR PLAN 4
Likely 2/2 malignancy,   - Cont IVF   - Will monitor Ca while on Prednisone   - Monitor fluid status

## 2021-08-09 NOTE — PROGRESS NOTE ADULT - ASSESSMENT
pending 89 year old woman with a pmh of COPD and HFrEF (EF 26%) that with chronic cough, not improved with steroids nor Abx. Now s/p R robotic VATS/pleural bx with placement of pleurex catheter on 07/15/21 after PET from 07/07/21 reveal b/l upper lobe opacities concerning for malignancy with pleural effusion. Hematology consulted for newly diagnosed DLBCL.    #DLBCL  -S/p Right peritracheal lymph node, level 4 biopsy: Diffuse large B cell lymphoma, non-germinal center B cell like immunophenotype  -Will need pleural fluid cytopath and flow if tapped again  -Peripheral blood flow cytometry: absolute monocytosis with marked leukocytosis and neutrophilia  -Bone marrow biopsy not performed given that it would not  at this time.   - Will need to get LP with IT chemo eventually   - Repeat Echo showing EF of 65%;   -Patient and Family both confirm wanting treatment; however discussed with patient regarding intensity of treatment regimen. Given performance status, and requiring O2; S/P prednison 20mg x 2 days (8/3/2021) with good patient response. Will increase prednisone to 50mg daily x 5 days. Discussion with patient regarding chemotherapy regimen possibilities. Patient wishing treatment however would like to limit hospital visits due to severe neutropenia, fevers ect. Discussed several regimens but asked to defer final regimen decision to her daughter. Discussed at length with daughter Dinorah, chemotherapy regimens, possible AEs, benefits vs risks. Agreed on gentle initiation of Prednisone and cyclophosphamide. Discussed 8/9/2021 with daughter at bedside addition of Rituxan to regimen. Discussed possile AEs, risks and benefits. both patient and daughter in agreement with initiation of Rituxan tomorrow.   - D1  8/3  Prednisone 20mg AM; D2 8/4 Prednisone 20mg AM, 30mg PM  D3-D7 Prednisone 50mg (end on aug 9)  - Cyclophosphamide 200mg x 3 doses D1 8/5/2021. D2 8/6/2021; Can be administered through IV Consent obtained from daughter through phone. Please see chart.   - Will give Rituxan 375mg/m2 tomorrow.   -High risk for TLS; will need to start regimen in stages with q 8 hour TLS lab monitoring (CMP, Phos, Mag, LDH, Uric Acid). Normal until now  -Change allopurinol to febuxostat considering transaminitis three x the upper limit of normal  (resolved) and rash since starting allopurinol.   - Patient is on gentle hydration from primary team and would recommend continuing at minimum 50cc per hour, if patient can tolerate.   - Patient will require chemotherapy regimen (cytoxan, prednisone, rituxan) every 3 weeks. If patient is discharged to rehab, next chemotherapy would be week of august 23rd.       New onset rash on back; transaminitis 3x upper limit of normal (resolved)  -Unclear etiology; significantly improved since yesterday   -Dermatology consulted by Primary team; considering possible antibiotic as culprit;   -Transaminitis and rash resolved;   - continue to monitor.     #Leukocytosis  -Leukocytosis with left shift with many neutrophils seen on the peripheral smear. Improving since on steroids  -Febrile to 103 on admission; no clear source of infection found with work up.   - completed 5 days vanco and Zosyn;   - per pulm/primary team     #Hypercalcemia (resolved)    Jess Nelson MD  PGY 4, Oncology/Hematology fellow  (P) [phone]  After 5pm, please contact on-call team.

## 2021-08-09 NOTE — PROGRESS NOTE ADULT - SUBJECTIVE AND OBJECTIVE BOX
Hematology Oncology Follow-up    INTERVAL HPI/OVERNIGHT EVENTS:  No o/n events, patient resting comfortably. No complaints at this time. Patient specifically denies fever, chills, dizziness, weakness, CP, palpitations, SOB, cough, N/V/D/C, dysuria, changes in bowel movements, LE edema.    Review of Systems:  General: denies fevers/chills, night sweats, malaise, changes in appetite  Head: denies HA  Eyes: denies vision change  ENT: denies oral lesions, rhinorrhea, epistaxys, sore throat, dysphagia  Respiratory: denies cough, shortness of breath, pleurisy  Cardiovascular: denies chest pain, palpitaitons, [place]  Gastrointestinal: denies nausea, vomiting, abdominal pain, constipation, diarrhea, melena, hematochezia  MSK: denies joint pain or muscle pain  Neuro: denies headache, weakness, or parasthesias  Skin: denies rash, petichiae, echymoses  Psych: denies anxiety or sleep disturbances    VITAL SIGNS:  T(F): 98 (08-09-21 @ 13:54)  HR: 89 (08-09-21 @ 13:54)  BP: 112/77 (08-09-21 @ 13:54)  RR: 18 (08-09-21 @ 13:54)  SpO2: 99% (08-09-21 @ 13:54)  Wt(kg): --      PHYSICAL EXAM:    Constitutional: AAOx3, NAD  Eyes: PERRL, EOMI, sclera non-icteric  Neck: supple, no masses, no JVD, no lymphadenopathy  Respiratory: CTA b/l, no wheezing, rhonchi, rales, with normal respiratory effort  Cardiovascular: RRR, normal S1S2, no M/R/G  Gastrointestinal: soft, NTND, no masses palpable, BS normal in all four quadrants, no HSM  Extremities:  no edema  MSK: no obvious abnormalities, normal ROM, no lymphadenopathy  Neurological: Grossly intact  Skin: Normal temperature, no rash, no echymoses, no petichiae  Psych: normal affect    MEDICATIONS  (STANDING):  aMIOdarone    Tablet 200 milliGRAM(s) Oral daily  enoxaparin Injectable 40 milliGRAM(s) SubCutaneous daily  febuxostat 40 milliGRAM(s) Oral daily  multivitamin 1 Tablet(s) Oral daily  sodium chloride 0.9%. 1000 milliLiter(s) (50 mL/Hr) IV Continuous <Continuous>  triamcinolone 0.1% Cream 1 Application(s) Topical two times a day    MEDICATIONS  (PRN):  acetaminophen   Tablet .. 650 milliGRAM(s) Oral every 6 hours PRN Temp greater or equal to 38.5C (101.3F), Mild Pain (1 - 3)  ALPRAZolam 0.25 milliGRAM(s) Oral two times a day PRN anxiety  aluminum hydroxide/magnesium hydroxide/simethicone Suspension 30 milliLiter(s) Oral every 4 hours PRN Dyspepsia  melatonin 3 milliGRAM(s) Oral at bedtime PRN Insomnia  ondansetron Injectable 6 milliGRAM(s) IV Push every 8 hours PRN Nausea  ondansetron Injectable 4 milliGRAM(s) IV Push every 8 hours PRN Nausea and/or Vomiting      No Known Allergies      LABS:                        8.9    21.70 )-----------( 381      ( 09 Aug 2021 08:24 )             30.3     08-09    139  |  104  |  31<H>  ----------------------------<  83  4.4   |  23  |  0.70    Ca    10.0      09 Aug 2021 08:24  Phos  2.8     08-09  Mg     2.10     08-09    TPro  4.7<L>  /  Alb  2.6<L>  /  TBili  <0.2  /  DBili  x   /  AST  13  /  ALT  47<H>  /  AlkPhos  125<H>  08-09     Lactate Dehydrogenase, Serum: 138 U/L (08-09 @ 08:24)                  RADIOLOGY & ADDITIONAL TESTS:  Studies reviewed. Hematology Oncology Follow-up    INTERVAL HPI/OVERNIGHT EVENTS:  No o/n events, patient resting comfortably. Daughter at bedside. Reports feeling much better and having good appetite.         VITAL SIGNS:  T(F): 98 (08-09-21 @ 13:54)  HR: 89 (08-09-21 @ 13:54)  BP: 112/77 (08-09-21 @ 13:54)  RR: 18 (08-09-21 @ 13:54)  SpO2: 99% (08-09-21 @ 13:54)  Wt(kg): --      PHYSICAL EXAM:  Constitutional: AAOx3, NAD  Eyes: PERRL, EOMI, sclera non-icteric  Neck: supple, no masses, no JVD, no lymphadenopathy  Respiratory: CTA b/l, no wheezing, rhonchi, rales, with normal respiratory effort  Cardiovascular: RRR, normal S1S2, no M/R/G  Gastrointestinal: soft, NTND, no masses palpable, BS normal in all four quadrants, no HSM  Extremities:  no edema  MSK: no obvious abnormalities, normal ROM, no lymphadenopathy  Neurological: Grossly intact  Skin: Normal temperature, senile purpura, purpuric rash on back improved from yesterday  Psych: normal affect    MEDICATIONS  (STANDING):  aMIOdarone    Tablet 200 milliGRAM(s) Oral daily  enoxaparin Injectable 40 milliGRAM(s) SubCutaneous daily  febuxostat 40 milliGRAM(s) Oral daily  multivitamin 1 Tablet(s) Oral daily  sodium chloride 0.9%. 1000 milliLiter(s) (50 mL/Hr) IV Continuous <Continuous>  triamcinolone 0.1% Cream 1 Application(s) Topical two times a day    MEDICATIONS  (PRN):  acetaminophen   Tablet .. 650 milliGRAM(s) Oral every 6 hours PRN Temp greater or equal to 38.5C (101.3F), Mild Pain (1 - 3)  ALPRAZolam 0.25 milliGRAM(s) Oral two times a day PRN anxiety  aluminum hydroxide/magnesium hydroxide/simethicone Suspension 30 milliLiter(s) Oral every 4 hours PRN Dyspepsia  melatonin 3 milliGRAM(s) Oral at bedtime PRN Insomnia  ondansetron Injectable 6 milliGRAM(s) IV Push every 8 hours PRN Nausea  ondansetron Injectable 4 milliGRAM(s) IV Push every 8 hours PRN Nausea and/or Vomiting      No Known Allergies      LABS:                        8.9    21.70 )-----------( 381      ( 09 Aug 2021 08:24 )             30.3     08-09    139  |  104  |  31<H>  ----------------------------<  83  4.4   |  23  |  0.70    Ca    10.0      09 Aug 2021 08:24  Phos  2.8     08-09  Mg     2.10     08-09    TPro  4.7<L>  /  Alb  2.6<L>  /  TBili  <0.2  /  DBili  x   /  AST  13  /  ALT  47<H>  /  AlkPhos  125<H>  08-09     Lactate Dehydrogenase, Serum: 138 U/L (08-09 @ 08:24)                  RADIOLOGY & ADDITIONAL TESTS:  Studies reviewed.

## 2021-08-09 NOTE — PROGRESS NOTE ADULT - NSICDXPILOT_GEN_ALL_CORE
[place]

## 2021-08-10 LAB
ALBUMIN SERPL ELPH-MCNC: 2.6 G/DL — LOW (ref 3.3–5)
ALBUMIN SERPL ELPH-MCNC: 2.8 G/DL — LOW (ref 3.3–5)
ALP SERPL-CCNC: 114 U/L — SIGNIFICANT CHANGE UP (ref 40–120)
ALP SERPL-CCNC: 118 U/L — SIGNIFICANT CHANGE UP (ref 40–120)
ALT FLD-CCNC: 35 U/L — HIGH (ref 4–33)
ALT FLD-CCNC: 38 U/L — HIGH (ref 4–33)
ANION GAP SERPL CALC-SCNC: 11 MMOL/L — SIGNIFICANT CHANGE UP (ref 7–14)
ANION GAP SERPL CALC-SCNC: 14 MMOL/L — SIGNIFICANT CHANGE UP (ref 7–14)
AST SERPL-CCNC: 10 U/L — SIGNIFICANT CHANGE UP (ref 4–32)
AST SERPL-CCNC: 15 U/L — SIGNIFICANT CHANGE UP (ref 4–32)
BASOPHILS # BLD AUTO: 0.03 K/UL — SIGNIFICANT CHANGE UP (ref 0–0.2)
BASOPHILS NFR BLD AUTO: 0.1 % — SIGNIFICANT CHANGE UP (ref 0–2)
BILIRUB SERPL-MCNC: <0.2 MG/DL — SIGNIFICANT CHANGE UP (ref 0.2–1.2)
BILIRUB SERPL-MCNC: <0.2 MG/DL — SIGNIFICANT CHANGE UP (ref 0.2–1.2)
BUN SERPL-MCNC: 28 MG/DL — HIGH (ref 7–23)
BUN SERPL-MCNC: 30 MG/DL — HIGH (ref 7–23)
CALCIUM SERPL-MCNC: 9.7 MG/DL — SIGNIFICANT CHANGE UP (ref 8.4–10.5)
CALCIUM SERPL-MCNC: 9.8 MG/DL — SIGNIFICANT CHANGE UP (ref 8.4–10.5)
CHLORIDE SERPL-SCNC: 102 MMOL/L — SIGNIFICANT CHANGE UP (ref 98–107)
CHLORIDE SERPL-SCNC: 103 MMOL/L — SIGNIFICANT CHANGE UP (ref 98–107)
CO2 SERPL-SCNC: 23 MMOL/L — SIGNIFICANT CHANGE UP (ref 22–31)
CO2 SERPL-SCNC: 24 MMOL/L — SIGNIFICANT CHANGE UP (ref 22–31)
CREAT SERPL-MCNC: 0.66 MG/DL — SIGNIFICANT CHANGE UP (ref 0.5–1.3)
CREAT SERPL-MCNC: 0.68 MG/DL — SIGNIFICANT CHANGE UP (ref 0.5–1.3)
EOSINOPHIL # BLD AUTO: 0.16 K/UL — SIGNIFICANT CHANGE UP (ref 0–0.5)
EOSINOPHIL NFR BLD AUTO: 0.8 % — SIGNIFICANT CHANGE UP (ref 0–6)
GLUCOSE SERPL-MCNC: 78 MG/DL — SIGNIFICANT CHANGE UP (ref 70–99)
GLUCOSE SERPL-MCNC: 92 MG/DL — SIGNIFICANT CHANGE UP (ref 70–99)
HCT VFR BLD CALC: 31.1 % — LOW (ref 34.5–45)
HGB BLD-MCNC: 9 G/DL — LOW (ref 11.5–15.5)
IANC: 16.92 K/UL — HIGH (ref 1.5–8.5)
IMM GRANULOCYTES NFR BLD AUTO: 0.8 % — SIGNIFICANT CHANGE UP (ref 0–1.5)
LDH SERPL L TO P-CCNC: 125 U/L — LOW (ref 135–225)
LDH SERPL L TO P-CCNC: 164 U/L — SIGNIFICANT CHANGE UP (ref 135–225)
LYMPHOCYTES # BLD AUTO: 1.91 K/UL — SIGNIFICANT CHANGE UP (ref 1–3.3)
LYMPHOCYTES # BLD AUTO: 9.5 % — LOW (ref 13–44)
MAGNESIUM SERPL-MCNC: 1.7 MG/DL — SIGNIFICANT CHANGE UP (ref 1.6–2.6)
MAGNESIUM SERPL-MCNC: 2 MG/DL — SIGNIFICANT CHANGE UP (ref 1.6–2.6)
MCHC RBC-ENTMCNC: 23.7 PG — LOW (ref 27–34)
MCHC RBC-ENTMCNC: 28.9 GM/DL — LOW (ref 32–36)
MCV RBC AUTO: 81.8 FL — SIGNIFICANT CHANGE UP (ref 80–100)
MONOCYTES # BLD AUTO: 0.87 K/UL — SIGNIFICANT CHANGE UP (ref 0–0.9)
MONOCYTES NFR BLD AUTO: 4.3 % — SIGNIFICANT CHANGE UP (ref 2–14)
NEUTROPHILS # BLD AUTO: 16.92 K/UL — HIGH (ref 1.8–7.4)
NEUTROPHILS NFR BLD AUTO: 84.5 % — HIGH (ref 43–77)
NON-GYNECOLOGICAL CYTOLOGY STUDY: SIGNIFICANT CHANGE UP
NRBC # BLD: 0 /100 WBCS — SIGNIFICANT CHANGE UP
NRBC # FLD: 0 K/UL — SIGNIFICANT CHANGE UP
PHOSPHATE SERPL-MCNC: 3.1 MG/DL — SIGNIFICANT CHANGE UP (ref 2.5–4.5)
PHOSPHATE SERPL-MCNC: 3.2 MG/DL — SIGNIFICANT CHANGE UP (ref 2.5–4.5)
PLATELET # BLD AUTO: 425 K/UL — HIGH (ref 150–400)
POTASSIUM SERPL-MCNC: 3.9 MMOL/L — SIGNIFICANT CHANGE UP (ref 3.5–5.3)
POTASSIUM SERPL-MCNC: 4.2 MMOL/L — SIGNIFICANT CHANGE UP (ref 3.5–5.3)
POTASSIUM SERPL-SCNC: 3.9 MMOL/L — SIGNIFICANT CHANGE UP (ref 3.5–5.3)
POTASSIUM SERPL-SCNC: 4.2 MMOL/L — SIGNIFICANT CHANGE UP (ref 3.5–5.3)
PROT SERPL-MCNC: 4.5 G/DL — LOW (ref 6–8.3)
PROT SERPL-MCNC: 4.8 G/DL — LOW (ref 6–8.3)
RBC # BLD: 3.8 M/UL — SIGNIFICANT CHANGE UP (ref 3.8–5.2)
RBC # FLD: 19 % — HIGH (ref 10.3–14.5)
SARS-COV-2 RNA SPEC QL NAA+PROBE: SIGNIFICANT CHANGE UP
SODIUM SERPL-SCNC: 138 MMOL/L — SIGNIFICANT CHANGE UP (ref 135–145)
SODIUM SERPL-SCNC: 139 MMOL/L — SIGNIFICANT CHANGE UP (ref 135–145)
URATE SERPL-MCNC: 1.5 MG/DL — LOW (ref 2.5–7)
URATE SERPL-MCNC: 1.6 MG/DL — LOW (ref 2.5–7)
WBC # BLD: 20.06 K/UL — HIGH (ref 3.8–10.5)
WBC # FLD AUTO: 20.06 K/UL — HIGH (ref 3.8–10.5)

## 2021-08-10 PROCEDURE — 99233 SBSQ HOSP IP/OBS HIGH 50: CPT

## 2021-08-10 PROCEDURE — 71045 X-RAY EXAM CHEST 1 VIEW: CPT | Mod: 26

## 2021-08-10 PROCEDURE — 99232 SBSQ HOSP IP/OBS MODERATE 35: CPT | Mod: GC

## 2021-08-10 RX ORDER — METOCLOPRAMIDE HCL 10 MG
10 TABLET ORAL EVERY 6 HOURS
Refills: 0 | Status: DISCONTINUED | OUTPATIENT
Start: 2021-08-10 | End: 2021-08-13

## 2021-08-10 RX ORDER — HYDROCORTISONE 20 MG
100 TABLET ORAL ONCE
Refills: 0 | Status: COMPLETED | OUTPATIENT
Start: 2021-08-10 | End: 2021-08-10

## 2021-08-10 RX ORDER — SODIUM CHLORIDE 9 MG/ML
250 INJECTION INTRAMUSCULAR; INTRAVENOUS; SUBCUTANEOUS ONCE
Refills: 0 | Status: COMPLETED | OUTPATIENT
Start: 2021-08-10 | End: 2021-08-10

## 2021-08-10 RX ORDER — MEPERIDINE HYDROCHLORIDE 50 MG/ML
25 INJECTION INTRAMUSCULAR; INTRAVENOUS; SUBCUTANEOUS ONCE
Refills: 0 | Status: DISCONTINUED | OUTPATIENT
Start: 2021-08-10 | End: 2021-08-10

## 2021-08-10 RX ORDER — HYDROCORTISONE 20 MG
100 TABLET ORAL ONCE
Refills: 0 | Status: DISCONTINUED | OUTPATIENT
Start: 2021-08-10 | End: 2021-08-10

## 2021-08-10 RX ORDER — DIPHENHYDRAMINE HCL 50 MG
25 CAPSULE ORAL EVERY 6 HOURS
Refills: 0 | Status: DISCONTINUED | OUTPATIENT
Start: 2021-08-10 | End: 2021-08-13

## 2021-08-10 RX ORDER — LIDOCAINE 4 G/100G
1 CREAM TOPICAL DAILY
Refills: 0 | Status: DISCONTINUED | OUTPATIENT
Start: 2021-08-10 | End: 2021-08-10

## 2021-08-10 RX ORDER — ACETAMINOPHEN 500 MG
650 TABLET ORAL ONCE
Refills: 0 | Status: COMPLETED | OUTPATIENT
Start: 2021-08-10 | End: 2021-08-10

## 2021-08-10 RX ORDER — RITUXIMAB 10 MG/ML
600 INJECTION, SOLUTION INTRAVENOUS ONCE
Refills: 0 | Status: COMPLETED | OUTPATIENT
Start: 2021-08-10 | End: 2021-08-10

## 2021-08-10 RX ORDER — DIPHENHYDRAMINE HCL 50 MG
25 CAPSULE ORAL ONCE
Refills: 0 | Status: COMPLETED | OUTPATIENT
Start: 2021-08-10 | End: 2021-08-10

## 2021-08-10 RX ORDER — DIPHENHYDRAMINE HCL 50 MG
50 CAPSULE ORAL ONCE
Refills: 0 | Status: DISCONTINUED | OUTPATIENT
Start: 2021-08-10 | End: 2021-08-13

## 2021-08-10 RX ORDER — DIPHENHYDRAMINE HYDROCHLORIDE AND LIDOCAINE HYDROCHLORIDE AND ALUMINUM HYDROXIDE AND MAGNESIUM HYDRO
5 KIT EVERY 6 HOURS
Refills: 0 | Status: DISCONTINUED | OUTPATIENT
Start: 2021-08-10 | End: 2021-08-13

## 2021-08-10 RX ORDER — LIDOCAINE 4 G/100G
1 CREAM TOPICAL DAILY
Refills: 0 | Status: DISCONTINUED | OUTPATIENT
Start: 2021-08-10 | End: 2021-08-13

## 2021-08-10 RX ADMIN — Medication 1 TABLET(S): at 12:34

## 2021-08-10 RX ADMIN — MEPERIDINE HYDROCHLORIDE 25 MILLIGRAM(S): 50 INJECTION INTRAMUSCULAR; INTRAVENOUS; SUBCUTANEOUS at 16:21

## 2021-08-10 RX ADMIN — AMIODARONE HYDROCHLORIDE 200 MILLIGRAM(S): 400 TABLET ORAL at 07:03

## 2021-08-10 RX ADMIN — Medication 0.25 MILLIGRAM(S): at 12:41

## 2021-08-10 RX ADMIN — Medication 100 MILLIGRAM(S): at 16:16

## 2021-08-10 RX ADMIN — FEBUXOSTAT 40 MILLIGRAM(S): 40 TABLET ORAL at 12:34

## 2021-08-10 RX ADMIN — Medication 650 MILLIGRAM(S): at 19:21

## 2021-08-10 RX ADMIN — LIDOCAINE 1 PATCH: 4 CREAM TOPICAL at 10:51

## 2021-08-10 RX ADMIN — LIDOCAINE 1 PATCH: 4 CREAM TOPICAL at 18:06

## 2021-08-10 RX ADMIN — ENOXAPARIN SODIUM 40 MILLIGRAM(S): 100 INJECTION SUBCUTANEOUS at 12:34

## 2021-08-10 RX ADMIN — SODIUM CHLORIDE 500 MILLILITER(S): 9 INJECTION INTRAMUSCULAR; INTRAVENOUS; SUBCUTANEOUS at 18:48

## 2021-08-10 RX ADMIN — RITUXIMAB 600 MILLIGRAM(S): 10 INJECTION, SOLUTION INTRAVENOUS at 14:38

## 2021-08-10 RX ADMIN — Medication 1 APPLICATION(S): at 18:32

## 2021-08-10 RX ADMIN — Medication 1 APPLICATION(S): at 07:03

## 2021-08-10 RX ADMIN — Medication 650 MILLIGRAM(S): at 20:11

## 2021-08-10 RX ADMIN — Medication 25 MILLIGRAM(S): at 13:16

## 2021-08-10 RX ADMIN — LIDOCAINE 1 PATCH: 4 CREAM TOPICAL at 22:36

## 2021-08-10 RX ADMIN — Medication 650 MILLIGRAM(S): at 13:16

## 2021-08-10 NOTE — PROGRESS NOTE ADULT - PROBLEM SELECTOR PLAN 1
new diagnosis of DLBCL, confirmed on path   - Prednisone 20mg on 8/3 & 8/4   - Prednisone 50mg x 5 days and Cyclophosphamide initiation 8/5- 8/7 ; rituxan on 8/10   - monitor TLS labs Q8H   - IVF @ 50cc/h   - HIV, hepatitis labs negative, repeat TTE with normal EF   - pleural fluid cytopath sent on 8/9,   - Hematology following

## 2021-08-10 NOTE — CHART NOTE - NSCHARTNOTEFT_GEN_A_CORE
Informed by 8N nurse that patient is having infusion reaction once at 150cc/hour. Reports desaturation to the 80s as well as increase in tremors. Increased Oxygen to 6L. Denies fever or new rash.   Given demerol, hydrocortisone.   Recommend to pause infusion. Once symptoms resolve, recommend restarting at 75cc/hr with close monitoring.     Discussed with Dr Esparza and Onc Pharmacy     Jess Nelson  PGY4 Heme/Onc   [phone]

## 2021-08-10 NOTE — PROVIDER CONTACT NOTE (OTHER) - REASON
Manual BP 86/50
/45
Pt desatted to 82% on 2L NC while rituximab is infusing at 150 mg/hr
/45
pt more lethargic than usual
4

## 2021-08-10 NOTE — PROGRESS NOTE ADULT - ASSESSMENT
89 year old woman with a pmh of COPD and HFrEF (EF 26%) that with chronic cough, not improved with steroids nor Abx. Now s/p R robotic VATS/pleural bx with placement of pleurex catheter on 07/15/21 after PET from 07/07/21 reveal b/l upper lobe opacities concerning for malignancy with pleural effusion. Hematology consulted for newly diagnosed DLBCL.    #DLBCL  -S/p Right peritracheal lymph node, level 4 biopsy: Diffuse large B cell lymphoma, non-germinal center B cell like immunophenotype  -Will need pleural fluid cytopath and flow if tapped again  -Peripheral blood flow cytometry: absolute monocytosis with marked leukocytosis and neutrophilia  -Bone marrow biopsy not performed given that it would not  at this time.   - Will need to get LP with IT chemo eventually   - Repeat Echo showing EF of 65%;   -Patient and Family both confirm wanting treatment; however discussed with patient regarding intensity of treatment regimen. Discussion with patient regarding chemotherapy regimen possibilities. Patient wishing treatment however would like to limit hospital visits due to severe neutropenia, fevers ect. Discussed several regimens but asked to defer final regimen decision to her daughter. Discussed at length with daughter Dinorah, chemotherapy regimens, possible AEs, benefits vs risks. Agreed on gentle initiation of Prednisone and cyclophosphamide. Discussed 8/9/2021 with daughter at bedside addition of Rituxan to regimen. Discussed possile AEs, risks and benefits. both patient and daughter in agreement with initiation of Rituxan.   - D1  8/3  Prednisone 20mg AM; D2 8/4 Prednisone 20mg AM, 30mg PM  D3-D7 Prednisone 50mg (end on aug 9)  - Cyclophosphamide 200mg x 3 doses D18/5/21-8/7/21   - Will give Rituxan 375mg/m2 today  -High risk for TLS; will need to start regimen in stages with q 8 hour TLS lab monitoring (CMP, Phos, Mag, LDH, Uric Acid). Normal until now  -Continue febuxostat; (allopurinol discontinued due to transaminitis 3x above normal and skin rash -  now resolved)   - Patient is on gentle hydration from primary team and would recommend continuing at minimum 50cc per hour, if patient can tolerate.   - Patient will require chemotherapy regimen (cytoxan, prednisone, rituxan) every 3 weeks. If patient is discharged to rehab, next chemotherapy would be week of august 23rd.     New onset rash on back; transaminitis 3x upper limit of normal (resolved)  -Unclear etiology; significantly improved since yesterday   -Dermatology consulted by Primary team; considering possible antibiotic as culprit;   -Transaminitis and rash resolved;   - continue to monitor.     #Leukocytosis  -Leukocytosis with left shift with many neutrophils seen on the peripheral smear. Improving since on steroids  -Febrile to 103 on admission; no clear source of infection found with work up.   - completed 5 days vanco and Zosyn;   - per pulm/primary team     #Hypercalcemia (resolved)    Jess Nelson MD  PGY 4, Oncology/Hematology fellow  (P) [phone]  After 5pm, please contact on-call team.   89 year old woman with a pmh of COPD and HFrEF (EF 26%) that with chronic cough, not improved with steroids nor Abx. Now s/p R robotic VATS/pleural bx with placement of pleurex catheter on 07/15/21 after PET from 07/07/21 reveal b/l upper lobe opacities concerning for malignancy with pleural effusion. Hematology consulted for newly diagnosed DLBCL.    #DLBCL  -S/p Right peritracheal lymph node, level 4 biopsy: Diffuse large B cell lymphoma, non-germinal center B cell like immunophenotype  -Will need pleural fluid cytopath and flow if tapped again  -Peripheral blood flow cytometry: absolute monocytosis with marked leukocytosis and neutrophilia  -Bone marrow biopsy not performed given that it would not  at this time.   - Will need to get LP with IT chemo eventually   - Repeat Echo showing EF of 65%;   -Patient and Family both confirm wanting treatment; however discussed with patient regarding intensity of treatment regimen. Discussion with patient regarding chemotherapy regimen possibilities. Patient wishing treatment however would like to limit hospital visits due to severe neutropenia, fevers ect. Discussed several regimens but asked to defer final regimen decision to her daughter. Discussed at length with daughter Dinorah, chemotherapy regimens, possible AEs, benefits vs risks. Agreed on gentle initiation of Prednisone and cyclophosphamide. Discussed 8/9/2021 with daughter at bedside addition of Rituxan to regimen. Discussed possile AEs, risks and benefits. both patient and daughter in agreement with initiation of Rituxan.   - D1  8/3  Prednisone 20mg AM; D2 8/4 Prednisone 20mg AM, 30mg PM  D3-D7 Prednisone 50mg (end on aug 9)  - Cyclophosphamide 200mg x 3 doses D18/5/21-8/7/21   - Will give Rituxan 375mg/m2 today  - Mucositis developing; will start magic mouth wash  - continue to monitor TLS labs daily   -Continue febuxostat; (allopurinol discontinued due to transaminitis 3x above normal and skin rash -  now resolved)   - Patient will require chemotherapy regimen (cytoxan, prednisone, rituxan) every 3 weeks for 6 cycles. Patient will need labs drawn once a week and will need follow up with Dr Esparza shortly after discharge. If patient is discharged to rehab, next chemotherapy would be week of august 23rd.     New onset rash on back; transaminitis 3x upper limit of normal (resolved)  -Unclear etiology; significantly improved since yesterday   -Dermatology consulted by Primary team; considering possible antibiotic as culprit;   -Transaminitis and rash resolved;   - continue to monitor.     #Leukocytosis  -Leukocytosis with left shift with many neutrophils seen on the peripheral smear. Improving since on steroids  -Febrile to 103 on admission; no clear source of infection found with work up.   - completed 5 days vanco and Zosyn;   - per pulm/primary team     #Hypercalcemia (resolved)    Jess Nelson MD  PGY 4, Oncology/Hematology fellow  (P) [phone]  After 5pm, please contact on-call team.

## 2021-08-10 NOTE — PROGRESS NOTE ADULT - SUBJECTIVE AND OBJECTIVE BOX
[facility]  Division of Hospital Medicine  Genie Flores MD  Pager: [phone]      Patient is a 89y old  Female who presents with a chief complaint of SOB (10 Aug 2021 09:47)      SUBJECTIVE / OVERNIGHT EVENTS: Patient is sitting in chair during examination. No medical complaints. No fever, chills or pain. Plan for rituxan.   ADDITIONAL REVIEW OF SYSTEMS:    MEDICATIONS  (STANDING):  aMIOdarone    Tablet 200 milliGRAM(s) Oral daily  enoxaparin Injectable 40 milliGRAM(s) SubCutaneous daily  febuxostat 40 milliGRAM(s) Oral daily  FIRST- Mouthwash  BLM 5 milliLiter(s) Swish and Swallow every 6 hours  lidocaine   5% Patch 1 Patch Transdermal daily  multivitamin 1 Tablet(s) Oral daily  riTUXimab-pvvr (RUXIENCE) IVPB (eMAR) 600 milliGRAM(s) IV Intermittent once  sodium chloride 0.9%. 1000 milliLiter(s) (50 mL/Hr) IV Continuous <Continuous>  triamcinolone 0.1% Cream 1 Application(s) Topical two times a day    MEDICATIONS  (PRN):  acetaminophen   Tablet .. 650 milliGRAM(s) Oral every 6 hours PRN Temp greater or equal to 38.5C (101.3F), Mild Pain (1 - 3)  ALPRAZolam 0.25 milliGRAM(s) Oral two times a day PRN anxiety  aluminum hydroxide/magnesium hydroxide/simethicone Suspension 30 milliLiter(s) Oral every 4 hours PRN Dyspepsia  diphenhydrAMINE   Injectable 25 milliGRAM(s) IV Push every 6 hours PRN Allergy symptoms  hydrocortisone sodium succinate Injectable 100 milliGRAM(s) IV Push once PRN PRN for Chemotherapy Reaction  melatonin 3 milliGRAM(s) Oral at bedtime PRN Insomnia  meperidine     Injectable 25 milliGRAM(s) IV Push once PRN PRN Chemotherapy Reaction (R[place]ors)  metoclopramide Injectable 10 milliGRAM(s) IV Push every 6 hours PRN Nausea  ondansetron Injectable 6 milliGRAM(s) IV Push every 8 hours PRN Nausea  ondansetron Injectable 4 milliGRAM(s) IV Push every 8 hours PRN Nausea and/or Vomiting      CAPILLARY BLOOD GLUCOSE        I&O's Summary    09 Aug 2021 07:01  -  10 Aug 2021 07:00  --------------------------------------------------------  IN: 920 mL / OUT: 2500 mL / NET: -1580 mL        PHYSICAL EXAM:  Vital Signs Last 24 Hrs  T(C): 36.2 (10 Aug 2021 13:32), Max: 36.7 (09 Aug 2021 13:54)  T(F): 97.2 (10 Aug 2021 13:32), Max: 98 (09 Aug 2021 13:54)  HR: 83 (10 Aug 2021 13:32) (68 - 89)  BP: 127/72 (10 Aug 2021 13:32) (112/77 - 131/64)  BP(mean): --  RR: 18 (10 Aug 2021 13:32) (17 - 18)  SpO2: 100% (10 Aug 2021 13:32) (99% - 100%)  CONSTITUTIONAL: NAD, well-developed woman on 2L NC   RESPIRATORY: Normal respiratory effort; diminished breath sounds w/ crackles bilaterally, R pleurex   CARDIOVASCULAR: Regular rate and rhythm, normal S1 and S2, no murmur/rub/gallop; No lower extremity edema; Peripheral pulses are 2+ bilaterally  ABDOMEN: Nontender to palpation, normoactive bowel sounds, no rebound/guarding; No hepatosplenomegaly  MUSCULOSKELETAL no clubbing or cyanosis of digits; no joint swelling or tenderness to palpation  PSYCH: Alert and oriented, lethargic but wakes up with verbal stimuli   Skin: Morbilliform rash over back, trunk, thighs, arms - improving  LABS:                        9.0    20.06 )-----------( 425      ( 10 Aug 2021 07:34 )             31.1     08-10    138  |  103  |  30<H>  ----------------------------<  78  4.2   |  24  |  0.68    Ca    9.8      10 Aug 2021 07:34  Phos  3.1     08-10  Mg     2.00     08-10    TPro  4.5<L>  /  Alb  2.6<L>  /  TBili  <0.2  /  DBili  x   /  AST  10  /  ALT  38<H>  /  AlkPhos  114  08-10                RADIOLOGY & ADDITIONAL TESTS:  Results Reviewed:   Imaging Personally Reviewed:  Electrocardiogram Personally Reviewed:    COORDINATION OF CARE:  Care Discussed with Consultants/Other Providers [Y/N]:  Prior or Outpatient Records Reviewed [Y/N]:

## 2021-08-10 NOTE — PHARMACOTHERAPY INTERVENTION NOTE - COMMENTS
Recommended to dose cyclophosphamide at 150mg x 3 doses due to AST/ALT elevations. Dosing was decided on 200mg x 3 doses, as it may be the lymphoma causing the AST/ALT elevations.
Recommended team to order G6PD testing.
Medication history is incomplete. Medication list verified with [facility] and patient's daughter. Patient's daughter confirmed patient's active prescription medications, however was unable to verify vitamins and supplements included in patient's medication list. Please call spectra [phone] if you have any questions.
Recommended to order diphenhydramine and meperidine as reaction medications for rituximab.

## 2021-08-10 NOTE — PROVIDER CONTACT NOTE (OTHER) - SITUATION
Pt desatted to 82% on 2L NC while rituximab is infusing at 150 mg/hr
Manual BP 86/50
pt lethargic, arouseable. received xanax at 0830
Patients /45
Patients /45

## 2021-08-10 NOTE — PROVIDER CONTACT NOTE (OTHER) - RECOMMENDATIONS
Merlin Mathew MD assessed patient in AM, instructed to be called if patient continues to be lethargic in afternoon.
Solu-cortef given

## 2021-08-10 NOTE — PROGRESS NOTE ADULT - SUBJECTIVE AND OBJECTIVE BOX
DATE OF SERVICE: 08-10-21 @ 09:48    Subjective: Patient seen and examined. No new events except as noted.     SUBJECTIVE/ROS:        MEDICATIONS:  MEDICATIONS  (STANDING):  aMIOdarone    Tablet 200 milliGRAM(s) Oral daily  enoxaparin Injectable 40 milliGRAM(s) SubCutaneous daily  febuxostat 40 milliGRAM(s) Oral daily  multivitamin 1 Tablet(s) Oral daily  sodium chloride 0.9%. 1000 milliLiter(s) (50 mL/Hr) IV Continuous <Continuous>  triamcinolone 0.1% Cream 1 Application(s) Topical two times a day      PHYSICAL EXAM:  T(C): 36.3 (08-10-21 @ 07:20), Max: 36.7 (08-09-21 @ 13:54)  HR: 68 (08-10-21 @ 07:20) (68 - 89)  BP: 131/64 (08-10-21 @ 07:20) (112/77 - 131/64)  RR: 17 (08-10-21 @ 07:20) (17 - 18)  SpO2: 100% (08-10-21 @ 07:20) (99% - 100%)  Wt(kg): --  I&O's Summary    09 Aug 2021 07:01  -  10 Aug 2021 07:00  --------------------------------------------------------  IN: 920 mL / OUT: 2500 mL / NET: -1580 mL            JVP: Normal  Neck: supple  Lung: clear   CV: S1 S2 , Murmur:  Abd: soft  Ext: No edema  neuro: Awake / alert  Psych: flat affect  Skin: normal``    LABS/DATA:    CARDIAC MARKERS:                                9.0    20.06 )-----------( 425      ( 10 Aug 2021 07:34 )             31.1     08-10    138  |  103  |  30<H>  ----------------------------<  78  4.2   |  24  |  0.68    Ca    9.8      10 Aug 2021 07:34  Phos  3.1     08-10  Mg     2.00     08-10    TPro  4.5<L>  /  Alb  2.6<L>  /  TBili  <0.2  /  DBili  x   /  AST  10  /  ALT  38<H>  /  AlkPhos  114  08-10    proBNP:   Lipid Profile:   HgA1c:   TSH:     TELE:  EKG:

## 2021-08-10 NOTE — PROGRESS NOTE ADULT - PROBLEM SELECTOR PLAN 3
CC:  Chiara Strong is here today for Left eye swelling.    Medications: medications verified, no change  Patient would like communication of their results via:      Cell Phone:   Telephone Information:   Mobile [phone]     Okay to leave a message containing results? Yes      Requiring 2L NC, s/p VATS with pleurex placement, possible PNA s/p therapy   - Drain Pleurex 2x/week (Wednesday & Sunday)   - Mucinex, chest PT, IS, OOB into chair as possible, strict I/O's

## 2021-08-10 NOTE — PROVIDER CONTACT NOTE (OTHER) - ACTION/TREATMENT ORDERED:
250 cc NS bolus  Recheck BP after bolus
Continue to monitor.
Continue to monitor, will look into ordering possible fluids
Administer demerol IVP
Merlin Mathew MD page about patients status. MD to assess patient at bedside. will continue to monitor

## 2021-08-10 NOTE — CHART NOTE - NSCHARTNOTEFT_GEN_A_CORE
Patient s/p Rituxan infusion reaction once at 150cc/hr, patient desatted to 80s with increase in o2 demand and tremors. S/p Demerol and hydrocortisone. Infusion paused as per heme/onc. Once symptoms resolved patient re-evaluated hypotensive to SBP 86 s/p 250 cc bolus improved to 92 however, much lower than her baseline 120s-130s. Patient seen and examined at bedside, on examination patient with course breath sounds R>L O2 weaned to 2L NC. Discussed with heme/onc fellow overnight, will send infectious w/u including CXR - unsure if acute hypotension is due to Rituxan vs. infectious process.  Recommended to hold off further infusion for tonight, discussed with patient who understands.

## 2021-08-10 NOTE — PROGRESS NOTE ADULT - SUBJECTIVE AND OBJECTIVE BOX
Hematology Oncology Follow-up    INTERVAL HPI/OVERNIGHT EVENTS:  No o/n events, patient resting comfortably. No complaints at this time. Patient specifically denies fever, chills, dizziness, weakness, CP, palpitations, SOB, cough, N/V/D/C, dysuria, changes in bowel movements, LE edema.    Review of Systems:  General: denies fevers/chills, night sweats, malaise, changes in appetite  Head: denies HA  Eyes: denies vision change  ENT: denies oral lesions, rhinorrhea, epistaxys, sore throat, dysphagia  Respiratory: denies cough, shortness of breath, pleurisy  Cardiovascular: denies chest pain, palpitaitons, [place]ANDE  Gastrointestinal: denies nausea, vomiting, abdominal pain, constipation, diarrhea, melena, hematochezia  MSK: denies joint pain or muscle pain  Neuro: denies headache, weakness, or parasthesias  Skin: denies rash, petichiae, echymoses  Psych: denies anxiety or sleep disturbances    VITAL SIGNS:  T(F): 97.4 (08-10-21 @ 07:20)  HR: 68 (08-10-21 @ 07:20)  BP: 131/64 (08-10-21 @ 07:20)  RR: 17 (08-10-21 @ 07:20)  SpO2: 100% (08-10-21 @ 07:20)  Wt(kg): --    08-09-21 @ 07:01  -  08-10-21 @ 07:00  --------------------------------------------------------  IN: 920 mL / OUT: 2500 mL / NET: -1580 mL        PHYSICAL EXAM:    Constitutional: AAOx3, NAD  Eyes: PERRL, EOMI, sclera non-icteric  Neck: supple, no masses, no JVD, no lymphadenopathy  Respiratory: CTA b/l, no wheezing, rhonchi, rales, with normal respiratory effort  Cardiovascular: RRR, normal S1S2, no M/R/G  Gastrointestinal: soft, NTND, no masses palpable, BS normal in all four quadrants, no HSM  Extremities:  no edema  MSK: no obvious abnormalities, normal ROM, no lymphadenopathy  Neurological: Grossly intact  Skin: Normal temperature, no rash, no echymoses, no petichiae  Psych: normal affect    MEDICATIONS  (STANDING):  aMIOdarone    Tablet 200 milliGRAM(s) Oral daily  enoxaparin Injectable 40 milliGRAM(s) SubCutaneous daily  febuxostat 40 milliGRAM(s) Oral daily  multivitamin 1 Tablet(s) Oral daily  sodium chloride 0.9%. 1000 milliLiter(s) (50 mL/Hr) IV Continuous <Continuous>  triamcinolone 0.1% Cream 1 Application(s) Topical two times a day    MEDICATIONS  (PRN):  acetaminophen   Tablet .. 650 milliGRAM(s) Oral every 6 hours PRN Temp greater or equal to 38.5C (101.3F), Mild Pain (1 - 3)  ALPRAZolam 0.25 milliGRAM(s) Oral two times a day PRN anxiety  aluminum hydroxide/magnesium hydroxide/simethicone Suspension 30 milliLiter(s) Oral every 4 hours PRN Dyspepsia  melatonin 3 milliGRAM(s) Oral at bedtime PRN Insomnia  ondansetron Injectable 6 milliGRAM(s) IV Push every 8 hours PRN Nausea  ondansetron Injectable 4 milliGRAM(s) IV Push every 8 hours PRN Nausea and/or Vomiting      No Known Allergies      LABS:                        9.0    20.06 )-----------( 425      ( 10 Aug 2021 07:34 )             31.1     08-10    138  |  103  |  30<H>  ----------------------------<  78  4.2   |  24  |  0.68    Ca    9.8      10 Aug 2021 07:34  Phos  3.1     08-10  Mg     2.00     08-10    TPro  4.5<L>  /  Alb  2.6<L>  /  TBili  <0.2  /  DBili  x   /  AST  10  /  ALT  38<H>  /  AlkPhos  114  08-10     Lactate Dehydrogenase, Serum: 125 U/L (08-10 @ 07:34)                  RADIOLOGY & ADDITIONAL TESTS:  Studies reviewed. Hematology Oncology Follow-up    INTERVAL HPI/OVERNIGHT EVENTS:  No o/n events, patient sitting in chair. She reports to be doing well but does have a feeling of something stuck in her through. She reports no cough or worsening SOB. She states that she feels nervous removing the supplemental O2. She reports new tremor         VITAL SIGNS:  T(F): 97.4 (08-10-21 @ 07:20)  HR: 68 (08-10-21 @ 07:20)  BP: 131/64 (08-10-21 @ 07:20)  RR: 17 (08-10-21 @ 07:20)  SpO2: 100% (08-10-21 @ 07:20)  Wt(kg): --    08-09-21 @ 07:01  -  08-10-21 @ 07:00  --------------------------------------------------------  IN: 920 mL / OUT: 2500 mL / NET: -1580 mL        PHYSICAL EXAM:  Constitutional: AAOx3, NAD  Eyes: PERRL, EOMI, sclera non-icteric  Neck: supple, no masses, no JVD, no lymphadenopathy  Respiratory: CTA b/l, no wheezing, rhonchi, rales, with normal respiratory effort  Cardiovascular: RRR, normal S1S2, Bilateral pleurex with dressing on  Gastrointestinal: soft, NTND, no masses palpable, BS normal in all four quadrants, no HSM  MSK: no obvious abnormalities, normal ROM, no lymphadenopathy  Neurological: Grossly intact; INTENTIONAL TREMORS  Skin: Normal temperature, senile purpura; rash no longer seen.   Psych: normal affect      MEDICATIONS  (STANDING):  aMIOdarone    Tablet 200 milliGRAM(s) Oral daily  enoxaparin Injectable 40 milliGRAM(s) SubCutaneous daily  febuxostat 40 milliGRAM(s) Oral daily  multivitamin 1 Tablet(s) Oral daily  sodium chloride 0.9%. 1000 milliLiter(s) (50 mL/Hr) IV Continuous <Continuous>  triamcinolone 0.1% Cream 1 Application(s) Topical two times a day    MEDICATIONS  (PRN):  acetaminophen   Tablet .. 650 milliGRAM(s) Oral every 6 hours PRN Temp greater or equal to 38.5C (101.3F), Mild Pain (1 - 3)  ALPRAZolam 0.25 milliGRAM(s) Oral two times a day PRN anxiety  aluminum hydroxide/magnesium hydroxide/simethicone Suspension 30 milliLiter(s) Oral every 4 hours PRN Dyspepsia  melatonin 3 milliGRAM(s) Oral at bedtime PRN Insomnia  ondansetron Injectable 6 milliGRAM(s) IV Push every 8 hours PRN Nausea  ondansetron Injectable 4 milliGRAM(s) IV Push every 8 hours PRN Nausea and/or Vomiting      No Known Allergies      LABS:                        9.0    20.06 )-----------( 425      ( 10 Aug 2021 07:34 )             31.1     08-10    138  |  103  |  30<H>  ----------------------------<  78  4.2   |  24  |  0.68    Ca    9.8      10 Aug 2021 07:34  Phos  3.1     08-10  Mg     2.00     08-10    TPro  4.5<L>  /  Alb  2.6<L>  /  TBili  <0.2  /  DBili  x   /  AST  10  /  ALT  38<H>  /  AlkPhos  114  08-10     Lactate Dehydrogenase, Serum: 125 U/L (08-10 @ 07:34)                  RADIOLOGY & ADDITIONAL TESTS:  Studies reviewed.

## 2021-08-10 NOTE — PROVIDER CONTACT NOTE (OTHER) - ASSESSMENT
Patient A&Ox4 with periods of confusion, no s/s of hypotension noted or voiced @ this time
Patient A&Ox4 with periods of confusion, no s/s of hypotension noted or voiced @ this time. IV fluids continued
 /71 SpO2 82% on 2L NC  Pt is shaking and complaining of chills
Pt is hypotensive   Pt is about to be re-challenged with rituximab
pt arousable, more lethargic than usual.

## 2021-08-11 LAB
ALBUMIN SERPL ELPH-MCNC: 2.2 G/DL — LOW (ref 3.3–5)
ALBUMIN SERPL ELPH-MCNC: 2.5 G/DL — LOW (ref 3.3–5)
ALP SERPL-CCNC: 96 U/L — SIGNIFICANT CHANGE UP (ref 40–120)
ALP SERPL-CCNC: 99 U/L — SIGNIFICANT CHANGE UP (ref 40–120)
ALT FLD-CCNC: 29 U/L — SIGNIFICANT CHANGE UP (ref 4–33)
ALT FLD-CCNC: 30 U/L — SIGNIFICANT CHANGE UP (ref 4–33)
ANION GAP SERPL CALC-SCNC: 11 MMOL/L — SIGNIFICANT CHANGE UP (ref 7–14)
ANION GAP SERPL CALC-SCNC: 14 MMOL/L — SIGNIFICANT CHANGE UP (ref 7–14)
ANISOCYTOSIS BLD QL: SLIGHT — SIGNIFICANT CHANGE UP
APPEARANCE UR: CLEAR — SIGNIFICANT CHANGE UP
AST SERPL-CCNC: 13 U/L — SIGNIFICANT CHANGE UP (ref 4–32)
AST SERPL-CCNC: 18 U/L — SIGNIFICANT CHANGE UP (ref 4–32)
BASOPHILS # BLD AUTO: 0 K/UL — SIGNIFICANT CHANGE UP (ref 0–0.2)
BASOPHILS NFR BLD AUTO: 0 % — SIGNIFICANT CHANGE UP (ref 0–2)
BILIRUB SERPL-MCNC: 0.2 MG/DL — SIGNIFICANT CHANGE UP (ref 0.2–1.2)
BILIRUB SERPL-MCNC: 0.2 MG/DL — SIGNIFICANT CHANGE UP (ref 0.2–1.2)
BILIRUB UR-MCNC: NEGATIVE — SIGNIFICANT CHANGE UP
BUN SERPL-MCNC: 24 MG/DL — HIGH (ref 7–23)
BUN SERPL-MCNC: 28 MG/DL — HIGH (ref 7–23)
CALCIUM SERPL-MCNC: 9.3 MG/DL — SIGNIFICANT CHANGE UP (ref 8.4–10.5)
CALCIUM SERPL-MCNC: 9.3 MG/DL — SIGNIFICANT CHANGE UP (ref 8.4–10.5)
CHLORIDE SERPL-SCNC: 103 MMOL/L — SIGNIFICANT CHANGE UP (ref 98–107)
CHLORIDE SERPL-SCNC: 103 MMOL/L — SIGNIFICANT CHANGE UP (ref 98–107)
CO2 SERPL-SCNC: 18 MMOL/L — LOW (ref 22–31)
CO2 SERPL-SCNC: 25 MMOL/L — SIGNIFICANT CHANGE UP (ref 22–31)
COLOR SPEC: SIGNIFICANT CHANGE UP
CREAT SERPL-MCNC: 0.61 MG/DL — SIGNIFICANT CHANGE UP (ref 0.5–1.3)
CREAT SERPL-MCNC: 0.7 MG/DL — SIGNIFICANT CHANGE UP (ref 0.5–1.3)
DIFF PNL FLD: NEGATIVE — SIGNIFICANT CHANGE UP
EOSINOPHIL # BLD AUTO: 0 K/UL — SIGNIFICANT CHANGE UP (ref 0–0.5)
EOSINOPHIL NFR BLD AUTO: 0 % — SIGNIFICANT CHANGE UP (ref 0–6)
G6PD RBC-CCNC: 27.4 U/G HGB — HIGH (ref 7–20.5)
GLUCOSE SERPL-MCNC: 102 MG/DL — HIGH (ref 70–99)
GLUCOSE SERPL-MCNC: 98 MG/DL — SIGNIFICANT CHANGE UP (ref 70–99)
GLUCOSE UR QL: NEGATIVE — SIGNIFICANT CHANGE UP
HCT VFR BLD CALC: 25.3 % — LOW (ref 34.5–45)
HGB BLD-MCNC: 7.7 G/DL — LOW (ref 11.5–15.5)
HYPOCHROMIA BLD QL: SLIGHT — SIGNIFICANT CHANGE UP
IANC: 24.6 K/UL — HIGH (ref 1.5–8.5)
KETONES UR-MCNC: NEGATIVE — SIGNIFICANT CHANGE UP
LDH SERPL L TO P-CCNC: 154 U/L — SIGNIFICANT CHANGE UP (ref 135–225)
LDH SERPL L TO P-CCNC: SIGNIFICANT CHANGE UP U/L (ref 135–225)
LEUKOCYTE ESTERASE UR-ACNC: NEGATIVE — SIGNIFICANT CHANGE UP
LYMPHOCYTES # BLD AUTO: 0.93 K/UL — LOW (ref 1–3.3)
LYMPHOCYTES # BLD AUTO: 3.5 % — LOW (ref 13–44)
MAGNESIUM SERPL-MCNC: 1.9 MG/DL — SIGNIFICANT CHANGE UP (ref 1.6–2.6)
MAGNESIUM SERPL-MCNC: 1.9 MG/DL — SIGNIFICANT CHANGE UP (ref 1.6–2.6)
MCHC RBC-ENTMCNC: 24.7 PG — LOW (ref 27–34)
MCHC RBC-ENTMCNC: 30.4 GM/DL — LOW (ref 32–36)
MCV RBC AUTO: 81.1 FL — SIGNIFICANT CHANGE UP (ref 80–100)
MICROCYTES BLD QL: SLIGHT — SIGNIFICANT CHANGE UP
MONOCYTES # BLD AUTO: 0.24 K/UL — SIGNIFICANT CHANGE UP (ref 0–0.9)
MONOCYTES NFR BLD AUTO: 0.9 % — LOW (ref 2–14)
NEUTROPHILS # BLD AUTO: 25.35 K/UL — HIGH (ref 1.8–7.4)
NEUTROPHILS NFR BLD AUTO: 95.6 % — HIGH (ref 43–77)
NITRITE UR-MCNC: NEGATIVE — SIGNIFICANT CHANGE UP
PH UR: 6 — SIGNIFICANT CHANGE UP (ref 5–8)
PHOSPHATE SERPL-MCNC: 3.3 MG/DL — SIGNIFICANT CHANGE UP (ref 2.5–4.5)
PHOSPHATE SERPL-MCNC: 3.5 MG/DL — SIGNIFICANT CHANGE UP (ref 2.5–4.5)
PLAT MORPH BLD: ABNORMAL
PLATELET # BLD AUTO: 360 K/UL — SIGNIFICANT CHANGE UP (ref 150–400)
PLATELET COUNT - ESTIMATE: NORMAL — SIGNIFICANT CHANGE UP
POTASSIUM SERPL-MCNC: 4 MMOL/L — SIGNIFICANT CHANGE UP (ref 3.5–5.3)
POTASSIUM SERPL-MCNC: 4.2 MMOL/L — SIGNIFICANT CHANGE UP (ref 3.5–5.3)
POTASSIUM SERPL-SCNC: 4 MMOL/L — SIGNIFICANT CHANGE UP (ref 3.5–5.3)
POTASSIUM SERPL-SCNC: 4.2 MMOL/L — SIGNIFICANT CHANGE UP (ref 3.5–5.3)
PROT SERPL-MCNC: 4.4 G/DL — LOW (ref 6–8.3)
PROT SERPL-MCNC: 4.5 G/DL — LOW (ref 6–8.3)
PROT UR-MCNC: NEGATIVE — SIGNIFICANT CHANGE UP
RBC # BLD: 3.12 M/UL — LOW (ref 3.8–5.2)
RBC # FLD: 19.5 % — HIGH (ref 10.3–14.5)
RBC BLD AUTO: ABNORMAL
SODIUM SERPL-SCNC: 135 MMOL/L — SIGNIFICANT CHANGE UP (ref 135–145)
SODIUM SERPL-SCNC: 139 MMOL/L — SIGNIFICANT CHANGE UP (ref 135–145)
SP GR SPEC: 1.01 — SIGNIFICANT CHANGE UP (ref 1.01–1.02)
URATE SERPL-MCNC: 1.6 MG/DL — LOW (ref 2.5–7)
URATE SERPL-MCNC: SIGNIFICANT CHANGE UP MG/DL (ref 2.5–7)
UROBILINOGEN FLD QL: SIGNIFICANT CHANGE UP
WBC # BLD: 26.52 K/UL — HIGH (ref 3.8–10.5)
WBC # FLD AUTO: 26.52 K/UL — HIGH (ref 3.8–10.5)

## 2021-08-11 PROCEDURE — 99232 SBSQ HOSP IP/OBS MODERATE 35: CPT | Mod: GC

## 2021-08-11 PROCEDURE — 99233 SBSQ HOSP IP/OBS HIGH 50: CPT

## 2021-08-11 RX ORDER — IBUPROFEN 200 MG
400 TABLET ORAL ONCE
Refills: 0 | Status: COMPLETED | OUTPATIENT
Start: 2021-08-11 | End: 2021-08-12

## 2021-08-11 RX ORDER — ACETAMINOPHEN 500 MG
650 TABLET ORAL EVERY 6 HOURS
Refills: 0 | Status: DISCONTINUED | OUTPATIENT
Start: 2021-08-11 | End: 2021-08-13

## 2021-08-11 RX ADMIN — ENOXAPARIN SODIUM 40 MILLIGRAM(S): 100 INJECTION SUBCUTANEOUS at 13:21

## 2021-08-11 RX ADMIN — Medication 1 APPLICATION(S): at 17:36

## 2021-08-11 RX ADMIN — Medication 650 MILLIGRAM(S): at 13:41

## 2021-08-11 RX ADMIN — SODIUM CHLORIDE 50 MILLILITER(S): 9 INJECTION INTRAMUSCULAR; INTRAVENOUS; SUBCUTANEOUS at 05:49

## 2021-08-11 RX ADMIN — FEBUXOSTAT 40 MILLIGRAM(S): 40 TABLET ORAL at 13:21

## 2021-08-11 RX ADMIN — LIDOCAINE 1 PATCH: 4 CREAM TOPICAL at 17:40

## 2021-08-11 RX ADMIN — Medication 1 APPLICATION(S): at 05:49

## 2021-08-11 RX ADMIN — Medication 650 MILLIGRAM(S): at 14:41

## 2021-08-11 RX ADMIN — DIPHENHYDRAMINE HYDROCHLORIDE AND LIDOCAINE HYDROCHLORIDE AND ALUMINUM HYDROXIDE AND MAGNESIUM HYDRO 5 MILLILITER(S): KIT at 13:22

## 2021-08-11 RX ADMIN — Medication 1 TABLET(S): at 13:21

## 2021-08-11 RX ADMIN — LIDOCAINE 1 PATCH: 4 CREAM TOPICAL at 13:20

## 2021-08-11 RX ADMIN — AMIODARONE HYDROCHLORIDE 200 MILLIGRAM(S): 400 TABLET ORAL at 05:49

## 2021-08-11 NOTE — PROGRESS NOTE ADULT - SUBJECTIVE AND OBJECTIVE BOX
DATE OF SERVICE: 08-11-21 @ 09:06    Subjective: Patient seen and examined. No new events except as noted.     SUBJECTIVE/ROS:        MEDICATIONS:  MEDICATIONS  (STANDING):  aMIOdarone    Tablet 200 milliGRAM(s) Oral daily  diphenhydrAMINE   Injectable 50 milliGRAM(s) IV Push once  enoxaparin Injectable 40 milliGRAM(s) SubCutaneous daily  febuxostat 40 milliGRAM(s) Oral daily  FIRST- Mouthwash  BLM 5 milliLiter(s) Swish and Swallow every 6 hours  lidocaine   5% Patch 1 Patch Transdermal daily  multivitamin 1 Tablet(s) Oral daily  sodium chloride 0.9%. 1000 milliLiter(s) (50 mL/Hr) IV Continuous <Continuous>  triamcinolone 0.1% Cream 1 Application(s) Topical two times a day      PHYSICAL EXAM:  T(C): 36.8 (08-11-21 @ 05:47), Max: 36.8 (08-10-21 @ 20:38)  HR: 75 (08-11-21 @ 05:47) (75 - 130)  BP: 107/55 (08-11-21 @ 05:47) (86/50 - 147/71)  RR: 18 (08-11-21 @ 05:47) (18 - 20)  SpO2: 98% (08-11-21 @ 05:47) (82% - 100%)  Wt(kg): --  I&O's Summary      Weight (kg): 60.6 (08-10 @ 10:03)      JVP: Normal  Neck: supple  Lung: clear   CV: S1 S2 , Murmur:  Abd: soft  Ext: No edema  neuro: Awake / alert  Psych: flat affect  Skin: normal``    LABS/DATA:    CARDIAC MARKERS:                                7.7    26.52 )-----------( 360      ( 11 Aug 2021 02:52 )             25.3     08-11    139  |  103  |  28<H>  ----------------------------<  102<H>  4.2   |  25  |  0.70    Ca    9.3      11 Aug 2021 02:52  Phos  3.5     08-11  Mg     1.90     08-11    TPro  4.5<L>  /  Alb  2.5<L>  /  TBili  0.2  /  DBili  x   /  AST  13  /  ALT  30  /  AlkPhos  99  08-11    proBNP:   Lipid Profile:   HgA1c:   TSH:     TELE:  EKG:

## 2021-08-11 NOTE — PROGRESS NOTE ADULT - SUBJECTIVE AND OBJECTIVE BOX
[facility]  Division of Hospital Medicine  Genie Flores MD  Pager: [phone]      Patient is a 89y old  Female who presents with a chief complaint of SOB (11 Aug 2021 13:48)      SUBJECTIVE / OVERNIGHT EVENTS: s/p Rituxan infusion reaction once at 150cc/hr, patient desatted to 80s with increase in o2 demand and tremors. S/p Demerol and hydrocortisone. Infusion paused as per heme/onc. Infectious work up so far unremarkable.   ADDITIONAL REVIEW OF SYSTEMS:    MEDICATIONS  (STANDING):  aMIOdarone    Tablet 200 milliGRAM(s) Oral daily  diphenhydrAMINE   Injectable 50 milliGRAM(s) IV Push once  enoxaparin Injectable 40 milliGRAM(s) SubCutaneous daily  febuxostat 40 milliGRAM(s) Oral daily  FIRST- Mouthwash  BLM 5 milliLiter(s) Swish and Swallow every 6 hours  lidocaine   5% Patch 1 Patch Transdermal daily  multivitamin 1 Tablet(s) Oral daily  sodium chloride 0.9%. 1000 milliLiter(s) (50 mL/Hr) IV Continuous <Continuous>  triamcinolone 0.1% Cream 1 Application(s) Topical two times a day    MEDICATIONS  (PRN):  acetaminophen   Tablet .. 650 milliGRAM(s) Oral every 6 hours PRN Temp greater or equal to 38.5C (101.3F), Mild Pain (1 - 3), Moderate Pain (4 - 6), Severe Pain (7 - 10)  ALPRAZolam 0.25 milliGRAM(s) Oral two times a day PRN anxiety  aluminum hydroxide/magnesium hydroxide/simethicone Suspension 30 milliLiter(s) Oral every 4 hours PRN Dyspepsia  diphenhydrAMINE   Injectable 25 milliGRAM(s) IV Push every 6 hours PRN Allergy symptoms  melatonin 3 milliGRAM(s) Oral at bedtime PRN Insomnia  metoclopramide Injectable 10 milliGRAM(s) IV Push every 6 hours PRN Nausea  ondansetron Injectable 6 milliGRAM(s) IV Push every 8 hours PRN Nausea  ondansetron Injectable 4 milliGRAM(s) IV Push every 8 hours PRN Nausea and/or Vomiting      CAPILLARY BLOOD GLUCOSE        I&O's Summary    11 Aug 2021 07:01  -  11 Aug 2021 13:51  --------------------------------------------------------  IN: 0 mL / OUT: 5 mL / NET: -5 mL        PHYSICAL EXAM:  Vital Signs Last 24 Hrs  T(C): 36.5 (11 Aug 2021 12:56), Max: 36.8 (10 Aug 2021 20:38)  T(F): 97.7 (11 Aug 2021 12:56), Max: 98.2 (10 Aug 2021 20:38)  HR: 75 (11 Aug 2021 12:56) (75 - 130)  BP: 111/63 (11 Aug 2021 12:56) (86/50 - 147/71)  BP(mean): --  RR: 17 (11 Aug 2021 12:56) (17 - 20)  SpO2: 100% (11 Aug 2021 12:) (82% - 100%)  CONSTITUTIONAL: NAD, well-developed woman on 2L NC   RESPIRATORY: Normal respiratory effort; diminished breath sounds w/ crackles bilaterally, R pleurex   CARDIOVASCULAR: Regular rate and rhythm, normal S1 and S2, no murmur/rub/gallop; No lower extremity edema; Peripheral pulses are 2+ bilaterally  ABDOMEN: Nontender to palpation, normoactive bowel sounds, no rebound/guarding; No hepatosplenomegaly  MUSCULOSKELETAL no clubbing or cyanosis of digits; no joint swelling or tenderness to palpation  PSYCH: Alert and oriented, lethargic but wakes up with verbal stimuli     LABS:                        7.7    26.52 )-----------( 360      ( 11 Aug 2021 02:52 )             25.3     08-11    135  |  103  |  24<H>  ----------------------------<  98  4.0   |  18<L>  |  0.61    Ca    9.3      11 Aug 2021 10:53  Phos  3.3     08-  Mg     1.90     08-11    TPro  4.4<L>  /  Alb  2.2<L>  /  TBili  0.2  /  DBili  x   /  AST  18  /  ALT  29  /  AlkPhos  96  08-11          Urinalysis Basic - ( 11 Aug 2021 02:52 )    Color: Light Yellow / Appearance: Clear / S.012 / pH: x  Gluc: x / Ketone: Negative  / Bili: Negative / Urobili: <2 mg/dL   Blood: x / Protein: Negative / Nitrite: Negative   Leuk Esterase: Negative / RBC: x / WBC x   Sq Epi: x / Non Sq Epi: x / Bacteria: x          RADIOLOGY & ADDITIONAL TESTS:  Results Reviewed:   Imaging Personally Reviewed:  Electrocardiogram Personally Reviewed:    COORDINATION OF CARE:  Care Discussed with Consultants/Other Providers [Y/N]:  Prior or Outpatient Records Reviewed [Y/N]:

## 2021-08-11 NOTE — PROGRESS NOTE ADULT - SUBJECTIVE AND OBJECTIVE BOX
Hematology Oncology Follow-up    INTERVAL HPI/OVERNIGHT EVENTS:  No o/n events, patient resting comfortably. No complaints at this time. Patient specifically denies fever, chills, dizziness, weakness, CP, palpitations, SOB, cough, N/V/D/C, dysuria, changes in bowel movements, LE edema.    Review of Systems:  General: denies fevers/chills, night sweats, malaise, changes in appetite  Head: denies HA  Eyes: denies vision change  ENT: denies oral lesions, rhinorrhea, epistaxys, sore throat, dysphagia  Respiratory: denies cough, shortness of breath, pleurisy  Cardiovascular: denies chest pain, palpitaitons, DESHPANDE  Gastrointestinal: denies nausea, vomiting, abdominal pain, constipation, diarrhea, melena, hematochezia  MSK: denies joint pain or muscle pain  Neuro: denies headache, weakness, or parasthesias  Skin: denies rash, petichiae, echymoses  Psych: denies anxiety or sleep disturbances    VITAL SIGNS:  T(F): 97.7 (21 @ 12:56)  HR: 75 (21 @ 12:56)  BP: 111/63 (21 @ 12:56)  RR: 17 (21 @ 12:56)  SpO2: 100% (21 @ 12:56)  Wt(kg): --    21 @ 07:01  -  21 @ 13:49  --------------------------------------------------------  IN: 0 mL / OUT: 5 mL / NET: -5 mL        PHYSICAL EXAM:    Constitutional: AAOx3, NAD  Eyes: PERRL, EOMI, sclera non-icteric  Neck: supple, no masses, no JVD, no lymphadenopathy  Respiratory: CTA b/l, no wheezing, rhonchi, rales, with normal respiratory effort  Cardiovascular: RRR, normal S1S2, no M/R/G  Gastrointestinal: soft, NTND, no masses palpable, BS normal in all four quadrants, no HSM  Extremities:  no edema  MSK: no obvious abnormalities, normal ROM, no lymphadenopathy  Neurological: Grossly intact  Skin: Normal temperature, no rash, no echymoses, no petichiae  Psych: normal affect    MEDICATIONS  (STANDING):  aMIOdarone    Tablet 200 milliGRAM(s) Oral daily  diphenhydrAMINE   Injectable 50 milliGRAM(s) IV Push once  enoxaparin Injectable 40 milliGRAM(s) SubCutaneous daily  febuxostat 40 milliGRAM(s) Oral daily  FIRST- Mouthwash  BLM 5 milliLiter(s) Swish and Swallow every 6 hours  lidocaine   5% Patch 1 Patch Transdermal daily  multivitamin 1 Tablet(s) Oral daily  sodium chloride 0.9%. 1000 milliLiter(s) (50 mL/Hr) IV Continuous <Continuous>  triamcinolone 0.1% Cream 1 Application(s) Topical two times a day    MEDICATIONS  (PRN):  acetaminophen   Tablet .. 650 milliGRAM(s) Oral every 6 hours PRN Temp greater or equal to 38.5C (101.3F), Mild Pain (1 - 3), Moderate Pain (4 - 6), Severe Pain (7 - 10)  ALPRAZolam 0.25 milliGRAM(s) Oral two times a day PRN anxiety  aluminum hydroxide/magnesium hydroxide/simethicone Suspension 30 milliLiter(s) Oral every 4 hours PRN Dyspepsia  diphenhydrAMINE   Injectable 25 milliGRAM(s) IV Push every 6 hours PRN Allergy symptoms  melatonin 3 milliGRAM(s) Oral at bedtime PRN Insomnia  metoclopramide Injectable 10 milliGRAM(s) IV Push every 6 hours PRN Nausea  ondansetron Injectable 6 milliGRAM(s) IV Push every 8 hours PRN Nausea  ondansetron Injectable 4 milliGRAM(s) IV Push every 8 hours PRN Nausea and/or Vomiting      No Known Allergies      LABS:                        7.7    26.52 )-----------( 360      ( 11 Aug 2021 02:52 )             25.3     08-11    135  |  103  |  24<H>  ----------------------------<  98  4.0   |  18<L>  |  0.61    Ca    9.3      11 Aug 2021 10:53  Phos  3.3     08-11  Mg     1.90     08-11    TPro  4.4<L>  /  Alb  2.2<L>  /  TBili  0.2  /  DBili  x   /  AST  18  /  ALT  29  /  AlkPhos  96       Lactate Dehydrogenase, Serum: QNS U/L ( @ 10:53)  Lactate Dehydrogenase, Serum: 154 U/L ( @ 02:52)  Lactate Dehydrogenase, Serum: 164 U/L (08-10 @ 17:45)    Urinalysis Basic - ( 11 Aug 2021 02:52 )    Color: Light Yellow / Appearance: Clear / S.012 / pH: x  Gluc: x / Ketone: Negative  / Bili: Negative / Urobili: <2 mg/dL   Blood: x / Protein: Negative / Nitrite: Negative   Leuk Esterase: Negative / RBC: x / WBC x   Sq Epi: x / Non Sq Epi: x / Bacteria: x              Bilirubin: Negative (21 @ 02:52)      RADIOLOGY & ADDITIONAL TESTS:  Studies reviewed. Hematology Oncology Follow-up    INTERVAL HPI/OVERNIGHT EVENTS:  No o/n events, patient resting comfortably sitting in chair. She reports to be doing well and eating well. PAtient had reaction to Rituxan infusion yesterday once rate was at 150. She desaturated and required additional O2. When restarted on 75cc/hr, patient BP dropped and required fluid resuscitation.     Review of Systems:  General: denies fevers/chills, night sweats, malaise, changes in appetite  Head: denies HA  Eyes: denies vision change  ENT: denies oral lesions, rhinorrhea, epistaxys, sore throat, dysphagia  Respiratory: denies cough, shortness of breath, pleurisy  Cardiovascular: denies chest pain, palpitaitons, DESHPANDE  Gastrointestinal: denies nausea, vomiting, abdominal pain, constipation, diarrhea, melena, hematochezia  MSK: denies joint pain or muscle pain  Neuro: denies headache, weakness, or parasthesias  Skin: denies rash, petichiae, echymoses  Psych: denies anxiety or sleep disturbances    VITAL SIGNS:  T(F): 97.7 (21 @ 12:56)  HR: 75 (21 @ 12:56)  BP: 111/63 (21 @ 12:56)  RR: 17 (21 @ 12:56)  SpO2: 100% (21 @ 12:56)  Wt(kg): --    21 @ 07:01  -  21 @ 13:49  --------------------------------------------------------  IN: 0 mL / OUT: 5 mL / NET: -5 mL        PHYSICAL EXAM:  Constitutional: AAOx3, NAD  Eyes: PERRL, EOMI, sclera non-icteric  Neck: supple, no masses, no JVD, no lymphadenopathy  Respiratory: CTA b/l, no wheezing, rhonchi, rales, with normal respiratory effort  Cardiovascular: RRR, normal S1S2, Bilateral pleurex with dressing on  Gastrointestinal: soft, NTND, no masses palpable, BS normal in all four quadrants, no HSM  MSK: no obvious abnormalities, normal ROM, no lymphadenopathy  Neurological: Grossly intact; INTENTIONAL TREMORS  Skin: Normal temperature, senile purpura; rash no longer seen.   Psych: normal affect    MEDICATIONS  (STANDING):  aMIOdarone    Tablet 200 milliGRAM(s) Oral daily  diphenhydrAMINE   Injectable 50 milliGRAM(s) IV Push once  enoxaparin Injectable 40 milliGRAM(s) SubCutaneous daily  febuxostat 40 milliGRAM(s) Oral daily  FIRST- Mouthwash  BLM 5 milliLiter(s) Swish and Swallow every 6 hours  lidocaine   5% Patch 1 Patch Transdermal daily  multivitamin 1 Tablet(s) Oral daily  sodium chloride 0.9%. 1000 milliLiter(s) (50 mL/Hr) IV Continuous <Continuous>  triamcinolone 0.1% Cream 1 Application(s) Topical two times a day    MEDICATIONS  (PRN):  acetaminophen   Tablet .. 650 milliGRAM(s) Oral every 6 hours PRN Temp greater or equal to 38.5C (101.3F), Mild Pain (1 - 3), Moderate Pain (4 - 6), Severe Pain (7 - 10)  ALPRAZolam 0.25 milliGRAM(s) Oral two times a day PRN anxiety  aluminum hydroxide/magnesium hydroxide/simethicone Suspension 30 milliLiter(s) Oral every 4 hours PRN Dyspepsia  diphenhydrAMINE   Injectable 25 milliGRAM(s) IV Push every 6 hours PRN Allergy symptoms  melatonin 3 milliGRAM(s) Oral at bedtime PRN Insomnia  metoclopramide Injectable 10 milliGRAM(s) IV Push every 6 hours PRN Nausea  ondansetron Injectable 6 milliGRAM(s) IV Push every 8 hours PRN Nausea  ondansetron Injectable 4 milliGRAM(s) IV Push every 8 hours PRN Nausea and/or Vomiting      No Known Allergies      LABS:                        7.7    26.52 )-----------( 360      ( 11 Aug 2021 02:52 )             25.3     08-11    135  |  103  |  24<H>  ----------------------------<  98  4.0   |  18<L>  |  0.61    Ca    9.3      11 Aug 2021 10:53  Phos  3.3     08-11  Mg     1.90         TPro  4.4<L>  /  Alb  2.2<L>  /  TBili  0.2  /  DBili  x   /  AST  18  /  ALT  29  /  AlkPhos  96       Lactate Dehydrogenase, Serum: QNS U/L ( @ 10:53)  Lactate Dehydrogenase, Serum: 154 U/L ( @ 02:52)  Lactate Dehydrogenase, Serum: 164 U/L (08-10 @ 17:45)    Urinalysis Basic - ( 11 Aug 2021 02:52 )    Color: Light Yellow / Appearance: Clear / S.012 / pH: x  Gluc: x / Ketone: Negative  / Bili: Negative / Urobili: <2 mg/dL   Blood: x / Protein: Negative / Nitrite: Negative   Leuk Esterase: Negative / RBC: x / WBC x   Sq Epi: x / Non Sq Epi: x / Bacteria: x              Bilirubin: Negative (21 @ 02:52)      RADIOLOGY & ADDITIONAL TESTS:  Studies reviewed.

## 2021-08-11 NOTE — PROGRESS NOTE ADULT - PROBLEM SELECTOR PLAN 1
new diagnosis of DLBCL, confirmed on path   - Prednisone 20mg on 8/3 & 8/4   - Prednisone 50mg x 5 days and Cyclophosphamide initiation 8/5- 8/7 ; s/p partial rituxan on 8/10. pt could not tolerate. f/u heme: re-challenge   - monitor TLS labs Q8H   - IVF @ 50cc/h   - HIV, hepatitis labs negative, repeat TTE with normal EF   - pleural fluid cytopath sent on 8/9,   - Hematology following

## 2021-08-11 NOTE — PROGRESS NOTE ADULT - ASSESSMENT
89 year old woman with a pmh of COPD and HFrEF (EF 26%) that with chronic cough, not improved with steroids nor Abx. Now s/p R robotic VATS/pleural bx with placement of pleurex catheter on 07/15/21 after PET from 07/07/21 reveal b/l upper lobe opacities concerning for malignancy with pleural effusion. Hematology consulted for newly diagnosed DLBCL.    #DLBCL  -S/p Right peritracheal lymph node, level 4 biopsy: Diffuse large B cell lymphoma, non-germinal center B cell like immunophenotype  -Will need pleural fluid cytopath and flow if tapped again  -Peripheral blood flow cytometry: absolute monocytosis with marked leukocytosis and neutrophilia  -Bone marrow biopsy not performed given that it would not  at this time.   - Will need to get LP with IT chemo eventually   - Repeat Echo showing EF of 65%;   -Patient and Family both confirm wanting treatment; however discussed with patient regarding intensity of treatment regimen. Discussion with patient regarding chemotherapy regimen possibilities. Patient wishing treatment however would like to limit hospital visits due to severe neutropenia, fevers ect. Discussed several regimens but asked to defer final regimen decision to her daughter. Discussed at length with daughter Dinorah, chemotherapy regimens, possible AEs, benefits vs risks. Agreed on gentle initiation of Prednisone and cyclophosphamide. Discussed 8/9/2021 with daughter at bedside addition of Rituxan to regimen. Discussed possile AEs, risks and benefits. both patient and daughter in agreement with initiation of Rituxan.   - D1  8/3  Prednisone 20mg AM; D2 8/4 Prednisone 20mg AM, 30mg PM  D3-D7 Prednisone 50mg (end on aug 9)  - Cyclophosphamide 200mg x 3 doses D18/5/21-8/7/21   - Rituxan 375mg/m2 8/11 with infusion reaction   - Mucositis developing; will start magic mouth wash  - continue to monitor TLS labs daily   -Continue febuxostat; (allopurinol discontinued due to transaminitis 3x above normal and skin rash -  now resolved)   - Patient will require chemotherapy regimen (cytoxan, prednisone, rituxan) every 3 weeks for 6 cycles. Patient will need labs drawn once a week and will need follow up with Dr Esparza shortly after discharge. If patient is discharged to rehab, next chemotherapy would be week of august 23rd.     New onset rash on back; transaminitis 3x upper limit of normal (resolved)  -Unclear etiology; significantly improved since yesterday   -Dermatology consulted by Primary team; considering possible antibiotic as culprit;   -Transaminitis and rash resolved;   - continue to monitor.     #Leukocytosis  -Leukocytosis with left shift with many neutrophils seen on the peripheral smear. Improving since on steroids  -Febrile to 103 on admission; no clear source of infection found with work up.   - completed 5 days vanco and Zosyn;   - per pulm/primary team     #Hypercalcemia (resolved)    INCOMPLETE    Jess Nelson MD  PGY 4, Oncology/Hematology fellow  (P) [phone]  After 5pm, please contact on-call team.   89 year old woman with a pmh of COPD and HFrEF (EF 26%) that with chronic cough, not improved with steroids nor Abx. Now s/p R robotic VATS/pleural bx with placement of pleurex catheter on 07/15/21 after PET from 07/07/21 reveal b/l upper lobe opacities concerning for malignancy with pleural effusion. Hematology consulted for newly diagnosed DLBCL.    #DLBCL  -S/p Right peritracheal lymph node, level 4 biopsy: Diffuse large B cell lymphoma, non-germinal center B cell like immunophenotype  -Will need pleural fluid cytopath and flow if tapped again  -Peripheral blood flow cytometry: absolute monocytosis with marked leukocytosis and neutrophilia  -Bone marrow biopsy not performed given that it would not  at this time.   - Will need to get LP with IT chemo eventually   - Repeat Echo showing EF of 65%;   -Patient and Family both confirm wanting treatment; however discussed with patient regarding intensity of treatment regimen. Discussion with patient regarding chemotherapy regimen possibilities. Patient wishing treatment however would like to limit hospital visits due to severe neutropenia, fevers ect. Discussed several regimens but asked to defer final regimen decision to her daughter. Discussed at length with silvia Copeland, chemotherapy regimens, possible AEs, benefits vs risks. Agreed on gentle initiation of Prednisone and cyclophosphamide. Discussed 8/9/2021 with daughter at bedside addition of Rituxan to regimen. Discussed possile AEs, risks and benefits. both patient and daughter in agreement with initiation of Rituxan.   - D1  8/3  Prednisone 20mg AM; D2 8/4 Prednisone 20mg AM, 30mg PM  D3-D7 Prednisone 50mg (end on aug 9)  - Cyclophosphamide 200mg x 3 doses D18/5/21-8/7/21   - Rituxan 375mg/m2 8/11 with infusion reaction; had desaturation when infusion running at 150cc/hr. Received benadryl, demerol, hydrocortisone. When resumed at 75 cc/hr; patient became hypotensive and infusion was stopped. Fluids given with improvement in BP. Completed only 1/2 the bag. Will attempt to rechallenge tomorrow with premedications prior to complete tranfusion. Discussed with Daughter Dinorah Foster and agrees with plan.   - Mucositis developing; will start magic mouth wash  - continue to monitor TLS labs daily   -Continue febuxostat; (allopurinol discontinued due to transaminitis 3x above normal and skin rash -  now resolved)   - Patient will require chemotherapy regimen (cytoxan, prednisone, rituxan) every 3 weeks for 6 cycles. Patient will need labs drawn once a week and will need follow up with Dr Esparza shortly after discharge. If patient is discharged to rehab, next chemotherapy would be week of august 23rd.     New onset rash on back; transaminitis 3x upper limit of normal (resolved)  -Unclear etiology; significantly improved since yesterday   -Dermatology consulted by Primary team; considering possible antibiotic as culprit;   -Transaminitis and rash resolved;   - continue to monitor.     #Leukocytosis  -Leukocytosis with left shift with many neutrophils seen on the peripheral smear. Improving since on steroids  -Febrile to 103 on admission; no clear source of infection found with work up.   - completed 5 days vanco and Zosyn;   - per pulm/primary team     # Peripheral Edema lower extremities   - worsened today; likely secondary to the fluids given for BP resuscitation yesterday  - recommend compression socks   - consider a dose of diuretic if continues tomorrow     #Hypercalcemia (resolved)        Jess Nelson MD  PGY 4, Oncology/Hematology fellow  (P) [phone]  After 5pm, please contact on-call team.

## 2021-08-12 LAB
ALBUMIN SERPL ELPH-MCNC: 2.4 G/DL — LOW (ref 3.3–5)
ALP SERPL-CCNC: 91 U/L — SIGNIFICANT CHANGE UP (ref 40–120)
ALT FLD-CCNC: 24 U/L — SIGNIFICANT CHANGE UP (ref 4–33)
ANION GAP SERPL CALC-SCNC: 13 MMOL/L — SIGNIFICANT CHANGE UP (ref 7–14)
AST SERPL-CCNC: 14 U/L — SIGNIFICANT CHANGE UP (ref 4–32)
BASOPHILS # BLD AUTO: 0.02 K/UL — SIGNIFICANT CHANGE UP (ref 0–0.2)
BASOPHILS NFR BLD AUTO: 0.2 % — SIGNIFICANT CHANGE UP (ref 0–2)
BILIRUB SERPL-MCNC: 0.2 MG/DL — SIGNIFICANT CHANGE UP (ref 0.2–1.2)
BUN SERPL-MCNC: 24 MG/DL — HIGH (ref 7–23)
CALCIUM SERPL-MCNC: 8.9 MG/DL — SIGNIFICANT CHANGE UP (ref 8.4–10.5)
CHLORIDE SERPL-SCNC: 105 MMOL/L — SIGNIFICANT CHANGE UP (ref 98–107)
CO2 SERPL-SCNC: 22 MMOL/L — SIGNIFICANT CHANGE UP (ref 22–31)
CREAT SERPL-MCNC: 0.73 MG/DL — SIGNIFICANT CHANGE UP (ref 0.5–1.3)
EOSINOPHIL # BLD AUTO: 0.32 K/UL — SIGNIFICANT CHANGE UP (ref 0–0.5)
EOSINOPHIL NFR BLD AUTO: 3 % — SIGNIFICANT CHANGE UP (ref 0–6)
G6PD RBC-CCNC: 26.9 U/G HGB — HIGH (ref 7–20.5)
GLUCOSE SERPL-MCNC: 84 MG/DL — SIGNIFICANT CHANGE UP (ref 70–99)
HCT VFR BLD CALC: 24 % — LOW (ref 34.5–45)
HGB BLD-MCNC: 7.4 G/DL — LOW (ref 11.5–15.5)
IANC: 8.05 K/UL — SIGNIFICANT CHANGE UP (ref 1.5–8.5)
IMM GRANULOCYTES NFR BLD AUTO: 0.5 % — SIGNIFICANT CHANGE UP (ref 0–1.5)
LDH SERPL L TO P-CCNC: 144 U/L — SIGNIFICANT CHANGE UP (ref 135–225)
LYMPHOCYTES # BLD AUTO: 1.64 K/UL — SIGNIFICANT CHANGE UP (ref 1–3.3)
LYMPHOCYTES # BLD AUTO: 15.4 % — SIGNIFICANT CHANGE UP (ref 13–44)
MAGNESIUM SERPL-MCNC: 1.8 MG/DL — SIGNIFICANT CHANGE UP (ref 1.6–2.6)
MCHC RBC-ENTMCNC: 24.7 PG — LOW (ref 27–34)
MCHC RBC-ENTMCNC: 30.8 GM/DL — LOW (ref 32–36)
MCV RBC AUTO: 80 FL — SIGNIFICANT CHANGE UP (ref 80–100)
MONOCYTES # BLD AUTO: 0.57 K/UL — SIGNIFICANT CHANGE UP (ref 0–0.9)
MONOCYTES NFR BLD AUTO: 5.4 % — SIGNIFICANT CHANGE UP (ref 2–14)
NEUTROPHILS # BLD AUTO: 8.05 K/UL — HIGH (ref 1.8–7.4)
NEUTROPHILS NFR BLD AUTO: 75.5 % — SIGNIFICANT CHANGE UP (ref 43–77)
NRBC # BLD: 0 /100 WBCS — SIGNIFICANT CHANGE UP
NRBC # FLD: 0 K/UL — SIGNIFICANT CHANGE UP
PHOSPHATE SERPL-MCNC: 3 MG/DL — SIGNIFICANT CHANGE UP (ref 2.5–4.5)
PLATELET # BLD AUTO: 261 K/UL — SIGNIFICANT CHANGE UP (ref 150–400)
POTASSIUM SERPL-MCNC: 3.7 MMOL/L — SIGNIFICANT CHANGE UP (ref 3.5–5.3)
POTASSIUM SERPL-SCNC: 3.7 MMOL/L — SIGNIFICANT CHANGE UP (ref 3.5–5.3)
PROT SERPL-MCNC: 4.4 G/DL — LOW (ref 6–8.3)
RBC # BLD: 3 M/UL — LOW (ref 3.8–5.2)
RBC # FLD: 19.1 % — HIGH (ref 10.3–14.5)
SODIUM SERPL-SCNC: 140 MMOL/L — SIGNIFICANT CHANGE UP (ref 135–145)
URATE SERPL-MCNC: 1.6 MG/DL — LOW (ref 2.5–7)
WBC # BLD: 10.65 K/UL — HIGH (ref 3.8–10.5)
WBC # FLD AUTO: 10.65 K/UL — HIGH (ref 3.8–10.5)

## 2021-08-12 PROCEDURE — 99233 SBSQ HOSP IP/OBS HIGH 50: CPT

## 2021-08-12 PROCEDURE — 99232 SBSQ HOSP IP/OBS MODERATE 35: CPT | Mod: GC

## 2021-08-12 RX ORDER — DIPHENHYDRAMINE HCL 50 MG
25 CAPSULE ORAL ONCE
Refills: 0 | Status: COMPLETED | OUTPATIENT
Start: 2021-08-12 | End: 2021-08-12

## 2021-08-12 RX ORDER — DEXAMETHASONE 0.5 MG/5ML
12 ELIXIR ORAL ONCE
Refills: 0 | Status: COMPLETED | OUTPATIENT
Start: 2021-08-12 | End: 2021-08-12

## 2021-08-12 RX ORDER — HYDROCORTISONE 20 MG
100 TABLET ORAL ONCE
Refills: 0 | Status: DISCONTINUED | OUTPATIENT
Start: 2021-08-12 | End: 2021-08-13

## 2021-08-12 RX ORDER — DIPHENHYDRAMINE HCL 50 MG
25 CAPSULE ORAL ONCE
Refills: 0 | Status: DISCONTINUED | OUTPATIENT
Start: 2021-08-12 | End: 2021-08-13

## 2021-08-12 RX ORDER — MEPERIDINE HYDROCHLORIDE 50 MG/ML
25 INJECTION INTRAMUSCULAR; INTRAVENOUS; SUBCUTANEOUS ONCE
Refills: 0 | Status: DISCONTINUED | OUTPATIENT
Start: 2021-08-12 | End: 2021-08-13

## 2021-08-12 RX ORDER — ACETAMINOPHEN 500 MG
650 TABLET ORAL ONCE
Refills: 0 | Status: COMPLETED | OUTPATIENT
Start: 2021-08-12 | End: 2021-08-12

## 2021-08-12 RX ORDER — ALPRAZOLAM 0.25 MG
0.25 TABLET ORAL
Refills: 0 | Status: DISCONTINUED | OUTPATIENT
Start: 2021-08-12 | End: 2021-08-13

## 2021-08-12 RX ADMIN — Medication 1 APPLICATION(S): at 17:39

## 2021-08-12 RX ADMIN — Medication 106 MILLIGRAM(S): at 12:41

## 2021-08-12 RX ADMIN — Medication 3 MILLIGRAM(S): at 02:03

## 2021-08-12 RX ADMIN — Medication 25 MILLIGRAM(S): at 12:45

## 2021-08-12 RX ADMIN — Medication 650 MILLIGRAM(S): at 13:25

## 2021-08-12 RX ADMIN — LIDOCAINE 1 PATCH: 4 CREAM TOPICAL at 02:00

## 2021-08-12 RX ADMIN — Medication 400 MILLIGRAM(S): at 02:04

## 2021-08-12 RX ADMIN — LIDOCAINE 1 PATCH: 4 CREAM TOPICAL at 12:28

## 2021-08-12 RX ADMIN — Medication 650 MILLIGRAM(S): at 12:44

## 2021-08-12 RX ADMIN — ENOXAPARIN SODIUM 40 MILLIGRAM(S): 100 INJECTION SUBCUTANEOUS at 12:28

## 2021-08-12 RX ADMIN — Medication 400 MILLIGRAM(S): at 03:04

## 2021-08-12 RX ADMIN — LIDOCAINE 1 PATCH: 4 CREAM TOPICAL at 21:35

## 2021-08-12 RX ADMIN — Medication 1 TABLET(S): at 12:28

## 2021-08-12 RX ADMIN — SODIUM CHLORIDE 50 MILLILITER(S): 9 INJECTION INTRAMUSCULAR; INTRAVENOUS; SUBCUTANEOUS at 02:03

## 2021-08-12 RX ADMIN — Medication 1 APPLICATION(S): at 05:08

## 2021-08-12 RX ADMIN — LIDOCAINE 1 PATCH: 4 CREAM TOPICAL at 18:14

## 2021-08-12 RX ADMIN — AMIODARONE HYDROCHLORIDE 200 MILLIGRAM(S): 400 TABLET ORAL at 05:09

## 2021-08-12 RX ADMIN — FEBUXOSTAT 40 MILLIGRAM(S): 40 TABLET ORAL at 12:28

## 2021-08-12 NOTE — PROGRESS NOTE ADULT - SUBJECTIVE AND OBJECTIVE BOX
Hematology Oncology Follow-up    INTERVAL HPI/OVERNIGHT EVENTS:  No o/n events, patient resting comfortably. No complaints at this time. Patient specifically denies fever, chills, dizziness, weakness, CP, palpitations, SOB, cough, N/V/D/C, dysuria, changes in bowel movements, LE edema.    Review of Systems:  General: denies fevers/chills, night sweats, malaise, changes in appetite  Head: denies HA  Eyes: denies vision change  ENT: denies oral lesions, rhinorrhea, epistaxys, sore throat, dysphagia  Respiratory: denies cough, shortness of breath, pleurisy  Cardiovascular: denies chest pain, palpitaitons, DESHPANDE  Gastrointestinal: denies nausea, vomiting, abdominal pain, constipation, diarrhea, melena, hematochezia  MSK: denies joint pain or muscle pain  Neuro: denies headache, weakness, or parasthesias  Skin: denies rash, petichiae, echymoses  Psych: denies anxiety or sleep disturbances    VITAL SIGNS:  T(F): 98 (21 @ 05:39)  HR: 78 (21 @ 05:39)  BP: 105/53 (21 @ 05:39)  RR: 18 (21 @ 05:39)  SpO2: 100% (21 @ 05:39)  Wt(kg): --    21 @ 07:01  -  21 @ 07:00  --------------------------------------------------------  IN: 480 mL / OUT: 1205 mL / NET: -725 mL        PHYSICAL EXAM:    Constitutional: AAOx3, NAD  Eyes: PERRL, EOMI, sclera non-icteric  Neck: supple, no masses, no JVD, no lymphadenopathy  Respiratory: CTA b/l, no wheezing, rhonchi, rales, with normal respiratory effort  Cardiovascular: RRR, normal S1S2, no M/R/G  Gastrointestinal: soft, NTND, no masses palpable, BS normal in all four quadrants, no HSM  Extremities:  no edema  MSK: no obvious abnormalities, normal ROM, no lymphadenopathy  Neurological: Grossly intact  Skin: Normal temperature, no rash, no echymoses, no petichiae  Psych: normal affect    MEDICATIONS  (STANDING):  acetaminophen   Tablet .. 650 milliGRAM(s) Oral once  aMIOdarone    Tablet 200 milliGRAM(s) Oral daily  dexAMETHasone  IVPB 12 milliGRAM(s) IV Intermittent once  diphenhydrAMINE   Injectable 25 milliGRAM(s) IV Push once  diphenhydrAMINE   Injectable 50 milliGRAM(s) IV Push once  enoxaparin Injectable 40 milliGRAM(s) SubCutaneous daily  febuxostat 40 milliGRAM(s) Oral daily  FIRST- Mouthwash  BLM 5 milliLiter(s) Swish and Swallow every 6 hours  lidocaine   5% Patch 1 Patch Transdermal daily  multivitamin 1 Tablet(s) Oral daily  sodium chloride 0.9%. 1000 milliLiter(s) (50 mL/Hr) IV Continuous <Continuous>  triamcinolone 0.1% Cream 1 Application(s) Topical two times a day    MEDICATIONS  (PRN):  acetaminophen   Tablet .. 650 milliGRAM(s) Oral every 6 hours PRN Temp greater or equal to 38.5C (101.3F), Mild Pain (1 - 3), Moderate Pain (4 - 6), Severe Pain (7 - 10)  ALPRAZolam 0.25 milliGRAM(s) Oral two times a day PRN anxiety  aluminum hydroxide/magnesium hydroxide/simethicone Suspension 30 milliLiter(s) Oral every 4 hours PRN Dyspepsia  diphenhydrAMINE   Injectable 25 milliGRAM(s) IV Push every 6 hours PRN Allergy symptoms  diphenhydrAMINE   Injectable 25 milliGRAM(s) IV Push once PRN infusion reaction  hydrocortisone sodium succinate Injectable 100 milliGRAM(s) IV Push once PRN infusion reaction  melatonin 3 milliGRAM(s) Oral at bedtime PRN Insomnia  meperidine     Injectable 25 milliGRAM(s) IV Push once PRN infusion reaction  metoclopramide Injectable 10 milliGRAM(s) IV Push every 6 hours PRN Nausea  ondansetron Injectable 6 milliGRAM(s) IV Push every 8 hours PRN Nausea  ondansetron Injectable 4 milliGRAM(s) IV Push every 8 hours PRN Nausea and/or Vomiting      Zosyn (Rash)      LABS:                        7.4    10.65 )-----------( 261      ( 12 Aug 2021 07:06 )             24.0     08-12    140  |  105  |  24<H>  ----------------------------<  84  3.7   |  22  |  0.73    Ca    8.9      12 Aug 2021 07:06  Phos  3.0     08-12  Mg     1.80     -    TPro  4.4<L>  /  Alb  2.4<L>  /  TBili  0.2  /  DBili  x   /  AST  14  /  ALT  24  /  AlkPhos  91  08-12     Lactate Dehydrogenase, Serum: 144 U/L ( @ 07:06)    Urinalysis Basic - ( 11 Aug 2021 02:52 )    Color: Light Yellow / Appearance: Clear / S.012 / pH: x  Gluc: x / Ketone: Negative  / Bili: Negative / Urobili: <2 mg/dL   Blood: x / Protein: Negative / Nitrite: Negative   Leuk Esterase: Negative / RBC: x / WBC x   Sq Epi: x / Non Sq Epi: x / Bacteria: x                  RADIOLOGY & ADDITIONAL TESTS:  Studies reviewed. Hematology Oncology Follow-up    INTERVAL HPI/OVERNIGHT EVENTS:  No o/n events, patient resting comfortably. No complaints at this time. Feels better then before. Reports decreased tremors this am.  Patient specifically denies fever, chills, dizziness, weakness, CP, palpitations, SOB, cough, N/V/D/C, dysuria, changes in bowel movements, LE edema.    Review of Systems:  General: denies fevers/chills, night sweats, malaise, changes in appetite  Head: denies HA  Eyes: denies vision change  ENT: denies oral lesions, rhinorrhea, epistaxys, sore throat, dysphagia  Respiratory: denies cough, shortness of breath, pleurisy  Cardiovascular: denies chest pain, palpitaitons, DESHPANDE  Gastrointestinal: denies nausea, vomiting, abdominal pain, constipation, diarrhea, melena, hematochezia  MSK: denies joint pain or muscle pain  Neuro: denies headache, weakness, or parasthesias  Skin: denies rash, petichiae, echymoses  Psych: denies anxiety or sleep disturbances    VITAL SIGNS:  T(F): 98 (21 @ 05:39)  HR: 78 (21 @ 05:39)  BP: 105/53 (21 @ 05:39)  RR: 18 (21 @ 05:39)  SpO2: 100% (21 @ 05:39)  Wt(kg): --    21 @ 07:01  -  21 @ 07:00  --------------------------------------------------------  IN: 480 mL / OUT: 1205 mL / NET: -725 mL        PHYSICAL EXAM:    Eyes: PERRL, EOMI, sclera non-icteric  Neck: supple, no masses, no JVD, no lymphadenopathy  Respiratory: diminished breath sounds bilaterally with minimal bilateral crackles   Cardiovascular: RRR, normal S1S2, Bilateral pleurex with dressing on  Gastrointestinal: soft, NTND, no masses palpable, BS normal in all four quadrants, no HSM  MSK: no obvious abnormalities, normal ROM, no lymphadenopathy  Neurological: Grossly intact; INTENTIONAL TREMORS  Skin: Normal temperature, senile purpura; rash no longer seen.   Psych: normal affect      MEDICATIONS  (STANDING):  acetaminophen   Tablet .. 650 milliGRAM(s) Oral once  aMIOdarone    Tablet 200 milliGRAM(s) Oral daily  dexAMETHasone  IVPB 12 milliGRAM(s) IV Intermittent once  diphenhydrAMINE   Injectable 25 milliGRAM(s) IV Push once  diphenhydrAMINE   Injectable 50 milliGRAM(s) IV Push once  enoxaparin Injectable 40 milliGRAM(s) SubCutaneous daily  febuxostat 40 milliGRAM(s) Oral daily  FIRST- Mouthwash  BLM 5 milliLiter(s) Swish and Swallow every 6 hours  lidocaine   5% Patch 1 Patch Transdermal daily  multivitamin 1 Tablet(s) Oral daily  sodium chloride 0.9%. 1000 milliLiter(s) (50 mL/Hr) IV Continuous <Continuous>  triamcinolone 0.1% Cream 1 Application(s) Topical two times a day    MEDICATIONS  (PRN):  acetaminophen   Tablet .. 650 milliGRAM(s) Oral every 6 hours PRN Temp greater or equal to 38.5C (101.3F), Mild Pain (1 - 3), Moderate Pain (4 - 6), Severe Pain (7 - 10)  ALPRAZolam 0.25 milliGRAM(s) Oral two times a day PRN anxiety  aluminum hydroxide/magnesium hydroxide/simethicone Suspension 30 milliLiter(s) Oral every 4 hours PRN Dyspepsia  diphenhydrAMINE   Injectable 25 milliGRAM(s) IV Push every 6 hours PRN Allergy symptoms  diphenhydrAMINE   Injectable 25 milliGRAM(s) IV Push once PRN infusion reaction  hydrocortisone sodium succinate Injectable 100 milliGRAM(s) IV Push once PRN infusion reaction  melatonin 3 milliGRAM(s) Oral at bedtime PRN Insomnia  meperidine     Injectable 25 milliGRAM(s) IV Push once PRN infusion reaction  metoclopramide Injectable 10 milliGRAM(s) IV Push every 6 hours PRN Nausea  ondansetron Injectable 6 milliGRAM(s) IV Push every 8 hours PRN Nausea  ondansetron Injectable 4 milliGRAM(s) IV Push every 8 hours PRN Nausea and/or Vomiting      Zosyn (Rash)      LABS:                        7.4    10.65 )-----------( 261      ( 12 Aug 2021 07:06 )             24.0     08-12    140  |  105  |  24<H>  ----------------------------<  84  3.7   |  22  |  0.73    Ca    8.9      12 Aug 2021 07:06  Phos  3.0     08-12  Mg     1.80     08-12    TPro  4.4<L>  /  Alb  2.4<L>  /  TBili  0.2  /  DBili  x   /  AST  14  /  ALT  24  /  AlkPhos  91  08-12     Lactate Dehydrogenase, Serum: 144 U/L ( @ 07:06)    Urinalysis Basic - ( 11 Aug 2021 02:52 )    Color: Light Yellow / Appearance: Clear / S.012 / pH: x  Gluc: x / Ketone: Negative  / Bili: Negative / Urobili: <2 mg/dL   Blood: x / Protein: Negative / Nitrite: Negative   Leuk Esterase: Negative / RBC: x / WBC x   Sq Epi: x / Non Sq Epi: x / Bacteria: x                  RADIOLOGY & ADDITIONAL TESTS:  Studies reviewed.

## 2021-08-12 NOTE — PROGRESS NOTE ADULT - SUBJECTIVE AND OBJECTIVE BOX
DATE OF SERVICE: 08-12-21 @ 09:28    Subjective: Patient seen and examined. No new events except as noted.     SUBJECTIVE/ROS:  had hypoxia yesterday       MEDICATIONS:  MEDICATIONS  (STANDING):  aMIOdarone    Tablet 200 milliGRAM(s) Oral daily  diphenhydrAMINE   Injectable 50 milliGRAM(s) IV Push once  enoxaparin Injectable 40 milliGRAM(s) SubCutaneous daily  febuxostat 40 milliGRAM(s) Oral daily  FIRST- Mouthwash  BLM 5 milliLiter(s) Swish and Swallow every 6 hours  lidocaine   5% Patch 1 Patch Transdermal daily  multivitamin 1 Tablet(s) Oral daily  sodium chloride 0.9%. 1000 milliLiter(s) (50 mL/Hr) IV Continuous <Continuous>  triamcinolone 0.1% Cream 1 Application(s) Topical two times a day      PHYSICAL EXAM:  T(C): 36.7 (08-12-21 @ 05:39), Max: 36.7 (08-12-21 @ 05:39)  HR: 78 (08-12-21 @ 05:39) (75 - 79)  BP: 105/53 (08-12-21 @ 05:39) (105/53 - 114/59)  RR: 18 (08-12-21 @ 05:39) (17 - 18)  SpO2: 100% (08-12-21 @ 05:39) (100% - 100%)  Wt(kg): --  I&O's Summary    11 Aug 2021 07:01  -  12 Aug 2021 07:00  --------------------------------------------------------  IN: 480 mL / OUT: 1205 mL / NET: -725 mL            JVP: Normal  Neck: supple  Lung: clear   CV: S1 S2 , Murmur:  Abd: soft  Ext: No edema  neuro: Awake / alert  Psych: flat affect  Skin: normal``    LABS/DATA:    CARDIAC MARKERS:                                7.4    10.65 )-----------( 261      ( 12 Aug 2021 07:06 )             24.0     08-12    140  |  105  |  24<H>  ----------------------------<  84  3.7   |  22  |  0.73    Ca    8.9      12 Aug 2021 07:06  Phos  3.0     08-12  Mg     1.80     08-12    TPro  4.4<L>  /  Alb  2.4<L>  /  TBili  0.2  /  DBili  x   /  AST  14  /  ALT  24  /  AlkPhos  91  08-12    proBNP:   Lipid Profile:   HgA1c:   TSH:     TELE:  EKG:

## 2021-08-12 NOTE — PROGRESS NOTE ADULT - ASSESSMENT
89 year old woman with a pmh of COPD and HFrEF (EF 26%) that with chronic cough, not improved with steroids nor Abx. Now s/p R robotic VATS/pleural bx with placement of pleurex catheter on 07/15/21 after PET from 07/07/21 reveal b/l upper lobe opacities concerning for malignancy with pleural effusion. Hematology consulted for newly diagnosed DLBCL.    #DLBCL  -S/p Right peritracheal lymph node, level 4 biopsy: Diffuse large B cell lymphoma, non-germinal center B cell like immunophenotype  -Will need pleural fluid cytopath and flow if tapped again  -Peripheral blood flow cytometry: absolute monocytosis with marked leukocytosis and neutrophilia  -Bone marrow biopsy not performed given that it would not  at this time.   - Will need to get LP with IT chemo eventually   - Repeat Echo showing EF of 65%;   -Patient and Family both confirm wanting treatment; however discussed with patient regarding intensity of treatment regimen.Patient wishing treatment however would like to limit hospital visits due to severe neutropenia, fevers ect.  Discussed at length with silvia Copeland, chemotherapy regimens, possible AEs, benefits vs risks. Agreed on gentle initiation of Prednisone and cyclophosphamide + Rituxan. Discussed 8/9/2021 with daughter at bedside addition of Rituxan to regimen. both patient and daughter in agreement with initiation of Rituxan.   - D1  8/3  Prednisone 20mg AM; D2 8/4 Prednisone 20mg AM, 30mg PM  D3-D7 Prednisone 50mg (completed on aug 9)  - Cyclophosphamide 200mg x 3 doses D18/5/21-8/7/21   - Rituxan 375mg/m2 8/11 with infusion reaction; had desaturation when infusion running at 150cc/hr. Received benadryl, demerol, hydrocortisone. When resumed at 75 cc/hr; patient became hypotensive and infusion was stopped. Fluids given with improvement in BP. Completed only 1/2 the bag. Will attempt to rechallenge today with premedication of Tylenol 650mg, dexamethasone 12 mg, benadryl 25mg IV. Discussed with Daughter Dinorah Foster and agrees with plan.   - Mucositis pain in throat; Magic mouth wash helping  - continue to monitor TLS labs daily   - Continue febuxostat; (allopurinol discontinued due to transaminitis 3x above normal and skin rash -  now resolved)   - Patient will require chemotherapy regimen (cytoxan, prednisone, rituxan) every 3 weeks for 6 cycles. Patient will need labs drawn once a week and will need follow up with Dr Esparza shortly after discharge (Likely week of 8/16/2021). If patient is discharged to rehab, next chemotherapy would be started on week of august 23rd.     New onset rash on back; transaminitis 3x upper limit of normal (resolved)  -Unclear etiology; significantly improved since yesterday   -Dermatology consulted by Primary team; considering possible antibiotic as culprit;   -Transaminitis and rash resolved;   - continue to monitor.     #Leukocytosis  -Leukocytosis with left shift with many neutrophils seen on the peripheral smear. Improving since on steroids  -Febrile to 103 on admission; no clear source of infection found with work up.   - completed 5 days vanco and Zosyn;   - per pulm/primary team     # Peripheral Edema lower extremities   - likely secondary to the fluids given for BP resuscitation yesterday  - recommend compression socks   - recommend dose of lasix      #Hypercalcemia (resolved)    INCOMPLETE - will be updated later    Jess Nelson MD  PGY 4, Oncology/Hematology fellow  (P) [phone]  After 5pm, please contact on-call team.   89 year old woman with a pmh of COPD and HFrEF (EF 26%) that with chronic cough, not improved with steroids nor Abx. Now s/p R robotic VATS/pleural bx with placement of pleurex catheter on 07/15/21 after PET from 07/07/21 reveal b/l upper lobe opacities concerning for malignancy with pleural effusion. Hematology consulted for newly diagnosed DLBCL.    #DLBCL  -S/p Right peritracheal lymph node, level 4 biopsy: Diffuse large B cell lymphoma, non-germinal center B cell like immunophenotype  -Will need pleural fluid cytopath and flow if tapped again  -Peripheral blood flow cytometry: absolute monocytosis with marked leukocytosis and neutrophilia  -Bone marrow biopsy not performed given that it would not  at this time.   - May need to get LP with IT chemo eventually   - Repeat Echo showing EF of 65%;   -Patient and Family both confirm wanting treatment; however discussed with patient regarding intensity of treatment regimen.Patient wishing treatment however would like to limit hospital visits due to severe neutropenia, fevers ect.  Discussed at length with silvia Copeland, chemotherapy regimens, possible AEs, benefits vs risks. Agreed on gentle initiation of Prednisone and cyclophosphamide + Rituxan. Discussed 8/9/2021 with daughter at bedside addition of Rituxan to regimen. both patient and daughter in agreement with initiation of Rituxan.   - D1  8/3  Prednisone 20mg AM; D2 8/4 Prednisone 20mg AM, 30mg PM  D3-D7 Prednisone 50mg (completed on aug 9)  - Cyclophosphamide 200mg x 3 doses D18/5/21-8/7/21   - Rituxan 375mg/m2 8/11 with infusion reaction; had desaturation when infusion running at 150cc/hr. Received benadryl, demerol, hydrocortisone. When resumed at 75 cc/hr; patient became hypotensive and infusion was stopped. Fluids given with improvement in BP. Completed only 1/2 the bag. Will attempt to rechallenge today with premedication of Tylenol 650mg, dexamethasone 12 mg, benadryl 25mg IV. Discussed with Daughter Dinorah Foster and agrees with plan.   - Mucositis pain in throat; Magic mouth wash helping  - continue to monitor TLS labs daily   - Continue febuxostat; (allopurinol discontinued due to transaminitis 3x above normal and skin rash -  now resolved)   - Patient will require chemotherapy regimen (cytoxan, prednisone, rituxan) every 3 weeks for 6 cycles. Patient will need labs drawn once a week and will need follow up with Dr Esparza shortly after discharge (Likely week of 8/16/2021). If patient is discharged to rehab, next chemotherapy would be started on week of august 23rd.     New onset rash on back; transaminitis 3x upper limit of normal (resolved)  -Unclear etiology; significantly improved since yesterday   -Dermatology consulted by Primary team; considering possible antibiotic as culprit;   -Transaminitis and rash resolved;   - continue to monitor.     #Leukocytosis  -Leukocytosis with left shift with many neutrophils seen on the peripheral smear. Improving since on steroids  -Febrile to 103 on admission; no clear source of infection found with work up.   - completed 5 days vanco and Zosyn;   - per pulm/primary team     # Peripheral Edema lower extremities   - likely secondary to the fluids given for BP resuscitation yesterday  - recommend compression socks   - recommend dose of lasix      #Hypercalcemia (resolved)      Jess Nelson MD  PGY 4, Oncology/Hematology fellow  (P) [phone]  After 5pm, please contact on-call team.

## 2021-08-12 NOTE — CHART NOTE - NSCHARTNOTESELECT_GEN_ALL_CORE
Denies known Latex allergy or symptoms of Latex sensitivity.  Medications verified, no changes.    Patient here for recheck of anxiety.    Health Maintenance Summary     Topic Due On Due Status Completed On    MAMMOGRAM - BREAST CANCER SCREENING Jan 11, 2019 Not Due Jan 11, 2017    Colorectal Cancer Screening - Colonoscopy Aug 22, 2024 Not Due Aug 22, 2014    Immunization-Zoster  Completed Apr 29, 2013    Immunization - TDAP Pregnancy  Hidden     IMMUNIZATION - DTaP/Tdap/Td Jan 12, 2027 Not Due Jan 12, 2017    Immunization-Influenza  Completed Oct 17, 2016          Patient is up to date, no discussion needed .      
Event Note
Nutrition Follow-up Note-Registered Dietitian/Nutrition Services
Off Service Note
SUBJECTIVE: The patient is seen today for follow-up of  anxiety.   Overall feeling much better. Now retired. Her aging parents are living at [facility]. She still has significant stress trying to help them with her healthcare.  Sleeping well. Walking for exercise. Mild concerns about depression but nothing significant. Denies crying episodes. Going to counseling and her therapist thinks that she is doing well.  Declines seeing a psychiatrist.  Recent diagnosis of asthma and under control with current meds.  Started piano lessons  Needs to follow-up with Dr. Burden neurosurgery in August but has not scheduled this appointment for her meningioma.    Past medical history including allergies, medications, adult illnesses, social history reviewed.    OBJECTIVE:    Blood pressure 116/70, pulse 72, weight 64.1 kg, last menstrual period 10/22/1983.  Weight down 7 pounds since February, intentional.    General apperance: Healthy, alert, in no distress  Mental status: Alert and orientated X 3. Affect is varied and appropriate.    ASSESSMENT:Anxiety    PLAN: Medications refilled.  Followup in August or September for recheck.   Follow-up appointment with neurosurgery scheduled.  Encouraged increased exercise, mindfulness, meditation.  Encourage regular sleep, eating, exercise schedule.  Encouraged avoiding or limiting alcohol use. Minimize caffeine use.   See after visits summary for additional patient information.  
Event Note
Event Note
Hematology
Thoracic surgery

## 2021-08-12 NOTE — PROGRESS NOTE ADULT - ATTENDING COMMENTS
88 yo female with newly diagnosed DLBCL, non GCB, awaiting FISH for further risk stratification. After further discussion with family, they would like to pursue treatment with chemotherapy. EF is 65% on re-read. She has improved on steroids. We discussed the risks and benefits of curative intent treatments as well as other options. She would like to take the option of non-curative intent regimen that will have fewer side effects. WE discussed starting cyclophosphamide, rituximab and continuing prednisone. Her and her daughter have been consented for treatment. She is MARKEDLY improved since starting treatment and has had decreased drainage for pleurex catheter.   Received rituximab yesterday complicated by Grade 2 infusion reaction.   -completed prednisone 50 mg x 5 days   -TLS labs q 8 h   -continue IVF  -HIV negative, follow up hepatitis B serologies  -completed cytoxan (200 mg/m2 x 3 days), rituximab due today    -hold allopurinol in the setting of rash   -transaminitis resolved   -will likely need G-CSF upon completion of chemotherapy   -can arrange outpatient follow up with me on discharge  -PT consult
88 yo female with newly diagnosed DLBCL, non GCB, awaiting FISH for further risk stratification. After further discussion with family, they would like to pursue treatment with chemotherapy. She is currently not a candidate for an anthracycline given her EF of 27%- will repeat post-VATS biopsy. Would consider treatment with dose-reduced R-CEOP. To start low dose prednisone given potential for TLS to gently debulk her lymphoma and monitor for side effects. Suspect this will lead to symptomatic improvement.   -start prednisone 20 mg today, can increase if no evidence of TLS   -TLS labs q 8 h   start IVF at 50 cc hr given EF 27%, would increase as needed   -if responding, pleural effusion should improve and can hopefully wean O2 requirement   -check HIV/Hepatitis serologies   -consider bone marrow biopsy as part of initial staging
89 yo female with newly diagnosed DLBCL, non GCB, awaiting FISH for further risk stratification. After further discussion with family, they would like to pursue treatment with chemotherapy. EF is 65% on re-read. She has improved on steroids. We discussed the risks and benefits of curative intent treatments as well as other options. She would like to take the option of non-curative intent regimen that will have fewer side effects. WE discussed starting cyclophosphamide, rituximab and continuing prednisone. Her and her daughter have been consented for treatment. She is MARKEDLY improved since starting treatment and has had decreased drainage for pleurex catheter.   Received rituximab 8/10 complicated by Grade 2 infusion reaction.   -rechallenge with rituximab on 8/12 to complete dose  -completed prednisone 50 mg x 5 days   -TLS labs q 8 h   -continue IVF  -HIV negative, follow up hepatitis B serologies  -completed cytoxan (200 mg/m2 x 3 days), rituximab due today    -hold allopurinol in the setting of rash   -transaminitis resolved   -will likely need G-CSF upon completion of chemotherapy, ANC WNL currently   -can arrange outpatient follow up with me on discharge  -PT consult  -patient will need to continue chemotherapy long term for disease control, currently planned for every 2-3 weeks, will need to discuss dispo plan with BEATRIZ
88 yo female with newly diagnosed DLBCL, non GCB, awaiting FISH for further risk stratification. After further discussion with family, they would like to pursue treatment with chemotherapy. EF is 65% on re-read. She has improved on steroids. We discussed the risks and benefits of curative intent treatments as well as other options. She would like to take the option of non-curative intent regimen that will have fewer side effects. WE discussed starting cyclophosphamide, rituximab and continuing prednisone. Her and her daughter have been consented for treatment   -continue prednisone 50 mg x 5 days   -TLS labs q 8 h   -continue IVF  -HIV negative, follow up hepatitis B serologies  -cytoxan 200 mg/m2 (Day 2/3)    -hold allopurinol in the setting of rash   -mild transaminitis (Grade 1) hold allopurinol- ?related to lymphoma, overall stable   -will likely need GCSF upon completion of cheotherapy   -can arrange outpatient follow up with me on discharge  -PT consult
88 yo female with newly diagnosed DLBCL, non GCB, awaiting FISH for further risk stratification. After further discussion with family, they would like to pursue treatment with chemotherapy. EF is 65% on re-read. She has improved on steroids. We discussed the risks and benefits of curative intent treatments as well as other options. She would like to take the option of non-curative intent regimen that will have fewer side effects. WE discussed starting cyclophosphamide, rituximab and continuing prednisone. Her and her daughter have been consented for treatment. She is MARKEDLY improved since starting treatment and has had decreased drainage for pleurex catheter.   -continue prednisone 50 mg x 5 days   -TLS labs q 8 h   -continue IVF  -HIV negative, follow up hepatitis B serologies  -completed cytoxan (200 mg/m2 x 3 days), will add rituximab     -hold allopurinol in the setting of rash   -transaminitis resolved   -will likely need GCSF upon completion of chemotherapy   -can arrange outpatient follow up with me on discharge  -PT consult
89 yo female with newly diagnosed DLBCL, non GCB, awaiting FISH for further risk stratification. After further discussion with family, they would like to pursue treatment with chemotherapy. EF is 65% on re-read. She has improved on steroids. We discussed the risks and benefits of curative intent treatments as well as other options. She would like to take the option of non-curative intent regimen that will have fewer side effects. WE discussed starting cyclophosphamide, rituximab and continuing prednisone. Her and her daughter have been consented for treatment. She is MARKEDLY improved since starting treatment and has had decreased drainage for pleurex catheter.   -completed prednisone 50 mg x 5 days   -TLS labs q 8 h   -continue IVF  -HIV negative, follow up hepatitis B serologies  -completed cytoxan (200 mg/m2 x 3 days), rituximab due today    -hold allopurinol in the setting of rash   -transaminitis resolved   -will likely need GCSF upon completion of chemotherapy   -can arrange outpatient follow up with me on discharge  -PT consult
newly diagnosed DLBCL, non GCB, awaiting FISH for further risk stratification. Pt was overwhelmed with diagnosis, she didn't want to agree to bone marrow biopsy for staging until sh has time to consider and to discuss with daughter. suspect stage 4 given pleural effusions, tumor burden, hypercalcemia, and B sx. Given age, comorbidities, and performance status at high risk for TLS and myelosuppression from chemo. Will reassess extent of work-up tomorrow after she has had time to think about the diagnosis.
88 yo female with newly diagnosed DLBCL, non GCB, awaiting FISH for further risk stratification. After further discussion with family, they would like to pursue treatment with chemotherapy. EF is 65% on re-read. She has improved on steroids. We discussed the risks and benefits of curative intent treatments as well as other options. She would like to take the option of non-curative intent regimen that will have fewer side effects. WE discussed starting cyclophosphamide, rituximab and continuing prednisone. Plan to start treatment tomorrow after discussing with patients daughter- she request we obtain consent from her. -  -continue prednisone 50 mg x 5 days   -TLS labs q 8 h   -continue IVF  -HIV negative, follow up hepatitis B serologies  -check bone marrow biopsy   -cytoxan and rituximab can be given peripherally, can consider adding agents as outpatient depending on tolerance to treatment
88 yo female with newly diagnosed DLBCL, non GCB, awaiting FISH for further risk stratification. After further discussion with family, they would like to pursue treatment with chemotherapy. She is currently not a candidate for an anthracycline given her EF of 27%- will repeat post-VATS biopsy. Would consider treatment with dose-reduced R-CEOP. To start low dose prednisone given potential for TLS to gently debulk her lymphoma and monitor for side effects. Suspect this will lead to symptomatic improvement.   -start prednisone 20 mg today, can increase if no evidence of TLS to 50 mg daily   -TLS labs q 8 h   -continue IVF  -please confirm what her actual EF is as this is important for determining chemotherapy regimen   -if responding, pleural effusion should improve and can hopefully wean O2 requirement   -HIV negative, follow up hepatitis B serologies  -check bone marrow biopsy   -will need PICC line placement
88 yo female with newly diagnosed DLBCL, non GCB, awaiting FISH for further risk stratification. After further discussion with family, they would like to pursue treatment with chemotherapy. EF is 65% on re-read. She has improved on steroids. We discussed the risks and benefits of curative intent treatments as well as other options. She would like to take the option of non-curative intent regimen that will have fewer side effects. WE discussed starting cyclophosphamide, rituximab and continuing prednisone. Her and her daughter have been consented for treatment   -continue prednisone 50 mg x 5 days   -TLS labs q 8 h   -continue IVF  -HIV negative, follow up hepatitis B serologies  -cytoxan 200 mg/m2 today   -hold allopurinol in the setting of rash   -mild transaminitis (Grade 1) hold allopurinol- ?related to lymphoma
Hypercalcemia with elevated 1,25D likely secondary to recently diagnosed lymphoma.  Defer need for bisphosphonate instead prefer to use prednisone if ok per oncology.  Will follow.    Kenney Irizarry MD  Division of Endocrinology  Pager: [phone]    If after 6PM or before 9AM, or on weekends/holidays, please call endocrine answering service for assistance ([phone]).  For nonurgent matters email [email or web address] for assistance.
Hypercalcemia with elevated 1,25D likely secondary to recently diagnosed lymphoma.  Defer need for bisphosphonate instead prefer to use prednisone if ok per oncology.  Will follow.    Kenney Irizarry MD  Division of Endocrinology  Pager: [phone]    If after 6PM or before 9AM, or on weekends/holidays, please call endocrine answering service for assistance ([phone]).  For nonurgent matters email [email or web address] for assistance.
Hypercalcemia with elevated 1,25D likely secondary to recently diagnosed lymphoma.  Initiated on prednisone therapy, anticipate further improvement of calcium on prednisone.  Defer management to oncology at this point. Will sign off please call if questions.  Will follow.    Kenney Irizarry MD  Division of Endocrinology  Pager: [phone]    If after 6PM or before 9AM, or on weekends/holidays, please call endocrine answering service for assistance ([phone]).  For nonurgent matters email [email or web address] for assistance.
Hypercalcemia with elevated 1,25D likely secondary to recently diagnosed lymphoma.  Initiated on prednisone therapy, rate of IVF lowered, trend calcium.  Will follow.    Kenney Irizarry MD  Division of Endocrinology  Pager: [phone]    If after 6PM or before 9AM, or on weekends/holidays, please call endocrine answering service for assistance ([phone]).  For nonurgent matters email [email or web address] for assistance.

## 2021-08-12 NOTE — PROGRESS NOTE ADULT - PROBLEM SELECTOR PLAN 1
new diagnosis of DLBCL, confirmed on path   - Prednisone 20mg on 8/3 & 8/4   - Prednisone 50mg x 5 days and Cyclophosphamide initiation 8/5- 8/7 ; s/p partial rituxan on 8/10. pt could not tolerate. f/u heme: re-challenge today on 8/12   - monitor TLS labs Q8H   - IVF @ 50cc/h   - HIV, hepatitis labs negative, repeat TTE with normal EF   - pleural fluid cytopath sent on 8/9,   - Hematology following, on discharge Patient will need labs drawn once a week and will need follow up with Dr Esparza shortly after discharge. If patient is discharged to rehab, next chemotherapy would be week of august 23rd.

## 2021-08-12 NOTE — PROGRESS NOTE ADULT - SUBJECTIVE AND OBJECTIVE BOX
[facility]  Division of Hospital Medicine  Genie Flores MD  Pager: [phone]      Patient is a 89y old  Female who presents with a chief complaint of SOB (12 Aug 2021 11:01)      SUBJECTIVE / OVERNIGHT EVENTS: Plan to re-attempt rituxan challenge today. Assessed at bedside. pt sitting in chair. No medical complaints. No f/c/chest pain or SOB.   ADDITIONAL REVIEW OF SYSTEMS:    MEDICATIONS  (STANDING):  acetaminophen   Tablet .. 650 milliGRAM(s) Oral once  aMIOdarone    Tablet 200 milliGRAM(s) Oral daily  diphenhydrAMINE   Injectable 50 milliGRAM(s) IV Push once  diphenhydrAMINE   Injectable 25 milliGRAM(s) IV Push once  enoxaparin Injectable 40 milliGRAM(s) SubCutaneous daily  febuxostat 40 milliGRAM(s) Oral daily  FIRST- Mouthwash  BLM 5 milliLiter(s) Swish and Swallow every 6 hours  lidocaine   5% Patch 1 Patch Transdermal daily  multivitamin 1 Tablet(s) Oral daily  sodium chloride 0.9%. 1000 milliLiter(s) (50 mL/Hr) IV Continuous <Continuous>  triamcinolone 0.1% Cream 1 Application(s) Topical two times a day    MEDICATIONS  (PRN):  acetaminophen   Tablet .. 650 milliGRAM(s) Oral every 6 hours PRN Temp greater or equal to 38.5C (101.3F), Mild Pain (1 - 3), Moderate Pain (4 - 6), Severe Pain (7 - 10)  ALPRAZolam 0.25 milliGRAM(s) Oral two times a day PRN anxiety  aluminum hydroxide/magnesium hydroxide/simethicone Suspension 30 milliLiter(s) Oral every 4 hours PRN Dyspepsia  diphenhydrAMINE   Injectable 25 milliGRAM(s) IV Push once PRN infusion reaction  diphenhydrAMINE   Injectable 25 milliGRAM(s) IV Push every 6 hours PRN Allergy symptoms  hydrocortisone sodium succinate Injectable 100 milliGRAM(s) IV Push once PRN infusion reaction  melatonin 3 milliGRAM(s) Oral at bedtime PRN Insomnia  meperidine     Injectable 25 milliGRAM(s) IV Push once PRN infusion reaction  metoclopramide Injectable 10 milliGRAM(s) IV Push every 6 hours PRN Nausea  ondansetron Injectable 6 milliGRAM(s) IV Push every 8 hours PRN Nausea  ondansetron Injectable 4 milliGRAM(s) IV Push every 8 hours PRN Nausea and/or Vomiting      CAPILLARY BLOOD GLUCOSE        I&O's Summary    11 Aug 2021 07:01  -  12 Aug 2021 07:00  --------------------------------------------------------  IN: 480 mL / OUT: 1205 mL / NET: -725 mL        PHYSICAL EXAM:  Vital Signs Last 24 Hrs  T(C): 36.7 (12 Aug 2021 05:39), Max: 36.7 (12 Aug 2021 05:39)  T(F): 98 (12 Aug 2021 05:39), Max: 98 (12 Aug 2021 05:39)  HR: 78 (12 Aug 2021 05:39) (75 - 79)  BP: 105/53 (12 Aug 2021 05:39) (105/53 - 114/59)  BP(mean): --  RR: 18 (12 Aug 2021 05:39) (17 - 18)  SpO2: 100% (12 Aug 2021 05:39) (100% - 100%)  CONSTITUTIONAL: elderly woman sitting in chair NAD, well-developed woman on 2L NC   RESPIRATORY: Normal respiratory effort; diminished breath sounds w/ crackles bilaterally, R pleurex   CARDIOVASCULAR: Regular rate and rhythm, normal S1 and S2, no murmur/rub/gallop; No lower extremity edema; Peripheral pulses are 2+ bilaterally  ABDOMEN: Nontender to palpation, normoactive bowel sounds, no rebound/guarding; No hepatosplenomegaly  MUSCULOSKELETAL no clubbing or cyanosis of digits; no joint swelling or tenderness to palpation  PSYCH: Alert and oriented,        LABS:                        7.4    10.65 )-----------( 261      ( 12 Aug 2021 07:06 )             24.0     08-12    140  |  105  |  24<H>  ----------------------------<  84  3.7   |  22  |  0.73    Ca    8.9      12 Aug 2021 07:06  Phos  3.0     08-12  Mg     1.80     08-12    TPro  4.4<L>  /  Alb  2.4<L>  /  TBili  0.2  /  DBili  x   /  AST  14  /  ALT  24  /  AlkPhos  91  08-12          Urinalysis Basic - ( 11 Aug 2021 02:52 )    Color: Light Yellow / Appearance: Clear / S.012 / pH: x  Gluc: x / Ketone: Negative  / Bili: Negative / Urobili: <2 mg/dL   Blood: x / Protein: Negative / Nitrite: Negative   Leuk Esterase: Negative / RBC: x / WBC x   Sq Epi: x / Non Sq Epi: x / Bacteria: x        Culture - Blood (collected 11 Aug 2021 08:27)  Source: .Blood Blood-Venous  Preliminary Report (12 Aug 2021 09:01):    No growth to date.    Culture - Blood (collected 11 Aug 2021 08:27)  Source: .Blood Blood-Peripheral  Preliminary Report (12 Aug 2021 09:01):    No growth to date.        RADIOLOGY & ADDITIONAL TESTS:  Results Reviewed:   Imaging Personally Reviewed:  Electrocardiogram Personally Reviewed:    COORDINATION OF CARE:  Care Discussed with Consultants/Other Providers [Y/N]:  Prior or Outpatient Records Reviewed [Y/N]:

## 2021-08-12 NOTE — CHART NOTE - NSCHARTNOTEFT_GEN_A_CORE
Source: Patient [X] Alert    Family [ ]     other [X] electronic chart, RN     Diet : Diet, Regular:   DASH/TLC {Sodium & Cholesterol Restricted} (DASH)  Supplement Feeding Modality:  Oral  Ensure Clear Cans or Servings Per Day:  1       Frequency:  Two Times a day (07-30-21 @ 10:40)    Nutrition Follow-up Note. Patient with variable po intake during the course of admission. Patient consumes <% of meals per nursing flowsheet. Also consumes meals brought by family. Patient likes to drink PO supplement Ensure Enlive, therefore would change to PO supplement Ensure Enlive to optimize kcal and protein intake. No nausea/vomiting/diarrhea/constipation or difficulty chewing and swallowing reported. Continue encouragement during mealtime suggested to promote po intake. Encouraged to have po supplement between meals as added calories and protein.     Weight (kg): 60.6 (08-10 @ 10:03), 61.4kg (8/5), 49.8kg (7/28)     Pertinent Medications: aMIOdarone    Tablet  diphenhydrAMINE   Injectable  febuxostat  multivitamin  sodium chloride 0.9%.    Pertinent Labs:  08-12 Na140 mmol/L Glu 84 mg/dL K+ 3.7 mmol/L Cr  0.73 mg/dL BUN 24 mg/dL<H> 08-12 Phos 3.0 mg/dL 08-12 Alb 2.4 g/dL<L>    Skin: intact.   +2 b/l foot edema noted.    Estimated Needs:   [X ] no change since previous assessment  [ ] recalculated:       Previous Nutrition Diagnosis:    [X ] Malnutrition, Severe     Nutrition Diagnosis is [ X] ongoing  [ ] resolved [ ] not applicable     Additional Recommendations:     1. Recommend liberalizing diet to Regular and D/C DASH/TLC (cholesterol and sodium restricted) diet.   2. Recommend change PO supplement to PO supplement Ensure Enlive 240mls 2x daily (700kcal, 40g protein) for added calories and protein.   3. Please Encourage po intake, assist with meals and menu selections, provide alternatives PRN.   4. Honor food and beverage preferences within diet restriction of patient in an effort to maximize level of nutrient intake. Source: Patient [X] Alert    Family [ ]     other [X] electronic chart, RN     Diet : Diet, Regular:   DASH/TLC {Sodium & Cholesterol Restricted} (DASH)  Supplement Feeding Modality:  Oral  Ensure Clear Cans or Servings Per Day:  1       Frequency:  Two Times a day (07-30-21 @ 10:40)    Nutrition Follow-up Note. Patient with variable po intake during the course of admission. Patient consumes <% of meals per nursing flowsheet. Also consumes meals brought by family. Patient likes to drink PO supplement Ensure Enlive, therefore would change to PO supplement Ensure Enlive to optimize kcal and protein intake. No nausea/vomiting/diarrhea/constipation or difficulty chewing and swallowing reported. Continue encouragement during mealtime suggested to promote po intake. Encouraged to have po supplement between meals as added calories and protein.     Weight (kg): 60.6 (08-10 @ 10:03), 61.4kg (8/5), 49.8kg (7/28)     Pertinent Medications: aMIOdarone    Tablet  diphenhydrAMINE   Injectable  febuxostat  multivitamin  sodium chloride 0.9%.    Pertinent Labs:  08-12 Na140 mmol/L Glu 84 mg/dL K+ 3.7 mmol/L Cr  0.73 mg/dL BUN 24 mg/dL<H> 08-12 Phos 3.0 mg/dL 08-12 Alb 2.4 g/dL<L>    Skin: intact.   +2 b/l foot edema noted.    Estimated Needs:   [X ] no change since previous assessment  [ ] recalculated:       Previous Nutrition Diagnosis:    [X ] Malnutrition, Severe     Nutrition Diagnosis is [ X] ongoing  [ ] resolved [ ] not applicable     Additional Recommendations:     1. Recommend liberalizing diet to Regular and D/C DASH/TLC (cholesterol and sodium restricted) diet.   2. Recommend change PO supplement to PO supplement Ensure Enlive 240mls 2x daily (700kcal, 40g protein) for added calories and protein.   3. Please Encourage po intake, assist with meals and menu selections, provide alternatives PRN.   4. Honor food and beverage preferences within diet restriction of patient in an effort to maximize level of nutrient intake.  5. Consider appetite stimulant as clinically able.

## 2021-08-13 ENCOUNTER — TRANSCRIPTION ENCOUNTER (OUTPATIENT)
Age: 86
End: 2021-08-13

## 2021-08-13 VITALS
RESPIRATION RATE: 17 BRPM | SYSTOLIC BLOOD PRESSURE: 112 MMHG | OXYGEN SATURATION: 100 % | TEMPERATURE: 98 F | HEART RATE: 75 BPM | DIASTOLIC BLOOD PRESSURE: 62 MMHG

## 2021-08-13 LAB
ALBUMIN SERPL ELPH-MCNC: 2.5 G/DL — LOW (ref 3.3–5)
ALP SERPL-CCNC: 92 U/L — SIGNIFICANT CHANGE UP (ref 40–120)
ALT FLD-CCNC: 21 U/L — SIGNIFICANT CHANGE UP (ref 4–33)
ANION GAP SERPL CALC-SCNC: 11 MMOL/L — SIGNIFICANT CHANGE UP (ref 7–14)
AST SERPL-CCNC: 11 U/L — SIGNIFICANT CHANGE UP (ref 4–32)
BASOPHILS # BLD AUTO: 0.01 K/UL — SIGNIFICANT CHANGE UP (ref 0–0.2)
BASOPHILS NFR BLD AUTO: 0.2 % — SIGNIFICANT CHANGE UP (ref 0–2)
BILIRUB SERPL-MCNC: <0.2 MG/DL — SIGNIFICANT CHANGE UP (ref 0.2–1.2)
BUN SERPL-MCNC: 22 MG/DL — SIGNIFICANT CHANGE UP (ref 7–23)
CALCIUM SERPL-MCNC: 9.3 MG/DL — SIGNIFICANT CHANGE UP (ref 8.4–10.5)
CHLORIDE SERPL-SCNC: 105 MMOL/L — SIGNIFICANT CHANGE UP (ref 98–107)
CO2 SERPL-SCNC: 23 MMOL/L — SIGNIFICANT CHANGE UP (ref 22–31)
CREAT SERPL-MCNC: 0.67 MG/DL — SIGNIFICANT CHANGE UP (ref 0.5–1.3)
EOSINOPHIL # BLD AUTO: 0 K/UL — SIGNIFICANT CHANGE UP (ref 0–0.5)
EOSINOPHIL NFR BLD AUTO: 0 % — SIGNIFICANT CHANGE UP (ref 0–6)
GLUCOSE SERPL-MCNC: 140 MG/DL — HIGH (ref 70–99)
HCT VFR BLD CALC: 23.8 % — LOW (ref 34.5–45)
HGB BLD-MCNC: 7.1 G/DL — LOW (ref 11.5–15.5)
IANC: 5.33 K/UL — SIGNIFICANT CHANGE UP (ref 1.5–8.5)
IMM GRANULOCYTES NFR BLD AUTO: 0.6 % — SIGNIFICANT CHANGE UP (ref 0–1.5)
LDH SERPL L TO P-CCNC: 120 U/L — LOW (ref 135–225)
LYMPHOCYTES # BLD AUTO: 1.05 K/UL — SIGNIFICANT CHANGE UP (ref 1–3.3)
LYMPHOCYTES # BLD AUTO: 16 % — SIGNIFICANT CHANGE UP (ref 13–44)
MAGNESIUM SERPL-MCNC: 1.7 MG/DL — SIGNIFICANT CHANGE UP (ref 1.6–2.6)
MCHC RBC-ENTMCNC: 24.1 PG — LOW (ref 27–34)
MCHC RBC-ENTMCNC: 29.8 GM/DL — LOW (ref 32–36)
MCV RBC AUTO: 80.7 FL — SIGNIFICANT CHANGE UP (ref 80–100)
MONOCYTES # BLD AUTO: 0.14 K/UL — SIGNIFICANT CHANGE UP (ref 0–0.9)
MONOCYTES NFR BLD AUTO: 2.1 % — SIGNIFICANT CHANGE UP (ref 2–14)
NEUTROPHILS # BLD AUTO: 5.33 K/UL — SIGNIFICANT CHANGE UP (ref 1.8–7.4)
NEUTROPHILS NFR BLD AUTO: 81.1 % — HIGH (ref 43–77)
NRBC # BLD: 0 /100 WBCS — SIGNIFICANT CHANGE UP
NRBC # FLD: 0 K/UL — SIGNIFICANT CHANGE UP
PHOSPHATE SERPL-MCNC: 2.6 MG/DL — SIGNIFICANT CHANGE UP (ref 2.5–4.5)
PLATELET # BLD AUTO: 236 K/UL — SIGNIFICANT CHANGE UP (ref 150–400)
POTASSIUM SERPL-MCNC: 3.8 MMOL/L — SIGNIFICANT CHANGE UP (ref 3.5–5.3)
POTASSIUM SERPL-SCNC: 3.8 MMOL/L — SIGNIFICANT CHANGE UP (ref 3.5–5.3)
PROT SERPL-MCNC: 4.7 G/DL — LOW (ref 6–8.3)
RBC # BLD: 2.95 M/UL — LOW (ref 3.8–5.2)
RBC # FLD: 19.4 % — HIGH (ref 10.3–14.5)
SODIUM SERPL-SCNC: 139 MMOL/L — SIGNIFICANT CHANGE UP (ref 135–145)
URATE SERPL-MCNC: 1.3 MG/DL — LOW (ref 2.5–7)
WBC # BLD: 6.57 K/UL — SIGNIFICANT CHANGE UP (ref 3.8–10.5)
WBC # FLD AUTO: 6.57 K/UL — SIGNIFICANT CHANGE UP (ref 3.8–10.5)

## 2021-08-13 PROCEDURE — 99239 HOSP IP/OBS DSCHRG MGMT >30: CPT

## 2021-08-13 RX ORDER — SENNA PLUS 8.6 MG/1
2 TABLET ORAL
Qty: 0 | Refills: 0 | DISCHARGE
Start: 2021-08-13

## 2021-08-13 RX ORDER — AMIODARONE HYDROCHLORIDE 400 MG/1
1 TABLET ORAL
Qty: 0 | Refills: 0 | DISCHARGE
Start: 2021-08-13

## 2021-08-13 RX ORDER — SENNA PLUS 8.6 MG/1
2 TABLET ORAL AT BEDTIME
Refills: 0 | Status: DISCONTINUED | OUTPATIENT
Start: 2021-08-13 | End: 2021-08-13

## 2021-08-13 RX ORDER — POLYETHYLENE GLYCOL 3350 17 G/17G
17 POWDER, FOR SOLUTION ORAL DAILY
Refills: 0 | Status: DISCONTINUED | OUTPATIENT
Start: 2021-08-13 | End: 2021-08-13

## 2021-08-13 RX ORDER — SENNA PLUS 8.6 MG/1
2 TABLET ORAL ONCE
Refills: 0 | Status: COMPLETED | OUTPATIENT
Start: 2021-08-13 | End: 2021-08-13

## 2021-08-13 RX ORDER — POLYETHYLENE GLYCOL 3350 17 G/17G
17 POWDER, FOR SOLUTION ORAL
Qty: 0 | Refills: 0 | DISCHARGE
Start: 2021-08-13

## 2021-08-13 RX ADMIN — FEBUXOSTAT 40 MILLIGRAM(S): 40 TABLET ORAL at 11:46

## 2021-08-13 RX ADMIN — Medication 1 TABLET(S): at 11:46

## 2021-08-13 RX ADMIN — ENOXAPARIN SODIUM 40 MILLIGRAM(S): 100 INJECTION SUBCUTANEOUS at 11:47

## 2021-08-13 RX ADMIN — LIDOCAINE 1 PATCH: 4 CREAM TOPICAL at 13:49

## 2021-08-13 RX ADMIN — Medication 650 MILLIGRAM(S): at 02:38

## 2021-08-13 RX ADMIN — SENNA PLUS 2 TABLET(S): 8.6 TABLET ORAL at 11:46

## 2021-08-13 RX ADMIN — Medication 650 MILLIGRAM(S): at 03:38

## 2021-08-13 RX ADMIN — Medication 1 APPLICATION(S): at 05:46

## 2021-08-13 RX ADMIN — AMIODARONE HYDROCHLORIDE 200 MILLIGRAM(S): 400 TABLET ORAL at 05:45

## 2021-08-13 RX ADMIN — POLYETHYLENE GLYCOL 3350 17 GRAM(S): 17 POWDER, FOR SOLUTION ORAL at 11:46

## 2021-08-13 NOTE — DISCHARGE NOTE NURSING/CASE MANAGEMENT/SOCIAL WORK - PATIENT PORTAL LINK FT
You can access the FollowMyHealth Patient Portal offered by [facility] by registering at the following website: [email or web address]. By joining [facility]’s FollowMyHealth portal, you will also be able to view your health information using other applications (apps) compatible with our system.

## 2021-08-13 NOTE — PROGRESS NOTE ADULT - PROBLEM SELECTOR PROBLEM 1
Lymphoma, unspecified body region, unspecified lymphoma type
Hypercalcemia
Sepsis without acute organ dysfunction, due to unspecified organism
Lymphoma, unspecified body region, unspecified lymphoma type
Hypercalcemia
Lymphoma, unspecified body region, unspecified lymphoma type
Lymphoma, unspecified body region, unspecified lymphoma type
Sepsis without acute organ dysfunction, due to unspecified organism
Sepsis without acute organ dysfunction, due to unspecified organism
Lymphoma, unspecified body region, unspecified lymphoma type
Sepsis without acute organ dysfunction, due to unspecified organism

## 2021-08-13 NOTE — PROGRESS NOTE ADULT - SUBJECTIVE AND OBJECTIVE BOX
[facility]  Division of Hospital Medicine  Genie Flores MD  Pager: [phone]      Patient is a 89y old  Female who presents with a chief complaint of SOB (13 Aug 2021 09:38)      SUBJECTIVE / OVERNIGHT EVENTS Patient examined at bedside. Reports some constipation. Otherwise, no medical complaints. :  ADDITIONAL REVIEW OF SYSTEMS:    MEDICATIONS  (STANDING):  aMIOdarone    Tablet 200 milliGRAM(s) Oral daily  diphenhydrAMINE   Injectable 50 milliGRAM(s) IV Push once  enoxaparin Injectable 40 milliGRAM(s) SubCutaneous daily  febuxostat 40 milliGRAM(s) Oral daily  FIRST- Mouthwash  BLM 5 milliLiter(s) Swish and Swallow every 6 hours  lidocaine   5% Patch 1 Patch Transdermal daily  multivitamin 1 Tablet(s) Oral daily  sodium chloride 0.9%. 1000 milliLiter(s) (50 mL/Hr) IV Continuous <Continuous>  triamcinolone 0.1% Cream 1 Application(s) Topical two times a day    MEDICATIONS  (PRN):  acetaminophen   Tablet .. 650 milliGRAM(s) Oral every 6 hours PRN Temp greater or equal to 38.5C (101.3F), Mild Pain (1 - 3), Moderate Pain (4 - 6), Severe Pain (7 - 10)  ALPRAZolam 0.25 milliGRAM(s) Oral two times a day PRN anxiety  aluminum hydroxide/magnesium hydroxide/simethicone Suspension 30 milliLiter(s) Oral every 4 hours PRN Dyspepsia  diphenhydrAMINE   Injectable 25 milliGRAM(s) IV Push once PRN infusion reaction  diphenhydrAMINE   Injectable 25 milliGRAM(s) IV Push every 6 hours PRN Allergy symptoms  hydrocortisone sodium succinate Injectable 100 milliGRAM(s) IV Push once PRN infusion reaction  melatonin 3 milliGRAM(s) Oral at bedtime PRN Insomnia  meperidine     Injectable 25 milliGRAM(s) IV Push once PRN infusion reaction  metoclopramide Injectable 10 milliGRAM(s) IV Push every 6 hours PRN Nausea  ondansetron Injectable 6 milliGRAM(s) IV Push every 8 hours PRN Nausea  ondansetron Injectable 4 milliGRAM(s) IV Push every 8 hours PRN Nausea and/or Vomiting  polyethylene glycol 3350 17 Gram(s) Oral daily PRN Constipation  senna 2 Tablet(s) Oral at bedtime PRN Constipation      CAPILLARY BLOOD GLUCOSE        I&O's Summary      PHYSICAL EXAM:  Vital Signs Last 24 Hrs  T(C): 36.4 (13 Aug 2021 13:31), Max: 37 (12 Aug 2021 14:46)  T(F): 97.5 (13 Aug 2021 13:31), Max: 98.6 (12 Aug 2021 14:46)  HR: 75 (13 Aug 2021 13:31) (75 - 91)  BP: 112/62 (13 Aug 2021 13:31) (101/79 - 142/70)  BP(mean): --  RR: 17 (13 Aug 2021 13:31) (17 - 18)  SpO2: 100% (13 Aug 2021 13:31) (97% - 100%)  CONSTITUTIONAL: elderly woman sitting in chair NAD, well-developed woman on 2L NC   RESPIRATORY: Normal respiratory effort; diminished breath sounds w/ crackles bilaterally, R pleurex   CARDIOVASCULAR: Regular rate and rhythm, normal S1 and S2, no murmur/rub/gallop; No lower extremity edema; Peripheral pulses are 2+ bilaterally  ABDOMEN: Nontender to palpation, normoactive bowel sounds, no rebound/guarding; No hepatosplenomegaly  MUSCULOSKELETAL no clubbing or cyanosis of digits; no joint swelling or tenderness to palpation  PSYCH: Alert and oriented,      LABS:                        7.1    6.57  )-----------( 236      ( 13 Aug 2021 07:43 )             23.8     08-13    139  |  105  |  22  ----------------------------<  140<H>  3.8   |  23  |  0.67    Ca    9.3      13 Aug 2021 07:43  Phos  2.6     08-13  Mg     1.70     08-13    TPro  4.7<L>  /  Alb  2.5<L>  /  TBili  <0.2  /  DBili  x   /  AST  11  /  ALT  21  /  AlkPhos  92  08-13              Culture - Blood (collected 11 Aug 2021 08:27)  Source: .Blood Blood-Venous  Preliminary Report (12 Aug 2021 09:01):    No growth to date.    Culture - Blood (collected 11 Aug 2021 08:27)  Source: .Blood Blood-Peripheral  Preliminary Report (12 Aug 2021 09:01):    No growth to date.        RADIOLOGY & ADDITIONAL TESTS:  Results Reviewed:   Imaging Personally Reviewed:  Electrocardiogram Personally Reviewed:    COORDINATION OF CARE:  Care Discussed with Consultants/Other Providers [Y/N]:  Prior or Outpatient Records Reviewed [Y/N]:

## 2021-08-13 NOTE — PROGRESS NOTE ADULT - ASSESSMENT
89 year old woman with a pmh of COPD and HFrEF (EF 26%) that with chronic cough, not improved with steroids nor Abx. Now s/p R robotic VATS/pleural bx with placement of pleurex catheter on 07/15/21 after PET from 07/07/21 reveal b/l upper lobe opacities concerning for malignancy with pleural effusion. Hematology consulted for newly diagnosed DLBCL.    #DLBCL  -S/p Right peritracheal lymph node, level 4 biopsy: Diffuse large B cell lymphoma, non-germinal center B cell like immunophenotype  -Will need pleural fluid cytopath and flow if tapped again  -Peripheral blood flow cytometry: absolute monocytosis with marked leukocytosis and neutrophilia  -Bone marrow biopsy not performed given that it would not  at this time.   - May need to get LP with IT chemo eventually   - Repeat Echo showing EF of 65%;   -Patient and Family both confirm wanting treatment; however discussed with patient regarding intensity of treatment regimen.Patient wishing treatment however would like to limit hospital visits due to severe neutropenia, fevers ect.  Discussed at length with daughter Dinorah, chemotherapy regimens, possible AEs, benefits vs risks. Agreed on gentle initiation of Prednisone and cyclophosphamide + Rituxan. Discussed 8/9/2021 with daughter at bedside addition of Rituxan to regimen. both patient and daughter in agreement with initiation of Rituxan.   - D1  8/3  Prednisone 20mg AM; D2 8/4 Prednisone 20mg AM, 30mg PM  D3-D7 Prednisone 50mg (completed on aug 9)  - Cyclophosphamide 200mg x 3 doses D18/5/21-8/7/21   - Rituxan 375mg/m2 8/11 with infusion reaction; had desaturation when infusion running at 150cc/hr. Received benadryl, demerol, hydrocortisone. When resumed at 75 cc/hr; patient became hypotensive and infusion was stopped. Fluids given with improvement in BP. Completed only 1/2 the bag. Rechallenge 8/12 succesfful with premedication of Tylenol 650mg, dexamethasone 12 mg, benadryl 25mg IV.   - Mucositis pain in throat; Magic mouth wash helping  - continue to monitor TLS labs daily   - Continue febuxostat; (allopurinol discontinued due to transaminitis 3x above normal and skin rash -  now resolved)   - Patient will require chemotherapy regimen (cytoxan, prednisone, rituxan) every 3 weeks for 6 cycles. Patient will need labs drawn once a week and will need follow up with Dr Esparza shortly after discharge (Likely week of 8/16/2021). If patient is discharged to rehab, next chemotherapy would be started on week of august 23rd.     New onset rash on back; transaminitis 3x upper limit of normal (resolved)  -Unclear etiology; significantly improved since yesterday   -Dermatology consulted by Primary team; considering possible antibiotic as culprit;   -Transaminitis and rash resolved;   - continue to monitor.     #Leukocytosis  -Leukocytosis with left shift with many neutrophils seen on the peripheral smear. Improving since on steroids  -Febrile to 103 on admission; no clear source of infection found with work up.   - completed 5 days vanco and Zosyn;   - per pulm/primary team     # Peripheral Edema lower extremities   - likely secondary to the fluids given for BP resuscitation yesterday  - recommend compression socks   - recommend dose of lasix      #Hypercalcemia (resolved)      INCOMPLETE - WILL UPDATE LATER    Jess Nelson MD  PGY 4, Oncology/Hematology fellow  (P) [phone]  After 5pm, please contact on-call team.

## 2021-08-13 NOTE — PROGRESS NOTE ADULT - ASSESSMENT
PAF post op     in sinus  had episode of elevated HR yesterday ? afib   on amio   if pt needs antiemetic , then recommend to hold amio given high risk of qt prolongation

## 2021-08-13 NOTE — PROGRESS NOTE ADULT - PROBLEM SELECTOR PLAN 9
Enoxaparin SQ  Dispo: Likely [facility]
Enoxaparin SQ  Dispo: Likely [facility] - friday
Enoxaparin SQ  Dispo: [facility] - friday. 35 minutes spent discharge planning. For discharge, Patient will need labs drawn once a week and will need follow up with Dr Esparza shortly after discharge. If patient is discharged to rehab, next chemotherapy would be week of august 23rd.
Enoxaparin SQ
Enoxaparin SQ  Dispo: [facility] - friday. 35 minutes spent discharge planning. For discharge, Patient will need labs drawn once a week and will need follow up with Dr Esparza shortly after discharge. If patient is discharged to rehab, next chemotherapy would be week of august 23rd. 35 minutes spent discharge planning.
Enoxaparin SQ
Enoxaparin SQ  Dispo: Likely [facility]
Enoxaparin SQ

## 2021-08-13 NOTE — PROGRESS NOTE ADULT - SUBJECTIVE AND OBJECTIVE BOX
DATE OF SERVICE: 08-13-21 @ 09:38    Subjective: Patient seen and examined. No new events except as noted.     SUBJECTIVE/ROS:        MEDICATIONS:  MEDICATIONS  (STANDING):  aMIOdarone    Tablet 200 milliGRAM(s) Oral daily  diphenhydrAMINE   Injectable 50 milliGRAM(s) IV Push once  enoxaparin Injectable 40 milliGRAM(s) SubCutaneous daily  febuxostat 40 milliGRAM(s) Oral daily  FIRST- Mouthwash  BLM 5 milliLiter(s) Swish and Swallow every 6 hours  lidocaine   5% Patch 1 Patch Transdermal daily  multivitamin 1 Tablet(s) Oral daily  sodium chloride 0.9%. 1000 milliLiter(s) (50 mL/Hr) IV Continuous <Continuous>  triamcinolone 0.1% Cream 1 Application(s) Topical two times a day      PHYSICAL EXAM:  T(C): 36.7 (08-13-21 @ 06:03), Max: 37 (08-12-21 @ 14:46)  HR: 91 (08-13-21 @ 06:03) (76 - 91)  BP: 114/60 (08-13-21 @ 06:03) (101/79 - 142/70)  RR: 18 (08-13-21 @ 06:03) (17 - 18)  SpO2: 100% (08-13-21 @ 06:03) (97% - 100%)  Wt(kg): --  I&O's Summary          JVP: Normal  Neck: supple  Lung: clear   CV: S1 S2 , Murmur:  Abd: soft  Ext: No edema  neuro: Awake  Psych: flat affect  Skin: normal``    LABS/DATA:    CARDIAC MARKERS:                                7.1    6.57  )-----------( 236      ( 13 Aug 2021 07:43 )             23.8     08-13    139  |  105  |  22  ----------------------------<  140<H>  3.8   |  23  |  0.67    Ca    9.3      13 Aug 2021 07:43  Phos  2.6     08-13  Mg     1.70     08-13    TPro  4.7<L>  /  Alb  2.5<L>  /  TBili  <0.2  /  DBili  x   /  AST  11  /  ALT  21  /  AlkPhos  92  08-13    proBNP:   Lipid Profile:   HgA1c:   TSH:     TELE:  EKG:

## 2021-08-13 NOTE — PROGRESS NOTE ADULT - PROVIDER SPECIALTY LIST ADULT
Cardiology
Heme/Onc
Thoracic Surgery
Cardiology
Heme/Onc
Heme/Onc
Cardiology
Heme/Onc
Cardiology
Cardiology
Heme/Onc
Hospitalist
Cardiology
Cardiology
Endocrinology
Heme/Onc
Endocrinology
Hospitalist
Endocrinology
Hospitalist
Hospitalist
Endocrinology
Hospitalist

## 2021-08-13 NOTE — PROGRESS NOTE ADULT - REASON FOR ADMISSION
SOB

## 2021-08-16 ENCOUNTER — NON-APPOINTMENT (OUTPATIENT)
Age: 86
End: 2021-08-16

## 2021-08-17 ENCOUNTER — NON-APPOINTMENT (OUTPATIENT)
Age: 86
End: 2021-08-17

## 2021-08-18 ENCOUNTER — APPOINTMENT (OUTPATIENT)
Dept: HEMATOLOGY ONCOLOGY | Facility: CLINIC | Age: 86
End: 2021-08-18

## 2021-08-19 ENCOUNTER — APPOINTMENT (OUTPATIENT)
Dept: HEMATOLOGY ONCOLOGY | Facility: CLINIC | Age: 86
End: 2021-08-19

## 2021-08-19 ENCOUNTER — OUTPATIENT (OUTPATIENT)
Dept: OUTPATIENT SERVICES | Facility: HOSPITAL | Age: 86
LOS: 1 days | Discharge: HOME | End: 2021-08-19

## 2021-08-19 DIAGNOSIS — R91.8 OTHER NONSPECIFIC ABNORMAL FINDING OF LUNG FIELD: ICD-10-CM

## 2021-08-19 DIAGNOSIS — Z96.649 PRESENCE OF UNSPECIFIED ARTIFICIAL HIP JOINT: Chronic | ICD-10-CM

## 2021-08-26 ENCOUNTER — RESULT REVIEW (OUTPATIENT)
Age: 86
End: 2021-08-26

## 2021-08-26 ENCOUNTER — APPOINTMENT (OUTPATIENT)
Dept: HEMATOLOGY ONCOLOGY | Facility: CLINIC | Age: 86
End: 2021-08-26
Payer: MEDICARE

## 2021-08-26 ENCOUNTER — APPOINTMENT (OUTPATIENT)
Dept: INFUSION THERAPY | Facility: HOSPITAL | Age: 86
End: 2021-08-26

## 2021-08-26 DIAGNOSIS — R11.2 NAUSEA WITH VOMITING, UNSPECIFIED: ICD-10-CM

## 2021-08-26 DIAGNOSIS — Z51.11 ENCOUNTER FOR ANTINEOPLASTIC CHEMOTHERAPY: ICD-10-CM

## 2021-08-26 LAB
BASOPHILS # BLD AUTO: 0.25 K/UL — HIGH (ref 0–0.2)
BASOPHILS NFR BLD AUTO: 3 % — HIGH (ref 0–2)
EOSINOPHIL # BLD AUTO: 0.42 K/UL — SIGNIFICANT CHANGE UP (ref 0–0.5)
EOSINOPHIL NFR BLD AUTO: 5 % — SIGNIFICANT CHANGE UP (ref 0–6)
HCT VFR BLD CALC: 29.9 % — LOW (ref 34.5–45)
HGB BLD-MCNC: 9.4 G/DL — LOW (ref 11.5–15.5)
LYMPHOCYTES # BLD AUTO: 2.25 K/UL — SIGNIFICANT CHANGE UP (ref 1–3.3)
LYMPHOCYTES # BLD AUTO: 27 % — SIGNIFICANT CHANGE UP (ref 13–44)
MCHC RBC-ENTMCNC: 26 PG — LOW (ref 27–34)
MCHC RBC-ENTMCNC: 31.4 G/DL — LOW (ref 32–36)
MCV RBC AUTO: 82.8 FL — SIGNIFICANT CHANGE UP (ref 80–100)
MONOCYTES # BLD AUTO: 0.75 K/UL — SIGNIFICANT CHANGE UP (ref 0–0.9)
MONOCYTES NFR BLD AUTO: 9 % — SIGNIFICANT CHANGE UP (ref 2–14)
NEUTROPHILS # BLD AUTO: 4.67 K/UL — SIGNIFICANT CHANGE UP (ref 1.8–7.4)
NEUTROPHILS NFR BLD AUTO: 56 % — SIGNIFICANT CHANGE UP (ref 43–77)
NRBC # BLD: 0 /100 — SIGNIFICANT CHANGE UP (ref 0–0)
NRBC # BLD: SIGNIFICANT CHANGE UP /100 WBCS (ref 0–0)
PLAT MORPH BLD: NORMAL — SIGNIFICANT CHANGE UP
PLATELET # BLD AUTO: 280 K/UL — SIGNIFICANT CHANGE UP (ref 150–400)
RBC # BLD: 3.61 M/UL — LOW (ref 3.8–5.2)
RBC # FLD: 20.9 % — HIGH (ref 10.3–14.5)
RBC BLD AUTO: SIGNIFICANT CHANGE UP
WBC # BLD: 8.34 K/UL — SIGNIFICANT CHANGE UP (ref 3.8–10.5)
WBC # FLD AUTO: 8.34 K/UL — SIGNIFICANT CHANGE UP (ref 3.8–10.5)

## 2021-08-26 PROCEDURE — 99215 OFFICE O/P EST HI 40 MIN: CPT

## 2021-08-26 NOTE — ASSESSMENT
[FreeTextEntry1] : 89 yo female with new diagnosis of Stage IV DLBCL s/p treatment with RC and prednisone presents for follow up. Overall, tolerated treatment well without any significant side effects. She continues to improve in rehab. \par -continue RC and prednisone every 3 weeks, planned for 8/26 \par -monitor weekly CBC, CMP, Mag, Phos, uric acid ldh while on treatment \par -transfusional support as needed \par -continue valacyclovir \par -continue protonix \par -will plan for repeat imaging after cycle 2 \par -discussed that we could potentially add in medications to improve cure rate, will continue to discuss with patient and daughter Dinorah, will continue to discuss \par \par Follow up in 1 week

## 2021-08-26 NOTE — PHYSICAL EXAM
[Capable of only limited self care, confined to bed or chair more than 50% of waking hours] : Status 3- Capable of only limited self care, confined to bed or chair more than 50% of waking hours [Thin] : thin [Normal] : affect appropriate [de-identified] : scattered rales

## 2021-08-26 NOTE — HISTORY OF PRESENT ILLNESS
[de-identified] : 89 year old woman with a pmh of COPD  was admitted to [facility] for worsening cough and lethargy despite treatment with antibiotics and steroids. She underwent R robotic VATS/pleural bx with placement of pleurex catheter on 07/15/21 after PET from 07/07/21 reveal b/l upper lobe opacities concerning for malignancy with pleural effusion. 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 Given her age and symptoms, patient and daughter opted for palliative chemotherapy. she received prednisone, followed by cytoxan (200 mg/m2) on 8/5-8/7/21 with marked improvement in respiratory status and mental status. Pleurex catheter stopped draining after initiation of cytoxan. She received rituximab which was complicated by infusion reaction. She received the full dose over 2 days. She was discharged to [facility] and presents for follow up. \par  [de-identified] : Since discharge, she continues to feel improvement in her energy level. She hasn't been eating as well, but states she does not like the food. She has no fevers, chills or night sweats. She is doing physical therapy. Her breathing is stable. Her pleurex is not draining and she only intermittently needs oxygen.

## 2021-08-26 NOTE — REVIEW OF SYSTEMS
[Fatigue] : fatigue [Negative] : Allergic/Immunologic [FreeTextEntry6] : shortness of breath and cough improved

## 2021-08-27 ENCOUNTER — NON-APPOINTMENT (OUTPATIENT)
Age: 86
End: 2021-08-27

## 2021-08-27 DIAGNOSIS — C85.80 OTHER SPECIFIED TYPES OF NON-HODGKIN LYMPHOMA, UNSPECIFIED SITE: ICD-10-CM

## 2021-08-30 ENCOUNTER — OUTPATIENT (OUTPATIENT)
Dept: OUTPATIENT SERVICES | Facility: HOSPITAL | Age: 86
LOS: 1 days | Discharge: HOME | End: 2021-08-30
Payer: MEDICARE

## 2021-08-30 DIAGNOSIS — Z96.649 PRESENCE OF UNSPECIFIED ARTIFICIAL HIP JOINT: Chronic | ICD-10-CM

## 2021-08-30 DIAGNOSIS — R09.02 HYPOXEMIA: ICD-10-CM

## 2021-08-30 PROCEDURE — 71045 X-RAY EXAM CHEST 1 VIEW: CPT | Mod: 26

## 2021-08-30 PROCEDURE — 71275 CT ANGIOGRAPHY CHEST: CPT | Mod: 26

## 2021-08-31 NOTE — RESULTS/DATA
[FreeTextEntry1] : Final Diagnosis\par 1-Right pleural biopsy\par - Fatty tissue with granulation tissue\par See note and description.\par \par 2-Right pleural nodule\par - Lymphohistiocytic proliferation with scattered large cells\par See note and description.\par \par 3-Right peritracheal lymph node, level 4 for molecular testing\par - Lymphohistiocytic proliferation with scattered large cells\par See note and description.\par \par 4-Right peritracheal lymph node, level 4 for molecular testing\par - Lymphohistiocytic proliferation with increased large cells\par See note and description.\par \par 5-Right peritracheal lymph node, level 4\par - Diffuse large B cell lymphoma, non-germinal center B cell\par like immunophenotype\par See note and description.\par \par Diagnostic note:  Per chart review, patient presents with\par bilateral supraclavicular, bilateral mediastinal, and perigastric\par lymphadenopathy. Current biopsy is consistent with diffuse large\par B cell lymphoma, non-germinal center B-cell-like immunophenotype.\par FISH studies for MYC, BCL2 and BCL6 gene rearrangements are in\par process and will be reported as an addendum.\par

## 2021-08-31 NOTE — HISTORY OF PRESENT ILLNESS
[Other Location: e.g. School (Enter Location, City,State)___] : at [unfilled], at the time of the visit. [Medical Office: ([facility])___] : at the medical office located in  [Verbal consent obtained from patient] : the patient, [unfilled] [de-identified] : 89 year old woman with a pmh of COPD  was admitted to [facility] for worsening cough and lethargy despite treatment with antibiotics and steroids. She underwent R robotic VATS/pleural bx with placement of pleurex catheter on 07/15/21 after PET from 07/07/21 reveal b/l upper lobe opacities concerning for malignancy with pleural effusion. 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 Given her age and symptoms, patient and daughter opted for palliative chemotherapy. she received prednisone, followed by cytoxan (200 mg/m2) on 8/5-8/7/21 with marked improvement in respiratory status and mental status. Pleurex catheter stopped draining after initiation of cytoxan. She received rituximab which was complicated by infusion reaction. She received the full dose over 2 days. She was discharged to [facility] and presents for follow up. \par  [de-identified] : since discharge, she continues to feel improvement in her energy level. She hasn't been eating as well, but states she does not like the food. She has no fevers, chills or night sweats. She is doing physical therapy. Her breathing is stable. Her pleurex is not draining.

## 2021-08-31 NOTE — ASSESSMENT
[FreeTextEntry1] : 88 yo female with new diagnosis of Stage IV DLBCL s/p treatment with RC and prednisone presents for follow up. Overall, tolerated treatment well without any significant side effects. She continues to improve in rehab. \par -continue RC and prednisone every 3 weeks, planned for 8/26 \par -monitor weekly CBC, CMP, Mag, Phos, uric acid ldh while on treatment \par -transfusional support as needed \par -continue valacyclovir \par -continue protonix \par -will plan for repeat imaging after cycle 2 \par -discussed that we could potentially add in medications to improve cure rate, will continue to discuss with aptient and daughter Dinorah \par \par Follow up in 1 week

## 2021-09-02 ENCOUNTER — APPOINTMENT (OUTPATIENT)
Dept: THORACIC SURGERY | Facility: CLINIC | Age: 86
End: 2021-09-02
Payer: MEDICARE

## 2021-09-02 ENCOUNTER — OUTPATIENT (OUTPATIENT)
Dept: OUTPATIENT SERVICES | Facility: HOSPITAL | Age: 86
LOS: 1 days | End: 2021-09-02
Payer: MEDICARE

## 2021-09-02 ENCOUNTER — APPOINTMENT (OUTPATIENT)
Dept: RADIOLOGY | Facility: HOSPITAL | Age: 86
End: 2021-09-02

## 2021-09-02 VITALS
DIASTOLIC BLOOD PRESSURE: 70 MMHG | HEIGHT: 63 IN | SYSTOLIC BLOOD PRESSURE: 116 MMHG | BODY MASS INDEX: 21.26 KG/M2 | HEART RATE: 79 BPM | WEIGHT: 120 LBS | OXYGEN SATURATION: 99 % | RESPIRATION RATE: 18 BRPM

## 2021-09-02 DIAGNOSIS — J90 PLEURAL EFFUSION, NOT ELSEWHERE CLASSIFIED: ICD-10-CM

## 2021-09-02 DIAGNOSIS — Z96.649 PRESENCE OF UNSPECIFIED ARTIFICIAL HIP JOINT: Chronic | ICD-10-CM

## 2021-09-02 PROCEDURE — 99215 OFFICE O/P EST HI 40 MIN: CPT | Mod: 25

## 2021-09-02 PROCEDURE — 32552 REMOVE LUNG CATHETER: CPT | Mod: 58

## 2021-09-02 PROCEDURE — 71046 X-RAY EXAM CHEST 2 VIEWS: CPT | Mod: 26

## 2021-09-03 NOTE — DATA REVIEWED
[FreeTextEntry1] : I have independently reviewed the following:\par - PET/CT on 7/7/21\par - CT angio on 8/30/21\par \par

## 2021-09-03 NOTE — HISTORY OF PRESENT ILLNESS
[FreeTextEntry1] : Ms. TOMMIE BOONE, 89 year old female, never a smoker, w/ hx of CHF (EF 26%), COPD and chronic cough. Referred to office by Dr. Zev Larkin for possible biopsy. \par \par Biopsy of RLL on 11/16/2020 at [facility], ordered by Dr. Zev Larkin. Path showed chronic inflammation.\par \par Patient was hospitalized 4/29-5/1 at [facility] for productive cough x few months, which improved with steroid. She was treated with antibiotics for PNA and UTI. She refused bronch biopsy in-house.\par \par CT Chest on 4/29/21:\par - dominant Rt mid to lower lung masslike consolidation w/ multiple other smaller patchy and nodular opacities scattered throughout both lungs\par - bulky mediastinal lymphadenopathy: largest measuring 2.8cm in the subcarinal region\par \par PET/CT on 7/7/21:\par - a 1.6 x 1cm SUV=7.5 Lt supraclavicular LN (2:51) and several hypermetabolic Rt supraclavicular LNs: a 1.2 x 1.1cm SUV=10.3 LN (2:52)\par - multiple mediastinal LNs, stable: a 1.5 x 1cm SUV=7.5 Rt upper paratracheal LN (image 55); a 3.9 x 1.5cm SUV=11.5 pretracheal LN (image 73); a 3cm SUV=15.6 subcarinal LN (image 179)\par - stable RLL and RML masslike consolidation with SUV=10.5 (image 81)\par - stable FDG-avid patchy opacities in the bilateral upper lobes\par - stable FDG-avid LLL consolidation w/ SUV=10.9 (image 87)\par - two stable non-FDG-avid subcentimeter CHRIS nodule (image 56 and 66)\par - new large Rt pleural effusion\par - stable 5.5 x 4.6cm Lt renal upper pole photopenic cyst\par - a 1.1 x 1.1cm SUV=4.6 paragastric LN (2:116)\par \par Now 7 wks s/p Flex bronch, Rt VATS Robotic-assisted, drainage of 1.5L of Rt pleural effusion, pleural biopsy, biopsy of Rt paratracheal LNs on 7/15/21. Path of Rt peritracheal LN, Level 4 revealed diffuse large B cell lymphoma, non-germinal center B cell like immunophenotype; molecular testing showed lymphohistiocytic proliferation w/ increased large cells. FISH negative. Ki67 70%. \par Rt pleural bx showed fatty tissue w/ granulation tissue; Rt pleural nodule showed lymphohistiocytic proliferation w/ scattered large cells; Rt pleural fluid negative for malignancy.\par \par Patient was discharged to rehab then re-admitted on 7/28/21 for SOB and fever, treated, then discharged on 8/13/21.\par \par Post operatively, established care with Dr. Rossy Esparza ([facility]). Now s/p 2 cycles of chemo. (3rd cycle to start 9/17/21)\par \par Patient experiencing worsening dyspnea upon exertion with low 02 Sat at times. Underwent CT angio to r/o PE\par \par CT angio on 8/30/21:\par - Interval improvement/decrease of bilateral lung consolidations and nodular opacities, with residual remaining mostly within the RML and bilateral lower lobes\par \par Patient is here today for a follow up. Currently on 2LNC; patient denies worsening SOB, chest pain,hemoptysis, fever, chills, night sweats, lightheadedness or dizziness. Last PleurX output on 8/13/21 - 5ml. Currently being drained twice/week. No drainage since. \par

## 2021-09-03 NOTE — CONSULT LETTER
[Dear  ___] : Dear  [unfilled], [Consult Letter:] : I had the pleasure of evaluating your patient, [unfilled]. [( Thank you for referring [unfilled] for consultation for _____ )] : Thank you for referring [unfilled] for consultation for [unfilled] [Please see my note below.] : Please see my note below. [Consult Closing:] : Thank you very much for allowing me to participate in the care of this patient.  If you have any questions, please do not hesitate to contact me. [Sincerely,] : Sincerely, [FreeTextEntry2] : Dr. Zev Larkin (Referring)  [FreeTextEntry3] : Vicente Coates MD, MPH \par System Director of Thoracic Surgery \par Director of Comprehensive Lung and Foregut [place] \par Professor Cardiovascular & Thoracic Surgery  \par [employer] [facility]\par \par [facility]\par [address]\par Oncology [address]\[place] [address]\par Tel: [phone]\par Fax: [phone]\par

## 2021-09-03 NOTE — ASSESSMENT
[FreeTextEntry1] : Ms. TOMMIE BOONE, 89 year old female, never a smoker, w/ hx of CHF (EF 26%), COPD and chronic cough. \par \par Now 7 wks s/p Flex bronch, Rt VATS Robotic-assisted, drainage of 1.5L of Rt pleural effusion, pleural biopsy, biopsy of Rt paratracheal LNs on 7/15/21. Path of Rt peritracheal LN, Level 4 revealed diffuse large B cell lymphoma, non-germinal center B cell like immunophenotype; molecular testing showed lymphohistiocytic proliferation w/ increased large cells. FISH negative. Ki67 70%. \par \par I have reviewed the patient's medical records and diagnostic images at time of this office consultation and have made the following recommendation:\par 1. Patient trialed off 02 in office. 02 SAT low to mid 90's at rest and high 80's upon exertion. \par 2. PleurX cath removed in office with safety measures maintained. F/U CXR stable.\par 3. Purulent drainage noted from PleurX site. Recommendation for 1 week course of Augmentin. \par 4. Return to clinic in 2 weeks for clinical re-evaluation with follow up CXR \par 5. Recommendations reviewed with RN at [facility]. Consult form completed\par \par Recommendations reviewed with patient during this office visit, and all questions answered; Patient instructed on the importance of follow up and verbalizes understanding.\par \par I personally performed the services described in the documentation, reviewed the documentation recorded by the scribe in my presence and it accurately and completely records my words and actions.\par \par I, Rocio Cochran ANP-C, am scribing for and the presence of JENNY Collazo, the following sections HISTORY OF PRESENT ILLNESS, PAST MEDICAL/FAMILY/SOCIAL HISTORY; REVIEW OF SYSTEMS; VITAL SIGNS; PHYSICAL EXAM; DISPOSITION.\par \par

## 2021-09-03 NOTE — PHYSICAL EXAM
[] : no respiratory distress [Respiration, Rhythm And Depth] : normal respiratory rhythm and effort [Exaggerated Use Of Accessory Muscles For Inspiration] : no accessory muscle use [Examination Of The Chest] : the chest was normal in appearance [Chest Visual Inspection Thoracic Asymmetry] : no chest asymmetry [Diminished Respiratory Excursion] : normal chest expansion [Oriented To Time, Place, And Person] : oriented to person, place, and time [2+] : right 2+ [Sclera] : the sclera and conjunctiva were normal [PERRL With Normal Accommodation] : pupils were equal in size, round, and reactive to light [Extraocular Movements] : extraocular movements were intact [Neck Appearance] : the appearance of the neck was normal [Breast Appearance] : normal in appearance [Breast Palpation Mass] : no palpable masses [Bowel Sounds] : normal bowel sounds [Abdomen Soft] : soft [Abdomen Tenderness] : non-tender [Cervical Lymph Nodes Enlarged Posterior Bilaterally] : posterior cervical [Cervical Lymph Nodes Enlarged Anterior Bilaterally] : anterior cervical [Supraclavicular Lymph Nodes Enlarged Bilaterally] : supraclavicular [No CVA Tenderness] : no ~M costovertebral angle tenderness [No Spinal Tenderness] : no spinal tenderness [Motor Exam] : the motor exam was normal [No Focal Deficits] : no focal deficits [FreeTextEntry1] : Purluent drainage noted from Right PleurX insertion site; Fungal rash noted around surrounding area

## 2021-09-07 ENCOUNTER — APPOINTMENT (OUTPATIENT)
Dept: HEMATOLOGY ONCOLOGY | Facility: CLINIC | Age: 86
End: 2021-09-07

## 2021-09-16 ENCOUNTER — RESULT REVIEW (OUTPATIENT)
Age: 86
End: 2021-09-16

## 2021-09-16 ENCOUNTER — APPOINTMENT (OUTPATIENT)
Dept: THORACIC SURGERY | Facility: CLINIC | Age: 86
End: 2021-09-16
Payer: MEDICARE

## 2021-09-16 ENCOUNTER — APPOINTMENT (OUTPATIENT)
Dept: HEMATOLOGY ONCOLOGY | Facility: CLINIC | Age: 86
End: 2021-09-16
Payer: MEDICARE

## 2021-09-16 ENCOUNTER — APPOINTMENT (OUTPATIENT)
Dept: RADIOLOGY | Facility: HOSPITAL | Age: 86
End: 2021-09-16

## 2021-09-16 ENCOUNTER — APPOINTMENT (OUTPATIENT)
Dept: HEMATOLOGY ONCOLOGY | Facility: CLINIC | Age: 86
End: 2021-09-16

## 2021-09-16 ENCOUNTER — OUTPATIENT (OUTPATIENT)
Dept: OUTPATIENT SERVICES | Facility: HOSPITAL | Age: 86
LOS: 1 days | End: 2021-09-16
Payer: MEDICARE

## 2021-09-16 VITALS
OXYGEN SATURATION: 98 % | RESPIRATION RATE: 18 BRPM | SYSTOLIC BLOOD PRESSURE: 159 MMHG | HEART RATE: 71 BPM | DIASTOLIC BLOOD PRESSURE: 88 MMHG

## 2021-09-16 VITALS
OXYGEN SATURATION: 98 % | HEART RATE: 68 BPM | DIASTOLIC BLOOD PRESSURE: 80 MMHG | RESPIRATION RATE: 18 BRPM | SYSTOLIC BLOOD PRESSURE: 125 MMHG | TEMPERATURE: 97.7 F

## 2021-09-16 DIAGNOSIS — Z96.649 PRESENCE OF UNSPECIFIED ARTIFICIAL HIP JOINT: Chronic | ICD-10-CM

## 2021-09-16 DIAGNOSIS — J90 PLEURAL EFFUSION, NOT ELSEWHERE CLASSIFIED: ICD-10-CM

## 2021-09-16 LAB
BASOPHILS # BLD AUTO: 0.09 K/UL — SIGNIFICANT CHANGE UP (ref 0–0.2)
BASOPHILS NFR BLD AUTO: 0.6 % — SIGNIFICANT CHANGE UP (ref 0–2)
EOSINOPHIL # BLD AUTO: 0.12 K/UL — SIGNIFICANT CHANGE UP (ref 0–0.5)
EOSINOPHIL NFR BLD AUTO: 0.8 % — SIGNIFICANT CHANGE UP (ref 0–6)
HCT VFR BLD CALC: 32 % — LOW (ref 34.5–45)
HGB BLD-MCNC: 9.7 G/DL — LOW (ref 11.5–15.5)
IMM GRANULOCYTES NFR BLD AUTO: 0.8 % — SIGNIFICANT CHANGE UP (ref 0–1.5)
LYMPHOCYTES # BLD AUTO: 1.43 K/UL — SIGNIFICANT CHANGE UP (ref 1–3.3)
LYMPHOCYTES # BLD AUTO: 10.1 % — LOW (ref 13–44)
MCHC RBC-ENTMCNC: 26.4 PG — LOW (ref 27–34)
MCHC RBC-ENTMCNC: 30.3 G/DL — LOW (ref 32–36)
MCV RBC AUTO: 87 FL — SIGNIFICANT CHANGE UP (ref 80–100)
MONOCYTES # BLD AUTO: 0.66 K/UL — SIGNIFICANT CHANGE UP (ref 0–0.9)
MONOCYTES NFR BLD AUTO: 4.7 % — SIGNIFICANT CHANGE UP (ref 2–14)
NEUTROPHILS # BLD AUTO: 11.74 K/UL — HIGH (ref 1.8–7.4)
NEUTROPHILS NFR BLD AUTO: 83 % — HIGH (ref 43–77)
NRBC # BLD: 0 /100 WBCS — SIGNIFICANT CHANGE UP (ref 0–0)
PLATELET # BLD AUTO: 323 K/UL — SIGNIFICANT CHANGE UP (ref 150–400)
RBC # BLD: 3.68 M/UL — LOW (ref 3.8–5.2)
RBC # FLD: 21.1 % — HIGH (ref 10.3–14.5)
WBC # BLD: 14.16 K/UL — HIGH (ref 3.8–10.5)
WBC # FLD AUTO: 14.16 K/UL — HIGH (ref 3.8–10.5)

## 2021-09-16 PROCEDURE — 99215 OFFICE O/P EST HI 40 MIN: CPT | Mod: GC

## 2021-09-16 PROCEDURE — 99024 POSTOP FOLLOW-UP VISIT: CPT

## 2021-09-16 PROCEDURE — 71046 X-RAY EXAM CHEST 2 VIEWS: CPT | Mod: 26

## 2021-09-16 RX ORDER — FEBUXOSTAT 40 MG/1
40 TABLET ORAL DAILY
Qty: 90 | Refills: 0 | Status: COMPLETED | COMMUNITY
Start: 2021-08-19 | End: 2021-09-16

## 2021-09-16 RX ORDER — FUROSEMIDE 20 MG/1
20 TABLET ORAL
Refills: 0 | Status: COMPLETED | COMMUNITY
End: 2021-09-16

## 2021-09-16 RX ORDER — PREDNISONE 50 MG/1
50 TABLET ORAL
Qty: 10 | Refills: 0 | Status: COMPLETED | COMMUNITY
Start: 2021-08-19 | End: 2021-09-16

## 2021-09-16 NOTE — HISTORY OF PRESENT ILLNESS
[de-identified] : 89 year old woman with a pmh of COPD  was admitted to [facility] for worsening cough and lethargy despite treatment with antibiotics and steroids. She underwent R robotic VATS/pleural bx with placement of pleurex catheter on 07/15/21 after PET from 07/07/21 reveal b/l upper lobe opacities concerning for malignancy with pleural effusion. 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 Given her age and symptoms, patient and daughter opted for palliative chemotherapy. she received prednisone, followed by cytoxan (200 mg/m2) on 8/5-8/7/21 with marked improvement in respiratory status and mental status. Pleurex catheter stopped draining after initiation of cytoxan. She received rituximab which was complicated by infusion reaction. She received the full dose over 2 days. She was discharged to [place] [facility] and presents for follow up. \par  [de-identified] : Since discharge, she continues to feel improvement in her energy level. She hasn't been eating as well, but states she does not like the food. She has no fevers, chills or night sweats. She is doing physical therapy. Her breathing is stable. Her pleurex is not draining and she only intermittently needs oxygen.

## 2021-09-16 NOTE — ASSESSMENT
[FreeTextEntry1] : 89 yo female with new diagnosis of Stage IV DLBCL s/p treatment with RC and prednisone presents for follow up. Overall, tolerated treatment well without any significant side effects. She continues to improve in rehab. \par -continue RC and prednisone every 3 weeks, planned for C3 on 9/17/21\par -monitor weekly CBC, CMP, Mag, Phos, uric acid ldh while on treatment \par -transfusional support as needed \par -continue valacyclovir \par -continue Protonix \par -will plan for repeat imaging after cycle 2 \par -discussed that we could potentially switch to curative intent treatment with R-mini-CHOP, will continue to discuss with patient and daughter Dinorah, will continue to discuss \par \par Follow up next treatment, sooner RPN

## 2021-09-16 NOTE — HISTORY OF PRESENT ILLNESS
[de-identified] : 89 year old woman with a pmh of COPD  was admitted to [facility] for worsening cough and lethargy despite treatment with antibiotics and steroids. She underwent R robotic VATS/pleural bx with placement of pleurex catheter on 07/15/21 after PET from 07/07/21 reveal b/l upper lobe opacities concerning for malignancy with pleural effusion. 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 Given her age and symptoms, patient and daughter opted for palliative chemotherapy. she received prednisone, followed by cytoxan (200 mg/m2) on 8/5-8/7/21 with marked improvement in respiratory status and mental status. Pleurex catheter stopped draining after initiation of cytoxan. She received rituximab which was complicated by infusion reaction. She received the full dose over 2 days. She was discharged to [place] [facility] and presents for follow up. \par  [de-identified] : 9/16/21: She continues to feel improvement in her energy level. She reports her appetite has significantly improved. She has no fevers, chills or night sweats. She is doing physical therapy. Her breathing is stable. She is not on supplemental O2 anymore. Her Pleurx drain is removed and her CXR two weeks later was clear

## 2021-09-16 NOTE — END OF VISIT
[FreeTextEntry3] : Patient seen and case discussed with DRAGAN Nelson. I agree with above and have edited the note where needed.\par  [Time Spent: ___ minutes] : I have spent [unfilled] minutes of time on the encounter.

## 2021-09-17 ENCOUNTER — RESULT REVIEW (OUTPATIENT)
Age: 86
End: 2021-09-17

## 2021-09-17 ENCOUNTER — APPOINTMENT (OUTPATIENT)
Dept: INFUSION THERAPY | Facility: HOSPITAL | Age: 86
End: 2021-09-17

## 2021-09-17 PROBLEM — J90 PLEURAL EFFUSION ON RIGHT: Status: ACTIVE | Noted: 2021-09-02

## 2021-09-17 LAB
ALBUMIN SERPL ELPH-MCNC: 3.9 G/DL
ALP BLD-CCNC: 127 U/L
ALT SERPL-CCNC: 7 U/L
ANION GAP SERPL CALC-SCNC: 17 MMOL/L
AST SERPL-CCNC: 10 U/L
BILIRUB SERPL-MCNC: 0.2 MG/DL
BUN SERPL-MCNC: 16 MG/DL
BUN SERPL-MCNC: 19 MG/DL — SIGNIFICANT CHANGE UP (ref 7–23)
CA-I BLDA-SCNC: 1.25 MMOL/L — SIGNIFICANT CHANGE UP (ref 1.12–1.3)
CALCIUM SERPL-MCNC: 9.5 MG/DL
CHLORIDE SERPL-SCNC: 104 MMOL/L
CHLORIDE SERPL-SCNC: 107 MMOL/L — SIGNIFICANT CHANGE UP (ref 96–108)
CO2 SERPL-SCNC: 24 MMOL/L
CO2 SERPL-SCNC: 26 MMOL/L — SIGNIFICANT CHANGE UP (ref 22–31)
CREAT SERPL-MCNC: 0.8 MG/DL
CREAT SERPL-MCNC: 0.8 MG/DL — SIGNIFICANT CHANGE UP (ref 0.5–1.3)
GLUCOSE SERPL-MCNC: 100 MG/DL
GLUCOSE SERPL-MCNC: 90 MG/DL — SIGNIFICANT CHANGE UP (ref 70–99)
LDH SERPL-CCNC: 141 U/L
MAGNESIUM SERPL-MCNC: 1.9 MG/DL
PHOSPHATE SERPL-MCNC: 2.9 MG/DL
POTASSIUM SERPL-MCNC: 3.1 MMOL/L — LOW (ref 3.5–5.3)
POTASSIUM SERPL-SCNC: 3.1 MMOL/L
POTASSIUM SERPL-SCNC: 3.1 MMOL/L — LOW (ref 3.5–5.3)
PROT SERPL-MCNC: 5.6 G/DL
SODIUM SERPL-SCNC: 142 MMOL/L — SIGNIFICANT CHANGE UP (ref 135–145)
SODIUM SERPL-SCNC: 145 MMOL/L
URATE SERPL-MCNC: 4.1 MG/DL

## 2021-09-19 DIAGNOSIS — Z51.89 ENCOUNTER FOR OTHER SPECIFIED AFTERCARE: ICD-10-CM

## 2021-09-20 ENCOUNTER — NON-APPOINTMENT (OUTPATIENT)
Age: 86
End: 2021-09-20

## 2021-09-20 NOTE — ASSESSMENT
[FreeTextEntry1] : Ms. TOMMIE BOONE, 89 year old female, never a smoker, w/ hx of CHF (EF 26%), COPD and chronic cough. Referred to office by Dr. Zev Larkin for possible biopsy. \par \par Biopsy of RLL on 11/16/2020 at [facility], ordered by Dr. Zev Larkin. Path showed chronic inflammation.\par \par Patient was hospitalized 4/29-5/1 at [facility] for productive cough x few months, which improved with steroid. She was treated with antibiotics for PNA and UTI. She refused bronch biopsy in-house.\par \par CT Chest on 4/29/21:\par - dominant Rt mid to lower lung masslike consolidation w/ multiple other smaller patchy and nodular opacities scattered throughout both lungs\par - bulky mediastinal lymphadenopathy: largest measuring 2.8cm in the subcarinal region\par \par PET/CT on 7/7/21:\par - a 1.6 x 1cm SUV=7.5 Lt supraclavicular LN (2:51) and several hypermetabolic Rt supraclavicular LNs: a 1.2 x 1.1cm SUV=10.3 LN (2:52)\par - multiple mediastinal LNs, stable: a 1.5 x 1cm SUV=7.5 Rt upper paratracheal LN (image 55); a 3.9 x 1.5cm SUV=11.5 pretracheal LN (image 73); a 3cm SUV=15.6 subcarinal LN (image 179)\par - stable RLL and RML masslike consolidation with SUV=10.5 (image 81)\par - stable FDG-avid patchy opacities in the bilateral upper lobes\par - stable FDG-avid LLL consolidation w/ SUV=10.9 (image 87)\par - two stable non-FDG-avid subcentimeter CHRIS nodule (image 56 and 66)\par - new large Rt pleural effusion\par - stable 5.5 x 4.6cm Lt renal upper pole photopenic cyst\par - a 1.1 x 1.1cm SUV=4.6 paragastric LN (2:116)\par \par Now 2mons s/p Flex bronch, Rt VATS Robotic-assisted, drainage of 1.5L of Rt pleural effusion, pleural biopsy, biopsy of Rt paratracheal LNs on 7/15/21. Path of Rt peritracheal LN, Level 4 revealed diffuse large B cell lymphoma, non-germinal center B cell like immunophenotype; molecular testing showed lymphohistiocytic proliferation w/ increased large cells. FISH negative. Ki67 70%. \par Rt pleural bx showed fatty tissue w/ granulation tissue; Rt pleural nodule showed lymphohistiocytic proliferation w/ scattered large cells; Rt pleural fluid negative for malignancy.\par \par Patient was discharged to rehab then re-admitted on 7/28/21 for SOB and fever, treated, then discharged on 8/13/21.\par \par Post operatively, established care with Dr. Rossy Esparza ([facility]). Now s/p 2 cycles of chemo. (3rd cycle to start 9/17/21)\par \par Patient experiencing worsening dyspnea upon exertion with low 02 Sat at times. Underwent CT angio to r/o PE\par \par CT angio on 8/30/21:\par - Interval improvement/decrease of bilateral lung consolidations and nodular opacities, with residual remaining mostly within the RML and bilateral lower lobes\par \par PleurX cath removed in office on 9/2/21. \par \par I have reviewed the patient's medical records and diagnostic images at time of this office consultation and have made the following recommendation:\par 1. Pt is doing well, RTC PRN\par \par \par I personally performed the services described in the documentation, reviewed the documentation recorded by the scribe in my presence and it accurately and completely records my words and actions. \par \par I, Kathy Coon, NP, am scribing for and the presence of Dr. Vicente Coates the following sections, HISTORY OF PRESENT ILLNESS, PAST MEDICAL/FAMILY/SOCIAL HISTORY; REVIEW OF SYSTEMS; VITAL SIGNS; PHYSICAL EXAM; DISPOSITION.\par

## 2021-09-20 NOTE — HISTORY OF PRESENT ILLNESS
[FreeTextEntry1] : Ms. TOMMIE BOONE, 89 year old female, never a smoker, w/ hx of CHF (EF 26%), COPD and chronic cough. Referred to office by Dr. Zev Larkin for possible biopsy. \par \par Biopsy of RLL on 11/16/2020 at [facility], ordered by Dr. Zev Larkin. Path showed chronic inflammation.\par \par Patient was hospitalized 4/29-5/1 at [facility] for productive cough x few months, which improved with steroid. She was treated with antibiotics for PNA and UTI. She refused bronch biopsy in-house.\par \par CT Chest on 4/29/21:\par - dominant Rt mid to lower lung masslike consolidation w/ multiple other smaller patchy and nodular opacities scattered throughout both lungs\par - bulky mediastinal lymphadenopathy: largest measuring 2.8cm in the subcarinal region\par \par PET/CT on 7/7/21:\par - a 1.6 x 1cm SUV=7.5 Lt supraclavicular LN (2:51) and several hypermetabolic Rt supraclavicular LNs: a 1.2 x 1.1cm SUV=10.3 LN (2:52)\par - multiple mediastinal LNs, stable: a 1.5 x 1cm SUV=7.5 Rt upper paratracheal LN (image 55); a 3.9 x 1.5cm SUV=11.5 pretracheal LN (image 73); a 3cm SUV=15.6 subcarinal LN (image 179)\par - stable RLL and RML masslike consolidation with SUV=10.5 (image 81)\par - stable FDG-avid patchy opacities in the bilateral upper lobes\par - stable FDG-avid LLL consolidation w/ SUV=10.9 (image 87)\par - two stable non-FDG-avid subcentimeter CHRIS nodule (image 56 and 66)\par - new large Rt pleural effusion\par - stable 5.5 x 4.6cm Lt renal upper pole photopenic cyst\par - a 1.1 x 1.1cm SUV=4.6 paragastric LN (2:116)\par \par Now 2mons s/p Flex bronch, Rt VATS Robotic-assisted, drainage of 1.5L of Rt pleural effusion, pleural biopsy, biopsy of Rt paratracheal LNs on 7/15/21. Path of Rt peritracheal LN, Level 4 revealed diffuse large B cell lymphoma, non-germinal center B cell like immunophenotype; molecular testing showed lymphohistiocytic proliferation w/ increased large cells. FISH negative. Ki67 70%. \par Rt pleural bx showed fatty tissue w/ granulation tissue; Rt pleural nodule showed lymphohistiocytic proliferation w/ scattered large cells; Rt pleural fluid negative for malignancy.\par \par Patient was discharged to rehab then re-admitted on 7/28/21 for SOB and fever, treated, then discharged on 8/13/21.\par \par Post operatively, established care with Dr. Rossy Esparza ([facility]). Now s/p 2 cycles of chemo. (3rd cycle to start 9/17/21)\par \par Patient experiencing worsening dyspnea upon exertion with low 02 Sat at times. Underwent CT angio to r/o PE\par \par CT angio on 8/30/21:\par - Interval improvement/decrease of bilateral lung consolidations and nodular opacities, with residual remaining mostly within the RML and bilateral lower lobes\par \par PleurX cath removed in office on 9/2/21. \par \par Patient is here today for a follow up. Pt discharged from Rehab and now is off from O2. Denies cough or SOB.

## 2021-09-20 NOTE — CONSULT LETTER
[Dear  ___] : Dear  [unfilled], [Consult Letter:] : I had the pleasure of evaluating your patient, [unfilled]. [( Thank you for referring [unfilled] for consultation for _____ )] : Thank you for referring [unfilled] for consultation for [unfilled] [Please see my note below.] : Please see my note below. [Consult Closing:] : Thank you very much for allowing me to participate in the care of this patient.  If you have any questions, please do not hesitate to contact me. [Sincerely,] : Sincerely, [FreeTextEntry2] : Dr. Zev Larkin (Referring)  [FreeTextEntry3] : Vicente Coates MD, MPH \par System Director of Thoracic Surgery \par Director of Comprehensive Lung and [facility] [place] \par Professor Cardiovascular & Thoracic Surgery  \par [employer] [facility]\par \par [facility]\par [address]\par Oncology [address]\[place] [address]\par Tel: [phone]\par Fax: [phone]\par

## 2021-09-23 ENCOUNTER — RX RENEWAL (OUTPATIENT)
Age: 86
End: 2021-09-23

## 2021-09-28 NOTE — PATIENT PROFILE ADULT - NSPROPTRIGHTNOTIFY_GEN_A_NUR
Meningococcal MCV4O (Menveo)    Current Status    Updated on: 9/28/2021  3:19 PM    Name Date Status Dose VIS Date Route Site  Lot# Given By Verified By   Meningococcal MCV4O (Menveo) 9/28/2021 Given 0.5 mL 8/6/2021 IM left delt [employer] [id number] [place], [address] [place] --   Exp. Date [address] # Product Time Location External Comment   4/20/2023 [id number] Menveo -- -- -- --   Question Answer Comment   VFC status? No    VIS/EUA reviewed with patient and questions answered? Yes    Are you pregnant or planning to be pregnant within next 28 days? NO    Has your child taken cortisone, prednisone or other steroids anti-cancer drugs or x-ray treatments in the past 3 months? NO    Has your child received any vaccination in the past 30 days?ays? NO    VIS/EUA Given Date 9/28/2021    Updated by: LUCAS Rendon             Previous Statuses    Updated on: 9/28/2021  3:18 PM    Name Date Status Dose VIS Date Route Site  Lot# Given By Verified By   Meningococcal MCV4O (Menveo) 9/28/2021 Incomplete 0.5 mL 8/6/2021 IM left delt [employer] -- -- --   Exp.  Date [address] # Product Time Location External Comment   -- -- -- -- -- -- --   Updated by: LUCAS Rendon declines

## 2021-10-05 ENCOUNTER — OUTPATIENT (OUTPATIENT)
Dept: OUTPATIENT SERVICES | Facility: HOSPITAL | Age: 86
LOS: 1 days | Discharge: HOME | End: 2021-10-05

## 2021-10-05 DIAGNOSIS — R91.8 OTHER NONSPECIFIC ABNORMAL FINDING OF LUNG FIELD: ICD-10-CM

## 2021-10-05 DIAGNOSIS — Z96.649 PRESENCE OF UNSPECIFIED ARTIFICIAL HIP JOINT: Chronic | ICD-10-CM

## 2021-10-07 ENCOUNTER — NON-APPOINTMENT (OUTPATIENT)
Age: 86
End: 2021-10-07

## 2021-10-07 ENCOUNTER — APPOINTMENT (OUTPATIENT)
Dept: HEMATOLOGY ONCOLOGY | Facility: CLINIC | Age: 86
End: 2021-10-07
Payer: MEDICARE

## 2021-10-07 PROCEDURE — 99214 OFFICE O/P EST MOD 30 MIN: CPT | Mod: GC

## 2021-10-07 PROCEDURE — 85025 COMPLETE CBC W/AUTO DIFF WBC: CPT | Mod: GC

## 2021-10-08 ENCOUNTER — APPOINTMENT (OUTPATIENT)
Dept: INFUSION THERAPY | Facility: HOSPITAL | Age: 86
End: 2021-10-08

## 2021-10-08 LAB
ALBUMIN SERPL ELPH-MCNC: 4.5 G/DL
ALP BLD-CCNC: 133 U/L
ALT SERPL-CCNC: 11 U/L
ANION GAP SERPL CALC-SCNC: 19 MMOL/L
AST SERPL-CCNC: 10 U/L
BILIRUB SERPL-MCNC: 0.2 MG/DL
BUN SERPL-MCNC: 19 MG/DL
CALCIUM SERPL-MCNC: 9.7 MG/DL
CHLORIDE SERPL-SCNC: 108 MMOL/L
CO2 SERPL-SCNC: 18 MMOL/L
COVID-19 SPIKE DOMAIN ANTIBODY INTERPRETATION: POSITIVE
CREAT SERPL-MCNC: 0.93 MG/DL
GLUCOSE SERPL-MCNC: 95 MG/DL
LDH SERPL-CCNC: 127 U/L
MAGNESIUM SERPL-MCNC: 2 MG/DL
PHOSPHATE SERPL-MCNC: 3.4 MG/DL
POTASSIUM SERPL-SCNC: 4.4 MMOL/L
PROT SERPL-MCNC: 6 G/DL
SARS-COV-2 AB SERPL IA-ACNC: 10.6 U/ML
SODIUM SERPL-SCNC: 145 MMOL/L
URATE SERPL-MCNC: 5.1 MG/DL

## 2021-10-11 ENCOUNTER — NON-APPOINTMENT (OUTPATIENT)
Age: 86
End: 2021-10-11

## 2021-10-11 DIAGNOSIS — Z51.89 ENCOUNTER FOR OTHER SPECIFIED AFTERCARE: ICD-10-CM

## 2021-10-11 DIAGNOSIS — Z51.11 ENCOUNTER FOR ANTINEOPLASTIC CHEMOTHERAPY: ICD-10-CM

## 2021-10-11 DIAGNOSIS — R11.2 NAUSEA WITH VOMITING, UNSPECIFIED: ICD-10-CM

## 2021-10-11 NOTE — ASSESSMENT
[FreeTextEntry1] : 89 yo female with new diagnosis of Stage IV DLBCL s/p treatment with RC and prednisone presents for follow up. Overall, tolerated treatment well without any significant side effects. She continues to improve in rehab. \par -continue RC and prednisone every 3 weeks, s/p C3 on 9/17/21. C4 on 10/8/2021, ok to treat. \par -monitor weekly CBC, CMP, Mag, Phos, Uric acid, LDH while on treatment \par -transfusional support as needed \par -continue valacyclovir \par -continue Protonix \par -will plan for PET/CT next week, they request weekend scheduling \par -discussed that we could potentially switch to curative intent treatment with R-mini-CHOP, will continue to discuss with patient and daughter Dinorah, will continue to discuss \par \par Follow up next treatment, sooner RPN

## 2021-10-11 NOTE — END OF VISIT
[FreeTextEntry3] : Patient seen and case discussed with DRAGAN Nelson. I agree with above and have edited the note where needed.\par

## 2021-10-11 NOTE — HISTORY OF PRESENT ILLNESS
[de-identified] : 89 year old woman with a pmh of COPD  was admitted to [facility] for worsening cough and lethargy despite treatment with antibiotics and steroids. She underwent R robotic VATS/pleural bx with placement of pleurex catheter on 07/15/21 after PET from 07/07/21 reveal b/l upper lobe opacities concerning for malignancy with pleural effusion. 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 Given her age and symptoms, patient and daughter opted for palliative chemotherapy. she received prednisone, followed by cytoxan (200 mg/m2) on 8/5-8/7/21 with marked improvement in respiratory status and mental status. Pleurex catheter stopped draining after initiation of cytoxan. She received rituximab which was complicated by infusion reaction. She received the full dose over 2 days. She was discharged to [place] [facility] and presents for follow up. \par  [de-identified] : 10/7/2021: She continues to feel improvement in her energy level. She is s/p C3 of RC and prednisone on 9/17/21. She reports her appetite has significantly improved. She has no fevers, chills or night sweats. She is doing physical therapy. Her breathing is stable. She is not on supplemental O2 anymore. Her Pleurx drain is removed and her CXR two weeks later was clear.

## 2021-10-11 NOTE — RESULTS/DATA
[FreeTextEntry1] : CBC with diff 10/7/21:\par WBC 15.9\par ALC 2.08\par ANC 12.53\par Hgb 12.1\par Hct 38.7\par Plt 219.0\par \par \par Final Diagnosis\par 1-Right pleural biopsy\par - Fatty tissue with granulation tissue\par See note and description.\par \par 2-Right pleural nodule\par - Lymphohistiocytic proliferation with scattered large cells\par See note and description.\par \par 3-Right peritracheal lymph node, level 4 for molecular testing\par - Lymphohistiocytic proliferation with scattered large cells\par See note and description.\par \par 4-Right peritracheal lymph node, level 4 for molecular testing\par - Lymphohistiocytic proliferation with increased large cells\par See note and description.\par \par 5-Right peritracheal lymph node, level 4\par - Diffuse large B cell lymphoma, non-germinal center B cell\par like immunophenotype\par See note and description.\par \par Diagnostic note:  Per chart review, patient presents with\par bilateral supraclavicular, bilateral mediastinal, and perigastric\par lymphadenopathy. Current biopsy is consistent with diffuse large\par B cell lymphoma, non-germinal center B-cell-like immunophenotype.\par FISH studies for MYC, BCL2 and BCL6 gene rearrangements are in\par process and will be reported as an addendum.\par

## 2021-10-20 ENCOUNTER — APPOINTMENT (OUTPATIENT)
Dept: HEMATOLOGY ONCOLOGY | Facility: CLINIC | Age: 86
End: 2021-10-20
Payer: MEDICARE

## 2021-10-20 ENCOUNTER — NON-APPOINTMENT (OUTPATIENT)
Age: 86
End: 2021-10-20

## 2021-10-20 ENCOUNTER — INPATIENT (INPATIENT)
Facility: HOSPITAL | Age: 86
LOS: 8 days | Discharge: HOME HEALTH | End: 2021-10-29
Attending: HOSPITALIST | Admitting: HOSPITALIST
Payer: MEDICARE

## 2021-10-20 VITALS
OXYGEN SATURATION: 95 % | RESPIRATION RATE: 20 BRPM | DIASTOLIC BLOOD PRESSURE: 74 MMHG | HEART RATE: 89 BPM | TEMPERATURE: 98 F | SYSTOLIC BLOOD PRESSURE: 141 MMHG

## 2021-10-20 DIAGNOSIS — Z96.649 PRESENCE OF UNSPECIFIED ARTIFICIAL HIP JOINT: Chronic | ICD-10-CM

## 2021-10-20 PROCEDURE — 73060 X-RAY EXAM OF HUMERUS: CPT | Mod: 26,RT

## 2021-10-20 PROCEDURE — 71046 X-RAY EXAM CHEST 2 VIEWS: CPT | Mod: 26

## 2021-10-20 PROCEDURE — 99285 EMERGENCY DEPT VISIT HI MDM: CPT

## 2021-10-20 PROCEDURE — 73030 X-RAY EXAM OF SHOULDER: CPT | Mod: 26,RT

## 2021-10-20 PROCEDURE — 73070 X-RAY EXAM OF ELBOW: CPT | Mod: 26,RT

## 2021-10-20 PROCEDURE — 99441: CPT

## 2021-10-20 NOTE — ED ADULT TRIAGE NOTE - CHIEF COMPLAINT QUOTE
pt fell off bed today onto carpet, [facility] and "confirmed a right shoulder fx... when they did xrays they also said they saw 3 broken ribs and a touch of pneumonia." pt breathing even and unlabored. not on blood thinners. ekg in progress

## 2021-10-20 NOTE — ED ADULT NURSE NOTE - OBJECTIVE STATEMENT
Pt A&Ox4, ambulatory at baseline. Pt in NAD, resp equal and unlabored. Pt states 12 days ago she slipped off her bed while getting dressed and fell on her right side without hitting her head. Pt has confirmed 3 rib fracture on R side and shoulder fracture, with purple/green  bruising to the area. Pt able to move shoulder joint without pain. Pt states ribs only hurt when she is coughing. SOB on exertion. Labs to be drawn by self.

## 2021-10-21 DIAGNOSIS — Z29.9 ENCOUNTER FOR PROPHYLACTIC MEASURES, UNSPECIFIED: ICD-10-CM

## 2021-10-21 DIAGNOSIS — S42.309A UNSPECIFIED FRACTURE OF SHAFT OF HUMERUS, UNSPECIFIED ARM, INITIAL ENCOUNTER FOR CLOSED FRACTURE: ICD-10-CM

## 2021-10-21 DIAGNOSIS — Z96.641 PRESENCE OF RIGHT ARTIFICIAL HIP JOINT: Chronic | ICD-10-CM

## 2021-10-21 DIAGNOSIS — J18.9 PNEUMONIA, UNSPECIFIED ORGANISM: ICD-10-CM

## 2021-10-21 DIAGNOSIS — C85.90 NON-HODGKIN LYMPHOMA, UNSPECIFIED, UNSPECIFIED SITE: ICD-10-CM

## 2021-10-21 DIAGNOSIS — I48.0 PAROXYSMAL ATRIAL FIBRILLATION: ICD-10-CM

## 2021-10-21 DIAGNOSIS — J96.01 ACUTE RESPIRATORY FAILURE WITH HYPOXIA: ICD-10-CM

## 2021-10-21 DIAGNOSIS — S22.39XA FRACTURE OF ONE RIB, UNSPECIFIED SIDE, INITIAL ENCOUNTER FOR CLOSED FRACTURE: ICD-10-CM

## 2021-10-21 LAB
ALBUMIN SERPL ELPH-MCNC: 4.4 G/DL — SIGNIFICANT CHANGE UP (ref 3.3–5)
ALP SERPL-CCNC: 199 U/L — HIGH (ref 40–120)
ALT FLD-CCNC: 16 U/L — SIGNIFICANT CHANGE UP (ref 4–33)
ANION GAP SERPL CALC-SCNC: 18 MMOL/L — HIGH (ref 7–14)
AST SERPL-CCNC: 17 U/L — SIGNIFICANT CHANGE UP (ref 4–32)
B PERT DNA SPEC QL NAA+PROBE: SIGNIFICANT CHANGE UP
B PERT+PARAPERT DNA PNL SPEC NAA+PROBE: SIGNIFICANT CHANGE UP
BASOPHILS # BLD AUTO: 0 K/UL — SIGNIFICANT CHANGE UP (ref 0–0.2)
BASOPHILS NFR BLD AUTO: 0 % — SIGNIFICANT CHANGE UP (ref 0–2)
BILIRUB SERPL-MCNC: 0.5 MG/DL — SIGNIFICANT CHANGE UP (ref 0.2–1.2)
BLOOD GAS VENOUS COMPREHENSIVE RESULT: SIGNIFICANT CHANGE UP
BORDETELLA PARAPERTUSSIS (RAPRVP): SIGNIFICANT CHANGE UP
BUN SERPL-MCNC: 21 MG/DL — SIGNIFICANT CHANGE UP (ref 7–23)
C PNEUM DNA SPEC QL NAA+PROBE: SIGNIFICANT CHANGE UP
CALCIUM SERPL-MCNC: 10.4 MG/DL — SIGNIFICANT CHANGE UP (ref 8.4–10.5)
CHLORIDE SERPL-SCNC: 102 MMOL/L — SIGNIFICANT CHANGE UP (ref 98–107)
CO2 SERPL-SCNC: 19 MMOL/L — LOW (ref 22–31)
CREAT SERPL-MCNC: 0.94 MG/DL — SIGNIFICANT CHANGE UP (ref 0.5–1.3)
EOSINOPHIL # BLD AUTO: 0.45 K/UL — SIGNIFICANT CHANGE UP (ref 0–0.5)
EOSINOPHIL NFR BLD AUTO: 1.8 % — SIGNIFICANT CHANGE UP (ref 0–6)
FLUAV SUBTYP SPEC NAA+PROBE: SIGNIFICANT CHANGE UP
FLUBV RNA SPEC QL NAA+PROBE: SIGNIFICANT CHANGE UP
GLUCOSE SERPL-MCNC: 103 MG/DL — HIGH (ref 70–99)
HADV DNA SPEC QL NAA+PROBE: SIGNIFICANT CHANGE UP
HCOV 229E RNA SPEC QL NAA+PROBE: SIGNIFICANT CHANGE UP
HCOV HKU1 RNA SPEC QL NAA+PROBE: SIGNIFICANT CHANGE UP
HCOV NL63 RNA SPEC QL NAA+PROBE: SIGNIFICANT CHANGE UP
HCOV OC43 RNA SPEC QL NAA+PROBE: SIGNIFICANT CHANGE UP
HCT VFR BLD CALC: 34.6 % — SIGNIFICANT CHANGE UP (ref 34.5–45)
HGB BLD-MCNC: 10.8 G/DL — LOW (ref 11.5–15.5)
HMPV RNA SPEC QL NAA+PROBE: SIGNIFICANT CHANGE UP
HPIV1 RNA SPEC QL NAA+PROBE: SIGNIFICANT CHANGE UP
HPIV2 RNA SPEC QL NAA+PROBE: SIGNIFICANT CHANGE UP
HPIV3 RNA SPEC QL NAA+PROBE: DETECTED
HPIV4 RNA SPEC QL NAA+PROBE: SIGNIFICANT CHANGE UP
IANC: 22.39 K/UL — HIGH (ref 1.5–8.5)
LIDOCAIN IGE QN: 23 U/L — SIGNIFICANT CHANGE UP (ref 7–60)
LYMPHOCYTES # BLD AUTO: 1.33 K/UL — SIGNIFICANT CHANGE UP (ref 1–3.3)
LYMPHOCYTES # BLD AUTO: 5.3 % — LOW (ref 13–44)
M PNEUMO DNA SPEC QL NAA+PROBE: SIGNIFICANT CHANGE UP
MAGNESIUM SERPL-MCNC: 1.9 MG/DL — SIGNIFICANT CHANGE UP (ref 1.6–2.6)
MCHC RBC-ENTMCNC: 28.6 PG — SIGNIFICANT CHANGE UP (ref 27–34)
MCHC RBC-ENTMCNC: 31.2 GM/DL — LOW (ref 32–36)
MCV RBC AUTO: 91.8 FL — SIGNIFICANT CHANGE UP (ref 80–100)
MONOCYTES # BLD AUTO: 1.33 K/UL — HIGH (ref 0–0.9)
MONOCYTES NFR BLD AUTO: 5.3 % — SIGNIFICANT CHANGE UP (ref 2–14)
NEUTROPHILS # BLD AUTO: 21.95 K/UL — HIGH (ref 1.8–7.4)
NEUTROPHILS NFR BLD AUTO: 86.7 % — HIGH (ref 43–77)
PLATELET # BLD AUTO: 359 K/UL — SIGNIFICANT CHANGE UP (ref 150–400)
POTASSIUM SERPL-MCNC: 4 MMOL/L — SIGNIFICANT CHANGE UP (ref 3.5–5.3)
POTASSIUM SERPL-SCNC: 4 MMOL/L — SIGNIFICANT CHANGE UP (ref 3.5–5.3)
PROT SERPL-MCNC: 7 G/DL — SIGNIFICANT CHANGE UP (ref 6–8.3)
RAPID RVP RESULT: DETECTED
RBC # BLD: 3.77 M/UL — LOW (ref 3.8–5.2)
RBC # FLD: 20.8 % — HIGH (ref 10.3–14.5)
RSV RNA SPEC QL NAA+PROBE: SIGNIFICANT CHANGE UP
RV+EV RNA SPEC QL NAA+PROBE: SIGNIFICANT CHANGE UP
SARS-COV-2 RNA SPEC QL NAA+PROBE: SIGNIFICANT CHANGE UP
SARS-COV-2 RNA SPEC QL NAA+PROBE: SIGNIFICANT CHANGE UP
SODIUM SERPL-SCNC: 139 MMOL/L — SIGNIFICANT CHANGE UP (ref 135–145)
TROPONIN T, HIGH SENSITIVITY RESULT: 32 NG/L — SIGNIFICANT CHANGE UP
WBC # BLD: 25.06 K/UL — HIGH (ref 3.8–10.5)
WBC # FLD AUTO: 25.06 K/UL — HIGH (ref 3.8–10.5)

## 2021-10-21 PROCEDURE — 73020 X-RAY EXAM OF SHOULDER: CPT | Mod: 26,RT

## 2021-10-21 PROCEDURE — 99223 1ST HOSP IP/OBS HIGH 75: CPT

## 2021-10-21 RX ORDER — ENOXAPARIN SODIUM 100 MG/ML
40 INJECTION SUBCUTANEOUS DAILY
Refills: 0 | Status: DISCONTINUED | OUTPATIENT
Start: 2021-10-21 | End: 2021-10-29

## 2021-10-21 RX ORDER — ESCITALOPRAM OXALATE 10 MG/1
10 TABLET, FILM COATED ORAL DAILY
Refills: 0 | Status: DISCONTINUED | OUTPATIENT
Start: 2021-10-21 | End: 2021-10-29

## 2021-10-21 RX ORDER — CEFEPIME 1 G/1
2000 INJECTION, POWDER, FOR SOLUTION INTRAMUSCULAR; INTRAVENOUS ONCE
Refills: 0 | Status: COMPLETED | OUTPATIENT
Start: 2021-10-21 | End: 2021-10-21

## 2021-10-21 RX ORDER — ACETAMINOPHEN 500 MG
975 TABLET ORAL ONCE
Refills: 0 | Status: COMPLETED | OUTPATIENT
Start: 2021-10-21 | End: 2021-10-21

## 2021-10-21 RX ORDER — CEFTRIAXONE 500 MG/1
1000 INJECTION, POWDER, FOR SOLUTION INTRAMUSCULAR; INTRAVENOUS EVERY 24 HOURS
Refills: 0 | Status: COMPLETED | OUTPATIENT
Start: 2021-10-21 | End: 2021-10-25

## 2021-10-21 RX ORDER — AZITHROMYCIN 500 MG/1
500 TABLET, FILM COATED ORAL EVERY 24 HOURS
Refills: 0 | Status: COMPLETED | OUTPATIENT
Start: 2021-10-21 | End: 2021-10-25

## 2021-10-21 RX ORDER — VALACYCLOVIR 500 MG/1
500 TABLET, FILM COATED ORAL DAILY
Refills: 0 | Status: DISCONTINUED | OUTPATIENT
Start: 2021-10-21 | End: 2021-10-29

## 2021-10-21 RX ORDER — FOLIC ACID 0.8 MG
1 TABLET ORAL DAILY
Refills: 0 | Status: DISCONTINUED | OUTPATIENT
Start: 2021-10-21 | End: 2021-10-29

## 2021-10-21 RX ORDER — VANCOMYCIN HCL 1 G
1000 VIAL (EA) INTRAVENOUS ONCE
Refills: 0 | Status: COMPLETED | OUTPATIENT
Start: 2021-10-21 | End: 2021-10-21

## 2021-10-21 RX ORDER — LIDOCAINE 4 G/100G
1 CREAM TOPICAL DAILY
Refills: 0 | Status: DISCONTINUED | OUTPATIENT
Start: 2021-10-21 | End: 2021-10-29

## 2021-10-21 RX ORDER — SENNA PLUS 8.6 MG/1
2 TABLET ORAL ONCE
Refills: 0 | Status: COMPLETED | OUTPATIENT
Start: 2021-10-21 | End: 2021-10-21

## 2021-10-21 RX ORDER — ACETAMINOPHEN 500 MG
650 TABLET ORAL EVERY 6 HOURS
Refills: 0 | Status: DISCONTINUED | OUTPATIENT
Start: 2021-10-21 | End: 2021-10-29

## 2021-10-21 RX ORDER — SENNA PLUS 8.6 MG/1
2 TABLET ORAL AT BEDTIME
Refills: 0 | Status: DISCONTINUED | OUTPATIENT
Start: 2021-10-21 | End: 2021-10-29

## 2021-10-21 RX ORDER — PANTOPRAZOLE SODIUM 20 MG/1
40 TABLET, DELAYED RELEASE ORAL
Refills: 0 | Status: DISCONTINUED | OUTPATIENT
Start: 2021-10-21 | End: 2021-10-29

## 2021-10-21 RX ADMIN — Medication 650 MILLIGRAM(S): at 11:30

## 2021-10-21 RX ADMIN — SENNA PLUS 1 TABLET(S): 8.6 TABLET ORAL at 21:40

## 2021-10-21 RX ADMIN — LIDOCAINE 1 PATCH: 4 CREAM TOPICAL at 11:55

## 2021-10-21 RX ADMIN — CEFTRIAXONE 100 MILLIGRAM(S): 500 INJECTION, POWDER, FOR SOLUTION INTRAMUSCULAR; INTRAVENOUS at 10:59

## 2021-10-21 RX ADMIN — AZITHROMYCIN 255 MILLIGRAM(S): 500 TABLET, FILM COATED ORAL at 12:00

## 2021-10-21 RX ADMIN — ENOXAPARIN SODIUM 40 MILLIGRAM(S): 100 INJECTION SUBCUTANEOUS at 13:10

## 2021-10-21 RX ADMIN — Medication 650 MILLIGRAM(S): at 10:52

## 2021-10-21 RX ADMIN — SENNA PLUS 2 TABLET(S): 8.6 TABLET ORAL at 00:48

## 2021-10-21 RX ADMIN — Medication 975 MILLIGRAM(S): at 00:48

## 2021-10-21 RX ADMIN — ESCITALOPRAM OXALATE 10 MILLIGRAM(S): 10 TABLET, FILM COATED ORAL at 11:55

## 2021-10-21 RX ADMIN — Medication 250 MILLIGRAM(S): at 03:33

## 2021-10-21 RX ADMIN — Medication 1 MILLIGRAM(S): at 11:55

## 2021-10-21 RX ADMIN — CEFEPIME 100 MILLIGRAM(S): 1 INJECTION, POWDER, FOR SOLUTION INTRAMUSCULAR; INTRAVENOUS at 02:44

## 2021-10-21 RX ADMIN — VALACYCLOVIR 500 MILLIGRAM(S): 500 TABLET, FILM COATED ORAL at 13:11

## 2021-10-21 RX ADMIN — Medication 650 MILLIGRAM(S): at 23:48

## 2021-10-21 NOTE — ED PROVIDER NOTE - OBJECTIVE STATEMENT
89yo F Hx of lymphoma on chemo last treatment 2 wks ago presenting with complaints of R arm pain. pt had a mechanical fall 1wk ago, slipped getting off bed, falling on her right side. no head strike or LOC. able to stand up following, had R arm pain but thought it would improve but had continued pain and bruising, went to urgent care today and was told she had a fracture, placed in a sling. CXR with possible rib fractures and a "touch of pna." sent to the ED for further evaluation. denies any chest pain or SOB, with intermittent cough. no f/c, nausea or vomiting. no LE edema, abd pain. R hand dominant

## 2021-10-21 NOTE — ED PROVIDER NOTE - PROGRESS NOTE DETAILS
krishna martinez pgy3: pt with krishna martinez pgy3: pt with b/l lower lobe opacities, pna cannot be excluded, with leukocytosis. vanc/cefepime ordered. discussed fracture with ortho, nonoperative management with sling. will see pt to discuss and give f/u. Cx sent and pending. discussed with hospitalist, accepted to their service.

## 2021-10-21 NOTE — OCCUPATIONAL THERAPY INITIAL EVALUATION ADULT - LIVES WITH, PROFILE
Patient lives in a private home alone with no stairs to manage. Patient has a home health aide 5 days/week for 7 hours/day to assist with ADLs and IADLs as needed. Patient also has a "friend" who stays over to help when needed.

## 2021-10-21 NOTE — ED PROVIDER NOTE - ATTENDING CONTRIBUTION TO CARE
I performed a history and physical exam of the patient and discussed their management with the resident and /or advanced care provider. I reviewed the resident and /or ACP's note and agree with the documented findings and plan of care. My medical decision making and observations are found above.  Lungs clear lt chest pain. lt knee pain. I performed a history and physical exam of the patient and discussed their management with the resident and /or advanced care provider. I reviewed the resident and /or ACP's note and agree with the documented findings and plan of care. My medical decision making and observations are found above.  Lungs clear rt arm pain. breathing well

## 2021-10-21 NOTE — OCCUPATIONAL THERAPY INITIAL EVALUATION ADULT - GENERAL OBSERVATIONS, REHAB EVAL
Patient received semisupine on stretcher in ED in NAD and agreeable to participate in OT evaluation. +tele. +O2 via NC. +right UE in sling.

## 2021-10-21 NOTE — OCCUPATIONAL THERAPY INITIAL EVALUATION ADULT - PERTINENT HX OF CURRENT PROBLEM, REHAB EVAL
89 year old female with history of lymphoma on active chemo s/p mechanical fall 12 days ago. Found to have right proximal humerus fracture and PNA.

## 2021-10-21 NOTE — H&P ADULT - PROBLEM SELECTOR PLAN 3
-patient with basilar opacities on xray that could be 2/2 lymphoma vs infectious process vs atelectasis  -patient reports intermittent cough but no fever.  -leukocytosis present but could be reactive to stress from fracture  -patient is at high risk for PNA given rib fracture, malignancy, old age etc  -currently respiratory and hemodynamic status stable  -lower suspicion for resistant organisms. Will treat as CAP with ceft/azithro. Can transition to elvaquin on DC if remains clinically well  -f/u blood cx  -incentive spirometry -patient 86% on RA, improves to 96 on 2 L  -suspect multifactorial from shallow breathing from rib fracture vs atelectasis vs pna  -incentive spirometry  -treat PNA -patient 86% on RA, improves to 96 on 2 L  -suspect multifactorial from shallow breathing from rib fracture vs atelectasis vs pna vs lymphoma?  -incentive spirometry  -treat PNA  -last admission in august patient also required 02 but at that time had pleurex for pleural effusion. Pleurex has since been removed. Patient at home now does not use 02. NO pleural effusion on CXR now.   -monitor clinically

## 2021-10-21 NOTE — H&P ADULT - PROBLEM SELECTOR PLAN 2
-patient s/p fall 12 days, mechanical in nature  -acute rib fracture also noted on imaging at urgent care  -not seen on xrays here, will not pursue further imaging as does not   -supportive care with pain control with tylenol and lidocaine patch  -incentive spirometry

## 2021-10-21 NOTE — CONSULT NOTE ADULT - ATTENDING COMMENTS
patient seen, labs reviewed, imaging studies reviewed. Much of the information is under the surname Declan. Subcapital fracture right humerus, underlying osteoporosis. I agree with Dr Lanza's assessment and plan.
Agree with above. This is an 89 year old female with a right proximal humerus fx. Distal NV intact benign. Pain controlled. Would keep in sling, VIANCA RUE. Ok for pendulum exercises with right arm    Follow up with my office as outpatient.

## 2021-10-21 NOTE — CONSULT NOTE ADULT - TIME BILLING
Please note that over 70 minutes of time was spent in care of this patient including pre visit preparation, in person visit, post visit documentation, discussion with colleagues, review of imaging.

## 2021-10-21 NOTE — H&P ADULT - ASSESSMENT
88 y/o F with PMH of lymphoma on active chemo s/p mechanical fall 12 days ago found to have right proximal humerus fracture and possible PNA 89 y/o F with PMH of lymphoma on active chemo s/p mechanical fall 12 days ago found to have right proximal humerus fracture and PNA

## 2021-10-21 NOTE — CONSULT NOTE ADULT - SUBJECTIVE AND OBJECTIVE BOX
CHIEF COMPLAINT:Patient is a 89y old  Female who presents with a chief complaint of Fracture (21 Oct 2021 11:06)      HISTORY OF PRESENT ILLNESS:    89 female with history of lymphoma  admitted s/p fall with humerus fx and rib fx  found to have parainfluenza virus as well  last admission she had post op afib  converted to sinus with amio for short term     PAST MEDICAL & SURGICAL HISTORY:  Lymphoma    S/P hip replacement, right            MEDICATIONS:  enoxaparin Injectable 40 milliGRAM(s) SubCutaneous daily    azithromycin  IVPB 500 milliGRAM(s) IV Intermittent every 24 hours  cefTRIAXone   IVPB 1000 milliGRAM(s) IV Intermittent every 24 hours  valACYclovir 500 milliGRAM(s) Oral daily      acetaminophen   Tablet .. 650 milliGRAM(s) Oral every 6 hours PRN  escitalopram 10 milliGRAM(s) Oral daily    pantoprazole    Tablet 40 milliGRAM(s) Oral before breakfast  senna 2 Tablet(s) Oral at bedtime      folic acid 1 milliGRAM(s) Oral daily  lidocaine   4% Patch 1 Patch Transdermal daily      FAMILY HISTORY:  Family history of bladder cancer (Father)        Non-contributory    SOCIAL HISTORY:    No tobacco, drugs or etoh    Allergies    No Known Allergies    Intolerances    	    REVIEW OF SYSTEMS:  as above  The rest of the 14 points ROS reviewed and except above they are unremarkable.        PHYSICAL EXAM:  T(C): 36.4 (10-21-21 @ 11:15), Max: 36.9 (10-21-21 @ 03:45)  HR: 94 (10-21-21 @ 11:15) (78 - 94)  BP: 130/78 (10-21-21 @ 11:15) (122/77 - 141/74)  RR: 18 (10-21-21 @ 11:15) (16 - 20)  SpO2: 96% (10-21-21 @ 11:15) (94% - 98%)  Wt(kg): --  I&O's Summary      JVP: Normal  Neck: supple  Lung: clear   CV: S1 S2 , Murmur:  Abd: soft  Ext: No edema  neuro: Awake / alert  Psych: flat affect  Skin: normal      LABS/DATA:    TELEMETRY: 	    ECG:  	   	  CARDIAC MARKERS:                        32 <<== 10-21-21 @ 00:39                              10.8   25.06 )-----------( 359      ( 21 Oct 2021 00:39 )             34.6     10-21    139  |  102  |  21  ----------------------------<  103<H>  4.0   |  19<L>  |  0.94    Ca    10.4      21 Oct 2021 00:39  Mg     1.90     10-21    TPro  7.0  /  Alb  4.4  /  TBili  0.5  /  DBili  x   /  AST  17  /  ALT  16  /  AlkPhos  199<H>  10-21    proBNP:   Lipid Profile:   HgA1c:   TSH:           
Orthopaedic Surgery Consult Note    89yFemale c/o R shoulder pain  s/p fall off of her bed 1 week ago. Patient sitting on the side of her bed getting dressed to go out, when she lost her balance and fell. Patient denies headstrike or LOC. Patient denies numbness or tingling in the RUE. Patient also reports pain in her chest wall. After dealing with her pain for a week, she was seen at an urgent care center and told she has a proximal humerus fracture as well as rib fractures    PMH:  Lymphoma      PSH:    :    Meds: See med rec    T(C): 36.8 (10-20-21 @ 22:57)  HR: 90 (10-21-21 @ 00:32)  BP: 122/77 (10-21-21 @ 00:32)  RR: 16 (10-21-21 @ 00:32)  SpO2: 96% (10-21-21 @ 00:32)  Wt(kg): --    Gen: NAD  Resp: Unlabored breathing on NC, cough with rhonci audible without stethoscope  RUE:  Skin intact,    SILT axillary/med/rad/ulnar  Swelling and ecchymosis throughout upper arm  +Motor AIN/PIN/Ulnar/Radial/Musc/Median,   +painless elbow/wrist ROM,   shoulder ROM limited 2/2 pain,   2+radial pulse, soft compartments.    Secondary:  No TTP over bony landmarks, SILT BL, ROM intact BL, distal pulses palpable.    Imaging:  XR demonstrating right proximal humerus fracture  Axillary view demonstrates well located glenohumeral joint          
HPI:  NOTE: Patient's other name in system is Magalys Manriquez. See MRN: [id number]    This is a 89 y/o F with PMH of lymphoma on active chemo (last 2 weeks ago) presents s/p fall 12 days ago and now with right shoulder pain. Patient states that she fell about 12 days ago. She slipped while sitting at the edge of the bed and fell on her right side. She denies any head trauma or LOC. No chest pain or palpitations. Reports it was just an accident. Went to urgent care due to persistent right shoulder pain that didnt improve over next days. There she was told about fracture and possible PNA and advised to come to ER. Here again noted to have right proximal humerus fracture. Seen by ortho. Placed in a sling. No surgical interventions planned. Will need outpt follow up. She denies any chest pain or SOB. No headache. No abdominal pain, fevers or N/V/D. Does have intermittent cough and post nasal drip. Last chemo was 2 weeks ago. Denies any other medical problems. Has HHA 5 hrs/day x 5 days. Lives alone and is independent.     Spoke to daughter Dinorah. She reports that patient has another name in system Magalys Manriquez. She was at [facility] in August for prolonged stay. At that time she was diagnosed with lymphoma and underwent VATS and also required pleruex. She says that pleurex was removed and since chemo patient has been doing well. She does not need o2 anymore at home. Also daughter is not sure of all her medications. She does says that rehab put patient on amio. Per review of her prior records, patient developed afib post op last admission and was placed on amio. Then amio was not continued on discharge per cards note. Will has Dr. Madison to see patient.     She follows outpatient with Dr. Rossy Esparza, has completed four cycles of rituximab, cytoxan & prednisone treatment. She is due for cycle 5 on 10/28 with PET/CT scan scheduled for 10/23. There has been an ongoing discussion between the patient, her daughter, and Dr. Esparza regarding transitioning from the palliative treatment she is on to a more curative treatment with R-mini-CHOP    Vital Signs Last 24 Hrs  T(C): 36.6 (21 Oct 2021 04:05), Max: 36.9 (21 Oct 2021 03:45)  T(F): 97.8 (21 Oct 2021 04:05), Max: 98.5 (21 Oct 2021 03:45)  HR: 78 (21 Oct 2021 04:05) (78 - 92)  BP: 128/68 (21 Oct 2021 04:05) (122/77 - 141/74)  BP(mean): --  RR: 18 (21 Oct 2021 04:05) (16 - 20)  SpO2: 94% (21 Oct 2021 04:05) (94% - 98%) (21 Oct 2021 08:28)      14 point ROS otherwise negative    PAST MEDICAL & SURGICAL HISTORY:  Lymphoma    S/P hip replacement, right        Allergies    No Known Allergies    Intolerances        MEDICATIONS  (STANDING):  azithromycin  IVPB 500 milliGRAM(s) IV Intermittent every 24 hours  cefTRIAXone   IVPB 1000 milliGRAM(s) IV Intermittent every 24 hours  enoxaparin Injectable 40 milliGRAM(s) SubCutaneous daily  escitalopram 10 milliGRAM(s) Oral daily  folic acid 1 milliGRAM(s) Oral daily  lidocaine   4% Patch 1 Patch Transdermal daily  pantoprazole    Tablet 40 milliGRAM(s) Oral before breakfast  senna 2 Tablet(s) Oral at bedtime  valACYclovir 500 milliGRAM(s) Oral daily    MEDICATIONS  (PRN):  acetaminophen   Tablet .. 650 milliGRAM(s) Oral every 6 hours PRN Temp greater or equal to 38C (100.4F), Mild Pain (1 - 3)      FAMILY HISTORY:  Family history of bladder cancer (Father)        SOCIAL HISTORY: No EtOH, no tobacco        VITALS:   T(F): 97.8 (10-21-21 @ 04:05), Max: 98.5 (10-21-21 @ 03:45)  HR: 78 (10-21-21 @ 04:05)  BP: 128/68 (10-21-21 @ 04:05)  RR: 18 (10-21-21 @ 04:05)  SpO2: 94% (10-21-21 @ 04:05)  Wt(kg): --    PHYSICAL EXAM    GENERAL: NAD, well-developed  HEAD:  Atraumatic, Normocephalic  EYES: EOMI, PERRLA, conjunctiva and sclera clear  NECK: Supple, No JVD  CHEST/LUNG: Clear to auscultation bilaterally; No wheeze  HEART: Regular rate and rhythm; No murmurs, rubs, or gallops  ABDOMEN: Soft, Nontender, Nondistended; Bowel sounds present  EXTREMITIES:  2+ Peripheral Pulses, No clubbing, cyanosis, or edema  NEUROLOGY: non-focal  SKIN: No rashes or lesions    LABS:                         10.8   25.06 )-----------( 359      ( 21 Oct 2021 00:39 )             34.6     10-21    139  |  102  |  21  ----------------------------<  103<H>  4.0   |  19<L>  |  0.94    Ca    10.4      21 Oct 2021 00:39  Mg     1.90     10-21    TPro  7.0  /  Alb  4.4  /  TBili  0.5  /  DBili  x   /  AST  17  /  ALT  16  /  AlkPhos  199<H>  10-21    Magnesium, Serum: 1.90 mg/dL (10-21 @ 00:39)          IMAGING:

## 2021-10-21 NOTE — H&P ADULT - NSICDXFAMILYHX_GEN_ALL_CORE_FT
FAMILY HISTORY:  Father  Still living? Unknown  Family history of bladder cancer, Age at diagnosis: Age Unknown

## 2021-10-21 NOTE — ED PROVIDER NOTE - CLINICAL SUMMARY MEDICAL DECISION MAKING FREE TEXT BOX
88yo F presenting with complaints of R arm pain s/p fall. Fx on imaging at , possible rib Fx and pna. given Hx of lymphoma will obtain labs and Cx, rpt imaging. reassess. 88yo F presenting with complaints of R arm pain s/p fall. Fx on imaging at , possible rib Fx and pna. given Hx of lymphoma will obtain labs and Cx, rpt imaging. reassess.  Bela: 88yo who presents with rt arm and leg pain after fall. also rib pain. will image for trauma. awake and alert now. discussed with family. wheel chair at home so D/C probable depending on chest imaging 88yo F presenting with complaints of R arm pain s/p fall. Fx on imaging at , possible rib Fx and pna. given Hx of lymphoma will obtain labs and Cx, rpt imaging. reassess.  Bela: 88yo who presents with rt arm and leg pain after fall. went to  and was told she had pneumonia and sent her in. Here with cominuted humeral fx and pneumonia, start antibiotics and admit. ortho consulted for displaced, comminuted fracture.

## 2021-10-21 NOTE — OCCUPATIONAL THERAPY INITIAL EVALUATION ADULT - RANGE OF MOTION EXAMINATION, UPPER EXTREMITY
Right UE not formally assessed due to + fracture/sling/Left UE Active ROM was WFL (within functional limits)

## 2021-10-21 NOTE — H&P ADULT - HISTORY OF PRESENT ILLNESS
This is a 89 y/o F with PMH of lymphoma on active chemo presents s/p fall 12 days ago and now with right shoulder pain. Patient states that she fell about 12 days ago. She slipped while getting out of bed and fell on her right side. She denies any head trauma or LOC. Went to urgent care due to persistent right shoulder pain that didnt improve over next days. There she was told about fracture and possible PNA and advised to come to ER. Here again noted to have right proximal humerus fracture. Seen by ortho. Placed in a sling. No surgical interventions planned. Will need outpt follow up.     Vital Signs Last 24 Hrs  T(C): 36.6 (21 Oct 2021 04:05), Max: 36.9 (21 Oct 2021 03:45)  T(F): 97.8 (21 Oct 2021 04:05), Max: 98.5 (21 Oct 2021 03:45)  HR: 78 (21 Oct 2021 04:05) (78 - 92)  BP: 128/68 (21 Oct 2021 04:05) (122/77 - 141/74)  BP(mean): --  RR: 18 (21 Oct 2021 04:05) (16 - 20)  SpO2: 94% (21 Oct 2021 04:05) (94% - 98%) This is a 88 y/o F with PMH of lymphoma on active chemo (last 2 weeks ago) presents s/p fall 12 days ago and now with right shoulder pain. Patient states that she fell about 12 days ago. She slipped while sitting at the edge of the bed and fell on her right side. She denies any head trauma or LOC. No chest pain or palpitations. Reports it was just an accident. Went to urgent care due to persistent right shoulder pain that didnt improve over next days. There she was told about fracture and possible PNA and advised to come to ER. Here again noted to have right proximal humerus fracture. Seen by ortho. Placed in a sling. No surgical interventions planned. Will need outpt follow up. She denies any chest pain or SOB. No headache. No abdominal pain, fevers or N/V/D. Does have intermittent cough and post nasal drip. Last chemo was 2 weeks ago. Denies any other medical problems. Has HHA 5 hrs/day x 5 days. Lives alone and is independent.     Vital Signs Last 24 Hrs  T(C): 36.6 (21 Oct 2021 04:05), Max: 36.9 (21 Oct 2021 03:45)  T(F): 97.8 (21 Oct 2021 04:05), Max: 98.5 (21 Oct 2021 03:45)  HR: 78 (21 Oct 2021 04:05) (78 - 92)  BP: 128/68 (21 Oct 2021 04:05) (122/77 - 141/74)  BP(mean): --  RR: 18 (21 Oct 2021 04:05) (16 - 20)  SpO2: 94% (21 Oct 2021 04:05) (94% - 98%) NOTE: Patient's other name in system is Magalys Manriquez. See MRN:    This is a 88 y/o F with PMH of lymphoma on active chemo (last 2 weeks ago) presents s/p fall 12 days ago and now with right shoulder pain. Patient states that she fell about 12 days ago. She slipped while sitting at the edge of the bed and fell on her right side. She denies any head trauma or LOC. No chest pain or palpitations. Reports it was just an accident. Went to urgent care due to persistent right shoulder pain that didnt improve over next days. There she was told about fracture and possible PNA and advised to come to ER. Here again noted to have right proximal humerus fracture. Seen by ortho. Placed in a sling. No surgical interventions planned. Will need outpt follow up. She denies any chest pain or SOB. No headache. No abdominal pain, fevers or N/V/D. Does have intermittent cough and post nasal drip. Last chemo was 2 weeks ago. Denies any other medical problems. Has HHA 5 hrs/day x 5 days. Lives alone and is independent.     Spoke to daughter Dinorah. She reports that patient has another name in system Magalys Manriquez. She was at [facility] in August for prolonged stay. At that time she was diagnosed with lymphoma and underwent VATS and also required pleruex. She says that pleurex was removed and since chemo patient has been doing well. She does not need o2 anymore at home. Also daughter is not sure of all her medications. She does says that rehab put patient on amio. Per review of her prior records, patient developed afib post op last admission and was placed on amio. Then amio was not continued on discharge per cards note. Will has Dr. Madison to see patient.     Vital Signs Last 24 Hrs  T(C): 36.6 (21 Oct 2021 04:05), Max: 36.9 (21 Oct 2021 03:45)  T(F): 97.8 (21 Oct 2021 04:05), Max: 98.5 (21 Oct 2021 03:45)  HR: 78 (21 Oct 2021 04:05) (78 - 92)  BP: 128/68 (21 Oct 2021 04:05) (122/77 - 141/74)  BP(mean): --  RR: 18 (21 Oct 2021 04:05) (16 - 20)  SpO2: 94% (21 Oct 2021 04:05) (94% - 98%) NOTE: Patient's other name in system is Magalys Manriquez. See MRN: [id number]    This is a 89 y/o F with PMH of lymphoma on active chemo (last 2 weeks ago) presents s/p fall 12 days ago and now with right shoulder pain. Patient states that she fell about 12 days ago. She slipped while sitting at the edge of the bed and fell on her right side. She denies any head trauma or LOC. No chest pain or palpitations. Reports it was just an accident. Went to urgent care due to persistent right shoulder pain that didnt improve over next days. There she was told about fracture and possible PNA and advised to come to ER. Here again noted to have right proximal humerus fracture. Seen by ortho. Placed in a sling. No surgical interventions planned. Will need outpt follow up. She denies any chest pain or SOB. No headache. No abdominal pain, fevers or N/V/D. Does have intermittent cough and post nasal drip. Last chemo was 2 weeks ago. Denies any other medical problems. Has HHA 5 hrs/day x 5 days. Lives alone and is independent.     Spoke to daughter Dinorah. She reports that patient has another name in system Magalys Manriquez. She was at [facility] in August for prolonged stay. At that time she was diagnosed with lymphoma and underwent VATS and also required pleruex. She says that pleurex was removed and since chemo patient has been doing well. She does not need o2 anymore at home. Also daughter is not sure of all her medications. She does says that rehab put patient on amio. Per review of her prior records, patient developed afib post op last admission and was placed on amio. Then amio was not continued on discharge per cards note. Will has Dr. Madison to see patient.     Vital Signs Last 24 Hrs  T(C): 36.6 (21 Oct 2021 04:05), Max: 36.9 (21 Oct 2021 03:45)  T(F): 97.8 (21 Oct 2021 04:05), Max: 98.5 (21 Oct 2021 03:45)  HR: 78 (21 Oct 2021 04:05) (78 - 92)  BP: 128/68 (21 Oct 2021 04:05) (122/77 - 141/74)  BP(mean): --  RR: 18 (21 Oct 2021 04:05) (16 - 20)  SpO2: 94% (21 Oct 2021 04:05) (94% - 98%)

## 2021-10-21 NOTE — H&P ADULT - PROBLEM SELECTOR PLAN 6
-lovenox -patient on amio at home. Per daughter she was placed on amion at rehab  -prior records reviwed. Appears like patient was on amio for PAF and was discharged off of amio  -currently EKG NSR.  -Was seen by Dr. Madison last admission. Will have him consult to see if patient still needs amio. -patient on amio at home. Per daughter she was placed on amion at rehab  -prior records reviewed. Appears like patient was on amio for PAF and was discharged off of amio  -currently EKG NSR.  -Was seen by Dr. Madison last admission. Will have him consult to see if patient still needs amio.

## 2021-10-21 NOTE — OCCUPATIONAL THERAPY INITIAL EVALUATION ADULT - DIAGNOSIS, OT EVAL
s/p fall, s/p right humerus fracture, s/p PNA; decreased functional mobility, decreased ADL performance

## 2021-10-21 NOTE — H&P ADULT - PROBLEM/PLAN-1
Excuse Slip    Yonathan Jennings,           This is to certify that the above-named patient had an appointment at this office for professional attention on September 9, 2019.      Please excuse him/her:  (X)    FROM:   (X)    DUE TO:        Work      Injury   xxx    School  xxx    Illness       Gym      Other       Other        Comments: May return to school 9/10/19.          Thank you,            Kadi Galaviz MA    
DISPLAY PLAN FREE TEXT

## 2021-10-21 NOTE — CONSULT NOTE ADULT - ASSESSMENT
89yFemale with R proximal humerus fracture    -Pain control  -NWB in sling  -PT/OT  -No acute orthopaedic intervention at this time  -Ortho to sign off  -Please call if you have further questions  -Can follow up with Dr. Martinez in 2 weeks or upon discharge 89yFemale with R proximal humerus fracture    -Pain control  -NWB in sling  -PT/OT  -No acute orthopaedic intervention at this time  -Ortho to sign off  -Please call if you have further questions  -Can follow up with Dr. Edge in 2 weeks or upon discharge

## 2021-10-21 NOTE — H&P ADULT - PROBLEM SELECTOR PLAN 5
-lovenox -c/w outpt management  -house heme/onc notified -c/w outpt management. Patient of Dr. Esparza  -house heme/onc notified  -due for outpt PET scan on 10/23  -will discuss with heme if they want further inpatient imaging -c/w outpt management. Patient of Dr. Esparza  -house heme/onc notified  -due for outpt PET scan on 10/23  -will discuss with heme if they want further inpatient imaging  - c/w valacyclovir for ppx - discuss with heme if patient needs this

## 2021-10-21 NOTE — H&P ADULT - PROBLEM SELECTOR PLAN 1
-patient s/p fall 12 days, mechanical in nature  -right proximal humerus fracture  -NWB RUE  -c/w use of sling  -seen by ortho and no intervention planned  -outpt follow up with ortho  -pain control with tylenol  -bowel regimen  -PT/OT -patient s/p fall 12 days ago, mechanical in nature  -right proximal humerus fracture  -NWB RUE  -c/w use of sling  -seen by ortho and no intervention planned  -outpt follow up with ortho  -pain control with tylenol  -bowel regimen  -PT/OT

## 2021-10-21 NOTE — H&P ADULT - PROBLEM SELECTOR PLAN 4
-c/w outpt management -patient with basilar opacities on xray that could be 2/2 lymphoma vs infectious process vs atelectasis  -patient reports intermittent cough but no fever.  -leukocytosis present but could be reactive to stress from fracture  -patient is at high risk for PNA given rib fracture, malignancy, old age etc  -currently hemodynamic status stable but requiring supplemental 02  -lower suspicion for resistant organisms. Will treat as CAP with ceft/azithro. Can transition to elvaquin on DC if remains clinically well  -f/u blood cx  -incentive spirometry -patient with basilar opacities on xray that could be 2/2 lymphoma vs infectious process vs atelectasis  -patient reports intermittent cough but no fever.  -leukocytosis present but could be reactive to stress from fracture  -patient is at high risk for PNA given rib fracture, malignancy, old age etc  -currently hemodynamic status stable but requiring supplemental 02  -lower suspicion for resistant organisms. Will treat as CAP with ceft/azithro. Can transition to levaquin on DC if remains clinically well  -f/u blood cx  -incentive spirometry -patient with basilar opacities on xray that could be 2/2 lymphoma vs infectious process vs atelectasis  -patient reports intermittent cough but no fever.  -leukocytosis present but could be reactive to stress from fracture  -patient is at high risk for PNA given rib fracture, malignancy, old age etc  -currently hemodynamic status stable but requiring supplemental 02  -lower suspicion for resistant organisms. Will treat as CAP with ceft/azithro. Can transition to levaquin on DC if remains clinically well  -f/u blood cx, urine legionella nad MRSA swab  -incentive spirometry

## 2021-10-21 NOTE — CONSULT NOTE ADULT - ASSESSMENT
This is a 89 y/o F with PMH of lymphoma on active chemo (last 2 weeks ago) presents s/p fall 12 days ago found to have an acute fracture of the right proximal humerus on XR.     #Diffuse Large B-Cell Lymphoma  - Admitted to [facility] in July for worsening cough & lethargy, PET revealed b/l upper lobe opacities concerning for malignancy with pleural effusion, underwent R robotic VATS/pleural bx with placement of PleurX catheter at that time  - Pathology revealed non-GCB DLBCL  - Received palliative chemo with prednisone, cytoxan (200mg/m2 8/5-8/7), rituximab c/b infusion reaction  - PleurX removed with improvement of symptoms, no longer required supplemental O2  - D/c'ed to rehab, followed in clinic by Dr. Rossy Esparza  - Plan to continue RC & prednisone q3 weeks with ongoing discussion of switch to curative treatment with R-mini-CHOP  - Last seen in clinic 10/7, s/p C4 of RC & pred on 10/8; C5 scheduled for 10/28  - Due for PET/CT 10/23    #R proximal humerus fracture  - Mechanical in nature  - Pain control per primary team    Jorge Lanza MD  PGY-1    INCOMPLETE NOTE, TO BE DISCUSSED WITH ATTENDING This is a 88 y/o F with PMH of lymphoma on active chemo (last 2 weeks ago) presents s/p fall 12 days ago found to have an acute fracture of the right proximal humerus on XR.     #Diffuse Large B-Cell Lymphoma  - Admitted to [facility] in July for worsening cough & lethargy, PET revealed b/l upper lobe opacities concerning for malignancy with pleural effusion, underwent R robotic VATS/pleural bx with placement of PleurX catheter at that time  - Pathology revealed non-GCB DLBCL  - Received cycle 1 of palliative chemo per choice of patient & family inpatient with prednisone, cyclophosphamide, rituximab  - Rituxan transfusion c/b reaction with desaturation; subsequent rechallenge successful with pre-medication with Tylenol, Decadron, Benadryl  - PleurX removed with improvement of symptoms, no longer required supplemental O2  - D/c'ed to rehab, followed in clinic by Dr. Rossy Esparza  - Plan to continue RC & prednisone q3 weeks with ongoing discussion of switch to curative treatment with R-mini-CHOP  - Last seen in clinic 10/7, s/p C4 of RC & pred on 10/8; C5 scheduled for 10/28  - Due for PET/CT 10/23    #R proximal humerus fracture  - Mechanical in nature  - Pain control per primary team    Jorge Lanza MD  PGY-1    INCOMPLETE NOTE, TO BE DISCUSSED WITH ATTENDING This is a 88 y/o F with PMH of lymphoma on active chemo (last 2 weeks ago) presents s/p fall 12 days ago found to have an acute fracture of the right proximal humerus on XR.     #Diffuse Large B-Cell Lymphoma  - Admitted to [facility] in July for worsening cough & lethargy, PET revealed b/l upper lobe opacities concerning for malignancy with pleural effusion, underwent R robotic VATS/pleural bx with placement of PleurX catheter at that time  - Pathology revealed non-GCB DLBCL  - Received cycle 1 of palliative chemo per choice of patient & family inpatient with prednisone, cyclophosphamide, rituximab  - Rituxan transfusion c/b reaction with desaturation; subsequent rechallenge successful with pre-medication with Tylenol, Decadron, Benadryl  - PleurX removed with improvement of symptoms, no longer required supplemental O2  - D/c'ed to rehab, followed in clinic by Dr. Rossy Esparza  - Plan to continue RC & prednisone q3 weeks with ongoing discussion of switch to curative treatment with R-mini-CHOP  - Last seen in clinic 10/7, s/p C4 of RC & pred on 10/8; C5 scheduled for 10/28  - Due for PET/CT 10/23  - No intervention recommended while inpatient, can continue outpatient management with Dr. Esparza upon discharge    #R proximal humerus fracture  - Mechanical in nature  - Pain control per primary team    Jorge Lanza MD  PGY-1    Case discussed with Dr. Fulton

## 2021-10-21 NOTE — CONSULT NOTE ADULT - ASSESSMENT
Post op afib  transient  off amio  not a candidate for a/c given fall risk     lymphoma  as per Oncology     Humerus fx  rib fx  pain control  incentive spirometry   fu with medicine     parainfluenza   PNA  on anbx  plan as per primary team

## 2021-10-22 LAB
A1C WITH ESTIMATED AVERAGE GLUCOSE RESULT: 4.7 % — SIGNIFICANT CHANGE UP (ref 4–5.6)
ANION GAP SERPL CALC-SCNC: 11 MMOL/L — SIGNIFICANT CHANGE UP (ref 7–14)
BASOPHILS # BLD AUTO: 0.07 K/UL — SIGNIFICANT CHANGE UP (ref 0–0.2)
BASOPHILS NFR BLD AUTO: 0.4 % — SIGNIFICANT CHANGE UP (ref 0–2)
BUN SERPL-MCNC: 18 MG/DL — SIGNIFICANT CHANGE UP (ref 7–23)
CALCIUM SERPL-MCNC: 9.6 MG/DL — SIGNIFICANT CHANGE UP (ref 8.4–10.5)
CHLORIDE SERPL-SCNC: 105 MMOL/L — SIGNIFICANT CHANGE UP (ref 98–107)
CO2 SERPL-SCNC: 20 MMOL/L — LOW (ref 22–31)
COVID-19 SPIKE DOMAIN AB INTERP: POSITIVE
COVID-19 SPIKE DOMAIN ANTIBODY RESULT: 6.54 U/ML — HIGH
CREAT SERPL-MCNC: 1.04 MG/DL — SIGNIFICANT CHANGE UP (ref 0.5–1.3)
EOSINOPHIL # BLD AUTO: 0.51 K/UL — HIGH (ref 0–0.5)
EOSINOPHIL NFR BLD AUTO: 2.8 % — SIGNIFICANT CHANGE UP (ref 0–6)
ESTIMATED AVERAGE GLUCOSE: 88 — SIGNIFICANT CHANGE UP
GLUCOSE SERPL-MCNC: 97 MG/DL — SIGNIFICANT CHANGE UP (ref 70–99)
HCT VFR BLD CALC: 31.6 % — LOW (ref 34.5–45)
HGB BLD-MCNC: 10.4 G/DL — LOW (ref 11.5–15.5)
IANC: 16.09 K/UL — HIGH (ref 1.5–8.5)
IMM GRANULOCYTES NFR BLD AUTO: 0.9 % — SIGNIFICANT CHANGE UP (ref 0–1.5)
LYMPHOCYTES # BLD AUTO: 0.51 K/UL — LOW (ref 1–3.3)
LYMPHOCYTES # BLD AUTO: 2.8 % — LOW (ref 13–44)
MAGNESIUM SERPL-MCNC: 1.9 MG/DL — SIGNIFICANT CHANGE UP (ref 1.6–2.6)
MCHC RBC-ENTMCNC: 29.1 PG — SIGNIFICANT CHANGE UP (ref 27–34)
MCHC RBC-ENTMCNC: 32.9 GM/DL — SIGNIFICANT CHANGE UP (ref 32–36)
MCV RBC AUTO: 88.3 FL — SIGNIFICANT CHANGE UP (ref 80–100)
MONOCYTES # BLD AUTO: 0.67 K/UL — SIGNIFICANT CHANGE UP (ref 0–0.9)
MONOCYTES NFR BLD AUTO: 3.7 % — SIGNIFICANT CHANGE UP (ref 2–14)
NEUTROPHILS # BLD AUTO: 16.09 K/UL — HIGH (ref 1.8–7.4)
NEUTROPHILS NFR BLD AUTO: 89.4 % — HIGH (ref 43–77)
NRBC # BLD: 0 /100 WBCS — SIGNIFICANT CHANGE UP
NRBC # FLD: 0 K/UL — SIGNIFICANT CHANGE UP
PHOSPHATE SERPL-MCNC: 3.5 MG/DL — SIGNIFICANT CHANGE UP (ref 2.5–4.5)
PLATELET # BLD AUTO: 315 K/UL — SIGNIFICANT CHANGE UP (ref 150–400)
POTASSIUM SERPL-MCNC: 4.2 MMOL/L — SIGNIFICANT CHANGE UP (ref 3.5–5.3)
POTASSIUM SERPL-SCNC: 4.2 MMOL/L — SIGNIFICANT CHANGE UP (ref 3.5–5.3)
RBC # BLD: 3.58 M/UL — LOW (ref 3.8–5.2)
RBC # FLD: 20.5 % — HIGH (ref 10.3–14.5)
SARS-COV-2 IGG+IGM SERPL QL IA: 6.54 U/ML — HIGH
SARS-COV-2 IGG+IGM SERPL QL IA: POSITIVE
SODIUM SERPL-SCNC: 136 MMOL/L — SIGNIFICANT CHANGE UP (ref 135–145)
T3 SERPL-MCNC: 53 NG/DL — LOW (ref 80–200)
T4 AB SER-ACNC: 3.64 UG/DL — LOW (ref 5.1–13)
TSH SERPL-MCNC: 41.57 UIU/ML — HIGH (ref 0.27–4.2)
WBC # BLD: 18.01 K/UL — HIGH (ref 3.8–10.5)
WBC # FLD AUTO: 18.01 K/UL — HIGH (ref 3.8–10.5)

## 2021-10-22 PROCEDURE — 99232 SBSQ HOSP IP/OBS MODERATE 35: CPT

## 2021-10-22 RX ORDER — LACTOBACILLUS ACIDOPHILUS 100MM CELL
1 CAPSULE ORAL DAILY
Refills: 0 | Status: DISCONTINUED | OUTPATIENT
Start: 2021-10-22 | End: 2021-10-29

## 2021-10-22 RX ORDER — LANOLIN ALCOHOL/MO/W.PET/CERES
3 CREAM (GRAM) TOPICAL AT BEDTIME
Refills: 0 | Status: DISCONTINUED | OUTPATIENT
Start: 2021-10-22 | End: 2021-10-29

## 2021-10-22 RX ORDER — IPRATROPIUM/ALBUTEROL SULFATE 18-103MCG
3 AEROSOL WITH ADAPTER (GRAM) INHALATION EVERY 6 HOURS
Refills: 0 | Status: DISCONTINUED | OUTPATIENT
Start: 2021-10-22 | End: 2021-10-29

## 2021-10-22 RX ORDER — TIOTROPIUM BROMIDE 18 UG/1
1 CAPSULE ORAL; RESPIRATORY (INHALATION) DAILY
Refills: 0 | Status: DISCONTINUED | OUTPATIENT
Start: 2021-10-22 | End: 2021-10-29

## 2021-10-22 RX ORDER — BUDESONIDE AND FORMOTEROL FUMARATE DIHYDRATE 160; 4.5 UG/1; UG/1
2 AEROSOL RESPIRATORY (INHALATION)
Refills: 0 | Status: DISCONTINUED | OUTPATIENT
Start: 2021-10-22 | End: 2021-10-29

## 2021-10-22 RX ADMIN — LIDOCAINE 1 PATCH: 4 CREAM TOPICAL at 23:00

## 2021-10-22 RX ADMIN — ENOXAPARIN SODIUM 40 MILLIGRAM(S): 100 INJECTION SUBCUTANEOUS at 11:27

## 2021-10-22 RX ADMIN — LIDOCAINE 1 PATCH: 4 CREAM TOPICAL at 11:26

## 2021-10-22 RX ADMIN — Medication 3 MILLILITER(S): at 21:49

## 2021-10-22 RX ADMIN — Medication 3 MILLIGRAM(S): at 21:59

## 2021-10-22 RX ADMIN — Medication 650 MILLIGRAM(S): at 21:16

## 2021-10-22 RX ADMIN — Medication 650 MILLIGRAM(S): at 00:18

## 2021-10-22 RX ADMIN — VALACYCLOVIR 500 MILLIGRAM(S): 500 TABLET, FILM COATED ORAL at 12:51

## 2021-10-22 RX ADMIN — Medication 1 MILLIGRAM(S): at 11:27

## 2021-10-22 RX ADMIN — Medication 650 MILLIGRAM(S): at 12:00

## 2021-10-22 RX ADMIN — BUDESONIDE AND FORMOTEROL FUMARATE DIHYDRATE 2 PUFF(S): 160; 4.5 AEROSOL RESPIRATORY (INHALATION) at 21:59

## 2021-10-22 RX ADMIN — LIDOCAINE 1 PATCH: 4 CREAM TOPICAL at 20:45

## 2021-10-22 RX ADMIN — Medication 650 MILLIGRAM(S): at 11:26

## 2021-10-22 RX ADMIN — Medication 100 MILLIGRAM(S): at 11:28

## 2021-10-22 RX ADMIN — TIOTROPIUM BROMIDE 1 CAPSULE(S): 18 CAPSULE ORAL; RESPIRATORY (INHALATION) at 21:59

## 2021-10-22 RX ADMIN — Medication 650 MILLIGRAM(S): at 20:16

## 2021-10-22 RX ADMIN — Medication 100 MILLIGRAM(S): at 05:06

## 2021-10-22 RX ADMIN — Medication 1 TABLET(S): at 22:00

## 2021-10-22 RX ADMIN — CEFTRIAXONE 100 MILLIGRAM(S): 500 INJECTION, POWDER, FOR SOLUTION INTRAMUSCULAR; INTRAVENOUS at 11:26

## 2021-10-22 RX ADMIN — LIDOCAINE 1 PATCH: 4 CREAM TOPICAL at 00:19

## 2021-10-22 RX ADMIN — AZITHROMYCIN 255 MILLIGRAM(S): 500 TABLET, FILM COATED ORAL at 11:27

## 2021-10-22 RX ADMIN — ESCITALOPRAM OXALATE 10 MILLIGRAM(S): 10 TABLET, FILM COATED ORAL at 11:26

## 2021-10-22 RX ADMIN — Medication 100 MILLIGRAM(S): at 20:16

## 2021-10-22 RX ADMIN — PANTOPRAZOLE SODIUM 40 MILLIGRAM(S): 20 TABLET, DELAYED RELEASE ORAL at 05:06

## 2021-10-22 NOTE — PROGRESS NOTE ADULT - SUBJECTIVE AND OBJECTIVE BOX
DATE OF SERVICE: 10-22-21 @ 09:17    Subjective: Patient seen and examined. No new events except as noted.     SUBJECTIVE/ROS:  had cough  No chest pain, dyspnea, palpitation, or dizziness.       MEDICATIONS:  MEDICATIONS  (STANDING):  azithromycin  IVPB 500 milliGRAM(s) IV Intermittent every 24 hours  cefTRIAXone   IVPB 1000 milliGRAM(s) IV Intermittent every 24 hours  enoxaparin Injectable 40 milliGRAM(s) SubCutaneous daily  escitalopram 10 milliGRAM(s) Oral daily  folic acid 1 milliGRAM(s) Oral daily  lidocaine   4% Patch 1 Patch Transdermal daily  pantoprazole    Tablet 40 milliGRAM(s) Oral before breakfast  senna 2 Tablet(s) Oral at bedtime  valACYclovir 500 milliGRAM(s) Oral daily      PHYSICAL EXAM:  T(C): 36.6 (10-22-21 @ 05:01), Max: 36.8 (10-21-21 @ 23:13)  HR: 89 (10-22-21 @ 05:01) (88 - 94)  BP: 118/61 (10-22-21 @ 05:01) (102/72 - 142/72)  RR: 19 (10-22-21 @ 05:01) (17 - 19)  SpO2: 98% (10-22-21 @ 05:01) (96% - 100%)  Wt(kg): --  I&O's Summary    Height (cm): 157.5 (10-21 @ 23:13)  Weight (kg): 51.7 (10-21 @ 23:13)  BMI (kg/m2): 20.8 (10-21 @ 23:13)  BSA (m2): 1.51 (10-21 @ 23:13)      JVP: Normal  Neck: supple  Lung: rhonchi  CV: S1 S2 , Murmur:  Abd: soft  Ext: No edema  neuro: Awake / alert  Psych: flat affect  Skin: normal``    LABS/DATA:    CARDIAC MARKERS:                                10.4   18.01 )-----------( 315      ( 22 Oct 2021 08:02 )             31.6     10-22    136  |  105  |  18  ----------------------------<  97  4.2   |  20<L>  |  1.04    Ca    9.6      22 Oct 2021 08:02  Phos  3.5     10-22  Mg     1.90     10-22    TPro  7.0  /  Alb  4.4  /  TBili  0.5  /  DBili  x   /  AST  17  /  ALT  16  /  AlkPhos  199<H>  10-21    proBNP:   Lipid Profile:   HgA1c:   TSH: Thyroid Stimulating Hormone, Serum: 41.57 uIU/mL (10-22 @ 08:02)      TELE:  EKG:

## 2021-10-22 NOTE — PHYSICAL THERAPY INITIAL EVALUATION ADULT - ADDITIONAL COMMENTS
Patient lives alone in private home, no steps to negotiate. Patient was getting assist with ADL prior to admission. Patient has an aide 7 hours/5days week, when aide is not there she has a "friend" who stays over and assists. Patient was independent in ADL prior to admission.

## 2021-10-22 NOTE — PROGRESS NOTE ADULT - ASSESSMENT
88 y/o F with PMH of lymphoma on active chemo s/p mechanical fall 12 days ago found to have right proximal humerus fracture and PNA

## 2021-10-22 NOTE — PHYSICAL THERAPY INITIAL EVALUATION ADULT - PERTINENT HX OF CURRENT PROBLEM, REHAB EVAL
89 y/o F with PMH of lymphoma on active chemo s/p mechanical fall 12 days ago found to have right proximal humerus fracture and PNA

## 2021-10-22 NOTE — PROGRESS NOTE ADULT - SUBJECTIVE AND OBJECTIVE BOX
Patient pain is controlled   c/o cough   Patient started on Ceftriaxone for associated bacterial PNA/ Parainfluenza    .  VITAL SIGNS:  T(C): 36.4 (10-22-21 @ 12:11), Max: 36.8 (10-21-21 @ 23:13)  T(F): 97.6 (10-22-21 @ 12:11), Max: 98.3 (10-21-21 @ 23:13)  HR: 78 (10-22-21 @ 12:11) (78 - 90)  BP: 129/72 (10-22-21 @ 12:11) (102/72 - 142/72)  BP(mean): --  RR: 18 (10-22-21 @ 12:11) (17 - 19)  SpO2: 99% (10-22-21 @ 12:11) (97% - 100%)  Wt(kg): --    PHYSICAL EXAM:    Constitutional: WDWN resting comfortably in bed; NAD  Head: NC/AT  Eyes: PERRL, EOMI, clear conjunctiva  Neck: supple; no JVD or thyromegaly  Respiratory: diminished breath sounds   Cardiac: +S1/S2; RRR;   Gastrointestinal: soft, NT/ND; no rebound or guarding; +BSx4  Genitourinary: normal external genitalia  Back: spine midline, no bony tenderness or step-offs; no CVAT B/L  Extremities: limited mobility rt humeral swing   Musculoskeletal: as above   Vascular: 2+ radial, femoral, DP/PT pulses B/L  Dermatologic: skin warm, dry and intact; no rashes, wounds, or scars  Neurologic: AAOx3; CNII-XII grossly intact; no focal deficits  Psychiatric: affect and characteristics of appearance, verbalizations, behaviors are appropriate    .  LABS:                         10.4 18.01 )-----------( 315      ( 22 Oct 2021 08:02 )             31.6     10-22    136  |  105  |  18  ----------------------------<  97  4.2   |  20<L>  |  1.04    Ca    9.6      22 Oct 2021 08:02  Phos  3.5     10-22  Mg     1.90     10-22    TPro  7.0  /  Alb  4.4  /  TBili  0.5  /  DBili  x   /  AST  17  /  ALT  16  /  AlkPhos  199<H>  10-21                  RADIOLOGY, EKG & ADDITIONAL TESTS: Reviewed.

## 2021-10-22 NOTE — PHYSICAL THERAPY INITIAL EVALUATION ADULT - ACTIVE RANGE OF MOTION EXAMINATION, REHAB EVAL
Right UE in sling, unable to assess/Left UE Active ROM was WFL (within functional limits)/bilateral  lower extremity Active ROM was WFL (within functional limits)

## 2021-10-23 DIAGNOSIS — R00.0 TACHYCARDIA, UNSPECIFIED: ICD-10-CM

## 2021-10-23 LAB
ANION GAP SERPL CALC-SCNC: 11 MMOL/L — SIGNIFICANT CHANGE UP (ref 7–14)
BASOPHILS # BLD AUTO: 0.07 K/UL — SIGNIFICANT CHANGE UP (ref 0–0.2)
BASOPHILS NFR BLD AUTO: 0.4 % — SIGNIFICANT CHANGE UP (ref 0–2)
BUN SERPL-MCNC: 20 MG/DL — SIGNIFICANT CHANGE UP (ref 7–23)
CALCIUM SERPL-MCNC: 9.5 MG/DL — SIGNIFICANT CHANGE UP (ref 8.4–10.5)
CHLORIDE SERPL-SCNC: 103 MMOL/L — SIGNIFICANT CHANGE UP (ref 98–107)
CO2 SERPL-SCNC: 19 MMOL/L — LOW (ref 22–31)
CREAT SERPL-MCNC: 1 MG/DL — SIGNIFICANT CHANGE UP (ref 0.5–1.3)
CULTURE RESULTS: SIGNIFICANT CHANGE UP
EOSINOPHIL # BLD AUTO: 0.21 K/UL — SIGNIFICANT CHANGE UP (ref 0–0.5)
EOSINOPHIL NFR BLD AUTO: 1.2 % — SIGNIFICANT CHANGE UP (ref 0–6)
GLUCOSE SERPL-MCNC: 131 MG/DL — HIGH (ref 70–99)
GRAM STN FLD: SIGNIFICANT CHANGE UP
HCT VFR BLD CALC: 29.1 % — LOW (ref 34.5–45)
HGB BLD-MCNC: 9.4 G/DL — LOW (ref 11.5–15.5)
IANC: 16.25 K/UL — HIGH (ref 1.5–8.5)
IMM GRANULOCYTES NFR BLD AUTO: 0.7 % — SIGNIFICANT CHANGE UP (ref 0–1.5)
LYMPHOCYTES # BLD AUTO: 0.71 K/UL — LOW (ref 1–3.3)
LYMPHOCYTES # BLD AUTO: 3.9 % — LOW (ref 13–44)
MAGNESIUM SERPL-MCNC: 1.9 MG/DL — SIGNIFICANT CHANGE UP (ref 1.6–2.6)
MCHC RBC-ENTMCNC: 28.7 PG — SIGNIFICANT CHANGE UP (ref 27–34)
MCHC RBC-ENTMCNC: 32.3 GM/DL — SIGNIFICANT CHANGE UP (ref 32–36)
MCV RBC AUTO: 88.7 FL — SIGNIFICANT CHANGE UP (ref 80–100)
MONOCYTES # BLD AUTO: 0.81 K/UL — SIGNIFICANT CHANGE UP (ref 0–0.9)
MONOCYTES NFR BLD AUTO: 4.5 % — SIGNIFICANT CHANGE UP (ref 2–14)
NEUTROPHILS # BLD AUTO: 16.25 K/UL — HIGH (ref 1.8–7.4)
NEUTROPHILS NFR BLD AUTO: 89.3 % — HIGH (ref 43–77)
NRBC # BLD: 0 /100 WBCS — SIGNIFICANT CHANGE UP
NRBC # FLD: 0 K/UL — SIGNIFICANT CHANGE UP
PHOSPHATE SERPL-MCNC: 3.1 MG/DL — SIGNIFICANT CHANGE UP (ref 2.5–4.5)
PLATELET # BLD AUTO: 305 K/UL — SIGNIFICANT CHANGE UP (ref 150–400)
POTASSIUM SERPL-MCNC: 3.6 MMOL/L — SIGNIFICANT CHANGE UP (ref 3.5–5.3)
POTASSIUM SERPL-SCNC: 3.6 MMOL/L — SIGNIFICANT CHANGE UP (ref 3.5–5.3)
RBC # BLD: 3.28 M/UL — LOW (ref 3.8–5.2)
RBC # FLD: 20.1 % — HIGH (ref 10.3–14.5)
SODIUM SERPL-SCNC: 133 MMOL/L — LOW (ref 135–145)
SPECIMEN SOURCE: SIGNIFICANT CHANGE UP
SPECIMEN SOURCE: SIGNIFICANT CHANGE UP
WBC # BLD: 18.18 K/UL — HIGH (ref 3.8–10.5)
WBC # FLD AUTO: 18.18 K/UL — HIGH (ref 3.8–10.5)

## 2021-10-23 PROCEDURE — 93010 ELECTROCARDIOGRAM REPORT: CPT | Mod: 76

## 2021-10-23 PROCEDURE — 99233 SBSQ HOSP IP/OBS HIGH 50: CPT

## 2021-10-23 RX ORDER — MAGNESIUM SULFATE 500 MG/ML
1 VIAL (ML) INJECTION ONCE
Refills: 0 | Status: COMPLETED | OUTPATIENT
Start: 2021-10-23 | End: 2021-10-23

## 2021-10-23 RX ORDER — POTASSIUM CHLORIDE 20 MEQ
20 PACKET (EA) ORAL ONCE
Refills: 0 | Status: COMPLETED | OUTPATIENT
Start: 2021-10-23 | End: 2021-10-23

## 2021-10-23 RX ADMIN — Medication 3 MILLILITER(S): at 15:33

## 2021-10-23 RX ADMIN — Medication 100 MILLIGRAM(S): at 02:18

## 2021-10-23 RX ADMIN — AZITHROMYCIN 255 MILLIGRAM(S): 500 TABLET, FILM COATED ORAL at 12:30

## 2021-10-23 RX ADMIN — Medication 3 MILLILITER(S): at 21:18

## 2021-10-23 RX ADMIN — TIOTROPIUM BROMIDE 1 CAPSULE(S): 18 CAPSULE ORAL; RESPIRATORY (INHALATION) at 12:29

## 2021-10-23 RX ADMIN — LIDOCAINE 1 PATCH: 4 CREAM TOPICAL at 12:28

## 2021-10-23 RX ADMIN — Medication 650 MILLIGRAM(S): at 03:00

## 2021-10-23 RX ADMIN — Medication 650 MILLIGRAM(S): at 08:58

## 2021-10-23 RX ADMIN — BUDESONIDE AND FORMOTEROL FUMARATE DIHYDRATE 2 PUFF(S): 160; 4.5 AEROSOL RESPIRATORY (INHALATION) at 08:59

## 2021-10-23 RX ADMIN — Medication 100 MILLIGRAM(S): at 08:58

## 2021-10-23 RX ADMIN — Medication 100 GRAM(S): at 15:05

## 2021-10-23 RX ADMIN — Medication 3 MILLIGRAM(S): at 22:29

## 2021-10-23 RX ADMIN — Medication 650 MILLIGRAM(S): at 22:29

## 2021-10-23 RX ADMIN — ESCITALOPRAM OXALATE 10 MILLIGRAM(S): 10 TABLET, FILM COATED ORAL at 12:28

## 2021-10-23 RX ADMIN — Medication 1 TABLET(S): at 12:29

## 2021-10-23 RX ADMIN — PANTOPRAZOLE SODIUM 40 MILLIGRAM(S): 20 TABLET, DELAYED RELEASE ORAL at 04:50

## 2021-10-23 RX ADMIN — VALACYCLOVIR 500 MILLIGRAM(S): 500 TABLET, FILM COATED ORAL at 12:28

## 2021-10-23 RX ADMIN — Medication 650 MILLIGRAM(S): at 09:35

## 2021-10-23 RX ADMIN — Medication 3 MILLILITER(S): at 03:43

## 2021-10-23 RX ADMIN — LIDOCAINE 1 PATCH: 4 CREAM TOPICAL at 21:53

## 2021-10-23 RX ADMIN — CEFTRIAXONE 100 MILLIGRAM(S): 500 INJECTION, POWDER, FOR SOLUTION INTRAMUSCULAR; INTRAVENOUS at 18:37

## 2021-10-23 RX ADMIN — Medication 650 MILLIGRAM(S): at 02:17

## 2021-10-23 RX ADMIN — Medication 100 MILLIGRAM(S): at 12:29

## 2021-10-23 RX ADMIN — BUDESONIDE AND FORMOTEROL FUMARATE DIHYDRATE 2 PUFF(S): 160; 4.5 AEROSOL RESPIRATORY (INHALATION) at 22:00

## 2021-10-23 RX ADMIN — Medication 100 MILLIGRAM(S): at 22:02

## 2021-10-23 RX ADMIN — ENOXAPARIN SODIUM 40 MILLIGRAM(S): 100 INJECTION SUBCUTANEOUS at 12:27

## 2021-10-23 RX ADMIN — Medication 20 MILLIEQUIVALENT(S): at 15:05

## 2021-10-23 RX ADMIN — Medication 1 MILLIGRAM(S): at 12:28

## 2021-10-23 RX ADMIN — Medication 650 MILLIGRAM(S): at 23:29

## 2021-10-23 NOTE — PROGRESS NOTE ADULT - ASSESSMENT
Post op afib  transient  off amio  not a candidate for a/c given fall risk     lymphoma  as per Oncology     Humerus fx  rib fx  pain control  incentive spirometry   fu with medicine     parainfluenza   plan as per primary team

## 2021-10-23 NOTE — PROGRESS NOTE ADULT - ASSESSMENT
89 y/o F with PMH of lymphoma on active chemo s/p mechanical fall 12 days ago found to have right proximal humerus fracture and PNA.  Now tachy- lidia on pulse ox

## 2021-10-23 NOTE — PROVIDER CONTACT NOTE (OTHER) - RECOMMENDATIONS
RN recommends provider to come assess pt. RN recommends provider to come assess pt. RN recommends that pt. possibly be moved to tele

## 2021-10-23 NOTE — PROGRESS NOTE ADULT - SUBJECTIVE AND OBJECTIVE BOX
Patient has HX of post-op A Fib  Was on amiodarone  Noted with tachy-lidia on pulse oximeter    .  VITAL SIGNS:  T(C): 36.8 (10-23-21 @ 04:56), Max: 37.2 (10-22-21 @ 22:00)  T(F): 98.2 (10-23-21 @ 04:56), Max: 99 (10-22-21 @ 22:00)  HR: 84 (10-23-21 @ 04:56) (84 - 104)  BP: 107/57 (10-23-21 @ 04:56) (107/57 - 122/66)  BP(mean): --  RR: 18 (10-23-21 @ 04:56) (18 - 18)  SpO2: 96% (10-23-21 @ 04:56) (94% - 100%)  Wt(kg): --    PHYSICAL EXAM:    Constitutional: WDWN resting comfortably in bed; NAD  Head: NC/AT  Eyes: PERRL, EOMI, clear conjunctiva  Neck: supple; no JVD or thyromegaly  Respiratory: Diminished breath sounds B/L , scattered crackles   Cardiac: +S1/S2; RRR;   Gastrointestinal: soft, NT/ND; no rebound or guarding; +BSx4  Genitourinary: normal external genitalia  Back: spine midline, no bony tenderness or step-offs; no CVAT B/L  Extremities: limited mobility UE   Musculoskeletal: as above   Vascular: 2+ radial, femoral, DP/PT pulses B/L  Neurologic: AAOx3; CNII-XII grossly intact; no focal deficits  Psychiatric: affect and characteristics of appearance, verbalizations, behaviors are appropriate\    .  LABS:                         9.4    18.18 )-----------( 305      ( 23 Oct 2021 06:34 )             29.1     10-23    133<L>  |  103  |  20  ----------------------------<  131<H>  3.6   |  19<L>  |  1.00    Ca    9.5      23 Oct 2021 06:38  Phos  3.1     10-23  Mg     1.90     10-23                    RADIOLOGY, EKG & ADDITIONAL TESTS: Reviewed.

## 2021-10-23 NOTE — PROGRESS NOTE ADULT - SUBJECTIVE AND OBJECTIVE BOX
DATE OF SERVICE: 10-23-21 @ 09:29    Subjective: Patient seen and examined. No new events except as noted.     SUBJECTIVE/ROS:        MEDICATIONS:  MEDICATIONS  (STANDING):  albuterol/ipratropium for Nebulization 3 milliLiter(s) Nebulizer every 6 hours  azithromycin  IVPB 500 milliGRAM(s) IV Intermittent every 24 hours  benzonatate 100 milliGRAM(s) Oral every 8 hours  budesonide 160 MICROgram(s)/formoterol 4.5 MICROgram(s) Inhaler 2 Puff(s) Inhalation two times a day  cefTRIAXone   IVPB 1000 milliGRAM(s) IV Intermittent every 24 hours  enoxaparin Injectable 40 milliGRAM(s) SubCutaneous daily  escitalopram 10 milliGRAM(s) Oral daily  folic acid 1 milliGRAM(s) Oral daily  lactobacillus acidophilus 1 Tablet(s) Oral daily  lidocaine   4% Patch 1 Patch Transdermal daily  pantoprazole    Tablet 40 milliGRAM(s) Oral before breakfast  senna 2 Tablet(s) Oral at bedtime  tiotropium 18 MICROgram(s) Capsule 1 Capsule(s) Inhalation daily  valACYclovir 500 milliGRAM(s) Oral daily      PHYSICAL EXAM:  T(C): 36.8 (10-23-21 @ 04:56), Max: 37.2 (10-22-21 @ 22:00)  HR: 84 (10-23-21 @ 04:56) (78 - 104)  BP: 107/57 (10-23-21 @ 04:56) (107/57 - 129/72)  RR: 18 (10-23-21 @ 04:56) (18 - 18)  SpO2: 96% (10-23-21 @ 04:56) (94% - 100%)  Wt(kg): --  I&O's Summary          JVP: Normal  Neck: supple  Lung: clear   CV: S1 S2 , Murmur:  Abd: soft  Ext: No edema  neuro: Awake / alert  Psych: flat affect  Skin: normal``    LABS/DATA:    CARDIAC MARKERS:                                9.4    18.18 )-----------( 305      ( 23 Oct 2021 06:34 )             29.1     10-23    133<L>  |  103  |  20  ----------------------------<  131<H>  3.6   |  19<L>  |  1.00    Ca    9.5      23 Oct 2021 06:38  Phos  3.1     10-23  Mg     1.90     10-23      proBNP:   Lipid Profile:   HgA1c:   TSH:     TELE:  EKG:

## 2021-10-23 NOTE — CHART NOTE - NSCHARTNOTEFT_GEN_A_CORE
LATE CHART NOTE:  Went to see patient at bedside. Patient's heart rate fluctuating between 's on continuous pulse ox/heart rate monitor. Patient seen laying in bed and in no acute distress. Patient denies chest pain, heart palpitations, dizziness, or headache. Patient reports shortness of breath is improving.     EKG ordered, NSR @ 93 bpm  Discussed with Dr. Madison  Will transfer patient to tele floor for further monitoring  If patient continues to have fluctuating heart rate will consider EP consult  Team to continue to monitor.     INCOMPLETE NOTE LATE CHART NOTE:  Went to see patient at bedside. Patient's heart rate fluctuating between 's on continuous pulse ox/heart rate monitor. Patient seen laying in bed and in no acute distress. Patient denies chest pain, heart palpitations, dizziness, or headache. Patient reports shortness of breath is improving. Patient reports cough.     Heart: RRR  Lungs: b/l coarse lung sounds, scattered crackles    ICU Vital Signs Last 24 Hrs  T(C): 36.8 (23 Oct 2021 04:56), Max: 37.2 (22 Oct 2021 22:00)  T(F): 98.2 (23 Oct 2021 04:56), Max: 99 (22 Oct 2021 22:00)  HR: 84 (23 Oct 2021 04:56) (84 - 104)  BP: 107/57 (23 Oct 2021 04:56) (107/57 - 122/66)  BP(mean): --  ABP: --  ABP(mean): --  RR: 18 (23 Oct 2021 04:56) (18 - 18)  SpO2: 96% (23 Oct 2021 04:56) (94% - 100%)    EKG ordered, NSR @ 93 bpm  Discussed case with Dr. Madison --- Patient has hx of post operative Afib was on amiodarone, pt is off Amiodarone per Cards  Will transfer patient to tele floor for further monitoring  If patient continues to have fluctuating heart rate will consider EP consult  Magnesium and potassium repleted  Medical attending made aware and in agreement with plan  Team to continue to monitor.

## 2021-10-24 LAB
ANION GAP SERPL CALC-SCNC: 13 MMOL/L — SIGNIFICANT CHANGE UP (ref 7–14)
BASOPHILS # BLD AUTO: 0.04 K/UL — SIGNIFICANT CHANGE UP (ref 0–0.2)
BASOPHILS NFR BLD AUTO: 0.3 % — SIGNIFICANT CHANGE UP (ref 0–2)
BUN SERPL-MCNC: 18 MG/DL — SIGNIFICANT CHANGE UP (ref 7–23)
CALCIUM SERPL-MCNC: 9.8 MG/DL — SIGNIFICANT CHANGE UP (ref 8.4–10.5)
CHLORIDE SERPL-SCNC: 98 MMOL/L — SIGNIFICANT CHANGE UP (ref 98–107)
CO2 SERPL-SCNC: 20 MMOL/L — LOW (ref 22–31)
CREAT SERPL-MCNC: 0.9 MG/DL — SIGNIFICANT CHANGE UP (ref 0.5–1.3)
CULTURE RESULTS: SIGNIFICANT CHANGE UP
EOSINOPHIL # BLD AUTO: 0.39 K/UL — SIGNIFICANT CHANGE UP (ref 0–0.5)
EOSINOPHIL NFR BLD AUTO: 2.7 % — SIGNIFICANT CHANGE UP (ref 0–6)
FERRITIN SERPL-MCNC: 410 NG/ML — HIGH (ref 15–150)
FOLATE SERPL-MCNC: 20 NG/ML — HIGH (ref 3.1–17.5)
GLUCOSE SERPL-MCNC: 109 MG/DL — HIGH (ref 70–99)
HCT VFR BLD CALC: 31 % — LOW (ref 34.5–45)
HGB BLD-MCNC: 9.7 G/DL — LOW (ref 11.5–15.5)
IANC: 12.3 K/UL — HIGH (ref 1.5–8.5)
IMM GRANULOCYTES NFR BLD AUTO: 0.8 % — SIGNIFICANT CHANGE UP (ref 0–1.5)
IRON SATN MFR SERPL: 13 UG/DL — LOW (ref 30–160)
IRON SATN MFR SERPL: 6 % — LOW (ref 14–50)
LYMPHOCYTES # BLD AUTO: 0.81 K/UL — LOW (ref 1–3.3)
LYMPHOCYTES # BLD AUTO: 5.7 % — LOW (ref 13–44)
MAGNESIUM SERPL-MCNC: 2.2 MG/DL — SIGNIFICANT CHANGE UP (ref 1.6–2.6)
MCHC RBC-ENTMCNC: 28.3 PG — SIGNIFICANT CHANGE UP (ref 27–34)
MCHC RBC-ENTMCNC: 31.3 GM/DL — LOW (ref 32–36)
MCV RBC AUTO: 90.4 FL — SIGNIFICANT CHANGE UP (ref 80–100)
MONOCYTES # BLD AUTO: 0.6 K/UL — SIGNIFICANT CHANGE UP (ref 0–0.9)
MONOCYTES NFR BLD AUTO: 4.2 % — SIGNIFICANT CHANGE UP (ref 2–14)
NEUTROPHILS # BLD AUTO: 12.3 K/UL — HIGH (ref 1.8–7.4)
NEUTROPHILS NFR BLD AUTO: 86.3 % — HIGH (ref 43–77)
NRBC # BLD: 0 /100 WBCS — SIGNIFICANT CHANGE UP
NRBC # FLD: 0 K/UL — SIGNIFICANT CHANGE UP
PHOSPHATE SERPL-MCNC: 3.2 MG/DL — SIGNIFICANT CHANGE UP (ref 2.5–4.5)
PLATELET # BLD AUTO: 335 K/UL — SIGNIFICANT CHANGE UP (ref 150–400)
POTASSIUM SERPL-MCNC: 3.9 MMOL/L — SIGNIFICANT CHANGE UP (ref 3.5–5.3)
POTASSIUM SERPL-SCNC: 3.9 MMOL/L — SIGNIFICANT CHANGE UP (ref 3.5–5.3)
RBC # BLD: 3.43 M/UL — LOW (ref 3.8–5.2)
RBC # FLD: 20 % — HIGH (ref 10.3–14.5)
SARS-COV-2 RNA SPEC QL NAA+PROBE: SIGNIFICANT CHANGE UP
SODIUM SERPL-SCNC: 131 MMOL/L — LOW (ref 135–145)
SPECIMEN SOURCE: SIGNIFICANT CHANGE UP
TIBC SERPL-MCNC: 215 UG/DL — LOW (ref 220–430)
UIBC SERPL-MCNC: 202 UG/DL — SIGNIFICANT CHANGE UP (ref 110–370)
VIT B12 SERPL-MCNC: 2000 PG/ML — HIGH (ref 200–900)
WBC # BLD: 14.26 K/UL — HIGH (ref 3.8–10.5)
WBC # FLD AUTO: 14.26 K/UL — HIGH (ref 3.8–10.5)

## 2021-10-24 PROCEDURE — 99232 SBSQ HOSP IP/OBS MODERATE 35: CPT

## 2021-10-24 RX ORDER — ALPRAZOLAM 0.25 MG
0.5 TABLET ORAL AT BEDTIME
Refills: 0 | Status: DISCONTINUED | OUTPATIENT
Start: 2021-10-24 | End: 2021-10-24

## 2021-10-24 RX ORDER — METOPROLOL TARTRATE 50 MG
12.5 TABLET ORAL
Refills: 0 | Status: DISCONTINUED | OUTPATIENT
Start: 2021-10-24 | End: 2021-10-29

## 2021-10-24 RX ADMIN — TIOTROPIUM BROMIDE 1 CAPSULE(S): 18 CAPSULE ORAL; RESPIRATORY (INHALATION) at 09:37

## 2021-10-24 RX ADMIN — Medication 100 MILLIGRAM(S): at 05:39

## 2021-10-24 RX ADMIN — AZITHROMYCIN 255 MILLIGRAM(S): 500 TABLET, FILM COATED ORAL at 12:52

## 2021-10-24 RX ADMIN — Medication 3 MILLILITER(S): at 10:14

## 2021-10-24 RX ADMIN — Medication 3 MILLILITER(S): at 16:17

## 2021-10-24 RX ADMIN — VALACYCLOVIR 500 MILLIGRAM(S): 500 TABLET, FILM COATED ORAL at 12:52

## 2021-10-24 RX ADMIN — Medication 100 MILLIGRAM(S): at 22:32

## 2021-10-24 RX ADMIN — ENOXAPARIN SODIUM 40 MILLIGRAM(S): 100 INJECTION SUBCUTANEOUS at 12:51

## 2021-10-24 RX ADMIN — BUDESONIDE AND FORMOTEROL FUMARATE DIHYDRATE 2 PUFF(S): 160; 4.5 AEROSOL RESPIRATORY (INHALATION) at 09:37

## 2021-10-24 RX ADMIN — LIDOCAINE 1 PATCH: 4 CREAM TOPICAL at 12:52

## 2021-10-24 RX ADMIN — CEFTRIAXONE 100 MILLIGRAM(S): 500 INJECTION, POWDER, FOR SOLUTION INTRAMUSCULAR; INTRAVENOUS at 10:46

## 2021-10-24 RX ADMIN — LIDOCAINE 1 PATCH: 4 CREAM TOPICAL at 00:12

## 2021-10-24 RX ADMIN — Medication 100 MILLIGRAM(S): at 15:26

## 2021-10-24 RX ADMIN — Medication 0.5 MILLIGRAM(S): at 23:09

## 2021-10-24 RX ADMIN — Medication 3 MILLIGRAM(S): at 23:09

## 2021-10-24 RX ADMIN — BUDESONIDE AND FORMOTEROL FUMARATE DIHYDRATE 2 PUFF(S): 160; 4.5 AEROSOL RESPIRATORY (INHALATION) at 22:31

## 2021-10-24 RX ADMIN — SENNA PLUS 2 TABLET(S): 8.6 TABLET ORAL at 22:32

## 2021-10-24 RX ADMIN — Medication 3 MILLILITER(S): at 04:15

## 2021-10-24 RX ADMIN — ESCITALOPRAM OXALATE 10 MILLIGRAM(S): 10 TABLET, FILM COATED ORAL at 12:51

## 2021-10-24 RX ADMIN — Medication 3 MILLILITER(S): at 22:26

## 2021-10-24 RX ADMIN — PANTOPRAZOLE SODIUM 40 MILLIGRAM(S): 20 TABLET, DELAYED RELEASE ORAL at 05:39

## 2021-10-24 RX ADMIN — Medication 1 MILLIGRAM(S): at 12:51

## 2021-10-24 RX ADMIN — Medication 1 TABLET(S): at 12:52

## 2021-10-24 NOTE — PROGRESS NOTE ADULT - SUBJECTIVE AND OBJECTIVE BOX
Telemetry- SR @77 bmp, no episodes of bradycardia   started on low dose BB per cardiology recommendations  .  VITAL SIGNS:  T(C): 36.6 (10-24-21 @ 05:38), Max: 37.3 (10-23-21 @ 21:55)  T(F): 97.8 (10-24-21 @ 05:38), Max: 99.1 (10-23-21 @ 21:55)  HR: 86 (10-24-21 @ 05:38) (83 - 101)  BP: 118/77 (10-24-21 @ 05:38) (110/66 - 118/77)  BP(mean): --  RR: 17 (10-24-21 @ 05:38) (17 - 19)  SpO2: 100% (10-24-21 @ 05:38) (93% - 100%)  Wt(kg): --    PHYSICAL EXAM:    Constitutional: WDWN resting comfortably in bed; NAD  Head: NC/AT  Eyes: PERRL, EOMI, clear conjunctiva  Neck: supple; no JVD or thyromegaly  Respiratory: Diminished breath sounds B/L   Cardiac: +S1/S2; RRR;   Gastrointestinal: soft, NT/ND; no rebound or guarding; +BSx4  Genitourinary: normal external genitalia  Back: spine midline, no bony tenderness or step-offs; no CVAT B/L  Extremities: limited mobility, UE  Musculoskeletal: as above   Neurologic: AAOx3; CNII-XII grossly intact; no focal deficits  Psychiatric: affect and characteristics of appearance, verbalizations, behaviors are appropriate     .  LABS:                         9.7    14.26 )-----------( 335      ( 24 Oct 2021 07:16 )             31.0     10-24    131<L>  |  98  |  18  ----------------------------<  109<H>  3.9   |  20<L>  |  0.90    Ca    9.8      24 Oct 2021 07:16  Phos  3.2     10-24  Mg     2.20     10-24                    RADIOLOGY, EKG & ADDITIONAL TESTS: Reviewed.

## 2021-10-24 NOTE — PROGRESS NOTE ADULT - ASSESSMENT
Post op afib  transient  off amio  not a candidate for a/c given fall risk   now in sinus  HR stable on tele   has frequent APCs   will start low dose BB     lymphoma  as per Oncology     Humerus fx  rib fx  pain control  incentive spirometry   fu with medicine     parainfluenza   plan as per primary team

## 2021-10-24 NOTE — PROGRESS NOTE ADULT - SUBJECTIVE AND OBJECTIVE BOX
DATE OF SERVICE: 10-24-21 @ 06:33    Subjective: Patient seen and examined. No new events except as noted.     SUBJECTIVE/ROS:  nad      MEDICATIONS:  MEDICATIONS  (STANDING):  albuterol/ipratropium for Nebulization 3 milliLiter(s) Nebulizer every 6 hours  azithromycin  IVPB 500 milliGRAM(s) IV Intermittent every 24 hours  benzonatate 100 milliGRAM(s) Oral every 8 hours  budesonide 160 MICROgram(s)/formoterol 4.5 MICROgram(s) Inhaler 2 Puff(s) Inhalation two times a day  cefTRIAXone   IVPB 1000 milliGRAM(s) IV Intermittent every 24 hours  enoxaparin Injectable 40 milliGRAM(s) SubCutaneous daily  escitalopram 10 milliGRAM(s) Oral daily  folic acid 1 milliGRAM(s) Oral daily  lactobacillus acidophilus 1 Tablet(s) Oral daily  lidocaine   4% Patch 1 Patch Transdermal daily  pantoprazole    Tablet 40 milliGRAM(s) Oral before breakfast  senna 2 Tablet(s) Oral at bedtime  tiotropium 18 MICROgram(s) Capsule 1 Capsule(s) Inhalation daily  valACYclovir 500 milliGRAM(s) Oral daily      PHYSICAL EXAM:  T(C): 36.6 (10-24-21 @ 05:38), Max: 37.3 (10-23-21 @ 21:55)  HR: 86 (10-24-21 @ 05:38) (82 - 101)  BP: 118/77 (10-24-21 @ 05:38) (110/66 - 128/74)  RR: 17 (10-24-21 @ 05:38) (17 - 19)  SpO2: 100% (10-24-21 @ 05:38) (93% - 100%)  Wt(kg): --  I&O's Summary          JVP: Normal  Neck: supple  Lung: clear   CV: S1 S2 , Murmur:  Abd: soft  Ext: No edema  neuro: Awake / alert  Psych: flat affect  Skin: normal``    LABS/DATA:    CARDIAC MARKERS:                                9.4    18.18 )-----------( 305      ( 23 Oct 2021 06:34 )             29.1     10-23    133<L>  |  103  |  20  ----------------------------<  131<H>  3.6   |  19<L>  |  1.00    Ca    9.5      23 Oct 2021 06:38  Phos  3.1     10-23  Mg     1.90     10-23      proBNP:   Lipid Profile:   HgA1c:   TSH:     TELE:  EKG:

## 2021-10-25 LAB
ANION GAP SERPL CALC-SCNC: 11 MMOL/L — SIGNIFICANT CHANGE UP (ref 7–14)
BASOPHILS # BLD AUTO: 0.04 K/UL — SIGNIFICANT CHANGE UP (ref 0–0.2)
BASOPHILS NFR BLD AUTO: 0.4 % — SIGNIFICANT CHANGE UP (ref 0–2)
BUN SERPL-MCNC: 17 MG/DL — SIGNIFICANT CHANGE UP (ref 7–23)
CALCIUM SERPL-MCNC: 9.5 MG/DL — SIGNIFICANT CHANGE UP (ref 8.4–10.5)
CHLORIDE SERPL-SCNC: 99 MMOL/L — SIGNIFICANT CHANGE UP (ref 98–107)
CO2 SERPL-SCNC: 22 MMOL/L — SIGNIFICANT CHANGE UP (ref 22–31)
CREAT SERPL-MCNC: 0.98 MG/DL — SIGNIFICANT CHANGE UP (ref 0.5–1.3)
EOSINOPHIL # BLD AUTO: 0.36 K/UL — SIGNIFICANT CHANGE UP (ref 0–0.5)
EOSINOPHIL NFR BLD AUTO: 3.4 % — SIGNIFICANT CHANGE UP (ref 0–6)
GLUCOSE SERPL-MCNC: 106 MG/DL — HIGH (ref 70–99)
HCT VFR BLD CALC: 28.3 % — LOW (ref 34.5–45)
HGB BLD-MCNC: 9.5 G/DL — LOW (ref 11.5–15.5)
IANC: 8.62 K/UL — HIGH (ref 1.5–8.5)
IMM GRANULOCYTES NFR BLD AUTO: 0.7 % — SIGNIFICANT CHANGE UP (ref 0–1.5)
LEGIONELLA AG UR QL: NEGATIVE — SIGNIFICANT CHANGE UP
LYMPHOCYTES # BLD AUTO: 1.05 K/UL — SIGNIFICANT CHANGE UP (ref 1–3.3)
LYMPHOCYTES # BLD AUTO: 9.8 % — LOW (ref 13–44)
MAGNESIUM SERPL-MCNC: 2 MG/DL — SIGNIFICANT CHANGE UP (ref 1.6–2.6)
MCHC RBC-ENTMCNC: 29.5 PG — SIGNIFICANT CHANGE UP (ref 27–34)
MCHC RBC-ENTMCNC: 33.6 GM/DL — SIGNIFICANT CHANGE UP (ref 32–36)
MCV RBC AUTO: 87.9 FL — SIGNIFICANT CHANGE UP (ref 80–100)
MONOCYTES # BLD AUTO: 0.56 K/UL — SIGNIFICANT CHANGE UP (ref 0–0.9)
MONOCYTES NFR BLD AUTO: 5.2 % — SIGNIFICANT CHANGE UP (ref 2–14)
NEUTROPHILS # BLD AUTO: 8.62 K/UL — HIGH (ref 1.8–7.4)
NEUTROPHILS NFR BLD AUTO: 80.5 % — HIGH (ref 43–77)
NRBC # BLD: 0 /100 WBCS — SIGNIFICANT CHANGE UP
NRBC # FLD: 0 K/UL — SIGNIFICANT CHANGE UP
PHOSPHATE SERPL-MCNC: 2.9 MG/DL — SIGNIFICANT CHANGE UP (ref 2.5–4.5)
PLATELET # BLD AUTO: 279 K/UL — SIGNIFICANT CHANGE UP (ref 150–400)
POTASSIUM SERPL-MCNC: 4 MMOL/L — SIGNIFICANT CHANGE UP (ref 3.5–5.3)
POTASSIUM SERPL-SCNC: 4 MMOL/L — SIGNIFICANT CHANGE UP (ref 3.5–5.3)
RBC # BLD: 3.22 M/UL — LOW (ref 3.8–5.2)
RBC # FLD: 19.8 % — HIGH (ref 10.3–14.5)
SODIUM SERPL-SCNC: 132 MMOL/L — LOW (ref 135–145)
WBC # BLD: 10.71 K/UL — HIGH (ref 3.8–10.5)
WBC # FLD AUTO: 10.71 K/UL — HIGH (ref 3.8–10.5)

## 2021-10-25 PROCEDURE — 99232 SBSQ HOSP IP/OBS MODERATE 35: CPT

## 2021-10-25 RX ADMIN — Medication 100 MILLIGRAM(S): at 05:14

## 2021-10-25 RX ADMIN — Medication 3 MILLILITER(S): at 22:46

## 2021-10-25 RX ADMIN — Medication 3 MILLILITER(S): at 09:17

## 2021-10-25 RX ADMIN — LIDOCAINE 1 PATCH: 4 CREAM TOPICAL at 15:26

## 2021-10-25 RX ADMIN — VALACYCLOVIR 500 MILLIGRAM(S): 500 TABLET, FILM COATED ORAL at 12:47

## 2021-10-25 RX ADMIN — Medication 1 TABLET(S): at 12:48

## 2021-10-25 RX ADMIN — CEFTRIAXONE 100 MILLIGRAM(S): 500 INJECTION, POWDER, FOR SOLUTION INTRAMUSCULAR; INTRAVENOUS at 12:44

## 2021-10-25 RX ADMIN — Medication 3 MILLILITER(S): at 15:45

## 2021-10-25 RX ADMIN — LIDOCAINE 1 PATCH: 4 CREAM TOPICAL at 19:00

## 2021-10-25 RX ADMIN — Medication 100 MILLIGRAM(S): at 22:05

## 2021-10-25 RX ADMIN — Medication 100 MILLIGRAM(S): at 15:23

## 2021-10-25 RX ADMIN — Medication 1 MILLIGRAM(S): at 12:47

## 2021-10-25 RX ADMIN — LIDOCAINE 1 PATCH: 4 CREAM TOPICAL at 00:07

## 2021-10-25 RX ADMIN — Medication 3 MILLILITER(S): at 04:13

## 2021-10-25 RX ADMIN — Medication 650 MILLIGRAM(S): at 22:15

## 2021-10-25 RX ADMIN — ESCITALOPRAM OXALATE 10 MILLIGRAM(S): 10 TABLET, FILM COATED ORAL at 12:47

## 2021-10-25 RX ADMIN — Medication 12.5 MILLIGRAM(S): at 05:15

## 2021-10-25 RX ADMIN — BUDESONIDE AND FORMOTEROL FUMARATE DIHYDRATE 2 PUFF(S): 160; 4.5 AEROSOL RESPIRATORY (INHALATION) at 12:45

## 2021-10-25 RX ADMIN — ENOXAPARIN SODIUM 40 MILLIGRAM(S): 100 INJECTION SUBCUTANEOUS at 12:47

## 2021-10-25 RX ADMIN — SENNA PLUS 2 TABLET(S): 8.6 TABLET ORAL at 21:10

## 2021-10-25 RX ADMIN — Medication 12.5 MILLIGRAM(S): at 17:22

## 2021-10-25 RX ADMIN — Medication 3 MILLIGRAM(S): at 22:14

## 2021-10-25 RX ADMIN — TIOTROPIUM BROMIDE 1 CAPSULE(S): 18 CAPSULE ORAL; RESPIRATORY (INHALATION) at 12:45

## 2021-10-25 RX ADMIN — BUDESONIDE AND FORMOTEROL FUMARATE DIHYDRATE 2 PUFF(S): 160; 4.5 AEROSOL RESPIRATORY (INHALATION) at 21:09

## 2021-10-25 RX ADMIN — AZITHROMYCIN 255 MILLIGRAM(S): 500 TABLET, FILM COATED ORAL at 15:22

## 2021-10-25 RX ADMIN — PANTOPRAZOLE SODIUM 40 MILLIGRAM(S): 20 TABLET, DELAYED RELEASE ORAL at 06:55

## 2021-10-25 NOTE — PROGRESS NOTE ADULT - ASSESSMENT
Post op afib  transient  off amio  not a candidate for a/c given fall risk   now in sinus  HR stable on tele   has frequent APCs   cont BB    lymphoma  as per Oncology     Humerus fx  rib fx  pain control  incentive spirometry   fu with medicine     parainfluenza   plan as per primary team

## 2021-10-25 NOTE — PROGRESS NOTE ADULT - SUBJECTIVE AND OBJECTIVE BOX
DATE OF SERVICE: 10-25-21 @ 09:18    Subjective: Patient seen and examined. No new events except as noted.     SUBJECTIVE/ROS:  No chest pain, dyspnea, palpitation, or dizziness.       MEDICATIONS:  MEDICATIONS  (STANDING):  albuterol/ipratropium for Nebulization 3 milliLiter(s) Nebulizer every 6 hours  azithromycin  IVPB 500 milliGRAM(s) IV Intermittent every 24 hours  benzonatate 100 milliGRAM(s) Oral every 8 hours  budesonide 160 MICROgram(s)/formoterol 4.5 MICROgram(s) Inhaler 2 Puff(s) Inhalation two times a day  cefTRIAXone   IVPB 1000 milliGRAM(s) IV Intermittent every 24 hours  enoxaparin Injectable 40 milliGRAM(s) SubCutaneous daily  escitalopram 10 milliGRAM(s) Oral daily  folic acid 1 milliGRAM(s) Oral daily  lactobacillus acidophilus 1 Tablet(s) Oral daily  lidocaine   4% Patch 1 Patch Transdermal daily  metoprolol tartrate 12.5 milliGRAM(s) Oral two times a day  pantoprazole    Tablet 40 milliGRAM(s) Oral before breakfast  senna 2 Tablet(s) Oral at bedtime  tiotropium 18 MICROgram(s) Capsule 1 Capsule(s) Inhalation daily  valACYclovir 500 milliGRAM(s) Oral daily      PHYSICAL EXAM:  T(C): 36.4 (10-25-21 @ 05:20), Max: 36.6 (10-24-21 @ 22:20)  HR: 81 (10-25-21 @ 05:20) (81 - 90)  BP: 122/60 (10-25-21 @ 05:20) (98/60 - 129/67)  RR: 17 (10-25-21 @ 05:20) (17 - 17)  SpO2: 100% (10-25-21 @ 05:20) (100% - 100%)  Wt(kg): --  I&O's Summary          JVP: Normal  Neck: supple  Lung: clear   CV: S1 S2 , Murmur:  Abd: soft  Ext: No edema  neuro: Awake / alert  Psych: flat affect  Skin: normal``    LABS/DATA:    CARDIAC MARKERS:                                9.5    10.71 )-----------( 279      ( 25 Oct 2021 05:39 )             28.3     10-25    132<L>  |  99  |  17  ----------------------------<  106<H>  4.0   |  22  |  0.98    Ca    9.5      25 Oct 2021 05:39  Phos  2.9     10-25  Mg     2.00     10-25      proBNP:   Lipid Profile:   HgA1c:   TSH:     TELE:  EKG:

## 2021-10-25 NOTE — PROGRESS NOTE ADULT - ASSESSMENT
88 y/o F with PMH of lymphoma on active chemo s/p mechanical fall 12 days ago found to have right proximal humerus fracture and PNA.  Also noted to be tachy- lidia.

## 2021-10-25 NOTE — PROGRESS NOTE ADULT - SUBJECTIVE AND OBJECTIVE BOX
Patient is a 89y old  Female who presents with a chief complaint of Fracture (25 Oct 2021 09:18)      SUBJECTIVE / OVERNIGHT EVENTS:    Review of Systems:     MEDICATIONS  (STANDING):  albuterol/ipratropium for Nebulization 3 milliLiter(s) Nebulizer every 6 hours  azithromycin  IVPB 500 milliGRAM(s) IV Intermittent every 24 hours  benzonatate 100 milliGRAM(s) Oral every 8 hours  budesonide 160 MICROgram(s)/formoterol 4.5 MICROgram(s) Inhaler 2 Puff(s) Inhalation two times a day  cefTRIAXone   IVPB 1000 milliGRAM(s) IV Intermittent every 24 hours  enoxaparin Injectable 40 milliGRAM(s) SubCutaneous daily  escitalopram 10 milliGRAM(s) Oral daily  folic acid 1 milliGRAM(s) Oral daily  lactobacillus acidophilus 1 Tablet(s) Oral daily  lidocaine   4% Patch 1 Patch Transdermal daily  metoprolol tartrate 12.5 milliGRAM(s) Oral two times a day  pantoprazole    Tablet 40 milliGRAM(s) Oral before breakfast  senna 2 Tablet(s) Oral at bedtime  tiotropium 18 MICROgram(s) Capsule 1 Capsule(s) Inhalation daily  valACYclovir 500 milliGRAM(s) Oral daily    MEDICATIONS  (PRN):  acetaminophen   Tablet .. 650 milliGRAM(s) Oral every 6 hours PRN Temp greater or equal to 38C (100.4F), Mild Pain (1 - 3)  ALPRAZolam 0.5 milliGRAM(s) Oral at bedtime PRN anxiety  guaiFENesin Oral Liquid (Sugar-Free) 100 milliGRAM(s) Oral every 6 hours PRN Cough  ICU Vital Signs Last 24 Hrs  T(C): 36.4 (25 Oct 2021 05:20), Max: 36.6 (2  4 Oct 2021 22:20)  T(F): 97.6 (25 Oct 2021 05:20), Max: 97.8 (24 Oct 2021 22:20)  HR: 82 (25 Oct 2021 09:21) (81 - 90)  BP: 122/60 (25 Oct 2021 05:20) (98/60 - 129/67)  BP(mean): --  ABP: --  ABP(mean): --  RR: 17 (25 Oct 2021 05:20) (17 - 17)  SpO2: 98% (25 Oct 2021 09:21) (98% - 100%)  melatonin 3 milliGRAM(s) Oral at bedtime PRN Insomnia      PHYSICAL EXAM:      LABS:  CAPILLARY BLOOD GLUCOSE                              9.5    10.71 )-----------( 279      ( 25 Oct 2021 05:39 )             28.3     10-25    132<L>  |  99  |  17  ----------------------------<  106<H>  4.0   |  22  |  0.98    Ca    9.5      25 Oct 2021 05:39  Phos  2.9     10-25  Mg     2.00     10-25                RADIOLOGY & ADDITIONAL TESTS:    Imaging Personally Reviewed:    Consultant(s) Notes Reviewed:      Care Discussed with Consultants/Other Providers: Patient is a 89y old  Female who presents with a chief complaint of Fracture (25 Oct 2021 09:18)      SUBJECTIVE / OVERNIGHT EVENTS: Denies CP, SOB, n/v. Denies having arm pain     Review of Systems:     MEDICATIONS  (STANDING):  albuterol/ipratropium for Nebulization 3 milliLiter(s) Nebulizer every 6 hours  azithromycin  IVPB 500 milliGRAM(s) IV Intermittent every 24 hours  benzonatate 100 milliGRAM(s) Oral every 8 hours  budesonide 160 MICROgram(s)/formoterol 4.5 MICROgram(s) Inhaler 2 Puff(s) Inhalation two times a day  cefTRIAXone   IVPB 1000 milliGRAM(s) IV Intermittent every 24 hours  enoxaparin Injectable 40 milliGRAM(s) SubCutaneous daily  escitalopram 10 milliGRAM(s) Oral daily  folic acid 1 milliGRAM(s) Oral daily  lactobacillus acidophilus 1 Tablet(s) Oral daily  lidocaine   4% Patch 1 Patch Transdermal daily  metoprolol tartrate 12.5 milliGRAM(s) Oral two times a day  pantoprazole    Tablet 40 milliGRAM(s) Oral before breakfast  senna 2 Tablet(s) Oral at bedtime  tiotropium 18 MICROgram(s) Capsule 1 Capsule(s) Inhalation daily  valACYclovir 500 milliGRAM(s) Oral daily    MEDICATIONS  (PRN):  acetaminophen   Tablet .. 650 milliGRAM(s) Oral every 6 hours PRN Temp greater or equal to 38C (100.4F), Mild Pain (1 - 3)  ALPRAZolam 0.5 milliGRAM(s) Oral at bedtime PRN anxiety  guaiFENesin Oral Liquid (Sugar-Free) 100 milliGRAM(s) Oral every 6 hours PRN Cough  ICU Vital Signs Last 24 Hrs  T(C): 36.4 (25 Oct 2021 05:20), Max: 36.6 (2  4 Oct 2021 22:20)  T(F): 97.6 (25 Oct 2021 05:20), Max: 97.8 (24 Oct 2021 22:20)  HR: 82 (25 Oct 2021 09:21) (81 - 90)  BP: 122/60 (25 Oct 2021 05:20) (98/60 - 129/67)  BP(mean): --  ABP: --  ABP(mean): --  RR: 17 (25 Oct 2021 05:20) (17 - 17)  SpO2: 98% (25 Oct 2021 09:21) (98% - 100%)  melatonin 3 milliGRAM(s) Oral at bedtime PRN Insomnia  Constitutional: WDWN resting comfortably in bed; NAD  Head: NC/AT  Eyes: EOMI, clear conjunctiva  Neck: supple;   Respiratory: CTA b/l  Cardiac: +S1/S2; RRR;   Gastrointestinal: soft, NT/ND; no rebound or guarding;   Extremities: UE in sling  Neurologic:  no focal deficits, moves all extremities   Psychiatric: calm    PHYSICAL EXAM:      LABS:  CAPILLARY BLOOD GLUCOSE                              9.5    10.71 )-----------( 279      ( 25 Oct 2021 05:39 )             28.3     10-25    132<L>  |  99  |  17  ----------------------------<  106<H>  4.0   |  22  |  0.98    Ca    9.5      25 Oct 2021 05:39  Phos  2.9     10-25  Mg     2.00     10-25                RADIOLOGY & ADDITIONAL TESTS:    Imaging Personally Reviewed:    Consultant(s) Notes Reviewed:      Care Discussed with Consultants/Other Providers:

## 2021-10-26 PROCEDURE — 99232 SBSQ HOSP IP/OBS MODERATE 35: CPT

## 2021-10-26 RX ADMIN — Medication 100 MILLIGRAM(S): at 13:46

## 2021-10-26 RX ADMIN — BUDESONIDE AND FORMOTEROL FUMARATE DIHYDRATE 2 PUFF(S): 160; 4.5 AEROSOL RESPIRATORY (INHALATION) at 09:50

## 2021-10-26 RX ADMIN — Medication 3 MILLILITER(S): at 11:09

## 2021-10-26 RX ADMIN — Medication 100 MILLIGRAM(S): at 21:03

## 2021-10-26 RX ADMIN — Medication 12.5 MILLIGRAM(S): at 18:21

## 2021-10-26 RX ADMIN — SENNA PLUS 2 TABLET(S): 8.6 TABLET ORAL at 21:04

## 2021-10-26 RX ADMIN — ENOXAPARIN SODIUM 40 MILLIGRAM(S): 100 INJECTION SUBCUTANEOUS at 12:04

## 2021-10-26 RX ADMIN — LIDOCAINE 1 PATCH: 4 CREAM TOPICAL at 18:05

## 2021-10-26 RX ADMIN — TIOTROPIUM BROMIDE 1 CAPSULE(S): 18 CAPSULE ORAL; RESPIRATORY (INHALATION) at 09:58

## 2021-10-26 RX ADMIN — BUDESONIDE AND FORMOTEROL FUMARATE DIHYDRATE 2 PUFF(S): 160; 4.5 AEROSOL RESPIRATORY (INHALATION) at 21:03

## 2021-10-26 RX ADMIN — Medication 1 MILLIGRAM(S): at 12:06

## 2021-10-26 RX ADMIN — VALACYCLOVIR 500 MILLIGRAM(S): 500 TABLET, FILM COATED ORAL at 12:06

## 2021-10-26 RX ADMIN — Medication 100 MILLIGRAM(S): at 00:45

## 2021-10-26 RX ADMIN — Medication 12.5 MILLIGRAM(S): at 05:40

## 2021-10-26 RX ADMIN — Medication 3 MILLIGRAM(S): at 21:05

## 2021-10-26 RX ADMIN — Medication 1 TABLET(S): at 12:06

## 2021-10-26 RX ADMIN — Medication 3 MILLILITER(S): at 16:14

## 2021-10-26 RX ADMIN — Medication 3 MILLILITER(S): at 04:07

## 2021-10-26 RX ADMIN — Medication 100 MILLIGRAM(S): at 05:39

## 2021-10-26 RX ADMIN — LIDOCAINE 1 PATCH: 4 CREAM TOPICAL at 03:39

## 2021-10-26 RX ADMIN — LIDOCAINE 1 PATCH: 4 CREAM TOPICAL at 12:04

## 2021-10-26 RX ADMIN — ESCITALOPRAM OXALATE 10 MILLIGRAM(S): 10 TABLET, FILM COATED ORAL at 12:06

## 2021-10-26 RX ADMIN — PANTOPRAZOLE SODIUM 40 MILLIGRAM(S): 20 TABLET, DELAYED RELEASE ORAL at 05:40

## 2021-10-26 NOTE — PROGRESS NOTE ADULT - SUBJECTIVE AND OBJECTIVE BOX
Patient is a 89y old  Female who presents with a chief complaint of Fracture (25 Oct 2021 09:29)      SUBJECTIVE / OVERNIGHT EVENTS:    Review of Systems:     MEDICATIONS  (STANDING):  albuterol/ipratropium for Nebulization 3 milliLiter(s) Nebulizer every 6 hours  benzonatate 100 milliGRAM(s) Oral every 8 hours  budesonide 160 MICROgram(s)/formoterol 4.5 MICROgram(s) Inhaler 2 Puff(s) Inhalation two times a day  enoxaparin Injectable 40 milliGRAM(s) SubCutaneous daily  escitalopram 10 milliGRAM(s) Oral daily  folic acid 1 milliGRAM(s) Oral daily  lactobacillus acidophilus 1 Tablet(s) Oral daily  lidocaine   4% Patch 1 Patch Transdermal daily  metoprolol tartrate 12.5 milliGRAM(s) Oral two times a day  pantoprazole    Tablet 40 milliGRAM(s) Oral before breakfast  senna 2 Tablet(s) Oral at bedtime  tiotropium 18 MICROgram(s) Capsule 1 Capsule(s) Inhalation daily  valACYclovir 500 milliGRAM(s) Oral daily    MEDICATIONS  (PRN):  acetaminophen   Tablet .. 650 milliGRAM(s) Oral every 6 hours PRN Temp greater or equal to 38C (100.4F), Mild Pain (1 - 3)  ALPRAZolam 0.5 milliGRAM(s) Oral at bedtime PRN anxiety  guaiFENesin Oral Liquid (Sugar-Free) 100 milliGRAM(s) Oral every 6 hours PRN Cough  melatonin 3 milliGRAM(s) Oral at bedtime PRN Insomnia      PHYSICAL EXAM:    I&O's Summary    ICU Vital Signs Last 24 Hrs  T(C): 36.8 (26 Oct 2021 05:00), Max: 36.8 (26 Oct 2021 05:00)  T(F): 98.2 (26 Oct 2021 05:00), Max: 98.2 (26 Oct 2021 05:00)  HR: 73 (26 Oct 2021 05:00) (71 - 89)  BP: 114/65 (26 Oct 2021 05:00) (102/58 - 114/65)  BP(mean): --  ABP: --  ABP(mean): --  RR: 18 (26 Oct 2021 05:00) (16 - 18)  SpO2: 99% (26 Oct 2021 05:00) (98% - 100%)      LABS:  CAPILLARY BLOOD GLUCOSE                              9.5    10.71 )-----------( 279      ( 25 Oct 2021 05:39 )             28.3     10-25    132<L>  |  99  |  17  ----------------------------<  106<H>  4.0   |  22  |  0.98    Ca    9.5      25 Oct 2021 05:39  Phos  2.9     10-25  Mg     2.00     10-25                RADIOLOGY & ADDITIONAL TESTS:    Imaging Personally Reviewed:    Consultant(s) Notes Reviewed:      Care Discussed with Consultants/Other Providers: Patient is a 89y old  Female who presents with a chief complaint of Fracture (25 Oct 2021 09:29)      SUBJECTIVE / OVERNIGHT EVENTS: Denies CP, SOB, n/v. No arm pain     Review of Systems:     MEDICATIONS  (STANDING):  albuterol/ipratropium for Nebulization 3 milliLiter(s) Nebulizer every 6 hours  benzonatate 100 milliGRAM(s) Oral every 8 hours  budesonide 160 MICROgram(s)/formoterol 4.5 MICROgram(s) Inhaler 2 Puff(s) Inhalation two times a day  enoxaparin Injectable 40 milliGRAM(s) SubCutaneous daily  escitalopram 10 milliGRAM(s) Oral daily  folic acid 1 milliGRAM(s) Oral daily  lactobacillus acidophilus 1 Tablet(s) Oral daily  lidocaine   4% Patch 1 Patch Transdermal daily  metoprolol tartrate 12.5 milliGRAM(s) Oral two times a day  pantoprazole    Tablet 40 milliGRAM(s) Oral before breakfast  senna 2 Tablet(s) Oral at bedtime  tiotropium 18 MICROgram(s) Capsule 1 Capsule(s) Inhalation daily  valACYclovir 500 milliGRAM(s) Oral daily    MEDICATIONS  (PRN):  acetaminophen   Tablet .. 650 milliGRAM(s) Oral every 6 hours PRN Temp greater or equal to 38C (100.4F), Mild Pain (1 - 3)  ALPRAZolam 0.5 milliGRAM(s) Oral at bedtime PRN anxiety  guaiFENesin Oral Liquid (Sugar-Free) 100 milliGRAM(s) Oral every 6 hours PRN Cough  melatonin 3 milliGRAM(s) Oral at bedtime PRN Insomnia      PHYSICAL EXAM:    I&O's Summary    ICU Vital Signs Last 24 Hrs  T(C): 36.8 (26 Oct 2021 05:00), Max: 36.8 (26 Oct 2021 05:00)  T(F): 98.2 (26 Oct 2021 05:00), Max: 98.2 (26 Oct 2021 05:00)  HR: 73 (26 Oct 2021 05:00) (71 - 89)  BP: 114/65 (26 Oct 2021 05:00) (102/58 - 114/65)  BP(mean): --  ABP: --  ABP(mean): --  RR: 18 (26 Oct 2021 05:00) (16 - 18)  SpO2: 99% (26 Oct 2021 05:00) (98% - 100%)  Constitutional:  NAD  Head: NC/AT  Eyes: EOMI, clear conjunctiva  Neck: supple;   Respiratory: CTA b/l  Cardiac: +S1/S2; RRR;   Gastrointestinal: soft, NT/ND; no rebound or guarding;   Extremities: UE in sling  Neurologic:  no focal deficits, moves all extremities   Psychiatric: calm      LABS:  CAPILLARY BLOOD GLUCOSE                              9.5    10.71 )-----------( 279      ( 25 Oct 2021 05:39 )             28.3     10-25    132<L>  |  99  |  17  ----------------------------<  106<H>  4.0   |  22  |  0.98    Ca    9.5      25 Oct 2021 05:39  Phos  2.9     10-25  Mg     2.00     10-25                RADIOLOGY & ADDITIONAL TESTS:    Imaging Personally Reviewed:    Consultant(s) Notes Reviewed:      Care Discussed with Consultants/Other Providers:

## 2021-10-26 NOTE — PROGRESS NOTE ADULT - SUBJECTIVE AND OBJECTIVE BOX
DATE OF SERVICE: 10-26-21 @ 10:28    Subjective: Patient seen and examined. No new events except as noted.     SUBJECTIVE/ROS:  feels ok   No chest pain, dyspnea, palpitation, or dizziness.       MEDICATIONS:  MEDICATIONS  (STANDING):  albuterol/ipratropium for Nebulization 3 milliLiter(s) Nebulizer every 6 hours  benzonatate 100 milliGRAM(s) Oral every 8 hours  budesonide 160 MICROgram(s)/formoterol 4.5 MICROgram(s) Inhaler 2 Puff(s) Inhalation two times a day  enoxaparin Injectable 40 milliGRAM(s) SubCutaneous daily  escitalopram 10 milliGRAM(s) Oral daily  folic acid 1 milliGRAM(s) Oral daily  lactobacillus acidophilus 1 Tablet(s) Oral daily  lidocaine   4% Patch 1 Patch Transdermal daily  metoprolol tartrate 12.5 milliGRAM(s) Oral two times a day  pantoprazole    Tablet 40 milliGRAM(s) Oral before breakfast  senna 2 Tablet(s) Oral at bedtime  tiotropium 18 MICROgram(s) Capsule 1 Capsule(s) Inhalation daily  valACYclovir 500 milliGRAM(s) Oral daily      PHYSICAL EXAM:  T(C): 36.8 (10-26-21 @ 05:00), Max: 36.8 (10-26-21 @ 05:00)  HR: 73 (10-26-21 @ 05:00) (71 - 89)  BP: 114/65 (10-26-21 @ 05:00) (102/58 - 114/65)  RR: 18 (10-26-21 @ 05:00) (16 - 18)  SpO2: 99% (10-26-21 @ 05:00) (98% - 100%)  Wt(kg): --  I&O's Summary          JVP: Normal  Neck: supple  Lung: clear   CV: S1 S2 , Murmur:  Abd: soft  Ext: No edema  neuro: Awake / alert  Psych: flat affect  Skin: normal``    LABS/DATA:    CARDIAC MARKERS:                                9.5    10.71 )-----------( 279      ( 25 Oct 2021 05:39 )             28.3     10-25    132<L>  |  99  |  17  ----------------------------<  106<H>  4.0   |  22  |  0.98    Ca    9.5      25 Oct 2021 05:39  Phos  2.9     10-25  Mg     2.00     10-25      proBNP:   Lipid Profile:   HgA1c:   TSH:     TELE:  EKG:

## 2021-10-26 NOTE — PROGRESS NOTE ADULT - ASSESSMENT
Post op afib  transient  off amio  not a candidate for a/c given fall risk   now in sinus  HR stable on tele   has frequent APCs   cont BB  no evidence of afib on tele     lymphoma  as per Oncology     Humerus fx  rib fx  pain control  incentive spirometry   fu with medicine     parainfluenza   plan as per primary team

## 2021-10-27 LAB
ALBUMIN SERPL ELPH-MCNC: 3.1 G/DL — LOW (ref 3.3–5)
ALP SERPL-CCNC: 110 U/L — SIGNIFICANT CHANGE UP (ref 40–120)
ALT FLD-CCNC: 10 U/L — SIGNIFICANT CHANGE UP (ref 4–33)
ANION GAP SERPL CALC-SCNC: 14 MMOL/L — SIGNIFICANT CHANGE UP (ref 7–14)
AST SERPL-CCNC: 10 U/L — SIGNIFICANT CHANGE UP (ref 4–32)
BILIRUB SERPL-MCNC: <0.2 MG/DL — SIGNIFICANT CHANGE UP (ref 0.2–1.2)
BUN SERPL-MCNC: 19 MG/DL — SIGNIFICANT CHANGE UP (ref 7–23)
CALCIUM SERPL-MCNC: 10 MG/DL — SIGNIFICANT CHANGE UP (ref 8.4–10.5)
CHLORIDE SERPL-SCNC: 102 MMOL/L — SIGNIFICANT CHANGE UP (ref 98–107)
CO2 SERPL-SCNC: 21 MMOL/L — LOW (ref 22–31)
CREAT SERPL-MCNC: 0.88 MG/DL — SIGNIFICANT CHANGE UP (ref 0.5–1.3)
GLUCOSE SERPL-MCNC: 102 MG/DL — HIGH (ref 70–99)
HCT VFR BLD CALC: 28.2 % — LOW (ref 34.5–45)
HGB BLD-MCNC: 9.1 G/DL — LOW (ref 11.5–15.5)
MCHC RBC-ENTMCNC: 28.8 PG — SIGNIFICANT CHANGE UP (ref 27–34)
MCHC RBC-ENTMCNC: 32.3 GM/DL — SIGNIFICANT CHANGE UP (ref 32–36)
MCV RBC AUTO: 89.2 FL — SIGNIFICANT CHANGE UP (ref 80–100)
NRBC # BLD: 0 /100 WBCS — SIGNIFICANT CHANGE UP
NRBC # FLD: 0 K/UL — SIGNIFICANT CHANGE UP
PLATELET # BLD AUTO: 290 K/UL — SIGNIFICANT CHANGE UP (ref 150–400)
POTASSIUM SERPL-MCNC: 4.2 MMOL/L — SIGNIFICANT CHANGE UP (ref 3.5–5.3)
POTASSIUM SERPL-SCNC: 4.2 MMOL/L — SIGNIFICANT CHANGE UP (ref 3.5–5.3)
PROT SERPL-MCNC: 5.5 G/DL — LOW (ref 6–8.3)
RBC # BLD: 3.16 M/UL — LOW (ref 3.8–5.2)
RBC # FLD: 19.3 % — HIGH (ref 10.3–14.5)
SODIUM SERPL-SCNC: 137 MMOL/L — SIGNIFICANT CHANGE UP (ref 135–145)
URATE SERPL-MCNC: 5.3 MG/DL — SIGNIFICANT CHANGE UP (ref 2.5–7)
WBC # BLD: 9.22 K/UL — SIGNIFICANT CHANGE UP (ref 3.8–10.5)
WBC # FLD AUTO: 9.22 K/UL — SIGNIFICANT CHANGE UP (ref 3.8–10.5)

## 2021-10-27 PROCEDURE — 99232 SBSQ HOSP IP/OBS MODERATE 35: CPT

## 2021-10-27 PROCEDURE — 99233 SBSQ HOSP IP/OBS HIGH 50: CPT | Mod: GC

## 2021-10-27 PROCEDURE — 99221 1ST HOSP IP/OBS SF/LOW 40: CPT

## 2021-10-27 RX ADMIN — TIOTROPIUM BROMIDE 1 CAPSULE(S): 18 CAPSULE ORAL; RESPIRATORY (INHALATION) at 10:11

## 2021-10-27 RX ADMIN — Medication 50 MILLIGRAM(S): at 18:47

## 2021-10-27 RX ADMIN — Medication 3 MILLIGRAM(S): at 22:41

## 2021-10-27 RX ADMIN — Medication 1 TABLET(S): at 12:45

## 2021-10-27 RX ADMIN — Medication 650 MILLIGRAM(S): at 23:30

## 2021-10-27 RX ADMIN — LIDOCAINE 1 PATCH: 4 CREAM TOPICAL at 00:00

## 2021-10-27 RX ADMIN — Medication 12.5 MILLIGRAM(S): at 18:48

## 2021-10-27 RX ADMIN — Medication 3 MILLILITER(S): at 10:10

## 2021-10-27 RX ADMIN — Medication 650 MILLIGRAM(S): at 22:41

## 2021-10-27 RX ADMIN — BUDESONIDE AND FORMOTEROL FUMARATE DIHYDRATE 2 PUFF(S): 160; 4.5 AEROSOL RESPIRATORY (INHALATION) at 08:21

## 2021-10-27 RX ADMIN — Medication 100 MILLIGRAM(S): at 06:30

## 2021-10-27 RX ADMIN — Medication 1 MILLIGRAM(S): at 12:46

## 2021-10-27 RX ADMIN — Medication 100 MILLIGRAM(S): at 21:31

## 2021-10-27 RX ADMIN — Medication 100 MILLIGRAM(S): at 21:24

## 2021-10-27 RX ADMIN — ENOXAPARIN SODIUM 40 MILLIGRAM(S): 100 INJECTION SUBCUTANEOUS at 12:44

## 2021-10-27 RX ADMIN — Medication 12.5 MILLIGRAM(S): at 06:29

## 2021-10-27 RX ADMIN — Medication 650 MILLIGRAM(S): at 08:21

## 2021-10-27 RX ADMIN — Medication 100 MILLIGRAM(S): at 13:54

## 2021-10-27 RX ADMIN — PANTOPRAZOLE SODIUM 40 MILLIGRAM(S): 20 TABLET, DELAYED RELEASE ORAL at 06:30

## 2021-10-27 RX ADMIN — SENNA PLUS 2 TABLET(S): 8.6 TABLET ORAL at 21:24

## 2021-10-27 RX ADMIN — VALACYCLOVIR 500 MILLIGRAM(S): 500 TABLET, FILM COATED ORAL at 12:45

## 2021-10-27 RX ADMIN — Medication 3 MILLILITER(S): at 15:51

## 2021-10-27 RX ADMIN — Medication 650 MILLIGRAM(S): at 09:21

## 2021-10-27 RX ADMIN — BUDESONIDE AND FORMOTEROL FUMARATE DIHYDRATE 2 PUFF(S): 160; 4.5 AEROSOL RESPIRATORY (INHALATION) at 21:23

## 2021-10-27 RX ADMIN — Medication 3 MILLILITER(S): at 04:30

## 2021-10-27 RX ADMIN — ESCITALOPRAM OXALATE 10 MILLIGRAM(S): 10 TABLET, FILM COATED ORAL at 12:45

## 2021-10-27 NOTE — PROGRESS NOTE ADULT - ASSESSMENT
Post op afib  transient in the past admission   off amio  not a candidate for a/c given fall risk   now in sinus  HR stable on tele   has frequent APCs   cont BB  no evidence of afib on tele     lymphoma  as per Oncology

## 2021-10-27 NOTE — PROGRESS NOTE ADULT - ASSESSMENT
89 y/o F with PMH of lymphoma on active chemo s/p mechanical fall 12 days ago found to have right proximal humerus fracture and PNA.  Also noted to be tachy- lidia.

## 2021-10-27 NOTE — PROGRESS NOTE ADULT - SUBJECTIVE AND OBJECTIVE BOX
Patient is a 89y old  Female who presents with a chief complaint of Fracture (27 Oct 2021 07:58)      SUBJECTIVE / OVERNIGHT EVENTS:    Review of Systems:     MEDICATIONS  (STANDING):  albuterol/ipratropium for Nebulization 3 milliLiter(s) Nebulizer every 6 hours  benzonatate 100 milliGRAM(s) Oral every 8 hours  budesonide 160 MICROgram(s)/formoterol 4.5 MICROgram(s) Inhaler 2 Puff(s) Inhalation two times a day  enoxaparin Injectable 40 milliGRAM(s) SubCutaneous daily  escitalopram 10 milliGRAM(s) Oral daily  folic acid 1 milliGRAM(s) Oral daily  lactobacillus acidophilus 1 Tablet(s) Oral daily  lidocaine   4% Patch 1 Patch Transdermal daily  metoprolol tartrate 12.5 milliGRAM(s) Oral two times a day  pantoprazole    Tablet 40 milliGRAM(s) Oral before breakfast  senna 2 Tablet(s) Oral at bedtime  tiotropium 18 MICROgram(s) Capsule 1 Capsule(s) Inhalation daily  valACYclovir 500 milliGRAM(s) Oral daily    MEDICATIONS  (PRN):  acetaminophen   Tablet .. 650 milliGRAM(s) Oral every 6 hours PRN Temp greater or equal to 38C (100.4F), Mild Pain (1 - 3)  guaiFENesin Oral Liquid (Sugar-Free) 100 milliGRAM(s) Oral every 6 hours PRN Cough  melatonin 3 milliGRAM(s) Oral at bedtime PRN Insomnia      PHYSICAL EXAM:    I&O's Summary    ICU Vital Signs Last 24 Hrs  T(C): 36.4 (27 Oct 2021 06:05), Max: 36.6 (26 Oct 2021 12:19)  T(F): 97.6 (27 Oct 2021 06:05), Max: 97.9 (26 Oct 2021 12:19)  HR: 79 (27 Oct 2021 06:05) (77 - 95)  BP: 125/76 (27 Oct 2021 06:05) (100/55 - 125/76)  BP(mean): --  ABP: --  ABP(mean): --  RR: 18 (27 Oct 2021 06:05) (18 - 18)  SpO2: 100% (27 Oct 2021 06:05) (97% - 100%)    LABS:  CAPILLARY BLOOD GLUCOSE                              9.1    9.22  )-----------( 290      ( 27 Oct 2021 07:35 )             28.2     10-27    137  |  102  |  19  ----------------------------<  102<H>  4.2   |  21<L>  |  0.88    Ca    10.0      27 Oct 2021 07:35    TPro  5.5<L>  /  Alb  3.1<L>  /  TBili  <0.2  /  DBili  x   /  AST  10  /  ALT  10  /  AlkPhos  110  10-27              RADIOLOGY & ADDITIONAL TESTS:    Imaging Personally Reviewed:    Consultant(s) Notes Reviewed:      Care Discussed with Consultants/Other Providers: Patient is a 89y old  Female who presents with a chief complaint of Fracture (27 Oct 2021 07:58)      SUBJECTIVE / OVERNIGHT EVENTS: Still has wet cough but states it is better compared to prior. Denies CP, SOB, n/v. No arm pain     Review of Systems:     MEDICATIONS  (STANDING):  albuterol/ipratropium for Nebulization 3 milliLiter(s) Nebulizer every 6 hours  benzonatate 100 milliGRAM(s) Oral every 8 hours  budesonide 160 MICROgram(s)/formoterol 4.5 MICROgram(s) Inhaler 2 Puff(s) Inhalation two times a day  enoxaparin Injectable 40 milliGRAM(s) SubCutaneous daily  escitalopram 10 milliGRAM(s) Oral daily  folic acid 1 milliGRAM(s) Oral daily  lactobacillus acidophilus 1 Tablet(s) Oral daily  lidocaine   4% Patch 1 Patch Transdermal daily  metoprolol tartrate 12.5 milliGRAM(s) Oral two times a day  pantoprazole    Tablet 40 milliGRAM(s) Oral before breakfast  senna 2 Tablet(s) Oral at bedtime  tiotropium 18 MICROgram(s) Capsule 1 Capsule(s) Inhalation daily  valACYclovir 500 milliGRAM(s) Oral daily    MEDICATIONS  (PRN):  acetaminophen   Tablet .. 650 milliGRAM(s) Oral every 6 hours PRN Temp greater or equal to 38C (100.4F), Mild Pain (1 - 3)  guaiFENesin Oral Liquid (Sugar-Free) 100 milliGRAM(s) Oral every 6 hours PRN Cough  melatonin 3 milliGRAM(s) Oral at bedtime PRN Insomnia      PHYSICAL EXAM:    I&O's Summary    ICU Vital Signs Last 24 Hrs  T(C): 36.4 (27 Oct 2021 06:05), Max: 36.6 (26 Oct 2021 12:19)  T(F): 97.6 (27 Oct 2021 06:05), Max: 97.9 (26 Oct 2021 12:19)  HR: 79 (27 Oct 2021 06:05) (77 - 95)  BP: 125/76 (27 Oct 2021 06:05) (100/55 - 125/76)  BP(mean): --  ABP: --  ABP(mean): --  RR: 18 (27 Oct 2021 06:05) (18 - 18)  SpO2: 100% (27 Oct 2021 06:05) (97% - 100%)  Constitutional:  NAD  Head: NC/AT  Eyes: EOMI, clear conjunctiva  Neck: supple;   Respiratory: CTA b/l  Cardiac: +S1/S2; RRR;   Gastrointestinal: soft, NT/ND; no rebound or guarding;   Extremities: UE in sling  Neurologic:  no focal deficits, moves all extremities   Psychiatric: calm    LABS:  CAPILLARY BLOOD GLUCOSE                              9.1    9.22  )-----------( 290      ( 27 Oct 2021 07:35 )             28.2     10-27    137  |  102  |  19  ----------------------------<  102<H>  4.2   |  21<L>  |  0.88    Ca    10.0      27 Oct 2021 07:35    TPro  5.5<L>  /  Alb  3.1<L>  /  TBili  <0.2  /  DBili  x   /  AST  10  /  ALT  10  /  AlkPhos  110  10-27              RADIOLOGY & ADDITIONAL TESTS:    Imaging Personally Reviewed:    Consultant(s) Notes Reviewed:      Care Discussed with Consultants/Other Providers:

## 2021-10-27 NOTE — PROGRESS NOTE ADULT - SUBJECTIVE AND OBJECTIVE BOX
INTERVAL History:    Allergies    No Known Allergies    Intolerances        MEDICATIONS  (STANDING):  albuterol/ipratropium for Nebulization 3 milliLiter(s) Nebulizer every 6 hours  benzonatate 100 milliGRAM(s) Oral every 8 hours  budesonide 160 MICROgram(s)/formoterol 4.5 MICROgram(s) Inhaler 2 Puff(s) Inhalation two times a day  enoxaparin Injectable 40 milliGRAM(s) SubCutaneous daily  escitalopram 10 milliGRAM(s) Oral daily  folic acid 1 milliGRAM(s) Oral daily  lactobacillus acidophilus 1 Tablet(s) Oral daily  lidocaine   4% Patch 1 Patch Transdermal daily  metoprolol tartrate 12.5 milliGRAM(s) Oral two times a day  pantoprazole    Tablet 40 milliGRAM(s) Oral before breakfast  senna 2 Tablet(s) Oral at bedtime  tiotropium 18 MICROgram(s) Capsule 1 Capsule(s) Inhalation daily  valACYclovir 500 milliGRAM(s) Oral daily    MEDICATIONS  (PRN):  acetaminophen   Tablet .. 650 milliGRAM(s) Oral every 6 hours PRN Temp greater or equal to 38C (100.4F), Mild Pain (1 - 3)  guaiFENesin Oral Liquid (Sugar-Free) 100 milliGRAM(s) Oral every 6 hours PRN Cough  melatonin 3 milliGRAM(s) Oral at bedtime PRN Insomnia      Vital Signs Last 24 Hrs  T(C): 36.4 (27 Oct 2021 06:05), Max: 36.6 (26 Oct 2021 12:19)  T(F): 97.6 (27 Oct 2021 06:05), Max: 97.9 (26 Oct 2021 12:19)  HR: 79 (27 Oct 2021 06:05) (77 - 95)  BP: 125/76 (27 Oct 2021 06:05) (100/55 - 125/76)  BP(mean): --  RR: 18 (27 Oct 2021 06:05) (18 - 18)  SpO2: 100% (27 Oct 2021 06:05) (97% - 100%)    PHYSICAL EXAM:        LABS:                        9.1    9.22  )-----------( 290      ( 27 Oct 2021 07:35 )             28.2                   RADIOLOGY & ADDITIONAL STUDIES:    PATHOLOGY:         INTERVAL History: No acute events overnight. Pt seen and examined at the bedside. Pt states she feels well. No pain in the right arm. Pt understands that she will get inpatient chemotherapy tomorrow with potential discharge home after chemo.     Allergies    No Known Allergies    Intolerances        MEDICATIONS  (STANDING):  albuterol/ipratropium for Nebulization 3 milliLiter(s) Nebulizer every 6 hours  benzonatate 100 milliGRAM(s) Oral every 8 hours  budesonide 160 MICROgram(s)/formoterol 4.5 MICROgram(s) Inhaler 2 Puff(s) Inhalation two times a day  enoxaparin Injectable 40 milliGRAM(s) SubCutaneous daily  escitalopram 10 milliGRAM(s) Oral daily  folic acid 1 milliGRAM(s) Oral daily  lactobacillus acidophilus 1 Tablet(s) Oral daily  lidocaine   4% Patch 1 Patch Transdermal daily  metoprolol tartrate 12.5 milliGRAM(s) Oral two times a day  pantoprazole    Tablet 40 milliGRAM(s) Oral before breakfast  senna 2 Tablet(s) Oral at bedtime  tiotropium 18 MICROgram(s) Capsule 1 Capsule(s) Inhalation daily  valACYclovir 500 milliGRAM(s) Oral daily    MEDICATIONS  (PRN):  acetaminophen   Tablet .. 650 milliGRAM(s) Oral every 6 hours PRN Temp greater or equal to 38C (100.4F), Mild Pain (1 - 3)  guaiFENesin Oral Liquid (Sugar-Free) 100 milliGRAM(s) Oral every 6 hours PRN Cough  melatonin 3 milliGRAM(s) Oral at bedtime PRN Insomnia      Vital Signs Last 24 Hrs  T(C): 36.4 (27 Oct 2021 06:05), Max: 36.6 (26 Oct 2021 12:19)  T(F): 97.6 (27 Oct 2021 06:05), Max: 97.9 (26 Oct 2021 12:19)  HR: 79 (27 Oct 2021 06:05) (77 - 95)  BP: 125/76 (27 Oct 2021 06:05) (100/55 - 125/76)  BP(mean): --  RR: 18 (27 Oct 2021 06:05) (18 - 18)  SpO2: 100% (27 Oct 2021 06:05) (97% - 100%)    PHYSICAL EXAM:  Constitutional:  NAD  Head: NC/AT  Eyes: EOMI, clear conjunctiva  Neck: supple;   Respiratory: CTA b/l  Cardiac: +S1/S2; RRR;   Gastrointestinal: soft, NT/ND; no rebound or guarding;   Extremities: RUE in sling  Neurologic:  no focal deficits, moves all extremities   Psychiatric: calm      LABS:                        9.1    9.22  )-----------( 290      ( 27 Oct 2021 07:35 )             28.2

## 2021-10-27 NOTE — PROGRESS NOTE ADULT - SUBJECTIVE AND OBJECTIVE BOX
DATE OF SERVICE: 10-27-21 @ 10:32    Subjective: Patient seen and examined. No new events except as noted.     SUBJECTIVE/ROS:  feels ok       MEDICATIONS:  MEDICATIONS  (STANDING):  albuterol/ipratropium for Nebulization 3 milliLiter(s) Nebulizer every 6 hours  benzonatate 100 milliGRAM(s) Oral every 8 hours  budesonide 160 MICROgram(s)/formoterol 4.5 MICROgram(s) Inhaler 2 Puff(s) Inhalation two times a day  enoxaparin Injectable 40 milliGRAM(s) SubCutaneous daily  escitalopram 10 milliGRAM(s) Oral daily  folic acid 1 milliGRAM(s) Oral daily  lactobacillus acidophilus 1 Tablet(s) Oral daily  lidocaine   4% Patch 1 Patch Transdermal daily  metoprolol tartrate 12.5 milliGRAM(s) Oral two times a day  pantoprazole    Tablet 40 milliGRAM(s) Oral before breakfast  senna 2 Tablet(s) Oral at bedtime  tiotropium 18 MICROgram(s) Capsule 1 Capsule(s) Inhalation daily  valACYclovir 500 milliGRAM(s) Oral daily      PHYSICAL EXAM:  T(C): 36.4 (10-27-21 @ 06:05), Max: 36.6 (10-26-21 @ 12:19)  HR: 79 (10-27-21 @ 06:05) (77 - 95)  BP: 125/76 (10-27-21 @ 06:05) (100/55 - 125/76)  RR: 18 (10-27-21 @ 06:05) (18 - 18)  SpO2: 100% (10-27-21 @ 06:05) (97% - 100%)  Wt(kg): --  I&O's Summary          JVP: Normal  Neck: supple  Lung: clear   CV: S1 S2 , Murmur:  Abd: soft  Ext: No edema  neuro: Awake / alert  Psych: flat affect  Skin: normal``    LABS/DATA:    CARDIAC MARKERS:                                9.1    9.22  )-----------( 290      ( 27 Oct 2021 07:35 )             28.2     10-27    137  |  102  |  19  ----------------------------<  102<H>  4.2   |  21<L>  |  0.88    Ca    10.0      27 Oct 2021 07:35    TPro  5.5<L>  /  Alb  3.1<L>  /  TBili  <0.2  /  DBili  x   /  AST  10  /  ALT  10  /  AlkPhos  110  10-27    proBNP:   Lipid Profile:   HgA1c:   TSH:     TELE:  EKG:

## 2021-10-28 ENCOUNTER — APPOINTMENT (OUTPATIENT)
Dept: INFUSION THERAPY | Facility: HOSPITAL | Age: 86
End: 2021-10-28

## 2021-10-28 ENCOUNTER — APPOINTMENT (OUTPATIENT)
Dept: HEMATOLOGY ONCOLOGY | Facility: CLINIC | Age: 86
End: 2021-10-28

## 2021-10-28 ENCOUNTER — TRANSCRIPTION ENCOUNTER (OUTPATIENT)
Age: 86
End: 2021-10-28

## 2021-10-28 LAB
ANION GAP SERPL CALC-SCNC: 11 MMOL/L — SIGNIFICANT CHANGE UP (ref 7–14)
BUN SERPL-MCNC: 18 MG/DL — SIGNIFICANT CHANGE UP (ref 7–23)
CALCIUM SERPL-MCNC: 9.8 MG/DL — SIGNIFICANT CHANGE UP (ref 8.4–10.5)
CHLORIDE SERPL-SCNC: 103 MMOL/L — SIGNIFICANT CHANGE UP (ref 98–107)
CO2 SERPL-SCNC: 22 MMOL/L — SIGNIFICANT CHANGE UP (ref 22–31)
CREAT SERPL-MCNC: 0.73 MG/DL — SIGNIFICANT CHANGE UP (ref 0.5–1.3)
GLUCOSE SERPL-MCNC: 143 MG/DL — HIGH (ref 70–99)
HCT VFR BLD CALC: 30.2 % — LOW (ref 34.5–45)
HGB BLD-MCNC: 9.5 G/DL — LOW (ref 11.5–15.5)
MCHC RBC-ENTMCNC: 28.3 PG — SIGNIFICANT CHANGE UP (ref 27–34)
MCHC RBC-ENTMCNC: 31.5 GM/DL — LOW (ref 32–36)
MCV RBC AUTO: 89.9 FL — SIGNIFICANT CHANGE UP (ref 80–100)
NRBC # BLD: 0 /100 WBCS — SIGNIFICANT CHANGE UP
NRBC # FLD: 0 K/UL — SIGNIFICANT CHANGE UP
PLATELET # BLD AUTO: 308 K/UL — SIGNIFICANT CHANGE UP (ref 150–400)
POTASSIUM SERPL-MCNC: 4.9 MMOL/L — SIGNIFICANT CHANGE UP (ref 3.5–5.3)
POTASSIUM SERPL-SCNC: 4.9 MMOL/L — SIGNIFICANT CHANGE UP (ref 3.5–5.3)
RBC # BLD: 3.36 M/UL — LOW (ref 3.8–5.2)
RBC # FLD: 18.9 % — HIGH (ref 10.3–14.5)
SODIUM SERPL-SCNC: 136 MMOL/L — SIGNIFICANT CHANGE UP (ref 135–145)
WBC # BLD: 8.36 K/UL — SIGNIFICANT CHANGE UP (ref 3.8–10.5)
WBC # FLD AUTO: 8.36 K/UL — SIGNIFICANT CHANGE UP (ref 3.8–10.5)

## 2021-10-28 PROCEDURE — 99233 SBSQ HOSP IP/OBS HIGH 50: CPT | Mod: GC

## 2021-10-28 PROCEDURE — 99233 SBSQ HOSP IP/OBS HIGH 50: CPT

## 2021-10-28 RX ORDER — CYCLOPHOSPHAMIDE 100 MG
750 VIAL (EA) INTRAVENOUS ONCE
Refills: 0 | Status: COMPLETED | OUTPATIENT
Start: 2021-10-28 | End: 2021-10-28

## 2021-10-28 RX ORDER — MEPERIDINE HYDROCHLORIDE 50 MG/ML
25 INJECTION INTRAMUSCULAR; INTRAVENOUS; SUBCUTANEOUS ONCE
Refills: 0 | Status: DISCONTINUED | OUTPATIENT
Start: 2021-10-28 | End: 2021-10-29

## 2021-10-28 RX ORDER — FAMOTIDINE 10 MG/ML
20 INJECTION INTRAVENOUS ONCE
Refills: 0 | Status: COMPLETED | OUTPATIENT
Start: 2021-10-28 | End: 2021-10-28

## 2021-10-28 RX ORDER — HYDROCORTISONE 20 MG
50 TABLET ORAL ONCE
Refills: 0 | Status: COMPLETED | OUTPATIENT
Start: 2021-10-28 | End: 2021-10-28

## 2021-10-28 RX ORDER — ACETAMINOPHEN 500 MG
650 TABLET ORAL ONCE
Refills: 0 | Status: COMPLETED | OUTPATIENT
Start: 2021-10-28 | End: 2021-10-28

## 2021-10-28 RX ORDER — PALONOSETRON HYDROCHLORIDE 0.25 MG/5ML
0.25 INJECTION, SOLUTION INTRAVENOUS ONCE
Refills: 0 | Status: COMPLETED | OUTPATIENT
Start: 2021-10-28 | End: 2021-10-28

## 2021-10-28 RX ORDER — IBUPROFEN 200 MG
400 TABLET ORAL ONCE
Refills: 0 | Status: COMPLETED | OUTPATIENT
Start: 2021-10-28 | End: 2021-10-28

## 2021-10-28 RX ORDER — DIPHENHYDRAMINE HCL 50 MG
50 CAPSULE ORAL ONCE
Refills: 0 | Status: COMPLETED | OUTPATIENT
Start: 2021-10-28 | End: 2021-10-28

## 2021-10-28 RX ORDER — RITUXIMAB 10 MG/ML
563 INJECTION, SOLUTION INTRAVENOUS ONCE
Refills: 0 | Status: COMPLETED | OUTPATIENT
Start: 2021-10-28 | End: 2021-10-28

## 2021-10-28 RX ORDER — HYDROCORTISONE 20 MG
100 TABLET ORAL ONCE
Refills: 0 | Status: DISCONTINUED | OUTPATIENT
Start: 2021-10-28 | End: 2021-10-29

## 2021-10-28 RX ORDER — DIPHENHYDRAMINE HCL 50 MG
25 CAPSULE ORAL ONCE
Refills: 0 | Status: DISCONTINUED | OUTPATIENT
Start: 2021-10-28 | End: 2021-10-29

## 2021-10-28 RX ADMIN — Medication 650 MILLIGRAM(S): at 13:58

## 2021-10-28 RX ADMIN — Medication 50 MILLIGRAM(S): at 06:17

## 2021-10-28 RX ADMIN — Medication 1 MILLIGRAM(S): at 13:03

## 2021-10-28 RX ADMIN — LIDOCAINE 1 PATCH: 4 CREAM TOPICAL at 13:01

## 2021-10-28 RX ADMIN — Medication 12.5 MILLIGRAM(S): at 17:41

## 2021-10-28 RX ADMIN — Medication 500 MILLIGRAM(S): at 19:40

## 2021-10-28 RX ADMIN — VALACYCLOVIR 500 MILLIGRAM(S): 500 TABLET, FILM COATED ORAL at 13:04

## 2021-10-28 RX ADMIN — Medication 12.5 MILLIGRAM(S): at 06:17

## 2021-10-28 RX ADMIN — Medication 3 MILLILITER(S): at 23:36

## 2021-10-28 RX ADMIN — Medication 3 MILLILITER(S): at 10:53

## 2021-10-28 RX ADMIN — Medication 650 MILLIGRAM(S): at 22:45

## 2021-10-28 RX ADMIN — SENNA PLUS 2 TABLET(S): 8.6 TABLET ORAL at 21:51

## 2021-10-28 RX ADMIN — Medication 1 TABLET(S): at 13:04

## 2021-10-28 RX ADMIN — TIOTROPIUM BROMIDE 1 CAPSULE(S): 18 CAPSULE ORAL; RESPIRATORY (INHALATION) at 13:05

## 2021-10-28 RX ADMIN — Medication 100 MILLIGRAM(S): at 21:50

## 2021-10-28 RX ADMIN — Medication 400 MILLIGRAM(S): at 23:40

## 2021-10-28 RX ADMIN — Medication 3 MILLILITER(S): at 16:00

## 2021-10-28 RX ADMIN — BUDESONIDE AND FORMOTEROL FUMARATE DIHYDRATE 2 PUFF(S): 160; 4.5 AEROSOL RESPIRATORY (INHALATION) at 13:06

## 2021-10-28 RX ADMIN — RITUXIMAB 563 MILLIGRAM(S): 10 INJECTION, SOLUTION INTRAVENOUS at 16:14

## 2021-10-28 RX ADMIN — LIDOCAINE 1 PATCH: 4 CREAM TOPICAL at 19:30

## 2021-10-28 RX ADMIN — Medication 50 MILLIGRAM(S): at 13:58

## 2021-10-28 RX ADMIN — PANTOPRAZOLE SODIUM 40 MILLIGRAM(S): 20 TABLET, DELAYED RELEASE ORAL at 06:17

## 2021-10-28 RX ADMIN — ESCITALOPRAM OXALATE 10 MILLIGRAM(S): 10 TABLET, FILM COATED ORAL at 13:05

## 2021-10-28 RX ADMIN — Medication 100 MILLIGRAM(S): at 13:04

## 2021-10-28 RX ADMIN — FAMOTIDINE 20 MILLIGRAM(S): 10 INJECTION INTRAVENOUS at 13:58

## 2021-10-28 RX ADMIN — ENOXAPARIN SODIUM 40 MILLIGRAM(S): 100 INJECTION SUBCUTANEOUS at 13:06

## 2021-10-28 RX ADMIN — Medication 650 MILLIGRAM(S): at 21:48

## 2021-10-28 RX ADMIN — PALONOSETRON HYDROCHLORIDE 0.25 MILLIGRAM(S): 0.25 INJECTION, SOLUTION INTRAVENOUS at 13:59

## 2021-10-28 RX ADMIN — BUDESONIDE AND FORMOTEROL FUMARATE DIHYDRATE 2 PUFF(S): 160; 4.5 AEROSOL RESPIRATORY (INHALATION) at 21:49

## 2021-10-28 RX ADMIN — Medication 100 MILLIGRAM(S): at 06:17

## 2021-10-28 RX ADMIN — Medication 400 MILLIGRAM(S): at 22:48

## 2021-10-28 NOTE — PROVIDER CONTACT NOTE (OTHER) - ASSESSMENT
Pt. asymptomatic, denying shortness of breath
Pt. denied chest discomfort/pain.
Pt. asymptomatic, no c/o chest pain or discomfort. Vitals stable. No change in mental status. EKG result "sinus rhythm with premature atrial complexes, otherwise normal EKG."

## 2021-10-28 NOTE — DISCHARGE NOTE PROVIDER - CARE PROVIDER_API CALL
Tito Bernal  Internal Medicine  [address] [place] [id number]  Phone: ([phone]  Fax: ([phone]  Follow Up Time:     Rossy Esparza)  Internal Medicine  [address] [facility], [place] [address]  Phone: [phone]  Fax: ()-  Follow Up Time:

## 2021-10-28 NOTE — DIETITIAN INITIAL EVALUATION ADULT. - OTHER INFO
89 year old female with PMH of lymphoma on active chemo s/p mechanical fall 12 days ago found to have right proximal humerus fracture and PNA s/p antibiotics with plan for inpatient chemo per chart.    Patient reports having poor appetite, reports finishing 50% of meals. Noted with 50% intake of breakfast and lunch per tray observations. Patient states consuming ensure clear daily, and consumed about 70% of can during course of interview with encouragement. Discussed food preferences and assisted with patients meal selection for dinner. Reports no GI distress or chewing/swallowing difficulties, but states having difficulty feeding self. Discussed with PCA about patients need for assistance to ensure PO intake. Has no food allergies. Reports UBW is about 120 lbs., but was unsure when she was last that weight. No weights noted per HIE. ABW is 112.4 lbs. (10/27) per chart indicating a -6.4% wt. loss. Noted with +1 R arm edema and no pressure injuries per RN flow sheet.    Educated patient on importance of small frequent meals and focusing on protein rich foods to meet nutritional needs. Recommended consume ensure supplement between meals to promote consistent calorie intake.

## 2021-10-28 NOTE — PROGRESS NOTE ADULT - SUBJECTIVE AND OBJECTIVE BOX
DATE OF SERVICE: 10-28-21 @ 06:46    Subjective: Patient seen and examined. No new events except as noted.     SUBJECTIVE/ROS:  resting   nad       MEDICATIONS:  MEDICATIONS  (STANDING):  albuterol/ipratropium for Nebulization 3 milliLiter(s) Nebulizer every 6 hours  benzonatate 100 milliGRAM(s) Oral every 8 hours  budesonide 160 MICROgram(s)/formoterol 4.5 MICROgram(s) Inhaler 2 Puff(s) Inhalation two times a day  enoxaparin Injectable 40 milliGRAM(s) SubCutaneous daily  escitalopram 10 milliGRAM(s) Oral daily  folic acid 1 milliGRAM(s) Oral daily  lactobacillus acidophilus 1 Tablet(s) Oral daily  lidocaine   4% Patch 1 Patch Transdermal daily  metoprolol tartrate 12.5 milliGRAM(s) Oral two times a day  pantoprazole    Tablet 40 milliGRAM(s) Oral before breakfast  predniSONE   Tablet 50 milliGRAM(s) Oral daily  senna 2 Tablet(s) Oral at bedtime  tiotropium 18 MICROgram(s) Capsule 1 Capsule(s) Inhalation daily  valACYclovir 500 milliGRAM(s) Oral daily      PHYSICAL EXAM:  T(C): 36.6 (10-28-21 @ 06:15), Max: 36.7 (10-27-21 @ 13:43)  HR: 67 (10-28-21 @ 06:15) (67 - 88)  BP: 121/72 (10-28-21 @ 06:15) (104/65 - 126/71)  RR: 18 (10-28-21 @ 06:15) (18 - 18)  SpO2: 100% (10-28-21 @ 06:15) (86% - 100%)  Wt(kg): --  I&O's Summary          JVP: Normal  Neck: supple  Lung: clear   CV: S1 S2 , Murmur:  Abd: soft  Ext: No edema  neuro: Awake / alert  Psych: flat affect  Skin: normal``    LABS/DATA:    CARDIAC MARKERS:                                9.1    9.22  )-----------( 290      ( 27 Oct 2021 07:35 )             28.2     10-27    137  |  102  |  19  ----------------------------<  102<H>  4.2   |  21<L>  |  0.88    Ca    10.0      27 Oct 2021 07:35    TPro  5.5<L>  /  Alb  3.1<L>  /  TBili  <0.2  /  DBili  x   /  AST  10  /  ALT  10  /  AlkPhos  110  10-27    proBNP:   Lipid Profile:   HgA1c:   TSH:     TELE:  EKG:

## 2021-10-28 NOTE — DISCHARGE NOTE PROVIDER - HOSPITAL COURSE
89y F PMH pAfib not on AC, lymphoma on active chemo s/p mechanical fall found to have right proximal humerus fracture and PNA. Course c/b lidia on tele    Hospital course:  Pt admitted with R humerus fracture s/p mechanical fall  Ortho following- NWB + sling, pain management, no acute intervention. Outpt F/U    Arrythmia:  10/23: Fluctuating HR (lidia- tachy), patient asymptomatic, has hx of post operative amio, pt is off Amio as per Cards, EKG no changes  Monitor for tachy-lidia , if noted will need EP cs    Respiratory failure poss 2/2 PNA  basilar opacities on xray that could be 2/2 lymphoma vs infectious process vs atelectasis  suspect multifactorial from shallow breathing from rib fracture vs atelectasis vs pna  incentive spirometry  S/p ceftriaxone, azithro for parainfluenza PNA  Bcx 10/20 neg, sputum culture normal respiratory shellie. Legionella neg    Lymphoma  house heme/onc - routine chemo given 10/28, can continue outpatient management with Dr. Esparza upon discharge    PAF  cardio Dr Madison - not a candidate for a/c given fall risk. Started Metoprolol for APCs    Elevated TSH, T4 Low  Repeat TFT outpt in 6-8 weeks    **Incomplete 89y F PMH pAfib not on AC, lymphoma on active chemo s/p mechanical fall found to have right proximal humerus fracture and PNA. Patient followed by ortho, managed with NWB + sling, pain management, no acute intervention. Patient to follow up with ortho outpatient. Course c/b lidia on tele - fluctuating HR (lidia-tachy) on 10/23, patient asymptomatic. Patient off of Amio as per Cardio recs. EKG showed no changes. Patient treated for PNA with Cefriaxone, Azithro for parainfluenza PNA. Patient received chemo on 10/28 for lymphoma. Will continue outpatient management with Dr. Esparza upon discharge. Patient has Neulasta appointment on 10/30 at 1:20pm. Patient followed by cardio for PAF, per recs, started on Metoprolol for APCs. Patient not a candidate for AC due to fall risk. Patient found to have elevated TSH and low T4 - will need repeat TFT outpatient in 6-8 weeks.            Hospital course:  Pt admitted with R humerus fracture s/p mechanical fall  Ortho following- NWB + sling, pain management, no acute intervention. Outpt F/U    Arrythmia:  10/23: Fluctuating HR (lidia- tachy), patient asymptomatic, has hx of post operative amio, pt is off Amio as per Cards, EKG no changes  Monitor for tachy-lidia , if noted will need EP cs    Respiratory failure poss 2/2 PNA  basilar opacities on xray that could be 2/2 lymphoma vs infectious process vs atelectasis  suspect multifactorial from shallow breathing from rib fracture vs atelectasis vs pna  incentive spirometry  S/p ceftriaxone, azithro for parainfluenza PNA  Bcx 10/20 neg, sputum culture normal respiratory shellie. Legionella neg    Lymphoma  house heme/onc - routine chemo given 10/28, can continue outpatient management with Dr. Esparza upon discharge    PAF  cardio Dr Madison - not a candidate for a/c given fall risk. Started Metoprolol for APCs    Elevated TSH, T4 Low  Repeat TFT outpt in 6-8 weeks    **Incomplete 89y F PMH pAfib not on AC, lymphoma on active chemo s/p mechanical fall found to have right proximal humerus fracture and PNA. Patient followed by ortho, managed with NWB + sling, pain management, no acute intervention. Patient to follow up with ortho outpatient. Course c/b ?? lidia on tele - fluctuating HR (lidia-tachy) on 10/23, patient asymptomatic. Pt was transferred to tele to monitor for tachy-lidia syndrome and this was never found on tele floor. Patient off of Amio as per Cardio recs. EKG showed no changes. Patient treated for PNA with Cefriaxone, Azithro for parainfluenza PNA. Patient was being seen by heme/onc and received chemo on 10/28 for lymphoma. Pt instructed to take 2 more  days of Prednisone 50mg to complete 5 day course. Will continue outpatient management with Dr. Esparza upon discharge. Patient has Neulasta appointment on 10/30 at 1:20pm. Patient followed by cardio for PAF, per recs, started on Metoprolol for APCs. Patient not a candidate for AC due to fall risk. Patient found to have elevated TSH and low T4 - will need repeat TFT outpatient in 6-8 weeks.  Pt remained dependent on O2 due to history of COPD therefore Home O2 was set up by [facility].    Case discussed with Dr. Simpson and Heme/onc on 10/29, pt medically stable for discharge home. Home care home O2 was set up by [facility]. 89y F PMH pAfib not on AC, lymphoma on active chemo s/p mechanical fall found to have right proximal humerus fracture and PNA. Patient followed by ortho, managed with NWB + sling, pain management, no acute intervention. Patient to follow up with ortho outpatient. Course c/b ?? lidia on tele - fluctuating HR (lidia-tachy) on 10/23, patient asymptomatic. Pt was transferred to tele to monitor for tachy-lidia syndrome and this was never found on tele floor. Patient off of Amio as per Cardio recs. EKG showed no changes. Patient treated for PNA with Cefriaxone, Azithro for parainfluenza PNA. Patient was being seen by heme/onc and received chemo on 10/28 for lymphoma. Pt instructed to take 2 more  days of Prednisone 50mg to complete 5 day course. Will continue outpatient management with Dr. Esparza upon discharge. Patient has Neulasta appointment on 10/30 at 1:20pm. Patient followed by cardio for PAF, per recs, started on Metoprolol for APCs. Patient not a candidate for AC due to fall risk. Patient found to have elevated TSH and low T4 - will need repeat TFT outpatient in 6-8 weeks.  Pt remained dependent on O2 due to history of COPD therefore Home O2 was set up by [facility]..    Case discussed with Dr. Simpson and Heme/onc on 10/29, pt medically stable for discharge home. Home care home O2 was set up by [facility].

## 2021-10-28 NOTE — PROGRESS NOTE ADULT - SUBJECTIVE AND OBJECTIVE BOX
Patient is a 89y old  Female who presents with a chief complaint of Fracture (28 Oct 2021 06:46)      SUBJECTIVE / OVERNIGHT EVENTS:    Review of Systems:     MEDICATIONS  (STANDING):  albuterol/ipratropium for Nebulization 3 milliLiter(s) Nebulizer every 6 hours  benzonatate 100 milliGRAM(s) Oral every 8 hours  budesonide 160 MICROgram(s)/formoterol 4.5 MICROgram(s) Inhaler 2 Puff(s) Inhalation two times a day  enoxaparin Injectable 40 milliGRAM(s) SubCutaneous daily  escitalopram 10 milliGRAM(s) Oral daily  folic acid 1 milliGRAM(s) Oral daily  lactobacillus acidophilus 1 Tablet(s) Oral daily  lidocaine   4% Patch 1 Patch Transdermal daily  metoprolol tartrate 12.5 milliGRAM(s) Oral two times a day  pantoprazole    Tablet 40 milliGRAM(s) Oral before breakfast  predniSONE   Tablet 50 milliGRAM(s) Oral daily  senna 2 Tablet(s) Oral at bedtime  tiotropium 18 MICROgram(s) Capsule 1 Capsule(s) Inhalation daily  valACYclovir 500 milliGRAM(s) Oral daily    MEDICATIONS  (PRN):  acetaminophen   Tablet .. 650 milliGRAM(s) Oral every 6 hours PRN Temp greater or equal to 38C (100.4F), Mild Pain (1 - 3)  guaiFENesin Oral Liquid (Sugar-Free) 100 milliGRAM(s) Oral every 6 hours PRN Cough  melatonin 3 milliGRAM(s) Oral at bedtime PRN Insomnia      PHYSICAL EXAM:    I&O's Summary    ICU Vital Signs Last 24 Hrs  T(C): 36.6 (28 Oct 2021 06:15), Max: 36.7 (27 Oct 2021 13:43)  T(F): 97.8 (28 Oct 2021 06:15), Max: 98 (27 Oct 2021 13:43)  HR: 67 (28 Oct 2021 06:15) (67 - 88)  BP: 121/72 (28 Oct 2021 06:15) (104/65 - 126/71)  BP(mean): --  ABP: --  ABP(mean): --  RR: 18 (28 Oct 2021 06:15) (18 - 18)  SpO2: 100% (28 Oct 2021 06:15) (86% - 100%)      LABS:  CAPILLARY BLOOD GLUCOSE                              9.5    8.36  )-----------( 308      ( 28 Oct 2021 07:21 )             30.2     10-28    136  |  103  |  18  ----------------------------<  143<H>  4.9   |  22  |  0.73    Ca    9.8      28 Oct 2021 07:21    TPro  5.5<L>  /  Alb  3.1<L>  /  TBili  <0.2  /  DBili  x   /  AST  10  /  ALT  10  /  AlkPhos  110  10-27              RADIOLOGY & ADDITIONAL TESTS:    Imaging Personally Reviewed:    Consultant(s) Notes Reviewed:      Care Discussed with Consultants/Other Providers: Patient is a 89y old  Female who presents with a chief complaint of Fracture (28 Oct 2021 06:46)      SUBJECTIVE / OVERNIGHT EVENTS: Denies CP, SOB, n.v. She states that her cough has improved.     Review of Systems:     MEDICATIONS  (STANDING):  albuterol/ipratropium for Nebulization 3 milliLiter(s) Nebulizer every 6 hours  benzonatate 100 milliGRAM(s) Oral every 8 hours  budesonide 160 MICROgram(s)/formoterol 4.5 MICROgram(s) Inhaler 2 Puff(s) Inhalation two times a day  enoxaparin Injectable 40 milliGRAM(s) SubCutaneous daily  escitalopram 10 milliGRAM(s) Oral daily  folic acid 1 milliGRAM(s) Oral daily  lactobacillus acidophilus 1 Tablet(s) Oral daily  lidocaine   4% Patch 1 Patch Transdermal daily  metoprolol tartrate 12.5 milliGRAM(s) Oral two times a day  pantoprazole    Tablet 40 milliGRAM(s) Oral before breakfast  predniSONE   Tablet 50 milliGRAM(s) Oral daily  senna 2 Tablet(s) Oral at bedtime  tiotropium 18 MICROgram(s) Capsule 1 Capsule(s) Inhalation daily  valACYclovir 500 milliGRAM(s) Oral daily    MEDICATIONS  (PRN):  acetaminophen   Tablet .. 650 milliGRAM(s) Oral every 6 hours PRN Temp greater or equal to 38C (100.4F), Mild Pain (1 - 3)  guaiFENesin Oral Liquid (Sugar-Free) 100 milliGRAM(s) Oral every 6 hours PRN Cough  melatonin 3 milliGRAM(s) Oral at bedtime PRN Insomnia      PHYSICAL EXAM:    I&O's Summary    ICU Vital Signs Last 24 Hrs  T(C): 36.6 (28 Oct 2021 06:15), Max: 36.7 (27 Oct 2021 13:43)  T(F): 97.8 (28 Oct 2021 06:15), Max: 98 (27 Oct 2021 13:43)  HR: 67 (28 Oct 2021 06:15) (67 - 88)  BP: 121/72 (28 Oct 2021 06:15) (104/65 - 126/71)  BP(mean): --  ABP: --  ABP(mean): --  RR: 18 (28 Oct 2021 06:15) (18 - 18)  SpO2: 100% (28 Oct 2021 06:15) (86% - 100%)    Constitutional:  NAD  Head: NC/AT  Eyes: EOMI, clear conjunctiva  Neck: supple;   Respiratory: CTA b/l  Cardiac: +S1/S2; RRR;   Gastrointestinal: soft, NT/ND; no rebound or guarding;   Extremities: UE in sling  Neurologic:  no focal deficits, moves all extremities   Psychiatric: calm    LABS:  CAPILLARY BLOOD GLUCOSE                              9.5    8.36  )-----------( 308      ( 28 Oct 2021 07:21 )             30.2     10-28    136  |  103  |  18  ----------------------------<  143<H>  4.9   |  22  |  0.73    Ca    9.8      28 Oct 2021 07:21    TPro  5.5<L>  /  Alb  3.1<L>  /  TBili  <0.2  /  DBili  x   /  AST  10  /  ALT  10  /  AlkPhos  110  10-27              RADIOLOGY & ADDITIONAL TESTS:    Imaging Personally Reviewed:    Consultant(s) Notes Reviewed:      Care Discussed with Consultants/Other Providers:

## 2021-10-28 NOTE — DISCHARGE NOTE PROVIDER - DETAILS OF MALNUTRITION DIAGNOSIS/DIAGNOSES
This patient has been assessed with a concern for Malnutrition and was treated during this hospitalization for the following Nutrition diagnosis/diagnoses:     -  10/28/2021: Moderate protein-calorie malnutrition

## 2021-10-28 NOTE — DIETITIAN INITIAL EVALUATION ADULT. - PROBLEM SELECTOR PLAN 1
-patient s/p fall 12 days ago, mechanical in nature  -right proximal humerus fracture  -NWB RUE  -c/w use of sling  -seen by ortho and no intervention planned  -outpt follow up with ortho  -pain control with tylenol  -bowel regimen  -PT/OT

## 2021-10-28 NOTE — DIETITIAN INITIAL EVALUATION ADULT. - PERTINENT MEDS FT
MEDICATIONS  (STANDING):  acetaminophen     Tablet .. 650 milliGRAM(s) Oral once  albuterol/ipratropium for Nebulization 3 milliLiter(s) Nebulizer every 6 hours  benzonatate 100 milliGRAM(s) Oral every 8 hours  budesonide 160 MICROgram(s)/formoterol 4.5 MICROgram(s) Inhaler 2 Puff(s) Inhalation two times a day  cyclophosphamide IVPB (eMAR) 750 milliGRAM(s) IV Intermittent once  enoxaparin Injectable 40 milliGRAM(s) SubCutaneous daily  escitalopram 10 milliGRAM(s) Oral daily  folic acid 1 milliGRAM(s) Oral daily  lactobacillus acidophilus 1 Tablet(s) Oral daily  lidocaine   4% Patch 1 Patch Transdermal daily  metoprolol tartrate 12.5 milliGRAM(s) Oral two times a day  pantoprazole    Tablet 40 milliGRAM(s) Oral before breakfast  predniSONE   Tablet 50 milliGRAM(s) Oral daily  riTUXimab-pvvr (RUXIENCE) IVPB (eMAR) 563 milliGRAM(s) IV Intermittent once  senna 2 Tablet(s) Oral at bedtime  tiotropium 18 MICROgram(s) Capsule 1 Capsule(s) Inhalation daily  valACYclovir 500 milliGRAM(s) Oral daily

## 2021-10-28 NOTE — DIETITIAN INITIAL EVALUATION ADULT. - PROBLEM SELECTOR PLAN 4
-patient with basilar opacities on xray that could be 2/2 lymphoma vs infectious process vs atelectasis  -patient reports intermittent cough but no fever.  -leukocytosis present but could be reactive to stress from fracture  -patient is at high risk for PNA given rib fracture, malignancy, old age etc  -currently hemodynamic status stable but requiring supplemental 02  -lower suspicion for resistant organisms. Will treat as CAP with ceft/azithro. Can transition to levaquin on DC if remains clinically well  -f/u blood cx, urine legionella nad MRSA swab  -incentive spirometry

## 2021-10-28 NOTE — PROGRESS NOTE ADULT - ASSESSMENT
This is a 88 y/o F with PMH of lymphoma on active chemo (last 2 weeks ago) presents s/p fall 12 days ago found to have an acute fracture of the right proximal humerus on XR currently in RUE sling.     #Diffuse Large B-Cell Lymphoma  - Admitted to [facility] in July for worsening cough & lethargy, PET revealed b/l upper lobe opacities concerning for malignancy with pleural effusion, underwent R robotic VATS/pleural bx with placement of PleurX catheter at that time  - Pathology revealed non-GCB DLBCL  - Received cycle 1 of palliative chemo per choice of patient & family inpatient with prednisone, cyclophosphamide, rituximab  - PleurX removed with improvement of symptoms, no longer required supplemental O2  - D/c'ed to rehab, followed in clinic by Dr. Rossy Esparza  - Plan to continue RC & prednisone q3 weeks  -Plan for Rituxan 375mg/m2, Cytoxan 500mg/m2 and Prednisone 1mg/kg x 5 days (will start Prednisone 50mg daily today, for 5 days). Please ensure pt has prescription for remainder of Prednisone doses sent to her pharmacy upon discharge.   - Neulasta appointment on 10/30 13:20, please inform patient on discharge paperwork       #R proximal humerus fracture, currently in sling  - Mechanical in nature  - Pain control per primary team    Please page with questions or concerns. Will Follow with you.      Nas Villanueva M.D.  Hematology/Oncology Fellow PGY4  Pager [phone]  After 5pm, please contact on-call team.

## 2021-10-28 NOTE — CHART NOTE - NSCHARTNOTEFT_GEN_A_CORE
Due to a diagnosis of COPD, J44.9, and O2 sats of:  91% on RA at rest  92% on O2 at exercise  84% on RA at exercise  Taken when pt in a chronic stable state, pt requires home O2. Pt needs home filled stationary unit with conserving device and portable systems at 2LPM via NC.   SHARIFA: 99 months, 24 hours/day continuous

## 2021-10-28 NOTE — DIETITIAN INITIAL EVALUATION ADULT. - ORAL INTAKE PTA/DIET HISTORY
Patient agitated during time of interview, but became more receptive as assessment went on. Patient reports decreased appetite/PO intake for a few months, but didn't provide cause of it. Didn't report following a specialized diet at home.

## 2021-10-28 NOTE — DIETITIAN INITIAL EVALUATION ADULT. - PROBLEM SELECTOR PLAN 5
-c/w outpt management. Patient of Dr. Esparza  -house heme/onc notified  -due for outpt PET scan on 10/23  -will discuss with heme if they want further inpatient imaging  - c/w valacyclovir for ppx - discuss with heme if patient needs this

## 2021-10-28 NOTE — PROVIDER CONTACT NOTE (OTHER) - SITUATION
Pt. heart rate fluctuating from 20s to 150s as seen on 
desat to 87% on room air
Patient's HR fluctuating from 20s-150s on

## 2021-10-28 NOTE — DISCHARGE NOTE PROVIDER - NSDCMRMEDTOKEN_GEN_ALL_CORE_FT
amiodarone 200 mg oral tablet: 1 tab(s) orally once a day  folic acid 1 mg oral tablet: 1 tab(s) orally once a day  Lexapro 10 mg oral tablet: 1 tab(s) orally once a day  omeprazole 20 mg oral delayed release tablet: 1 tab(s) orally once a day  Trelegy Ellipta 200 mcg-62.5 mcg-25 mcg/inh inhalation powder: 1 puff(s) inhaled once a day  valACYclovir 500 mg oral tablet: 1 tab(s) orally once a day   amiodarone 200 mg oral tablet: 1 tab(s) orally once a day  folic acid 1 mg oral tablet: 1 tab(s) orally once a day  Lexapro 10 mg oral tablet: 1 tab(s) orally once a day  omeprazole 20 mg oral delayed release tablet: 1 tab(s) orally once a day  predniSONE 50 mg oral tablet: 1 tab(s) orally once a day  Trelegy Ellipta 200 mcg-62.5 mcg-25 mcg/inh inhalation powder: 1 puff(s) inhaled once a day  valACYclovir 500 mg oral tablet: 1 tab(s) orally once a day   folic acid 1 mg oral tablet: 1 tab(s) orally once a day  Lexapro 10 mg oral tablet: 1 tab(s) orally once a day  metoprolol tartrate 25 mg oral tablet: 0.5 tab(s) orally 2 times a day   omeprazole 20 mg oral delayed release tablet: 1 tab(s) orally once a day  predniSONE 50 mg oral tablet: 1 tab(s) orally once a day, Stop after 10/31 dose  senna oral tablet: 2 tab(s) orally once a day (at bedtime)  Trelegy Ellipta 200 mcg-62.5 mcg-25 mcg/inh inhalation powder: 1 puff(s) inhaled once a day  valACYclovir 500 mg oral tablet: 1 tab(s) orally once a day

## 2021-10-28 NOTE — DIETITIAN NUTRITION RISK NOTIFICATION - TREATMENT: THE FOLLOWING DIET HAS BEEN RECOMMENDED
Diet, Regular:   Supplement Feeding Modality:  Oral  Ensure Clear Cans or Servings Per Day:  1       Frequency:  Two Times a day (10-22-21 @ 18:24) [Active]

## 2021-10-28 NOTE — DIETITIAN INITIAL EVALUATION ADULT. - PROBLEM SELECTOR PLAN 6
-patient on amio at home. Per daughter she was placed on amion at rehab  -prior records reviewed. Appears like patient was on amio for PAF and was discharged off of amio  -currently EKG NSR.  -Was seen by Dr. Madison last admission. Will have him consult to see if patient still needs amio.

## 2021-10-28 NOTE — DIETITIAN INITIAL EVALUATION ADULT. - ADD RECOMMEND
Continue diet as ordered: Regular. Provide assistance with meals to ensure PO intake. Continue folic acid supplementation. Obtain weekly weight and document PO intake to monitor trend.

## 2021-10-28 NOTE — DISCHARGE NOTE PROVIDER - NSDCFUADDAPPT_GEN_ALL_CORE_FT
You need to follow up with Orthopedics outpatient.  You need to continue outpatient management with Dr. Esparza upon discharge. Follow up appointment made for 10/30 at 1:20pm.  Follow up with your Cardiologist.

## 2021-10-28 NOTE — DIETITIAN INITIAL EVALUATION ADULT. - REASON
Deferred at this time, but noted with overt signs of mild muscle loss in temporal region per observation

## 2021-10-28 NOTE — DIETITIAN INITIAL EVALUATION ADULT. - PROBLEM SELECTOR PLAN 3
-patient 86% on RA, improves to 96 on 2 L  -suspect multifactorial from shallow breathing from rib fracture vs atelectasis vs pna vs lymphoma?  -incentive spirometry  -treat PNA  -last admission in august patient also required 02 but at that time had pleurex for pleural effusion. Pleurex has since been removed. Patient at home now does not use 02. NO pleural effusion on CXR now.   -monitor clinically

## 2021-10-28 NOTE — PROGRESS NOTE ADULT - SUBJECTIVE AND OBJECTIVE BOX
Hematology Follow-up    INTERVAL HPI/OVERNIGHT EVENTS:  Patient S&E at bedside. No o/n events, patient resting comfortably. No complaints at this time. Patient denies fever, chills, dizziness, weakness, CP, palpitations, SOB, N/V/D/C, dysuria, changes in bowel movements, LE edema. Pt mentions cough improved.    VITAL SIGNS:  T(F): 97.8 (10-28-21 @ 06:15)  HR: 67 (10-28-21 @ 06:15)  BP: 121/72 (10-28-21 @ 06:15)  RR: 18 (10-28-21 @ 10:26)  SpO2: 92% (10-28-21 @ 10:26)    PHYSICAL EXAM:    Constitutional: AAOx3, NAD,   Eyes: PERRL, EOMI, sclera non-icteric  Neck: supple, no masses, no JVD  Respiratory: CTA b/l, good air entry b/l, no wheezing, rhonchi, rales, with normal respiratory effort and no intercostal retractions  Cardiovascular: RRR, normal S1S2, no M/R/G  Gastrointestinal: soft, NTND, no masses palpable, BS normal in all four quadrants, no HSM  Extremities:  no c/c/e  Neurological: Grossly intact  Skin: Normal temperature    MEDICATIONS  (STANDING):  acetaminophen     Tablet .. 650 milliGRAM(s) Oral once  albuterol/ipratropium for Nebulization 3 milliLiter(s) Nebulizer every 6 hours  benzonatate 100 milliGRAM(s) Oral every 8 hours  budesonide 160 MICROgram(s)/formoterol 4.5 MICROgram(s) Inhaler 2 Puff(s) Inhalation two times a day  cyclophosphamide IVPB (eMAR) 750 milliGRAM(s) IV Intermittent once  diphenhydrAMINE 50 milliGRAM(s) Oral once  enoxaparin Injectable 40 milliGRAM(s) SubCutaneous daily  escitalopram 10 milliGRAM(s) Oral daily  famotidine Injectable 20 milliGRAM(s) IV Push once  folic acid 1 milliGRAM(s) Oral daily  hydrocortisone sodium succinate Injectable 50 milliGRAM(s) IV Push once  lactobacillus acidophilus 1 Tablet(s) Oral daily  lidocaine   4% Patch 1 Patch Transdermal daily  metoprolol tartrate 12.5 milliGRAM(s) Oral two times a day  palonosetron Injectable 0.25 milliGRAM(s) IV Push once  pantoprazole    Tablet 40 milliGRAM(s) Oral before breakfast  predniSONE   Tablet 50 milliGRAM(s) Oral daily  riTUXimab-pvvr (RUXIENCE) IVPB (eMAR) 563 milliGRAM(s) IV Intermittent once  senna 2 Tablet(s) Oral at bedtime  tiotropium 18 MICROgram(s) Capsule 1 Capsule(s) Inhalation daily  valACYclovir 500 milliGRAM(s) Oral daily    MEDICATIONS  (PRN):  acetaminophen   Tablet .. 650 milliGRAM(s) Oral every 6 hours PRN Temp greater or equal to 38C (100.4F), Mild Pain (1 - 3)  diphenhydrAMINE Injectable 25 milliGRAM(s) IV Push once PRN PRN Chemotherapy Reaction  guaiFENesin Oral Liquid (Sugar-Free) 100 milliGRAM(s) Oral every 6 hours PRN Cough  hydrocortisone sodium succinate Injectable 100 milliGRAM(s) IV Push once PRN PRN Chemotherapy Reaction  melatonin 3 milliGRAM(s) Oral at bedtime PRN Insomnia  meperidine     Injectable 25 milliGRAM(s) IV Push once PRN PRN Chemotherapy Reaction (Rigors)      No Known Allergies      LABS:                        9.5    8.36  )-----------( 308      ( 28 Oct 2021 07:21 )             30.2     10-28    136  |  103  |  18  ----------------------------<  143<H>  4.9   |  22  |  0.73    Ca    9.8      28 Oct 2021 07:21    TPro  5.5<L>  /  Alb  3.1<L>  /  TBili  <0.2  /  DBili  x   /  AST  10  /  ALT  10  /  AlkPhos  110  10-27           RADIOLOGY & ADDITIONAL TESTS:  Studies reviewed.

## 2021-10-28 NOTE — DISCHARGE NOTE PROVIDER - CARE PROVIDERS DIRECT ADDRESSES
[email or web address]

## 2021-10-28 NOTE — DISCHARGE NOTE PROVIDER - NSDCCPCAREPLAN_GEN_ALL_CORE_FT
PRINCIPAL DISCHARGE DIAGNOSIS  Diagnosis: Humeral fracture  Assessment and Plan of Treatment: You had a fall which led do you fracturing (breaking) your proximal humerus (wrist). The Orthopedics team was following your case and decided not to perform surgery and instead treated you with pain management and a sling. You need to follow up with Orthopedics outpatient.      SECONDARY DISCHARGE DIAGNOSES  Diagnosis: Lymphoma  Assessment and Plan of Treatment: You received chemotherapy on 10/28. You need to continue outpatient management with Dr. Esparza upon discharge.    Diagnosis: Pneumonia  Assessment and Plan of Treatment: You were treated for pneumonia while here with Cetriaxone and Azithromycin (antibiotics).    Diagnosis: Humeral fracture  Assessment and Plan of Treatment:      PRINCIPAL DISCHARGE DIAGNOSIS  Diagnosis: Humeral fracture  Assessment and Plan of Treatment: You had a fall which led do you fracturing (breaking) your proximal humerus (wrist). The Orthopedics team was following your case and decided not to perform surgery and instead treated you with pain management and a sling. You need to follow up with Orthopedics outpatient.      SECONDARY DISCHARGE DIAGNOSES  Diagnosis: Lymphoma  Assessment and Plan of Treatment: You received chemotherapy on 10/28. Please continue to take Prednisone 50mg for 2 more days (10/30 and 10/31) to complete a total of 5 day course. You need to continue outpatient management with Dr. Esparza upon discharge. Follow up appointment made for 10/30 at 1:20pm.    Diagnosis: Inappropriate sinus node tachycardia  Assessment and Plan of Treatment: You were monitored on telemetry and found to have premature atrial complexes therefore you were started on Metoprolol to control your heart rate. Continue to take Metoprolol at home. Do not take Amiodarone anymore. Follow up with your Cardiologist.    Diagnosis: Pneumonia  Assessment and Plan of Treatment: You were treated for pneumonia while here with Cetriaxone and Azithromycin (antibiotics). You do not need anymore antibiotics upon discharge. Follow up with your PCP.    Diagnosis: Acute respiratory failure with hypoxia  Assessment and Plan of Treatment: Your oxygen level drops when you ambulate and therefore you require home oxygen upon discharge. Follow up with your PCP.    Diagnosis: Elevated TSH  Assessment and Plan of Treatment: Your thyroid level was abnormal which may correct on its own. However you will need this rechecked with your PCP in 6-8 weeks.     PRINCIPAL DISCHARGE DIAGNOSIS  Diagnosis: Humeral fracture  Assessment and Plan of Treatment: You had a fall which led do you fracturing (breaking) your proximal humerus. The Orthopedics team was following your case and decided not to perform surgery and instead treated you with pain management and a sling. You need to follow up with Orthopedics outpatient.      SECONDARY DISCHARGE DIAGNOSES  Diagnosis: Lymphoma  Assessment and Plan of Treatment: You received chemotherapy on 10/28. Please continue to take Prednisone 50mg for 2 more days (10/30 and 10/31) to complete a total of 5 day course. You need to continue outpatient management with Dr. Esparza upon discharge. Follow up appointment made for 10/30 at 1:20pm.    Diagnosis: Pneumonia  Assessment and Plan of Treatment: You were treated for pneumonia while here with Cetriaxone and Azithromycin (antibiotics). You do not need anymore antibiotics upon discharge. Follow up with your PCP.    Diagnosis: Acute respiratory failure with hypoxia  Assessment and Plan of Treatment: Your oxygen level drops when you ambulate and therefore you require home oxygen upon discharge. Follow up with your PCP.    Diagnosis: Inappropriate sinus node tachycardia  Assessment and Plan of Treatment: You were monitored on telemetry and found to have premature atrial complexes therefore you were started on Metoprolol to control your heart rate. Continue to take Metoprolol at home. Do not take Amiodarone anymore. Follow up with your Cardiologist.    Diagnosis: Elevated TSH  Assessment and Plan of Treatment: Your thyroid level was abnormal which may correct on its own. However you will need this rechecked with your PCP in 6-8 weeks.

## 2021-10-28 NOTE — DISCHARGE NOTE PROVIDER - NSFOLLOWUPCLINICS_GEN_ALL_ED_FT
[facility]  Endocrinology  [address] [place] [address]  Phone: [phone]  Fax:     [facility] [place]  Orthopedics  .  [place]   Phone: [phone]  Fax:

## 2021-10-28 NOTE — PROVIDER CONTACT NOTE (OTHER) - ACTION/TREATMENT ORDERED:
EKG ordered and call back with results
Provider ordered to continue to monitor, cardiology consult possible
2L nasal cannula, pt going home with home oxygen

## 2021-10-29 ENCOUNTER — TRANSCRIPTION ENCOUNTER (OUTPATIENT)
Age: 86
End: 2021-10-29

## 2021-10-29 VITALS
TEMPERATURE: 98 F | HEART RATE: 88 BPM | OXYGEN SATURATION: 100 % | DIASTOLIC BLOOD PRESSURE: 70 MMHG | RESPIRATION RATE: 17 BRPM | SYSTOLIC BLOOD PRESSURE: 119 MMHG

## 2021-10-29 DIAGNOSIS — S42.309A UNSPECIFIED FRACTURE OF SHAFT OF HUMERUS, UNSPECIFIED ARM, INITIAL ENCOUNTER FOR CLOSED FRACTURE: ICD-10-CM

## 2021-10-29 LAB
ALBUMIN SERPL ELPH-MCNC: 3.4 G/DL — SIGNIFICANT CHANGE UP (ref 3.3–5)
ALP SERPL-CCNC: 111 U/L — SIGNIFICANT CHANGE UP (ref 40–120)
ALT FLD-CCNC: 11 U/L — SIGNIFICANT CHANGE UP (ref 4–33)
ANION GAP SERPL CALC-SCNC: 12 MMOL/L — SIGNIFICANT CHANGE UP (ref 7–14)
AST SERPL-CCNC: 10 U/L — SIGNIFICANT CHANGE UP (ref 4–32)
BILIRUB SERPL-MCNC: <0.2 MG/DL — SIGNIFICANT CHANGE UP (ref 0.2–1.2)
BUN SERPL-MCNC: 33 MG/DL — HIGH (ref 7–23)
CALCIUM SERPL-MCNC: 10.3 MG/DL — SIGNIFICANT CHANGE UP (ref 8.4–10.5)
CHLORIDE SERPL-SCNC: 101 MMOL/L — SIGNIFICANT CHANGE UP (ref 98–107)
CO2 SERPL-SCNC: 22 MMOL/L — SIGNIFICANT CHANGE UP (ref 22–31)
CREAT SERPL-MCNC: 1.09 MG/DL — SIGNIFICANT CHANGE UP (ref 0.5–1.3)
GLUCOSE SERPL-MCNC: 91 MG/DL — SIGNIFICANT CHANGE UP (ref 70–99)
HCT VFR BLD CALC: 27.2 % — LOW (ref 34.5–45)
HGB BLD-MCNC: 8.4 G/DL — LOW (ref 11.5–15.5)
LDH PNL SERPL: 181 IU/L — SIGNIFICANT CHANGE UP (ref 119–226)
LDH1 SERPL-CCNC: 26 % — SIGNIFICANT CHANGE UP (ref 17–32)
LDH3 SERPL-CCNC: 22 % — SIGNIFICANT CHANGE UP (ref 17–27)
LDH3 SERPL-CCNC: 38 % — SIGNIFICANT CHANGE UP (ref 25–40)
LDH4 SERPL-CCNC: 8 % — SIGNIFICANT CHANGE UP (ref 5–13)
LDH5 SERPL-CCNC: 6 % — SIGNIFICANT CHANGE UP (ref 4–20)
MAGNESIUM SERPL-MCNC: 2 MG/DL — SIGNIFICANT CHANGE UP (ref 1.6–2.6)
MCHC RBC-ENTMCNC: 28.3 PG — SIGNIFICANT CHANGE UP (ref 27–34)
MCHC RBC-ENTMCNC: 30.9 GM/DL — LOW (ref 32–36)
MCV RBC AUTO: 91.6 FL — SIGNIFICANT CHANGE UP (ref 80–100)
NRBC # BLD: 0 /100 WBCS — SIGNIFICANT CHANGE UP
NRBC # FLD: 0 K/UL — SIGNIFICANT CHANGE UP
PHOSPHATE SERPL-MCNC: 3.6 MG/DL — SIGNIFICANT CHANGE UP (ref 2.5–4.5)
PLATELET # BLD AUTO: 314 K/UL — SIGNIFICANT CHANGE UP (ref 150–400)
POTASSIUM SERPL-MCNC: 4.3 MMOL/L — SIGNIFICANT CHANGE UP (ref 3.5–5.3)
POTASSIUM SERPL-SCNC: 4.3 MMOL/L — SIGNIFICANT CHANGE UP (ref 3.5–5.3)
PROT SERPL-MCNC: 5.6 G/DL — LOW (ref 6–8.3)
RBC # BLD: 2.97 M/UL — LOW (ref 3.8–5.2)
RBC # FLD: 19 % — HIGH (ref 10.3–14.5)
SODIUM SERPL-SCNC: 135 MMOL/L — SIGNIFICANT CHANGE UP (ref 135–145)
WBC # BLD: 11.6 K/UL — HIGH (ref 3.8–10.5)
WBC # FLD AUTO: 11.6 K/UL — HIGH (ref 3.8–10.5)

## 2021-10-29 PROCEDURE — 99233 SBSQ HOSP IP/OBS HIGH 50: CPT | Mod: GC

## 2021-10-29 PROCEDURE — 99239 HOSP IP/OBS DSCHRG MGMT >30: CPT

## 2021-10-29 RX ORDER — SENNA PLUS 8.6 MG/1
2 TABLET ORAL
Qty: 0 | Refills: 0 | DISCHARGE
Start: 2021-10-29

## 2021-10-29 RX ORDER — METOPROLOL TARTRATE 50 MG
0.5 TABLET ORAL
Qty: 30 | Refills: 0
Start: 2021-10-29 | End: 2021-11-27

## 2021-10-29 RX ADMIN — Medication 3 MILLILITER(S): at 03:47

## 2021-10-29 RX ADMIN — Medication 3 MILLILITER(S): at 15:24

## 2021-10-29 RX ADMIN — Medication 3 MILLILITER(S): at 10:03

## 2021-10-29 RX ADMIN — Medication 12.5 MILLIGRAM(S): at 17:15

## 2021-10-29 RX ADMIN — LIDOCAINE 1 PATCH: 4 CREAM TOPICAL at 17:35

## 2021-10-29 RX ADMIN — Medication 100 MILLIGRAM(S): at 14:48

## 2021-10-29 RX ADMIN — ESCITALOPRAM OXALATE 10 MILLIGRAM(S): 10 TABLET, FILM COATED ORAL at 11:26

## 2021-10-29 RX ADMIN — LIDOCAINE 1 PATCH: 4 CREAM TOPICAL at 11:23

## 2021-10-29 RX ADMIN — Medication 50 MILLIGRAM(S): at 05:43

## 2021-10-29 RX ADMIN — Medication 12.5 MILLIGRAM(S): at 05:44

## 2021-10-29 RX ADMIN — TIOTROPIUM BROMIDE 1 CAPSULE(S): 18 CAPSULE ORAL; RESPIRATORY (INHALATION) at 09:06

## 2021-10-29 RX ADMIN — ENOXAPARIN SODIUM 40 MILLIGRAM(S): 100 INJECTION SUBCUTANEOUS at 11:25

## 2021-10-29 RX ADMIN — Medication 1 TABLET(S): at 11:25

## 2021-10-29 RX ADMIN — Medication 1 MILLIGRAM(S): at 11:25

## 2021-10-29 RX ADMIN — PANTOPRAZOLE SODIUM 40 MILLIGRAM(S): 20 TABLET, DELAYED RELEASE ORAL at 05:43

## 2021-10-29 RX ADMIN — BUDESONIDE AND FORMOTEROL FUMARATE DIHYDRATE 2 PUFF(S): 160; 4.5 AEROSOL RESPIRATORY (INHALATION) at 09:04

## 2021-10-29 RX ADMIN — VALACYCLOVIR 500 MILLIGRAM(S): 500 TABLET, FILM COATED ORAL at 11:25

## 2021-10-29 RX ADMIN — Medication 100 MILLIGRAM(S): at 05:43

## 2021-10-29 RX ADMIN — LIDOCAINE 1 PATCH: 4 CREAM TOPICAL at 01:00

## 2021-10-29 NOTE — PROGRESS NOTE ADULT - PROBLEM SELECTOR PLAN 2
-patient s/p fall 12 days, mechanical in nature  -acute rib fracture also noted on imaging at urgent care  -not seen on xrays here, will not pursue further imaging as does not   -supportive care with pain control with tylenol and lidocaine patch  -incentive spirometry
2/2 fall,  mechanical in nature. Right proximal humerus fracture    -NWB RUE  -c/w use of sling  -seen by ortho and no intervention planned  -outpt follow up with ortho  -pain control with tylenol  -bowel regimen
2/2 fall,  mechanical in nature. Right proximal humerus fracture    -NWB RUE  -c/w use of sling  -seen by ortho and no intervention planned  -outpt follow up with ortho  -pain control with Tylenol  -bowel regimen
-patient s/p fall 12 days ago, mechanical in nature  -right proximal humerus fracture  -NWB RUE  -c/w use of sling  -seen by ortho and no intervention planned  -outpt follow up with ortho  -pain control with tylenol  -bowel regimen  -PT/OT
2/2 fall,  mechanical in nature. Right proximal humerus fracture    -NWB RUE  -c/w use of sling  -seen by ortho and no intervention planned  -outpt follow up with ortho  -pain control with tylenol  -bowel regimen
2/2 fall,  mechanical in nature. Right proximal humerus fracture    -NWB RUE  -c/w use of sling  -seen by ortho and no intervention planned  -outpt follow up with ortho  -pain control with tylenol  -bowel regimen
-patient s/p fall 12 days ago, mechanical in nature  -right proximal humerus fracture  -NWB RUE  -c/w use of sling  -seen by ortho and no intervention planned  -outpt follow up with ortho  -pain control with tylenol  -bowel regimen  -PT/OT
2/2 fall,  mechanical in nature. Right proximal humerus fracture    -NWB RUE  -c/w use of sling  -seen by ortho and no intervention planned  -outpt follow up with ortho  -pain control with tylenol  -bowel regimen

## 2021-10-29 NOTE — PROGRESS NOTE ADULT - ASSESSMENT
This is a 89 y/o F with PMH of lymphoma on active chemo (last 2 weeks ago) presents s/p fall 12 days ago found to have an acute fracture of the right proximal humerus on XR currently in RUE sling.     #Diffuse Large B-Cell Lymphoma  - Admitted to [facility] in July for worsening cough & lethargy, PET revealed b/l upper lobe opacities concerning for malignancy with pleural effusion, underwent R robotic VATS/pleural bx with placement of PleurX catheter at that time  - Pathology revealed non-GCB DLBCL  - Received cycle 1 of palliative chemo per choice of patient & family inpatient with prednisone, cyclophosphamide, rituximab  - PleurX removed with improvement of symptoms, no longer required supplemental O2  - D/c'ed to rehab, followed in clinic by Dr. Rossy Esparza  - Plan to continue RC & prednisone q3 weeks  -Plan for Rituxan 375mg/m2, Cytoxan 500mg/m2 and Prednisone 1mg/kg x 5 days (will start Prednisone 50mg daily today, for 5 days). Please ensure pt has prescription for remainder of Prednisone doses sent to her pharmacy upon discharge.   - Neulasta appointment on 10/30 13:20, please inform patient on discharge paperwork       #R proximal humerus fracture, currently in sling  - Mechanical in nature  - Pain control per primary team    Please page with questions or concerns. Will Follow with you.      Nas Villanueva M.D.  Hematology/Oncology Fellow PGY4  Pager [phone]  After 5pm, please contact on-call team.

## 2021-10-29 NOTE — PROGRESS NOTE ADULT - PROBLEM SELECTOR PLAN 7
-lovenox
Patient on amio at home. Per daughter she was placed on amio at rehab    Per prior records reviewed-Appears like patient was on amio for PAF and was discharged off of amio    -Cards following and recs appreciated- c/w BB. However, pt not candidate for a/c per cards given fall risk  -monitor tele
-patient on amio at home. Per daughter she was placed on amion at rehab  -prior records reviewed. Appears like patient was on amio for PAF and was discharged off of amio  -currently EKG NSR.  -Was seen by Dr. Madison last admission, follow up appreciated during this admission
-patient on amio at home. Per daughter she was placed on amion at rehab  -prior records reviewed. Appears like patient was on amio for PAF and was discharged off of amio  -currently EKG NSR.  -Was seen by Dr. Madison last admission. Will have him consult to see if patient still needs amio.
Patient on amio at home. Per daughter she was placed on amio at rehab    Per prior records reviewed-Appears like patient was on amio for PAF and was discharged off of amio    -Cards following and recs appreciated- c/w BB. However, pt not candidate for a/c   -monitor tele
Patient on amio at home. Per daughter she was placed on amio at rehab    Per prior records reviewed-Appears like patient was on amio for PAF and was discharged off of amio    -Cards following and recs appreciated- c/w BB. However, pt not candidate for a/c per cards given fall risk  -monitor tele
Patient on amio at home. Per daughter she was placed on amio at rehab    Per prior records reviewed-Appears like patient was on amio for PAF and was discharged off of amio    -Cards following and recs appreciated- c/w BB. However, pt not candidate for a/c   -monitor tele
Patient on amio at home. Per daughter she was placed on amio at rehab    Per prior records reviewed-Appears like patient was on amio for PAF and was discharged off of amio    -Cards following and recs appreciated- c/w BB. However, pt not candidate for a/c per cards given fall risk  -monitor tele

## 2021-10-29 NOTE — PROGRESS NOTE ADULT - PROBLEM SELECTOR PROBLEM 4
Acute respiratory failure with hypoxia
Pneumonia
Acute respiratory failure with hypoxia

## 2021-10-29 NOTE — PROGRESS NOTE ADULT - PROBLEM SELECTOR PLAN 4
-patient with basilar opacities on xray that could be 2/2 lymphoma vs infectious process vs atelectasis  -patient reports intermittent cough but no fever.  -leukocytosis present but could be reactive to stress from fracture  -patient is at high risk for PNA given rib fracture, malignancy, old age etc  -currently hemodynamic status stable but requiring supplemental 02  -lower suspicion for resistant organisms. Will treat as CAP with ceft/azithro. Can transition to levaquin on DC if remains clinically well  -f/u blood cx, urine legionella nad MRSA swab  -incentive spirometry
-patient 86% on RA, improves to 96 on 2 L  -suspect multifactorial from shallow breathing from rib fracture vs atelectasis vs pna vs lymphoma?  -incentive spirometry  -treat PNA  -last admission in august patient also required 02 but at that time had pleurex for pleural effusion. Pleurex has since been removed. Patient at home now does not use 02. NO pleural effusion on CXR now.   -monitor clinically  - RSV/ Parainfluenza Infection , supportive care
-patient 86% on RA, improves to 96 on 2 L  -suspect multifactorial from shallow breathing from rib fracture vs atelectasis vs pna vs lymphoma?  -incentive spirometry  -treat PNA  -last admission in august patient also required 02 but at that time had pleurex for pleural effusion. Pleurex has since been removed. Patient at home now does not use 02. NO pleural effusion on CXR now.   -monitor clinically  - RSV/ Parainfluenza Infection , supportive care
Patient  was 86% on RA, improves to 96 on 2 L. Last admission in august patient also required 02 but at that time had pleurex for pleural effusion. Pleurex has since been removed. Patient at home now does not use 02. NO pleural effusion on CXR now.     Suspect multifactorial from shallow breathing from rib fracture vs atelectasis vs pna vs lymphoma . +RSV/ Parainfluenza Infection ,     Hypoxic respiratory failure now resolved.     -incentive spirometry  -treat PNA as stated below   -monitor clinically  -monitor o2 req and wean to room air as tolerated
Patient  was 86% on RA, improves to 96 on 2 L. Last admission in august patient also required 02 but at that time had pleurex for pleural effusion. Pleurex has since been removed. Patient at home now does not use 02. NO pleural effusion on CXR now.     Improved  Suspect multifactorial from shallow breathing from rib fracture vs atelectasis vs pna vs lymphoma . Also pt was +RSV/ Parainfluenza Infection ,     -incentive spirometry  -treated PNA as stated below   -monitor clinically  -monitor o2 req and wean to room air as tolerated. If sating well on RA would also check ambulatory O2 to assess for need for home O2
Patient  was 86% on RA, improves to 96 on 2 L. Last admission in august patient also required 02 but at that time had pleurex for pleural effusion. Pleurex has since been removed. Patient at home now does not use 02. NO pleural effusion on CXR now.     Suspect multifactorial from shallow breathing from rib fracture vs atelectasis vs pna vs lymphoma . +RSV/ Parainfluenza Infection ,     Hypoxic respiratory failure now resolved.     -incentive spirometry  -treat PNA as stated below   -monitor clinically  -monitor o2 req and wean to room air as tolerated. If sating well on RA would also check ambulatory O2
Patient  was 86% on RA, improves to 96 on 2 L. Last admission in august patient also required 02 but at that time had pleurex for pleural effusion. Pleurex has since been removed. Patient at home now does not use 02. NO pleural effusion on CXR now.     Suspect multifactorial from shallow breathing from rib fracture vs atelectasis vs pna vs lymphoma . +RSV/ Parainfluenza Infection ,     -incentive spirometry  -treat PNA as stated below   -monitor clinically  -monitor o2 req and wean to room air as tolerated. If sating well on RA would also check ambulatory O2 to assess for need for home O2
Patient  was 86% on RA, improves to 96 on 2 L. Last admission in august patient also required 02 but at that time had pleurex for pleural effusion. Pleurex has since been removed. Patient at home now does not use 02. NO pleural effusion on CXR now.     Suspect multifactorial from shallow breathing from rib fracture vs atelectasis vs pna vs lymphoma . +RSV/ Parainfluenza Infection ,     -incentive spirometry  -treat PNA as stated below   -monitor clinically  -monitor o2 req and wean to room air as tolerated

## 2021-10-29 NOTE — DISCHARGE NOTE NURSING/CASE MANAGEMENT/SOCIAL WORK - PATIENT PORTAL LINK FT
You can access the FollowMyHealth Patient Portal offered by [facility] by registering at the following website: [email or web address]. By joining [facility]’s FollowMyHealth portal, you will also be able to view your health information using other applications (apps) compatible with our system.

## 2021-10-29 NOTE — PROGRESS NOTE ADULT - PROBLEM SELECTOR PROBLEM 1
Inappropriate sinus node tachycardia
Fracture of humerus
Inappropriate sinus node tachycardia

## 2021-10-29 NOTE — PROGRESS NOTE ADULT - PROBLEM SELECTOR PROBLEM 3
Acute respiratory failure with hypoxia
Fracture, rib

## 2021-10-29 NOTE — PROGRESS NOTE ADULT - PROBLEM SELECTOR PLAN 8
DVT ppx- lovenox    Dispo- Dc planning. PT rec rehab. However, pt declines and states she wants to go home. SW/CM aware and arranging home services. Anticipated dc Friday.
DVT ppx- lovenox    Dispo- Dc planning. PT rec rehab. However, pt declines and states she wants to go home. SW/CM aware. Will f/u progress daily.
-lovenox
DVT ppx- lovenox    Dispo- Dc planning. PT rec rehab. However, pt declines and states she wants to go home. SW/CM aware and arranging home services. Anticipated dc tomorrow after chemo or Friday
DVT ppx- lovenox    Dispo- Dc planning. PT rec rehab. However, pt declines and states she wants to go home. SW/CM aware and arranging home services. Anticipated dc today. Further details outlined in discharge documentation. Spent 38 min in discharge planning.     Above plan was d/w patient
-lovenox
DVT ppx- lovenox    Dispo- Dc planning. PT rec rehab. However, pt declines and states she wants to go home. Will f/u SW/CM.

## 2021-10-29 NOTE — PROGRESS NOTE ADULT - ASSESSMENT
Post op afib  transient in the past admission   off amio  not a candidate for a/c given fall risk   now in sinus  has frequent APCs   cont BB      lymphoma  as per Oncology

## 2021-10-29 NOTE — PROGRESS NOTE ADULT - SUBJECTIVE AND OBJECTIVE BOX
Patient is a 89y old  Female who presents with a chief complaint of Fracture (29 Oct 2021 08:36)      SUBJECTIVE / OVERNIGHT EVENTS:    Review of Systems:     MEDICATIONS  (STANDING):  albuterol/ipratropium for Nebulization 3 milliLiter(s) Nebulizer every 6 hours  benzonatate 100 milliGRAM(s) Oral every 8 hours  budesonide 160 MICROgram(s)/formoterol 4.5 MICROgram(s) Inhaler 2 Puff(s) Inhalation two times a day  enoxaparin Injectable 40 milliGRAM(s) SubCutaneous daily  escitalopram 10 milliGRAM(s) Oral daily  folic acid 1 milliGRAM(s) Oral daily  lactobacillus acidophilus 1 Tablet(s) Oral daily  lidocaine   4% Patch 1 Patch Transdermal daily  metoprolol tartrate 12.5 milliGRAM(s) Oral two times a day  pantoprazole    Tablet 40 milliGRAM(s) Oral before breakfast  predniSONE   Tablet 50 milliGRAM(s) Oral daily  senna 2 Tablet(s) Oral at bedtime  tiotropium 18 MICROgram(s) Capsule 1 Capsule(s) Inhalation daily  valACYclovir 500 milliGRAM(s) Oral daily    MEDICATIONS  (PRN):  acetaminophen   Tablet .. 650 milliGRAM(s) Oral every 6 hours PRN Temp greater or equal to 38C (100.4F), Mild Pain (1 - 3)  diphenhydrAMINE Injectable 25 milliGRAM(s) IV Push once PRN PRN Chemotherapy Reaction  guaiFENesin Oral Liquid (Sugar-Free) 100 milliGRAM(s) Oral every 6 hours PRN Cough  hydrocortisone sodium succinate Injectable 100 milliGRAM(s) IV Push once PRN PRN Chemotherapy Reaction  melatonin 3 milliGRAM(s) Oral at bedtime PRN Insomnia  meperidine     Injectable 25 milliGRAM(s) IV Push once PRN PRN Chemotherapy Reaction (Rigors)      PHYSICAL EXAM:    I&O's Summary    ICU Vital Signs Last 24 Hrs  T(C): 36.6 (29 Oct 2021 05:40), Max: 37.1 (28 Oct 2021 17:38)  T(F): 97.8 (29 Oct 2021 05:40), Max: 98.8 (28 Oct 2021 17:38)  HR: 67 (29 Oct 2021 05:40) (67 - 96)  BP: 111/64 (29 Oct 2021 05:40) (89/52 - 122/77)  BP(mean): --  ABP: --  ABP(mean): --  RR: 18 (29 Oct 2021 05:40) (16 - 18)  SpO2: 100% (29 Oct 2021 05:40) (84% - 100%)    LABS:  CAPILLARY BLOOD GLUCOSE                              8.4    11.60 )-----------( 314      ( 29 Oct 2021 07:20 )             27.2     10-29    135  |  101  |  33<H>  ----------------------------<  91  4.3   |  22  |  1.09    Ca    10.3      29 Oct 2021 07:20  Phos  3.6     10-29  Mg     2.00     10-29    TPro  5.6<L>  /  Alb  3.4  /  TBili  <0.2  /  DBili  x   /  AST  10  /  ALT  11  /  AlkPhos  111  10-29              RADIOLOGY & ADDITIONAL TESTS:    Imaging Personally Reviewed:    Consultant(s) Notes Reviewed:      Care Discussed with Consultants/Other Providers: Patient is a 89y old  Female who presents with a chief complaint of Fracture (29 Oct 2021 08:36)      SUBJECTIVE / OVERNIGHT EVENTS: Denies CP, SOB, n/v/d.     Review of Systems:     MEDICATIONS  (STANDING):  albuterol/ipratropium for Nebulization 3 milliLiter(s) Nebulizer every 6 hours  benzonatate 100 milliGRAM(s) Oral every 8 hours  budesonide 160 MICROgram(s)/formoterol 4.5 MICROgram(s) Inhaler 2 Puff(s) Inhalation two times a day  enoxaparin Injectable 40 milliGRAM(s) SubCutaneous daily  escitalopram 10 milliGRAM(s) Oral daily  folic acid 1 milliGRAM(s) Oral daily  lactobacillus acidophilus 1 Tablet(s) Oral daily  lidocaine   4% Patch 1 Patch Transdermal daily  metoprolol tartrate 12.5 milliGRAM(s) Oral two times a day  pantoprazole    Tablet 40 milliGRAM(s) Oral before breakfast  predniSONE   Tablet 50 milliGRAM(s) Oral daily  senna 2 Tablet(s) Oral at bedtime  tiotropium 18 MICROgram(s) Capsule 1 Capsule(s) Inhalation daily  valACYclovir 500 milliGRAM(s) Oral daily    MEDICATIONS  (PRN):  acetaminophen   Tablet .. 650 milliGRAM(s) Oral every 6 hours PRN Temp greater or equal to 38C (100.4F), Mild Pain (1 - 3)  diphenhydrAMINE Injectable 25 milliGRAM(s) IV Push once PRN PRN Chemotherapy Reaction  guaiFENesin Oral Liquid (Sugar-Free) 100 milliGRAM(s) Oral every 6 hours PRN Cough  hydrocortisone sodium succinate Injectable 100 milliGRAM(s) IV Push once PRN PRN Chemotherapy Reaction  melatonin 3 milliGRAM(s) Oral at bedtime PRN Insomnia  meperidine     Injectable 25 milliGRAM(s) IV Push once PRN PRN Chemotherapy Reaction (Rigors)      PHYSICAL EXAM:    I&O's Summary    ICU Vital Signs Last 24 Hrs  T(C): 36.6 (29 Oct 2021 05:40), Max: 37.1 (28 Oct 2021 17:38)  T(F): 97.8 (29 Oct 2021 05:40), Max: 98.8 (28 Oct 2021 17:38)  HR: 67 (29 Oct 2021 05:40) (67 - 96)  BP: 111/64 (29 Oct 2021 05:40) (89/52 - 122/77)  BP(mean): --  ABP: --  ABP(mean): --  RR: 18 (29 Oct 2021 05:40) (16 - 18)  SpO2: 100% (29 Oct 2021 05:40) (84% - 100%)  Constitutional:  NAD  Head: NC/AT  Eyes: EOMI, clear conjunctiva  Neck: supple;   Respiratory: CTA b/l  Cardiac: +S1/S2; RRR;   Gastrointestinal: soft, NT/ND; no rebound or guarding;   Extremities: UE in sling  Neurologic:  no focal deficits, moves all extremities   Psychiatric: calm    LABS:  CAPILLARY BLOOD GLUCOSE                              8.4    11.60 )-----------( 314      ( 29 Oct 2021 07:20 )             27.2     10-29    135  |  101  |  33<H>  ----------------------------<  91  4.3   |  22  |  1.09    Ca    10.3      29 Oct 2021 07:20  Phos  3.6     10-29  Mg     2.00     10-29    TPro  5.6<L>  /  Alb  3.4  /  TBili  <0.2  /  DBili  x   /  AST  10  /  ALT  11  /  AlkPhos  111  10-29              RADIOLOGY & ADDITIONAL TESTS:    Imaging Personally Reviewed:    Consultant(s) Notes Reviewed:      Care Discussed with Consultants/Other Providers:

## 2021-10-29 NOTE — PROGRESS NOTE ADULT - PROBLEM SELECTOR PLAN 6
-c/w outpt management. Patient of Dr. Esparza  -house heme/onc notified  -due for outpt PET scan on 10/23  -will discuss with heme if they want further inpatient imaging  - c/w valacyclovir for ppx - discuss with heme if patient needs this
-patient on amio at home. Per daughter she was placed on amion at rehab  -prior records reviewed. Appears like patient was on amio for PAF and was discharged off of amio  -currently EKG NSR.  -Was seen by Dr. Madison last admission. Will have him consult to see if patient still needs amio.
-c/w outpt management. Patient of Dr. Esparza  -house heme/onc notified  -due for outpt PET scan on 10/23  -will discuss with heme if they want further inpatient imaging  - c/w valacyclovir for ppx - discuss with heme if patient needs this
C/w outpt management. Patient of Dr. Esparza. Was due for outpt PET scan on 10/23    -house heme/onc consult and input recs appreciated . S/p inpatient chemo yesterday inpatient.   -pt has nuelasta appt for 10/30 at 120pm  -c/w prednisone 50mg per chemo protocol   -Pts family also inquired about flu shot and phizer booster shot. Per heme/onc hold while getting chemo and to be addressed as outpt  - c/w valacyclovir for ppx
C/w outpt management. Patient of Dr. Esparza. Was due for outpt PET scan on 10/23    -house heme/onc consult and input recs appreciated   - c/w valacyclovir for ppx
C/w outpt management. Patient of Dr. Esparza. Was due for outpt PET scan on 10/23    -house heme/onc consult and input recs appreciated   - c/w valacyclovir for ppx
C/w outpt management. Patient of Dr. Esparza. Was due for outpt PET scan on 10/23    -house heme/onc consult and input recs appreciated . Spoke  w/ Dr. Serna from heme/onc today w/ plan for next chemo tomorrow inpatient.  - c/w valacyclovir for ppx
C/w outpt management. Patient of Dr. Esparza. Was due for outpt PET scan on 10/23    -house heme/onc consult and input recs appreciated . Spoke  w/ Dr. Villanueva from heme/onc today w/ plan for next chemo today inpatient.   -pt has nuelasta appt for 10/30 at 120pm  -Pts family also inquired about flu shot and phizer booster shot. Per heme/onc hold while getting chemo and to be addressed as outpt  - c/w valacyclovir for ppx

## 2021-10-29 NOTE — PROGRESS NOTE ADULT - PROBLEM SELECTOR PLAN 3
-patient 86% on RA, improves to 96 on 2 L  -suspect multifactorial from shallow breathing from rib fracture vs atelectasis vs pna vs lymphoma?  -incentive spirometry  -treat PNA  -last admission in august patient also required 02 but at that time had pleurex for pleural effusion. Pleurex has since been removed. Patient at home now does not use 02. NO pleural effusion on CXR now.   -monitor clinically
-patient s/p fall 12 days, mechanical in nature  -acute rib fracture also noted on imaging at urgent care  -not seen on xrays here, will not pursue further imaging as does not   -supportive care with pain control with tylenol and lidocaine patch  -incentive spirometry
2/2 fall,  mechanical in nature. Acute rib fracture also noted on imaging at urgent care    -Not seen on xrays here, will not pursue further imaging as does not   -supportive care with pain control with Tylenol and lidocaine patch  -incentive spirometry
-patient s/p fall 12 days, mechanical in nature  -acute rib fracture also noted on imaging at urgent care  -not seen on xrays here, will not pursue further imaging as does not   -supportive care with pain control with tylenol and lidocaine patch  -incentive spirometry
2/2 fall,  mechanical in nature. Acute rib fracture also noted on imaging at urgent care    -Not seen on xrays here, will not pursue further imaging as does not   -supportive care with pain control with Tylenol and lidocaine patch  -incentive spirometry

## 2021-10-29 NOTE — PROGRESS NOTE ADULT - REASON FOR ADMISSION
Fracture

## 2021-10-29 NOTE — PROGRESS NOTE ADULT - PROBLEM SELECTOR PLAN 5
-c/w outpt management. Patient of Dr. Esparza  -house heme/onc notified  -due for outpt PET scan on 10/23  -will discuss with heme if they want further inpatient imaging  - c/w valacyclovir for ppx - discuss with heme if patient needs this
-patient with basilar opacities on xray that could be 2/2 lymphoma vs infectious process vs atelectasis  -patient reports intermittent cough but no fever.  -leukocytosis present but could be reactive to stress from fracture  -patient is at high risk for PNA given rib fracture, malignancy, old age etc  -currently hemodynamic status stable but requiring supplemental 02  -lower suspicion for resistant organisms. Will treat as CAP with ceft/azithro. Can transition to levaquin on DC if remains clinically well  -f/u blood cx, urine legionella and  MRSA swab  -incentive spirometry
Patient with basilar opacities on xray that could be 2/2 lymphoma vs infectious process vs atelectasis    Patient reports intermittent cough but no fever. Leukocytosis present but could be reactive to stress from fracture. Patient is at high risk for PNA given rib fracture, malignancy, old age etc    -Now s/p ceft/azithro  -Blood cx NTD and urine legionella neg  -incentive spirometry
-patient with basilar opacities on xray that could be 2/2 lymphoma vs infectious process vs atelectasis  -patient reports intermittent cough but no fever.  -leukocytosis present but could be reactive to stress from fracture  -patient is at high risk for PNA given rib fracture, malignancy, old age etc  -currently hemodynamic status stable but requiring supplemental 02  -lower suspicion for resistant organisms. Will treat as CAP with ceft/azithro. Can transition to levaquin on DC if remains clinically well  -f/u blood cx, urine legionella and  MRSA swab  -incentive spirometry
Patient with basilar opacities on xray that could be 2/2 lymphoma vs infectious process vs atelectasis    Patient reports intermittent cough but no fever. Leukocytosis present but could be reactive to stress from fracture. Patient is at high risk for PNA given rib fracture, malignancy, old age etc    -C/w ceft/azithro  -Blood cx NTD F/u urine legionella   -incentive spirometry
Patient with basilar opacities on xray that could be 2/2 lymphoma vs infectious process vs atelectasis    Patient reports intermittent cough but no fever. Leukocytosis present but could be reactive to stress from fracture. Patient is at high risk for PNA given rib fracture, malignancy, old age etc    -Now s/p ceft/azithro  -Blood cx NTD and urine legionella neg  -incentive spirometry

## 2021-10-29 NOTE — PROGRESS NOTE ADULT - PROBLEM SELECTOR PROBLEM 2
Fracture of humerus
Fracture, rib
Fracture of humerus

## 2021-10-29 NOTE — PROGRESS NOTE ADULT - SUBJECTIVE AND OBJECTIVE BOX
Hematology Follow-up    INTERVAL HPI/OVERNIGHT EVENTS:  Patient S&E at bedside. No o/n events, patient resting comfortably. No complaints at this time. Patient denies fever, chills, dizziness, weakness, CP, palpitations, SOB, cough, N/V/D/C, dysuria, changes in bowel movements, LE edema.    VITAL SIGNS:  T(F): 98.8 (10-29-21 @ 11:20)  HR: 75 (10-29-21 @ 11:20)  BP: 115/68 (10-29-21 @ 11:20)  RR: 18 (10-29-21 @ 11:20)  SpO2: 100% (10-29-21 @ 11:20)  Wt(kg): --    PHYSICAL EXAM:    Constitutional: AAOx3, NAD,   Eyes: PERRL, EOMI, sclera non-icteric  Neck: supple, no masses, no JVD  Respiratory: CTA b/l, good air entry b/l, no wheezing, rhonchi, rales, with normal respiratory effort and no intercostal retractions  Cardiovascular: RRR, normal S1S2, no M/R/G  Gastrointestinal: soft, NTND, no masses palpable, BS normal in all four quadrants, no HSM  Extremities:  no c/c/e  Neurological: Grossly intact  Skin: Normal temperature    MEDICATIONS  (STANDING):  albuterol/ipratropium for Nebulization 3 milliLiter(s) Nebulizer every 6 hours  benzonatate 100 milliGRAM(s) Oral every 8 hours  budesonide 160 MICROgram(s)/formoterol 4.5 MICROgram(s) Inhaler 2 Puff(s) Inhalation two times a day  enoxaparin Injectable 40 milliGRAM(s) SubCutaneous daily  escitalopram 10 milliGRAM(s) Oral daily  folic acid 1 milliGRAM(s) Oral daily  lactobacillus acidophilus 1 Tablet(s) Oral daily  lidocaine   4% Patch 1 Patch Transdermal daily  metoprolol tartrate 12.5 milliGRAM(s) Oral two times a day  pantoprazole    Tablet 40 milliGRAM(s) Oral before breakfast  predniSONE   Tablet 50 milliGRAM(s) Oral daily  senna 2 Tablet(s) Oral at bedtime  tiotropium 18 MICROgram(s) Capsule 1 Capsule(s) Inhalation daily  valACYclovir 500 milliGRAM(s) Oral daily    MEDICATIONS  (PRN):  acetaminophen   Tablet .. 650 milliGRAM(s) Oral every 6 hours PRN Temp greater or equal to 38C (100.4F), Mild Pain (1 - 3)  diphenhydrAMINE Injectable 25 milliGRAM(s) IV Push once PRN PRN Chemotherapy Reaction  guaiFENesin Oral Liquid (Sugar-Free) 100 milliGRAM(s) Oral every 6 hours PRN Cough  hydrocortisone sodium succinate Injectable 100 milliGRAM(s) IV Push once PRN PRN Chemotherapy Reaction  melatonin 3 milliGRAM(s) Oral at bedtime PRN Insomnia  meperidine     Injectable 25 milliGRAM(s) IV Push once PRN PRN Chemotherapy Reaction (Rigors)      No Known Allergies      LABS:                        8.4    11.60 )-----------( 314      ( 29 Oct 2021 07:20 )             27.2     10-29    135  |  101  |  33<H>  ----------------------------<  91  4.3   |  22  |  1.09    Ca    10.3      29 Oct 2021 07:20  Phos  3.6     10-29  Mg     2.00     10-29    TPro  5.6<L>  /  Alb  3.4  /  TBili  <0.2  /  DBili  x   /  AST  10  /  ALT  11  /  AlkPhos  111  10-29           RADIOLOGY & ADDITIONAL TESTS:  Studies reviewed.

## 2021-10-29 NOTE — PROGRESS NOTE ADULT - PROBLEM SELECTOR PROBLEM 7
PAF (paroxysmal atrial fibrillation)
DVT prophylaxis
PAF (paroxysmal atrial fibrillation)

## 2021-10-29 NOTE — PROGRESS NOTE ADULT - PROBLEM SELECTOR PLAN 1
Patient found with tachy lidia on pulse ox  EKG SR  will transfer to telemetry to eval tachy-lidia SDR, if confirm, EP consult
-patient s/p fall 12 days ago, mechanical in nature  -right proximal humerus fracture  -NWB RUE  -c/w use of sling  -seen by ortho and no intervention planned  -outpt follow up with ortho  -pain control with tylenol  -bowel regimen  -PT/OT
Patient found with tachy lidia on pulse ox     EKG SR and telemetry to eval tachy-lidia SDR, not confirmed    -c/w metoprolol 12.5mg BID  -cardiology following and recs appreciated
Patient found with tachy lidia on pulse ox  EKG SR  telemetry to eval tachy-lidia SDR, not confirmed, start low dose BB
Patient found with tachy lidia on pulse ox     EKG SR and telemetry to eval tachy-lidia SDR, not confirmed    -c/w metoprolol 12.5mg BID  -cardiology following and recs appreciated

## 2021-10-29 NOTE — PROGRESS NOTE ADULT - ATTENDING COMMENTS
dlbcl s/p chemo, patient reminded today of GCSF appt at [facility] on 10/30/21. f/u w dr Espraza in clinic
88 y/o F with DLBCL tolerated steroids (actually feeling better overnight), now awaiting rituximab at the time we saw her on rounds. If all well, pt has GCSF appt at [facility] 10/30/21 and f/u w Dr Esparza.
88 y/o DLBCL getting RCP (prednisone to start tonight) and to be f/u by Dr Esparza upon discharge.

## 2021-10-29 NOTE — PROGRESS NOTE ADULT - PROVIDER SPECIALTY LIST ADULT
Cardiology
Heme/Onc
Hospitalist
Cardiology
Cardiology
Heme/Onc
Heme/Onc
Hospitalist
Hospitalist
Cardiology
Hospitalist
Hospitalist
Cardiology
Hospitalist

## 2021-10-29 NOTE — PROGRESS NOTE ADULT - SUBJECTIVE AND OBJECTIVE BOX
DATE OF SERVICE: 10-29-21 @ 08:36    Subjective: Patient seen and examined. No new events except as noted.     SUBJECTIVE/ROS:  feels ok       MEDICATIONS:  MEDICATIONS  (STANDING):  albuterol/ipratropium for Nebulization 3 milliLiter(s) Nebulizer every 6 hours  benzonatate 100 milliGRAM(s) Oral every 8 hours  budesonide 160 MICROgram(s)/formoterol 4.5 MICROgram(s) Inhaler 2 Puff(s) Inhalation two times a day  enoxaparin Injectable 40 milliGRAM(s) SubCutaneous daily  escitalopram 10 milliGRAM(s) Oral daily  folic acid 1 milliGRAM(s) Oral daily  lactobacillus acidophilus 1 Tablet(s) Oral daily  lidocaine   4% Patch 1 Patch Transdermal daily  metoprolol tartrate 12.5 milliGRAM(s) Oral two times a day  pantoprazole    Tablet 40 milliGRAM(s) Oral before breakfast  predniSONE   Tablet 50 milliGRAM(s) Oral daily  senna 2 Tablet(s) Oral at bedtime  tiotropium 18 MICROgram(s) Capsule 1 Capsule(s) Inhalation daily  valACYclovir 500 milliGRAM(s) Oral daily      PHYSICAL EXAM:  T(C): 36.6 (10-29-21 @ 05:40), Max: 37.1 (10-28-21 @ 17:38)  HR: 67 (10-29-21 @ 05:40) (67 - 96)  BP: 111/64 (10-29-21 @ 05:40) (89/52 - 122/77)  RR: 18 (10-29-21 @ 05:40) (16 - 18)  SpO2: 100% (10-29-21 @ 05:40) (84% - 100%)  Wt(kg): --  I&O's Summary          JVP: Normal  Neck: supple  Lung: clear   CV: S1 S2 , Murmur:  Abd: soft  Ext: No edema  neuro: Awake / alert  Psych: flat affect  Skin: normal``    LABS/DATA:    CARDIAC MARKERS:                                8.4    11.60 )-----------( 314      ( 29 Oct 2021 07:20 )             27.2     10-29    135  |  101  |  33<H>  ----------------------------<  91  4.3   |  22  |  1.09    Ca    10.3      29 Oct 2021 07:20  Phos  3.6     10-29  Mg     2.00     10-29    TPro  5.6<L>  /  Alb  3.4  /  TBili  <0.2  /  DBili  x   /  AST  10  /  ALT  11  /  AlkPhos  111  10-29    proBNP:   Lipid Profile:   HgA1c:   TSH:     TELE:  EKG:

## 2021-10-29 NOTE — PROGRESS NOTE ADULT - NUTRITIONAL ASSESSMENT
This patient has been assessed with a concern for Malnutrition and has been determined to have a diagnosis/diagnoses of Moderate protein-calorie malnutrition.    This patient is being managed with:   Diet Regular-  Supplement Feeding Modality:  Oral  Ensure Clear Cans or Servings Per Day:  1       Frequency:  Three Times a day  Entered: Oct 28 2021  5:53PM

## 2021-10-30 ENCOUNTER — APPOINTMENT (OUTPATIENT)
Dept: INFUSION THERAPY | Facility: HOSPITAL | Age: 86
End: 2021-10-30

## 2021-11-03 NOTE — ED ADULT NURSE NOTE - CAS TRG GEN SKIN CONDITION
Hot Keystone Flap Text: The defect edges were debeveled with a #15 scalpel blade.  Given the location of the defect, shape of the defect a keystone flap was deemed most appropriate.  Using a sterile surgical marker, an appropriate keystone flap was drawn incorporating the defect, outlining the appropriate donor tissue and placing the expected incisions within the relaxed skin tension lines where possible. The area thus outlined was incised deep to adipose tissue with a #15 scalpel blade.  The skin margins were undermined to an appropriate distance in all directions around the primary defect and laterally outward around the flap utilizing iris scissors.

## 2021-11-05 ENCOUNTER — APPOINTMENT (OUTPATIENT)
Dept: HEMATOLOGY ONCOLOGY | Facility: CLINIC | Age: 86
End: 2021-11-05

## 2021-11-06 ENCOUNTER — OUTPATIENT (OUTPATIENT)
Dept: OUTPATIENT SERVICES | Facility: HOSPITAL | Age: 86
LOS: 1 days | End: 2021-11-06
Payer: MEDICARE

## 2021-11-06 ENCOUNTER — APPOINTMENT (OUTPATIENT)
Dept: NUCLEAR MEDICINE | Facility: IMAGING CENTER | Age: 86
End: 2021-11-06
Payer: MEDICARE

## 2021-11-06 DIAGNOSIS — Z00.8 ENCOUNTER FOR OTHER GENERAL EXAMINATION: ICD-10-CM

## 2021-11-06 DIAGNOSIS — C83.30 DIFFUSE LARGE B-CELL LYMPHOMA, UNSPECIFIED SITE: ICD-10-CM

## 2021-11-06 DIAGNOSIS — Z96.641 PRESENCE OF RIGHT ARTIFICIAL HIP JOINT: Chronic | ICD-10-CM

## 2021-11-06 DIAGNOSIS — Z96.649 PRESENCE OF UNSPECIFIED ARTIFICIAL HIP JOINT: Chronic | ICD-10-CM

## 2021-11-06 PROCEDURE — A9552: CPT

## 2021-11-06 PROCEDURE — 78815 PET IMAGE W/CT SKULL-THIGH: CPT | Mod: 26,PS

## 2021-11-06 PROCEDURE — 78815 PET IMAGE W/CT SKULL-THIGH: CPT

## 2021-11-15 ENCOUNTER — LABORATORY RESULT (OUTPATIENT)
Age: 86
End: 2021-11-15

## 2021-11-15 ENCOUNTER — OUTPATIENT (OUTPATIENT)
Dept: OUTPATIENT SERVICES | Facility: HOSPITAL | Age: 86
LOS: 1 days | Discharge: HOME | End: 2021-11-15

## 2021-11-15 DIAGNOSIS — R91.8 OTHER NONSPECIFIC ABNORMAL FINDING OF LUNG FIELD: ICD-10-CM

## 2021-11-15 DIAGNOSIS — Z96.641 PRESENCE OF RIGHT ARTIFICIAL HIP JOINT: Chronic | ICD-10-CM

## 2021-11-15 DIAGNOSIS — Z96.649 PRESENCE OF UNSPECIFIED ARTIFICIAL HIP JOINT: Chronic | ICD-10-CM

## 2021-11-18 ENCOUNTER — RESULT REVIEW (OUTPATIENT)
Age: 86
End: 2021-11-18

## 2021-11-18 ENCOUNTER — APPOINTMENT (OUTPATIENT)
Dept: ORTHOPEDIC SURGERY | Facility: CLINIC | Age: 86
End: 2021-11-18
Payer: MEDICARE

## 2021-11-18 ENCOUNTER — APPOINTMENT (OUTPATIENT)
Dept: INFUSION THERAPY | Facility: HOSPITAL | Age: 86
End: 2021-11-18

## 2021-11-18 ENCOUNTER — APPOINTMENT (OUTPATIENT)
Dept: HEMATOLOGY ONCOLOGY | Facility: CLINIC | Age: 86
End: 2021-11-18
Payer: MEDICARE

## 2021-11-18 VITALS
WEIGHT: 120 LBS | HEART RATE: 75 BPM | SYSTOLIC BLOOD PRESSURE: 111 MMHG | DIASTOLIC BLOOD PRESSURE: 70 MMHG | HEIGHT: 63 IN | BODY MASS INDEX: 21.26 KG/M2

## 2021-11-18 DIAGNOSIS — S42.201A UNSPECIFIED FRACTURE OF UPPER END OF RIGHT HUMERUS, INITIAL ENCOUNTER FOR CLOSED FRACTURE: ICD-10-CM

## 2021-11-18 LAB
BASOPHILS # BLD AUTO: 0.13 K/UL — SIGNIFICANT CHANGE UP (ref 0–0.2)
BASOPHILS NFR BLD AUTO: 1 % — SIGNIFICANT CHANGE UP (ref 0–2)
EOSINOPHIL # BLD AUTO: 0.67 K/UL — HIGH (ref 0–0.5)
EOSINOPHIL NFR BLD AUTO: 5 % — SIGNIFICANT CHANGE UP (ref 0–6)
HCT VFR BLD CALC: 32.4 % — LOW (ref 34.5–45)
HGB BLD-MCNC: 10.3 G/DL — LOW (ref 11.5–15.5)
IMM GRANULOCYTES NFR BLD AUTO: 0.6 % — SIGNIFICANT CHANGE UP (ref 0–1.5)
LYMPHOCYTES # BLD AUTO: 1.33 K/UL — SIGNIFICANT CHANGE UP (ref 1–3.3)
LYMPHOCYTES # BLD AUTO: 9.9 % — LOW (ref 13–44)
MCHC RBC-ENTMCNC: 29.6 PG — SIGNIFICANT CHANGE UP (ref 27–34)
MCHC RBC-ENTMCNC: 31.8 G/DL — LOW (ref 32–36)
MCV RBC AUTO: 93.1 FL — SIGNIFICANT CHANGE UP (ref 80–100)
MONOCYTES # BLD AUTO: 0.7 K/UL — SIGNIFICANT CHANGE UP (ref 0–0.9)
MONOCYTES NFR BLD AUTO: 5.2 % — SIGNIFICANT CHANGE UP (ref 2–14)
NEUTROPHILS # BLD AUTO: 10.56 K/UL — HIGH (ref 1.8–7.4)
NEUTROPHILS NFR BLD AUTO: 78.3 % — HIGH (ref 43–77)
NRBC # BLD: 0 /100 WBCS — SIGNIFICANT CHANGE UP (ref 0–0)
PLATELET # BLD AUTO: 238 K/UL — SIGNIFICANT CHANGE UP (ref 150–400)
RBC # BLD: 3.48 M/UL — LOW (ref 3.8–5.2)
RBC # FLD: 20.9 % — HIGH (ref 10.3–14.5)
WBC # BLD: 13.47 K/UL — HIGH (ref 3.8–10.5)
WBC # FLD AUTO: 13.47 K/UL — HIGH (ref 3.8–10.5)

## 2021-11-18 PROCEDURE — 73030 X-RAY EXAM OF SHOULDER: CPT | Mod: RT

## 2021-11-18 PROCEDURE — 99215 OFFICE O/P EST HI 40 MIN: CPT

## 2021-11-18 PROCEDURE — 23600 CLTX PROX HUMRL FX W/O MNPJ: CPT | Mod: RT

## 2021-11-18 PROCEDURE — 99204 OFFICE O/P NEW MOD 45 MIN: CPT | Mod: 57

## 2021-11-18 NOTE — ASSESSMENT
[FreeTextEntry1] : 88 yo female with new diagnosis of Stage IV DLBCL s/p treatment with RC and prednisone presents for follow up. Overall, tolerated treatment well without any significant side effects. She continues to improve in rehab. \par -continue RC and prednisone every 3 weeks, to receive C6 today (11/18/21), ok to treat\par -monitor weekly CBC, CMP, Mag, Phos, Uric acid, LDH while on treatment \par -transfusional support as needed \par -continue valacyclovir \par -continue Protonix \par -follow up orthopedics recs \par -discussed that we could potentially switch to curative intent treatment with R-mini-CHOP, will continue to discuss with patient and daughter Dinorah, will continue to discuss \par \par Follow up next treatment, sooner PRN

## 2021-11-18 NOTE — HISTORY OF PRESENT ILLNESS
[de-identified] : 89 year old woman with a pmh of COPD  was admitted to [facility] for worsening cough and lethargy despite treatment with antibiotics and steroids. She underwent R robotic VATS/pleural bx with placement of pleurex catheter on 07/15/21 after PET from 07/07/21 reveal b/l upper lobe opacities concerning for malignancy with pleural effusion. 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 Given her age and symptoms, patient and daughter opted for palliative chemotherapy. she received prednisone, followed by cytoxan (200 mg/m2) on 8/5-8/7/21 with marked improvement in respiratory status and mental status. Pleurex catheter stopped draining after initiation of cytoxan. She received rituximab which was complicated by infusion reaction. She received the full dose over 2 days. She was discharged to [facility] re[facility] and presents for follow up. She received 4 cycles of R-CP which was overall well tolerated. She has a fall in mid October and did not initially seek medical care. Her daughter Dinorah called the office on 10/20 and given her shoulder pain it was recommended she get xrays and an evaluation. She was noted to have a R proximal humerus fracture and was sent from Urgent care to the ER for admission. She was also noted to have a possible PNA. While admitted, she was noted to be tachy-lidia. She was also found to have parainflueza and was treated with ceftriaxone/azithromycin for PNA. \par  [de-identified] : 11/182021: She continues to feel improvement in her energy level. She reports her appetite has significantly improved but is still not back to normal. She eats 3 meals a day. She has no fevers, chills or night sweats. She is doing physical therapy. Her breathing is stable. She is not on supplemental O2 anymore, but occasionally uses it at night. Her PET/CT showed a complete metabolic response.

## 2021-11-19 DIAGNOSIS — C85.80 OTHER SPECIFIED TYPES OF NON-HODGKIN LYMPHOMA, UNSPECIFIED SITE: ICD-10-CM

## 2021-11-19 DIAGNOSIS — Z51.11 ENCOUNTER FOR ANTINEOPLASTIC CHEMOTHERAPY: ICD-10-CM

## 2021-11-19 DIAGNOSIS — Z51.89 ENCOUNTER FOR OTHER SPECIFIED AFTERCARE: ICD-10-CM

## 2021-11-19 DIAGNOSIS — R11.2 NAUSEA WITH VOMITING, UNSPECIFIED: ICD-10-CM

## 2021-11-24 ENCOUNTER — RX RENEWAL (OUTPATIENT)
Age: 86
End: 2021-11-24

## 2021-11-29 ENCOUNTER — APPOINTMENT (OUTPATIENT)
Dept: CARDIOLOGY | Facility: CLINIC | Age: 86
End: 2021-11-29

## 2021-12-09 ENCOUNTER — RESULT REVIEW (OUTPATIENT)
Age: 86
End: 2021-12-09

## 2021-12-09 ENCOUNTER — APPOINTMENT (OUTPATIENT)
Dept: INFUSION THERAPY | Facility: HOSPITAL | Age: 86
End: 2021-12-09

## 2021-12-09 LAB
BASOPHILS # BLD AUTO: 0.1 K/UL — SIGNIFICANT CHANGE UP (ref 0–0.2)
BASOPHILS NFR BLD AUTO: 0.8 % — SIGNIFICANT CHANGE UP (ref 0–2)
EOSINOPHIL # BLD AUTO: 0.25 K/UL — SIGNIFICANT CHANGE UP (ref 0–0.5)
EOSINOPHIL NFR BLD AUTO: 2 % — SIGNIFICANT CHANGE UP (ref 0–6)
HCT VFR BLD CALC: 35.1 % — SIGNIFICANT CHANGE UP (ref 34.5–45)
HGB BLD-MCNC: 11.1 G/DL — LOW (ref 11.5–15.5)
IMM GRANULOCYTES NFR BLD AUTO: 0.6 % — SIGNIFICANT CHANGE UP (ref 0–1.5)
LYMPHOCYTES # BLD AUTO: 1.14 K/UL — SIGNIFICANT CHANGE UP (ref 1–3.3)
LYMPHOCYTES # BLD AUTO: 9 % — LOW (ref 13–44)
MCHC RBC-ENTMCNC: 30.3 PG — SIGNIFICANT CHANGE UP (ref 27–34)
MCHC RBC-ENTMCNC: 31.6 G/DL — LOW (ref 32–36)
MCV RBC AUTO: 95.9 FL — SIGNIFICANT CHANGE UP (ref 80–100)
MONOCYTES # BLD AUTO: 0.69 K/UL — SIGNIFICANT CHANGE UP (ref 0–0.9)
MONOCYTES NFR BLD AUTO: 5.4 % — SIGNIFICANT CHANGE UP (ref 2–14)
NEUTROPHILS # BLD AUTO: 10.42 K/UL — HIGH (ref 1.8–7.4)
NEUTROPHILS NFR BLD AUTO: 82.2 % — HIGH (ref 43–77)
NRBC # BLD: 0 /100 WBCS — SIGNIFICANT CHANGE UP (ref 0–0)
PLATELET # BLD AUTO: 168 K/UL — SIGNIFICANT CHANGE UP (ref 150–400)
RBC # BLD: 3.66 M/UL — LOW (ref 3.8–5.2)
RBC # FLD: 18.2 % — HIGH (ref 10.3–14.5)
WBC # BLD: 12.68 K/UL — HIGH (ref 3.8–10.5)
WBC # FLD AUTO: 12.68 K/UL — HIGH (ref 3.8–10.5)

## 2021-12-17 ENCOUNTER — APPOINTMENT (OUTPATIENT)
Dept: HEART AND VASCULAR | Facility: CLINIC | Age: 86
End: 2021-12-17
Payer: MEDICARE

## 2021-12-17 ENCOUNTER — NON-APPOINTMENT (OUTPATIENT)
Age: 86
End: 2021-12-17

## 2021-12-17 VITALS
WEIGHT: 120 LBS | SYSTOLIC BLOOD PRESSURE: 110 MMHG | HEIGHT: 63 IN | RESPIRATION RATE: 12 BRPM | DIASTOLIC BLOOD PRESSURE: 80 MMHG | BODY MASS INDEX: 21.26 KG/M2 | HEART RATE: 70 BPM

## 2021-12-17 DIAGNOSIS — Z23 ENCOUNTER FOR IMMUNIZATION: ICD-10-CM

## 2021-12-17 PROCEDURE — 90662 IIV NO PRSV INCREASED AG IM: CPT

## 2021-12-17 PROCEDURE — 99204 OFFICE O/P NEW MOD 45 MIN: CPT

## 2021-12-17 PROCEDURE — 93000 ELECTROCARDIOGRAM COMPLETE: CPT

## 2021-12-17 PROCEDURE — G0008: CPT

## 2021-12-17 NOTE — DISCUSSION/SUMMARY
[FreeTextEntry1] : 1. Atrial fibrillation colon from what I can ascertain from reviewing hospital record patient had postop A. fib controlled with amio, off now. pt was placed metoprolol. overall feeling well. at this time would continue metoprolol and hold off on amio and full dose a/c.\par 2.Lymphoma: onc f/u\par \par will administer flu vaccine

## 2021-12-17 NOTE — PHYSICAL EXAM
[Well Developed] : well developed [Well Nourished] : well nourished [No Acute Distress] : no acute distress [Normal Conjunctiva] : normal conjunctiva [Normal Venous Pressure] : normal venous pressure [No Carotid Bruit] : no carotid bruit [Normal S1, S2] : normal S1, S2 [No Rub] : no rub [No Gallop] : no gallop [Murmur] : murmur [Good Air Entry] : good air entry [No Respiratory Distress] : no respiratory distress  [Soft] : abdomen soft [Non Tender] : non-tender [No Masses/organomegaly] : no masses/organomegaly [Normal Bowel Sounds] : normal bowel sounds [Normal Gait] : normal gait [No Edema] : no edema [No Cyanosis] : no cyanosis [No Clubbing] : no clubbing [No Varicosities] : no varicosities [No Rash] : no rash [No Skin Lesions] : no skin lesions [Moves all extremities] : moves all extremities [No Focal Deficits] : no focal deficits [Normal Speech] : normal speech [Alert and Oriented] : alert and oriented [Normal memory] : normal memory [de-identified] : judd [de-identified] : right sided insp crackles

## 2021-12-17 NOTE — HISTORY OF PRESENT ILLNESS
[FreeTextEntry1] : 89-year-old female with an extensive past medical history including lymphoma status post VATS complicated by postop atrial fibrillation comes in to establish care. Patient is accompanied by her home health aide daughter who has old history is not available. Review of hospital records gleaned the most that I can, reviewed echocardiogram done in August which showed normal LV function mild to moderate valvular heart disease. Overall, patient reports feeling well denies chest pain shortness of breath PND or orthopnea.

## 2021-12-22 ENCOUNTER — RX RENEWAL (OUTPATIENT)
Age: 86
End: 2021-12-22

## 2021-12-27 ENCOUNTER — OUTPATIENT (OUTPATIENT)
Dept: OUTPATIENT SERVICES | Facility: HOSPITAL | Age: 86
LOS: 1 days | Discharge: HOME | End: 2021-12-27

## 2021-12-27 ENCOUNTER — RX RENEWAL (OUTPATIENT)
Age: 86
End: 2021-12-27

## 2021-12-27 DIAGNOSIS — C85.80 OTHER SPECIFIED TYPES OF NON-HODGKIN LYMPHOMA, UNSPECIFIED SITE: ICD-10-CM

## 2021-12-27 DIAGNOSIS — Z96.649 PRESENCE OF UNSPECIFIED ARTIFICIAL HIP JOINT: Chronic | ICD-10-CM

## 2021-12-27 DIAGNOSIS — Z96.641 PRESENCE OF RIGHT ARTIFICIAL HIP JOINT: Chronic | ICD-10-CM

## 2021-12-29 ENCOUNTER — LABORATORY RESULT (OUTPATIENT)
Age: 86
End: 2021-12-29

## 2021-12-30 ENCOUNTER — APPOINTMENT (OUTPATIENT)
Dept: INFUSION THERAPY | Facility: HOSPITAL | Age: 86
End: 2021-12-30

## 2022-01-14 ENCOUNTER — LABORATORY RESULT (OUTPATIENT)
Age: 87
End: 2022-01-14

## 2022-01-24 ENCOUNTER — LABORATORY RESULT (OUTPATIENT)
Age: 87
End: 2022-01-24

## 2022-01-25 ENCOUNTER — OUTPATIENT (OUTPATIENT)
Dept: OUTPATIENT SERVICES | Facility: HOSPITAL | Age: 87
LOS: 1 days | Discharge: HOME | End: 2022-01-25

## 2022-01-25 DIAGNOSIS — C85.80 OTHER SPECIFIED TYPES OF NON-HODGKIN LYMPHOMA, UNSPECIFIED SITE: ICD-10-CM

## 2022-01-25 DIAGNOSIS — Z96.641 PRESENCE OF RIGHT ARTIFICIAL HIP JOINT: Chronic | ICD-10-CM

## 2022-01-25 DIAGNOSIS — Z96.649 PRESENCE OF UNSPECIFIED ARTIFICIAL HIP JOINT: Chronic | ICD-10-CM

## 2022-01-27 ENCOUNTER — RESULT REVIEW (OUTPATIENT)
Age: 87
End: 2022-01-27

## 2022-01-27 ENCOUNTER — APPOINTMENT (OUTPATIENT)
Dept: INFUSION THERAPY | Facility: HOSPITAL | Age: 87
End: 2022-01-27

## 2022-01-27 LAB
APPEARANCE UR: ABNORMAL
BACTERIA # UR AUTO: ABNORMAL
BILIRUB UR-MCNC: NEGATIVE — SIGNIFICANT CHANGE UP
COLOR SPEC: YELLOW — SIGNIFICANT CHANGE UP
DIFF PNL FLD: SIGNIFICANT CHANGE UP
EPI CELLS # UR: 0 /HPF — SIGNIFICANT CHANGE UP (ref 0–5)
GLUCOSE UR QL: NEGATIVE — SIGNIFICANT CHANGE UP
HYALINE CASTS # UR AUTO: 1 /LPF — SIGNIFICANT CHANGE UP (ref 0–7)
KETONES UR-MCNC: NEGATIVE — SIGNIFICANT CHANGE UP
LEUKOCYTE ESTERASE UR-ACNC: ABNORMAL
NITRITE UR-MCNC: POSITIVE
PH UR: 6 — SIGNIFICANT CHANGE UP (ref 5–8)
PROT UR-MCNC: ABNORMAL
RBC CASTS # UR COMP ASSIST: 2 /HPF — SIGNIFICANT CHANGE UP (ref 0–4)
SP GR SPEC: 1.02 — SIGNIFICANT CHANGE UP (ref 1.01–1.02)
UROBILINOGEN FLD QL: SIGNIFICANT CHANGE UP
WBC UR QL: 586 /HPF — HIGH (ref 0–5)

## 2022-01-28 ENCOUNTER — NON-APPOINTMENT (OUTPATIENT)
Age: 87
End: 2022-01-28

## 2022-01-28 DIAGNOSIS — Z51.89 ENCOUNTER FOR OTHER SPECIFIED AFTERCARE: ICD-10-CM

## 2022-01-28 DIAGNOSIS — Z51.11 ENCOUNTER FOR ANTINEOPLASTIC CHEMOTHERAPY: ICD-10-CM

## 2022-01-28 DIAGNOSIS — R11.2 NAUSEA WITH VOMITING, UNSPECIFIED: ICD-10-CM

## 2022-02-09 NOTE — DISCUSSION/SUMMARY
[de-identified] : 89-year-old woman with right proximal humerus fracture\par -Long discussion was held with the patient and the patient's daughter regarding the risks and benefits of operative versus nonoperative management of this injury.  Patient's daughter shows a good understanding of the injury and treatment options.  She would like to move forward with nonoperative management of this humerus fracture.\par -Physical therapy for range of motion right shoulder elbow wrist and fingers\par -Tylenol for pain\par -Home pendulum exercises and wall walking exercises were demonstrated in the office\par -All of the patient's and patient's daughter's questions and concerns were addressed during this visit\par -Follow-up in 4 weeks with x-rays of the right shoulder at that time

## 2022-02-09 NOTE — HISTORY OF PRESENT ILLNESS
[de-identified] : 89 year old woman presents today with right shoulder pain after a fall on 10/9/21.  Patient is with her daughter.

## 2022-02-09 NOTE — PHYSICAL EXAM
[de-identified] : Ms. TOMMIE BOONE is sitting comfortably in the exam room \par right UE\par -Skin is intact, no swelling, no ecchymosis\par -Able to make a fist\par -Pain with range of motion of right shoulder\par -Sensation is intact median, radial, ulnar nerves\par -Motor is intact median, radial, ulnar nerves\par -Hand is warm and well-perfused, Palpable radial and ulnar pulses [de-identified] : X-rays of the right shoulder were taken in the office today including AP internal rotation and axillary.  X-rays show significant glenohumeral arthritis.  The glenohumeral joint is reduced.  There is a proximal humerus fracture with the shaft impacted into the humeral head.  The chronicity of this injury is unclear.

## 2022-02-14 ENCOUNTER — NON-APPOINTMENT (OUTPATIENT)
Age: 87
End: 2022-02-14

## 2022-02-16 ENCOUNTER — LABORATORY RESULT (OUTPATIENT)
Age: 87
End: 2022-02-16

## 2022-02-17 ENCOUNTER — APPOINTMENT (OUTPATIENT)
Dept: HEMATOLOGY ONCOLOGY | Facility: CLINIC | Age: 87
End: 2022-02-17
Payer: MEDICARE

## 2022-02-17 ENCOUNTER — APPOINTMENT (OUTPATIENT)
Dept: INFUSION THERAPY | Facility: HOSPITAL | Age: 87
End: 2022-02-17

## 2022-02-17 PROCEDURE — 99215 OFFICE O/P EST HI 40 MIN: CPT

## 2022-02-17 NOTE — HISTORY OF PRESENT ILLNESS
[de-identified] : 89 year old woman with a pmh of COPD  was admitted to [facility] for worsening cough and lethargy despite treatment with antibiotics and steroids. She underwent R robotic VATS/pleural bx with placement of pleurex catheter on 07/15/21 after PET from 07/07/21 reveal b/l upper lobe opacities concerning for malignancy with pleural effusion. 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 Given her age and symptoms, patient and daughter opted for palliative chemotherapy. she received prednisone, followed by cytoxan (200 mg/m2) on 8/5-8/7/21 with marked improvement in respiratory status and mental status. Pleurex catheter stopped draining after initiation of cytoxan. She received rituximab which was complicated by infusion reaction. She received the full dose over 2 days. She was discharged to [facility] [facility] and presents for follow up. She received 4 cycles of R-CP which was overall well tolerated. She has a fall in mid October and did not initially seek medical care. Her daughter Dinorah called the office on 10/20 and given her shoulder pain it was recommended she get xrays and an evaluation. She was noted to have a R proximal humerus fracture and was sent from Urgent care to the ER for admission. She was also noted to have a possible PNA. While admitted, she was noted to be tachy-lidia. She was also found to have parainflueza and was treated with ceftriaxone/azithromycin for PNA. \par She received C5 on 11/18, C6 on 12/9, C7 on 12/30, and C8 on 1/27 (delayed due to neutropenia). \par \par  [de-identified] : 2/17/2022: She continues to feel improvement in her energy level. She reports her appetite has significantly improved but is still not back to normal. She endorses decrease in taste. She eats 3 meals a day. She has no fevers, chills or night sweats. She is doing physical therapy. Her breathing is stable. She is not on supplemental O2 anymore, but occasionally uses it at night. Her PET/CT showed a complete metabolic response. She has noticed some new pruritis in her legs but no rash.

## 2022-02-17 NOTE — ASSESSMENT
[FreeTextEntry1] : 89 yo female with new diagnosis of Stage IV DLBCL s/p treatment with RC and prednisone presents for follow up. Overall, tolerated treatment well without any significant side effects. She continues to improve in rehab. \par -continue RC and prednisone every 3 weeks, to receive C9 today (2/17/22), ok to treat\par -monitor weekly CBC, CMP, Mag, Phos, Uric acid, LDH while on treatment \par -transfusional support as needed \par -continue valacyclovir \par -continue Protonix \par -follow up orthopedics recs \par -discussed that we could potentially switch to curative intent treatment with R-mini-CHOP, will continue to discuss with patient and daughter Dinorah, will continue to discuss \par -will repeat CT scans for restaging to ensure continued response to therapy \par \par Follow up next treatment, sooner PRN

## 2022-03-08 ENCOUNTER — OUTPATIENT (OUTPATIENT)
Dept: OUTPATIENT SERVICES | Facility: HOSPITAL | Age: 87
LOS: 1 days | Discharge: HOME | End: 2022-03-08

## 2022-03-08 DIAGNOSIS — C85.80 OTHER SPECIFIED TYPES OF NON-HODGKIN LYMPHOMA, UNSPECIFIED SITE: ICD-10-CM

## 2022-03-08 DIAGNOSIS — Z96.649 PRESENCE OF UNSPECIFIED ARTIFICIAL HIP JOINT: Chronic | ICD-10-CM

## 2022-03-08 DIAGNOSIS — Z96.641 PRESENCE OF RIGHT ARTIFICIAL HIP JOINT: Chronic | ICD-10-CM

## 2022-03-10 ENCOUNTER — RESULT REVIEW (OUTPATIENT)
Age: 87
End: 2022-03-10

## 2022-03-10 ENCOUNTER — APPOINTMENT (OUTPATIENT)
Dept: INFUSION THERAPY | Facility: HOSPITAL | Age: 87
End: 2022-03-10

## 2022-03-10 ENCOUNTER — APPOINTMENT (OUTPATIENT)
Dept: HEMATOLOGY ONCOLOGY | Facility: CLINIC | Age: 87
End: 2022-03-10
Payer: MEDICARE

## 2022-03-10 VITALS
WEIGHT: 126 LBS | HEART RATE: 88 BPM | BODY MASS INDEX: 22.32 KG/M2 | SYSTOLIC BLOOD PRESSURE: 130 MMHG | OXYGEN SATURATION: 97 % | DIASTOLIC BLOOD PRESSURE: 80 MMHG

## 2022-03-10 LAB
ALBUMIN SERPL ELPH-MCNC: 4.4 G/DL
ALP BLD-CCNC: 144 U/L
ALT SERPL-CCNC: 11 U/L
ANION GAP SERPL CALC-SCNC: 15 MMOL/L
AST SERPL-CCNC: 15 U/L
BILIRUB SERPL-MCNC: 0.3 MG/DL
BUN SERPL-MCNC: 26 MG/DL
CALCIUM SERPL-MCNC: 9.4 MG/DL
CHLORIDE SERPL-SCNC: 113 MMOL/L
CO2 SERPL-SCNC: 19 MMOL/L
CREAT SERPL-MCNC: 0.98 MG/DL
EGFR: 55 ML/MIN/1.73M2
GLUCOSE SERPL-MCNC: 64 MG/DL
LDH SERPL-CCNC: 128 U/L
MAGNESIUM SERPL-MCNC: 2 MG/DL
PHOSPHATE SERPL-MCNC: 3.5 MG/DL
POTASSIUM SERPL-SCNC: 3.8 MMOL/L
PROT SERPL-MCNC: 6.3 G/DL
SODIUM SERPL-SCNC: 147 MMOL/L
URATE SERPL-MCNC: 4.7 MG/DL

## 2022-03-10 PROCEDURE — 99214 OFFICE O/P EST MOD 30 MIN: CPT | Mod: GC,25

## 2022-03-10 PROCEDURE — 85025 COMPLETE CBC W/AUTO DIFF WBC: CPT | Mod: GC

## 2022-03-10 NOTE — HISTORY OF PRESENT ILLNESS
[de-identified] : 89 year old woman with a PMH of COPD  was admitted to [facility] for worsening cough and lethargy despite treatment with antibiotics and steroids. She underwent R robotic VATS/pleural bx with placement of pleurex catheter on 07/15/21 after PET from 07/07/21 reveal b/l upper lobe opacities concerning for malignancy with pleural effusion. 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 Given her age and symptoms, patient and daughter opted for palliative chemotherapy. she received prednisone, followed by cytoxan (200 mg/m2) on 8/5-8/7/21 with marked improvement in respiratory status and mental status. Pleurex catheter stopped draining after initiation of cytoxan. She received rituximab which was complicated by infusion reaction. She received the full dose over 2 days. She was discharged to [place] [facility] and presents for follow up. She received 4 cycles of R-CP which was overall well tolerated. She has a fall in mid October and did not initially seek medical care. Her daughter Dinorah called the office on 10/20 and given her shoulder pain it was recommended she get xrays and an evaluation. She was noted to have a R proximal humerus fracture and was sent from Urgent care to the ER for admission. She was also noted to have a possible PNA. While admitted, she was noted to be tachy-lidia. She was also found to have parainflueza and was treated with ceftriaxone/azithromycin for PNA. \par She received C5 on 11/18, C6 on 12/9, C7 on 12/30, and C8 on 1/27 (delayed due to neutropenia). \par \par  [de-identified] : 3/10/22: She continues to feel improvement in her energy level. She reports her appetite has significantly improved but is still not back to normal. She endorses decrease in taste. She eats 3 meals a day. She has no fevers, chills or night sweats. She is doing physical therapy. Her breathing is stable. She is not on supplemental O2 anymore, but occasionally uses it at night. Her PET/CT showed a complete metabolic response.   Pt feels she may have a UTI - she has burning towards end of urination for the past few days

## 2022-03-10 NOTE — ASSESSMENT
[FreeTextEntry1] : 89 yo female with new diagnosis of Stage IV DLBCL s/p treatment with RC and prednisone presents for follow up. Overall, tolerated treatment well without any significant side effects. She continues to improve in rehab. \par -continue RC and prednisone every 3 weeks, to receive C10 today (3/10/22), ok to treat\par -monitor weekly CBC, CMP, Mag, Phos, Uric acid, LDH while on treatment \par -transfusional support as needed \par -continue valacyclovir \par -continue Protonix \par -follow up orthopedics recs \par -discussed that we could potentially switch to curative intent treatment with R-mini-CHOP, will continue to discuss with patient and daughter Dinorah, will continue to discuss \par -will repeat CT scans for restaging to ensure continued response to therapy \par -Urine culture sent out for possible UTI\par - Pt given information for [facility] - she will discuss with daughter\par \par Follow up next treatment, sooner PRN

## 2022-03-10 NOTE — RESULTS/DATA
[FreeTextEntry1] : CBC with diff 3/10/22\par WBC 15.8\par ALC 1.59\par ANC 12.74\par Hgb 11.9\par Hct 35.9\par Plt 202\par \par \par Final Diagnosis\par 1-Right pleural biopsy\par - Fatty tissue with granulation tissue\par See note and description.\par \par 2-Right pleural nodule\par - Lymphohistiocytic proliferation with scattered large cells\par See note and description.\par \par 3-Right peritracheal lymph node, level 4 for molecular testing\par - Lymphohistiocytic proliferation with scattered large cells\par See note and description.\par \par 4-Right peritracheal lymph node, level 4 for molecular testing\par - Lymphohistiocytic proliferation with increased large cells\par See note and description.\par \par 5-Right peritracheal lymph node, level 4\par - Diffuse large B cell lymphoma, non-germinal center B cell\par like immunophenotype\par See note and description.\par \par Diagnostic note:  Per chart review, patient presents with\par bilateral supraclavicular, bilateral mediastinal, and perigastric\par lymphadenopathy. Current biopsy is consistent with diffuse large\par B cell lymphoma, non-germinal center B-cell-like immunophenotype.\par FISH studies for MYC, BCL2 and BCL6 gene rearrangements are in\par process and will be reported as an addendum.\par

## 2022-03-10 NOTE — END OF VISIT
[FreeTextEntry3] : Patient seen and case discussed with DRAGAN Duncan. I agree with above and have edited the note where needed.\par

## 2022-03-11 DIAGNOSIS — Z51.11 ENCOUNTER FOR ANTINEOPLASTIC CHEMOTHERAPY: ICD-10-CM

## 2022-03-11 DIAGNOSIS — R11.2 NAUSEA WITH VOMITING, UNSPECIFIED: ICD-10-CM

## 2022-03-11 DIAGNOSIS — Z51.89 ENCOUNTER FOR OTHER SPECIFIED AFTERCARE: ICD-10-CM

## 2022-03-11 LAB
APPEARANCE: ABNORMAL
BACTERIA: ABNORMAL
BILIRUBIN URINE: NEGATIVE
BLOOD URINE: ABNORMAL
COLOR: YELLOW
GLUCOSE QUALITATIVE U: NEGATIVE
HYALINE CASTS: 0 /LPF
KETONES URINE: NEGATIVE
LEUKOCYTE ESTERASE URINE: ABNORMAL
MICROSCOPIC-UA: NORMAL
NITRITE URINE: NEGATIVE
PH URINE: 6
PROTEIN URINE: ABNORMAL
RED BLOOD CELLS URINE: 42 /HPF
SPECIFIC GRAVITY URINE: 1.02
SQUAMOUS EPITHELIAL CELLS: 6 /HPF
UROBILINOGEN URINE: NORMAL
WHITE BLOOD CELLS URINE: >720 /HPF

## 2022-03-14 LAB — BACTERIA UR CULT: ABNORMAL

## 2022-03-28 ENCOUNTER — RX RENEWAL (OUTPATIENT)
Age: 87
End: 2022-03-28

## 2022-03-29 ENCOUNTER — NON-APPOINTMENT (OUTPATIENT)
Age: 87
End: 2022-03-29

## 2022-03-31 ENCOUNTER — APPOINTMENT (OUTPATIENT)
Dept: INFUSION THERAPY | Facility: HOSPITAL | Age: 87
End: 2022-03-31

## 2022-03-31 ENCOUNTER — APPOINTMENT (OUTPATIENT)
Dept: HEMATOLOGY ONCOLOGY | Facility: CLINIC | Age: 87
End: 2022-03-31
Payer: MEDICARE

## 2022-03-31 VITALS
TEMPERATURE: 98.5 F | HEART RATE: 88 BPM | WEIGHT: 127 LBS | BODY MASS INDEX: 22.5 KG/M2 | RESPIRATION RATE: 12 BRPM | DIASTOLIC BLOOD PRESSURE: 78 MMHG | SYSTOLIC BLOOD PRESSURE: 120 MMHG | OXYGEN SATURATION: 96 %

## 2022-03-31 PROCEDURE — 99214 OFFICE O/P EST MOD 30 MIN: CPT | Mod: GC,25

## 2022-03-31 PROCEDURE — 85025 COMPLETE CBC W/AUTO DIFF WBC: CPT | Mod: GC

## 2022-03-31 NOTE — REVIEW OF SYSTEMS
[Fatigue] : fatigue [Dysuria] : dysuria [Negative] : Allergic/Immunologic [FreeTextEntry6] : shortness of breath and cough improved

## 2022-03-31 NOTE — ASSESSMENT
[FreeTextEntry1] : 89 yo female with new diagnosis of Stage IV DLBCL s/p treatment with RC and prednisone presents for follow up. Overall, tolerated treatment well without any significant side effects. She continues to improve in rehab. \par \par #DLBCL\par -continue RC and prednisone every 3 weeks, to receive C11 today (3/31/22), ok to treat\par -monitor weekly CBC, CMP, Mag, Phos, Uric acid, LDH while on treatment \par -transfusional support as needed \par -continue valacyclovir \par -continue Protonix \par -follow up orthopedics recs \par -discussed that we could potentially switch to curative intent treatment with R-mini-CHOP, will continue to discuss with patient and daughter Dinorah, will continue to discuss \par -will repeat CT scans for restaging to ensure continued response to therapy, scheduled 4/2/22\par -Urine culture sent out for possible UTI\par - Pt given information for [facility] - she will discuss with daughter\par \par Follow up next treatment, sooner PRN

## 2022-03-31 NOTE — END OF VISIT
[FreeTextEntry3] : Patient seen and case discussed with DRAGAN Nelson. I agree with above and have edited the note where needed.\par  Statement Selected

## 2022-03-31 NOTE — HISTORY OF PRESENT ILLNESS
[de-identified] : 89 year old woman with a PMH of COPD  was admitted to [facility] for worsening cough and lethargy despite treatment with antibiotics and steroids. She underwent R robotic VATS/pleural bx with placement of pleurex catheter on 07/15/21 after PET from 07/07/21 reveal b/l upper lobe opacities concerning for malignancy with pleural effusion. 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 Given her age and symptoms, patient and daughter opted for palliative chemotherapy. she received prednisone, followed by cytoxan (200 mg/m2) on 8/5-8/7/21 with marked improvement in respiratory status and mental status. Pleurex catheter stopped draining after initiation of cytoxan. She received rituximab which was complicated by infusion reaction. She received the full dose over 2 days. She was discharged to [place] [facility] and presents for follow up. She received 4 cycles of R-CP which was overall well tolerated. She has a fall in mid October and did not initially seek medical care. Her daughter Dinorah called the office on 10/20 and given her shoulder pain it was recommended she get xrays and an evaluation. She was noted to have a R proximal humerus fracture and was sent from Urgent care to the ER for admission. She was also noted to have a possible PNA. While admitted, she was noted to be tachy-lidia. She was also found to have parainflueza and was treated with ceftriaxone/azithromycin for PNA. \par She received C5 on 11/18, C6 on 12/9, C7 on 12/30, and C8 on 1/27 (delayed due to neutropenia). \par \par  [de-identified] : 3/31/22:\par Since last visit, was found to have UTI, urine culture 3/10/22 grew klebsiella and patient was started on nitrofurantoin 100 mg 1 capsule BID x 7 days. She report burning during urination has resolved, however she is anxious about the complete resolution of the UTI and wants Ucx tested again today.\par She continues to feel improvement in her energy level. She reports her appetite has significantly improved but is still not back to normal. She endorses decrease in taste, but improving. She eats 3 meals a day. She has no fevers, chills or night sweats. She is doing physical therapy. Her breathing is stable. She is not on supplemental O2 anymore, but occasionally uses it at night. Her most recent PET/CT showed a complete metabolic response.

## 2022-03-31 NOTE — RESULTS/DATA
[FreeTextEntry1] : CBC with diff 3/31/22\par WBC 12.9\par ALC 1.34\par ANC 10.37\par Hgb 11.8\par Hct 35.7\par Plt 219.0\par \par \par Final Diagnosis\par 1-Right pleural biopsy\par - Fatty tissue with granulation tissue\par See note and description.\par \par 2-Right pleural nodule\par - Lymphohistiocytic proliferation with scattered large cells\par See note and description.\par \par 3-Right peritracheal lymph node, level 4 for molecular testing\par - Lymphohistiocytic proliferation with scattered large cells\par See note and description.\par \par 4-Right peritracheal lymph node, level 4 for molecular testing\par - Lymphohistiocytic proliferation with increased large cells\par See note and description.\par \par 5-Right peritracheal lymph node, level 4\par - Diffuse large B cell lymphoma, non-germinal center B cell\par like immunophenotype\par See note and description.\par \par Diagnostic note:  Per chart review, patient presents with\par bilateral supraclavicular, bilateral mediastinal, and perigastric\par lymphadenopathy. Current biopsy is consistent with diffuse large\par B cell lymphoma, non-germinal center B-cell-like immunophenotype.\par FISH studies for MYC, BCL2 and BCL6 gene rearrangements are in\par process and will be reported as an addendum.\par

## 2022-04-01 LAB
ALBUMIN SERPL ELPH-MCNC: 4.6 G/DL
ALP BLD-CCNC: 224 U/L
ALT SERPL-CCNC: 16 U/L
ANION GAP SERPL CALC-SCNC: 14 MMOL/L
APPEARANCE: ABNORMAL
AST SERPL-CCNC: 11 U/L
BACTERIA: ABNORMAL
BILIRUB SERPL-MCNC: 0.2 MG/DL
BILIRUBIN URINE: NEGATIVE
BLOOD URINE: ABNORMAL
BUN SERPL-MCNC: 30 MG/DL
CALCIUM SERPL-MCNC: 10.1 MG/DL
CHLORIDE SERPL-SCNC: 110 MMOL/L
CO2 SERPL-SCNC: 20 MMOL/L
COLOR: NORMAL
CREAT SERPL-MCNC: 1.02 MG/DL
EGFR: 52 ML/MIN/1.73M2
GLUCOSE QUALITATIVE U: NEGATIVE
GLUCOSE SERPL-MCNC: 107 MG/DL
HYALINE CASTS: 1 /LPF
KETONES URINE: NEGATIVE
LDH SERPL-CCNC: 114 U/L
LEUKOCYTE ESTERASE URINE: ABNORMAL
MAGNESIUM SERPL-MCNC: 2.1 MG/DL
MICROSCOPIC-UA: NORMAL
NITRITE URINE: NEGATIVE
PH URINE: 6
PHOSPHATE SERPL-MCNC: 3.7 MG/DL
POTASSIUM SERPL-SCNC: 4.4 MMOL/L
PROT SERPL-MCNC: 6.4 G/DL
PROTEIN URINE: ABNORMAL
RED BLOOD CELLS URINE: 2 /HPF
SODIUM SERPL-SCNC: 144 MMOL/L
SPECIFIC GRAVITY URINE: 1.02
SQUAMOUS EPITHELIAL CELLS: 1 /HPF
URATE SERPL-MCNC: 5.1 MG/DL
URINE COMMENTS: NORMAL
UROBILINOGEN URINE: NORMAL
WHITE BLOOD CELLS URINE: >720 /HPF

## 2022-04-02 ENCOUNTER — APPOINTMENT (OUTPATIENT)
Dept: CT IMAGING | Facility: IMAGING CENTER | Age: 87
End: 2022-04-02
Payer: MEDICARE

## 2022-04-02 ENCOUNTER — APPOINTMENT (OUTPATIENT)
Dept: CT IMAGING | Facility: IMAGING CENTER | Age: 87
End: 2022-04-02

## 2022-04-02 ENCOUNTER — OUTPATIENT (OUTPATIENT)
Dept: OUTPATIENT SERVICES | Facility: HOSPITAL | Age: 87
LOS: 1 days | End: 2022-04-02
Payer: MEDICARE

## 2022-04-02 DIAGNOSIS — Z96.649 PRESENCE OF UNSPECIFIED ARTIFICIAL HIP JOINT: Chronic | ICD-10-CM

## 2022-04-02 DIAGNOSIS — C83.30 DIFFUSE LARGE B-CELL LYMPHOMA, UNSPECIFIED SITE: ICD-10-CM

## 2022-04-02 DIAGNOSIS — Z96.641 PRESENCE OF RIGHT ARTIFICIAL HIP JOINT: Chronic | ICD-10-CM

## 2022-04-02 PROCEDURE — 71260 CT THORAX DX C+: CPT | Mod: 26

## 2022-04-02 PROCEDURE — 71260 CT THORAX DX C+: CPT

## 2022-04-02 PROCEDURE — 74177 CT ABD & PELVIS W/CONTRAST: CPT

## 2022-04-02 PROCEDURE — 74177 CT ABD & PELVIS W/CONTRAST: CPT | Mod: 26

## 2022-04-04 ENCOUNTER — NON-APPOINTMENT (OUTPATIENT)
Age: 87
End: 2022-04-04

## 2022-04-04 LAB — BACTERIA UR CULT: ABNORMAL

## 2022-04-06 ENCOUNTER — APPOINTMENT (OUTPATIENT)
Dept: HEART AND VASCULAR | Facility: CLINIC | Age: 87
End: 2022-04-06

## 2022-04-15 ENCOUNTER — OUTPATIENT (OUTPATIENT)
Dept: OUTPATIENT SERVICES | Facility: HOSPITAL | Age: 87
LOS: 1 days | Discharge: HOME | End: 2022-04-15

## 2022-04-15 DIAGNOSIS — C85.80 OTHER SPECIFIED TYPES OF NON-HODGKIN LYMPHOMA, UNSPECIFIED SITE: ICD-10-CM

## 2022-04-15 DIAGNOSIS — Z96.649 PRESENCE OF UNSPECIFIED ARTIFICIAL HIP JOINT: Chronic | ICD-10-CM

## 2022-04-15 DIAGNOSIS — Z96.641 PRESENCE OF RIGHT ARTIFICIAL HIP JOINT: Chronic | ICD-10-CM

## 2022-04-21 ENCOUNTER — APPOINTMENT (OUTPATIENT)
Dept: RADIOLOGY | Facility: IMAGING CENTER | Age: 87
End: 2022-04-21
Payer: MEDICARE

## 2022-04-21 ENCOUNTER — LABORATORY RESULT (OUTPATIENT)
Age: 87
End: 2022-04-21

## 2022-04-21 ENCOUNTER — APPOINTMENT (OUTPATIENT)
Dept: HEMATOLOGY ONCOLOGY | Facility: CLINIC | Age: 87
End: 2022-04-21
Payer: MEDICARE

## 2022-04-21 ENCOUNTER — OUTPATIENT (OUTPATIENT)
Dept: OUTPATIENT SERVICES | Facility: HOSPITAL | Age: 87
LOS: 1 days | End: 2022-04-21
Payer: MEDICARE

## 2022-04-21 VITALS
OXYGEN SATURATION: 98 % | BODY MASS INDEX: 22.5 KG/M2 | WEIGHT: 127 LBS | HEART RATE: 93 BPM | SYSTOLIC BLOOD PRESSURE: 110 MMHG | DIASTOLIC BLOOD PRESSURE: 80 MMHG

## 2022-04-21 DIAGNOSIS — Z96.641 PRESENCE OF RIGHT ARTIFICIAL HIP JOINT: Chronic | ICD-10-CM

## 2022-04-21 DIAGNOSIS — C83.30 DIFFUSE LARGE B-CELL LYMPHOMA, UNSPECIFIED SITE: ICD-10-CM

## 2022-04-21 DIAGNOSIS — Z96.649 PRESENCE OF UNSPECIFIED ARTIFICIAL HIP JOINT: Chronic | ICD-10-CM

## 2022-04-21 PROCEDURE — 85025 COMPLETE CBC W/AUTO DIFF WBC: CPT | Mod: GC

## 2022-04-21 PROCEDURE — 71046 X-RAY EXAM CHEST 2 VIEWS: CPT | Mod: 26

## 2022-04-21 PROCEDURE — 71046 X-RAY EXAM CHEST 2 VIEWS: CPT

## 2022-04-21 PROCEDURE — 99214 OFFICE O/P EST MOD 30 MIN: CPT | Mod: GC,25

## 2022-04-22 ENCOUNTER — LABORATORY RESULT (OUTPATIENT)
Age: 87
End: 2022-04-22

## 2022-04-22 LAB
ALBUMIN SERPL ELPH-MCNC: 4.2 G/DL
ALP BLD-CCNC: 192 U/L
ALT SERPL-CCNC: 13 U/L
ANION GAP SERPL CALC-SCNC: 13 MMOL/L
AST SERPL-CCNC: 15 U/L
BILIRUB SERPL-MCNC: 0.4 MG/DL
BUN SERPL-MCNC: 35 MG/DL
CALCIUM SERPL-MCNC: 9.9 MG/DL
CHLORIDE SERPL-SCNC: 111 MMOL/L
CO2 SERPL-SCNC: 18 MMOL/L
CREAT SERPL-MCNC: 0.94 MG/DL
EGFR: 58 ML/MIN/1.73M2
GLUCOSE SERPL-MCNC: 87 MG/DL
LDH SERPL-CCNC: 121 U/L
MAGNESIUM SERPL-MCNC: 1.9 MG/DL
PHOSPHATE SERPL-MCNC: 3.6 MG/DL
POTASSIUM SERPL-SCNC: 4.1 MMOL/L
PROT SERPL-MCNC: 6.2 G/DL
SODIUM SERPL-SCNC: 143 MMOL/L
URATE SERPL-MCNC: 5 MG/DL

## 2022-04-23 NOTE — RESULTS/DATA
[FreeTextEntry1] : CBC with  4/21/22\par WBC 11.3\par ALC 0.64\par ANC 9.57\par Hgb 11.8\par Hct 35.8\par Plt 220\par \par \par Final Diagnosis\par 1-Right pleural biopsy\par - Fatty tissue with granulation tissue\par See note and description.\par \par 2-Right pleural nodule\par - Lymphohistiocytic proliferation with scattered large cells\par See note and description.\par \par 3-Right peritracheal lymph node, level 4 for molecular testing\par - Lymphohistiocytic proliferation with scattered large cells\par See note and description.\par \par 4-Right peritracheal lymph node, level 4 for molecular testing\par - Lymphohistiocytic proliferation with increased large cells\par See note and description.\par \par 5-Right peritracheal lymph node, level 4\par - Diffuse large B cell lymphoma, non-germinal center B cell\par like immunophenotype\par See note and description.\par \par Diagnostic note:  Per chart review, patient presents with\par bilateral supraclavicular, bilateral mediastinal, and perigastric\par lymphadenopathy. Current biopsy is consistent with diffuse large\par B cell lymphoma, non-germinal center B-cell-like immunophenotype.\par FISH studies for MYC, BCL2 and BCL6 gene rearrangements are in\par process and will be reported as an addendum.\par

## 2022-04-23 NOTE — HISTORY OF PRESENT ILLNESS
[de-identified] : 89 year old woman with a PMH of COPD  was admitted to [facility] for worsening cough and lethargy despite treatment with antibiotics and steroids. She underwent R robotic VATS/pleural bx with placement of pleurex catheter on 07/15/21 after PET from 07/07/21 reveal b/l upper lobe opacities concerning for malignancy with pleural effusion. 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 Given her age and symptoms, patient and daughter opted for palliative chemotherapy. she received prednisone, followed by cytoxan (200 mg/m2) on 8/5-8/7/21 with marked improvement in respiratory status and mental status. Pleurex catheter stopped draining after initiation of cytoxan. She received rituximab which was complicated by infusion reaction. She received the full dose over 2 days. She was discharged to [facility] and presents for follow up. She received 4 cycles of R-CP which was overall well tolerated. She has a fall in mid October and did not initially seek medical care. Her daughter Dinorah called the office on 10/20 and given her shoulder pain it was recommended she get xrays and an evaluation. She was noted to have a R proximal humerus fracture and was sent from Urgent care to the ER for admission. She was also noted to have a possible PNA. While admitted, she was noted to be tachy-lidia. She was also found to have parainflueza and was treated with ceftriaxone/azithromycin for PNA. \par She received C5 on 11/18, C6 on 12/9, C7 on 12/30, and C8 on 1/27 (delayed due to neutropenia). \par \par  [de-identified] : 4/21/22: Noted on interval scans to have progression in opacities in lung concerning for progressive lymphoma.\par Pt states she has a cough and rhinorrhea for the past few days, no fever/chills. She was exposed to a grandchild who had the cold recently. \par  She reports her appetite has significantly improved but is still not back to normal. She endorses decrease in taste, but improving. She eats 3 meals a day. She has no fevers, chills or night sweats. She is doing physical therapy. Her breathing is stable. She is not on supplemental O2 anymore, but occasionally uses it at night. Her most recent PET/CT showed a complete metabolic response. Pt completed a course of abx for UTI and was started on methenamine for UTI prevention.

## 2022-04-23 NOTE — PHYSICAL EXAM
[Ambulatory and capable of all self care but unable to carry out any work activities] : Status 2- Ambulatory and capable of all self care but unable to carry out any work activities. Up and about more than 50% of waking hours [Normal] : affect appropriate [de-identified] : crackles - right lower lobe

## 2022-04-23 NOTE — ASSESSMENT
[FreeTextEntry1] : 89 yo female with new diagnosis of Stage IV DLBCL s/p treatment with RC and prednisone presents for follow up. Overall, tolerated treatment well without any significant side effects. She continues to improve in rehab. \par \par #DLBCL\par -Pt was on RC and prednisone every 3 weeks,\par - CT scan of chest done 4/2/22: Newly noted 9 mm opacity in the periphery of the right upper lobe and interval increase in size of a lingular opacity along the fissure.\par - Discussed with patient and daughter Dinorah we would prefer to start treatment with Rituxan and Polatuzumab; Went over risk/side effects of Polatuzumab and consent signed today.\par -pt with new URI - will send pt for chest x-ray and COVID testing, will cancel today's infusion due to infection.  Will reschedule to next week pending results, can start steroids if negative for symptomatic relief \par -transfusional support as needed \par -continue valacyclovir \par -continue Protonix \par -follow up orthopedics recs \par -Continue methenamine hippurate for UTI prevention\par - Pt given information for [facility] - she will discuss with daughter\par \par F/u after next infusion

## 2022-04-28 ENCOUNTER — APPOINTMENT (OUTPATIENT)
Dept: INFUSION THERAPY | Facility: HOSPITAL | Age: 87
End: 2022-04-28

## 2022-04-28 ENCOUNTER — RESULT REVIEW (OUTPATIENT)
Age: 87
End: 2022-04-28

## 2022-04-28 ENCOUNTER — APPOINTMENT (OUTPATIENT)
Dept: HEMATOLOGY ONCOLOGY | Facility: CLINIC | Age: 87
End: 2022-04-28

## 2022-04-28 LAB
BASOPHILS # BLD AUTO: 0.07 K/UL — SIGNIFICANT CHANGE UP (ref 0–0.2)
BASOPHILS NFR BLD AUTO: 0.5 % — SIGNIFICANT CHANGE UP (ref 0–2)
EOSINOPHIL # BLD AUTO: 0.35 K/UL — SIGNIFICANT CHANGE UP (ref 0–0.5)
EOSINOPHIL NFR BLD AUTO: 2.5 % — SIGNIFICANT CHANGE UP (ref 0–6)
HCT VFR BLD CALC: 34.7 % — SIGNIFICANT CHANGE UP (ref 34.5–45)
HGB BLD-MCNC: 11.9 G/DL — SIGNIFICANT CHANGE UP (ref 11.5–15.5)
IMM GRANULOCYTES NFR BLD AUTO: 0.3 % — SIGNIFICANT CHANGE UP (ref 0–1.5)
LYMPHOCYTES # BLD AUTO: 0.44 K/UL — LOW (ref 1–3.3)
LYMPHOCYTES # BLD AUTO: 3.2 % — LOW (ref 13–44)
MCHC RBC-ENTMCNC: 31 PG — SIGNIFICANT CHANGE UP (ref 27–34)
MCHC RBC-ENTMCNC: 34.3 G/DL — SIGNIFICANT CHANGE UP (ref 32–36)
MCV RBC AUTO: 90.4 FL — SIGNIFICANT CHANGE UP (ref 80–100)
MONOCYTES # BLD AUTO: 1.24 K/UL — HIGH (ref 0–0.9)
MONOCYTES NFR BLD AUTO: 9 % — SIGNIFICANT CHANGE UP (ref 2–14)
NEUTROPHILS # BLD AUTO: 11.62 K/UL — HIGH (ref 1.8–7.4)
NEUTROPHILS NFR BLD AUTO: 84.5 % — HIGH (ref 43–77)
NRBC # BLD: 0 /100 WBCS — SIGNIFICANT CHANGE UP (ref 0–0)
PLATELET # BLD AUTO: 406 K/UL — HIGH (ref 150–400)
RBC # BLD: 3.84 M/UL — SIGNIFICANT CHANGE UP (ref 3.8–5.2)
RBC # FLD: 13.3 % — SIGNIFICANT CHANGE UP (ref 10.3–14.5)
WBC # BLD: 13.76 K/UL — HIGH (ref 3.8–10.5)
WBC # FLD AUTO: 13.76 K/UL — HIGH (ref 3.8–10.5)

## 2022-04-29 ENCOUNTER — NON-APPOINTMENT (OUTPATIENT)
Age: 87
End: 2022-04-29

## 2022-04-29 DIAGNOSIS — Z51.89 ENCOUNTER FOR OTHER SPECIFIED AFTERCARE: ICD-10-CM

## 2022-04-29 DIAGNOSIS — R11.2 NAUSEA WITH VOMITING, UNSPECIFIED: ICD-10-CM

## 2022-05-10 ENCOUNTER — RX RENEWAL (OUTPATIENT)
Age: 87
End: 2022-05-10

## 2022-05-10 ENCOUNTER — OUTPATIENT (OUTPATIENT)
Dept: OUTPATIENT SERVICES | Facility: HOSPITAL | Age: 87
LOS: 1 days | End: 2022-05-10
Payer: MEDICARE

## 2022-05-10 ENCOUNTER — APPOINTMENT (OUTPATIENT)
Dept: CT IMAGING | Facility: IMAGING CENTER | Age: 87
End: 2022-05-10
Payer: MEDICARE

## 2022-05-10 DIAGNOSIS — Z96.649 PRESENCE OF UNSPECIFIED ARTIFICIAL HIP JOINT: Chronic | ICD-10-CM

## 2022-05-10 DIAGNOSIS — Z96.641 PRESENCE OF RIGHT ARTIFICIAL HIP JOINT: Chronic | ICD-10-CM

## 2022-05-10 DIAGNOSIS — Z00.8 ENCOUNTER FOR OTHER GENERAL EXAMINATION: ICD-10-CM

## 2022-05-10 DIAGNOSIS — C83.30 DIFFUSE LARGE B-CELL LYMPHOMA, UNSPECIFIED SITE: ICD-10-CM

## 2022-05-10 PROCEDURE — 71275 CT ANGIOGRAPHY CHEST: CPT

## 2022-05-10 PROCEDURE — 71275 CT ANGIOGRAPHY CHEST: CPT | Mod: 26

## 2022-05-16 ENCOUNTER — INPATIENT (INPATIENT)
Facility: HOSPITAL | Age: 87
LOS: 5 days | Discharge: HOME HEALTH | End: 2022-05-22
Attending: STUDENT IN AN ORGANIZED HEALTH CARE EDUCATION/TRAINING PROGRAM | Admitting: STUDENT IN AN ORGANIZED HEALTH CARE EDUCATION/TRAINING PROGRAM
Payer: MEDICARE

## 2022-05-16 ENCOUNTER — OUTPATIENT (OUTPATIENT)
Dept: OUTPATIENT SERVICES | Facility: HOSPITAL | Age: 87
LOS: 1 days | Discharge: HOME | End: 2022-05-16

## 2022-05-16 VITALS
TEMPERATURE: 98 F | HEART RATE: 94 BPM | RESPIRATION RATE: 16 BRPM | SYSTOLIC BLOOD PRESSURE: 93 MMHG | DIASTOLIC BLOOD PRESSURE: 77 MMHG | HEIGHT: 62 IN | OXYGEN SATURATION: 98 %

## 2022-05-16 DIAGNOSIS — Z96.641 PRESENCE OF RIGHT ARTIFICIAL HIP JOINT: Chronic | ICD-10-CM

## 2022-05-16 DIAGNOSIS — Z29.9 ENCOUNTER FOR PROPHYLACTIC MEASURES, UNSPECIFIED: ICD-10-CM

## 2022-05-16 DIAGNOSIS — Z96.649 PRESENCE OF UNSPECIFIED ARTIFICIAL HIP JOINT: Chronic | ICD-10-CM

## 2022-05-16 DIAGNOSIS — C85.80 OTHER SPECIFIED TYPES OF NON-HODGKIN LYMPHOMA, UNSPECIFIED SITE: ICD-10-CM

## 2022-05-16 DIAGNOSIS — Z87.09 PERSONAL HISTORY OF OTHER DISEASES OF THE RESPIRATORY SYSTEM: ICD-10-CM

## 2022-05-16 DIAGNOSIS — N17.9 ACUTE KIDNEY FAILURE, UNSPECIFIED: ICD-10-CM

## 2022-05-16 DIAGNOSIS — R06.02 SHORTNESS OF BREATH: ICD-10-CM

## 2022-05-16 DIAGNOSIS — R06.00 DYSPNEA, UNSPECIFIED: ICD-10-CM

## 2022-05-16 DIAGNOSIS — N39.0 URINARY TRACT INFECTION, SITE NOT SPECIFIED: ICD-10-CM

## 2022-05-16 DIAGNOSIS — C85.90 NON-HODGKIN LYMPHOMA, UNSPECIFIED, UNSPECIFIED SITE: ICD-10-CM

## 2022-05-16 LAB
ALBUMIN SERPL ELPH-MCNC: 3.7 G/DL — SIGNIFICANT CHANGE UP (ref 3.3–5)
ALP SERPL-CCNC: 144 U/L — HIGH (ref 40–120)
ALT FLD-CCNC: 18 U/L — SIGNIFICANT CHANGE UP (ref 4–33)
ANION GAP SERPL CALC-SCNC: 15 MMOL/L — HIGH (ref 7–14)
AST SERPL-CCNC: 18 U/L — SIGNIFICANT CHANGE UP (ref 4–32)
B PERT DNA SPEC QL NAA+PROBE: SIGNIFICANT CHANGE UP
B PERT+PARAPERT DNA PNL SPEC NAA+PROBE: SIGNIFICANT CHANGE UP
BASOPHILS # BLD AUTO: 0.03 K/UL — SIGNIFICANT CHANGE UP (ref 0–0.2)
BASOPHILS NFR BLD AUTO: 0.3 % — SIGNIFICANT CHANGE UP (ref 0–2)
BILIRUB SERPL-MCNC: 0.3 MG/DL — SIGNIFICANT CHANGE UP (ref 0.2–1.2)
BLOOD GAS VENOUS COMPREHENSIVE RESULT: SIGNIFICANT CHANGE UP
BORDETELLA PARAPERTUSSIS (RAPRVP): SIGNIFICANT CHANGE UP
BUN SERPL-MCNC: 32 MG/DL — HIGH (ref 7–23)
C PNEUM DNA SPEC QL NAA+PROBE: SIGNIFICANT CHANGE UP
CALCIUM SERPL-MCNC: 10 MG/DL — SIGNIFICANT CHANGE UP (ref 8.4–10.5)
CHLORIDE SERPL-SCNC: 110 MMOL/L — HIGH (ref 98–107)
CO2 SERPL-SCNC: 19 MMOL/L — LOW (ref 22–31)
CREAT SERPL-MCNC: 1.3 MG/DL — SIGNIFICANT CHANGE UP (ref 0.5–1.3)
EGFR: 39 ML/MIN/1.73M2 — LOW
EOSINOPHIL # BLD AUTO: 0.28 K/UL — SIGNIFICANT CHANGE UP (ref 0–0.5)
EOSINOPHIL NFR BLD AUTO: 2.7 % — SIGNIFICANT CHANGE UP (ref 0–6)
FLUAV SUBTYP SPEC NAA+PROBE: SIGNIFICANT CHANGE UP
FLUBV RNA SPEC QL NAA+PROBE: SIGNIFICANT CHANGE UP
GLUCOSE SERPL-MCNC: 102 MG/DL — HIGH (ref 70–99)
HADV DNA SPEC QL NAA+PROBE: SIGNIFICANT CHANGE UP
HCOV 229E RNA SPEC QL NAA+PROBE: SIGNIFICANT CHANGE UP
HCOV HKU1 RNA SPEC QL NAA+PROBE: SIGNIFICANT CHANGE UP
HCOV NL63 RNA SPEC QL NAA+PROBE: SIGNIFICANT CHANGE UP
HCOV OC43 RNA SPEC QL NAA+PROBE: SIGNIFICANT CHANGE UP
HCT VFR BLD CALC: 37.8 % — SIGNIFICANT CHANGE UP (ref 34.5–45)
HGB BLD-MCNC: 12.2 G/DL — SIGNIFICANT CHANGE UP (ref 11.5–15.5)
HMPV RNA SPEC QL NAA+PROBE: SIGNIFICANT CHANGE UP
HPIV1 RNA SPEC QL NAA+PROBE: SIGNIFICANT CHANGE UP
HPIV2 RNA SPEC QL NAA+PROBE: SIGNIFICANT CHANGE UP
HPIV3 RNA SPEC QL NAA+PROBE: SIGNIFICANT CHANGE UP
HPIV4 RNA SPEC QL NAA+PROBE: SIGNIFICANT CHANGE UP
IANC: 8.42 K/UL — HIGH (ref 1.8–7.4)
IMM GRANULOCYTES NFR BLD AUTO: 0.3 % — SIGNIFICANT CHANGE UP (ref 0–1.5)
LYMPHOCYTES # BLD AUTO: 0.68 K/UL — LOW (ref 1–3.3)
LYMPHOCYTES # BLD AUTO: 6.6 % — LOW (ref 13–44)
M PNEUMO DNA SPEC QL NAA+PROBE: SIGNIFICANT CHANGE UP
MAGNESIUM SERPL-MCNC: 1.9 MG/DL — SIGNIFICANT CHANGE UP (ref 1.6–2.6)
MCHC RBC-ENTMCNC: 28.9 PG — SIGNIFICANT CHANGE UP (ref 27–34)
MCHC RBC-ENTMCNC: 32.3 GM/DL — SIGNIFICANT CHANGE UP (ref 32–36)
MCV RBC AUTO: 89.6 FL — SIGNIFICANT CHANGE UP (ref 80–100)
MONOCYTES # BLD AUTO: 0.79 K/UL — SIGNIFICANT CHANGE UP (ref 0–0.9)
MONOCYTES NFR BLD AUTO: 7.7 % — SIGNIFICANT CHANGE UP (ref 2–14)
NEUTROPHILS # BLD AUTO: 8.42 K/UL — HIGH (ref 1.8–7.4)
NEUTROPHILS NFR BLD AUTO: 82.4 % — HIGH (ref 43–77)
NRBC # BLD: 0 /100 WBCS — SIGNIFICANT CHANGE UP
NRBC # FLD: 0 K/UL — SIGNIFICANT CHANGE UP
NT-PROBNP SERPL-SCNC: 6096 PG/ML — HIGH
PHOSPHATE SERPL-MCNC: 3.3 MG/DL — SIGNIFICANT CHANGE UP (ref 2.5–4.5)
PLATELET # BLD AUTO: 275 K/UL — SIGNIFICANT CHANGE UP (ref 150–400)
POTASSIUM SERPL-MCNC: 3.9 MMOL/L — SIGNIFICANT CHANGE UP (ref 3.5–5.3)
POTASSIUM SERPL-SCNC: 3.9 MMOL/L — SIGNIFICANT CHANGE UP (ref 3.5–5.3)
PROCALCITONIN SERPL-MCNC: 0.08 NG/ML — SIGNIFICANT CHANGE UP (ref 0.02–0.1)
PROT SERPL-MCNC: 6.3 G/DL — SIGNIFICANT CHANGE UP (ref 6–8.3)
RAPID RVP RESULT: SIGNIFICANT CHANGE UP
RBC # BLD: 4.22 M/UL — SIGNIFICANT CHANGE UP (ref 3.8–5.2)
RBC # FLD: 14.2 % — SIGNIFICANT CHANGE UP (ref 10.3–14.5)
RSV RNA SPEC QL NAA+PROBE: SIGNIFICANT CHANGE UP
RV+EV RNA SPEC QL NAA+PROBE: SIGNIFICANT CHANGE UP
SARS-COV-2 RNA SPEC QL NAA+PROBE: SIGNIFICANT CHANGE UP
SODIUM SERPL-SCNC: 144 MMOL/L — SIGNIFICANT CHANGE UP (ref 135–145)
TROPONIN T, HIGH SENSITIVITY RESULT: 38 NG/L — SIGNIFICANT CHANGE UP
TROPONIN T, HIGH SENSITIVITY RESULT: 39 NG/L — SIGNIFICANT CHANGE UP
WBC # BLD: 10.23 K/UL — SIGNIFICANT CHANGE UP (ref 3.8–10.5)
WBC # FLD AUTO: 10.23 K/UL — SIGNIFICANT CHANGE UP (ref 3.8–10.5)

## 2022-05-16 PROCEDURE — 71045 X-RAY EXAM CHEST 1 VIEW: CPT | Mod: 26

## 2022-05-16 PROCEDURE — 99285 EMERGENCY DEPT VISIT HI MDM: CPT | Mod: GC

## 2022-05-16 PROCEDURE — 99223 1ST HOSP IP/OBS HIGH 75: CPT | Mod: GC

## 2022-05-16 RX ORDER — METOPROLOL TARTRATE 50 MG
25 TABLET ORAL
Refills: 0 | Status: DISCONTINUED | OUTPATIENT
Start: 2022-05-16 | End: 2022-05-22

## 2022-05-16 RX ORDER — CEFEPIME 1 G/1
1000 INJECTION, POWDER, FOR SOLUTION INTRAMUSCULAR; INTRAVENOUS ONCE
Refills: 0 | Status: COMPLETED | OUTPATIENT
Start: 2022-05-16 | End: 2022-05-16

## 2022-05-16 RX ORDER — OMEPRAZOLE 10 MG/1
1 CAPSULE, DELAYED RELEASE ORAL
Qty: 0 | Refills: 0 | DISCHARGE

## 2022-05-16 RX ORDER — VANCOMYCIN HCL 1 G
1000 VIAL (EA) INTRAVENOUS ONCE
Refills: 0 | Status: COMPLETED | OUTPATIENT
Start: 2022-05-16 | End: 2022-05-16

## 2022-05-16 RX ORDER — HEPARIN SODIUM 5000 [USP'U]/ML
5000 INJECTION INTRAVENOUS; SUBCUTANEOUS EVERY 12 HOURS
Refills: 0 | Status: DISCONTINUED | OUTPATIENT
Start: 2022-05-16 | End: 2022-05-19

## 2022-05-16 RX ORDER — IPRATROPIUM/ALBUTEROL SULFATE 18-103MCG
3 AEROSOL WITH ADAPTER (GRAM) INHALATION EVERY 6 HOURS
Refills: 0 | Status: DISCONTINUED | OUTPATIENT
Start: 2022-05-16 | End: 2022-05-22

## 2022-05-16 RX ORDER — CHOLECALCIFEROL (VITAMIN D3) 125 MCG
1 CAPSULE ORAL
Qty: 0 | Refills: 0 | DISCHARGE

## 2022-05-16 RX ORDER — ALPRAZOLAM 0.25 MG
0 TABLET ORAL
Qty: 0 | Refills: 0 | DISCHARGE

## 2022-05-16 RX ORDER — ACETAMINOPHEN 500 MG
2 TABLET ORAL
Qty: 0 | Refills: 0 | DISCHARGE

## 2022-05-16 RX ORDER — PANTOPRAZOLE SODIUM 20 MG/1
40 TABLET, DELAYED RELEASE ORAL
Refills: 0 | Status: DISCONTINUED | OUTPATIENT
Start: 2022-05-16 | End: 2022-05-22

## 2022-05-16 RX ORDER — FUROSEMIDE 40 MG
20 TABLET ORAL ONCE
Refills: 0 | Status: COMPLETED | OUTPATIENT
Start: 2022-05-16 | End: 2022-05-16

## 2022-05-16 RX ORDER — VALACYCLOVIR 500 MG/1
500 TABLET, FILM COATED ORAL DAILY
Refills: 0 | Status: DISCONTINUED | OUTPATIENT
Start: 2022-05-16 | End: 2022-05-22

## 2022-05-16 RX ORDER — ESCITALOPRAM OXALATE 10 MG/1
15 TABLET, FILM COATED ORAL DAILY
Refills: 0 | Status: DISCONTINUED | OUTPATIENT
Start: 2022-05-16 | End: 2022-05-22

## 2022-05-16 RX ORDER — FOLIC ACID 0.8 MG
1 TABLET ORAL DAILY
Refills: 0 | Status: DISCONTINUED | OUTPATIENT
Start: 2022-05-16 | End: 2022-05-22

## 2022-05-16 RX ADMIN — Medication 250 MILLIGRAM(S): at 21:12

## 2022-05-16 RX ADMIN — Medication 3 MILLILITER(S): at 21:40

## 2022-05-16 RX ADMIN — CEFEPIME 100 MILLIGRAM(S): 1 INJECTION, POWDER, FOR SOLUTION INTRAMUSCULAR; INTRAVENOUS at 18:52

## 2022-05-16 RX ADMIN — HEPARIN SODIUM 5000 UNIT(S): 5000 INJECTION INTRAVENOUS; SUBCUTANEOUS at 21:40

## 2022-05-16 RX ADMIN — ESCITALOPRAM OXALATE 15 MILLIGRAM(S): 10 TABLET, FILM COATED ORAL at 21:39

## 2022-05-16 NOTE — H&P ADULT - NSHPREVIEWOFSYSTEMS_GEN_ALL_CORE
REVIEW OF SYSTEMS:    CONSTITUTIONAL: See HPI  EYES/ENT: No visual changes;  No vertigo or throat pain   NECK: No pain or stiffness  RESPIRATORY: See HPI  CARDIOVASCULAR: No chest pain or palpitations  GASTROINTESTINAL: See HPI  GENITOURINARY: No dysuria, frequency or hematuria  NEUROLOGICAL: No numbness or weakness  SKIN: No itching, rashes

## 2022-05-16 NOTE — ED ADULT TRIAGE NOTE - CHIEF COMPLAINT QUOTE
Pt brought in by EMS from home complaining of SOB and cough. Pt has a hx of non-hodgkin's lymphoma, currently on chemo. Pt denies chest pain, n/v/d, fever or chills.

## 2022-05-16 NOTE — ED ADULT NURSE NOTE - OBJECTIVE STATEMENT
Pt received to rm 5  , awake and alert, A&OX4, ambulatory with assistance. C/o DESHPANDE over the last 2 days, worsening today. Also reporting non productive cough.  Received w 20G IV to LAC. + flush + blood return. Resp even and unlabored. Denies CP, N/V, HA, dizziness, palpitations, fatigue. Bed in lowest position, call bell within reach. Will continue to monitor.

## 2022-05-16 NOTE — ED PROVIDER NOTE - PHYSICAL EXAMINATION
Gen: chronically ill appearing female NAD   HEENT: NCAT, EOMI, no nasal discharge, mucous membranes moist  CV: RRR, +S1/S2, no M/R/G  Resp: +rhonchi b/l no wheezes   GI: Abdomen soft non-distended, NTTP, no masses  MSK: No open wounds, no bruising, no LE edema  Neuro: A&Ox4, following commands, moving all four extremities spontaneously  Psych: appropriate mood

## 2022-05-16 NOTE — ED PROVIDER NOTE - PROGRESS NOTE DETAILS
Ford, PGY3 - pt stable on 2L NC. CXR concern for infiltrate, abx ordered. pt endorsed to hospitalist pending rpt trop pt denies cp.

## 2022-05-16 NOTE — H&P ADULT - HISTORY OF PRESENT ILLNESS
89F with hx of lymphoma on chemotherapy (last session a few weeks ago), COPD, HFrEF (normal EF last echo) here for 1 week of exertional SOB with cough. Denies fevers. Produces some sputum, non-purulent. Dyspnea has been worse with exertion and resolves with rest. Pt has home O2 from previous hospitalization last year for pneumonia, but has not required it prior to this week.     Earlier in week patient had episode of diarrhea, non-bloody, since resolved and has had solid bowel movements since.   No other complaints.    Pt is unclear on her past medical history. Denies having any cardiac or thyroid past medical history.    PCP Dr. Shruti Esparza

## 2022-05-16 NOTE — H&P ADULT - PROBLEM SELECTOR PLAN 6
Plan:  -continue home omeprazole, escitalopram, KCl  GOC: f/u daughter DNR/DNI status  DVT: SQH  Diet: regular  Bowel regimen:   PT:   PCP: Vilma   Family:   Dispo:

## 2022-05-16 NOTE — H&P ADULT - ASSESSMENT
89F with hx of lymphoma on chemotherapy (last session a few weeks ago), COPD, HFrEF (normal EF last echo) here for 1 week of exertional SOB 2/2 pulmonary edema vs pneumonia vs COPD exacerbation.

## 2022-05-16 NOTE — ED PROVIDER NOTE - CARE PLAN
Principal Discharge DX:	Shortness of breath   1 Principal Discharge DX:	Shortness of breath  Secondary Diagnosis:	Pneumonia

## 2022-05-16 NOTE — ED PROVIDER NOTE - CLINICAL SUMMARY MEDICAL DECISION MAKING FREE TEXT BOX
89 y/o F PMH COPD (has home O2 but hasn't needed it for last 6 months), lymphoma on chemo (last tx 2.5 weeks ago) presents to ED c/o worsening DESHPANDE and sob x1 week associated with cough. Pt had CTA chest last week for same symptoms by her oncologist - no PE (results in PACS). low suspicion PE, higher concern for infection viral vs bacterial, also considered ACS, pulm edema. plan labs CXR trop EKG admit for dyspnea

## 2022-05-16 NOTE — ED PROVIDER NOTE - OBJECTIVE STATEMENT
89 y/o F PMH COPD (has home O2 but hasn't needed it for last 6 months), lymphoma on chemo (last tx 2.5 weeks ago) presents to ED c/o worsening DESHPANDE and sob x1 week associated with cough. Pt had CTA chest last week for same symptoms by her oncologist - no PE (results in PACS). Denies cp, f/c, palpitations, abd pain, nausea, vomiting, dysuria, hematuria. Pt notes she had diarrhea a few days ago, denies blood in stool. daughter called EMS today for persistent symptoms not improving.

## 2022-05-16 NOTE — H&P ADULT - PROBLEM SELECTOR PLAN 1
likely 2/2 pulmonary edema seen on CT outpatient, BNP 6000. Afebrile, no WBC, procalcitonin .08  -f/u echo  -20 mg IV lasix 5/16  -holding Levaquin started 5/9 outpatient  -wean to RA  -ambulatory sats tomorrow 5/17

## 2022-05-16 NOTE — H&P ADULT - PROBLEM SELECTOR PLAN 3
last chemo a few weeks ago with rutixan, polatuzumab  -continue home valacyclovir, folic acid  -outpatient follow-up  -f/u collateral about status of treatment/ prognosis

## 2022-05-16 NOTE — ED PROVIDER NOTE - ATTENDING CONTRIBUTION TO CARE
Attending Statement: I have personally seen and examined this patient. I have fully participated in the care of this patient. I have reviewed all pertinent clinical information, including history physical exam, plan and the Resident's note and agree except as noted  89yoF hx of COPD, Lymphoma on chemo, OA BIBEMS from home w daughter co SOB over the last week. +DESHPANDE, gradually increase no cp. Baseline slight cough, not worse. Pain  along the left scapula last week, states had a neg ct angio chest, no PE. no fever no n/v/d no abdominal pain. Today called PMD advised to come to ED.   Vital signs noted. Sitting up, nontoxic, refusing to wear a mask, coughing intermittently. no retractions. no work of breathing. soft nontender abdomen. no  rebound. no guarding. no sign of trauma. no CVAT no pedal edema. no calf tenderness. normal pulses bilateral feet.   plan labs, ekg, cxr, chart review, tba

## 2022-05-16 NOTE — H&P ADULT - NSHPPHYSICALEXAM_GEN_ALL_CORE
VITALS:   T(C): 36.5 (05-16-22 @ 12:44), Max: 36.5 (05-16-22 @ 12:44)  HR: 82 (05-16-22 @ 18:55) (82 - 94)  BP: 92/73 (05-16-22 @ 18:55) (92/73 - 158/85)  RR: 18 (05-16-22 @ 18:55) (16 - 18)  SpO2: 100% (05-16-22 @ 18:55) (98% - 100%)    PHYSICAL EXAM:     GENERAL: NAD, lying in bed comfortably, periodic coughing  HEAD:  Atraumatic, Normocephalic  EYES: EOMI, PERRLA, conjunctiva and sclera clear  ENT: Moist mucous membranes  NECK: Supple, No JVD  CHEST/LUNG: difficult to appreciate due to coughing  HEART: Regular rate and rhythm; No murmurs, rubs, or gallops  ABDOMEN: normal bowel sounds; Soft, nontender, nondistended  EXTREMITIES:  2+ Peripheral Pulses, brisk capillary refill. No clubbing, cyanosis, or edema  Neurological:  A&Ox3, no focal deficits   SKIN: No rashes or lesions  PSYCH: normal affect and mood

## 2022-05-17 LAB
ALBUMIN SERPL ELPH-MCNC: 3.4 G/DL — SIGNIFICANT CHANGE UP (ref 3.3–5)
ALP SERPL-CCNC: 134 U/L — HIGH (ref 40–120)
ALT FLD-CCNC: 16 U/L — SIGNIFICANT CHANGE UP (ref 4–33)
ANION GAP SERPL CALC-SCNC: 13 MMOL/L — SIGNIFICANT CHANGE UP (ref 7–14)
AST SERPL-CCNC: 17 U/L — SIGNIFICANT CHANGE UP (ref 4–32)
BASOPHILS # BLD AUTO: 0.03 K/UL — SIGNIFICANT CHANGE UP (ref 0–0.2)
BASOPHILS NFR BLD AUTO: 0.4 % — SIGNIFICANT CHANGE UP (ref 0–2)
BILIRUB SERPL-MCNC: 0.3 MG/DL — SIGNIFICANT CHANGE UP (ref 0.2–1.2)
BUN SERPL-MCNC: 32 MG/DL — HIGH (ref 7–23)
CALCIUM SERPL-MCNC: 10.2 MG/DL — SIGNIFICANT CHANGE UP (ref 8.4–10.5)
CHLORIDE SERPL-SCNC: 110 MMOL/L — HIGH (ref 98–107)
CO2 SERPL-SCNC: 19 MMOL/L — LOW (ref 22–31)
CREAT SERPL-MCNC: 1.19 MG/DL — SIGNIFICANT CHANGE UP (ref 0.5–1.3)
EGFR: 43 ML/MIN/1.73M2 — LOW
EOSINOPHIL # BLD AUTO: 0.32 K/UL — SIGNIFICANT CHANGE UP (ref 0–0.5)
EOSINOPHIL NFR BLD AUTO: 4.3 % — SIGNIFICANT CHANGE UP (ref 0–6)
GLUCOSE SERPL-MCNC: 107 MG/DL — HIGH (ref 70–99)
HCT VFR BLD CALC: 37.8 % — SIGNIFICANT CHANGE UP (ref 34.5–45)
HGB BLD-MCNC: 12.3 G/DL — SIGNIFICANT CHANGE UP (ref 11.5–15.5)
IANC: 5.93 K/UL — SIGNIFICANT CHANGE UP (ref 1.8–7.4)
IMM GRANULOCYTES NFR BLD AUTO: 0.4 % — SIGNIFICANT CHANGE UP (ref 0–1.5)
LYMPHOCYTES # BLD AUTO: 0.52 K/UL — LOW (ref 1–3.3)
LYMPHOCYTES # BLD AUTO: 7 % — LOW (ref 13–44)
MAGNESIUM SERPL-MCNC: 2 MG/DL — SIGNIFICANT CHANGE UP (ref 1.6–2.6)
MCHC RBC-ENTMCNC: 29.1 PG — SIGNIFICANT CHANGE UP (ref 27–34)
MCHC RBC-ENTMCNC: 32.5 GM/DL — SIGNIFICANT CHANGE UP (ref 32–36)
MCV RBC AUTO: 89.4 FL — SIGNIFICANT CHANGE UP (ref 80–100)
MONOCYTES # BLD AUTO: 0.59 K/UL — SIGNIFICANT CHANGE UP (ref 0–0.9)
MONOCYTES NFR BLD AUTO: 8 % — SIGNIFICANT CHANGE UP (ref 2–14)
NEUTROPHILS # BLD AUTO: 5.93 K/UL — SIGNIFICANT CHANGE UP (ref 1.8–7.4)
NEUTROPHILS NFR BLD AUTO: 79.9 % — HIGH (ref 43–77)
NRBC # BLD: 0 /100 WBCS — SIGNIFICANT CHANGE UP
NRBC # FLD: 0 K/UL — SIGNIFICANT CHANGE UP
PHOSPHATE SERPL-MCNC: 3.8 MG/DL — SIGNIFICANT CHANGE UP (ref 2.5–4.5)
PLATELET # BLD AUTO: 286 K/UL — SIGNIFICANT CHANGE UP (ref 150–400)
POTASSIUM SERPL-MCNC: 4 MMOL/L — SIGNIFICANT CHANGE UP (ref 3.5–5.3)
POTASSIUM SERPL-SCNC: 4 MMOL/L — SIGNIFICANT CHANGE UP (ref 3.5–5.3)
PROT SERPL-MCNC: 6.1 G/DL — SIGNIFICANT CHANGE UP (ref 6–8.3)
RBC # BLD: 4.23 M/UL — SIGNIFICANT CHANGE UP (ref 3.8–5.2)
RBC # FLD: 14.1 % — SIGNIFICANT CHANGE UP (ref 10.3–14.5)
SODIUM SERPL-SCNC: 142 MMOL/L — SIGNIFICANT CHANGE UP (ref 135–145)
WBC # BLD: 7.42 K/UL — SIGNIFICANT CHANGE UP (ref 3.8–10.5)
WBC # FLD AUTO: 7.42 K/UL — SIGNIFICANT CHANGE UP (ref 3.8–10.5)

## 2022-05-17 PROCEDURE — 93306 TTE W/DOPPLER COMPLETE: CPT | Mod: 26

## 2022-05-17 PROCEDURE — 99233 SBSQ HOSP IP/OBS HIGH 50: CPT | Mod: GC

## 2022-05-17 PROCEDURE — 99223 1ST HOSP IP/OBS HIGH 75: CPT | Mod: GC

## 2022-05-17 RX ORDER — FUROSEMIDE 40 MG
20 TABLET ORAL ONCE
Refills: 0 | Status: COMPLETED | OUTPATIENT
Start: 2022-05-17 | End: 2022-05-17

## 2022-05-17 RX ORDER — ONDANSETRON 8 MG/1
4 TABLET, FILM COATED ORAL ONCE
Refills: 0 | Status: COMPLETED | OUTPATIENT
Start: 2022-05-17 | End: 2022-05-17

## 2022-05-17 RX ORDER — CHLORHEXIDINE GLUCONATE 213 G/1000ML
1 SOLUTION TOPICAL DAILY
Refills: 0 | Status: DISCONTINUED | OUTPATIENT
Start: 2022-05-17 | End: 2022-05-22

## 2022-05-17 RX ORDER — LANOLIN ALCOHOL/MO/W.PET/CERES
3 CREAM (GRAM) TOPICAL ONCE
Refills: 0 | Status: COMPLETED | OUTPATIENT
Start: 2022-05-17 | End: 2022-05-18

## 2022-05-17 RX ADMIN — HEPARIN SODIUM 5000 UNIT(S): 5000 INJECTION INTRAVENOUS; SUBCUTANEOUS at 05:47

## 2022-05-17 RX ADMIN — VALACYCLOVIR 500 MILLIGRAM(S): 500 TABLET, FILM COATED ORAL at 13:35

## 2022-05-17 RX ADMIN — ESCITALOPRAM OXALATE 15 MILLIGRAM(S): 10 TABLET, FILM COATED ORAL at 13:10

## 2022-05-17 RX ADMIN — Medication 1 MILLIGRAM(S): at 13:10

## 2022-05-17 RX ADMIN — Medication 3 MILLILITER(S): at 04:14

## 2022-05-17 RX ADMIN — ONDANSETRON 4 MILLIGRAM(S): 8 TABLET, FILM COATED ORAL at 03:47

## 2022-05-17 RX ADMIN — Medication 3 MILLILITER(S): at 09:28

## 2022-05-17 RX ADMIN — Medication 3 MILLILITER(S): at 21:50

## 2022-05-17 RX ADMIN — Medication 3 MILLILITER(S): at 15:45

## 2022-05-17 RX ADMIN — CHLORHEXIDINE GLUCONATE 1 APPLICATION(S): 213 SOLUTION TOPICAL at 13:15

## 2022-05-17 RX ADMIN — PANTOPRAZOLE SODIUM 40 MILLIGRAM(S): 20 TABLET, DELAYED RELEASE ORAL at 05:47

## 2022-05-17 RX ADMIN — HEPARIN SODIUM 5000 UNIT(S): 5000 INJECTION INTRAVENOUS; SUBCUTANEOUS at 17:11

## 2022-05-17 RX ADMIN — Medication 20 MILLIGRAM(S): at 13:11

## 2022-05-17 NOTE — CONSULT NOTE ADULT - SUBJECTIVE AND OBJECTIVE BOX
HPI:  89F with hx of lymphoma on chemotherapy (last session a few weeks ago), COPD, HFrEF (normal EF last echo) here for 1 week of exertional SOB with cough. Denies fevers. Produces some sputum, non-purulent. Dyspnea has been worse with exertion and resolves with rest. Pt has home O2 from previous hospitalization last year for pneumonia, but has not required it prior to this week.     Earlier in week patient had episode of diarrhea, non-bloody, since resolved and has had solid bowel movements since.   No other complaints.    Pt is unclear on her past medical history. Denies having any cardiac or thyroid past medical history.    PCP Dr. Shruti Esparza (16 May 2022 18:48)      14 point ROS otherwise negative    PAST MEDICAL & SURGICAL HISTORY:  Cough      Osteoarthritis      Abnormal finding of lung      COPD, mild      History of hip replacement  right          Allergies    Zosyn (Rash)    Intolerances        MEDICATIONS  (STANDING):  albuterol/ipratropium for Nebulization 3 milliLiter(s) Nebulizer every 6 hours  chlorhexidine 2% Cloths 1 Application(s) Topical daily  escitalopram 15 milliGRAM(s) Oral daily  folic acid 1 milliGRAM(s) Oral daily  heparin   Injectable 5000 Unit(s) SubCutaneous every 12 hours  metoprolol tartrate 25 milliGRAM(s) Oral two times a day  pantoprazole    Tablet 40 milliGRAM(s) Oral before breakfast  valACYclovir 500 milliGRAM(s) Oral daily    MEDICATIONS  (PRN):  melatonin 3 milliGRAM(s) Oral once PRN Insomnia      FAMILY HISTORY:      SOCIAL HISTORY: No EtOH, no tobacco        VITALS:   T(F): 97.9 (05-17-22 @ 03:02), Max: 97.9 (05-17-22 @ 03:02)  HR: 68 (05-17-22 @ 09:28)  BP: 91/63 (05-17-22 @ 03:02)  RR: 22 (05-17-22 @ 03:02)  SpO2: 98% (05-17-22 @ 04:14)  Wt(kg): --    PHYSICAL EXAM    GENERAL: NAD, well-developed  HEAD:  Atraumatic, Normocephalic  EYES: EOMI, PERRLA, conjunctiva and sclera clear  NECK: Supple, No JVD  CHEST/LUNG: Clear to auscultation bilaterally; No wheeze  HEART: Regular rate and rhythm; No murmurs, rubs, or gallops  ABDOMEN: Soft, Nontender, Nondistended; Bowel sounds present  EXTREMITIES:  2+ Peripheral Pulses, No clubbing, cyanosis, or edema  NEUROLOGY: non-focal  SKIN: No rashes or lesions    LABS:                         12.3   7.42  )-----------( 286      ( 17 May 2022 05:31 )             37.8     05-17    142  |  110<H>  |  32<H>  ----------------------------<  107<H>  4.0   |  19<L>  |  1.19    Ca    10.2      17 May 2022 05:31  Phos  3.8     05-17  Mg     2.00     05-17    TPro  6.1  /  Alb  3.4  /  TBili  0.3  /  DBili  x   /  AST  17  /  ALT  16  /  AlkPhos  134<H>  05-17    Magnesium, Serum: 2.00 mg/dL (05-17 @ 05:31)  Phosphorus Level, Serum: 3.8 mg/dL (05-17 @ 05:31)  Magnesium, Serum: 1.90 mg/dL (05-16 @ 15:48)  Phosphorus Level, Serum: 3.3 mg/dL (05-16 @ 15:48)          IMAGING: HPI:  89F with hx of lymphoma on chemotherapy (last session a few weeks ago), COPD, HFrEF (normal EF last echo) here for 1 week of exertional SOB with cough. Denies fevers. Produces some sputum, non-purulent. Dyspnea has been worse with exertion and resolves with rest. Pt has home O2 from previous hospitalization last year for pneumonia, but has not required it prior to this week.     Earlier in week patient had episode of diarrhea, non-bloody, since resolved and has had solid bowel movements since.   No other complaints.    Pt is unclear on her past medical history. Denies having any cardiac or thyroid past medical history.    PCP Dr. Shruti Esparza (16 May 2022 18:48)      14 point ROS otherwise negative    PAST MEDICAL & SURGICAL HISTORY:  Cough      Osteoarthritis      Abnormal finding of lung      COPD, mild      History of hip replacement  right          Allergies    Zosyn (Rash)    Intolerances        MEDICATIONS  (STANDING):  albuterol/ipratropium for Nebulization 3 milliLiter(s) Nebulizer every 6 hours  chlorhexidine 2% Cloths 1 Application(s) Topical daily  escitalopram 15 milliGRAM(s) Oral daily  folic acid 1 milliGRAM(s) Oral daily  heparin   Injectable 5000 Unit(s) SubCutaneous every 12 hours  metoprolol tartrate 25 milliGRAM(s) Oral two times a day  pantoprazole    Tablet 40 milliGRAM(s) Oral before breakfast  valACYclovir 500 milliGRAM(s) Oral daily    MEDICATIONS  (PRN):  melatonin 3 milliGRAM(s) Oral once PRN Insomnia      FAMILY HISTORY:      SOCIAL HISTORY: No EtOH, no tobacco        VITALS:   T(F): 97.9 (05-17-22 @ 03:02), Max: 97.9 (05-17-22 @ 03:02)  HR: 68 (05-17-22 @ 09:28)  BP: 91/63 (05-17-22 @ 03:02)  RR: 22 (05-17-22 @ 03:02)  SpO2: 98% (05-17-22 @ 04:14)  Wt(kg): --    PHYSICAL EXAM    GENERAL: NAD, well-developed  HEAD:  Atraumatic, Normocephalic  EYES: EOMI, PERRLA, conjunctiva and sclera clear  NECK: Supple, No JVD  CHEST/LUNG: Clear to auscultation bilaterally; No wheeze  HEART: Regular rate and rhythm; No murmurs, rubs, or gallops  ABDOMEN: Soft, Nontender, Nondistended; Bowel sounds present  EXTREMITIES:  2+ Peripheral Pulses, No clubbing, cyanosis, or edema  NEUROLOGY: non-focal  SKIN: No rashes or lesions    LABS:                         12.3   7.42  )-----------( 286      ( 17 May 2022 05:31 )             37.8     05-17    142  |  110<H>  |  32<H>  ----------------------------<  107<H>  4.0   |  19<L>  |  1.19    Ca    10.2      17 May 2022 05:31  Phos  3.8     05-17  Mg     2.00     05-17    TPro  6.1  /  Alb  3.4  /  TBili  0.3  /  DBili  x   /  AST  17  /  ALT  16  /  AlkPhos  134<H>  05-17    Magnesium, Serum: 2.00 mg/dL (05-17 @ 05:31)  Phosphorus Level, Serum: 3.8 mg/dL (05-17 @ 05:31)  Magnesium, Serum: 1.90 mg/dL (05-16 @ 15:48)  Phosphorus Level, Serum: 3.3 mg/dL (05-16 @ 15:48)          IMAGING: Reviewed

## 2022-05-17 NOTE — PHYSICAL THERAPY INITIAL EVALUATION ADULT - RANGE OF MOTION EXAMINATION, REHAB EVAL
right UE limited in shoulder flexion to 100 degrees, pt stated this is her baseline/Left UE ROM was WFL (within functional limits)/bilateral lower extremity ROM was WFL (within functional limits)

## 2022-05-17 NOTE — PHYSICAL THERAPY INITIAL EVALUATION ADULT - GAIT DISTANCE, PT EVAL
distance limited due to Nasal Canula tubing; pt also refused to ambulate further, VSS throughout. pt with minimal SOB during ambulation/5 feet

## 2022-05-17 NOTE — PHYSICAL THERAPY INITIAL EVALUATION ADULT - PERTINENT HX OF CURRENT PROBLEM, REHAB EVAL
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 Admitted with PNA and c/f new heart failure.

## 2022-05-17 NOTE — PROGRESS NOTE ADULT - PROBLEM SELECTOR PLAN 5
-continue home methenamine holding home methenamine  -no urinary sx  -bladder scan 5/16 not showing retention

## 2022-05-17 NOTE — PATIENT PROFILE ADULT - FALL HARM RISK - HARM RISK INTERVENTIONS

## 2022-05-17 NOTE — CONSULT NOTE ADULT - SUBJECTIVE AND OBJECTIVE BOX
CHIEF COMPLAINT:Patient is a 89y old  Female who presents with a chief complaint of     HISTORY OF PRESENT ILLNESS:    89 year old female with history as below known to me admitted with sob   no cp   no dizziness  no syncope     PAST MEDICAL & SURGICAL HISTORY:  Cough      Osteoarthritis      Abnormal finding of lung      COPD, mild      History of hip replacement  right              MEDICATIONS:  heparin   Injectable 5000 Unit(s) SubCutaneous every 12 hours  metoprolol tartrate 25 milliGRAM(s) Oral two times a day    valACYclovir 500 milliGRAM(s) Oral daily    albuterol/ipratropium for Nebulization 3 milliLiter(s) Nebulizer every 6 hours    escitalopram 15 milliGRAM(s) Oral daily  melatonin 3 milliGRAM(s) Oral once PRN    pantoprazole    Tablet 40 milliGRAM(s) Oral before breakfast      chlorhexidine 2% Cloths 1 Application(s) Topical daily  folic acid 1 milliGRAM(s) Oral daily      FAMILY HISTORY:      Non-contributory    SOCIAL HISTORY:    No tobacco, drugs or etoh    Allergies    Zosyn (Rash)    Intolerances    	    REVIEW OF SYSTEMS:  as above  The rest of the 14 points ROS reviewed and except above they are unremarkable.        PHYSICAL EXAM:  T(C): 36.6 (05-17-22 @ 03:02), Max: 36.6 (05-16-22 @ 23:53)  HR: 92 (05-17-22 @ 04:14) (82 - 98)  BP: 91/63 (05-17-22 @ 03:02) (91/63 - 158/85)  RR: 22 (05-17-22 @ 03:02) (16 - 22)  SpO2: 98% (05-17-22 @ 04:14) (95% - 100%)  Wt(kg): --  I&O's Summary      JVP: Normal  Neck: supple  Lung: crackles   CV: S1 S2 , Murmur:  Abd: soft  Ext: No edema  neuro: Awake / alert  Psych: flat affect  Skin: normal      LABS/DATA:    TELEMETRY: 	    ECG:  	   	  CARDIAC MARKERS:                        38 <<== 05-16-22 @ 18:45                39 <<== 05-16-22 @ 15:48                              12.2   10.23 )-----------( 275      ( 16 May 2022 15:48 )             37.8     05-16    144  |  110<H>  |  32<H>  ----------------------------<  102<H>  3.9   |  19<L>  |  1.30    Ca    10.0      16 May 2022 15:48  Phos  3.3     05-16  Mg     1.90     05-16    TPro  6.3  /  Alb  3.7  /  TBili  0.3  /  DBili  x   /  AST  18  /  ALT  18  /  AlkPhos  144<H>  05-16    proBNP: Serum Pro-Brain Natriuretic Peptide: 6096 pg/mL (05-16 @ 15:48)    Lipid Profile:   HgA1c:   TSH:

## 2022-05-17 NOTE — PHYSICAL THERAPY INITIAL EVALUATION ADULT - PRECAUTIONS/LIMITATIONS, REHAB EVAL
3L Nasal Canula, pt stated she's on home oxygen as needed/fall precautions/oxygen therapy device and L/min

## 2022-05-17 NOTE — PROGRESS NOTE ADULT - PROBLEM SELECTOR PLAN 1
likely 2/2 pulmonary edema seen on CT outpatient, BNP 6000. Afebrile, no WBC, procalcitonin .08  -f/u echo  -20 mg IV lasix 5/16  -holding Levaquin started 5/9 outpatient  -wean to RA  -ambulatory sats tomorrow 5/17 likely 2/2 pulmonary edema seen on CT outpatient, BNP 6000. Afebrile, no WBC, procalcitonin .08  -f/u echo  -holding Levaquin started 5/9 outpatient  -wean to RA  -ambulatory sats tomorrow 5/17  -5/17 20 IV lasix  -f/u card recs  -consider pulm consult

## 2022-05-17 NOTE — PHYSICAL THERAPY INITIAL EVALUATION ADULT - DISCHARGE DISPOSITION, PT EVAL
Likely anticipated discharge to home with home PT services to address current functional limitations to optimize safety within the home environment with the use of a rollator (pt owns), pending further ambulation assessment/home w/ home PT

## 2022-05-17 NOTE — PROGRESS NOTE ADULT - SUBJECTIVE AND OBJECTIVE BOX
Arnulfo Thomas, PGY1    PROGRESS NOTE    Patient is a 89y old  Female who presents with a chief complaint of     SUBJECTIVE/INTERVAL EVENTS:     Review of Systems:  See subjective above. All other systems unchanged from previous.    MEDICATIONS  (STANDING):  albuterol/ipratropium for Nebulization 3 milliLiter(s) Nebulizer every 6 hours  chlorhexidine 2% Cloths 1 Application(s) Topical daily  escitalopram 15 milliGRAM(s) Oral daily  folic acid 1 milliGRAM(s) Oral daily  heparin   Injectable 5000 Unit(s) SubCutaneous every 12 hours  metoprolol tartrate 25 milliGRAM(s) Oral two times a day  pantoprazole    Tablet 40 milliGRAM(s) Oral before breakfast  valACYclovir 500 milliGRAM(s) Oral daily    MEDICATIONS  (PRN):  melatonin 3 milliGRAM(s) Oral once PRN Insomnia      VITAL SIGNS:  T(F): 97.9 (05-17-22 @ 03:02), Max: 97.9 (05-17-22 @ 03:02)  HR: 92 (05-17-22 @ 04:14) (82 - 98)  BP: 91/63 (05-17-22 @ 03:02) (91/63 - 158/85)  RR: 22 (05-17-22 @ 03:02) (16 - 22)  SpO2: 98% (05-17-22 @ 04:14) (95% - 100%)    I&O's Summary    Daily Height in cm: 157.48 (16 May 2022 12:44)    Daily     PHYSICAL EXAM:  Gen: Alert, NAD  HEENT: NCAT, conjunctiva clear, sclera anicteric, mmm  Neck: Supple, no JVD  CV: RRR, S1S2, no m/r/g  Resp: CTAB, normal respiratory effort  Abd: Soft, nontender, nondistended, normal bowel sounds  Ext: no edema, no clubbing or cyanosis  Neuro: AOx3, CN2-12 grossly intact, HILL  SKIN: warm, perfused    LABS:                        12.2   10.23 )-----------( 275      ( 16 May 2022 15:48 )             37.8     Hgb Trend: 12.2<--  05-16    144  |  110<H>  |  32<H>  ----------------------------<  102<H>  3.9   |  19<L>  |  1.30    Ca    10.0      16 May 2022 15:48  Phos  3.3     05-16  Mg     1.90     05-16    TPro  6.3  /  Alb  3.7  /  TBili  0.3  /  DBili  x   /  AST  18  /  ALT  18  /  AlkPhos  144<H>  05-16    Creatinine Trend: 1.30<--  LIVER FUNCTIONS - ( 16 May 2022 15:48 )  Alb: 3.7 g/dL / Pro: 6.3 g/dL / ALK PHOS: 144 U/L / ALT: 18 U/L / AST: 18 U/L / GGT: x                     CAPILLARY BLOOD GLUCOSE          RADIOLOGY & ADDITIONAL TESTS: Reviewed    Imaging Personally Reviewed:    Consultant(s) Notes Reviewed:      Care Discussed with Consultants/Other Providers:   Arnulfo Thomas, PGY1    PROGRESS NOTE    Patient is a 89y old  Female who presents with a chief complaint of     SUBJECTIVE/INTERVAL EVENTS:   No overnight events, patient feeling better with decreased cough. Was breathing without distress while off NC in AM.   No chest pain, abdominal pain, no other complaints.    Review of Systems:  See subjective above. All other systems unchanged from previous.    MEDICATIONS  (STANDING):  albuterol/ipratropium for Nebulization 3 milliLiter(s) Nebulizer every 6 hours  chlorhexidine 2% Cloths 1 Application(s) Topical daily  escitalopram 15 milliGRAM(s) Oral daily  folic acid 1 milliGRAM(s) Oral daily  heparin   Injectable 5000 Unit(s) SubCutaneous every 12 hours  metoprolol tartrate 25 milliGRAM(s) Oral two times a day  pantoprazole    Tablet 40 milliGRAM(s) Oral before breakfast  valACYclovir 500 milliGRAM(s) Oral daily    MEDICATIONS  (PRN):  melatonin 3 milliGRAM(s) Oral once PRN Insomnia      VITAL SIGNS:  T(F): 97.9 (05-17-22 @ 03:02), Max: 97.9 (05-17-22 @ 03:02)  HR: 92 (05-17-22 @ 04:14) (82 - 98)  BP: 91/63 (05-17-22 @ 03:02) (91/63 - 158/85)  RR: 22 (05-17-22 @ 03:02) (16 - 22)  SpO2: 98% (05-17-22 @ 04:14) (95% - 100%)    I&O's Summary    Daily Height in cm: 157.48 (16 May 2022 12:44)    Daily     PHYSICAL EXAM:  Gen: Alert, NAD  HEENT: NCAT, conjunctiva clear, sclera anicteric, mmm  Neck: Supple, no JVD  CV: RRR, S1S2, no m/r/g  Resp: Clear anteriorly   Abd: Soft, nontender, nondistended, normal bowel sounds  Ext: no edema  Neuro: AOx3, CN2-12 grossly intact, HILL  SKIN: warm, perfused    LABS:                        12.2   10.23 )-----------( 275      ( 16 May 2022 15:48 )             37.8     Hgb Trend: 12.2<--  05-16    144  |  110<H>  |  32<H>  ----------------------------<  102<H>  3.9   |  19<L>  |  1.30    Ca    10.0      16 May 2022 15:48  Phos  3.3     05-16  Mg     1.90     05-16    TPro  6.3  /  Alb  3.7  /  TBili  0.3  /  DBili  x   /  AST  18  /  ALT  18  /  AlkPhos  144<H>  05-16    Creatinine Trend: 1.30<--  LIVER FUNCTIONS - ( 16 May 2022 15:48 )  Alb: 3.7 g/dL / Pro: 6.3 g/dL / ALK PHOS: 144 U/L / ALT: 18 U/L / AST: 18 U/L / GGT: x                     CAPILLARY BLOOD GLUCOSE          RADIOLOGY & ADDITIONAL TESTS: Reviewed    Imaging Personally Reviewed:    Consultant(s) Notes Reviewed:      Care Discussed with Consultants/Other Providers:

## 2022-05-17 NOTE — CONSULT NOTE ADULT - ASSESSMENT
SOB  likely multifactorial   acute diastolic CHF has elevated proBNP   however CT reviewed personally and it appears she also has lung parenchymal disease   agree with diuresis  consider pulm eval   obtain echo

## 2022-05-17 NOTE — CONSULT NOTE ADULT - ASSESSMENT
89F with DLBCL (+pulm involvement on prior scans), COPD, HFrEF (normal EF last echo) here for 1 week of exertional SOB 2/2 pulmonary edema vs pneumonia vs COPD exacerbation vs worsening lymphoma.     # Acute resp distress  - CTA chest 5/10: No pulmonary embolism from the main pulmonary artery up to and including the lobar branches. Several segmental and subsegmental branches cannot be assessed due to extensive respiratory motion. New groundglass in the upper lobes, suspicious for pulmonary edema. Infection is also a consideration. Increased mildly enlarged mediastinal lymph nodes may be reactive. Stable multilobar chronic consolidation.  - BNP elevated to 6000-- diuresis per primary team and cardiology  - Given h/o pulm involvement of lymphoma, recommend pulm consult as well      # DLBCL  - Dx 7/2021-- patient with resp symptoms, + lung involvement, +LN involvement  - Given her age, plan was to do palliative mild treatment  - Patient got 11 cycles on R-CP (until 3/31). PET done 11/2021 after starting treatment with near complete resolution of disease in lungs and lymph nodes  - Unfortunately, patient with c/f recurrence when CT scan of chest done 4/2/22: Newly noted 9 mm opacity in the periphery of the right upper lobe and interval increase in size of a lingular opacity along the fissure.  - Started on Hugo-Rtixumab-- cycle 1 on 4/28  - Now p/w worsening SOB-- as above, recommend pulm consult. ? if this is all pulm edema.       Eloy Hughes, PGY-5  Hematology-Oncology Fellow  [phone] ([place]) [phone] ([facility])  I can also be reached on [employer] Teams  Please page fellow on call after 5pm and on weekends   89F with DLBCL (+pulm involvement on prior scans), COPD, HFrEF (normal EF last echo) here for 1 week of exertional SOB 2/2 pulmonary edema vs pneumonia vs COPD exacerbation vs worsening lymphoma.     # Acute resp distress  - CTA chest 5/10: No pulmonary embolism from the main pulmonary artery up to and including the lobar branches. Several segmental and subsegmental branches cannot be assessed due to extensive respiratory motion. New groundglass in the upper lobes, suspicious for pulmonary edema. Infection is also a consideration. Increased mildly enlarged mediastinal lymph nodes may be reactive. Stable multilobar chronic consolidation.  - BNP elevated to 6000-- diuresis per primary team and cardiology  - Given h/o pulm involvement of lymphoma, recommend pulm consult as well    # DLBCL  - Dx 7/2021-- patient with resp symptoms, + lung involvement, +LN involvement  - Given her age, plan was to do palliative mild treatment  - Patient got 11 cycles on R-CP (until 3/31). PET done 11/2021 after starting treatment with near complete resolution of disease in lungs and lymph nodes  - Unfortunately, patient with c/f recurrence when CT scan of chest done 4/2/22: Newly noted 9 mm opacity in the periphery of the right upper lobe and interval increase in size of a lingular opacity along the fissure.  - Started on Hugo-Rtixumab-- cycle 1 on 4/28. Polatuzumab can cause pneumonitis but it is very rare with only 1 dose. Pulm to assess further.   - Now p/w worsening SOB-- as above, recommend pulm consult. ? if this is all pulm edema.       Eloy Hughes, PGY-5  Hematology-Oncology Fellow  [phone] ([place]) [phone] ([facility])  I can also be reached on [employer] Teams  Please page fellow on call after 5pm and on weekends   89 yo F with DLBCL (+pulm involvement on prior scans), COPD, HFrEF (normal EF last echo) here for 1 week of exertional SOB 2/2 pulmonary edema vs pneumonia vs COPD exacerbation vs worsening lymphoma.     # Acute resp distress  - CTA chest 5/10: No pulmonary embolism from the main pulmonary artery up to and including the lobar branches. Several segmental and subsegmental branches cannot be assessed due to extensive respiratory motion. New groundglass in the upper lobes, suspicious for pulmonary edema. Infection is also a consideration. Increased mildly enlarged mediastinal lymph nodes may be reactive. Stable multilobar chronic consolidation.  - BNP elevated to 6000-- diuresis per primary team and cardiology  - Given h/o pulm involvement of lymphoma, recommend pulm consult as well    # DLBCL  - Dx 7/2021-- patient with resp symptoms, + lung involvement, +LN involvement  - Given her age, plan was to do palliative mild treatment  - Patient got 11 cycles on R-CP (until 3/31). PET done 11/2021 after starting treatment with near complete resolution of disease in lungs and lymph nodes  - Unfortunately, patient with c/f recurrence when CT scan of chest done 4/2/22: Newly noted 9 mm opacity in the periphery of the right upper lobe and interval increase in size of a lingular opacity along the fissure.  - Started on Hugo-Rtixumab-- cycle 1 on 4/28. Polatuzumab can cause pneumonitis but it is very rare with only 1 dose. Pulm to assess further.   - Now p/w worsening SOB-- as above, recommend pulm consult. ? if this is all pulm edema.       Eloy Hughes, PGY-5  Hematology-Oncology Fellow  [phone] ([place]) [phone] ([facility])  I can also be reached on [employer] Teams  Please page fellow on call after 5pm and on weekends

## 2022-05-18 ENCOUNTER — TRANSCRIPTION ENCOUNTER (OUTPATIENT)
Age: 87
End: 2022-05-18

## 2022-05-18 LAB
ALBUMIN SERPL ELPH-MCNC: 3.2 G/DL — LOW (ref 3.3–5)
ALP SERPL-CCNC: 124 U/L — HIGH (ref 40–120)
ALT FLD-CCNC: 7 U/L — SIGNIFICANT CHANGE UP (ref 4–33)
ANION GAP SERPL CALC-SCNC: 11 MMOL/L — SIGNIFICANT CHANGE UP (ref 7–14)
AST SERPL-CCNC: 12 U/L — SIGNIFICANT CHANGE UP (ref 4–32)
BILIRUB SERPL-MCNC: 0.2 MG/DL — SIGNIFICANT CHANGE UP (ref 0.2–1.2)
BUN SERPL-MCNC: 28 MG/DL — HIGH (ref 7–23)
CALCIUM SERPL-MCNC: 9.9 MG/DL — SIGNIFICANT CHANGE UP (ref 8.4–10.5)
CHLORIDE SERPL-SCNC: 110 MMOL/L — HIGH (ref 98–107)
CO2 SERPL-SCNC: 20 MMOL/L — LOW (ref 22–31)
CREAT SERPL-MCNC: 0.99 MG/DL — SIGNIFICANT CHANGE UP (ref 0.5–1.3)
EGFR: 54 ML/MIN/1.73M2 — LOW
GLUCOSE SERPL-MCNC: 107 MG/DL — HIGH (ref 70–99)
HCT VFR BLD CALC: 35 % — SIGNIFICANT CHANGE UP (ref 34.5–45)
HGB BLD-MCNC: 11.1 G/DL — LOW (ref 11.5–15.5)
MAGNESIUM SERPL-MCNC: 2.1 MG/DL — SIGNIFICANT CHANGE UP (ref 1.6–2.6)
MCHC RBC-ENTMCNC: 28.7 PG — SIGNIFICANT CHANGE UP (ref 27–34)
MCHC RBC-ENTMCNC: 31.7 GM/DL — LOW (ref 32–36)
MCV RBC AUTO: 90.4 FL — SIGNIFICANT CHANGE UP (ref 80–100)
MRSA PCR RESULT.: SIGNIFICANT CHANGE UP
NRBC # BLD: 0 /100 WBCS — SIGNIFICANT CHANGE UP
NRBC # FLD: 0 K/UL — SIGNIFICANT CHANGE UP
PHOSPHATE SERPL-MCNC: 3.5 MG/DL — SIGNIFICANT CHANGE UP (ref 2.5–4.5)
PLATELET # BLD AUTO: 239 K/UL — SIGNIFICANT CHANGE UP (ref 150–400)
POTASSIUM SERPL-MCNC: 4.3 MMOL/L — SIGNIFICANT CHANGE UP (ref 3.5–5.3)
POTASSIUM SERPL-SCNC: 4.3 MMOL/L — SIGNIFICANT CHANGE UP (ref 3.5–5.3)
PROT SERPL-MCNC: 5.7 G/DL — LOW (ref 6–8.3)
RBC # BLD: 3.87 M/UL — SIGNIFICANT CHANGE UP (ref 3.8–5.2)
RBC # FLD: 14.2 % — SIGNIFICANT CHANGE UP (ref 10.3–14.5)
S AUREUS DNA NOSE QL NAA+PROBE: DETECTED
SODIUM SERPL-SCNC: 141 MMOL/L — SIGNIFICANT CHANGE UP (ref 135–145)
WBC # BLD: 6.81 K/UL — SIGNIFICANT CHANGE UP (ref 3.8–10.5)
WBC # FLD AUTO: 6.81 K/UL — SIGNIFICANT CHANGE UP (ref 3.8–10.5)

## 2022-05-18 PROCEDURE — 99232 SBSQ HOSP IP/OBS MODERATE 35: CPT | Mod: GC

## 2022-05-18 PROCEDURE — 99222 1ST HOSP IP/OBS MODERATE 55: CPT

## 2022-05-18 RX ORDER — MUPIROCIN 20 MG/G
1 OINTMENT TOPICAL
Refills: 0 | Status: DISCONTINUED | OUTPATIENT
Start: 2022-05-18 | End: 2022-05-18

## 2022-05-18 RX ORDER — MUPIROCIN 20 MG/G
1 OINTMENT TOPICAL
Refills: 0 | Status: DISCONTINUED | OUTPATIENT
Start: 2022-05-18 | End: 2022-05-22

## 2022-05-18 RX ORDER — FUROSEMIDE 40 MG
40 TABLET ORAL DAILY
Refills: 0 | Status: DISCONTINUED | OUTPATIENT
Start: 2022-05-18 | End: 2022-05-18

## 2022-05-18 RX ADMIN — MUPIROCIN 1 APPLICATION(S): 20 OINTMENT TOPICAL at 17:09

## 2022-05-18 RX ADMIN — Medication 3 MILLIGRAM(S): at 00:22

## 2022-05-18 RX ADMIN — PANTOPRAZOLE SODIUM 40 MILLIGRAM(S): 20 TABLET, DELAYED RELEASE ORAL at 05:59

## 2022-05-18 RX ADMIN — HEPARIN SODIUM 5000 UNIT(S): 5000 INJECTION INTRAVENOUS; SUBCUTANEOUS at 17:08

## 2022-05-18 RX ADMIN — HEPARIN SODIUM 5000 UNIT(S): 5000 INJECTION INTRAVENOUS; SUBCUTANEOUS at 05:58

## 2022-05-18 RX ADMIN — Medication 1 MILLIGRAM(S): at 12:40

## 2022-05-18 RX ADMIN — ESCITALOPRAM OXALATE 15 MILLIGRAM(S): 10 TABLET, FILM COATED ORAL at 12:40

## 2022-05-18 RX ADMIN — Medication 3 MILLILITER(S): at 16:16

## 2022-05-18 RX ADMIN — Medication 100 MILLIGRAM(S): at 17:09

## 2022-05-18 RX ADMIN — Medication 3 MILLILITER(S): at 10:03

## 2022-05-18 RX ADMIN — VALACYCLOVIR 500 MILLIGRAM(S): 500 TABLET, FILM COATED ORAL at 12:40

## 2022-05-18 RX ADMIN — CHLORHEXIDINE GLUCONATE 1 APPLICATION(S): 213 SOLUTION TOPICAL at 12:44

## 2022-05-18 RX ADMIN — Medication 3 MILLILITER(S): at 20:30

## 2022-05-18 NOTE — PROGRESS NOTE ADULT - SUBJECTIVE AND OBJECTIVE BOX
DATE OF SERVICE: 05-18-22 @ 07:28    Subjective: Patient seen and examined. No new events except as noted.     SUBJECTIVE/ROS:        MEDICATIONS:  MEDICATIONS  (STANDING):  albuterol/ipratropium for Nebulization 3 milliLiter(s) Nebulizer every 6 hours  chlorhexidine 2% Cloths 1 Application(s) Topical daily  escitalopram 15 milliGRAM(s) Oral daily  folic acid 1 milliGRAM(s) Oral daily  heparin   Injectable 5000 Unit(s) SubCutaneous every 12 hours  metoprolol tartrate 25 milliGRAM(s) Oral two times a day  mupirocin 2% Nasal 1 Application(s) Both Nostrils two times a day  mupirocin 2% Ointment 1 Application(s) Topical two times a day  pantoprazole    Tablet 40 milliGRAM(s) Oral before breakfast  valACYclovir 500 milliGRAM(s) Oral daily      PHYSICAL EXAM:  T(C): 36.6 (05-18-22 @ 04:53), Max: 36.6 (05-18-22 @ 04:53)  HR: 84 (05-18-22 @ 04:53) (68 - 89)  BP: 90/53 (05-18-22 @ 04:53) (90/53 - 102/61)  RR: 18 (05-18-22 @ 04:53) (18 - 21)  SpO2: 100% (05-18-22 @ 04:53) (91% - 100%)  Wt(kg): --  I&O's Summary    17 May 2022 07:01  -  18 May 2022 07:00  --------------------------------------------------------  IN: 0 mL / OUT: 275 mL / NET: -275 mL        Weight (kg): 52 (05-17 @ 13:54)      JVP: Normal  Neck: supple  Lung: clear   CV: S1 S2 , Murmur:  Abd: soft  Ext: No edema  neuro: Awake / alert  Psych: flat affect  Skin: normal``    LABS/DATA:    CARDIAC MARKERS:    < from: TTE with Doppler (w/Cont) (05.17.22 @ 11:20) >  1. Mitral annular calcification, otherwise normal mitral  valve.  2. Calcified trileaflet aortic valve with normal opening.  3. Normal left ventricular internal dimensions and wall  thicknesses.  4. Endocardium not well visualized; grossly normal left  ventricular systolic function. Endocardial visualization  enhanced with intravenous injection of echo contrast  (Definity).  5. Right ventricular enlargement with decreased right  ventricular systolic function.  6. Estimated pulmonary artery systolic pressure equals 71  mm Hg, assuming right atrial pressure equals 10  mm Hg,  consistent with severepulmonary hypertension.  ------------------------------------------------------------------------  Confirmed on  5/17/2022 - 15:51:02 by Ghulam Tate M.D. RPVI  ------------------------------------------------------------------------    < end of copied text >                              12.3   7.42  )-----------( 286      ( 17 May 2022 05:31 )             37.8     05-17    142  |  110<H>  |  32<H>  ----------------------------<  107<H>  4.0   |  19<L>  |  1.19    Ca    10.2      17 May 2022 05:31  Phos  3.8     05-17  Mg     2.00     05-17    TPro  6.1  /  Alb  3.4  /  TBili  0.3  /  DBili  x   /  AST  17  /  ALT  16  /  AlkPhos  134<H>  05-17    proBNP:   Lipid Profile:   HgA1c:   TSH:     TELE:  EKG:

## 2022-05-18 NOTE — DISCHARGE NOTE PROVIDER - CARE PROVIDER_API CALL
Prateek Madison  CARDIOVASCULAR DISEASE  [address] [address]  Phone: [phone]  Fax: [phone]  Follow Up Time:    Prateek Madison  CARDIOVASCULAR DISEASE  [address] [facility] [address]  [place] [address]  Phone: [phone]  Fax: [phone]  Follow Up Time:     ARUNA OLIVARES  Internal Medicine  [address] [place] [address]  Phone: [phone]  Fax: [phone]  Follow Up Time:

## 2022-05-18 NOTE — CONSULT NOTE ADULT - SUBJECTIVE AND OBJECTIVE BOX
CHIEF COMPLAINT:    HPI:    PAST MEDICAL & SURGICAL HISTORY:  Cough      Osteoarthritis      Abnormal finding of lung      COPD, mild      History of hip replacement  right          FAMILY HISTORY:      SOCIAL HISTORY:  Smoking: [ ] Never Smoked [ ] Former Smoker (__ packs x ___ years) [ ] Current Smoker  (__ packs x ___ years)  Substance Use: [ ] Never Used [ ] Used ____  EtOH Use:  Marital Status: [ ] Single [ ]  [ ]  [ ]   Sexual History:   Occupation:  Recent Travel:  Country of Birth:  Advance Directives:    Allergies    Zosyn (Rash)    Intolerances        HOME MEDICATIONS:  Home Medications:  escitalopram 10 mg oral tablet: 1.5 tab (15 mg) orally once a day    **Per pharmacy, rx directions state to take escitalopram 10 mg - 1 tablet orally once daily (17 Feb 2022 10:24)  folic acid 1 mg oral tablet: 1 tab(s) orally once a day (17 Feb 2022 10:24)  Lasix 20 mg oral tablet: 1 tab(s) orally once a day (16 May 2022 19:02)  methenamine hippurate 1 g oral tablet: 1 tab(s) orally 2 times a day (16 May 2022 19:08)  Metoprolol Tartrate 25 mg oral tablet: 1 tab(s) orally 2 times a day (16 May 2022 18:59)  nitrofurantoin macrocrystals-monohydrate 100 mg oral capsule: 1 cap(s) orally 2 times a day (16 May 2022 19:08)  potassium chloride 10 mEq oral tablet, extended release: 1 tab(s) orally once a day (16 May 2022 18:59)  Trelegy Ellipta 100 mcg-62.5 mcg-25 mcg/inh inhalation powder: 1 puff(s) inhaled once a day (17 Feb 2022 10:24)  valACYclovir 500 mg oral tablet: 1 tab(s) orally once a day (16 May 2022 18:58)      REVIEW OF SYSTEMS:  Constitutional: [ ] negative [ ] fevers [ ] chills [ ] weight loss [ ] weight gain [ ] malaise  HEENT: [ ] negative [ ] visual disturbances [ ] postnasal drip [ ] nasal congestion [ ] sore throat  CV: [ ] negative [ ] chest pain [ ] palpitations [ ] orthopnea   Resp: [ ] negative [ ] cough [ ] shortness of breath [ ] wheezing [ ] sputum [ ] hemoptysis  GI: [ ] negative [ ] nausea [ ] vomiting [ ] abdominal pain [ ] dysphagia [ ] diarrhea [ ] melena [ ] hematochezia  : [ ] negative [ ] dysuria [ ] nocturia [ ] hematuria [ ] increased urinary frequency  Musculoskeletal: [ ] negative [ ] back pain [ ] myalgias [ ] arthralgias [ ] fracture  Skin: [ ] negative [ ] rash [ ] pruritus  Neurological: [ ] negative [ ] headache [ ] dizziness [ ] syncope [ ] weakness [ ] numbness  Psychiatric: [ ] negative [ ] anxiety [ ] depression  Endocrine: [ ] negative [ ] diabetes [ ] thyroid problem  Hematologic/Lymphatic: [ ] negative [ ] anemia [ ] bleeding problem  Allergic/Immunologic: [ ] hives [ ] angioedema  [ ] All other systems negative  [ ] Unable to assess ROS because ________    OBJECTIVE:  ICU Vital Signs Last 24 Hrs  T(C): 36.6 (18 May 2022 04:53), Max: 36.6 (18 May 2022 04:53)  T(F): 97.8 (18 May 2022 04:53), Max: 97.8 (18 May 2022 04:53)  HR: 85 (18 May 2022 10:06) (82 - 89)  BP: 91/52 (18 May 2022 11:08) (90/53 - 102/61)  BP(mean): --  ABP: --  ABP(mean): --  RR: 18 (18 May 2022 04:53) (18 - 21)  SpO2: 100% (18 May 2022 04:53) (91% - 100%)        05-17 @ 07:01  -  05-18 @ 07:00  --------------------------------------------------------  IN: 150 mL / OUT: 275 mL / NET: -125 mL      CAPILLARY BLOOD GLUCOSE          PHYSICAL EXAM:  General:   HEENT:   Lymph Nodes:  Neck:   Respiratory:   Cardiovascular:   Abdomen:   Extremities:   Skin:   Neurological:  Psychiatry:    LINES:     HOSPITAL MEDICATIONS:  Standing Meds:  albuterol/ipratropium for Nebulization 3 milliLiter(s) Nebulizer every 6 hours  chlorhexidine 2% Cloths 1 Application(s) Topical daily  escitalopram 15 milliGRAM(s) Oral daily  folic acid 1 milliGRAM(s) Oral daily  heparin   Injectable 5000 Unit(s) SubCutaneous every 12 hours  metoprolol tartrate 25 milliGRAM(s) Oral two times a day  mupirocin 2% Ointment 1 Application(s) Topical two times a day  pantoprazole    Tablet 40 milliGRAM(s) Oral before breakfast  valACYclovir 500 milliGRAM(s) Oral daily      PRN Meds:      LABS:                        11.1   6.81  )-----------( 239      ( 18 May 2022 07:32 )             35.0     Hgb Trend: 11.1<--, 12.3<--, 12.2<--  05-18    141  |  110<H>  |  28<H>  ----------------------------<  107<H>  4.3   |  20<L>  |  0.99    Ca    9.9      18 May 2022 07:32  Phos  3.5     05-18  Mg     2.10     05-18    TPro  5.7<L>  /  Alb  3.2<L>  /  TBili  0.2  /  DBili  x   /  AST  12  /  ALT  7   /  AlkPhos  124<H>  05-18    Creatinine Trend: 0.99<--, 1.19<--, 1.30<--        Venous Blood Gas:  05-16 @ 15:40  7.35/36/23/20/28.4  VBG Lactate: 2.0      MICROBIOLOGY:     Culture - Blood (collected 16 May 2022 15:45)  Source: .Blood Blood  Preliminary Report (17 May 2022 21:01):    No growth to date.    Culture - Blood (collected 16 May 2022 15:30)  Source: .Blood Blood  Preliminary Report (17 May 2022 21:01):    No growth to date.        RADIOLOGY:  [ ] Reviewed and interpreted by me    PULMONARY FUNCTION TESTS:    EKG:   CHIEF COMPLAINT: Shortness of breath    HPI: 89F with PMH lymphoma on chemotherapy (active) and heart failure with preserved ejection fraction presents for worsened dyspnea on exertion for 1 week and admitted for hypoxemic respiratory failure. Pulmonary consulted for hypoxemia, pt currently saturating 95% on room air. CT chest with nonspecific ground glass opacities along with motion artifact and TTE with evidence of severe pulmonary hypertension. ProBNP and creatinine were elevated on admission and creatinine has downtrended with intermittent diuretics. Pt feels much better but has not ambulated yet. No fever or cough.       HFrEF (last echo normal) who presents for exertional SOB for 1 week     PAST MEDICAL & SURGICAL HISTORY:  Cough      Osteoarthritis      Abnormal finding of lung      COPD, mild      History of hip replacement  right          FAMILY HISTORY:      SOCIAL HISTORY:  Smoking: [ ] Never Smoked [ ] Former Smoker (__ packs x ___ years) [ ] Current Smoker  (__ packs x ___ years)  Substance Use: [ ] Never Used [ ] Used ____  EtOH Use:  Marital Status: [ ] Single [ ]  [ ]  [ ]   Sexual History:   Occupation:  Recent Travel:  Country of Birth:  Advance Directives:    Allergies    Zosyn (Rash)    Intolerances        HOME MEDICATIONS:  Home Medications:  escitalopram 10 mg oral tablet: 1.5 tab (15 mg) orally once a day    **Per pharmacy, rx directions state to take escitalopram 10 mg - 1 tablet orally once daily (17 Feb 2022 10:24)  folic acid 1 mg oral tablet: 1 tab(s) orally once a day (17 Feb 2022 10:24)  Lasix 20 mg oral tablet: 1 tab(s) orally once a day (16 May 2022 19:02)  methenamine hippurate 1 g oral tablet: 1 tab(s) orally 2 times a day (16 May 2022 19:08)  Metoprolol Tartrate 25 mg oral tablet: 1 tab(s) orally 2 times a day (16 May 2022 18:59)  nitrofurantoin macrocrystals-monohydrate 100 mg oral capsule: 1 cap(s) orally 2 times a day (16 May 2022 19:08)  potassium chloride 10 mEq oral tablet, extended release: 1 tab(s) orally once a day (16 May 2022 18:59)  Trelegy Ellipta 100 mcg-62.5 mcg-25 mcg/inh inhalation powder: 1 puff(s) inhaled once a day (17 Feb 2022 10:24)  valACYclovir 500 mg oral tablet: 1 tab(s) orally once a day (16 May 2022 18:58)      REVIEW OF SYSTEMS:  Constitutional: [ ] negative [ ] fevers [ ] chills [ ] weight loss [ ] weight gain [ ] malaise  HEENT: [ ] negative [ ] visual disturbances [ ] postnasal drip [ ] nasal congestion [ ] sore throat  CV: [ ] negative [ ] chest pain [ ] palpitations [ ] orthopnea   Resp: [ ] negative [ ] cough [x] shortness of breath [ ] wheezing [ ] sputum [ ] hemoptysis  GI: [ ] negative [ ] nausea [ ] vomiting [ ] abdominal pain [ ] dysphagia [ ] diarrhea [ ] melena [ ] hematochezia  : [ ] negative [ ] dysuria [ ] nocturia [ ] hematuria [ ] increased urinary frequency  Musculoskeletal: [ ] negative [ ] back pain [ ] myalgias [ ] arthralgias [ ] fracture  Skin: [ ] negative [ ] rash [ ] pruritus  Neurological: [ ] negative [ ] headache [ ] dizziness [ ] syncope [ ] weakness [ ] numbness  Psychiatric: [ ] negative [ ] anxiety [ ] depression  Endocrine: [ ] negative [ ] diabetes [ ] thyroid problem  Hematologic/Lymphatic: [ ] negative [ ] anemia [ ] bleeding problem  Allergic/Immunologic: [ ] hives [ ] angioedema  [ ] All other systems negative  [ ] Unable to assess ROS because ________    OBJECTIVE:  ICU Vital Signs Last 24 Hrs  T(C): 36.6 (18 May 2022 04:53), Max: 36.6 (18 May 2022 04:53)  T(F): 97.8 (18 May 2022 04:53), Max: 97.8 (18 May 2022 04:53)  HR: 85 (18 May 2022 10:06) (82 - 89)  BP: 91/52 (18 May 2022 11:08) (90/53 - 102/61)  BP(mean): --  ABP: --  ABP(mean): --  RR: 18 (18 May 2022 04:53) (18 - 21)  SpO2: 100% (18 May 2022 04:53) (91% - 100%)        05-17 @ 07:01  -  05-18 @ 07:00  --------------------------------------------------------  IN: 150 mL / OUT: 275 mL / NET: -125 mL      CAPILLARY BLOOD GLUCOSE          PHYSICAL EXAM:  General: NAD, appears comfortable  Skin: Warm and dry, no rashes  Neuro: AAOx3, nonfocal  HEENT: PERRL, EOMI, no oral lesions  Neck: Full range of motion, no JVD  Lungs: Clear to ascultation bilatereally, no wheezes, rales, or rhonchi   Heart: Regular rate and rhythm, no murmurs  Abdomen: Normoactive bowl sounds, soft, nontender, nondistended  Extremities: No lower extremity tenderness, erythema, or edema   Psych: normal mood and affect      LINES:     HOSPITAL MEDICATIONS:  Standing Meds:  albuterol/ipratropium for Nebulization 3 milliLiter(s) Nebulizer every 6 hours  chlorhexidine 2% Cloths 1 Application(s) Topical daily  escitalopram 15 milliGRAM(s) Oral daily  folic acid 1 milliGRAM(s) Oral daily  heparin   Injectable 5000 Unit(s) SubCutaneous every 12 hours  metoprolol tartrate 25 milliGRAM(s) Oral two times a day  mupirocin 2% Ointment 1 Application(s) Topical two times a day  pantoprazole    Tablet 40 milliGRAM(s) Oral before breakfast  valACYclovir 500 milliGRAM(s) Oral daily      PRN Meds:      LABS:                        11.1   6.81  )-----------( 239      ( 18 May 2022 07:32 )             35.0     Hgb Trend: 11.1<--, 12.3<--, 12.2<--  05-18    141  |  110<H>  |  28<H>  ----------------------------<  107<H>  4.3   |  20<L>  |  0.99    Ca    9.9      18 May 2022 07:32  Phos  3.5     05-18  Mg     2.10     05-18    TPro  5.7<L>  /  Alb  3.2<L>  /  TBili  0.2  /  DBili  x   /  AST  12  /  ALT  7   /  AlkPhos  124<H>  05-18    Creatinine Trend: 0.99<--, 1.19<--, 1.30<--        Venous Blood Gas:  05-16 @ 15:40  7.35/36/23/20/28.4  VBG Lactate: 2.0      MICROBIOLOGY:     Culture - Blood (collected 16 May 2022 15:45)  Source: .Blood Blood  Preliminary Report (17 May 2022 21:01):    No growth to date.    Culture - Blood (collected 16 May 2022 15:30)  Source: .Blood Blood  Preliminary Report (17 May 2022 21:01):    No growth to date.        RADIOLOGY:  < from: CT Angio Chest PE Protocol w/ IV Cont (05.10.22 @ 13:06) >  EXAM: [id number] - CT ANGIO CHEST PULM [facility]  - ORDERED BY: FAITH HUMPHREY      PROCEDURE DATE:  05/10/2022           INTERPRETATION:  INDICATION: Diffuse large B-cell lymphoma    TECHNIQUE: Volumetric images of the chest were obtained after the   administration of 80 mL of Omnipaque 350. Maximum intensity projection   images were generated.    COMPARISON: CT chest 4/2/2022.    FINDINGS:    PULMONARY ANGIOGRAM:  No pulmonary embolism from the main pulmonary   artery to and including the lobar branches. Limited assessment of several   segmental and subsegmental branches due to obscuration by respiratory   motion.    LUNGS/AIRWAYS/PLEURA: Images degraded by respiratory motion. New   groundglass in the upper lobes. No definite change in consolidation and   underlying bronchiectasis in the middle lobe and bilateral lower lobes,   and lingula. Stable thin-walled cyst/pneumatocele in the right upper lobe   adjacent to the hilum. Bilateral air trapping. Few impacted small   airways. Unremarkable pleura.    LYMPH NODES/MEDIASTINUM: Increased mildly enlarged bilateral mediastinal   lymph nodes. Unchanged diffusely dilated esophagus containing fluid up to   the level of the aortic arch.    HEART/VASCULATURE: Enlarged heart, particularly of the right ventricle   and right atrium. No pericardial effusion. No aortic aneurysm.    UPPER ABDOMEN: Stable pancreatic tail cyst. Partially included left renal   cysts.    BONES/SOFT TISSUES: Degenerative changes of the spine. Remote fractures   of the sternum and multiple ribs.      IMPRESSION:    No pulmonary embolism from the main pulmonary artery up to and including   the lobar branches. Several segmental and subsegmental branches cannot be   assessed due to extensive respiratory motion.    New groundglass in the upper lobes, suspicious for pulmonary edema.   Infection is also a consideration.    Increased mildly enlarged mediastinal lymph nodes may be reactive.    Stable multilobar chronic consolidation.    --- End of Report ---      TRUMAN BECKER M.D., ATTENDING RADIOGIST   This document has been electronically signed. May 10 2022  2:11PM    < end of copied text >

## 2022-05-18 NOTE — DISCHARGE NOTE PROVIDER - NSDCFUADDAPPT_GEN_ALL_CORE_FT
Please follow up with your primary care provider within 1 week of discharge from the hospital. If you do not have a primary care provider please follow up with the [facility], call [phone]

## 2022-05-18 NOTE — PROGRESS NOTE ADULT - PROBLEM SELECTOR PLAN 3
last chemo a few weeks ago with rutixan, polatuzumab  -continue home valacyclovir, folic acid  -outpatient follow-up  -f/u collateral about status of treatment/ prognosis last chemo a few weeks ago with rutixan, polatuzumab  -continue home valacyclovir, folic acid  -outpatient follow-up  -f/u heme-on recs

## 2022-05-18 NOTE — CONSULT NOTE ADULT - ATTENDING COMMENTS
Pulmonary team consulted for hypoxemia.  Patient with lymphoma on chemotherapy, HFpEF.  Ground glass opacities on CT chest with severe pulmonary hypertension on TTE.   Keep negative fluid balance.  Check ambulatory room air saturation.
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 She also has COPD, HFrEF (normal EF last echo) and is now here for 1 week of exertional SOB 2/2 pulmonary edema vs pneumonia vs COPD exacerbation vs worsening lymphoma. CTA chest 5/10: No pulmonary embolism from the main pulmonary artery up to and including the lobar branches. Several segmental and subsegmental branches cannot be assessed due to extensive respiratory motion. New groundglass in the upper lobes, suspicious for pulmonary edema. Infection is also a consideration. Increased mildly enlarged mediastinal lymph nodes may be reactive. Stable multilobar chronic consolidation. BNP elevated to 6000-- diuresis per primary team and cardiology. She was recently started on Polatuzumab with Rituxan and although polatuzumab can cause pneumonitis it would be rare with only 1 does. Will get pulmonary input to help evaluate and manage.

## 2022-05-18 NOTE — DISCHARGE NOTE PROVIDER - NSDCFUSCHEDAPPT_GEN_ALL_CORE_FT
Rossy Esparza  [facility] Physician [facility]  Hem[facility]c [address]  Scheduled Appointment: 06/02/2022    [facility]  HEMONC [address]  Scheduled Appointment: 06/09/2022    [facility] Physician [facility]  [address] [facility]  Scheduled Appointment: 06/09/2022    [facility]  [address] [facility]  Scheduled Appointment: 06/09/2022

## 2022-05-18 NOTE — PROGRESS NOTE ADULT - ASSESSMENT
SOB  likely multifactorial   acute diastolic CHF has elevated proBNP   however CT reviewed personally and it appears she also has lung parenchymal disease   agree with diuresis  would get pulm eval   pulm HTN suspect due to underlying lung disease

## 2022-05-18 NOTE — PROGRESS NOTE ADULT - PROBLEM SELECTOR PLAN 1
likely 2/2 pulmonary edema seen on CT outpatient, BNP 6000. Afebrile, no WBC, procalcitonin .08  -f/u echo  -holding Levaquin started 5/9 outpatient  -wean to RA  -ambulatory sats tomorrow 5/17  -5/17 20 IV lasix  -f/u card recs  -consider pulm consult likely 2/2 pulmonary edema seen on CT outpatient, BNP 6000. Afebrile, no WBC, procalcitonin .08  -holding Levaquin started 5/9 outpatient  -ambulatory sat 5/17 on RA 92  -5/17 20 IV lasix  -5/17 echo showing severe pulm htn  -started 40 lasix PO  -f/u card recs  -f/u pulm recs likely 2/2 pulmonary edema seen on CT outpatient, BNP 6000. Afebrile, no WBC, procalcitonin .08  -holding Levaquin started 5/9 outpatient  -ambulatory sat 5/17 on RA 92  -5/17 20 IV lasix  -5/17 echo showing severe pulm htn  -f/u card recs  -f/u pulm recs

## 2022-05-18 NOTE — PROGRESS NOTE ADULT - SUBJECTIVE AND OBJECTIVE BOX
Arnulfo Thomas, PGY1    PROGRESS NOTE    Patient is a 89y old  Female who presents with a chief complaint of SOB (17 May 2022 13:44)      SUBJECTIVE/INTERVAL EVENTS:     Review of Systems:  See subjective above. All other systems unchanged from previous.    MEDICATIONS  (STANDING):  albuterol/ipratropium for Nebulization 3 milliLiter(s) Nebulizer every 6 hours  chlorhexidine 2% Cloths 1 Application(s) Topical daily  escitalopram 15 milliGRAM(s) Oral daily  folic acid 1 milliGRAM(s) Oral daily  heparin   Injectable 5000 Unit(s) SubCutaneous every 12 hours  metoprolol tartrate 25 milliGRAM(s) Oral two times a day  mupirocin 2% Nasal 1 Application(s) Both Nostrils two times a day  mupirocin 2% Ointment 1 Application(s) Topical two times a day  pantoprazole    Tablet 40 milliGRAM(s) Oral before breakfast  valACYclovir 500 milliGRAM(s) Oral daily    MEDICATIONS  (PRN):      VITAL SIGNS:  T(F): 97.8 (22 @ 04:53), Max: 97.8 (22 @ 04:53)  HR: 84 (22 @ 04:53) (68 - 89)  BP: 90/53 (22 @ 04:53) (90/53 - 102/61)  RR: 18 (22 @ 04:53) (18 - 21)  SpO2: 100% (22 @ 04:53) (91% - 100%)    I&O's Summary    17 May 2022 07:01  -  18 May 2022 06:41  --------------------------------------------------------  IN: 0 mL / OUT: 275 mL / NET: -275 mL      Daily     Daily Weight in k (17 May 2022 13:54)    PHYSICAL EXAM:  Gen: Alert, NAD  HEENT: NCAT, conjunctiva clear, sclera anicteric, mmm  Neck: Supple, no JVD  CV: RRR, S1S2, no m/r/g  Resp: CTAB, normal respiratory effort  Abd: Soft, nontender, nondistended, normal bowel sounds  Ext: no edema, no clubbing or cyanosis  Neuro: AOx3, CN2-12 grossly intact, HILL  SKIN: warm, perfused    LABS:                        12.3   7.42  )-----------( 286      ( 17 May 2022 05:31 )             37.8     Hgb Trend: 12.3<--, 12.2<--  05-17    142  |  110<H>  |  32<H>  ----------------------------<  107<H>  4.0   |  19<L>  |  1.19    Ca    10.2      17 May 2022 05:31  Phos  3.8     05-17  Mg     2.00     17    TPro  6.1  /  Alb  3.4  /  TBili  0.3  /  DBili  x   /  AST  17  /  ALT  16  /  AlkPhos  134<H>  05-17    Creatinine Trend: 1.19<--, 1.30<--  LIVER FUNCTIONS - ( 17 May 2022 05:31 )  Alb: 3.4 g/dL / Pro: 6.1 g/dL / ALK PHOS: 134 U/L / ALT: 16 U/L / AST: 17 U/L / GGT: x                     CAPILLARY BLOOD GLUCOSE          RADIOLOGY & ADDITIONAL TESTS: Reviewed    Imaging Personally Reviewed:    Consultant(s) Notes Reviewed:      Care Discussed with Consultants/Other Providers:   Arnulfo Thomas, PGY1    PROGRESS NOTE    Patient is a 89y old  Female who presents with a chief complaint of SOB (17 May 2022 13:44)      SUBJECTIVE/INTERVAL EVENTS:   Overnight no events, breathes comfortably at rest on room air. No chest pain, abdominal discomfort, urinating without issue. No complaints.    Review of Systems:  See subjective above. All other systems unchanged from previous.    MEDICATIONS  (STANDING):  albuterol/ipratropium for Nebulization 3 milliLiter(s) Nebulizer every 6 hours  chlorhexidine 2% Cloths 1 Application(s) Topical daily  escitalopram 15 milliGRAM(s) Oral daily  folic acid 1 milliGRAM(s) Oral daily  heparin   Injectable 5000 Unit(s) SubCutaneous every 12 hours  metoprolol tartrate 25 milliGRAM(s) Oral two times a day  mupirocin 2% Nasal 1 Application(s) Both Nostrils two times a day  mupirocin 2% Ointment 1 Application(s) Topical two times a day  pantoprazole    Tablet 40 milliGRAM(s) Oral before breakfast  valACYclovir 500 milliGRAM(s) Oral daily    MEDICATIONS  (PRN):      VITAL SIGNS:  T(F): 97.8 (22 @ 04:53), Max: 97.8 (22 @ 04:53)  HR: 84 (22 @ 04:53) (68 - 89)  BP: 90/53 (22 @ 04:53) (90/53 - 102/61)  RR: 18 (22 @ 04:53) (18 - 21)  SpO2: 100% (22 @ 04:53) (91% - 100%)    I&O's Summary    17 May 2022 07:01  -  18 May 2022 06:41  --------------------------------------------------------  IN: 0 mL / OUT: 275 mL / NET: -275 mL      Daily     Daily Weight in k (17 May 2022 13:54)    PHYSICAL EXAM:  Gen: Alert, NAD  HEENT: NCAT, conjunctiva clear, sclera anicteric, mmm  Neck: Supple, no JVD  CV: RRR, S1S2, no m/r/g  Resp: CTAB, normal respiratory effort  Abd: Soft, nontender, nondistended, normal bowel sounds  Ext: no edema, no clubbing or cyanosis  Neuro: AOx3, CN2-12 grossly intact, HILL  SKIN: warm, perfused    LABS:                        12.3   7.42  )-----------( 286      ( 17 May 2022 05:31 )             37.8     Hgb Trend: 12.3<--, 12.2<--  05-17    142  |  110<H>  |  32<H>  ----------------------------<  107<H>  4.0   |  19<L>  |  1.19    Ca    10.2      17 May 2022 05:31  Phos  3.8     05-  Mg     2.00     05-17    TPro  6.1  /  Alb  3.4  /  TBili  0.3  /  DBili  x   /  AST  17  /  ALT  16  /  AlkPhos  134<H>  05-17    Creatinine Trend: 1.19<--, 1.30<--  LIVER FUNCTIONS - ( 17 May 2022 05:31 )  Alb: 3.4 g/dL / Pro: 6.1 g/dL / ALK PHOS: 134 U/L / ALT: 16 U/L / AST: 17 U/L / GGT: x                     CAPILLARY BLOOD GLUCOSE          RADIOLOGY & ADDITIONAL TESTS: Reviewed    Imaging Personally Reviewed:    Consultant(s) Notes Reviewed:      Care Discussed with Consultants/Other Providers:   Arnulfo Thomas, PGY1    PROGRESS NOTE    Patient is a 89y old  Female who presents with a chief complaint of SOB (17 May 2022 13:44)      SUBJECTIVE/INTERVAL EVENTS:   Overnight no events, breathes comfortably at rest on room air. No chest pain, abdominal discomfort, urinating without issue. No complaints.    Review of Systems:  See subjective above. All other systems unchanged from previous.    MEDICATIONS  (STANDING):  albuterol/ipratropium for Nebulization 3 milliLiter(s) Nebulizer every 6 hours  chlorhexidine 2% Cloths 1 Application(s) Topical daily  escitalopram 15 milliGRAM(s) Oral daily  folic acid 1 milliGRAM(s) Oral daily  heparin   Injectable 5000 Unit(s) SubCutaneous every 12 hours  metoprolol tartrate 25 milliGRAM(s) Oral two times a day  mupirocin 2% Nasal 1 Application(s) Both Nostrils two times a day  mupirocin 2% Ointment 1 Application(s) Topical two times a day  pantoprazole    Tablet 40 milliGRAM(s) Oral before breakfast  valACYclovir 500 milliGRAM(s) Oral daily    MEDICATIONS  (PRN):      VITAL SIGNS:  T(F): 97.8 (22 @ 04:53), Max: 97.8 (22 @ 04:53)  HR: 84 (22 @ 04:53) (68 - 89)  BP: 90/53 (22 @ 04:53) (90/53 - 102/61)  RR: 18 (22 @ 04:53) (18 - 21)  SpO2: 100% (22 @ 04:53) (91% - 100%)    I&O's Summary    17 May 2022 07:01  -  18 May 2022 06:41  --------------------------------------------------------  IN: 0 mL / OUT: 275 mL / NET: -275 mL      Daily     Daily Weight in k (17 May 2022 13:54)    PHYSICAL EXAM:  Gen: Alert, NAD  HEENT: NCAT, conjunctiva clear, sclera anicteric, mmm  Neck: Supple, no JVD  CV: RRR, S1S2, no m/r/g  Resp: Clear anteriorly, some crackles lateral bases b/l  Abd: Soft, nontender, nondistended, normal bowel sounds  Ext: no edema  Neuro: AOx3, CN2-12 grossly intact, HILL  SKIN: warm, perfused    LABS:                        12.3   7.42  )-----------( 286      ( 17 May 2022 05:31 )             37.8     Hgb Trend: 12.3<--, 12.2<--  05-17    142  |  110<H>  |  32<H>  ----------------------------<  107<H>  4.0   |  19<L>  |  1.19    Ca    10.2      17 May 2022 05:31  Phos  3.8     05-  Mg     2.00     05-17    TPro  6.1  /  Alb  3.4  /  TBili  0.3  /  DBili  x   /  AST  17  /  ALT  16  /  AlkPhos  134<H>  05-17    Creatinine Trend: 1.19<--, 1.30<--  LIVER FUNCTIONS - ( 17 May 2022 05:31 )  Alb: 3.4 g/dL / Pro: 6.1 g/dL / ALK PHOS: 134 U/L / ALT: 16 U/L / AST: 17 U/L / GGT: x                     CAPILLARY BLOOD GLUCOSE          RADIOLOGY & ADDITIONAL TESTS: Reviewed    Imaging Personally Reviewed:    Consultant(s) Notes Reviewed:      Care Discussed with Consultants/Other Providers:

## 2022-05-18 NOTE — DISCHARGE NOTE PROVIDER - PROVIDER TOKENS
PROVIDER:[TOKEN:[[id number]]] PROVIDER:[TOKEN:[[id number]]],PROVIDER:[TOKEN:[[id number]]]

## 2022-05-18 NOTE — CONSULT NOTE ADULT - ASSESSMENT
89F with PMH lymphoma on chemotherapy (active) and heart failure with preserved ejection fraction presents for worsened dyspnea on exertion for 1 week and admitted for hypoxemic respiratory failure. Pulmonary consulted for hypoxemia, pt currently saturating 95% on room air at rest. 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 Pt feels much better but has not ambulated yet.     Problems  Abnormal Chest CT    Recommendations:  - Would resume oral diuetic to achieve net negative volume status given pulmonary hypertension and diastolic heart failure; consider starting with furosemide 40 mg daily, can decrease to 20 mg if develops worsening BUN/Cr  - Send ESR, CRP, GARRY, RF, and cyclic citrilunated peptide to assess for autoimmune ILD - does not need to wait in hospital for these results but may be useful for follow up  - Document O2 saturation at rest on room air with good waveform, during ambulation on room air, and during ambulation on supplemental O2 necessary to raise O2 saturation > 90% if desaturates to 88% during ambulation - pt would require home O2 if desaturates to 88%  - Patient should follow with pulmonology within 1-2 weeks of discharge. Please email [email or web address] and include patient's name, , MRN, telephone number, and discharge diagnosis, and allow 24 hours for scheduling. The office is located at [address] [place] [address], [address]. Number [phone].

## 2022-05-18 NOTE — DISCHARGE NOTE PROVIDER - NSDCMRMEDTOKEN_GEN_ALL_CORE_FT
escitalopram 10 mg oral tablet: 1.5 tab (15 mg) orally once a day    **Per pharmacy, rx directions state to take escitalopram 10 mg - 1 tablet orally once daily  folic acid 1 mg oral tablet: 1 tab(s) orally once a day  Lasix 20 mg oral tablet: 1 tab(s) orally once a day  methenamine hippurate 1 g oral tablet: 1 tab(s) orally 2 times a day  Metoprolol Tartrate 25 mg oral tablet: 1 tab(s) orally 2 times a day  nitrofurantoin macrocrystals-monohydrate 100 mg oral capsule: 1 cap(s) orally 2 times a day  potassium chloride 10 mEq oral tablet, extended release: 1 tab(s) orally once a day  Trelegy Ellipta 100 mcg-62.5 mcg-25 mcg/inh inhalation powder: 1 puff(s) inhaled once a day  valACYclovir 500 mg oral tablet: 1 tab(s) orally once a day   allopurinol 100 mg oral tablet: 1 tab(s) orally once a day  escitalopram 10 mg oral tablet: 1.5 tab (15 mg) orally once a day    **Per pharmacy, rx directions state to take escitalopram 10 mg - 1 tablet orally once daily  folic acid 1 mg oral tablet: 1 tab(s) orally once a day  Lasix 20 mg oral tablet: 1 tab(s) orally once a day  methenamine hippurate 1 g oral tablet: 1 tab(s) orally 2 times a day  Metoprolol Tartrate 25 mg oral tablet: 1 tab(s) orally 2 times a day  nitrofurantoin macrocrystals-monohydrate 100 mg oral capsule: 1 cap(s) orally 2 times a day  potassium chloride 10 mEq oral tablet, extended release: 1 tab(s) orally once a day  Trelegy Ellipta 100 mcg-62.5 mcg-25 mcg/inh inhalation powder: 1 puff(s) inhaled once a day  valACYclovir 500 mg oral tablet: 1 tab(s) orally once a day   allopurinol 100 mg oral tablet: 1 tab(s) orally once a day  escitalopram 10 mg oral tablet: 1.5 tab (15 mg) orally once a day    **Per pharmacy, rx directions state to take escitalopram 10 mg - 1 tablet orally once daily  folic acid 1 mg oral tablet: 1 tab(s) orally once a day  Lasix 20 mg oral tablet: 1 tab(s) orally once a day  methenamine hippurate 1 g oral tablet: 1 tab(s) orally 2 times a day  Metoprolol Tartrate 25 mg oral tablet: 1 tab(s) orally 2 times a day  potassium chloride 10 mEq oral tablet, extended release: 1 tab(s) orally once a day  Trelegy Ellipta 100 mcg-62.5 mcg-25 mcg/inh inhalation powder: 1 puff(s) inhaled once a day  valACYclovir 500 mg oral tablet: 1 tab(s) orally once a day

## 2022-05-18 NOTE — CHART NOTE - NSCHARTNOTEFT_GEN_A_CORE
Discussed goals of care with patient at bedside. Patient states she is DNR. When asked about intubation, she was unsure and deferred the decision to her daughter Dinorah Jacobsen who is her proxy. I spoke with patient's daughter on phone who stated that she would like to make her mother DNI as to not prolong any suffering and preserve quality of life which is inline with pt's goals of care.

## 2022-05-18 NOTE — DISCHARGE NOTE PROVIDER - HOSPITAL COURSE
HPI:  89F with hx of lymphoma on chemotherapy (last session a few weeks ago), COPD, HFrEF (normal EF last echo) here for 1 week of exertional SOB with cough. Denies fevers. Produces some sputum, non-purulent. Dyspnea has been worse with exertion and resolves with rest. Pt has home O2 from previous hospitalization last year for pneumonia, but has not required it prior to this week.     Earlier in week patient had episode of diarrhea, non-bloody, since resolved and has had solid bowel movements since.   No other complaints.    Pt is unclear on her past medical history. Denies having any cardiac or thyroid past medical history.    PCP Dr. Shruti Esparza (16 May 2022 18:48)    Hospital Course:  Patient was treated with duonebs as well as IV lasix. Pt weaned to room air while at rest. Ambulatory O2 saturation on nasal cannula was 2L.  Patient was seen by outpatient oncologist as well as cardiologist. Pulmonology was consulted for lung involvement of lymphoma.     At discharge patient stable and returned to respiratory baseline. Patient will continue to follow-up with outpatient oncologist. HPI:  89F with hx of lymphoma on chemotherapy (last session a few weeks ago), COPD, HFrEF (normal EF last echo) here for 1 week of exertional SOB with cough. Denies fevers. Produces some sputum, non-purulent. Dyspnea has been worse with exertion and resolves with rest. Pt has home O2 from previous hospitalization last year for pneumonia, but has not required it prior to this week.     Dyspnea on exertion.   - Likely multifactorial-acute diastolic CHF, severe pulmonary HTN and possible lymphoma involvement in the lungs  Pulmonary edema seen on CT outpatient, BNP 6000. Afebrile, no WBC  - Apprec Hem/Onc, Cards and Pulmonary recs  - Considering lymphoma involvement in the lungs (will get treatment as below)  - TTE showing severe pulmonary hypertension  - Ambulatory sat 5/17 on RA 92  - Will assess daily need for diuresis with lasix 20mg PO (may need to be continued on discharge)  - Needs Pulmonary f/up as outpatient.    Lymphoma.   - Last chemo a few weeks ago with rutixan, polatuzumab  - Will treat with Obinutuzumab on 5/20 test dose and 5/21 to complete infusion for total of 1000mg  - continue home valacyclovir, folic acid  - Apprec Hem/Onc recs.    SHARAD  - In admission SCr 1.30, baseline ~0.9  - Improved with diuresis, back at baseline    History of COPD.   - Not on home O2  - duonebs q6h prn  - Monitor O2 closely, mainly on ambulation.    Chronic UTI.   - Holding home methenamine  - no urinary sx  - bladder scan 5/16 not showing retention.    Discussed case with  _____________________ on ________________________, patient medically stable for discharge to ___________________. HPI:  89F with hx of lymphoma on chemotherapy (last session a few weeks ago), COPD, HFrEF (normal EF last echo) here for 1 week of exertional SOB with cough. Denies fevers. Produces some sputum, non-purulent. Dyspnea has been worse with exertion and resolves with rest. Pt has home O2 from previous hospitalization last year for pneumonia, but has not required it prior to this week.     Dyspnea on exertion.   - Likely multifactorial-acute diastolic CHF, severe pulmonary HTN and possible lymphoma involvement in the lungs  Pulmonary edema seen on CT outpatient, BNP 6000. Afebrile, no WBC  - Apprec Hem/Onc, Cards and Pulmonary recs  - Considering lymphoma involvement in the lungs (will get treatment as below)  - TTE showing severe pulmonary hypertension  - Ambulatory sat 5/17 on RA 92  - Continue lasix 20mg PO upon discharge   - Needs Pulmonary f/up as outpatient.    Lymphoma.   - Last chemo a few weeks ago with rutixan, polatuzumab  - Will treat with Obinutuzumab on 5/20 test dose and 5/21 to complete infusion for total of 1000mg  - continue home valacyclovir, folic acid  - Apprec Hem/Onc recs.  - Allopurinol added    SHARAD  - In admission SCr 1.30, baseline ~0.9  - Improved with diuresis, back at baseline    History of COPD.   - Not on home O2    Chronic UTI.   - Holding home methenamine  - no urinary sx  - bladder scan 5/16 not showing retention.    Discussed case with Dr. Robins on 5/22 patient medically stable for discharge to home with Home PT

## 2022-05-18 NOTE — DISCHARGE NOTE PROVIDER - NSDCCPCAREPLAN_GEN_ALL_CORE_FT
PRINCIPAL DISCHARGE DIAGNOSIS  Diagnosis: Shortness of breath  Assessment and Plan of Treatment: You arrived to the hospital experiencing shortness of breath. In your particular case, there could be several contributing factors to your symptoms such as the presence of lymphoma in your lungs, your COPD, as well as       PRINCIPAL DISCHARGE DIAGNOSIS  Diagnosis: Shortness of breath  Assessment and Plan of Treatment: You arrived to the hospital experiencing shortness of breath. In your particular case, there could be several contributing factors to your symptoms such as the presence of lymphoma in your lungs and your COPD, as well as potentially some component of heart dysfunction.  In the hospital, you received nebulizer treatments as well as some durietic through the IV. You were weaned to room air and were able to ambulate with nasal cannula.   Please follow-up with your outpatient oncologist and cardiologist.  If you have worsening shortness of breath, fever, or other symptoms please contact your physician or return to the hospital.       PRINCIPAL DISCHARGE DIAGNOSIS  Diagnosis: Shortness of breath  Assessment and Plan of Treatment: You arrived to the hospital experiencing shortness of breath. In your particular case, there could be several contributing factors to your symptoms such as the presence of lymphoma in your lungs and your COPD, as well as potentially some component of heart dysfunction.  In the hospital, you received nebulizer treatments as well as some durietic through the IV. You were weaned to room air and were able to ambulate with nasal cannula.   Please follow-up with your outpatient oncologist and cardiologist.  If you have worsening shortness of breath, fever, or other symptoms please contact your physician or return to the hospital.      SECONDARY DISCHARGE DIAGNOSES  Diagnosis: SHARAD (acute kidney injury)  Assessment and Plan of Treatment: Creatinine, Serum: 0.93 mg/dL (05.21.22 @ 07:40)  In order to prevent further disease progression, continue to follow recommendations made by your primary provider/nephrologist. Continue a diet that is low in sodium and avoid foods that are concentrated in potassium and phosphorus. Continue your medications/supplementations as directed and avoid over-the-counter drugs that are harmful to kidneys, such as, Non-Steroidal Anti-Inflammatory Drugs (NSAIDs). Follow-up as outpatient to monitor your kidney function, as well as, vitamin D, Calcium, potassium, and phosphorus levels.        PRINCIPAL DISCHARGE DIAGNOSIS  Diagnosis: Shortness of breath  Assessment and Plan of Treatment: You arrived to the hospital experiencing shortness of breath. In your particular case, there could be several contributing factors to your symptoms such as the presence of lymphoma in your lungs and your COPD, as well as potentially some component of heart dysfunction.  In the hospital, you received nebulizer treatments as well as some durietic through the IV. You were weaned to room air and were able to ambulate with nasal cannula.   Please follow-up with your outpatient oncologist and cardiologist.  If you have worsening shortness of breath, fever, or other symptoms please contact your physician or return to the hospital.      SECONDARY DISCHARGE DIAGNOSES  Diagnosis: SHARAD (acute kidney injury)  Assessment and Plan of Treatment: Creatinine, Serum: 0.93 mg/dL (05.21.22 @ 07:40)  In order to prevent further disease progression, continue to follow recommendations made by your primary provider/nephrologist. Continue a diet that is low in sodium and avoid foods that are concentrated in potassium and phosphorus. Continue your medications/supplementations as directed and avoid over-the-counter drugs that are harmful to kidneys, such as, Non-Steroidal Anti-Inflammatory Drugs (NSAIDs). Follow-up as outpatient to monitor your kidney function, as well as, vitamin D, Calcium, potassium, and phosphorus levels.       Diagnosis: History of COPD  Assessment and Plan of Treatment: Not on home oxygen    Diagnosis: Lymphoma  Assessment and Plan of Treatment: Outpatinent follow up with Dr. Cervantes  Continue allopurinol as prescribed

## 2022-05-19 ENCOUNTER — TRANSCRIPTION ENCOUNTER (OUTPATIENT)
Age: 87
End: 2022-05-19

## 2022-05-19 ENCOUNTER — APPOINTMENT (OUTPATIENT)
Dept: HEMATOLOGY ONCOLOGY | Facility: CLINIC | Age: 87
End: 2022-05-19

## 2022-05-19 ENCOUNTER — APPOINTMENT (OUTPATIENT)
Dept: INFUSION THERAPY | Facility: HOSPITAL | Age: 87
End: 2022-05-19

## 2022-05-19 LAB
CRP SERPL-MCNC: 24 MG/L — HIGH
ERYTHROCYTE [SEDIMENTATION RATE] IN BLOOD: 49 MM/HR — HIGH (ref 4–25)

## 2022-05-19 PROCEDURE — 99232 SBSQ HOSP IP/OBS MODERATE 35: CPT | Mod: GC

## 2022-05-19 RX ORDER — ENOXAPARIN SODIUM 100 MG/ML
30 INJECTION SUBCUTANEOUS EVERY 24 HOURS
Refills: 0 | Status: DISCONTINUED | OUTPATIENT
Start: 2022-05-19 | End: 2022-05-22

## 2022-05-19 RX ORDER — FUROSEMIDE 40 MG
40 TABLET ORAL DAILY
Refills: 0 | Status: DISCONTINUED | OUTPATIENT
Start: 2022-05-19 | End: 2022-05-20

## 2022-05-19 RX ORDER — ENOXAPARIN SODIUM 100 MG/ML
40 INJECTION SUBCUTANEOUS EVERY 24 HOURS
Refills: 0 | Status: DISCONTINUED | OUTPATIENT
Start: 2022-05-19 | End: 2022-05-19

## 2022-05-19 RX ADMIN — CHLORHEXIDINE GLUCONATE 1 APPLICATION(S): 213 SOLUTION TOPICAL at 11:50

## 2022-05-19 RX ADMIN — VALACYCLOVIR 500 MILLIGRAM(S): 500 TABLET, FILM COATED ORAL at 11:48

## 2022-05-19 RX ADMIN — Medication 3 MILLILITER(S): at 15:39

## 2022-05-19 RX ADMIN — Medication 1 MILLIGRAM(S): at 11:48

## 2022-05-19 RX ADMIN — HEPARIN SODIUM 5000 UNIT(S): 5000 INJECTION INTRAVENOUS; SUBCUTANEOUS at 07:06

## 2022-05-19 RX ADMIN — ENOXAPARIN SODIUM 30 MILLIGRAM(S): 100 INJECTION SUBCUTANEOUS at 17:48

## 2022-05-19 RX ADMIN — Medication 40 MILLIGRAM(S): at 11:48

## 2022-05-19 RX ADMIN — Medication 3 MILLILITER(S): at 10:59

## 2022-05-19 RX ADMIN — ESCITALOPRAM OXALATE 15 MILLIGRAM(S): 10 TABLET, FILM COATED ORAL at 11:48

## 2022-05-19 RX ADMIN — MUPIROCIN 1 APPLICATION(S): 20 OINTMENT TOPICAL at 17:50

## 2022-05-19 RX ADMIN — Medication 3 MILLILITER(S): at 03:51

## 2022-05-19 RX ADMIN — Medication 3 MILLILITER(S): at 21:40

## 2022-05-19 RX ADMIN — PANTOPRAZOLE SODIUM 40 MILLIGRAM(S): 20 TABLET, DELAYED RELEASE ORAL at 07:06

## 2022-05-19 RX ADMIN — Medication 25 MILLIGRAM(S): at 07:07

## 2022-05-19 NOTE — PROGRESS NOTE ADULT - PROBLEM SELECTOR PLAN 2
on admission SCr 1.30, baseline ~0.9  -continue to monitor SCr i/s/o diuresis last chemo a few weeks ago with linda rizviatuzumab  -continue home valacyclovir, folic acid  -pt to undergo tx on 20th, 21st with plan for discharge on Sunday  -f/u heme-on recs

## 2022-05-19 NOTE — PROGRESS NOTE ADULT - SUBJECTIVE AND OBJECTIVE BOX
DATE OF SERVICE: 05-19-22 @ 09:40    Subjective: Patient seen and examined. No new events except as noted.     SUBJECTIVE/ROS:  feels ok       MEDICATIONS:  MEDICATIONS  (STANDING):  albuterol/ipratropium for Nebulization 3 milliLiter(s) Nebulizer every 6 hours  chlorhexidine 2% Cloths 1 Application(s) Topical daily  escitalopram 15 milliGRAM(s) Oral daily  folic acid 1 milliGRAM(s) Oral daily  furosemide    Tablet 40 milliGRAM(s) Oral daily  heparin   Injectable 5000 Unit(s) SubCutaneous every 12 hours  metoprolol tartrate 25 milliGRAM(s) Oral two times a day  mupirocin 2% Ointment 1 Application(s) Topical two times a day  pantoprazole    Tablet 40 milliGRAM(s) Oral before breakfast  valACYclovir 500 milliGRAM(s) Oral daily      PHYSICAL EXAM:  T(C): 36.7 (05-19-22 @ 07:00), Max: 36.9 (05-18-22 @ 21:35)  HR: 87 (05-19-22 @ 07:00) (81 - 98)  BP: 109/62 (05-19-22 @ 07:00) (91/52 - 109/62)  RR: 16 (05-19-22 @ 07:00) (16 - 19)  SpO2: 97% (05-19-22 @ 07:00) (94% - 99%)  Wt(kg): --  I&O's Summary    18 May 2022 07:01  -  19 May 2022 07:00  --------------------------------------------------------  IN: 415 mL / OUT: 400 mL / NET: 15 mL            JVP: Normal  Neck: supple  Lung: clear   CV: S1 S2 , Murmur:  Abd: soft  Ext: No edema  neuro: Awake / alert  Psych: flat affect  Skin: normal``    LABS/DATA:    CARDIAC MARKERS:                                11.1   6.81  )-----------( 239      ( 18 May 2022 07:32 )             35.0     05-18    141  |  110<H>  |  28<H>  ----------------------------<  107<H>  4.3   |  20<L>  |  0.99    Ca    9.9      18 May 2022 07:32  Phos  3.5     05-18  Mg     2.10     05-18    TPro  5.7<L>  /  Alb  3.2<L>  /  TBili  0.2  /  DBili  x   /  AST  12  /  ALT  7   /  AlkPhos  124<H>  05-18    proBNP:   Lipid Profile:   HgA1c:   TSH:     TELE:  EKG:

## 2022-05-19 NOTE — PROGRESS NOTE ADULT - PROBLEM SELECTOR PLAN 3
last chemo a few weeks ago with rutixan, polatuzumab  -continue home valacyclovir, folic acid  -outpatient follow-up  -f/u heme-on recs on admission SCr 1.30, baseline ~0.9  -continue to monitor SCr i/s/o diuresis  -resolved

## 2022-05-19 NOTE — PROGRESS NOTE ADULT - ASSESSMENT
SOB  likely multifactorial   acute diastolic CHF has elevated proBNP   however CT reviewed personally and it appears she also has lung parenchymal disease   agree with diuresis  pulm consult appreciated   pulm HTN suspect due to underlying lung disease

## 2022-05-19 NOTE — PROGRESS NOTE ADULT - ASSESSMENT
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     # Acute resp distress  - CTA chest 5/10: No pulmonary embolism from the main pulmonary artery up to and including the lobar branches. Several segmental and subsegmental branches cannot be assessed due to extensive respiratory motion. New groundglass in the upper lobes, suspicious for pulmonary edema. Infection is also a consideration. Increased mildly enlarged mediastinal lymph nodes may be reactive. Stable multilobar chronic consolidation.  - BNP elevated to 6000-- diuresis per primary team and cardiology  - Given h/o pulm involvement of lymphoma, recommend pulm consult as well    # DLBCL  - Dx 7/2021-- patient with resp symptoms, + lung involvement, +LN involvement  - Given her age, plan was to do palliative mild treatment  - Patient got 11 cycles on R-CP (until 3/31). PET done 11/2021 after starting treatment with near complete resolution of disease in lungs and lymph nodes  - Unfortunately, patient with c/f recurrence when CT scan of chest done 4/2/22: Newly noted 9 mm opacity in the periphery of the right upper lobe and interval increase in size of a lingular opacity along the fissure.  - Started on Hugo-Rtixumab-- cycle 1 on 4/28. Polatuzumab can cause pneumonitis but it is very rare with only 1 dose. Pulm to assess further.   - Now p/w worsening SOB-- as above, recommend pulm consult- appreciate recs: pulm edema and lymphoma   - considering lymphoma involvement in the lungs will treat with Obinutuzumab on 5/20 test dose and 5/21 to complete infusion for total of 1000mg.     Please do not discharge patient as she needs infusional therapy     Master Esparza MD  Hematology/Oncology Fellow   pager [phone]  After 5pm and on weekends page on call fellow       Master Esparza MD  Hematology-Oncology Fellow  pager [phone]  Please page fellow on call after 5pm and on weekends

## 2022-05-19 NOTE — DISCHARGE NOTE NURSING/CASE MANAGEMENT/SOCIAL WORK - NSDCPEFALRISK_GEN_ALL_CORE
For information on Fall & Injury Prevention, visit: [email or web address] OR  [email or web address] OR  https://www.cdc.gov/steadi/patient.html

## 2022-05-19 NOTE — PROGRESS NOTE ADULT - SUBJECTIVE AND OBJECTIVE BOX
INTERVAL HPI/OVERNIGHT EVENTS:  Patient S&E at bedside. No o/n events,     VITAL SIGNS:  T(F): 98.1 (05-19-22 @ 07:00)  HR: 83 (05-19-22 @ 10:59)  BP: 109/62 (05-19-22 @ 07:00)  RR: 16 (05-19-22 @ 07:00)  SpO2: 97% (05-19-22 @ 10:59)  Wt(kg): --    PHYSICAL EXAM:    Constitutional: NAD  Eyes: EOMI, sclera non-icteric  Neck: supple  Respiratory: CTAB, no wheezes or crackles   Cardiovascular: RRR  Gastrointestinal: soft, NTND, + BS  Extremities: no cyanosis, clubbing or edema   Neurological: awake and alert      MEDICATIONS  (STANDING):  albuterol/ipratropium for Nebulization 3 milliLiter(s) Nebulizer every 6 hours  chlorhexidine 2% Cloths 1 Application(s) Topical daily  escitalopram 15 milliGRAM(s) Oral daily  folic acid 1 milliGRAM(s) Oral daily  furosemide    Tablet 40 milliGRAM(s) Oral daily  heparin   Injectable 5000 Unit(s) SubCutaneous every 12 hours  metoprolol tartrate 25 milliGRAM(s) Oral two times a day  mupirocin 2% Ointment 1 Application(s) Topical two times a day  pantoprazole    Tablet 40 milliGRAM(s) Oral before breakfast  valACYclovir 500 milliGRAM(s) Oral daily    MEDICATIONS  (PRN):  guaiFENesin Oral Liquid (Sugar-Free) 100 milliGRAM(s) Oral every 6 hours PRN Cough      Allergies    Zosyn (Rash)    Intolerances        LABS:                        11.1   6.81  )-----------( 239      ( 18 May 2022 07:32 )             35.0     05-18    141  |  110<H>  |  28<H>  ----------------------------<  107<H>  4.3   |  20<L>  |  0.99    Ca    9.9      18 May 2022 07:32  Phos  3.5     05-18  Mg     2.10     05-18    TPro  5.7<L>  /  Alb  3.2<L>  /  TBili  0.2  /  DBili  x   /  AST  12  /  ALT  7   /  AlkPhos  124<H>  05-18          RADIOLOGY & ADDITIONAL TESTS:  Studies reviewed.

## 2022-05-19 NOTE — PROGRESS NOTE ADULT - SUBJECTIVE AND OBJECTIVE BOX
Arnulfo Thomas, PGY1    PROGRESS NOTE    Patient is a 89y old  Female who presents with a chief complaint of SOB (17 May 2022 13:44)      SUBJECTIVE/INTERVAL EVENTS:     Review of Systems:  See subjective above. All other systems unchanged from previous.    MEDICATIONS  (STANDING):  albuterol/ipratropium for Nebulization 3 milliLiter(s) Nebulizer every 6 hours  chlorhexidine 2% Cloths 1 Application(s) Topical daily  escitalopram 15 milliGRAM(s) Oral daily  folic acid 1 milliGRAM(s) Oral daily  furosemide    Tablet 40 milliGRAM(s) Oral daily  heparin   Injectable 5000 Unit(s) SubCutaneous every 12 hours  metoprolol tartrate 25 milliGRAM(s) Oral two times a day  mupirocin 2% Ointment 1 Application(s) Topical two times a day  pantoprazole    Tablet 40 milliGRAM(s) Oral before breakfast  valACYclovir 500 milliGRAM(s) Oral daily    MEDICATIONS  (PRN):  guaiFENesin Oral Liquid (Sugar-Free) 100 milliGRAM(s) Oral every 6 hours PRN Cough      VITAL SIGNS:  T(F): 98.5 (05-18-22 @ 21:35), Max: 98.5 (05-18-22 @ 21:35)  HR: 81 (05-19-22 @ 03:51) (81 - 98)  BP: 104/63 (05-18-22 @ 21:35) (91/52 - 104/63)  RR: 19 (05-18-22 @ 21:35) (19 - 19)  SpO2: 99% (05-19-22 @ 03:51) (94% - 99%)    I&O's Summary    18 May 2022 07:01  -  19 May 2022 07:00  --------------------------------------------------------  IN: 415 mL / OUT: 400 mL / NET: 15 mL      Daily     Daily     PHYSICAL EXAM:  Gen: Alert, NAD  HEENT: NCAT, conjunctiva clear, sclera anicteric, mmm  Neck: Supple, no JVD  CV: RRR, S1S2, no m/r/g  Resp: CTAB, normal respiratory effort  Abd: Soft, nontender, nondistended, normal bowel sounds  Ext: no edema, no clubbing or cyanosis  Neuro: AOx3, CN2-12 grossly intact, HILL  SKIN: warm, perfused    LABS:                        11.1   6.81  )-----------( 239      ( 18 May 2022 07:32 )             35.0     Hgb Trend: 11.1<--, 12.3<--, 12.2<--  05-18    141  |  110<H>  |  28<H>  ----------------------------<  107<H>  4.3   |  20<L>  |  0.99    Ca    9.9      18 May 2022 07:32  Phos  3.5     05-18  Mg     2.10     05-18    TPro  5.7<L>  /  Alb  3.2<L>  /  TBili  0.2  /  DBili  x   /  AST  12  /  ALT  7   /  AlkPhos  124<H>  05-18    Creatinine Trend: 0.99<--, 1.19<--, 1.30<--  LIVER FUNCTIONS - ( 18 May 2022 07:32 )  Alb: 3.2 g/dL / Pro: 5.7 g/dL / ALK PHOS: 124 U/L / ALT: 7 U/L / AST: 12 U/L / GGT: x                     CAPILLARY BLOOD GLUCOSE          RADIOLOGY & ADDITIONAL TESTS: Reviewed    Imaging Personally Reviewed:    Consultant(s) Notes Reviewed:      Care Discussed with Consultants/Other Providers:   Arnulfo Thomas, PGY1    PROGRESS NOTE    Patient is a 89y old  Female who presents with a chief complaint of SOB (17 May 2022 13:44)      SUBJECTIVE/INTERVAL EVENTS:   No overnight events. Breathing comfortable on room air when at rest. Pt upset that nursing did not come quickly enough overnight when she had to go to the bathroom.  No complaints.    Review of Systems:  See subjective above. All other systems unchanged from previous.    MEDICATIONS  (STANDING):  albuterol/ipratropium for Nebulization 3 milliLiter(s) Nebulizer every 6 hours  chlorhexidine 2% Cloths 1 Application(s) Topical daily  escitalopram 15 milliGRAM(s) Oral daily  folic acid 1 milliGRAM(s) Oral daily  furosemide    Tablet 40 milliGRAM(s) Oral daily  heparin   Injectable 5000 Unit(s) SubCutaneous every 12 hours  metoprolol tartrate 25 milliGRAM(s) Oral two times a day  mupirocin 2% Ointment 1 Application(s) Topical two times a day  pantoprazole    Tablet 40 milliGRAM(s) Oral before breakfast  valACYclovir 500 milliGRAM(s) Oral daily    MEDICATIONS  (PRN):  guaiFENesin Oral Liquid (Sugar-Free) 100 milliGRAM(s) Oral every 6 hours PRN Cough      VITAL SIGNS:  T(F): 98.5 (05-18-22 @ 21:35), Max: 98.5 (05-18-22 @ 21:35)  HR: 81 (05-19-22 @ 03:51) (81 - 98)  BP: 104/63 (05-18-22 @ 21:35) (91/52 - 104/63)  RR: 19 (05-18-22 @ 21:35) (19 - 19)  SpO2: 99% (05-19-22 @ 03:51) (94% - 99%)    I&O's Summary    18 May 2022 07:01  -  19 May 2022 07:00  --------------------------------------------------------  IN: 415 mL / OUT: 400 mL / NET: 15 mL      Daily     Daily     PHYSICAL EXAM:  Gen: Alert, NAD  HEENT: NCAT, conjunctiva clear, sclera anicteric, mmm  Neck: Supple, no JVD  CV: RRR, S1S2, no m/r/g  Resp: Clear anteriorly, some crackles lateral bases b/l  Abd: Soft, nontender, nondistended, normal bowel sounds  Ext: no edema  Neuro: AOx3, CN2-12 grossly intact, HILL  SKIN: warm, perfused    LABS:                        11.1   6.81  )-----------( 239      ( 18 May 2022 07:32 )             35.0     Hgb Trend: 11.1<--, 12.3<--, 12.2<--  05-18    141  |  110<H>  |  28<H>  ----------------------------<  107<H>  4.3   |  20<L>  |  0.99    Ca    9.9      18 May 2022 07:32  Phos  3.5     05-18  Mg     2.10     05-18    TPro  5.7<L>  /  Alb  3.2<L>  /  TBili  0.2  /  DBili  x   /  AST  12  /  ALT  7   /  AlkPhos  124<H>  05-18    Creatinine Trend: 0.99<--, 1.19<--, 1.30<--  LIVER FUNCTIONS - ( 18 May 2022 07:32 )  Alb: 3.2 g/dL / Pro: 5.7 g/dL / ALK PHOS: 124 U/L / ALT: 7 U/L / AST: 12 U/L / GGT: x                     CAPILLARY BLOOD GLUCOSE          RADIOLOGY & ADDITIONAL TESTS: Reviewed    Imaging Personally Reviewed:    Consultant(s) Notes Reviewed:      Care Discussed with Consultants/Other Providers:

## 2022-05-19 NOTE — DISCHARGE NOTE NURSING/CASE MANAGEMENT/SOCIAL WORK - PATIENT PORTAL LINK FT
You can access the FollowMyHealth Patient Portal offered by [facility] by registering at the following website: [email or web address]. By joining [employer]’s FollowMyHealth portal, you will also be able to view your health information using other applications (apps) compatible with our system.

## 2022-05-19 NOTE — PROGRESS NOTE ADULT - PROBLEM SELECTOR PLAN 1
likely 2/2 pulmonary edema seen on CT outpatient, BNP 6000. Afebrile, no WBC, procalcitonin .08  -holding Levaquin started 5/9 outpatient  -ambulatory sat 5/17 on RA 92  -5/17 20 IV lasix  -5/17 echo showing severe pulm htn  -f/u card recs  -f/u pulm recs likely 2/2 pulmonary edema seen on CT outpatient, BNP 6000. Afebrile, no WBC, procalcitonin .08  -holding Levaquin started 5/9 outpatient  -ambulatory sat 5/17 on RA 92  -5/17 20 IV lasix  -5/17 echo showing severe pulm htn  -f/u card recs  -40 lasix oral standing  -outpatient pulm follow-up

## 2022-05-19 NOTE — DISCHARGE NOTE NURSING/CASE MANAGEMENT/SOCIAL WORK - NSDCFUADDAPPT_GEN_ALL_CORE_FT
Please follow up with your primary care provider within 1 week of discharge from the hospital. If you do not have a primary care provider please follow up with the [facility], call [phone]

## 2022-05-20 LAB
ALBUMIN SERPL ELPH-MCNC: 3.8 G/DL — SIGNIFICANT CHANGE UP (ref 3.3–5)
ALP SERPL-CCNC: 137 U/L — HIGH (ref 40–120)
ALT FLD-CCNC: 10 U/L — SIGNIFICANT CHANGE UP (ref 4–33)
ANION GAP SERPL CALC-SCNC: 15 MMOL/L — HIGH (ref 7–14)
AST SERPL-CCNC: 16 U/L — SIGNIFICANT CHANGE UP (ref 4–32)
BASOPHILS # BLD AUTO: 0.05 K/UL — SIGNIFICANT CHANGE UP (ref 0–0.2)
BASOPHILS NFR BLD AUTO: 0.4 % — SIGNIFICANT CHANGE UP (ref 0–2)
BILIRUB SERPL-MCNC: 0.4 MG/DL — SIGNIFICANT CHANGE UP (ref 0.2–1.2)
BUN SERPL-MCNC: 19 MG/DL — SIGNIFICANT CHANGE UP (ref 7–23)
CALCIUM SERPL-MCNC: 10.5 MG/DL — SIGNIFICANT CHANGE UP (ref 8.4–10.5)
CHLORIDE SERPL-SCNC: 104 MMOL/L — SIGNIFICANT CHANGE UP (ref 98–107)
CO2 SERPL-SCNC: 20 MMOL/L — LOW (ref 22–31)
CREAT SERPL-MCNC: 0.95 MG/DL — SIGNIFICANT CHANGE UP (ref 0.5–1.3)
EGFR: 57 ML/MIN/1.73M2 — LOW
EOSINOPHIL # BLD AUTO: 0.75 K/UL — HIGH (ref 0–0.5)
EOSINOPHIL NFR BLD AUTO: 6 % — SIGNIFICANT CHANGE UP (ref 0–6)
GLUCOSE SERPL-MCNC: 114 MG/DL — HIGH (ref 70–99)
HCT VFR BLD CALC: 40.8 % — SIGNIFICANT CHANGE UP (ref 34.5–45)
HCT VFR BLD CALC: 40.8 % — SIGNIFICANT CHANGE UP (ref 34.5–45)
HGB BLD-MCNC: 13.2 G/DL — SIGNIFICANT CHANGE UP (ref 11.5–15.5)
HGB BLD-MCNC: 13.2 G/DL — SIGNIFICANT CHANGE UP (ref 11.5–15.5)
IANC: 8.87 K/UL — HIGH (ref 1.8–7.4)
IMM GRANULOCYTES NFR BLD AUTO: 0.4 % — SIGNIFICANT CHANGE UP (ref 0–1.5)
LYMPHOCYTES # BLD AUTO: 1.53 K/UL — SIGNIFICANT CHANGE UP (ref 1–3.3)
LYMPHOCYTES # BLD AUTO: 12.3 % — LOW (ref 13–44)
MAGNESIUM SERPL-MCNC: 2.1 MG/DL — SIGNIFICANT CHANGE UP (ref 1.6–2.6)
MCHC RBC-ENTMCNC: 28.2 PG — SIGNIFICANT CHANGE UP (ref 27–34)
MCHC RBC-ENTMCNC: 28.2 PG — SIGNIFICANT CHANGE UP (ref 27–34)
MCHC RBC-ENTMCNC: 32.4 GM/DL — SIGNIFICANT CHANGE UP (ref 32–36)
MCHC RBC-ENTMCNC: 32.4 GM/DL — SIGNIFICANT CHANGE UP (ref 32–36)
MCV RBC AUTO: 87.2 FL — SIGNIFICANT CHANGE UP (ref 80–100)
MCV RBC AUTO: 87.2 FL — SIGNIFICANT CHANGE UP (ref 80–100)
MONOCYTES # BLD AUTO: 1.21 K/UL — HIGH (ref 0–0.9)
MONOCYTES NFR BLD AUTO: 9.7 % — SIGNIFICANT CHANGE UP (ref 2–14)
NEUTROPHILS # BLD AUTO: 8.87 K/UL — HIGH (ref 1.8–7.4)
NEUTROPHILS NFR BLD AUTO: 71.2 % — SIGNIFICANT CHANGE UP (ref 43–77)
NRBC # BLD: 0 /100 WBCS — SIGNIFICANT CHANGE UP
NRBC # BLD: 0 /100 WBCS — SIGNIFICANT CHANGE UP
NRBC # FLD: 0 K/UL — SIGNIFICANT CHANGE UP
NRBC # FLD: 0 K/UL — SIGNIFICANT CHANGE UP
PHOSPHATE SERPL-MCNC: 3.9 MG/DL — SIGNIFICANT CHANGE UP (ref 2.5–4.5)
PLATELET # BLD AUTO: 403 K/UL — HIGH (ref 150–400)
PLATELET # BLD AUTO: 403 K/UL — HIGH (ref 150–400)
POTASSIUM SERPL-MCNC: 3.8 MMOL/L — SIGNIFICANT CHANGE UP (ref 3.5–5.3)
POTASSIUM SERPL-SCNC: 3.8 MMOL/L — SIGNIFICANT CHANGE UP (ref 3.5–5.3)
PROT SERPL-MCNC: 6.5 G/DL — SIGNIFICANT CHANGE UP (ref 6–8.3)
RBC # BLD: 4.68 M/UL — SIGNIFICANT CHANGE UP (ref 3.8–5.2)
RBC # BLD: 4.68 M/UL — SIGNIFICANT CHANGE UP (ref 3.8–5.2)
RBC # FLD: 14.3 % — SIGNIFICANT CHANGE UP (ref 10.3–14.5)
RBC # FLD: 14.3 % — SIGNIFICANT CHANGE UP (ref 10.3–14.5)
SODIUM SERPL-SCNC: 139 MMOL/L — SIGNIFICANT CHANGE UP (ref 135–145)
WBC # BLD: 12.34 K/UL — HIGH (ref 3.8–10.5)
WBC # BLD: 12.34 K/UL — HIGH (ref 3.8–10.5)
WBC # FLD AUTO: 12.34 K/UL — HIGH (ref 3.8–10.5)
WBC # FLD AUTO: 12.34 K/UL — HIGH (ref 3.8–10.5)

## 2022-05-20 PROCEDURE — 99233 SBSQ HOSP IP/OBS HIGH 50: CPT

## 2022-05-20 PROCEDURE — 99232 SBSQ HOSP IP/OBS MODERATE 35: CPT | Mod: GC

## 2022-05-20 PROCEDURE — 99233 SBSQ HOSP IP/OBS HIGH 50: CPT | Mod: GC

## 2022-05-20 RX ORDER — LANOLIN ALCOHOL/MO/W.PET/CERES
3 CREAM (GRAM) TOPICAL AT BEDTIME
Refills: 0 | Status: DISCONTINUED | OUTPATIENT
Start: 2022-05-20 | End: 2022-05-20

## 2022-05-20 RX ORDER — DEXAMETHASONE 0.5 MG/5ML
20 ELIXIR ORAL DAILY
Refills: 0 | Status: COMPLETED | OUTPATIENT
Start: 2022-05-20 | End: 2022-05-22

## 2022-05-20 RX ORDER — CALCIUM CARBONATE 500(1250)
1 TABLET ORAL ONCE
Refills: 0 | Status: COMPLETED | OUTPATIENT
Start: 2022-05-20 | End: 2022-05-20

## 2022-05-20 RX ORDER — OBINUTUZUMAB 1000 MG/40ML
900 INJECTION, SOLUTION, CONCENTRATE INTRAVENOUS ONCE
Refills: 0 | Status: COMPLETED | OUTPATIENT
Start: 2022-05-21 | End: 2022-05-21

## 2022-05-20 RX ORDER — LANOLIN ALCOHOL/MO/W.PET/CERES
3 CREAM (GRAM) TOPICAL AT BEDTIME
Refills: 0 | Status: DISCONTINUED | OUTPATIENT
Start: 2022-05-20 | End: 2022-05-22

## 2022-05-20 RX ORDER — DIPHENHYDRAMINE HCL 50 MG
50 CAPSULE ORAL DAILY
Refills: 0 | Status: COMPLETED | OUTPATIENT
Start: 2022-05-20 | End: 2022-05-21

## 2022-05-20 RX ORDER — OBINUTUZUMAB 1000 MG/40ML
100 INJECTION, SOLUTION, CONCENTRATE INTRAVENOUS ONCE
Refills: 0 | Status: COMPLETED | OUTPATIENT
Start: 2022-05-20 | End: 2022-05-20

## 2022-05-20 RX ORDER — ACETAMINOPHEN 500 MG
650 TABLET ORAL DAILY
Refills: 0 | Status: COMPLETED | OUTPATIENT
Start: 2022-05-20 | End: 2022-05-21

## 2022-05-20 RX ORDER — DIPHENHYDRAMINE HCL 50 MG
50 CAPSULE ORAL DAILY
Refills: 0 | Status: DISCONTINUED | OUTPATIENT
Start: 2022-05-20 | End: 2022-05-22

## 2022-05-20 RX ORDER — HYDROCORTISONE 20 MG
100 TABLET ORAL DAILY
Refills: 0 | Status: DISCONTINUED | OUTPATIENT
Start: 2022-05-20 | End: 2022-05-22

## 2022-05-20 RX ADMIN — Medication 3 MILLIGRAM(S): at 00:32

## 2022-05-20 RX ADMIN — Medication 110 MILLIGRAM(S): at 13:57

## 2022-05-20 RX ADMIN — Medication 3 MILLILITER(S): at 04:13

## 2022-05-20 RX ADMIN — OBINUTUZUMAB 25 MILLIGRAM(S): 1000 INJECTION, SOLUTION, CONCENTRATE INTRAVENOUS at 15:51

## 2022-05-20 RX ADMIN — CHLORHEXIDINE GLUCONATE 1 APPLICATION(S): 213 SOLUTION TOPICAL at 12:19

## 2022-05-20 RX ADMIN — VALACYCLOVIR 500 MILLIGRAM(S): 500 TABLET, FILM COATED ORAL at 12:16

## 2022-05-20 RX ADMIN — Medication 3 MILLIGRAM(S): at 22:10

## 2022-05-20 RX ADMIN — Medication 3 MILLILITER(S): at 11:08

## 2022-05-20 RX ADMIN — Medication 1 MILLIGRAM(S): at 12:15

## 2022-05-20 RX ADMIN — Medication 3 MILLILITER(S): at 21:14

## 2022-05-20 RX ADMIN — ENOXAPARIN SODIUM 30 MILLIGRAM(S): 100 INJECTION SUBCUTANEOUS at 17:34

## 2022-05-20 RX ADMIN — Medication 50 MILLIGRAM(S): at 13:57

## 2022-05-20 RX ADMIN — ESCITALOPRAM OXALATE 15 MILLIGRAM(S): 10 TABLET, FILM COATED ORAL at 12:16

## 2022-05-20 RX ADMIN — MUPIROCIN 1 APPLICATION(S): 20 OINTMENT TOPICAL at 17:35

## 2022-05-20 RX ADMIN — Medication 1 TABLET(S): at 22:09

## 2022-05-20 RX ADMIN — Medication 1 TABLET(S): at 22:32

## 2022-05-20 RX ADMIN — Medication 650 MILLIGRAM(S): at 13:57

## 2022-05-20 NOTE — PROGRESS NOTE ADULT - SUBJECTIVE AND OBJECTIVE BOX
CHIEF COMPLAINT:    Interval Events:    REVIEW OF SYSTEMS:  Constitutional: [ ] negative [ ] fevers [ ] chills [ ] weight loss [ ] weight gain  HEENT: [ ] negative [ ] dry eyes [ ] eye irritation [ ] postnasal drip [ ] nasal congestion  CV: [ ] negative  [ ] chest pain [ ] orthopnea [ ] palpitations [ ] murmur  Resp: [ ] negative [ ] cough [ ] shortness of breath [ ] dyspnea [ ] wheezing [ ] sputum [ ] hemoptysis  GI: [ ] negative [ ] nausea [ ] vomiting [ ] diarrhea [ ] constipation [ ] abd pain [ ] dysphagia   : [ ] negative [ ] dysuria [ ] nocturia [ ] hematuria [ ] increased urinary frequency  Musculoskeletal: [ ] negative [ ] back pain [ ] myalgias [ ] arthralgias [ ] fracture  Skin: [ ] negative [ ] rash [ ] itch  Neurological: [ ] negative [ ] headache [ ] dizziness [ ] syncope [ ] weakness [ ] numbness  Psychiatric: [ ] negative [ ] anxiety [ ] depression  Endocrine: [ ] negative [ ] diabetes [ ] thyroid problem  Hematologic/Lymphatic: [ ] negative [ ] anemia [ ] bleeding problem  Allergic/Immunologic: [ ] negative [ ] itchy eyes [ ] nasal discharge [ ] hives [ ] angioedema  [ ] All other systems negative  [ ] Unable to assess ROS because ________    OBJECTIVE:  ICU Vital Signs Last 24 Hrs  T(C): 36.8 (20 May 2022 07:04), Max: 36.9 (19 May 2022 20:50)  T(F): 98.2 (20 May 2022 07:04), Max: 98.4 (19 May 2022 20:50)  HR: 91 (20 May 2022 07:04) (79 - 105)  BP: 108/71 (20 May 2022 10:16) (88/47 - 108/71)  BP(mean): --  ABP: --  ABP(mean): --  RR: 17 (20 May 2022 07:04) (17 - 18)  SpO2: 100% (20 May 2022 07:04) (95% - 100%)        05-19 @ 07:01  -  05-20 @ 07:00  --------------------------------------------------------  IN: 1200 mL / OUT: 700 mL / NET: 500 mL      CAPILLARY BLOOD GLUCOSE          PHYSICAL EXAM:  General:   HEENT:   Lymph Nodes:  Neck:   Respiratory:   Cardiovascular:   Abdomen:   Extremities:   Skin:   Neurological:  Psychiatry:    HOSPITAL MEDICATIONS:  MEDICATIONS  (STANDING):  acetaminophen     Tablet .. 650 milliGRAM(s) Oral daily  albuterol/ipratropium for Nebulization 3 milliLiter(s) Nebulizer every 6 hours  chlorhexidine 2% Cloths 1 Application(s) Topical daily  dexAMETHasone  IVPB 20 milliGRAM(s) IV Intermittent daily  diphenhydrAMINE Injectable 50 milliGRAM(s) IV Push daily  enoxaparin Injectable 30 milliGRAM(s) SubCutaneous every 24 hours  escitalopram 15 milliGRAM(s) Oral daily  folic acid 1 milliGRAM(s) Oral daily  melatonin 3 milliGRAM(s) Oral at bedtime  metoprolol tartrate 25 milliGRAM(s) Oral two times a day  mupirocin 2% Ointment 1 Application(s) Topical two times a day  obinutuzumab IVPB (eMAR) 100 milliGRAM(s) IV Intermittent once  pantoprazole    Tablet 40 milliGRAM(s) Oral before breakfast  valACYclovir 500 milliGRAM(s) Oral daily    MEDICATIONS  (PRN):  diphenhydrAMINE Injectable 50 milliGRAM(s) IV Push daily PRN REACTION  guaiFENesin Oral Liquid (Sugar-Free) 100 milliGRAM(s) Oral every 6 hours PRN Cough  hydrocortisone sodium succinate Injectable 100 milliGRAM(s) IV Push daily PRN REACTION      LABS:                        13.2   12.34 )-----------( 403      ( 20 May 2022 10:33 )             40.8     Hgb Trend: 13.2<--, 11.1<--, 12.3<--, 12.2<--  05-20    139  |  104  |  19  ----------------------------<  114<H>  3.8   |  20<L>  |  0.95    Ca    10.5      20 May 2022 10:33  Phos  3.9     05-20  Mg     2.10     05-20    TPro  6.5  /  Alb  3.8  /  TBili  0.4  /  DBili  x   /  AST  16  /  ALT  10  /  AlkPhos  137<H>  05-20    Creatinine Trend: 0.95<--, 0.99<--, 1.19<--, 1.30<--            MICROBIOLOGY:       RADIOLOGY:  [ ] Reviewed and interpreted by me    PULMONARY FUNCTION TESTS:    EKG: CHIEF COMPLAINT: Shortness of breath    Interval Events: Pt with resolved dyspnea. No hypoxemia at rest.     REVIEW OF SYSTEMS:  Constitutional: [ ] negative [ ] fevers [ ] chills [ ] weight loss [ ] weight gain  HEENT: [ ] negative [ ] dry eyes [ ] eye irritation [ ] postnasal drip [ ] nasal congestion  CV: [ ] negative  [ ] chest pain [ ] orthopnea [ ] palpitations [ ] murmur  Resp: [ ] negative [ ] cough [ ] shortness of breath [ ] dyspnea [ ] wheezing [ ] sputum [ ] hemoptysis  GI: [ ] negative [ ] nausea [ ] vomiting [ ] diarrhea [ ] constipation [ ] abd pain [ ] dysphagia   : [ ] negative [ ] dysuria [ ] nocturia [ ] hematuria [ ] increased urinary frequency  Musculoskeletal: [ ] negative [ ] back pain [ ] myalgias [ ] arthralgias [ ] fracture  Skin: [ ] negative [ ] rash [ ] itch  Neurological: [ ] negative [ ] headache [ ] dizziness [ ] syncope [ ] weakness [ ] numbness  Psychiatric: [ ] negative [ ] anxiety [ ] depression  Endocrine: [ ] negative [ ] diabetes [ ] thyroid problem  Hematologic/Lymphatic: [ ] negative [ ] anemia [ ] bleeding problem  Allergic/Immunologic: [ ] negative [ ] itchy eyes [ ] nasal discharge [ ] hives [ ] angioedema  [x] All other systems negative  [ ] Unable to assess ROS because ________    OBJECTIVE:  ICU Vital Signs Last 24 Hrs  T(C): 36.8 (20 May 2022 07:04), Max: 36.9 (19 May 2022 20:50)  T(F): 98.2 (20 May 2022 07:04), Max: 98.4 (19 May 2022 20:50)  HR: 91 (20 May 2022 07:04) (79 - 105)  BP: 108/71 (20 May 2022 10:16) (88/47 - 108/71)  BP(mean): --  ABP: --  ABP(mean): --  RR: 17 (20 May 2022 07:04) (17 - 18)  SpO2: 100% (20 May 2022 07:04) (95% - 100%)        05-19 @ 07:01  -  05-20 @ 07:00  --------------------------------------------------------  IN: 1200 mL / OUT: 700 mL / NET: 500 mL      CAPILLARY BLOOD GLUCOSE        PHYSICAL EXAM:  General: NAD, appears comfortable  Skin: Warm and dry, no rashes  Neuro: AAOx3, nonfocal  HEENT: PERRL, EOMI, no oral lesions  Neck: Full range of motion, no JVD  Lungs: Clear to ascultation bilatereally, no wheezes, rales, or rhonchi   Heart: Regular rate and rhythm, no murmurs  Abdomen: Normoactive bowl sounds, soft, nontender, nondistended  Extremities: No lower extremity tenderness, erythema, or edema   Psych: normal mood and affect      HOSPITAL MEDICATIONS:  MEDICATIONS  (STANDING):  acetaminophen     Tablet .. 650 milliGRAM(s) Oral daily  albuterol/ipratropium for Nebulization 3 milliLiter(s) Nebulizer every 6 hours  chlorhexidine 2% Cloths 1 Application(s) Topical daily  dexAMETHasone  IVPB 20 milliGRAM(s) IV Intermittent daily  diphenhydrAMINE Injectable 50 milliGRAM(s) IV Push daily  enoxaparin Injectable 30 milliGRAM(s) SubCutaneous every 24 hours  escitalopram 15 milliGRAM(s) Oral daily  folic acid 1 milliGRAM(s) Oral daily  melatonin 3 milliGRAM(s) Oral at bedtime  metoprolol tartrate 25 milliGRAM(s) Oral two times a day  mupirocin 2% Ointment 1 Application(s) Topical two times a day  obinutuzumab IVPB (eMAR) 100 milliGRAM(s) IV Intermittent once  pantoprazole    Tablet 40 milliGRAM(s) Oral before breakfast  valACYclovir 500 milliGRAM(s) Oral daily    MEDICATIONS  (PRN):  diphenhydrAMINE Injectable 50 milliGRAM(s) IV Push daily PRN REACTION  guaiFENesin Oral Liquid (Sugar-Free) 100 milliGRAM(s) Oral every 6 hours PRN Cough  hydrocortisone sodium succinate Injectable 100 milliGRAM(s) IV Push daily PRN REACTION      LABS:                        13.2   12.34 )-----------( 403      ( 20 May 2022 10:33 )             40.8     Hgb Trend: 13.2<--, 11.1<--, 12.3<--, 12.2<--  05-20    139  |  104  |  19  ----------------------------<  114<H>  3.8   |  20<L>  |  0.95    Ca    10.5      20 May 2022 10:33  Phos  3.9     05-20  Mg     2.10     05-20    TPro  6.5  /  Alb  3.8  /  TBili  0.4  /  DBili  x   /  AST  16  /  ALT  10  /  AlkPhos  137<H>  05-20    Creatinine Trend: 0.95<--, 0.99<--, 1.19<--, 1.30<--      RADIOLOGY:    < from: CT Angio Chest PE Protocol w/ IV Cont (05.10.22 @ 13:06) >    EXAM: [id number] - CT ANGIO CHEST PULM [facility]  - ORDERED BY: FAITH HUMPHREY      PROCEDURE DATE:  05/10/2022           INTERPRETATION:  INDICATION: Diffuse large B-cell lymphoma    TECHNIQUE: Volumetric images of the chest were obtained after the   administration of 80 mL of Omnipaque 350. Maximum intensity projection   images were generated.    COMPARISON: CT chest 4/2/2022.    FINDINGS:    PULMONARY ANGIOGRAM:  No pulmonary embolism from the main pulmonary   artery to and including the lobar branches. Limited assessment of several   segmental and subsegmental branches due to obscuration by respiratory   motion.    LUNGS/AIRWAYS/PLEURA: Images degraded by respiratory motion. New   groundglass in the upper lobes. No definite change in consolidation and   underlying bronchiectasis in the middle lobe and bilateral lower lobes,   and lingula. Stable thin-walled cyst/pneumatocele in the right upper lobe   adjacent to the hilum. Bilateral air trapping. Few impacted small   airways. Unremarkable pleura.    LYMPH NODES/MEDIASTINUM: Increased mildly enlarged bilateral mediastinal   lymph nodes. Unchanged diffusely dilated esophagus containing fluid up to   the level of the aortic arch.    HEART/VASCULATURE: Enlarged heart, particularly of the right ventricle   and right atrium. No pericardial effusion. No aortic aneurysm.    UPPER ABDOMEN: Stable pancreatic tail cyst. Partially included left renal   cysts.    BONES/SOFT TISSUES: Degenerative changes of the spine. Remote fractures   of the sternum and multiple ribs.      IMPRESSION:    No pulmonary embolism from the main pulmonary artery up to and including   the lobar branches. Several segmental and subsegmental branches cannot be   assessed due to extensive respiratory motion.    New groundglass in the upper lobes, suspicious for pulmonary edema.   Infection is also a consideration.    Increased mildly enlarged mediastinal lymph nodes may be reactive.    Stable multilobar chronic consolidation.    --- End of Report --        TRUMAN BECKER M.D., ATTENDING RADIOGIST   This document has been electronically signed. May 10 2022  2:11PM    < end of copied text >

## 2022-05-20 NOTE — PROGRESS NOTE ADULT - ASSESSMENT
SOB  likely multifactorial   acute diastolic CHF has elevated proBNP   however CT reviewed personally and it appears she also has lung parenchymal disease   cont diuresis   pulm consult appreciated   pulm HTN suspect due to underlying lung disease

## 2022-05-20 NOTE — PROGRESS NOTE ADULT - PROBLEM SELECTOR PLAN 2
Last chemo a few weeks ago with belkys, polatuzumab  - Will treat with Obinutuzumab on 5/20 test dose and 5/21 to complete infusion for total of 1000mg  - continue home valacyclovir, folic acid  - Apprec Hem/Onc recs

## 2022-05-20 NOTE — PROGRESS NOTE ADULT - SUBJECTIVE AND OBJECTIVE BOX
Patient is a 89y old  Female who presents with a chief complaint of Hypoxia/SOB (20 May 2022 11:53)      INTERVAL HPI/OVERNIGHT EVENTS:  Seen by me this afternoon, feeling great, awaiting chemo to be given to her. Decreased appetite. No complaints.    Review of Systems: 12 point review of systems otherwise negative    MEDICATIONS  (STANDING):  acetaminophen     Tablet .. 650 milliGRAM(s) Oral daily  albuterol/ipratropium for Nebulization 3 milliLiter(s) Nebulizer every 6 hours  chlorhexidine 2% Cloths 1 Application(s) Topical daily  dexAMETHasone  IVPB 20 milliGRAM(s) IV Intermittent daily  diphenhydrAMINE Injectable 50 milliGRAM(s) IV Push daily  enoxaparin Injectable 30 milliGRAM(s) SubCutaneous every 24 hours  escitalopram 15 milliGRAM(s) Oral daily  folic acid 1 milliGRAM(s) Oral daily  melatonin 3 milliGRAM(s) Oral at bedtime  metoprolol tartrate 25 milliGRAM(s) Oral two times a day  mupirocin 2% Ointment 1 Application(s) Topical two times a day  pantoprazole    Tablet 40 milliGRAM(s) Oral before breakfast  valACYclovir 500 milliGRAM(s) Oral daily    MEDICATIONS  (PRN):  diphenhydrAMINE Injectable 50 milliGRAM(s) IV Push daily PRN REACTION  guaiFENesin Oral Liquid (Sugar-Free) 100 milliGRAM(s) Oral every 6 hours PRN Cough  hydrocortisone sodium succinate Injectable 100 milliGRAM(s) IV Push daily PRN REACTION      Allergies    Zosyn (Rash)    Intolerances          Vital Signs Last 24 Hrs  T(C): 36.4 (20 May 2022 18:29), Max: 36.9 (19 May 2022 20:50)  T(F): 97.6 (20 May 2022 18:29), Max: 98.4 (19 May 2022 20:50)  HR: 99 (20 May 2022 18:29) (79 - 99)  BP: 102/70 (20 May 2022 18:29) (88/47 - 114/72)  BP(mean): --  RR: 17 (20 May 2022 18:29) (16 - 17)  SpO2: 95% (20 May 2022 18:29) (95% - 100%)  CAPILLARY BLOOD GLUCOSE          05-19 @ 07:01  -  05-20 @ 07:00  --------------------------------------------------------  IN: 1200 mL / OUT: 700 mL / NET: 500 mL    05-20 @ 07:01  - 05-20 @ 20:22  --------------------------------------------------------  IN: 1200 mL / OUT: 0 mL / NET: 1200 mL        Physical Exam:    Daily     Daily   General:  Well appearing, NAD, cachetic  HEENT:  Nonicteric, PERRLA  CV:  RRR, no murmur, no JVD  Lungs:  Decreased BS B/L  Abdomen:  Soft, non-tender, no distended, positive BS  Extremities:  2+ pulses, no c/c, no edema  Skin:  Warm and dry, no rashes  :  No kwan  Neuro:  AAOx3, non-focal, CN II-XII grossly intact  No Restraints    LABS:                        13.2   12.34 )-----------( 403      ( 20 May 2022 10:33 )             40.8     05-20    139  |  104  |  19  ----------------------------<  114<H>  3.8   |  20<L>  |  0.95    Ca    10.5      20 May 2022 10:33  Phos  3.9     05-20  Mg     2.10     05-20    TPro  6.5  /  Alb  3.8  /  TBili  0.4  /  DBili  x   /  AST  16  /  ALT  10  /  AlkPhos  137<H>  05-20            RADIOLOGY & ADDITIONAL TESTS:  Reviewed by me

## 2022-05-20 NOTE — PROGRESS NOTE ADULT - PROBLEM SELECTOR PLAN 6
C/w Heparin SQ  PT --> HPT  Dietitian evaluation  DNR/DNI    D/w ACP and with daughter Dinorah on the phone  Anticipate dc home on Sunday if patient remains stable

## 2022-05-20 NOTE — PHARMACOTHERAPY INTERVENTION NOTE - COMMENTS
Pharmacist contacted Hematology Fellow. Obinutuzumab was ordered for day 1 and day 2 but no premeds or reaction medication was ordered for day 2. Pharmacist intervention prevented possible reaction by patient receiving Obinutuzumab on day 2 which has a high rate of reactions without proper premeds and reaction medications ordered.

## 2022-05-20 NOTE — PROGRESS NOTE ADULT - SUBJECTIVE AND OBJECTIVE BOX
INTERVAL HPI/OVERNIGHT EVENTS:  No overnight events. Soft pressures this AM. Per primary team, lasix stopped.     MEDICATIONS  (STANDING):  acetaminophen     Tablet .. 650 milliGRAM(s) Oral daily  albuterol/ipratropium for Nebulization 3 milliLiter(s) Nebulizer every 6 hours  chlorhexidine 2% Cloths 1 Application(s) Topical daily  dexAMETHasone  IVPB 20 milliGRAM(s) IV Intermittent daily  diphenhydrAMINE Injectable 50 milliGRAM(s) IV Push daily  enoxaparin Injectable 30 milliGRAM(s) SubCutaneous every 24 hours  escitalopram 15 milliGRAM(s) Oral daily  folic acid 1 milliGRAM(s) Oral daily  melatonin 3 milliGRAM(s) Oral at bedtime  metoprolol tartrate 25 milliGRAM(s) Oral two times a day  mupirocin 2% Ointment 1 Application(s) Topical two times a day  obinutuzumab IVPB (eMAR) 100 milliGRAM(s) IV Intermittent once  pantoprazole    Tablet 40 milliGRAM(s) Oral before breakfast  valACYclovir 500 milliGRAM(s) Oral daily    MEDICATIONS  (PRN):  diphenhydrAMINE Injectable 50 milliGRAM(s) IV Push daily PRN REACTION  guaiFENesin Oral Liquid (Sugar-Free) 100 milliGRAM(s) Oral every 6 hours PRN Cough  hydrocortisone sodium succinate Injectable 100 milliGRAM(s) IV Push daily PRN REACTION    Allergies    Zosyn (Rash)    Intolerances          VITAL SIGNS:  T(F): 98.2 (05-20-22 @ 07:04)  HR: 91 (05-20-22 @ 07:04)  BP: 108/71 (05-20-22 @ 10:16)  RR: 17 (05-20-22 @ 07:04)  SpO2: 100% (05-20-22 @ 07:04)  Wt(kg): --    PHYSICAL EXAM:    Constitutional: NAD, lying comfortably in bed  Eyes: EOMI, PERRLA  Neck: supple, no masses, no JVD  Respiratory: CTAB; no r/r/w  Cardiovascular: RRR, no M/R/G  Gastrointestinal: +BS, soft, NTND, no hepatosplenomegaly  Extremities: no c/c/e  Neurological: AAOx3, nonfocal    LABS:                        13.2   12.34 )-----------( 403      ( 20 May 2022 10:33 )             40.8     05-20    139  |  104  |  19  ----------------------------<  114<H>  3.8   |  20<L>  |  0.95    Ca    10.5      20 May 2022 10:33  Phos  3.9     05-20  Mg     2.10     05-20    TPro  6.5  /  Alb  3.8  /  TBili  0.4  /  DBili  x   /  AST  16  /  ALT  10  /  AlkPhos  137<H>  05-20          RADIOLOGY & ADDITIONAL TESTS:  Studies reviewed.

## 2022-05-20 NOTE — PROGRESS NOTE ADULT - SUBJECTIVE AND OBJECTIVE BOX
DATE OF SERVICE: 05-20-22 @ 06:35    Subjective: Patient seen and examined. No new events except as noted.     SUBJECTIVE/ROS:        MEDICATIONS:  MEDICATIONS  (STANDING):  albuterol/ipratropium for Nebulization 3 milliLiter(s) Nebulizer every 6 hours  chlorhexidine 2% Cloths 1 Application(s) Topical daily  enoxaparin Injectable 30 milliGRAM(s) SubCutaneous every 24 hours  escitalopram 15 milliGRAM(s) Oral daily  folic acid 1 milliGRAM(s) Oral daily  furosemide    Tablet 40 milliGRAM(s) Oral daily  melatonin 3 milliGRAM(s) Oral at bedtime  metoprolol tartrate 25 milliGRAM(s) Oral two times a day  mupirocin 2% Ointment 1 Application(s) Topical two times a day  pantoprazole    Tablet 40 milliGRAM(s) Oral before breakfast  valACYclovir 500 milliGRAM(s) Oral daily      PHYSICAL EXAM:  T(C): 36.8 (05-20-22 @ 02:50), Max: 36.9 (05-19-22 @ 20:50)  HR: 79 (05-20-22 @ 04:13) (79 - 105)  BP: 101/56 (05-20-22 @ 02:50) (101/56 - 109/62)  RR: 17 (05-20-22 @ 02:50) (16 - 18)  SpO2: 98% (05-20-22 @ 04:13) (95% - 100%)  Wt(kg): --  I&O's Summary    18 May 2022 07:01  -  19 May 2022 07:00  --------------------------------------------------------  IN: 415 mL / OUT: 400 mL / NET: 15 mL    19 May 2022 07:01  -  20 May 2022 06:35  --------------------------------------------------------  IN: 1200 mL / OUT: 700 mL / NET: 500 mL            JVP: Normal  Neck: supple  Lung: clear   CV: S1 S2 , Murmur:  Abd: soft  Ext: No edema  neuro: Awake  Psych: flat affect  Skin: normal``    LABS/DATA:    CARDIAC MARKERS:                                11.1   6.81  )-----------( 239      ( 18 May 2022 07:32 )             35.0     05-18    141  |  110<H>  |  28<H>  ----------------------------<  107<H>  4.3   |  20<L>  |  0.99    Ca    9.9      18 May 2022 07:32  Phos  3.5     05-18  Mg     2.10     05-18    TPro  5.7<L>  /  Alb  3.2<L>  /  TBili  0.2  /  DBili  x   /  AST  12  /  ALT  7   /  AlkPhos  124<H>  05-18    proBNP:   Lipid Profile:   HgA1c:   TSH:     TELE:  EKG:

## 2022-05-20 NOTE — PROGRESS NOTE ADULT - PROBLEM SELECTOR PLAN 1
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 Afebrile, no WBC  - Apprec Hem/Onc, Cards and Pulmonary recs  - Considering lymphoma involvement in the lungs (will get treatment as below)  - TTE showing severe pulmonary hypertension  - Ambulatory sat 5/17 on RA 92  - Will assess daily need for diuresis with lasix 20mg PO (may need to be continued on discharge)  - Needs Pulmonary f/up as outpatient

## 2022-05-20 NOTE — PROGRESS NOTE ADULT - ASSESSMENT
89F with hx of lymphoma on chemotherapy (last session a few weeks ago), COPD, HFrEF (normal EF last echo) here for 1 week of exertional SOB 2/2 pulmonary edema vs pneumonia vs lymphoma involvement in the lungs.

## 2022-05-20 NOTE — PROGRESS NOTE ADULT - ASSESSMENT
89F with PMH lymphoma on chemotherapy (active) and heart failure with preserved ejection fraction presents for worsened dyspnea on exertion for 1 week and admitted for hypoxemic respiratory failure. Pulmonary consulted for hypoxemia, pt currently saturating 95% on room air at rest. 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 Pt clinically improved saturating 98% on room air at rest.     Problems  Abnormal Chest CT    Recommendations:  - Would resume oral diuretic such as furosemide 20 mg given pulmonary hypertension  - ESR and CRP are elevated but this is nonspecific and may due to lymphoma  - Document O2 saturation at rest on room air with good waveform, during ambulation on room air, and during ambulation on supplemental O2 necessary to raise O2 saturation > 90% if desaturates to 88% during ambulation - pt would require home O2 if desaturates to 88%  - Patient should follow with pulmonology within 1-2 weeks of discharge for repeat CT chest and PFTs. Please email [email or web address] and include patient's name, , MRN, telephone number, and discharge diagnosis, and allow 24 hours for scheduling. The office is located at [address] [place] [address], [address]. Number [phone].           89F with PMH lymphoma on chemotherapy (active) and heart failure with preserved ejection fraction presents for worsened dyspnea on exertion for 1 week and admitted for hypoxemic respiratory failure. Pulmonary consulted for hypoxemia, pt currently saturating 95% on room air at rest. 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 Pt clinically improved saturating 98% on room air at rest.     Problems  Abnormal Chest CT    Recommendations:  - Would resume oral diuretic such as furosemide 20 mg given pulmonary hypertension  - ESR and CRP are elevated but this is nonspecific and may due to lymphoma  - Document O2 saturation at rest on room air with good waveform, during ambulation on room air, and during ambulation on supplemental O2 necessary to raise O2 saturation > 90% if desaturates to 88% during ambulation - pt would require home O2 if desaturates to 88%  - Patient should follow with pulmonology within 1-2 weeks of discharge for repeat CT chest and PFTs. Please email [email or web address] and include patient's name, , MRN, telephone number, and discharge diagnosis, and allow 24 hours for scheduling. The office is located at [address] [place] [address], [address]. Number [phone].  - Please call if there are further questions

## 2022-05-20 NOTE — PROGRESS NOTE ADULT - PROBLEM SELECTOR PLAN 3
In admission SCr 1.30, baseline ~0.9  - Improved with diuresis, back at baseline  - Monitor BMP closely

## 2022-05-20 NOTE — PROGRESS NOTE ADULT - ASSESSMENT
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     # Acute resp distress  - CTA chest 5/10: No pulmonary embolism from the main pulmonary artery up to and including the lobar branches. Several segmental and subsegmental branches cannot be assessed due to extensive respiratory motion. New groundglass in the upper lobes, suspicious for pulmonary edema. Infection is also a consideration. Increased mildly enlarged mediastinal lymph nodes may be reactive. Stable multilobar chronic consolidation.  - BNP elevated to 6000-- diuresis per primary team and cardiology  - Given h/o pulm involvement of lymphoma, recommend pulm consult as well    # DLBCL  - Dx 7/2021-- patient with resp symptoms, + lung involvement, +LN involvement  - Given her age, plan was to do palliative mild treatment  - Patient got 11 cycles on R-CP (until 3/31). PET done 11/2021 after starting treatment with near complete resolution of disease in lungs and lymph nodes  - Unfortunately, patient with c/f recurrence when CT scan of chest done 4/2/22: Newly noted 9 mm opacity in the periphery of the right upper lobe and interval increase in size of a lingular opacity along the fissure.  - Started on Hugo-Rtixumab-- cycle 1 on 4/28. Polatuzumab can cause pneumonitis but it is very rare with only 1 dose. Pulm to assess further.   - Now p/w worsening SOB-- as above, recommend pulm consult- appreciate recs: pulm edema and lymphoma   - considering lymphoma involvement in the lungs will treat with Obinutuzumab on 5/20 test dose and 5/21 to complete infusion for total of 1000mg. If all is stable after infusion, potential dc on Sunday      Eloy Hughes, PGY-5  Hematology-Oncology Fellow  [phone] ([place]) [phone] ([facility])  I can also be reached on [employer] Teams  Please page fellow on call after 5pm and on weekends

## 2022-05-20 NOTE — PROGRESS NOTE ADULT - ATTENDING COMMENTS
As above.  Keep negative fluid balance.  On discharge, can follow up as outpatient.
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 She also has COPD, HFrEF (normal EF last echo) and is now here for 1 week of exertional SOB 2/2 pulmonary edema vs pneumonia vs COPD exacerbation vs worsening lymphoma. CTA chest 5/10: No pulmonary embolism from the main pulmonary artery up to and including the lobar branches. Several segmental and subsegmental branches cannot be assessed due to extensive respiratory motion. New groundglass in the upper lobes, suspicious for pulmonary edema. Infection is also a consideration. Increased mildly enlarged mediastinal lymph nodes may be reactive. Stable multilobar chronic consolidation. BNP elevated to 6000-- diuresis per primary team and cardiology. She was recently started on Polatuzumab with Rituxan and although polatuzumab can cause pneumonitis it would be rare with only 1 dose. She has improved with diuresis. Given lymphoma involvement in the lungs will start treatment with Obinutuzumab, giving test dose on 5/20 test and complete infusion on 5/21 for total of 1000 mg.
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 She also has COPD, HFrEF (normal EF last echo) and is now here for 1 week of exertional SOB 2/2 pulmonary edema vs pneumonia vs COPD exacerbation vs worsening lymphoma. CTA chest 5/10: No pulmonary embolism from the main pulmonary artery up to and including the lobar branches. Several segmental and subsegmental branches cannot be assessed due to extensive respiratory motion. New groundglass in the upper lobes, suspicious for pulmonary edema. Infection is also a consideration. Increased mildly enlarged mediastinal lymph nodes may be reactive. Stable multilobar chronic consolidation. BNP elevated to 6000-- diuresis per primary team and cardiology. She was recently started on Polatuzumab with Rituxan and although polatuzumab can cause pneumonitis it would be rare with only 1 dose. She has improved with diuresis. Given lymphoma involvement in the lungs will start treatment with Obinutuzumab, giving test dose on 5/20 and complete infusion on 5/21 for total of 1000 mg. Discharge planning for 5/22 if she remains stable.
89 y/o F with PMH lymphoma on chemotherapy (active), COPD, HFrEF (last echo normal) who presents for exertional SOB for 1 week concerning for CHF exacerbation vs progression of lymphoma.     #SOB, pt. reporting worsening DESHPANDE for one week. Breathing is okay at rest. Sating well on RA. PE ruled out on CT in the outpatient setting however there was concern for pulmonary edema given new ground glass opacities noted. Differential diagnosis for SOB includes CHF vs progression of lymphoma with pulmonary involvement vs COPD exacerbation vs PNA. Unclear cardiac history, conflicting echo reports. One echo reporting EF of 26% and the most recent echo with no LV dysfunction. BNP elevated on this admission. Possibly CHF? Will obtain echo. Lasix 20mg IVP x1 for now and will monitor urine output. Oncology visited patient and there is concern for pulmonary involvement of lymphoma. Pulm to be consulted. Follow up recommendations.     Plan discussed with HS.
89 y/o F with PMH lymphoma on chemotherapy (active), COPD, HFrEF (last echo normal) who presents for exertional SOB for 1 week concerning for CHF exacerbation vs progression of lymphoma.     #SOB, pt. reporting worsening DESHPANDE for one week. Currently on 2-3L O2. Echocardiogram with normal RV and LV however found to have severe pulm HTN. Less likely CHF exacerbation. Holding off on further diuresis for now. Pulmonary following. Unclear if lung findings are progression of lymphoma vs infection. Non-toxic appearing, afebrile and without leukocytosis so holding off on antibiotics for now. F/u pulmonary recommendations.     #Dispo: home with home PT.     Plan discussed with HS.
89 y/o F with PMH lymphoma on chemotherapy (active), COPD, HFrEF (last echo normal) who presents for exertional SOB for 1 week concerning for CHF exacerbation vs progression of lymphoma.     #SOB, pt. reporting worsening DESHPANDE for one week. Currently on 2-3L O2. Echocardiogram with normal RV and LV however found to have severe pulm HTN. Less likely CHF exacerbation. Holding off on further diuresis for now. Pulmonary following. Heme/onc following. It is possible lung findings are progression of lymphoma. Oncology planning on chemotherapy inpatient 5/20 and 5/21.     #Dispo: home with home PT.     Plan discussed with HS.

## 2022-05-21 LAB
ALBUMIN SERPL ELPH-MCNC: 3.5 G/DL — SIGNIFICANT CHANGE UP (ref 3.3–5)
ALP SERPL-CCNC: 116 U/L — SIGNIFICANT CHANGE UP (ref 40–120)
ALT FLD-CCNC: 8 U/L — SIGNIFICANT CHANGE UP (ref 4–33)
ANION GAP SERPL CALC-SCNC: 12 MMOL/L — SIGNIFICANT CHANGE UP (ref 7–14)
AST SERPL-CCNC: 8 U/L — SIGNIFICANT CHANGE UP (ref 4–32)
BILIRUB SERPL-MCNC: 0.3 MG/DL — SIGNIFICANT CHANGE UP (ref 0.2–1.2)
BUN SERPL-MCNC: 26 MG/DL — HIGH (ref 7–23)
CALCIUM SERPL-MCNC: 10.4 MG/DL — SIGNIFICANT CHANGE UP (ref 8.4–10.5)
CCP IGG SERPL-ACNC: <8 UNITS — SIGNIFICANT CHANGE UP
CHLORIDE SERPL-SCNC: 106 MMOL/L — SIGNIFICANT CHANGE UP (ref 98–107)
CO2 SERPL-SCNC: 21 MMOL/L — LOW (ref 22–31)
CREAT SERPL-MCNC: 0.93 MG/DL — SIGNIFICANT CHANGE UP (ref 0.5–1.3)
CULTURE RESULTS: SIGNIFICANT CHANGE UP
CULTURE RESULTS: SIGNIFICANT CHANGE UP
EGFR: 58 ML/MIN/1.73M2 — LOW
GLUCOSE SERPL-MCNC: 126 MG/DL — HIGH (ref 70–99)
HCT VFR BLD CALC: 34.9 % — SIGNIFICANT CHANGE UP (ref 34.5–45)
HGB BLD-MCNC: 11.4 G/DL — LOW (ref 11.5–15.5)
LDH SERPL L TO P-CCNC: 169 U/L — SIGNIFICANT CHANGE UP (ref 135–225)
MAGNESIUM SERPL-MCNC: 2.1 MG/DL — SIGNIFICANT CHANGE UP (ref 1.6–2.6)
MCHC RBC-ENTMCNC: 28.7 PG — SIGNIFICANT CHANGE UP (ref 27–34)
MCHC RBC-ENTMCNC: 32.7 GM/DL — SIGNIFICANT CHANGE UP (ref 32–36)
MCV RBC AUTO: 87.9 FL — SIGNIFICANT CHANGE UP (ref 80–100)
NRBC # BLD: 0 /100 WBCS — SIGNIFICANT CHANGE UP
NRBC # FLD: 0 K/UL — SIGNIFICANT CHANGE UP
PHOSPHATE SERPL-MCNC: 3.9 MG/DL — SIGNIFICANT CHANGE UP (ref 2.5–4.5)
PLATELET # BLD AUTO: 284 K/UL — SIGNIFICANT CHANGE UP (ref 150–400)
POTASSIUM SERPL-MCNC: 4.6 MMOL/L — SIGNIFICANT CHANGE UP (ref 3.5–5.3)
POTASSIUM SERPL-SCNC: 4.6 MMOL/L — SIGNIFICANT CHANGE UP (ref 3.5–5.3)
PROT SERPL-MCNC: 6 G/DL — SIGNIFICANT CHANGE UP (ref 6–8.3)
RBC # BLD: 3.97 M/UL — SIGNIFICANT CHANGE UP (ref 3.8–5.2)
RBC # FLD: 14.1 % — SIGNIFICANT CHANGE UP (ref 10.3–14.5)
RF+CCP IGG SER-IMP: NEGATIVE — SIGNIFICANT CHANGE UP
SODIUM SERPL-SCNC: 139 MMOL/L — SIGNIFICANT CHANGE UP (ref 135–145)
SPECIMEN SOURCE: SIGNIFICANT CHANGE UP
SPECIMEN SOURCE: SIGNIFICANT CHANGE UP
URATE SERPL-MCNC: 7.3 MG/DL — HIGH (ref 2.5–7)
URATE SERPL-MCNC: 8.2 MG/DL — HIGH (ref 2.5–7)
WBC # BLD: 5.21 K/UL — SIGNIFICANT CHANGE UP (ref 3.8–10.5)
WBC # FLD AUTO: 5.21 K/UL — SIGNIFICANT CHANGE UP (ref 3.8–10.5)

## 2022-05-21 PROCEDURE — 99233 SBSQ HOSP IP/OBS HIGH 50: CPT

## 2022-05-21 PROCEDURE — 99232 SBSQ HOSP IP/OBS MODERATE 35: CPT

## 2022-05-21 RX ORDER — ALLOPURINOL 300 MG
100 TABLET ORAL DAILY
Refills: 0 | Status: DISCONTINUED | OUTPATIENT
Start: 2022-05-21 | End: 2022-05-22

## 2022-05-21 RX ADMIN — VALACYCLOVIR 500 MILLIGRAM(S): 500 TABLET, FILM COATED ORAL at 11:19

## 2022-05-21 RX ADMIN — Medication 100 MILLIGRAM(S): at 10:07

## 2022-05-21 RX ADMIN — Medication 1 MILLIGRAM(S): at 11:19

## 2022-05-21 RX ADMIN — Medication 3 MILLILITER(S): at 04:39

## 2022-05-21 RX ADMIN — OBINUTUZUMAB 900 MILLIGRAM(S): 1000 INJECTION, SOLUTION, CONCENTRATE INTRAVENOUS at 14:43

## 2022-05-21 RX ADMIN — Medication 3 MILLILITER(S): at 21:05

## 2022-05-21 RX ADMIN — Medication 25 MILLIGRAM(S): at 06:48

## 2022-05-21 RX ADMIN — ESCITALOPRAM OXALATE 15 MILLIGRAM(S): 10 TABLET, FILM COATED ORAL at 11:19

## 2022-05-21 RX ADMIN — Medication 650 MILLIGRAM(S): at 13:25

## 2022-05-21 RX ADMIN — PANTOPRAZOLE SODIUM 40 MILLIGRAM(S): 20 TABLET, DELAYED RELEASE ORAL at 06:51

## 2022-05-21 RX ADMIN — Medication 50 MILLIGRAM(S): at 13:25

## 2022-05-21 RX ADMIN — Medication 3 MILLILITER(S): at 11:02

## 2022-05-21 RX ADMIN — MUPIROCIN 1 APPLICATION(S): 20 OINTMENT TOPICAL at 17:18

## 2022-05-21 RX ADMIN — Medication 25 MILLIGRAM(S): at 17:18

## 2022-05-21 RX ADMIN — MUPIROCIN 1 APPLICATION(S): 20 OINTMENT TOPICAL at 06:48

## 2022-05-21 RX ADMIN — Medication 3 MILLIGRAM(S): at 22:46

## 2022-05-21 RX ADMIN — CHLORHEXIDINE GLUCONATE 1 APPLICATION(S): 213 SOLUTION TOPICAL at 11:22

## 2022-05-21 RX ADMIN — ENOXAPARIN SODIUM 30 MILLIGRAM(S): 100 INJECTION SUBCUTANEOUS at 17:18

## 2022-05-21 RX ADMIN — Medication 110 MILLIGRAM(S): at 13:25

## 2022-05-21 NOTE — PROGRESS NOTE ADULT - SUBJECTIVE AND OBJECTIVE BOX
Patient is a 89y old  Female who presents with a chief complaint of Hypoxia/SOB (20 May 2022 11:53)      SUBJECTIVE / OVERNIGHT EVENTS: Pt seen and examined, chart reviewed. No complaints today, denies any Pain or SOB.     MEDICATIONS  (STANDING):  acetaminophen     Tablet .. 650 milliGRAM(s) Oral daily  albuterol/ipratropium for Nebulization 3 milliLiter(s) Nebulizer every 6 hours  allopurinol 100 milliGRAM(s) Oral daily  chlorhexidine 2% Cloths 1 Application(s) Topical daily  dexAMETHasone  IVPB 20 milliGRAM(s) IV Intermittent daily  diphenhydrAMINE Injectable 50 milliGRAM(s) IV Push daily  enoxaparin Injectable 30 milliGRAM(s) SubCutaneous every 24 hours  escitalopram 15 milliGRAM(s) Oral daily  folic acid 1 milliGRAM(s) Oral daily  melatonin 3 milliGRAM(s) Oral at bedtime  metoprolol tartrate 25 milliGRAM(s) Oral two times a day  mupirocin 2% Ointment 1 Application(s) Topical two times a day  obinutuzumab IVPB (eMAR) 900 milliGRAM(s) IV Intermittent once  pantoprazole    Tablet 40 milliGRAM(s) Oral before breakfast  valACYclovir 500 milliGRAM(s) Oral daily    MEDICATIONS  (PRN):  diphenhydrAMINE Injectable 50 milliGRAM(s) IV Push daily PRN REACTION  guaiFENesin Oral Liquid (Sugar-Free) 100 milliGRAM(s) Oral every 6 hours PRN Cough  hydrocortisone sodium succinate Injectable 100 milliGRAM(s) IV Push daily PRN REACTION      Vital Signs Last 24 Hrs  T(C): 36.4 (21 May 2022 07:00), Max: 36.6 (20 May 2022 21:24)  T(F): 97.5 (21 May 2022 07:00), Max: 97.8 (20 May 2022 21:24)  HR: 86 (21 May 2022 07:00) (79 - 107)  BP: 112/79 (21 May 2022 07:00) (102/70 - 114/72)  BP(mean): --  RR: 18 (21 May 2022 07:00) (16 - 18)  SpO2: 97% (21 May 2022 07:00) (93% - 98%)  CAPILLARY BLOOD GLUCOSE        I&O's Summary    20 May 2022 07:01  -  21 May 2022 07:00  --------------------------------------------------------  IN: 1200 mL / OUT: 0 mL / NET: 1200 mL        PHYSICAL EXAM:  GENERAL: NAD, well-developed  HEAD:  Atraumatic, Normocephalic  EYES: EOMI, PERRLA, conjunctiva and sclera clear  NECK: Supple, No JVD  CHEST/LUNG: Clear to auscultation bilaterally; No wheeze or crackles.   HEART: Regular rate and rhythm; No murmurs, rubs, or gallops  ABDOMEN: Soft, Nontender, Nondistended; Bowel sounds present  EXTREMITIES:  2+ Peripheral Pulses, No clubbing, cyanosis, or edema  PSYCH: AAOx3  NEUROLOGY: non-focal  SKIN: No rashes or lesions    LABS:                        11.4   5.21  )-----------( 284      ( 21 May 2022 07:40 )             34.9     05-21    139  |  106  |  26<H>  ----------------------------<  126<H>  4.6   |  21<L>  |  0.93    Ca    10.4      21 May 2022 07:40  Phos  3.9     05-21  Mg     2.10     05-21    TPro  6.0  /  Alb  3.5  /  TBili  0.3  /  DBili  x   /  AST  8   /  ALT  8   /  AlkPhos  116  05-21              RADIOLOGY & ADDITIONAL TESTS:    Imaging Personally Reviewed:    Consultant(s) Notes Reviewed:      Care Discussed with Consultants/Other Providers:

## 2022-05-21 NOTE — PROGRESS NOTE ADULT - PROBLEM SELECTOR PLAN 3
In admission SCr 1.30, baseline ~0.9---> 0.93 today   - Improved with diuresis, back at baseline  - Monitor BMP closely

## 2022-05-21 NOTE — CHART NOTE - NSCHARTNOTEFT_GEN_A_CORE
Spoke with hematology on-call regarding repeat Uric Acid of 7.3 from 8.2. Per heme, hold off on additional treatment and continue allopurinol. f/u G6PD result (currently testing in lab).

## 2022-05-21 NOTE — PROGRESS NOTE ADULT - ASSESSMENT
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     # Acute resp distress  - CTA chest 5/10: No pulmonary embolism from the main pulmonary artery up to and including the lobar branches. Several segmental and subsegmental branches cannot be assessed due to extensive respiratory motion. New groundglass in the upper lobes, suspicious for pulmonary edema. Infection is also a consideration. Increased mildly enlarged mediastinal lymph nodes may be reactive. Stable multilobar chronic consolidation.  - BNP elevated to 6000-- diuresis per primary team and cardiology  - Given h/o pulm involvement of lymphoma, pulmonary also following    # DLBCL  - Dx 7/2021-- patient with resp symptoms, + lung involvement, +LN involvement  - Given her age, plan was to do palliative mild treatment  - Patient got 11 cycles on R-CP (until 3/31). PET done 11/2021 after starting treatment with near complete resolution of disease in lungs and lymph nodes  - Unfortunately, patient with c/f recurrence when CT scan of chest done 4/2/22: Newly noted 9 mm opacity in the periphery of the right upper lobe and interval increase in size of a lingular opacity along the fissure.  - Started on Hugo-Rtixumab-- cycle 1 on 4/28. Polatuzumab can cause pneumonitis but it is very rare with only 1 dose. Pulm to assess further.   - Now p/w worsening SOB-- as above, recommend pulm consult- appreciate recs: pulm edema and lymphoma   - considering lymphoma involvement in the lungs will treat with Obinutuzumab on 5/20 test dose and 5/21 to complete infusion for total of 1000mg. If all is stable after infusion, potential d/c on Sunday  -monitor tumor lysis labs  -Elevated uric acid to 8.2 on 5/21. Allopurinol started. Checking G6PD prior to giving Elitek

## 2022-05-21 NOTE — PROGRESS NOTE ADULT - PROBLEM SELECTOR PLAN 1
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 Afebrile, no WBC  - Apprec Hem/Onc, Cards and Pulmonary recs  - Considering lymphoma involvement in the lungs (will get treatment as below)  - TTE showing severe pulmonary hypertension  - Ambulatory sat 5/17 on RA 92  - Will assess daily need for diuresis with lasix 20mg PO (may need to be continued on discharge), euvolemic today, no Lasix today   - Needs Pulmonary f/up as outpatient

## 2022-05-21 NOTE — PROGRESS NOTE ADULT - PROBLEM SELECTOR PLAN 2
Last chemo a few weeks ago with rutixan, polatuzumab  - Will treat with Obinutuzumab on 5/20 test dose and 5/21 to complete infusion for total of 1000mg  - continue home valacyclovir, folic acid  - Apprec Hem/Onc recs  -Add allopurinol as per Hem today

## 2022-05-21 NOTE — PROGRESS NOTE ADULT - SUBJECTIVE AND OBJECTIVE BOX
INTERVAL HPI/OVERNIGHT EVENTS:  No overnight events. Feels better today although still gets short of breath with activity. Comfortable in bed talking.    MEDICATIONS  (STANDING):  acetaminophen     Tablet .. 650 milliGRAM(s) Oral daily  albuterol/ipratropium for Nebulization 3 milliLiter(s) Nebulizer every 6 hours  allopurinol 100 milliGRAM(s) Oral daily  chlorhexidine 2% Cloths 1 Application(s) Topical daily  dexAMETHasone  IVPB 20 milliGRAM(s) IV Intermittent daily  diphenhydrAMINE Injectable 50 milliGRAM(s) IV Push daily  enoxaparin Injectable 30 milliGRAM(s) SubCutaneous every 24 hours  escitalopram 15 milliGRAM(s) Oral daily  folic acid 1 milliGRAM(s) Oral daily  melatonin 3 milliGRAM(s) Oral at bedtime  metoprolol tartrate 25 milliGRAM(s) Oral two times a day  mupirocin 2% Ointment 1 Application(s) Topical two times a day  obinutuzumab IVPB (eMAR) 900 milliGRAM(s) IV Intermittent once  pantoprazole    Tablet 40 milliGRAM(s) Oral before breakfast  valACYclovir 500 milliGRAM(s) Oral daily    MEDICATIONS  (PRN):  diphenhydrAMINE Injectable 50 milliGRAM(s) IV Push daily PRN REACTION  guaiFENesin Oral Liquid (Sugar-Free) 100 milliGRAM(s) Oral every 6 hours PRN Cough  hydrocortisone sodium succinate Injectable 100 milliGRAM(s) IV Push daily PRN REACTION      Allergies    Zosyn (Rash)    Intolerances    Vital Signs Last 24 Hrs  T(C): 36.4 (21 May 2022 07:00), Max: 36.6 (20 May 2022 21:24)  T(F): 97.5 (21 May 2022 07:00), Max: 97.8 (20 May 2022 21:24)  HR: 100 (21 May 2022 10:56) (79 - 107)  BP: 112/79 (21 May 2022 07:00) (102/70 - 114/72)  BP(mean): --  RR: 18 (21 May 2022 07:00) (16 - 18)  SpO2: 90% (21 May 2022 10:20) (90% - 98%)    PHYSICAL EXAM:    Constitutional: NAD, lying comfortably in bed  Eyes: EOMI, PERRLA  Neck: supple, no masses, no JVD  Respiratory: CTAB; no r/r/w  Cardiovascular: RRR, no M/R/G  Gastrointestinal: +BS, soft, NTND, no hepatosplenomegaly  Extremities: no c/c/e  Neurological: AAOx3, nonfocal    LABS:                                   11.4   5.21  )-----------( 284      ( 21 May 2022 07:40 )             34.9     05-21    139  |  106  |  26<H>  ----------------------------<  126<H>  4.6   |  21<L>  |  0.93    Ca    10.4      21 May 2022 07:40  Phos  3.9     05-21  Mg     2.10     05-21    TPro  6.0  /  Alb  3.5  /  TBili  0.3  /  DBili  x   /  AST  8   /  ALT  8   /  AlkPhos  116  05-21    Uric Acid, Serum: 8.2 mg/dL (05.21.22 @ 07:40)          RADIOLOGY & ADDITIONAL TESTS:  Studies reviewed.

## 2022-05-21 NOTE — CHART NOTE - NSCHARTNOTEFT_GEN_A_CORE
Uric acid 8.2 in AM labs  Discussed case with hematology team -- recommended to start allopurinol daily dosing  Discussed with pharmacy -- based on creatinine clearance and patient's age will dose Allopurinol 100 mg QD  Will repeat uric acid in AM  Team to continue to monitor. Uric acid 8.2 in AM labs  Discussed case with hematology team -- recommended to start allopurinol daily dosing for TLS prevention  Discussed with pharmacy -- based on creatinine clearance and patient's age will dose Allopurinol 100 mg QD  Will repeat uric acid in AM  Team to continue to monitor. Uric acid 8.2 in AM labs  Discussed case with hematology team -- recommended to start allopurinol daily dosing for TLS prevention  Discussed with pharmacy -- based on creatinine clearance and patient's age will dose Allopurinol 100 mg QD  Will repeat uric acid in AM  Team to continue to monitor.    UPDATE:  Discussed with hematology attending, will order G6PD STAT and recheck uric acid later tonight.

## 2022-05-21 NOTE — PROGRESS NOTE ADULT - SUBJECTIVE AND OBJECTIVE BOX
DATE OF SERVICE: 05-21-22 @ 12:49    Subjective: Patient seen and examined. No new events except as noted.     SUBJECTIVE/ROS:  Pt seen and examined early this am        MEDICATIONS:  MEDICATIONS  (STANDING):  acetaminophen     Tablet .. 650 milliGRAM(s) Oral daily  albuterol/ipratropium for Nebulization 3 milliLiter(s) Nebulizer every 6 hours  allopurinol 100 milliGRAM(s) Oral daily  chlorhexidine 2% Cloths 1 Application(s) Topical daily  dexAMETHasone  IVPB 20 milliGRAM(s) IV Intermittent daily  diphenhydrAMINE Injectable 50 milliGRAM(s) IV Push daily  enoxaparin Injectable 30 milliGRAM(s) SubCutaneous every 24 hours  escitalopram 15 milliGRAM(s) Oral daily  folic acid 1 milliGRAM(s) Oral daily  melatonin 3 milliGRAM(s) Oral at bedtime  metoprolol tartrate 25 milliGRAM(s) Oral two times a day  mupirocin 2% Ointment 1 Application(s) Topical two times a day  obinutuzumab IVPB (eMAR) 900 milliGRAM(s) IV Intermittent once  pantoprazole    Tablet 40 milliGRAM(s) Oral before breakfast  valACYclovir 500 milliGRAM(s) Oral daily      PHYSICAL EXAM:  T(C): 36.4 (05-21-22 @ 12:26), Max: 36.6 (05-20-22 @ 21:24)  HR: 88 (05-21-22 @ 12:26) (79 - 107)  BP: 109/65 (05-21-22 @ 12:26) (102/70 - 114/72)  RR: 18 (05-21-22 @ 12:26) (16 - 18)  SpO2: 94% (05-21-22 @ 12:26) (90% - 98%)  Wt(kg): --  I&O's Summary    20 May 2022 07:01  -  21 May 2022 07:00  --------------------------------------------------------  IN: 1200 mL / OUT: 0 mL / NET: 1200 mL    21 May 2022 07:01  -  21 May 2022 12:49  --------------------------------------------------------  IN: 250 mL / OUT: 0 mL / NET: 250 mL            JVP: Normal  Neck: supple  Lung: clear   CV: S1 S2 , Murmur:  Abd: soft  Ext: No edema  neuro: Awake / alert  Psych: flat affect  Skin: normal``    LABS/DATA:    CARDIAC MARKERS:                                11.4   5.21  )-----------( 284      ( 21 May 2022 07:40 )             34.9     05-21    139  |  106  |  26<H>  ----------------------------<  126<H>  4.6   |  21<L>  |  0.93    Ca    10.4      21 May 2022 07:40  Phos  3.9     05-21  Mg     2.10     05-21    TPro  6.0  /  Alb  3.5  /  TBili  0.3  /  DBili  x   /  AST  8   /  ALT  8   /  AlkPhos  116  05-21    proBNP:   Lipid Profile:   HgA1c:   TSH:     TELE:  EKG:

## 2022-05-22 VITALS — WEIGHT: 118.39 LBS

## 2022-05-22 LAB
ALBUMIN SERPL ELPH-MCNC: 3.7 G/DL — SIGNIFICANT CHANGE UP (ref 3.3–5)
ALP SERPL-CCNC: 103 U/L — SIGNIFICANT CHANGE UP (ref 40–120)
ALT FLD-CCNC: 10 U/L — SIGNIFICANT CHANGE UP (ref 4–33)
ANION GAP SERPL CALC-SCNC: 14 MMOL/L — SIGNIFICANT CHANGE UP (ref 7–14)
AST SERPL-CCNC: 15 U/L — SIGNIFICANT CHANGE UP (ref 4–32)
BILIRUB SERPL-MCNC: 0.2 MG/DL — SIGNIFICANT CHANGE UP (ref 0.2–1.2)
BUN SERPL-MCNC: 32 MG/DL — HIGH (ref 7–23)
CALCIUM SERPL-MCNC: 10 MG/DL — SIGNIFICANT CHANGE UP (ref 8.4–10.5)
CHLORIDE SERPL-SCNC: 110 MMOL/L — HIGH (ref 98–107)
CO2 SERPL-SCNC: 18 MMOL/L — LOW (ref 22–31)
CREAT SERPL-MCNC: 0.88 MG/DL — SIGNIFICANT CHANGE UP (ref 0.5–1.3)
EGFR: 62 ML/MIN/1.73M2 — SIGNIFICANT CHANGE UP
GLUCOSE SERPL-MCNC: 115 MG/DL — HIGH (ref 70–99)
HCT VFR BLD CALC: 34.3 % — LOW (ref 34.5–45)
HGB BLD-MCNC: 11.1 G/DL — LOW (ref 11.5–15.5)
LDH SERPL L TO P-CCNC: 184 U/L — SIGNIFICANT CHANGE UP (ref 135–225)
MAGNESIUM SERPL-MCNC: 2.1 MG/DL — SIGNIFICANT CHANGE UP (ref 1.6–2.6)
MCHC RBC-ENTMCNC: 28.9 PG — SIGNIFICANT CHANGE UP (ref 27–34)
MCHC RBC-ENTMCNC: 32.4 GM/DL — SIGNIFICANT CHANGE UP (ref 32–36)
MCV RBC AUTO: 89.3 FL — SIGNIFICANT CHANGE UP (ref 80–100)
NRBC # BLD: 0 /100 WBCS — SIGNIFICANT CHANGE UP
NRBC # FLD: 0 K/UL — SIGNIFICANT CHANGE UP
PHOSPHATE SERPL-MCNC: 3.4 MG/DL — SIGNIFICANT CHANGE UP (ref 2.5–4.5)
PLATELET # BLD AUTO: 181 K/UL — SIGNIFICANT CHANGE UP (ref 150–400)
POTASSIUM SERPL-MCNC: 4.4 MMOL/L — SIGNIFICANT CHANGE UP (ref 3.5–5.3)
POTASSIUM SERPL-SCNC: 4.4 MMOL/L — SIGNIFICANT CHANGE UP (ref 3.5–5.3)
PROT SERPL-MCNC: 5.9 G/DL — LOW (ref 6–8.3)
RBC # BLD: 3.84 M/UL — SIGNIFICANT CHANGE UP (ref 3.8–5.2)
RBC # FLD: 14.4 % — SIGNIFICANT CHANGE UP (ref 10.3–14.5)
SODIUM SERPL-SCNC: 142 MMOL/L — SIGNIFICANT CHANGE UP (ref 135–145)
URATE SERPL-MCNC: 6.9 MG/DL — SIGNIFICANT CHANGE UP (ref 2.5–7)
WBC # BLD: 9.13 K/UL — SIGNIFICANT CHANGE UP (ref 3.8–10.5)
WBC # FLD AUTO: 9.13 K/UL — SIGNIFICANT CHANGE UP (ref 3.8–10.5)

## 2022-05-22 PROCEDURE — 99239 HOSP IP/OBS DSCHRG MGMT >30: CPT

## 2022-05-22 PROCEDURE — 99238 HOSP IP/OBS DSCHRG MGMT 30/<: CPT

## 2022-05-22 RX ORDER — ALLOPURINOL 300 MG
1 TABLET ORAL
Qty: 30 | Refills: 0
Start: 2022-05-22 | End: 2022-06-20

## 2022-05-22 RX ORDER — FUROSEMIDE 40 MG
1 TABLET ORAL
Qty: 30 | Refills: 0
Start: 2022-05-22 | End: 2022-06-20

## 2022-05-22 RX ORDER — NITROFURANTOIN MACROCRYSTAL 50 MG
1 CAPSULE ORAL
Qty: 0 | Refills: 0 | DISCHARGE

## 2022-05-22 RX ORDER — FUROSEMIDE 40 MG
1 TABLET ORAL
Qty: 0 | Refills: 0 | DISCHARGE

## 2022-05-22 RX ADMIN — VALACYCLOVIR 500 MILLIGRAM(S): 500 TABLET, FILM COATED ORAL at 12:02

## 2022-05-22 RX ADMIN — PANTOPRAZOLE SODIUM 40 MILLIGRAM(S): 20 TABLET, DELAYED RELEASE ORAL at 06:58

## 2022-05-22 RX ADMIN — Medication 1 MILLIGRAM(S): at 12:02

## 2022-05-22 RX ADMIN — Medication 3 MILLILITER(S): at 04:01

## 2022-05-22 RX ADMIN — MUPIROCIN 1 APPLICATION(S): 20 OINTMENT TOPICAL at 06:58

## 2022-05-22 RX ADMIN — Medication 100 MILLIGRAM(S): at 12:02

## 2022-05-22 RX ADMIN — ESCITALOPRAM OXALATE 15 MILLIGRAM(S): 10 TABLET, FILM COATED ORAL at 12:02

## 2022-05-22 RX ADMIN — Medication 25 MILLIGRAM(S): at 06:58

## 2022-05-22 NOTE — PROGRESS NOTE ADULT - SUBJECTIVE AND OBJECTIVE BOX
Patient is a 89y old  Female who presents with a chief complaint of Hypoxia/SOB (21 May 2022 12:13)      SUBJECTIVE / OVERNIGHT EVENTS: Pt has no complaints today, denies any CP or SOB.     MEDICATIONS  (STANDING):  albuterol/ipratropium for Nebulization 3 milliLiter(s) Nebulizer every 6 hours  allopurinol 100 milliGRAM(s) Oral daily  chlorhexidine 2% Cloths 1 Application(s) Topical daily  dexAMETHasone  IVPB 20 milliGRAM(s) IV Intermittent daily  enoxaparin Injectable 30 milliGRAM(s) SubCutaneous every 24 hours  escitalopram 15 milliGRAM(s) Oral daily  folic acid 1 milliGRAM(s) Oral daily  melatonin 3 milliGRAM(s) Oral at bedtime  metoprolol tartrate 25 milliGRAM(s) Oral two times a day  mupirocin 2% Ointment 1 Application(s) Topical two times a day  pantoprazole    Tablet 40 milliGRAM(s) Oral before breakfast  valACYclovir 500 milliGRAM(s) Oral daily    MEDICATIONS  (PRN):  diphenhydrAMINE Injectable 50 milliGRAM(s) IV Push daily PRN REACTION  guaiFENesin Oral Liquid (Sugar-Free) 100 milliGRAM(s) Oral every 6 hours PRN Cough  hydrocortisone sodium succinate Injectable 100 milliGRAM(s) IV Push daily PRN REACTION      Vital Signs Last 24 Hrs  T(C): 36.4 (22 May 2022 07:07), Max: 36.8 (21 May 2022 17:00)  T(F): 97.5 (22 May 2022 07:07), Max: 98.3 (21 May 2022 17:00)  HR: 75 (22 May 2022 07:07) (75 - 100)  BP: 123/71 (22 May 2022 07:07) (99/66 - 123/71)  BP(mean): --  RR: 18 (22 May 2022 07:07) (16 - 18)  SpO2: 98% (22 May 2022 07:07) (90% - 100%)  CAPILLARY BLOOD GLUCOSE        I&O's Summary    21 May 2022 07:01  -  22 May 2022 07:00  --------------------------------------------------------  IN: 600 mL / OUT: 150 mL / NET: 450 mL        PHYSICAL EXAM:  GENERAL: NAD, well-developed  HEAD:  Atraumatic, Normocephalic  EYES: EOMI, PERRLA, conjunctiva and sclera clear  NECK: Supple, No JVD  CHEST/LUNG: Clear to auscultation bilaterally; No wheeze  HEART: Regular rate and rhythm; No murmurs, rubs, or gallops  ABDOMEN: Soft, Nontender, Nondistended; Bowel sounds present  EXTREMITIES:  2+ Peripheral Pulses, No clubbing, cyanosis, or edema  PSYCH: AAOx3  NEUROLOGY: non-focal  SKIN: No rashes or lesions    LABS:                        11.1   9.13  )-----------( 181      ( 22 May 2022 06:15 )             34.3     05-22    142  |  110<H>  |  32<H>  ----------------------------<  115<H>  4.4   |  18<L>  |  0.88    Ca    10.0      22 May 2022 06:15  Phos  3.4     05-22  Mg     2.10     05-22    TPro  5.9<L>  /  Alb  3.7  /  TBili  0.2  /  DBili  x   /  AST  15  /  ALT  10  /  AlkPhos  103  05-22              RADIOLOGY & ADDITIONAL TESTS:    Imaging Personally Reviewed:    Consultant(s) Notes Reviewed:      Care Discussed with Consultants/Other Providers: Patient is a 89y old  Female who presents with a chief complaint of Hypoxia/SOB (21 May 2022 12:13)      SUBJECTIVE / OVERNIGHT EVENTS: Pt has no complaints today, denies any CP or SOB.     MEDICATIONS  (STANDING):  albuterol/ipratropium for Nebulization 3 milliLiter(s) Nebulizer every 6 hours  allopurinol 100 milliGRAM(s) Oral daily  chlorhexidine 2% Cloths 1 Application(s) Topical daily  dexAMETHasone  IVPB 20 milliGRAM(s) IV Intermittent daily  enoxaparin Injectable 30 milliGRAM(s) SubCutaneous every 24 hours  escitalopram 15 milliGRAM(s) Oral daily  folic acid 1 milliGRAM(s) Oral daily  melatonin 3 milliGRAM(s) Oral at bedtime  metoprolol tartrate 25 milliGRAM(s) Oral two times a day  mupirocin 2% Ointment 1 Application(s) Topical two times a day  pantoprazole    Tablet 40 milliGRAM(s) Oral before breakfast  valACYclovir 500 milliGRAM(s) Oral daily    MEDICATIONS  (PRN):  diphenhydrAMINE Injectable 50 milliGRAM(s) IV Push daily PRN REACTION  guaiFENesin Oral Liquid (Sugar-Free) 100 milliGRAM(s) Oral every 6 hours PRN Cough  hydrocortisone sodium succinate Injectable 100 milliGRAM(s) IV Push daily PRN REACTION      Vital Signs Last 24 Hrs  T(C): 36.4 (22 May 2022 07:07), Max: 36.8 (21 May 2022 17:00)  T(F): 97.5 (22 May 2022 07:07), Max: 98.3 (21 May 2022 17:00)  HR: 75 (22 May 2022 07:07) (75 - 100)  BP: 123/71 (22 May 2022 07:07) (99/66 - 123/71)  BP(mean): --  RR: 18 (22 May 2022 07:07) (16 - 18)  SpO2: 98% (22 May 2022 07:07) (90% - 100%)  CAPILLARY BLOOD GLUCOSE        I&O's Summary    21 May 2022 07:01  -  22 May 2022 07:00  --------------------------------------------------------  IN: 600 mL / OUT: 150 mL / NET: 450 mL        PHYSICAL EXAM:  GENERAL: NAD, well-developed  HEAD:  Atraumatic, Normocephalic  EYES: EOMI, PERRLA, conjunctiva and sclera clear  NECK: Supple, No JVD  CHEST/LUNG: Clear to auscultation bilaterally; No wheeze  HEART: Regular rate and rhythm; No murmurs, rubs, or gallops  ABDOMEN: Soft, Nontender, Nondistended; Bowel sounds present  EXTREMITIES:  2+ Peripheral Pulses, No clubbing, cyanosis, or edema  PSYCH: AAOx3  NEUROLOGY: non-focal  SKIN: No rashes or lesions    LABS:                        11.1   9.13  )-----------( 181      ( 22 May 2022 06:15 )             34.3     05-22    142  |  110<H>  |  32<H>  ----------------------------<  115<H>  4.4   |  18<L>  |  0.88    Ca    10.0      22 May 2022 06:15  Phos  3.4     05-22  Mg     2.10     05-22    TPro  5.9<L>  /  Alb  3.7  /  TBili  0.2  /  DBili  x   /  AST  15  /  ALT  10  /  AlkPhos  103  05-22              RADIOLOGY & ADDITIONAL TESTS:    Imaging Personally Reviewed:    Consultant(s) Notes Reviewed:      Care Discussed with Consultants/Other Providers: Dr Madison, Cardiology, will continue lasix 20mg PO daily as per Cardiology rec.

## 2022-05-22 NOTE — PROGRESS NOTE ADULT - PROBLEM SELECTOR PROBLEM 1
Dyspnea on exertion

## 2022-05-22 NOTE — DIETITIAN INITIAL EVALUATION ADULT - ORAL INTAKE PTA/DIET HISTORY
Good appetite/ PO intake at home, per Pt.  Typically drinks 1 bottle of Ensure supplement per day.  Does not add salt to food.

## 2022-05-22 NOTE — PROGRESS NOTE ADULT - ASSESSMENT
SOB  likely multifactorial   acute diastolic CHF has elevated proBNP   however CT reviewed personally and it appears she also has lung parenchymal disease   cont diuresis   pulm consult appreciated   pulm HTN suspect due to underlying lung disease   fu labs today

## 2022-05-22 NOTE — PROGRESS NOTE ADULT - PROBLEM SELECTOR PLAN 4
Not on home O2  - duonebs q6h prn  - Monitor O2 closely, mainly on ambulation  -F/U with pulm as an outpatient

## 2022-05-22 NOTE — DIETITIAN INITIAL EVALUATION ADULT - OTHER INFO
Met with Pt.  Pt. endorses improvement in appetite/PO intake & denies food allergies, nausea/vomiting/diarrhea/constipation, or issues with chewing/swallowing. Discussed prescribed therapeutic diet.  Pt. replies, when asked, she had weight loss, then regained weight and believes she is ~115lbs currently.

## 2022-05-22 NOTE — PROGRESS NOTE ADULT - SUBJECTIVE AND OBJECTIVE BOX
INTERVAL HPI/OVERNIGHT EVENTS:  No overnight events. Feels better today with no CP or SOB.    MEDICATIONS  (STANDING):  acetaminophen     Tablet .. 650 milliGRAM(s) Oral daily  albuterol/ipratropium for Nebulization 3 milliLiter(s) Nebulizer every 6 hours  allopurinol 100 milliGRAM(s) Oral daily  chlorhexidine 2% Cloths 1 Application(s) Topical daily  dexAMETHasone  IVPB 20 milliGRAM(s) IV Intermittent daily  diphenhydrAMINE Injectable 50 milliGRAM(s) IV Push daily  enoxaparin Injectable 30 milliGRAM(s) SubCutaneous every 24 hours  escitalopram 15 milliGRAM(s) Oral daily  folic acid 1 milliGRAM(s) Oral daily  melatonin 3 milliGRAM(s) Oral at bedtime  metoprolol tartrate 25 milliGRAM(s) Oral two times a day  mupirocin 2% Ointment 1 Application(s) Topical two times a day  obinutuzumab IVPB (eMAR) 900 milliGRAM(s) IV Intermittent once  pantoprazole    Tablet 40 milliGRAM(s) Oral before breakfast  valACYclovir 500 milliGRAM(s) Oral daily    MEDICATIONS  (PRN):  diphenhydrAMINE Injectable 50 milliGRAM(s) IV Push daily PRN REACTION  guaiFENesin Oral Liquid (Sugar-Free) 100 milliGRAM(s) Oral every 6 hours PRN Cough  hydrocortisone sodium succinate Injectable 100 milliGRAM(s) IV Push daily PRN REACTION      Allergies    Zosyn (Rash)    Intolerances    Vital Signs Last 24 Hrs  T(C): 36.4 (22 May 2022 07:07), Max: 36.8 (21 May 2022 17:00)  T(F): 97.5 (22 May 2022 07:07), Max: 98.3 (21 May 2022 17:00)  HR: 75 (22 May 2022 07:07) (75 - 100)  BP: 123/71 (22 May 2022 07:07) (99/66 - 123/71)  BP(mean): --  RR: 18 (22 May 2022 07:07) (16 - 18)  SpO2: 98% (22 May 2022 07:07) (94% - 100%)    PHYSICAL EXAM:    Constitutional: NAD, lying comfortably in bed  Eyes: EOMI, PERRLA  Neck: supple, no masses, no JVD  Respiratory: CTAB; no r/r/w  Cardiovascular: RRR, no M/R/G  Gastrointestinal: +BS, soft, NTND, no hepatosplenomegaly  Extremities: no c/c/e  Neurological: AAOx3, nonfocal    LABS:                        11.1   9.13  )-----------( 181      ( 22 May 2022 06:15 )             34.3       05-22    142  |  110<H>  |  32<H>  ----------------------------<  115<H>  4.4   |  18<L>  |  0.88    Ca    10.0      22 May 2022 06:15  Phos  3.4     05-22  Mg     2.10     05-22    TPro  5.9<L>  /  Alb  3.7  /  TBili  0.2  /  DBili  x   /  AST  15  /  ALT  10  /  AlkPhos  103  05-22      Uric Acid, Serum: 6.9 mg/dL (05.22.22 @ 06:15)        RADIOLOGY & ADDITIONAL TESTS:  Studies reviewed.

## 2022-05-22 NOTE — DIETITIAN INITIAL EVALUATION ADULT - REASON FOR ADMISSION
89yo F admitted with PNA & lymphoma w worsening lung involvement & pHTN.  Also found to be in CHF.

## 2022-05-22 NOTE — PROGRESS NOTE ADULT - PROBLEM SELECTOR PLAN 3
In admission SCr 1.30, baseline ~0.9---> 0.88 today   - Improved with diuresis, back at baseline  - Monitor BMP closely

## 2022-05-22 NOTE — DIETITIAN INITIAL EVALUATION ADULT - PERTINENT MEDS FT
MEDICATIONS  (STANDING):  albuterol/ipratropium for Nebulization 3 milliLiter(s) Nebulizer every 6 hours  allopurinol 100 milliGRAM(s) Oral daily  chlorhexidine 2% Cloths 1 Application(s) Topical daily  enoxaparin Injectable 30 milliGRAM(s) SubCutaneous every 24 hours  escitalopram 15 milliGRAM(s) Oral daily  folic acid 1 milliGRAM(s) Oral daily  melatonin 3 milliGRAM(s) Oral at bedtime  metoprolol tartrate 25 milliGRAM(s) Oral two times a day  mupirocin 2% Ointment 1 Application(s) Topical two times a day  pantoprazole    Tablet 40 milliGRAM(s) Oral before breakfast  valACYclovir 500 milliGRAM(s) Oral daily    MEDICATIONS  (PRN):  diphenhydrAMINE Injectable 50 milliGRAM(s) IV Push daily PRN REACTION  guaiFENesin Oral Liquid (Sugar-Free) 100 milliGRAM(s) Oral every 6 hours PRN Cough  hydrocortisone sodium succinate Injectable 100 milliGRAM(s) IV Push daily PRN REACTION

## 2022-05-22 NOTE — PROGRESS NOTE ADULT - SUBJECTIVE AND OBJECTIVE BOX
DATE OF SERVICE: 05-22-22 @ 05:50    Subjective: Patient seen and examined. No new events except as noted.     SUBJECTIVE/ROS:        MEDICATIONS:  MEDICATIONS  (STANDING):  albuterol/ipratropium for Nebulization 3 milliLiter(s) Nebulizer every 6 hours  allopurinol 100 milliGRAM(s) Oral daily  chlorhexidine 2% Cloths 1 Application(s) Topical daily  dexAMETHasone  IVPB 20 milliGRAM(s) IV Intermittent daily  enoxaparin Injectable 30 milliGRAM(s) SubCutaneous every 24 hours  escitalopram 15 milliGRAM(s) Oral daily  folic acid 1 milliGRAM(s) Oral daily  melatonin 3 milliGRAM(s) Oral at bedtime  metoprolol tartrate 25 milliGRAM(s) Oral two times a day  mupirocin 2% Ointment 1 Application(s) Topical two times a day  pantoprazole    Tablet 40 milliGRAM(s) Oral before breakfast  valACYclovir 500 milliGRAM(s) Oral daily      PHYSICAL EXAM:  T(C): 36.6 (05-21-22 @ 21:53), Max: 36.8 (05-21-22 @ 17:00)  HR: 88 (05-22-22 @ 04:01) (81 - 100)  BP: 99/66 (05-21-22 @ 21:53) (99/66 - 120/72)  RR: 18 (05-21-22 @ 21:53) (16 - 18)  SpO2: 97% (05-22-22 @ 04:01) (90% - 100%)  Wt(kg): --  I&O's Summary    20 May 2022 07:01  -  21 May 2022 07:00  --------------------------------------------------------  IN: 1200 mL / OUT: 0 mL / NET: 1200 mL    21 May 2022 07:01  -  22 May 2022 05:50  --------------------------------------------------------  IN: 600 mL / OUT: 150 mL / NET: 450 mL            JVP: Normal  Neck: supple  Lung: clear   CV: S1 S2 , Murmur:  Abd: soft  Ext: No edema  Psych: flat affect  Skin: normal``    LABS/DATA:    CARDIAC MARKERS:                                11.4   5.21  )-----------( 284      ( 21 May 2022 07:40 )             34.9     05-21    139  |  106  |  26<H>  ----------------------------<  126<H>  4.6   |  21<L>  |  0.93    Ca    10.4      21 May 2022 07:40  Phos  3.9     05-21  Mg     2.10     05-21    TPro  6.0  /  Alb  3.5  /  TBili  0.3  /  DBili  x   /  AST  8   /  ALT  8   /  AlkPhos  116  05-21    proBNP:   Lipid Profile:   HgA1c:   TSH:     TELE:  EKG:

## 2022-05-22 NOTE — PROGRESS NOTE ADULT - PROBLEM SELECTOR PLAN 1
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 Afebrile, no WBC  - Apprec Hem/Onc, Cards and Pulmonary recs  - Considering lymphoma involvement in the lungs (will get treatment as below)  - TTE showing severe pulmonary hypertension  - Ambulatory sat 5/17 on RA 92  - Will assess daily need for diuresis with lasix 20mg PO (may need to be continued on discharge), still euvolemic today, no Lasix today   - Needs Pulmonary f/up as outpatient

## 2022-05-22 NOTE — DIETITIAN INITIAL EVALUATION ADULT - PERTINENT LABORATORY DATA
05-22    142  |  110<H>  |  32<H>  ----------------------------<  115<H>  4.4   |  18<L>  |  0.88    Ca    10.0      22 May 2022 06:15  Phos  3.4     05-22  Mg     2.10     05-22    TPro  5.9<L>  /  Alb  3.7  /  TBili  0.2  /  DBili  x   /  AST  15  /  ALT  10  /  AlkPhos  103  05-22  A1C with Estimated Average Glucose Result: 5.2 % (08-04-21 @ 02:42)

## 2022-05-22 NOTE — PROGRESS NOTE ADULT - NSPROGADDITIONALINFOA_GEN_ALL_CORE
D/W ACP
Medically stable for discharge with home PT.  Spoke to pt at length about f/u issues, she understood and agreed with the plan.  Total time: 35min     D/W ACP

## 2022-05-22 NOTE — PROGRESS NOTE ADULT - ASSESSMENT
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     # Acute resp distress  - CTA chest 5/10: No pulmonary embolism from the main pulmonary artery up to and including the lobar branches. Several segmental and subsegmental branches cannot be assessed due to extensive respiratory motion. New groundglass in the upper lobes, suspicious for pulmonary edema. Infection is also a consideration. Increased mildly enlarged mediastinal lymph nodes may be reactive. Stable multilobar chronic consolidation.  - BNP elevated to 6000-- diuresis per primary team and cardiology  - Given h/o pulm involvement of lymphoma, pulmonary also following    # DLBCL  - Dx 7/2021-- patient with resp symptoms, + lung involvement, +LN involvement  - Given her age, plan was to do palliative mild treatment  - Patient got 11 cycles on R-CP (until 3/31). PET done 11/2021 after starting treatment with near complete resolution of disease in lungs and lymph nodes  - Unfortunately, patient with c/f recurrence when CT scan of chest done 4/2/22: Newly noted 9 mm opacity in the periphery of the right upper lobe and interval increase in size of a lingular opacity along the fissure.  - Started on Hugo-Rtixumab-- cycle 1 on 4/28. Polatuzumab can cause pneumonitis but it is very rare with only 1 dose. Pulm to assess further.   - Now p/w worsening SOB-- as above, recommend pulm consult- appreciate recs: pulm edema and lymphoma   - Considering lymphoma involvement in the lungs treated with Obinutuzumab on 5/20 test dose and 5/21 to complete infusion for total of 1000mg.  - Elevated uric acid to 8.2 on 5/21. Allopurinol started. Checking G6PD in case Elitek needed, however uric acid is improving on Allopurinol  - Will continue Allopurinol on discharge  - Patient tolerated treatment well and is clinically doing better with no CP and decreased SOB. She may be discharged to home today.  - She will follow up with her primary hematologist Dr. Rossy Esparza.

## 2022-05-23 LAB — ANA TITR SER: NEGATIVE — SIGNIFICANT CHANGE UP

## 2022-05-25 LAB — G6PD RBC-CCNC: 22.3 U/G HGB — HIGH (ref 7–20.5)

## 2022-06-02 ENCOUNTER — APPOINTMENT (OUTPATIENT)
Dept: HEMATOLOGY ONCOLOGY | Facility: CLINIC | Age: 87
End: 2022-06-02
Payer: MEDICARE

## 2022-06-02 ENCOUNTER — LABORATORY RESULT (OUTPATIENT)
Age: 87
End: 2022-06-02

## 2022-06-02 VITALS
BODY MASS INDEX: 21.61 KG/M2 | OXYGEN SATURATION: 97 % | SYSTOLIC BLOOD PRESSURE: 100 MMHG | DIASTOLIC BLOOD PRESSURE: 60 MMHG | WEIGHT: 122 LBS | HEART RATE: 81 BPM

## 2022-06-02 PROCEDURE — 99214 OFFICE O/P EST MOD 30 MIN: CPT | Mod: GC,25

## 2022-06-02 PROCEDURE — 85025 COMPLETE CBC W/AUTO DIFF WBC: CPT | Mod: GC

## 2022-06-02 RX ORDER — ALLOPURINOL 100 MG/1
100 TABLET ORAL
Qty: 30 | Refills: 0 | Status: COMPLETED | COMMUNITY
Start: 2022-05-22

## 2022-06-02 RX ORDER — PREDNISONE 20 MG/1
20 TABLET ORAL
Qty: 60 | Refills: 0 | Status: COMPLETED | COMMUNITY
Start: 2021-09-15 | End: 2022-06-02

## 2022-06-02 RX ORDER — LEVOFLOXACIN 500 MG/1
500 TABLET, FILM COATED ORAL DAILY
Qty: 10 | Refills: 0 | Status: COMPLETED | COMMUNITY
Start: 2022-05-09 | End: 2022-06-02

## 2022-06-02 RX ORDER — PREDNISONE 10 MG/1
10 TABLET ORAL DAILY
Qty: 10 | Refills: 0 | Status: COMPLETED | COMMUNITY
Start: 2022-04-23 | End: 2022-06-02

## 2022-06-02 NOTE — HISTORY OF PRESENT ILLNESS
[de-identified] : 89 year old woman with a PMH of COPD  was admitted to [facility] for worsening cough and lethargy despite treatment with antibiotics and steroids. She underwent R robotic VATS/pleural bx with placement of pleurex catheter on 07/15/21 after PET from 07/07/21 reveal b/l upper lobe opacities concerning for malignancy with pleural effusion. 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 Given her age and symptoms, patient and daughter opted for palliative chemotherapy. she received prednisone, followed by cytoxan (200 mg/m2) on 8/5-8/7/21 with marked improvement in respiratory status and mental status. Pleurex catheter stopped draining after initiation of cytoxan. She received rituximab which was complicated by infusion reaction. She received the full dose over 2 days. She was discharged to [place] [facility] and presents for follow up. She received 4 cycles of R-CP which was overall well tolerated. She has a fall in mid October and did not initially seek medical care. Her daughter Dinorah called the office on 10/20 and given her shoulder pain it was recommended she get xrays and an evaluation. She was noted to have a R proximal humerus fracture and was sent from Urgent care to the ER for admission. She was also noted to have a possible PNA. While admitted, she was noted to be tachy-lidia. She was also found to have parainflueza and was treated with ceftriaxone/azithromycin for PNA. \par She received C5 on 11/18, C6 on 12/9, C7 on 12/30, and C8 on 1/27 (delayed due to neutropenia). \par \par  [de-identified] : 6/2/22:  Pt was recently hospitalized for respiratory distress . CTA chest 5/10: No pulmonary embolism from the main pulmonary artery up to and including the lobar branches. Several segmental and subsegmental branches cannot be assessed due to extensive respiratory motion. New groundglass in the upper lobes, suspicious for pulmonary edema. Infection is also a consideration. Increased mildly enlarged mediastinal lymph nodes may be reactive. Stable multilobar chronic consolidation.\par \par Considering lymphoma involvement in the lungs treated with Obinutuzumab on 5/20 test dose and 5/21 to complete infusion for total of 1000mg.\par \par Pt states her breathing is much improved since hospital admission, she does have dyspnea on exertion. She endorses decrease in taste. She eats 3 meals a day. She has no fevers, chills or night sweats. She is doing physical therapy.  She is not on supplemental O2 anymore, but occasionally uses it at night. Pt states methenamine hippurate is helping with UTI prevention. Pt has anxiety, mostly at night

## 2022-06-02 NOTE — ASSESSMENT
[FreeTextEntry1] : 89 yo female with new diagnosis of Stage IV DLBCL s/p treatment with RC and prednisone presents for follow up. Overall, tolerated treatment well without any significant side effects. She continues to improve in rehab. \par \par #DLBCL\par -Pt was on RC and prednisone every 3 weeks,\par - CT scan of chest done 4/2/22: Newly noted 9 mm opacity in the periphery of the right upper lobe and interval increase in size of a lingular opacity along the fissure.\par - Discussed with patient and daughter Dinorah we would prefer to start treatment with Rituxan and Polatuzumab; Went over risk/side effects of Polatuzumab and consent signed today.\par -Pt is s/p Hugo-Rtixumab- cycle 1 on 4/28/22\par -She developed respiratory distress and was hospitalized and was noted to have pulm edema \par -Considering lymphoma involvement in the lungs treated with Obinutuzumab on 5/20 test dose and 5/21 to complete infusion for total of 1000mg.\par -Plan for Obi weekly X3, then once monthly. Will start Revlimid on 6/26/22 (pt has an eye surgery scheduled at that time) - risks/side effects discussed with patient, consent signed today. Rx for ASA 81mg\par -transfusional support as needed \par -continue valacyclovir \par -continue Protonix \par -follow up orthopedics recs \par -Continue methenamine hippurate for UTI prevention - renewed today\par -Cont xanax as needed for anxiety - rx sent\par - Pt given information for [facility] - she will discuss with daughter\par \par F/u in 2 weeks\par

## 2022-06-02 NOTE — RESULTS/DATA
[FreeTextEntry1] : CBC with  6/2/22\par sent to core lab\par \par \par Final Diagnosis\par 1-Right pleural biopsy\par - Fatty tissue with granulation tissue\par See note and description.\par \par 2-Right pleural nodule\par - Lymphohistiocytic proliferation with scattered large cells\par See note and description.\par \par 3-Right peritracheal lymph node, level 4 for molecular testing\par - Lymphohistiocytic proliferation with scattered large cells\par See note and description.\par \par 4-Right peritracheal lymph node, level 4 for molecular testing\par - Lymphohistiocytic proliferation with increased large cells\par See note and description.\par \par 5-Right peritracheal lymph node, level 4\par - Diffuse large B cell lymphoma, non-germinal center B cell\par like immunophenotype\par See note and description.\par \par Diagnostic note:  Per chart review, patient presents with\par bilateral supraclavicular, bilateral mediastinal, and perigastric\par lymphadenopathy. Current biopsy is consistent with diffuse large\par B cell lymphoma, non-germinal center B-cell-like immunophenotype.\par FISH studies for MYC, BCL2 and BCL6 gene rearrangements are in\par process and will be reported as an addendum.\par

## 2022-06-03 LAB
ALBUMIN SERPL ELPH-MCNC: 4.1 G/DL
ALP BLD-CCNC: 129 U/L
ALT SERPL-CCNC: 10 U/L
ANION GAP SERPL CALC-SCNC: 18 MMOL/L
AST SERPL-CCNC: 13 U/L
BILIRUB SERPL-MCNC: 0.4 MG/DL
BUN SERPL-MCNC: 18 MG/DL
CALCIUM SERPL-MCNC: 10 MG/DL
CHLORIDE SERPL-SCNC: 106 MMOL/L
CO2 SERPL-SCNC: 20 MMOL/L
CREAT SERPL-MCNC: 0.9 MG/DL
EGFR: 61 ML/MIN/1.73M2
GLUCOSE SERPL-MCNC: 101 MG/DL
LDH SERPL-CCNC: 178 U/L
MAGNESIUM SERPL-MCNC: 1.7 MG/DL
PHOSPHATE SERPL-MCNC: 3.2 MG/DL
POTASSIUM SERPL-SCNC: 3.6 MMOL/L
PROT SERPL-MCNC: 5.8 G/DL
SODIUM SERPL-SCNC: 144 MMOL/L
URATE SERPL-MCNC: 4 MG/DL

## 2022-06-09 ENCOUNTER — RESULT REVIEW (OUTPATIENT)
Age: 87
End: 2022-06-09

## 2022-06-09 ENCOUNTER — APPOINTMENT (OUTPATIENT)
Dept: HEMATOLOGY ONCOLOGY | Facility: CLINIC | Age: 87
End: 2022-06-09
Payer: MEDICARE

## 2022-06-09 ENCOUNTER — NON-APPOINTMENT (OUTPATIENT)
Age: 87
End: 2022-06-09

## 2022-06-09 ENCOUNTER — APPOINTMENT (OUTPATIENT)
Dept: HEMATOLOGY ONCOLOGY | Facility: CLINIC | Age: 87
End: 2022-06-09

## 2022-06-09 ENCOUNTER — APPOINTMENT (OUTPATIENT)
Dept: INFUSION THERAPY | Facility: HOSPITAL | Age: 87
End: 2022-06-09

## 2022-06-09 LAB
BASOPHILS # BLD AUTO: 0.06 K/UL — SIGNIFICANT CHANGE UP (ref 0–0.2)
BASOPHILS NFR BLD AUTO: 0.5 % — SIGNIFICANT CHANGE UP (ref 0–2)
EOSINOPHIL # BLD AUTO: 0.35 K/UL — SIGNIFICANT CHANGE UP (ref 0–0.5)
EOSINOPHIL NFR BLD AUTO: 2.6 % — SIGNIFICANT CHANGE UP (ref 0–6)
HCT VFR BLD CALC: 35.5 % — SIGNIFICANT CHANGE UP (ref 34.5–45)
HGB BLD-MCNC: 11.7 G/DL — SIGNIFICANT CHANGE UP (ref 11.5–15.5)
IMM GRANULOCYTES NFR BLD AUTO: 0.4 % — SIGNIFICANT CHANGE UP (ref 0–1.5)
LYMPHOCYTES # BLD AUTO: 0.94 K/UL — LOW (ref 1–3.3)
LYMPHOCYTES # BLD AUTO: 7.1 % — LOW (ref 13–44)
MCHC RBC-ENTMCNC: 28.7 PG — SIGNIFICANT CHANGE UP (ref 27–34)
MCHC RBC-ENTMCNC: 33 G/DL — SIGNIFICANT CHANGE UP (ref 32–36)
MCV RBC AUTO: 87.2 FL — SIGNIFICANT CHANGE UP (ref 80–100)
MONOCYTES # BLD AUTO: 1.21 K/UL — HIGH (ref 0–0.9)
MONOCYTES NFR BLD AUTO: 9.1 % — SIGNIFICANT CHANGE UP (ref 2–14)
NEUTROPHILS # BLD AUTO: 10.66 K/UL — HIGH (ref 1.8–7.4)
NEUTROPHILS NFR BLD AUTO: 80.3 % — HIGH (ref 43–77)
NRBC # BLD: 0 /100 WBCS — SIGNIFICANT CHANGE UP (ref 0–0)
PLATELET # BLD AUTO: 202 K/UL — SIGNIFICANT CHANGE UP (ref 150–400)
RBC # BLD: 4.07 M/UL — SIGNIFICANT CHANGE UP (ref 3.8–5.2)
RBC # FLD: 15.1 % — HIGH (ref 10.3–14.5)
WBC # BLD: 13.27 K/UL — HIGH (ref 3.8–10.5)
WBC # FLD AUTO: 13.27 K/UL — HIGH (ref 3.8–10.5)

## 2022-06-09 NOTE — HISTORY OF PRESENT ILLNESS
[de-identified] : 89 year old woman with a PMH of COPD  was admitted to [facility] for worsening cough and lethargy despite treatment with antibiotics and steroids. She underwent R robotic VATS/pleural bx with placement of pleurex catheter on 07/15/21 after PET from 07/07/21 reveal b/l upper lobe opacities concerning for malignancy with pleural effusion. 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 Given her age and symptoms, patient and daughter opted for palliative chemotherapy. she received prednisone, followed by cytoxan (200 mg/m2) on 8/5-8/7/21 with marked improvement in respiratory status and mental status. Pleurex catheter stopped draining after initiation of cytoxan. She received rituximab which was complicated by infusion reaction. She received the full dose over 2 days. She was discharged to [place] [facility] and presents for follow up. She received 4 cycles of R-CP which was overall well tolerated. She has a fall in mid October and did not initially seek medical care. Her daughter Dinorah called the office on 10/20 and given her shoulder pain it was recommended she get xrays and an evaluation. She was noted to have a R proximal humerus fracture and was sent from Urgent care to the ER for admission. She was also noted to have a possible PNA. While admitted, she was noted to be tachy-lidia. She was also found to have parainflueza and was treated with ceftriaxone/azithromycin for PNA. \par She received C5 on 11/18, C6 on 12/9, C7 on 12/30, and C8 on 1/27 (delayed due to neutropenia). \par \par  [de-identified] : 6/9/22:  Pt was recently hospitalized for respiratory distress . CTA chest 5/10: No pulmonary embolism from the main pulmonary artery up to and including the lobar branches. Several segmental and subsegmental branches cannot be assessed due to extensive respiratory motion. New groundglass in the upper lobes, suspicious for pulmonary edema. Infection is also a consideration. Increased mildly enlarged mediastinal lymph nodes may be reactive. Stable multilobar chronic consolidation.\par \par Considering lymphoma involvement in the lungs treated with Obinutuzumab on 5/20 test dose and 5/21 to complete infusion for total of 1000mg.\par \par Pt states her breathing is much improved since hospital admission, she does have dyspnea on exertion. She endorses decrease in taste. She eats 3 meals a day. She has no fevers, chills or night sweats. She is doing physical therapy.  She is not on supplemental O2 anymore, but occasionally uses it at night. Pt states methenamine hippurate is helping with UTI prevention. Pt has anxiety, mostly at night

## 2022-06-10 DIAGNOSIS — R11.2 NAUSEA WITH VOMITING, UNSPECIFIED: ICD-10-CM

## 2022-06-10 DIAGNOSIS — Z51.11 ENCOUNTER FOR ANTINEOPLASTIC CHEMOTHERAPY: ICD-10-CM

## 2022-06-13 ENCOUNTER — OUTPATIENT (OUTPATIENT)
Dept: OUTPATIENT SERVICES | Facility: HOSPITAL | Age: 87
LOS: 1 days | Discharge: HOME | End: 2022-06-13

## 2022-06-13 DIAGNOSIS — Z96.649 PRESENCE OF UNSPECIFIED ARTIFICIAL HIP JOINT: Chronic | ICD-10-CM

## 2022-06-13 DIAGNOSIS — Z96.641 PRESENCE OF RIGHT ARTIFICIAL HIP JOINT: Chronic | ICD-10-CM

## 2022-06-13 DIAGNOSIS — C85.80 OTHER SPECIFIED TYPES OF NON-HODGKIN LYMPHOMA, UNSPECIFIED SITE: ICD-10-CM

## 2022-06-16 ENCOUNTER — RESULT REVIEW (OUTPATIENT)
Age: 87
End: 2022-06-16

## 2022-06-16 ENCOUNTER — APPOINTMENT (OUTPATIENT)
Dept: HEMATOLOGY ONCOLOGY | Facility: CLINIC | Age: 87
End: 2022-06-16
Payer: MEDICARE

## 2022-06-16 ENCOUNTER — APPOINTMENT (OUTPATIENT)
Dept: INFUSION THERAPY | Facility: HOSPITAL | Age: 87
End: 2022-06-16

## 2022-06-16 DIAGNOSIS — Z51.11 ENCOUNTER FOR ANTINEOPLASTIC CHEMOTHERAPY: ICD-10-CM

## 2022-06-16 DIAGNOSIS — R11.2 NAUSEA WITH VOMITING, UNSPECIFIED: ICD-10-CM

## 2022-06-16 LAB
BASOPHILS # BLD AUTO: 0.06 K/UL — SIGNIFICANT CHANGE UP (ref 0–0.2)
BASOPHILS NFR BLD AUTO: 0.4 % — SIGNIFICANT CHANGE UP (ref 0–2)
EOSINOPHIL # BLD AUTO: 0.69 K/UL — HIGH (ref 0–0.5)
EOSINOPHIL NFR BLD AUTO: 5.1 % — SIGNIFICANT CHANGE UP (ref 0–6)
HCT VFR BLD CALC: 39.3 % — SIGNIFICANT CHANGE UP (ref 34.5–45)
HGB BLD-MCNC: 12.8 G/DL — SIGNIFICANT CHANGE UP (ref 11.5–15.5)
IMM GRANULOCYTES NFR BLD AUTO: 0.7 % — SIGNIFICANT CHANGE UP (ref 0–1.5)
LYMPHOCYTES # BLD AUTO: 1.17 K/UL — SIGNIFICANT CHANGE UP (ref 1–3.3)
LYMPHOCYTES # BLD AUTO: 8.6 % — LOW (ref 13–44)
MCHC RBC-ENTMCNC: 28.4 PG — SIGNIFICANT CHANGE UP (ref 27–34)
MCHC RBC-ENTMCNC: 32.6 G/DL — SIGNIFICANT CHANGE UP (ref 32–36)
MCV RBC AUTO: 87.1 FL — SIGNIFICANT CHANGE UP (ref 80–100)
MONOCYTES # BLD AUTO: 1.09 K/UL — HIGH (ref 0–0.9)
MONOCYTES NFR BLD AUTO: 8 % — SIGNIFICANT CHANGE UP (ref 2–14)
NEUTROPHILS # BLD AUTO: 10.53 K/UL — HIGH (ref 1.8–7.4)
NEUTROPHILS NFR BLD AUTO: 77.2 % — HIGH (ref 43–77)
NRBC # BLD: 0 /100 WBCS — SIGNIFICANT CHANGE UP (ref 0–0)
PLATELET # BLD AUTO: 83 K/UL — LOW (ref 150–400)
RBC # BLD: 4.51 M/UL — SIGNIFICANT CHANGE UP (ref 3.8–5.2)
RBC # FLD: 15.6 % — HIGH (ref 10.3–14.5)
WBC # BLD: 13.64 K/UL — HIGH (ref 3.8–10.5)
WBC # FLD AUTO: 13.64 K/UL — HIGH (ref 3.8–10.5)

## 2022-06-16 PROCEDURE — 99213 OFFICE O/P EST LOW 20 MIN: CPT

## 2022-06-16 NOTE — RESULTS/DATA
[FreeTextEntry1] : CBC with 6/16/22\par cbc reviewed stable, plt at 83k\par \par \par Final Diagnosis\par 1-Right pleural biopsy\par - Fatty tissue with granulation tissue\par See note and description.\par \par 2-Right pleural nodule\par - Lymphohistiocytic proliferation with scattered large cells\par See note and description.\par \par 3-Right peritracheal lymph node, level 4 for molecular testing\par - Lymphohistiocytic proliferation with scattered large cells\par See note and description.\par \par 4-Right peritracheal lymph node, level 4 for molecular testing\par - Lymphohistiocytic proliferation with increased large cells\par See note and description.\par \par 5-Right peritracheal lymph node, level 4\par - Diffuse large B cell lymphoma, non-germinal center B cell\par like immunophenotype\par See note and description.\par \par Diagnostic note:  Per chart review, patient presents with\par bilateral supraclavicular, bilateral mediastinal, and perigastric\par lymphadenopathy. Current biopsy is consistent with diffuse large\par B cell lymphoma, non-germinal center B-cell-like immunophenotype.\par FISH studies for MYC, BCL2 and BCL6 gene rearrangements are in\par process and will be reported as an addendum.\par

## 2022-06-16 NOTE — HISTORY OF PRESENT ILLNESS
[de-identified] : 89 year old woman with a PMH of COPD  was admitted to [facility] for worsening cough and lethargy despite treatment with antibiotics and steroids. She underwent R robotic VATS/pleural bx with placement of pleurex catheter on 07/15/21 after PET from 07/07/21 reveal b/l upper lobe opacities concerning for malignancy with pleural effusion. 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 Given her age and symptoms, patient and daughter opted for palliative chemotherapy. she received prednisone, followed by cytoxan (200 mg/m2) on 8/5-8/7/21 with marked improvement in respiratory status and mental status. Pleurex catheter stopped draining after initiation of cytoxan. She received rituximab which was complicated by infusion reaction. She received the full dose over 2 days. She was discharged to [place] [facility] and presents for follow up. She received 4 cycles of R-CP which was overall well tolerated. She has a fall in mid October and did not initially seek medical care. Her daughter Dinorah called the office on 10/20 and given her shoulder pain it was recommended she get xrays and an evaluation. She was noted to have a R proximal humerus fracture and was sent from Urgent care to the ER for admission. She was also noted to have a possible PNA. While admitted, she was noted to be tachy-lidia. She was also found to have parainflueza and was treated with ceftriaxone/azithromycin for PNA. \par She received C5 on 11/18, C6 on 12/9, C7 on 12/30, and C8 on 1/27 (delayed due to neutropenia). \par \par  [de-identified] : 6/16/22:  Pt was recently hospitalized for respiratory distress . CTA chest 5/10: No pulmonary embolism from the main pulmonary artery up to and including the lobar branches. Several segmental and subsegmental branches cannot be assessed due to extensive respiratory motion. New groundglass in the upper lobes, suspicious for pulmonary edema. Infection is also a consideration. Increased mildly enlarged mediastinal lymph nodes may be reactive. Stable multilobar chronic consolidation.\par \par Considering lymphoma involvement in the lungs treated with Obinutuzumab on 5/20 test dose and 5/21 to complete infusion for total of 1000mg. She had C1D8 on 6/9/22, and is at [facility] for C1D15 today. Pt was seen and evaluated in treatment room.\par \par Pt states her breathing continues to improve, she does have dyspnea on exertion. She endorses decrease in taste. She eats 3 meals a day. She has no fevers, chills or night sweats. She is doing physical therapy.  She is not on supplemental O2 anymore, but occasionally uses it at night. Pt states methenamine hippurate is helping with UTI prevention - pt denies UTI sx but feels her urine is "off". Pt has anxiety, mostly at night

## 2022-06-16 NOTE — ASSESSMENT
[FreeTextEntry1] : 89 yo female with new diagnosis of Stage IV DLBCL s/p treatment with RC and prednisone presents for follow up. Overall, tolerated treatment well without any significant side effects. She continues to improve in rehab. \par \par #DLBCL\par -Pt was on RC and prednisone every 3 weeks,\par - CT scan of chest done 4/2/22: Newly noted 9 mm opacity in the periphery of the right upper lobe and interval increase in size of a lingular opacity along the fissure.\par - Discussed with patient and daughter Dinorah we would prefer to start treatment with Rituxan and Polatuzumab; Went over risk/side effects of Polatuzumab and consent signed today.\par -Pt is s/p Hugo-Rtixumab- cycle 1 on 4/28/22\par -She developed respiratory distress and was hospitalized and was noted to have pulm edema \par -Considering lymphoma involvement in the lungs treated with Obinutuzumab on 5/20 test dose and 5/21 to complete infusion for total of 1000mg. She is s/p C1D8 on 6/9/22 and C1D15 on 6/16/22\par -Plan for Obi weekly X3, then once monthly. Will start Revlimid on 6/26/22 (pt has an eye surgery scheduled at that time) - risks/side effects discussed with patient, consent signed today. Rx for ASA 81mg\par -transfusional support as needed \par -continue valacyclovir \par -continue Protonix \par -follow up orthopedics recs \par -Continue methenamine hippurate for UTI prevention; will send urine culture \par -Cont xanax as needed for anxiety - rx sent\par - Pt given information for [facility] - she will discuss with daughter\par \par F/u in 1 week at [facility] \par

## 2022-06-17 LAB
APPEARANCE UR: CLEAR — SIGNIFICANT CHANGE UP
BACTERIA # UR AUTO: NEGATIVE — SIGNIFICANT CHANGE UP
BILIRUB UR-MCNC: NEGATIVE — SIGNIFICANT CHANGE UP
COLOR SPEC: YELLOW — SIGNIFICANT CHANGE UP
DIFF PNL FLD: NEGATIVE — SIGNIFICANT CHANGE UP
EPI CELLS # UR: 8 /HPF — HIGH (ref 0–5)
GLUCOSE UR QL: NEGATIVE — SIGNIFICANT CHANGE UP
HYALINE CASTS # UR AUTO: 5 /LPF — SIGNIFICANT CHANGE UP (ref 0–7)
KETONES UR-MCNC: NEGATIVE — SIGNIFICANT CHANGE UP
LEUKOCYTE ESTERASE UR-ACNC: ABNORMAL
NITRITE UR-MCNC: NEGATIVE — SIGNIFICANT CHANGE UP
PH UR: 6 — SIGNIFICANT CHANGE UP (ref 5–8)
PROT UR-MCNC: ABNORMAL
RBC CASTS # UR COMP ASSIST: 2 /HPF — SIGNIFICANT CHANGE UP (ref 0–4)
SP GR SPEC: 1.02 — SIGNIFICANT CHANGE UP (ref 1.01–1.02)
UROBILINOGEN FLD QL: SIGNIFICANT CHANGE UP
WBC UR QL: 27 /HPF — HIGH (ref 0–5)

## 2022-06-18 LAB
CULTURE RESULTS: SIGNIFICANT CHANGE UP
SPECIMEN SOURCE: SIGNIFICANT CHANGE UP

## 2022-06-23 ENCOUNTER — RESULT REVIEW (OUTPATIENT)
Age: 87
End: 2022-06-23

## 2022-06-23 ENCOUNTER — APPOINTMENT (OUTPATIENT)
Dept: HEMATOLOGY ONCOLOGY | Facility: CLINIC | Age: 87
End: 2022-06-23

## 2022-06-23 ENCOUNTER — APPOINTMENT (OUTPATIENT)
Dept: INFUSION THERAPY | Facility: HOSPITAL | Age: 87
End: 2022-06-23

## 2022-06-23 LAB
BASOPHILS # BLD AUTO: 0.05 K/UL — SIGNIFICANT CHANGE UP (ref 0–0.2)
BASOPHILS NFR BLD AUTO: 0.4 % — SIGNIFICANT CHANGE UP (ref 0–2)
EOSINOPHIL # BLD AUTO: 0.53 K/UL — HIGH (ref 0–0.5)
EOSINOPHIL NFR BLD AUTO: 3.8 % — SIGNIFICANT CHANGE UP (ref 0–6)
HCT VFR BLD CALC: 40.5 % — SIGNIFICANT CHANGE UP (ref 34.5–45)
HGB BLD-MCNC: 13.1 G/DL — SIGNIFICANT CHANGE UP (ref 11.5–15.5)
IMM GRANULOCYTES NFR BLD AUTO: 0.6 % — SIGNIFICANT CHANGE UP (ref 0–1.5)
LYMPHOCYTES # BLD AUTO: 1.19 K/UL — SIGNIFICANT CHANGE UP (ref 1–3.3)
LYMPHOCYTES # BLD AUTO: 8.6 % — LOW (ref 13–44)
MCHC RBC-ENTMCNC: 28.5 PG — SIGNIFICANT CHANGE UP (ref 27–34)
MCHC RBC-ENTMCNC: 32.3 G/DL — SIGNIFICANT CHANGE UP (ref 32–36)
MCV RBC AUTO: 88 FL — SIGNIFICANT CHANGE UP (ref 80–100)
MONOCYTES # BLD AUTO: 0.85 K/UL — SIGNIFICANT CHANGE UP (ref 0–0.9)
MONOCYTES NFR BLD AUTO: 6.2 % — SIGNIFICANT CHANGE UP (ref 2–14)
NEUTROPHILS # BLD AUTO: 11.08 K/UL — HIGH (ref 1.8–7.4)
NEUTROPHILS NFR BLD AUTO: 80.4 % — HIGH (ref 43–77)
NRBC # BLD: 0 /100 WBCS — SIGNIFICANT CHANGE UP (ref 0–0)
PLATELET # BLD AUTO: 68 K/UL — LOW (ref 150–400)
RBC # BLD: 4.6 M/UL — SIGNIFICANT CHANGE UP (ref 3.8–5.2)
RBC # FLD: 17 % — HIGH (ref 10.3–14.5)
WBC # BLD: 13.78 K/UL — HIGH (ref 3.8–10.5)
WBC # FLD AUTO: 13.78 K/UL — HIGH (ref 3.8–10.5)

## 2022-06-24 ENCOUNTER — RX RENEWAL (OUTPATIENT)
Age: 87
End: 2022-06-24

## 2022-06-27 ENCOUNTER — APPOINTMENT (OUTPATIENT)
Dept: HEMATOLOGY ONCOLOGY | Facility: CLINIC | Age: 87
End: 2022-06-27

## 2022-06-27 ENCOUNTER — RX RENEWAL (OUTPATIENT)
Age: 87
End: 2022-06-27

## 2022-07-01 ENCOUNTER — APPOINTMENT (OUTPATIENT)
Dept: HEMATOLOGY ONCOLOGY | Facility: CLINIC | Age: 87
End: 2022-07-01

## 2022-07-01 ENCOUNTER — RESULT REVIEW (OUTPATIENT)
Age: 87
End: 2022-07-01

## 2022-07-01 ENCOUNTER — APPOINTMENT (OUTPATIENT)
Dept: INFUSION THERAPY | Facility: HOSPITAL | Age: 87
End: 2022-07-01

## 2022-07-01 LAB
BASOPHILS # BLD AUTO: 0.05 K/UL — SIGNIFICANT CHANGE UP (ref 0–0.2)
BASOPHILS NFR BLD AUTO: 0.5 % — SIGNIFICANT CHANGE UP (ref 0–2)
EOSINOPHIL # BLD AUTO: 0.48 K/UL — SIGNIFICANT CHANGE UP (ref 0–0.5)
EOSINOPHIL NFR BLD AUTO: 4.8 % — SIGNIFICANT CHANGE UP (ref 0–6)
HCT VFR BLD CALC: 37.3 % — SIGNIFICANT CHANGE UP (ref 34.5–45)
HGB BLD-MCNC: 12.2 G/DL — SIGNIFICANT CHANGE UP (ref 11.5–15.5)
IMM GRANULOCYTES NFR BLD AUTO: 0.4 % — SIGNIFICANT CHANGE UP (ref 0–1.5)
LYMPHOCYTES # BLD AUTO: 1.08 K/UL — SIGNIFICANT CHANGE UP (ref 1–3.3)
LYMPHOCYTES # BLD AUTO: 10.7 % — LOW (ref 13–44)
MCHC RBC-ENTMCNC: 28.9 PG — SIGNIFICANT CHANGE UP (ref 27–34)
MCHC RBC-ENTMCNC: 32.7 G/DL — SIGNIFICANT CHANGE UP (ref 32–36)
MCV RBC AUTO: 88.4 FL — SIGNIFICANT CHANGE UP (ref 80–100)
MONOCYTES # BLD AUTO: 0.76 K/UL — SIGNIFICANT CHANGE UP (ref 0–0.9)
MONOCYTES NFR BLD AUTO: 7.6 % — SIGNIFICANT CHANGE UP (ref 2–14)
NEUTROPHILS # BLD AUTO: 7.64 K/UL — HIGH (ref 1.8–7.4)
NEUTROPHILS NFR BLD AUTO: 76 % — SIGNIFICANT CHANGE UP (ref 43–77)
NRBC # BLD: 0 /100 WBCS — SIGNIFICANT CHANGE UP (ref 0–0)
PLATELET # BLD AUTO: 64 K/UL — LOW (ref 150–400)
RBC # BLD: 4.22 M/UL — SIGNIFICANT CHANGE UP (ref 3.8–5.2)
RBC # FLD: 17.5 % — HIGH (ref 10.3–14.5)
WBC # BLD: 10.05 K/UL — SIGNIFICANT CHANGE UP (ref 3.8–10.5)
WBC # FLD AUTO: 10.05 K/UL — SIGNIFICANT CHANGE UP (ref 3.8–10.5)

## 2022-07-01 PROCEDURE — 85025 COMPLETE CBC W/AUTO DIFF WBC: CPT | Mod: GC

## 2022-07-01 PROCEDURE — 99215 OFFICE O/P EST HI 40 MIN: CPT | Mod: 25,GC

## 2022-07-01 NOTE — HISTORY OF PRESENT ILLNESS
[de-identified] : 89 year old woman with a PMH of COPD  was admitted to [facility] for worsening cough and lethargy despite treatment with antibiotics and steroids. She underwent R robotic VATS/pleural bx with placement of pleurex catheter on 07/15/21 after PET from 07/07/21 reveal b/l upper lobe opacities concerning for malignancy with pleural effusion. 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 Given her age and symptoms, patient and daughter opted for palliative chemotherapy. she received prednisone, followed by cytoxan (200 mg/m2) on 8/5-8/7/21 with marked improvement in respiratory status and mental status. Pleurex catheter stopped draining after initiation of cytoxan. She received rituximab which was complicated by infusion reaction. She received the full dose over 2 days. She was discharged to [facility] and presents for follow up. She received 4 cycles of R-CP which was overall well tolerated. She has a fall in mid October and did not initially seek medical care. Her daughter Dinorah called the office on 10/20 and given her shoulder pain it was recommended she get xrays and an evaluation. She was noted to have a R proximal humerus fracture and was sent from Urgent care to the ER for admission. She was also noted to have a possible PNA. While admitted, she was noted to be tachy-lidia. She was also found to have parainflueza and was treated with ceftriaxone/azithromycin for PNA. \par She received C5 on 11/18, C6 on 12/9, C7 on 12/30, and C8 on 1/27 (delayed due to neutropenia). \par \par Pt was recently hospitalized for respiratory distress in May 2022. CTA chest 5/10: No pulmonary embolism from the main pulmonary artery up to and including the lobar branches. Several segmental and subsegmental branches cannot be assessed due to extensive respiratory motion. New groundglass in the upper lobes, suspicious for pulmonary edema. Infection is also a consideration. Increased mildly enlarged mediastinal lymph nodes may be reactive. Stable multilobar chronic consolidation.\par \par Considering lymphoma involvement in the lungs treated with Obinutuzumab on 5/20 test dose and 5/21 to complete infusion for total of 1000mg. She had C1D8 on 6/9/22, and is at [facility] for C1D15 today. Pt was seen and evaluated in treatment room.\par \par  [de-identified] : 7/1/22:  Pt states her breathing continues to improve, she does have dyspnea on exertion. She endorses decrease in taste. She eats 3 meals a day. She has no fevers, chills or night sweats. She is doing physical therapy.  She is not on supplemental O2 anymore, but occasionally uses it at night. Pt states methenamine hippurate is helping with UTI prevention - recently felt she had a UTI but urine culture negative. Continues to endorse anxiety. Michealindiana (her aide) has been taking her on walks to visit friends.

## 2022-07-01 NOTE — ASSESSMENT
[FreeTextEntry1] : 89 yo female with new diagnosis of Stage IV DLBCL s/p treatment with RC and prednisone presents for follow up. Overall, tolerated treatment well without any significant side effects. She continues to improve in rehab. \par \par #DLBCL\par -Pt was on RC and prednisone every 3 weeks,\par - CT scan of chest done 4/2/22: Newly noted 9 mm opacity in the periphery of the right upper lobe and interval increase in size of a lingular opacity along the fissure.\par - Discussed with patient and daughter Dinorah we would prefer to start treatment with Rituxan and Polatuzumab; Went over risk/side effects of Polatuzumab and consent signed today.\par -Pt is s/p Hugo-Rtixumab- cycle 1 on 4/28/22\par -She developed respiratory distress and was hospitalized and was noted to have pulm edema \par -Considering lymphoma involvement in the lungs treated with Obinutuzumab on 5/20 test dose and 5/21 to complete infusion for total of 1000mg. She is s/p C1D8 on 6/9/22 and C1D15 on 6/16/22\par -Plan for Obi weekly X3, then once monthly. Will start Revlimid on 6/26/22 (pt has an eye surgery scheduled at that time) - risks/side effects discussed with patient, consent signed today. Rx for ASA 81mg\par -check platelet count weekly \par -transfusional support as needed \par -continue valacyclovir \par -continue Protonix \par -follow up orthopedics recs \par -Continue methenamine hippurate for UTI prevention; will send urine culture \par -Cont xanax as needed for anxiety - rx sent\par - Pt given information for [facility] - she will discuss with daughter\par \par F/u in 1 week at [facility] \par

## 2022-07-01 NOTE — END OF VISIT
[Time Spent: ___ minutes] : I have spent [unfilled] minutes of time on the encounter. [FreeTextEntry3] : Patient seen and case discussed with DRAGAN Duncan. I agree with above and have edited the note where needed.\par

## 2022-07-05 ENCOUNTER — LABORATORY RESULT (OUTPATIENT)
Age: 87
End: 2022-07-05

## 2022-07-07 ENCOUNTER — APPOINTMENT (OUTPATIENT)
Dept: INFUSION THERAPY | Facility: HOSPITAL | Age: 87
End: 2022-07-07

## 2022-07-08 ENCOUNTER — NON-APPOINTMENT (OUTPATIENT)
Age: 87
End: 2022-07-08

## 2022-07-11 ENCOUNTER — LABORATORY RESULT (OUTPATIENT)
Age: 87
End: 2022-07-11

## 2022-07-13 ENCOUNTER — LABORATORY RESULT (OUTPATIENT)
Age: 87
End: 2022-07-13

## 2022-07-18 ENCOUNTER — LABORATORY RESULT (OUTPATIENT)
Age: 87
End: 2022-07-18

## 2022-07-19 ENCOUNTER — NON-APPOINTMENT (OUTPATIENT)
Age: 87
End: 2022-07-19

## 2022-07-20 ENCOUNTER — LABORATORY RESULT (OUTPATIENT)
Age: 87
End: 2022-07-20

## 2022-07-22 ENCOUNTER — RX RENEWAL (OUTPATIENT)
Age: 87
End: 2022-07-22

## 2022-07-25 ENCOUNTER — LABORATORY RESULT (OUTPATIENT)
Age: 87
End: 2022-07-25

## 2022-07-28 ENCOUNTER — LABORATORY RESULT (OUTPATIENT)
Age: 87
End: 2022-07-28

## 2022-08-01 ENCOUNTER — LABORATORY RESULT (OUTPATIENT)
Age: 87
End: 2022-08-01

## 2022-08-04 ENCOUNTER — RESULT REVIEW (OUTPATIENT)
Age: 87
End: 2022-08-04

## 2022-08-04 ENCOUNTER — APPOINTMENT (OUTPATIENT)
Dept: INFUSION THERAPY | Facility: HOSPITAL | Age: 87
End: 2022-08-04

## 2022-08-04 ENCOUNTER — APPOINTMENT (OUTPATIENT)
Dept: HEMATOLOGY ONCOLOGY | Facility: CLINIC | Age: 87
End: 2022-08-04

## 2022-08-04 LAB
BASOPHILS # BLD AUTO: 0.01 K/UL — SIGNIFICANT CHANGE UP (ref 0–0.2)
BASOPHILS NFR BLD AUTO: 0.1 % — SIGNIFICANT CHANGE UP (ref 0–2)
EOSINOPHIL # BLD AUTO: 0 K/UL — SIGNIFICANT CHANGE UP (ref 0–0.5)
EOSINOPHIL NFR BLD AUTO: 0 % — SIGNIFICANT CHANGE UP (ref 0–6)
HCT VFR BLD CALC: 34.5 % — SIGNIFICANT CHANGE UP (ref 34.5–45)
HGB BLD-MCNC: 11.5 G/DL — SIGNIFICANT CHANGE UP (ref 11.5–15.5)
IMM GRANULOCYTES NFR BLD AUTO: 0.9 % — SIGNIFICANT CHANGE UP (ref 0–1.5)
LYMPHOCYTES # BLD AUTO: 0.44 K/UL — LOW (ref 1–3.3)
LYMPHOCYTES # BLD AUTO: 5.1 % — LOW (ref 13–44)
MCHC RBC-ENTMCNC: 29.9 PG — SIGNIFICANT CHANGE UP (ref 27–34)
MCHC RBC-ENTMCNC: 33.3 G/DL — SIGNIFICANT CHANGE UP (ref 32–36)
MCV RBC AUTO: 89.8 FL — SIGNIFICANT CHANGE UP (ref 80–100)
MONOCYTES # BLD AUTO: 0.48 K/UL — SIGNIFICANT CHANGE UP (ref 0–0.9)
MONOCYTES NFR BLD AUTO: 5.6 % — SIGNIFICANT CHANGE UP (ref 2–14)
NEUTROPHILS # BLD AUTO: 7.58 K/UL — HIGH (ref 1.8–7.4)
NEUTROPHILS NFR BLD AUTO: 88.3 % — HIGH (ref 43–77)
NRBC # BLD: 0 /100 WBCS — SIGNIFICANT CHANGE UP (ref 0–0)
PLATELET # BLD AUTO: 126 K/UL — LOW (ref 150–400)
RBC # BLD: 3.84 M/UL — SIGNIFICANT CHANGE UP (ref 3.8–5.2)
RBC # FLD: 17.3 % — HIGH (ref 10.3–14.5)
WBC # BLD: 8.59 K/UL — SIGNIFICANT CHANGE UP (ref 3.8–10.5)
WBC # FLD AUTO: 8.59 K/UL — SIGNIFICANT CHANGE UP (ref 3.8–10.5)

## 2022-08-08 NOTE — ED PROVIDER NOTE - CCCP TRG CHIEF CMPLNT
fever Detail Level: Detailed Quality 130: Documentation Of Current Medications In The Medical Record: Current Medications Documented

## 2022-08-21 NOTE — PHYSICAL THERAPY INITIAL EVALUATION ADULT - ADDITIONAL COMMENTS
PM&R H&P     Patient seen and examined, while lying down in bed.  He is a 60-year-old gentleman with past medical history significant for HFrEF 2/2 ICM, HTN, HLD, lupus, and antiphospholipid syndrome.    He presented with cardiogenic shock complicated by multiorgan failure requiring mechanical support with a balloon pump.  He underwent HeartMate 3 LVAD on 8 July by Dr. Faye.  Postoperative course complicated by nasal perforation.  CT scan of the chest has shown that the LVAD outflow tract is widely patent.  He progressed to being euvolemic, continues to have some orthostasis symptoms.    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    Review of the systems is consistent with fatigue, pain in the lower back, not very restful sleeping patterns, loss of weight about 50 pounds, feeling dizzy and lightheaded when standing.  Per nursing report he did have a fall 2 days ago.  He has had a bowel movement today  Denies any problems with urination.    On examination  He is alert and oriented x3,  Speech is clear  Comprehension is intact  He has generalized atrophy  All muscle groups are antigravity strength  Sensory exam is intact    Assessment and plan:  Initiate physical therapy and occupational therapy a at 90 minutes daily in each discipline working on transfer training, mobility with an assistive device.  Closely monitor his vitals during the therapy sessions.  He will benefit from having bed alarm in place given the increased risk for falls.  History of epistaxis we will continue closely monitoring the hemoglobin.  History of mild to moderate emphysema; is currently not on oxygen supplementation but on room air.  Will closely monitor.    Reviewed the chart and hemoglobin electrolytes are stable creatinine stable.    Expected length of stay is about 12 to 14 days with the  plan to discharge home to the [facility] with his wife and daughter.    Total of 70 minutes spent this encounter of which more than 50% was in counseling and coordination.       Pt lives alone in an apartment with a home health aide from 10-4 Monday through Friday. Pt stated she has 4 stairs to enter her building; pt reports her friend has been staying with her the last few weeks. Pt ambulated mostly without an assistive device, however reports occasionally uses a rollator in the community. Pt owns a rolling walker, rollator and straight cane.     Pt. left comfortable in bed with all tubes/lines intact, +bed alarm, call bell in reach and in NAD.

## 2022-08-23 ENCOUNTER — RX RENEWAL (OUTPATIENT)
Age: 87
End: 2022-08-23

## 2022-08-29 ENCOUNTER — OUTPATIENT (OUTPATIENT)
Dept: OUTPATIENT SERVICES | Facility: HOSPITAL | Age: 87
LOS: 1 days | Discharge: HOME | End: 2022-08-29

## 2022-08-29 DIAGNOSIS — Z96.649 PRESENCE OF UNSPECIFIED ARTIFICIAL HIP JOINT: Chronic | ICD-10-CM

## 2022-08-29 DIAGNOSIS — C85.80 OTHER SPECIFIED TYPES OF NON-HODGKIN LYMPHOMA, UNSPECIFIED SITE: ICD-10-CM

## 2022-08-29 DIAGNOSIS — Z96.641 PRESENCE OF RIGHT ARTIFICIAL HIP JOINT: Chronic | ICD-10-CM

## 2022-08-29 DIAGNOSIS — C85.90 NON-HODGKIN LYMPHOMA, UNSPECIFIED, UNSPECIFIED SITE: ICD-10-CM

## 2022-09-01 ENCOUNTER — LABORATORY RESULT (OUTPATIENT)
Age: 87
End: 2022-09-01

## 2022-09-01 ENCOUNTER — RESULT REVIEW (OUTPATIENT)
Age: 87
End: 2022-09-01

## 2022-09-01 ENCOUNTER — NON-APPOINTMENT (OUTPATIENT)
Age: 87
End: 2022-09-01

## 2022-09-01 ENCOUNTER — APPOINTMENT (OUTPATIENT)
Dept: INFUSION THERAPY | Facility: HOSPITAL | Age: 87
End: 2022-09-01

## 2022-09-01 ENCOUNTER — APPOINTMENT (OUTPATIENT)
Dept: HEMATOLOGY ONCOLOGY | Facility: CLINIC | Age: 87
End: 2022-09-01

## 2022-09-01 LAB
BASOPHILS # BLD AUTO: 0.07 K/UL — SIGNIFICANT CHANGE UP (ref 0–0.2)
BASOPHILS NFR BLD AUTO: 0.9 % — SIGNIFICANT CHANGE UP (ref 0–2)
EOSINOPHIL # BLD AUTO: 1.1 K/UL — HIGH (ref 0–0.5)
EOSINOPHIL NFR BLD AUTO: 14.7 % — HIGH (ref 0–6)
HCT VFR BLD CALC: 38.5 % — SIGNIFICANT CHANGE UP (ref 34.5–45)
HGB BLD-MCNC: 12.8 G/DL — SIGNIFICANT CHANGE UP (ref 11.5–15.5)
IMM GRANULOCYTES NFR BLD AUTO: 0.4 % — SIGNIFICANT CHANGE UP (ref 0–1.5)
LYMPHOCYTES # BLD AUTO: 0.73 K/UL — LOW (ref 1–3.3)
LYMPHOCYTES # BLD AUTO: 9.8 % — LOW (ref 13–44)
MCHC RBC-ENTMCNC: 29.7 PG — SIGNIFICANT CHANGE UP (ref 27–34)
MCHC RBC-ENTMCNC: 33.2 G/DL — SIGNIFICANT CHANGE UP (ref 32–36)
MCV RBC AUTO: 89.3 FL — SIGNIFICANT CHANGE UP (ref 80–100)
MONOCYTES # BLD AUTO: 1.13 K/UL — HIGH (ref 0–0.9)
MONOCYTES NFR BLD AUTO: 15.1 % — HIGH (ref 2–14)
NEUTROPHILS # BLD AUTO: 4.41 K/UL — SIGNIFICANT CHANGE UP (ref 1.8–7.4)
NEUTROPHILS NFR BLD AUTO: 59.1 % — SIGNIFICANT CHANGE UP (ref 43–77)
NRBC # BLD: 0 /100 WBCS — SIGNIFICANT CHANGE UP (ref 0–0)
PLATELET # BLD AUTO: 53 K/UL — LOW (ref 150–400)
RBC # BLD: 4.31 M/UL — SIGNIFICANT CHANGE UP (ref 3.8–5.2)
RBC # FLD: 14.5 % — SIGNIFICANT CHANGE UP (ref 10.3–14.5)
WBC # BLD: 7.47 K/UL — SIGNIFICANT CHANGE UP (ref 3.8–10.5)
WBC # FLD AUTO: 7.47 K/UL — SIGNIFICANT CHANGE UP (ref 3.8–10.5)

## 2022-09-06 ENCOUNTER — LABORATORY RESULT (OUTPATIENT)
Age: 87
End: 2022-09-06

## 2022-09-06 ENCOUNTER — RX RENEWAL (OUTPATIENT)
Age: 87
End: 2022-09-06

## 2022-09-07 ENCOUNTER — APPOINTMENT (OUTPATIENT)
Dept: CT IMAGING | Facility: CLINIC | Age: 87
End: 2022-09-07

## 2022-09-07 PROCEDURE — 74177 CT ABD & PELVIS W/CONTRAST: CPT

## 2022-09-07 PROCEDURE — 71260 CT THORAX DX C+: CPT

## 2022-09-08 ENCOUNTER — LABORATORY RESULT (OUTPATIENT)
Age: 87
End: 2022-09-08

## 2022-09-15 ENCOUNTER — LABORATORY RESULT (OUTPATIENT)
Age: 87
End: 2022-09-15

## 2022-09-21 ENCOUNTER — LABORATORY RESULT (OUTPATIENT)
Age: 87
End: 2022-09-21

## 2022-09-22 ENCOUNTER — LABORATORY RESULT (OUTPATIENT)
Age: 87
End: 2022-09-22

## 2022-09-22 ENCOUNTER — RX RENEWAL (OUTPATIENT)
Age: 87
End: 2022-09-22

## 2022-09-22 ENCOUNTER — APPOINTMENT (OUTPATIENT)
Dept: HEMATOLOGY ONCOLOGY | Facility: CLINIC | Age: 87
End: 2022-09-22

## 2022-09-22 ENCOUNTER — APPOINTMENT (OUTPATIENT)
Dept: INFUSION THERAPY | Facility: HOSPITAL | Age: 87
End: 2022-09-22

## 2022-09-23 ENCOUNTER — RX RENEWAL (OUTPATIENT)
Age: 87
End: 2022-09-23

## 2022-09-23 ENCOUNTER — LABORATORY RESULT (OUTPATIENT)
Age: 87
End: 2022-09-23

## 2022-09-23 DIAGNOSIS — Z51.11 ENCOUNTER FOR ANTINEOPLASTIC CHEMOTHERAPY: ICD-10-CM

## 2022-09-23 DIAGNOSIS — R11.2 NAUSEA WITH VOMITING, UNSPECIFIED: ICD-10-CM

## 2022-09-26 ENCOUNTER — RX RENEWAL (OUTPATIENT)
Age: 87
End: 2022-09-26

## 2022-09-29 ENCOUNTER — LABORATORY RESULT (OUTPATIENT)
Age: 87
End: 2022-09-29

## 2022-09-29 ENCOUNTER — RX RENEWAL (OUTPATIENT)
Age: 87
End: 2022-09-29

## 2022-10-06 ENCOUNTER — LABORATORY RESULT (OUTPATIENT)
Age: 87
End: 2022-10-06

## 2022-10-12 ENCOUNTER — LABORATORY RESULT (OUTPATIENT)
Age: 87
End: 2022-10-12

## 2022-10-13 ENCOUNTER — LABORATORY RESULT (OUTPATIENT)
Age: 87
End: 2022-10-13

## 2022-10-14 ENCOUNTER — NON-APPOINTMENT (OUTPATIENT)
Age: 87
End: 2022-10-14

## 2022-10-17 ENCOUNTER — LABORATORY RESULT (OUTPATIENT)
Age: 87
End: 2022-10-17

## 2022-10-18 ENCOUNTER — LABORATORY RESULT (OUTPATIENT)
Age: 87
End: 2022-10-18

## 2022-10-19 ENCOUNTER — NON-APPOINTMENT (OUTPATIENT)
Age: 87
End: 2022-10-19

## 2022-10-25 ENCOUNTER — LABORATORY RESULT (OUTPATIENT)
Age: 87
End: 2022-10-25

## 2022-10-27 ENCOUNTER — APPOINTMENT (OUTPATIENT)
Dept: HEMATOLOGY ONCOLOGY | Facility: CLINIC | Age: 87
End: 2022-10-27

## 2022-10-27 ENCOUNTER — APPOINTMENT (OUTPATIENT)
Dept: INFUSION THERAPY | Facility: HOSPITAL | Age: 87
End: 2022-10-27

## 2022-10-31 ENCOUNTER — RX RENEWAL (OUTPATIENT)
Age: 87
End: 2022-10-31

## 2022-11-01 ENCOUNTER — LABORATORY RESULT (OUTPATIENT)
Age: 87
End: 2022-11-01

## 2022-11-08 ENCOUNTER — LABORATORY RESULT (OUTPATIENT)
Age: 87
End: 2022-11-08

## 2022-11-09 ENCOUNTER — LABORATORY RESULT (OUTPATIENT)
Age: 87
End: 2022-11-09

## 2022-11-10 ENCOUNTER — NON-APPOINTMENT (OUTPATIENT)
Age: 87
End: 2022-11-10

## 2022-11-15 ENCOUNTER — LABORATORY RESULT (OUTPATIENT)
Age: 87
End: 2022-11-15

## 2022-11-16 ENCOUNTER — LABORATORY RESULT (OUTPATIENT)
Age: 87
End: 2022-11-16

## 2022-11-21 ENCOUNTER — RX RENEWAL (OUTPATIENT)
Age: 87
End: 2022-11-21

## 2022-11-22 ENCOUNTER — LABORATORY RESULT (OUTPATIENT)
Age: 87
End: 2022-11-22

## 2022-11-23 ENCOUNTER — OUTPATIENT (OUTPATIENT)
Dept: OUTPATIENT SERVICES | Facility: HOSPITAL | Age: 87
LOS: 1 days | Discharge: HOME | End: 2022-11-23

## 2022-11-23 DIAGNOSIS — Z96.649 PRESENCE OF UNSPECIFIED ARTIFICIAL HIP JOINT: Chronic | ICD-10-CM

## 2022-11-23 DIAGNOSIS — D69.3 IMMUNE THROMBOCYTOPENIC PURPURA: ICD-10-CM

## 2022-11-23 DIAGNOSIS — C85.80 OTHER SPECIFIED TYPES OF NON-HODGKIN LYMPHOMA, UNSPECIFIED SITE: ICD-10-CM

## 2022-11-23 DIAGNOSIS — Z96.641 PRESENCE OF RIGHT ARTIFICIAL HIP JOINT: Chronic | ICD-10-CM

## 2022-11-23 DIAGNOSIS — C83.30 DIFFUSE LARGE B-CELL LYMPHOMA, UNSPECIFIED SITE: ICD-10-CM

## 2022-11-29 ENCOUNTER — LABORATORY RESULT (OUTPATIENT)
Age: 87
End: 2022-11-29

## 2022-12-02 ENCOUNTER — APPOINTMENT (OUTPATIENT)
Dept: INFUSION THERAPY | Facility: HOSPITAL | Age: 87
End: 2022-12-02

## 2022-12-05 ENCOUNTER — LABORATORY RESULT (OUTPATIENT)
Age: 87
End: 2022-12-05

## 2022-12-05 DIAGNOSIS — R11.2 NAUSEA WITH VOMITING, UNSPECIFIED: ICD-10-CM

## 2022-12-06 ENCOUNTER — LABORATORY RESULT (OUTPATIENT)
Age: 87
End: 2022-12-06

## 2022-12-13 ENCOUNTER — LABORATORY RESULT (OUTPATIENT)
Age: 87
End: 2022-12-13

## 2022-12-16 ENCOUNTER — RX RENEWAL (OUTPATIENT)
Age: 87
End: 2022-12-16

## 2022-12-19 ENCOUNTER — APPOINTMENT (OUTPATIENT)
Dept: INFUSION THERAPY | Facility: HOSPITAL | Age: 87
End: 2022-12-19

## 2022-12-19 ENCOUNTER — APPOINTMENT (OUTPATIENT)
Dept: HEMATOLOGY ONCOLOGY | Facility: CLINIC | Age: 87
End: 2022-12-19

## 2022-12-19 ENCOUNTER — RESULT REVIEW (OUTPATIENT)
Age: 87
End: 2022-12-19

## 2022-12-19 LAB
BASOPHILS # BLD AUTO: 0.03 K/UL — SIGNIFICANT CHANGE UP (ref 0–0.2)
BASOPHILS NFR BLD AUTO: 0.3 % — SIGNIFICANT CHANGE UP (ref 0–2)
EOSINOPHIL # BLD AUTO: 0.07 K/UL — SIGNIFICANT CHANGE UP (ref 0–0.5)
EOSINOPHIL NFR BLD AUTO: 0.7 % — SIGNIFICANT CHANGE UP (ref 0–6)
HCT VFR BLD CALC: 39.5 % — SIGNIFICANT CHANGE UP (ref 34.5–45)
HGB BLD-MCNC: 12.9 G/DL — SIGNIFICANT CHANGE UP (ref 11.5–15.5)
IMM GRANULOCYTES NFR BLD AUTO: 0.4 % — SIGNIFICANT CHANGE UP (ref 0–0.9)
LYMPHOCYTES # BLD AUTO: 1.01 K/UL — SIGNIFICANT CHANGE UP (ref 1–3.3)
LYMPHOCYTES # BLD AUTO: 10.6 % — LOW (ref 13–44)
MCHC RBC-ENTMCNC: 30 PG — SIGNIFICANT CHANGE UP (ref 27–34)
MCHC RBC-ENTMCNC: 32.7 G/DL — SIGNIFICANT CHANGE UP (ref 32–36)
MCV RBC AUTO: 91.9 FL — SIGNIFICANT CHANGE UP (ref 80–100)
MONOCYTES # BLD AUTO: 0.62 K/UL — SIGNIFICANT CHANGE UP (ref 0–0.9)
MONOCYTES NFR BLD AUTO: 6.5 % — SIGNIFICANT CHANGE UP (ref 2–14)
NEUTROPHILS # BLD AUTO: 7.74 K/UL — HIGH (ref 1.8–7.4)
NEUTROPHILS NFR BLD AUTO: 81.5 % — HIGH (ref 43–77)
NRBC # BLD: 0 /100 WBCS — SIGNIFICANT CHANGE UP (ref 0–0)
PLATELET # BLD AUTO: 185 K/UL — SIGNIFICANT CHANGE UP (ref 150–400)
RBC # BLD: 4.3 M/UL — SIGNIFICANT CHANGE UP (ref 3.8–5.2)
RBC # FLD: 16.6 % — HIGH (ref 10.3–14.5)
WBC # BLD: 9.51 K/UL — SIGNIFICANT CHANGE UP (ref 3.8–10.5)
WBC # FLD AUTO: 9.51 K/UL — SIGNIFICANT CHANGE UP (ref 3.8–10.5)

## 2022-12-20 ENCOUNTER — LABORATORY RESULT (OUTPATIENT)
Age: 87
End: 2022-12-20

## 2022-12-20 DIAGNOSIS — Z51.11 ENCOUNTER FOR ANTINEOPLASTIC CHEMOTHERAPY: ICD-10-CM

## 2022-12-23 ENCOUNTER — RX RENEWAL (OUTPATIENT)
Age: 87
End: 2022-12-23

## 2022-12-26 ENCOUNTER — LABORATORY RESULT (OUTPATIENT)
Age: 87
End: 2022-12-26

## 2023-01-01 NOTE — PROGRESS NOTE ADULT - PROBLEM SELECTOR PLAN 6
Name band; Plan:  -continue home omeprazole, escitalopram, KCl  GOC: f/u daughter DNR/DNI status  DVT: SQH  Diet: regular  Bowel regimen:   PT:   PCP: Vilma   Family:   Dispo: Plan:  -continue home omeprazole, escitalopram, KCl  GOC: f/u daughter DNR/DNI status  DVT: SQH  Diet: regular  Bowel regimen:   PT: home with PT  PCP: Vilma   Family:   Dispo: Plan:  -continue home omeprazole, escitalopram, KCl  GOC: f/u daughter DNR/DNI status  DVT: SQH  Diet: regular  Bowel regimen:   PT: home with PT  PCP: Vilma   Family:   Dispo: home with PT

## 2023-01-03 ENCOUNTER — LABORATORY RESULT (OUTPATIENT)
Age: 88
End: 2023-01-03

## 2023-01-03 ENCOUNTER — RX RENEWAL (OUTPATIENT)
Age: 88
End: 2023-01-03

## 2023-01-03 DIAGNOSIS — N39.0 URINARY TRACT INFECTION, SITE NOT SPECIFIED: ICD-10-CM

## 2023-01-04 ENCOUNTER — LABORATORY RESULT (OUTPATIENT)
Age: 88
End: 2023-01-04

## 2023-01-04 RX ORDER — LENALIDOMIDE 10 MG/1
10 CAPSULE ORAL
Qty: 21 | Refills: 0 | Status: DISCONTINUED | COMMUNITY
Start: 2022-06-02 | End: 2023-01-04

## 2023-01-05 ENCOUNTER — LABORATORY RESULT (OUTPATIENT)
Age: 88
End: 2023-01-05

## 2023-01-10 ENCOUNTER — LABORATORY RESULT (OUTPATIENT)
Age: 88
End: 2023-01-10

## 2023-01-13 ENCOUNTER — RX RENEWAL (OUTPATIENT)
Age: 88
End: 2023-01-13

## 2023-01-13 ENCOUNTER — APPOINTMENT (OUTPATIENT)
Dept: INFUSION THERAPY | Facility: HOSPITAL | Age: 88
End: 2023-01-13

## 2023-01-17 ENCOUNTER — LABORATORY RESULT (OUTPATIENT)
Age: 88
End: 2023-01-17

## 2023-01-23 ENCOUNTER — RX RENEWAL (OUTPATIENT)
Age: 88
End: 2023-01-23

## 2023-01-24 ENCOUNTER — LABORATORY RESULT (OUTPATIENT)
Age: 88
End: 2023-01-24

## 2023-02-02 ENCOUNTER — LABORATORY RESULT (OUTPATIENT)
Age: 88
End: 2023-02-02

## 2023-02-03 ENCOUNTER — OUTPATIENT (OUTPATIENT)
Dept: OUTPATIENT SERVICES | Facility: HOSPITAL | Age: 88
LOS: 1 days | Discharge: HOME | End: 2023-02-03

## 2023-02-03 DIAGNOSIS — C83.30 DIFFUSE LARGE B-CELL LYMPHOMA, UNSPECIFIED SITE: ICD-10-CM

## 2023-02-03 DIAGNOSIS — Z96.649 PRESENCE OF UNSPECIFIED ARTIFICIAL HIP JOINT: Chronic | ICD-10-CM

## 2023-02-03 DIAGNOSIS — D69.3 IMMUNE THROMBOCYTOPENIC PURPURA: ICD-10-CM

## 2023-02-03 DIAGNOSIS — Z96.641 PRESENCE OF RIGHT ARTIFICIAL HIP JOINT: Chronic | ICD-10-CM

## 2023-02-07 ENCOUNTER — LABORATORY RESULT (OUTPATIENT)
Age: 88
End: 2023-02-07

## 2023-02-07 ENCOUNTER — APPOINTMENT (OUTPATIENT)
Dept: INFUSION THERAPY | Facility: HOSPITAL | Age: 88
End: 2023-02-07

## 2023-02-07 ENCOUNTER — RESULT REVIEW (OUTPATIENT)
Age: 88
End: 2023-02-07

## 2023-02-07 LAB
BASOPHILS # BLD AUTO: 0.01 K/UL — SIGNIFICANT CHANGE UP (ref 0–0.2)
BASOPHILS NFR BLD AUTO: 0.1 % — SIGNIFICANT CHANGE UP (ref 0–2)
EOSINOPHIL # BLD AUTO: 0.19 K/UL — SIGNIFICANT CHANGE UP (ref 0–0.5)
EOSINOPHIL NFR BLD AUTO: 1.8 % — SIGNIFICANT CHANGE UP (ref 0–6)
HCT VFR BLD CALC: 37.3 % — SIGNIFICANT CHANGE UP (ref 34.5–45)
HGB BLD-MCNC: 12.6 G/DL — SIGNIFICANT CHANGE UP (ref 11.5–15.5)
IMM GRANULOCYTES NFR BLD AUTO: 0.4 % — SIGNIFICANT CHANGE UP (ref 0–0.9)
LYMPHOCYTES # BLD AUTO: 0.85 K/UL — LOW (ref 1–3.3)
LYMPHOCYTES # BLD AUTO: 7.9 % — LOW (ref 13–44)
MCHC RBC-ENTMCNC: 32.1 PG — SIGNIFICANT CHANGE UP (ref 27–34)
MCHC RBC-ENTMCNC: 33.8 G/DL — SIGNIFICANT CHANGE UP (ref 32–36)
MCV RBC AUTO: 94.9 FL — SIGNIFICANT CHANGE UP (ref 80–100)
MONOCYTES # BLD AUTO: 0.9 K/UL — SIGNIFICANT CHANGE UP (ref 0–0.9)
MONOCYTES NFR BLD AUTO: 8.4 % — SIGNIFICANT CHANGE UP (ref 2–14)
NEUTROPHILS # BLD AUTO: 8.76 K/UL — HIGH (ref 1.8–7.4)
NEUTROPHILS NFR BLD AUTO: 81.4 % — HIGH (ref 43–77)
NRBC # BLD: 0 /100 WBCS — SIGNIFICANT CHANGE UP (ref 0–0)
PLATELET # BLD AUTO: 152 K/UL — SIGNIFICANT CHANGE UP (ref 150–400)
RBC # BLD: 3.93 M/UL — SIGNIFICANT CHANGE UP (ref 3.8–5.2)
RBC # FLD: 15.4 % — HIGH (ref 10.3–14.5)
WBC # BLD: 10.75 K/UL — HIGH (ref 3.8–10.5)
WBC # FLD AUTO: 10.75 K/UL — HIGH (ref 3.8–10.5)

## 2023-02-08 DIAGNOSIS — Z51.11 ENCOUNTER FOR ANTINEOPLASTIC CHEMOTHERAPY: ICD-10-CM

## 2023-02-08 DIAGNOSIS — C83.80 OTHER NON-FOLLICULAR LYMPHOMA, UNSPECIFIED SITE: ICD-10-CM

## 2023-02-08 DIAGNOSIS — R11.2 NAUSEA WITH VOMITING, UNSPECIFIED: ICD-10-CM

## 2023-02-09 ENCOUNTER — LABORATORY RESULT (OUTPATIENT)
Age: 88
End: 2023-02-09

## 2023-02-14 ENCOUNTER — LABORATORY RESULT (OUTPATIENT)
Age: 88
End: 2023-02-14

## 2023-02-20 ENCOUNTER — LABORATORY RESULT (OUTPATIENT)
Age: 88
End: 2023-02-20

## 2023-02-21 ENCOUNTER — LABORATORY RESULT (OUTPATIENT)
Age: 88
End: 2023-02-21

## 2023-02-23 ENCOUNTER — APPOINTMENT (OUTPATIENT)
Dept: INFUSION THERAPY | Facility: HOSPITAL | Age: 88
End: 2023-02-23

## 2023-02-27 ENCOUNTER — LABORATORY RESULT (OUTPATIENT)
Age: 88
End: 2023-02-27

## 2023-02-28 ENCOUNTER — LABORATORY RESULT (OUTPATIENT)
Age: 88
End: 2023-02-28

## 2023-03-07 ENCOUNTER — LABORATORY RESULT (OUTPATIENT)
Age: 88
End: 2023-03-07

## 2023-03-16 ENCOUNTER — LABORATORY RESULT (OUTPATIENT)
Age: 88
End: 2023-03-16

## 2023-03-21 ENCOUNTER — LABORATORY RESULT (OUTPATIENT)
Age: 88
End: 2023-03-21

## 2023-03-23 ENCOUNTER — OUTPATIENT (OUTPATIENT)
Dept: OUTPATIENT SERVICES | Facility: HOSPITAL | Age: 88
LOS: 1 days | End: 2023-03-23
Payer: MEDICARE

## 2023-03-23 ENCOUNTER — APPOINTMENT (OUTPATIENT)
Dept: NUCLEAR MEDICINE | Facility: IMAGING CENTER | Age: 88
End: 2023-03-23
Payer: MEDICARE

## 2023-03-23 DIAGNOSIS — C83.30 DIFFUSE LARGE B-CELL LYMPHOMA, UNSPECIFIED SITE: ICD-10-CM

## 2023-03-23 DIAGNOSIS — Z96.649 PRESENCE OF UNSPECIFIED ARTIFICIAL HIP JOINT: Chronic | ICD-10-CM

## 2023-03-23 DIAGNOSIS — Z96.641 PRESENCE OF RIGHT ARTIFICIAL HIP JOINT: Chronic | ICD-10-CM

## 2023-03-23 PROCEDURE — A9552: CPT

## 2023-03-23 PROCEDURE — 78815 PET IMAGE W/CT SKULL-THIGH: CPT | Mod: 26,PS

## 2023-03-23 PROCEDURE — 78815 PET IMAGE W/CT SKULL-THIGH: CPT

## 2023-03-28 ENCOUNTER — APPOINTMENT (OUTPATIENT)
Dept: HEMATOLOGY ONCOLOGY | Facility: CLINIC | Age: 88
End: 2023-03-28
Payer: MEDICARE

## 2023-03-28 VITALS
WEIGHT: 139.97 LBS | TEMPERATURE: 97.2 F | BODY MASS INDEX: 24.8 KG/M2 | RESPIRATION RATE: 16 BRPM | HEART RATE: 82 BPM | DIASTOLIC BLOOD PRESSURE: 76 MMHG | SYSTOLIC BLOOD PRESSURE: 113 MMHG | OXYGEN SATURATION: 98 %

## 2023-03-28 PROCEDURE — 99215 OFFICE O/P EST HI 40 MIN: CPT | Mod: GC

## 2023-03-28 RX ORDER — LENALIDOMIDE 2.5 MG/1
2.5 CAPSULE ORAL
Qty: 21 | Refills: 0 | Status: COMPLETED | COMMUNITY
Start: 2022-09-01 | End: 2023-03-28

## 2023-03-28 NOTE — HISTORY OF PRESENT ILLNESS
[de-identified] : 89 year old woman with a PMH of COPD  was admitted to [facility] for worsening cough and lethargy despite treatment with antibiotics and steroids. She underwent R robotic VATS/pleural bx with placement of pleurex catheter on 07/15/21 after PET from 07/07/21 reveal b/l upper lobe opacities concerning for malignancy with pleural effusion. 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 Given her age and symptoms, patient and daughter opted for palliative chemotherapy. she received prednisone, followed by cytoxan (200 mg/m2) on 8/5-8/7/21 with marked improvement in respiratory status and mental status. Pleurex catheter stopped draining after initiation of cytoxan. She received rituximab which was complicated by infusion reaction. She received the full dose over 2 days. She was discharged to [facility] and presents for follow up. She received 4 cycles of R-CP which was overall well tolerated. She has a fall in mid October and did not initially seek medical care. Her daughter Dinorah called the office on 10/20 and given her shoulder pain it was recommended she get xrays and an evaluation. She was noted to have a R proximal humerus fracture and was sent from Urgent care to the ER for admission. She was also noted to have a possible PNA. While admitted, she was noted to be tachy-lidia. She was also found to have parainflueza and was treated with ceftriaxone/azithromycin for PNA. \par She received C5 on 11/18, C6 on 12/9, C7 on 12/30, and C8 on 1/27 (delayed due to neutropenia). \par \par Pt was recently hospitalized for respiratory distress in May 2022. CTA chest 5/10: No pulmonary embolism from the main pulmonary artery up to and including the lobar branches. Several segmental and subsegmental branches cannot be assessed due to extensive respiratory motion. New groundglass in the upper lobes, suspicious for pulmonary edema. Infection is also a consideration. Increased mildly enlarged mediastinal lymph nodes may be reactive. Stable multilobar chronic consolidation.\par \par Considering lymphoma involvement in the lungs treated with Obinutuzumab on 5/20 test dose and 5/21 to complete infusion for total of 1000mg. She had C1D8 on 6/9/22, and is at [facility] for C1D15 today. Pt was seen and evaluated in treatment room.\par \par  [de-identified] : \par 3/28/23: Pt presents today for follow up. Since last visit, s/p gazyva 2/7/23 and IVIG 50 grams in 1/2023. Revlimid has been on hold since 9/2022 due to thrombocytopenia (previously on 2.5 mg every other dya). Pt reports feeling well. continues to dyspnea on exertion, but stable. S/p PET/CT 3/23/23 which demonstrates improvement in lung opacitiies ([place] 2).  She endorses decrease in taste. She eats 3 meals a day. She has no fevers, chills or night sweats. She is doing physical therapy.  She is not on supplemental O2 anymore, but occasionally uses it at night.

## 2023-03-28 NOTE — ASSESSMENT
[FreeTextEntry1] : 89 yo female with new diagnosis of Stage IV DLBCL s/p treatment with RC and prednisone presents for follow up. Currently on gazyva, revlmid on hold since 9/2022 given thrombocytopenia. Overall, tolerating current treatment well without any significant side effects. \par \par #DLBCL\par -Pt was on RC and prednisone every 3 weeks,\par - CT scan of chest done 4/2/22: Newly noted 9 mm opacity in the periphery of the right upper lobe and interval increase in size of a lingular opacity along the fissure.\par - Discussed with patient and daughter Dinorah we would prefer to start treatment with Rituxan and Polatuzumab; Went over risk/side effects of Polatuzumab and consent signed today.\par -Pt is s/p Hugo-Rtixumab- cycle 1 on 4/28/22\par -She developed respiratory distress and was hospitalized and was noted to have pulm edema and concern for progressive lymphoma vs. infection\par -Considering lymphoma involvement in the lungs treated with Obinutuzumab on 5/20 test dose and 5/21 to complete infusion for total of 1000mg. She is s/p C1D8 on 6/9/22 and C1D15 on 6/16/22\par -Plan for Obi weekly X3, then once monthly. Will start Revlimid on 6/26/22 (pt has an eye surgery scheduled at that time) - risks/side effects discussed with patient, consent signed. Rx for ASA 81mg\par -currently revlimid on hold since 9/2022 due to thrombocytopenia\par -c/w obinutuzumab every 2 months for 2 years (started 7/2022), most recent 2/7/2023\par -last PET/CT 3/23/23, stable, will plan for scans every 2-3 months\par -if progression of disease, will consider adding on revlimid 2.5 mg\par -c/w IVIG 50 grams monthly for PLT <100-can hold now Plt are >100k\par -check platelet count weekly \par -transfusional support as needed \par -continue valacyclovir \par -continue Protonix \par -follow up orthopedics recs \par -Continue methenamine hippurate for UTI prevention\par -Cont xanax as needed for anxiety\par -follow up in treatment room at next gazyva treatment\par \par

## 2023-03-28 NOTE — REVIEW OF SYSTEMS
[Fatigue] : fatigue [Negative] : Genitourinary [Fever] : no fever [Chills] : no chills [Night Sweats] : no night sweats [Recent Change In Weight] : ~T no recent weight change [FreeTextEntry6] : shortness of breath and cough improved

## 2023-03-29 ENCOUNTER — LABORATORY RESULT (OUTPATIENT)
Age: 88
End: 2023-03-29

## 2023-04-06 ENCOUNTER — LABORATORY RESULT (OUTPATIENT)
Age: 88
End: 2023-04-06

## 2023-04-06 ENCOUNTER — OUTPATIENT (OUTPATIENT)
Dept: OUTPATIENT SERVICES | Facility: HOSPITAL | Age: 88
LOS: 1 days | Discharge: HOME | End: 2023-04-06

## 2023-04-06 DIAGNOSIS — Z96.641 PRESENCE OF RIGHT ARTIFICIAL HIP JOINT: Chronic | ICD-10-CM

## 2023-04-06 DIAGNOSIS — Z96.649 PRESENCE OF UNSPECIFIED ARTIFICIAL HIP JOINT: Chronic | ICD-10-CM

## 2023-04-06 DIAGNOSIS — D69.3 IMMUNE THROMBOCYTOPENIC PURPURA: ICD-10-CM

## 2023-04-11 ENCOUNTER — RESULT REVIEW (OUTPATIENT)
Age: 88
End: 2023-04-11

## 2023-04-11 ENCOUNTER — APPOINTMENT (OUTPATIENT)
Dept: HEMATOLOGY ONCOLOGY | Facility: CLINIC | Age: 88
End: 2023-04-11
Payer: MEDICARE

## 2023-04-11 ENCOUNTER — APPOINTMENT (OUTPATIENT)
Dept: INFUSION THERAPY | Facility: HOSPITAL | Age: 88
End: 2023-04-11

## 2023-04-11 ENCOUNTER — LABORATORY RESULT (OUTPATIENT)
Age: 88
End: 2023-04-11

## 2023-04-11 ENCOUNTER — APPOINTMENT (OUTPATIENT)
Dept: HEMATOLOGY ONCOLOGY | Facility: CLINIC | Age: 88
End: 2023-04-11

## 2023-04-11 LAB
BASOPHILS # BLD AUTO: 0.05 K/UL — SIGNIFICANT CHANGE UP (ref 0–0.2)
BASOPHILS NFR BLD AUTO: 0.4 % — SIGNIFICANT CHANGE UP (ref 0–2)
EOSINOPHIL # BLD AUTO: 0.15 K/UL — SIGNIFICANT CHANGE UP (ref 0–0.5)
EOSINOPHIL NFR BLD AUTO: 1.3 % — SIGNIFICANT CHANGE UP (ref 0–6)
HCT VFR BLD CALC: 41 % — SIGNIFICANT CHANGE UP (ref 34.5–45)
HGB BLD-MCNC: 13.7 G/DL — SIGNIFICANT CHANGE UP (ref 11.5–15.5)
IMM GRANULOCYTES NFR BLD AUTO: 0.8 % — SIGNIFICANT CHANGE UP (ref 0–0.9)
LYMPHOCYTES # BLD AUTO: 0.79 K/UL — LOW (ref 1–3.3)
LYMPHOCYTES # BLD AUTO: 7 % — LOW (ref 13–44)
MCHC RBC-ENTMCNC: 32.2 PG — SIGNIFICANT CHANGE UP (ref 27–34)
MCHC RBC-ENTMCNC: 33.4 G/DL — SIGNIFICANT CHANGE UP (ref 32–36)
MCV RBC AUTO: 96.5 FL — SIGNIFICANT CHANGE UP (ref 80–100)
MONOCYTES # BLD AUTO: 1.15 K/UL — HIGH (ref 0–0.9)
MONOCYTES NFR BLD AUTO: 10.2 % — SIGNIFICANT CHANGE UP (ref 2–14)
NEUTROPHILS # BLD AUTO: 9.02 K/UL — HIGH (ref 1.8–7.4)
NEUTROPHILS NFR BLD AUTO: 80.3 % — HIGH (ref 43–77)
NRBC # BLD: 0 /100 WBCS — SIGNIFICANT CHANGE UP (ref 0–0)
PLATELET # BLD AUTO: 203 K/UL — SIGNIFICANT CHANGE UP (ref 150–400)
RBC # BLD: 4.25 M/UL — SIGNIFICANT CHANGE UP (ref 3.8–5.2)
RBC # FLD: 16.6 % — HIGH (ref 10.3–14.5)
WBC # BLD: 11.25 K/UL — HIGH (ref 3.8–10.5)
WBC # FLD AUTO: 11.25 K/UL — HIGH (ref 3.8–10.5)

## 2023-04-11 PROCEDURE — 99215 OFFICE O/P EST HI 40 MIN: CPT | Mod: GC

## 2023-04-11 RX ORDER — SULFAMETHOXAZOLE AND TRIMETHOPRIM 800; 160 MG/1; MG/1
800-160 TABLET ORAL DAILY
Qty: 30 | Refills: 3 | Status: ACTIVE | COMMUNITY
Start: 2022-12-07 | End: 1900-01-01

## 2023-04-11 NOTE — REASON FOR VISIT
[Follow-Up Visit] : a follow-up visit for [Lymphoma] : lymphoma [Thrombocytosis] : thrombocytosis [Formal Caregiver] : formal caregiver

## 2023-04-12 DIAGNOSIS — R11.2 NAUSEA WITH VOMITING, UNSPECIFIED: ICD-10-CM

## 2023-04-12 DIAGNOSIS — C85.80 OTHER SPECIFIED TYPES OF NON-HODGKIN LYMPHOMA, UNSPECIFIED SITE: ICD-10-CM

## 2023-04-12 DIAGNOSIS — Z51.11 ENCOUNTER FOR ANTINEOPLASTIC CHEMOTHERAPY: ICD-10-CM

## 2023-04-14 NOTE — REVIEW OF SYSTEMS
[Negative] : Heme/Lymph [Fever] : no fever [Chills] : no chills [Night Sweats] : no night sweats [Fatigue] : no fatigue [Recent Change In Weight] : ~T no recent weight change [FreeTextEntry6] : shortness of breath with ambulation

## 2023-04-14 NOTE — PHYSICAL EXAM
[Ambulatory and capable of all self care but unable to carry out any work activities] : Status 2- Ambulatory and capable of all self care but unable to carry out any work activities. Up and about more than 50% of waking hours [Normal] : affect appropriate [de-identified] : no wheezing, symmetric chest rise

## 2023-04-14 NOTE — END OF VISIT
[FreeTextEntry3] : Patient seen and case discussed with . I agree with above and have edited the note where needed.\par  [Time Spent: ___ minutes] : I have spent [unfilled] minutes of time on the encounter.

## 2023-04-14 NOTE — HISTORY OF PRESENT ILLNESS
[de-identified] : 89 year old woman with a PMH of COPD  was admitted to [facility] for worsening cough and lethargy despite treatment with antibiotics and steroids. She underwent R robotic VATS/pleural bx with placement of pleurex catheter on 07/15/21 after PET from 07/07/21 reveal b/l upper lobe opacities concerning for malignancy with pleural effusion. 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 Given her age and symptoms, patient and daughter opted for palliative chemotherapy. she received prednisone, followed by cytoxan (200 mg/m2) on 8/5-8/7/21 with marked improvement in respiratory status and mental status. Pleurex catheter stopped draining after initiation of cytoxan. She received rituximab which was complicated by infusion reaction. She received the full dose over 2 days. She was discharged to [facility] and presents for follow up. She received 4 cycles of R-CP which was overall well tolerated. She has a fall in mid October and did not initially seek medical care. Her daughter Dinorah called the office on 10/20 and given her shoulder pain it was recommended she get xrays and an evaluation. She was noted to have a R proximal humerus fracture and was sent from Urgent care to the ER for admission. She was also noted to have a possible PNA. While admitted, she was noted to be tachy-lidia. She was also found to have parainflueza and was treated with ceftriaxone/azithromycin for PNA. \par She received C5 on 11/18, C6 on 12/9, C7 on 12/30, and C8 on 1/27 (delayed due to neutropenia). \par \par Pt was recently hospitalized for respiratory distress in May 2022. CTA chest 5/10: No pulmonary embolism from the main pulmonary artery up to and including the lobar branches. Several segmental and subsegmental branches cannot be assessed due to extensive respiratory motion. New groundglass in the upper lobes, suspicious for pulmonary edema. Infection is also a consideration. Increased mildly enlarged mediastinal lymph nodes may be reactive. Stable multilobar chronic consolidation.\par \par Considering lymphoma involvement in the lungs treated with Obinutuzumab on 5/20 test dose and 5/21 to complete infusion for total of 1000mg. She had C1D8 on 6/9/22, and is at [facility] for C1D15. \par \par  [de-identified] : 4/14/23: Patient was seen and examined in the treatment room. She continues to have occasional shortness of breath while walking. Denies fevers, chills, night sweats, weight loss.

## 2023-04-14 NOTE — ASSESSMENT
[FreeTextEntry1] : 89 yo female with new diagnosis of Stage IV DLBCL s/p treatment with RC and prednisone presents for follow up. Currently on gazyva, revlmid on hold since 9/2022 given thrombocytopenia. Overall, tolerating current treatment well without any significant side effects. \par \par #DLBCL\par -Pt was on RC and prednisone every 3 weeks,\par - CT scan of chest done 4/2/22: Newly noted 9 mm opacity in the periphery of the right upper lobe and interval increase in size of a lingular opacity along the fissure.\par - Discussed with patient and daughter Dinorah we would prefer to start treatment with Rituxan and Polatuzumab; Went over risk/side effects of Polatuzumab and consent signed today.\par -Pt is s/p Hugo-Rtixumab- cycle 1 on 4/28/22\par -She developed respiratory distress and was hospitalized and was noted to have pulm edema and concern for progressive lymphoma vs. infection\par -Considering lymphoma involvement in the lungs treated with Obinutuzumab on 5/20 test dose and 5/21 to complete infusion for total of 1000mg. She is s/p C1D8 on 6/9/22 and C1D15 on 6/16/22\par -Plan for Obi weekly X3, then once monthly. Started Revlimid on 6/26/22 (pt has an eye surgery scheduled at that time) - risks/side effects discussed with patient, consent signed. 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 was a component of ITP given improvement with dexamethasone and IVIG\par -c/w obinutuzumab every 2 months for 2 years (started 7/2022)\par \par -last PET/CT 3/23/23, stable, will plan for scans every 2-3 months\par -continue with obinutuzumab today, can continue IVIG given age and likelihood of hypogammaglobulinemia \par -if progression of disease, will consider adding on revlimid 2.5 mg\par -was getting prednisone for suspected ITP, will taper dose to 5mg daily x1mo then trial off \par - c/w valacyclovir, bactrim ppx \par -Continue methenamine hippurate for UTI prevention\par -Cont xanax as needed for anxiety\par \par \par

## 2023-04-18 RX ORDER — PROCHLORPERAZINE MALEATE 5 MG/1
5 TABLET ORAL
Qty: 60 | Refills: 3 | Status: DISCONTINUED | COMMUNITY
Start: 2021-09-16 | End: 2023-04-18

## 2023-04-18 RX ORDER — LEVOFLOXACIN 750 MG/1
750 TABLET, FILM COATED ORAL DAILY
Qty: 7 | Refills: 0 | Status: DISCONTINUED | COMMUNITY
Start: 2022-09-29 | End: 2023-04-18

## 2023-04-18 RX ORDER — LEVOFLOXACIN 500 MG/1
500 TABLET, FILM COATED ORAL DAILY
Qty: 10 | Refills: 0 | Status: DISCONTINUED | COMMUNITY
Start: 2022-09-09 | End: 2023-04-18

## 2023-04-18 RX ORDER — ONDANSETRON 8 MG/1
8 TABLET, ORALLY DISINTEGRATING ORAL
Qty: 90 | Refills: 6 | Status: DISCONTINUED | COMMUNITY
Start: 2021-09-16 | End: 2023-04-18

## 2023-04-18 RX ORDER — ALPRAZOLAM 0.5 MG/1
0.5 TABLET ORAL
Qty: 30 | Refills: 0 | Status: DISCONTINUED | COMMUNITY
Start: 2022-06-02 | End: 2023-04-18

## 2023-04-18 RX ORDER — DEXAMETHASONE 4 MG/1
4 TABLET ORAL
Qty: 20 | Refills: 0 | Status: DISCONTINUED | COMMUNITY
Start: 2022-07-14 | End: 2023-04-18

## 2023-04-18 RX ORDER — AZITHROMYCIN 250 MG/1
250 TABLET, FILM COATED ORAL
Qty: 1 | Refills: 0 | Status: DISCONTINUED | COMMUNITY
Start: 2023-04-18 | End: 2023-04-18

## 2023-04-18 RX ORDER — AMIODARONE HYDROCHLORIDE 200 MG/1
200 TABLET ORAL DAILY
Qty: 90 | Refills: 0 | Status: DISCONTINUED | COMMUNITY
Start: 2021-08-19 | End: 2023-04-18

## 2023-04-18 RX ORDER — NITROFURANTOIN (MONOHYDRATE/MACROCRYSTALS) 25; 75 MG/1; MG/1
100 CAPSULE ORAL TWICE DAILY
Qty: 20 | Refills: 0 | Status: DISCONTINUED | COMMUNITY
Start: 2022-04-04 | End: 2023-04-18

## 2023-04-18 RX ORDER — LORATADINE 5 MG/5 ML
10 SOLUTION, ORAL ORAL DAILY
Qty: 2 | Refills: 3 | Status: DISCONTINUED | COMMUNITY
Start: 2022-06-02 | End: 2023-04-18

## 2023-04-18 RX ORDER — LOPERAMIDE HYDROCHLORIDE 2 MG/1
2 CAPSULE ORAL
Qty: 30 | Refills: 0 | Status: DISCONTINUED | COMMUNITY
Start: 2023-02-09 | End: 2023-04-18

## 2023-04-20 ENCOUNTER — LABORATORY RESULT (OUTPATIENT)
Age: 88
End: 2023-04-20

## 2023-04-25 ENCOUNTER — LABORATORY RESULT (OUTPATIENT)
Age: 88
End: 2023-04-25

## 2023-04-26 ENCOUNTER — APPOINTMENT (OUTPATIENT)
Dept: HEART AND VASCULAR | Facility: CLINIC | Age: 88
End: 2023-04-26
Payer: MEDICARE

## 2023-04-26 ENCOUNTER — NON-APPOINTMENT (OUTPATIENT)
Age: 88
End: 2023-04-26

## 2023-04-26 VITALS
HEIGHT: 63 IN | BODY MASS INDEX: 24.8 KG/M2 | HEART RATE: 80 BPM | DIASTOLIC BLOOD PRESSURE: 76 MMHG | RESPIRATION RATE: 12 BRPM | WEIGHT: 140 LBS | SYSTOLIC BLOOD PRESSURE: 124 MMHG

## 2023-04-26 VITALS — WEIGHT: 142 LBS | RESPIRATION RATE: 16 BRPM | BODY MASS INDEX: 25.16 KG/M2 | HEIGHT: 63 IN

## 2023-04-26 DIAGNOSIS — R09.89 OTHER SPECIFIED SYMPTOMS AND SIGNS INVOLVING THE CIRCULATORY AND RESPIRATORY SYSTEMS: ICD-10-CM

## 2023-04-26 DIAGNOSIS — R01.1 CARDIAC MURMUR, UNSPECIFIED: ICD-10-CM

## 2023-04-26 DIAGNOSIS — Z86.79 PERSONAL HISTORY OF OTHER DISEASES OF THE CIRCULATORY SYSTEM: ICD-10-CM

## 2023-04-26 PROCEDURE — ZZZZZ: CPT

## 2023-04-26 PROCEDURE — 93880 EXTRACRANIAL BILAT STUDY: CPT

## 2023-04-26 PROCEDURE — 93306 TTE W/DOPPLER COMPLETE: CPT

## 2023-04-26 PROCEDURE — 99214 OFFICE O/P EST MOD 30 MIN: CPT | Mod: 25

## 2023-04-26 PROCEDURE — 93000 ELECTROCARDIOGRAM COMPLETE: CPT

## 2023-04-26 RX ORDER — OMEPRAZOLE 20 MG/1
20 TABLET, DELAYED RELEASE ORAL
Refills: 0 | Status: DISCONTINUED | COMMUNITY
End: 2023-04-26

## 2023-04-26 RX ORDER — AZITHROMYCIN 250 MG/1
250 TABLET, FILM COATED ORAL
Qty: 1 | Refills: 0 | Status: DISCONTINUED | COMMUNITY
Start: 2023-04-18 | End: 2023-04-26

## 2023-04-26 RX ORDER — CRANBERRY FRUIT EXTRACT 200 MG
CAPSULE ORAL
Refills: 0 | Status: DISCONTINUED | COMMUNITY
End: 2023-04-26

## 2023-04-26 RX ORDER — PREDNISONE 10 MG/1
10 TABLET ORAL DAILY
Qty: 60 | Refills: 0 | Status: DISCONTINUED | COMMUNITY
Start: 2022-12-07 | End: 2023-04-26

## 2023-04-26 RX ORDER — ESCITALOPRAM OXALATE 10 MG/1
10 TABLET, FILM COATED ORAL
Refills: 0 | Status: DISCONTINUED | COMMUNITY
End: 2023-04-26

## 2023-04-26 RX ORDER — ASPIRIN 81 MG/1
81 TABLET ORAL DAILY
Qty: 90 | Refills: 0 | Status: DISCONTINUED | COMMUNITY
Start: 2022-06-02 | End: 2023-04-26

## 2023-04-26 NOTE — DISCUSSION/SUMMARY
[FreeTextEntry1] : 1.  Atrial fibrillation: No need for anticoagulation as A-fib was postop has not recurred since.  Continue metoprolol 12.5 mg daily\par 2.  Cardiac murmur: Echocardiogram\par 3.  Carotid bruit: Carotid duplex [EKG obtained to assist in diagnosis and management of assessed problem(s)] : EKG obtained to assist in diagnosis and management of assessed problem(s)

## 2023-04-26 NOTE — HISTORY OF PRESENT ILLNESS
[FreeTextEntry1] : 89-year-old female with a past medical history of B-cell lymphoma status post VATS complicated by postop A-fib comes in for follow-up.  Overall, feels well denies any chest pain PND orthopnea.  Does continue to suffer from shortness of breath which is chronic.  Patient still undergoing treatment for B-cell lymphoma.

## 2023-04-26 NOTE — PHYSICAL EXAM
[Well Developed] : well developed [Well Nourished] : well nourished [No Acute Distress] : no acute distress [Normal Conjunctiva] : normal conjunctiva [Normal Venous Pressure] : normal venous pressure [No Carotid Bruit] : no carotid bruit [Normal S1, S2] : normal S1, S2 [No Rub] : no rub [No Gallop] : no gallop [Murmur] : murmur [Good Air Entry] : good air entry [No Respiratory Distress] : no respiratory distress  [Soft] : abdomen soft [Non Tender] : non-tender [No Masses/organomegaly] : no masses/organomegaly [Normal Bowel Sounds] : normal bowel sounds [Normal Gait] : normal gait [No Edema] : no edema [No Cyanosis] : no cyanosis [No Clubbing] : no clubbing [No Varicosities] : no varicosities [No Rash] : no rash [No Skin Lesions] : no skin lesions [Moves all extremities] : moves all extremities [No Focal Deficits] : no focal deficits [Normal Speech] : normal speech [Alert and Oriented] : alert and oriented [Normal memory] : normal memory [de-identified] : judd [de-identified] : right sided insp crackles

## 2023-04-27 RX ORDER — HYDROCODONE BITARTRATE AND HOMATROPINE METHYLBROMIDE 1.5; 5 MG/5ML; MG/5ML
5-1.5 SOLUTION ORAL EVERY 8 HOURS
Qty: 1 | Refills: 0 | Status: ACTIVE | COMMUNITY
Start: 2022-09-29 | End: 1900-01-01

## 2023-05-02 ENCOUNTER — LABORATORY RESULT (OUTPATIENT)
Age: 88
End: 2023-05-02

## 2023-05-04 ENCOUNTER — APPOINTMENT (OUTPATIENT)
Dept: HEMATOLOGY ONCOLOGY | Facility: CLINIC | Age: 88
End: 2023-05-04
Payer: MEDICARE

## 2023-05-04 VITALS
OXYGEN SATURATION: 98 % | SYSTOLIC BLOOD PRESSURE: 110 MMHG | HEIGHT: 63 IN | TEMPERATURE: 97.2 F | HEART RATE: 92 BPM | RESPIRATION RATE: 16 BRPM | DIASTOLIC BLOOD PRESSURE: 72 MMHG

## 2023-05-04 PROCEDURE — 99215 OFFICE O/P EST HI 40 MIN: CPT | Mod: GC

## 2023-05-05 NOTE — HISTORY OF PRESENT ILLNESS
[de-identified] : 89 year old woman with a PMH of COPD  was admitted to [facility] for worsening cough and lethargy despite treatment with antibiotics and steroids. She underwent R robotic VATS/pleural bx with placement of pleurex catheter on 07/15/21 after PET from 07/07/21 reveal b/l upper lobe opacities concerning for malignancy with pleural effusion. 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 Given her age and symptoms, patient and daughter opted for palliative chemotherapy. she received prednisone, followed by cytoxan (200 mg/m2) on 8/5-8/7/21 with marked improvement in respiratory status and mental status. Pleurex catheter stopped draining after initiation of cytoxan. She received rituximab which was complicated by infusion reaction. She received the full dose over 2 days. She was discharged to [facility] and presents for follow up. She received 4 cycles of R-CP which was overall well tolerated. She has a fall in mid October and did not initially seek medical care. Her daughter Dinorah called the office on 10/20 and given her shoulder pain it was recommended she get xrays and an evaluation. She was noted to have a R proximal humerus fracture and was sent from Urgent care to the ER for admission. She was also noted to have a possible PNA. While admitted, she was noted to be tachy-lidia. She was also found to have parainflueza and was treated with ceftriaxone/azithromycin for PNA. \par She received C5 on 11/18, C6 on 12/9, C7 on 12/30, and C8 on 1/27 (delayed due to neutropenia). \par \par Pt was recently hospitalized for respiratory distress in May 2022. CTA chest 5/10: No pulmonary embolism from the main pulmonary artery up to and including the lobar branches. Several segmental and subsegmental branches cannot be assessed due to extensive respiratory motion. New groundglass in the upper lobes, suspicious for pulmonary edema. Infection is also a consideration. Increased mildly enlarged mediastinal lymph nodes may be reactive. Stable multilobar chronic consolidation.\par \par Considering lymphoma involvement in the lungs treated with Obinutuzumab on 5/20 test dose and 5/21 to complete infusion for total of 1000mg. She had C1D8 on 6/9/22, C1D15 on 6/16/22. She completed 6 cycles. She was started on revlmid, dose reduced to 2.5 mg every other day for thrombocytopenia which was eventually held. She was treated for possible ITP with marked improvement in platelets and has been on prednisone with bactrim ppx. She has been receiving IVIG for ITP (also for hypogammaglobulinemia in the setting of chronic B cell depletion and frequent viral URIs)\par \par  [de-identified] : 5/4/23: since last visit, pt's prednisone was reduced from 10mg to 5mg. Pt has noticed increase cough, she was rxed hycodan which she states is helping. Pt state she had an URI last week. Denies fevers, chills, night sweats, weight loss.

## 2023-05-05 NOTE — ASSESSMENT
[FreeTextEntry1] : 89 yo female with new diagnosis of Stage IV DLBCL s/p treatment with RC and prednisone presents for follow up. Currently on gazyva, revlmid on hold since 9/2022 given thrombocytopenia. Overall, tolerating current treatment well without any significant side effects. \par \par #DLBCL\par -Pt was on RC and prednisone every 3 weeks,\par - CT scan of chest done 4/2/22: Newly noted 9 mm opacity in the periphery of the right upper lobe and interval increase in size of a lingular opacity along the fissure.\par - Discussed with patient and daughter Dinorah we would prefer to start treatment with Rituxan and Polatuzumab; Went over risk/side effects of Polatuzumab and consent signed today.\par -Pt is s/p Hugo-Rtixumab- cycle 1 on 4/28/22\par -She developed respiratory distress and was hospitalized and was noted to have pulm edema and concern for progressive lymphoma vs. infection\par -Considering lymphoma involvement in the lungs treated with Obinutuzumab on 5/20 test dose and 5/21 to complete infusion for total of 1000mg. She is s/p C1D8 on 6/9/22 and C1D15 on 6/16/22\par -Plan for Obi weekly X3, then once monthly. Started Revlimid on 6/26/22 (pt has an eye surgery scheduled at that time) - risks/side effects discussed with patient, consent signed. 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 was a component of ITP given improvement with dexamethasone and IVIG\par -c/w obinutuzumab every 2 months for 2 years (started 7/2022)\par \par -last PET/CT 3/23/23, stable, will plan for scans every 2-3 months\par -will plan for CT of chest given new cough with lower of steroids \par -continue with obinutuzumab today, can continue IVIG given age and likelihood of hypogammaglobulinemia \par -if progression of disease, will consider adding on revlimid 2.5 mg vs. loncatuximab tesirine x 12 cycles \par -was getting prednisone for suspected ITP, currently on 5mg daily\par - c/w valacyclovir; bactrim\par -Continue methenamine hippurate for UTI prevention\par -Cont xanax as needed for anxiety\par \par -RTO in 1 month before next gazyva infusion \par

## 2023-05-05 NOTE — RESULTS/DATA
[FreeTextEntry1] : CBC with diff done 5/4/23 - plt lower at 81k\par \par Final Diagnosis\par 1-Right pleural biopsy\par - Fatty tissue with granulation tissue\par See note and description.\par \par 2-Right pleural nodule\par - Lymphohistiocytic proliferation with scattered large cells\par See note and description.\par \par 3-Right peritracheal lymph node, level 4 for molecular testing\par - Lymphohistiocytic proliferation with scattered large cells\par See note and description.\par \par 4-Right peritracheal lymph node, level 4 for molecular testing\par - Lymphohistiocytic proliferation with increased large cells\par See note and description.\par \par 5-Right peritracheal lymph node, level 4\par - Diffuse large B cell lymphoma, non-germinal center B cell\par like immunophenotype\par See note and description.\par \par Diagnostic note:  Per chart review, patient presents with\par bilateral supraclavicular, bilateral mediastinal, and perigastric\par lymphadenopathy. Current biopsy is consistent with diffuse large\par B cell lymphoma, non-germinal center B-cell-like immunophenotype.\par FISH studies for MYC, BCL2 and BCL6 gene rearrangements are in\par process and will be reported as an addendum.\par

## 2023-05-05 NOTE — PHYSICAL EXAM
[Ambulatory and capable of all self care but unable to carry out any work activities] : Status 2- Ambulatory and capable of all self care but unable to carry out any work activities. Up and about more than 50% of waking hours [Normal] : affect appropriate [de-identified] : no wheezing, symmetric chest rise

## 2023-05-08 ENCOUNTER — LABORATORY RESULT (OUTPATIENT)
Age: 88
End: 2023-05-08

## 2023-05-16 ENCOUNTER — LABORATORY RESULT (OUTPATIENT)
Age: 88
End: 2023-05-16

## 2023-05-23 ENCOUNTER — LABORATORY RESULT (OUTPATIENT)
Age: 88
End: 2023-05-23

## 2023-05-24 ENCOUNTER — LABORATORY RESULT (OUTPATIENT)
Age: 88
End: 2023-05-24

## 2023-05-25 ENCOUNTER — RX RENEWAL (OUTPATIENT)
Age: 88
End: 2023-05-25

## 2023-05-28 ENCOUNTER — OUTPATIENT (OUTPATIENT)
Dept: OUTPATIENT SERVICES | Facility: HOSPITAL | Age: 88
LOS: 1 days | Discharge: HOME | End: 2023-05-28

## 2023-05-28 DIAGNOSIS — Z96.641 PRESENCE OF RIGHT ARTIFICIAL HIP JOINT: Chronic | ICD-10-CM

## 2023-05-28 DIAGNOSIS — D69.3 IMMUNE THROMBOCYTOPENIC PURPURA: ICD-10-CM

## 2023-05-28 DIAGNOSIS — Z96.649 PRESENCE OF UNSPECIFIED ARTIFICIAL HIP JOINT: Chronic | ICD-10-CM

## 2023-05-30 ENCOUNTER — LABORATORY RESULT (OUTPATIENT)
Age: 88
End: 2023-05-30

## 2023-05-31 ENCOUNTER — APPOINTMENT (OUTPATIENT)
Dept: CT IMAGING | Facility: CLINIC | Age: 88
End: 2023-05-31
Payer: MEDICARE

## 2023-05-31 PROCEDURE — 71260 CT THORAX DX C+: CPT

## 2023-06-06 ENCOUNTER — LABORATORY RESULT (OUTPATIENT)
Age: 88
End: 2023-06-06

## 2023-06-07 ENCOUNTER — NON-APPOINTMENT (OUTPATIENT)
Age: 88
End: 2023-06-07

## 2023-06-08 ENCOUNTER — APPOINTMENT (OUTPATIENT)
Dept: HEMATOLOGY ONCOLOGY | Facility: CLINIC | Age: 88
End: 2023-06-08

## 2023-06-12 NOTE — DISCHARGE NOTE PROVIDER - NSDCACTIVITY_GEN_ALL_CORE
Head, normocephalic, atraumatic, Face, Face within normal limits, Ears, External ears within normal limits. MC- II Sex allowed/Do not drive or operate machinery/Showering allowed/Do not make important decisions/Stairs allowed/Walking - Indoors allowed/No heavy lifting/straining/Walking - Outdoors allowed 30-Jan-2019

## 2023-06-13 ENCOUNTER — LABORATORY RESULT (OUTPATIENT)
Age: 88
End: 2023-06-13

## 2023-06-14 ENCOUNTER — LABORATORY RESULT (OUTPATIENT)
Age: 88
End: 2023-06-14

## 2023-06-14 ENCOUNTER — NON-APPOINTMENT (OUTPATIENT)
Age: 88
End: 2023-06-14

## 2023-06-20 ENCOUNTER — LABORATORY RESULT (OUTPATIENT)
Age: 88
End: 2023-06-20

## 2023-06-22 ENCOUNTER — APPOINTMENT (OUTPATIENT)
Dept: HEMATOLOGY ONCOLOGY | Facility: CLINIC | Age: 88
End: 2023-06-22
Payer: MEDICARE

## 2023-06-22 ENCOUNTER — APPOINTMENT (OUTPATIENT)
Dept: HEMATOLOGY ONCOLOGY | Facility: CLINIC | Age: 88
End: 2023-06-22

## 2023-06-22 VITALS
SYSTOLIC BLOOD PRESSURE: 102 MMHG | DIASTOLIC BLOOD PRESSURE: 71 MMHG | OXYGEN SATURATION: 99 % | BODY MASS INDEX: 23 KG/M2 | HEART RATE: 93 BPM | HEIGHT: 62 IN | RESPIRATION RATE: 16 BRPM | TEMPERATURE: 96.6 F | WEIGHT: 125 LBS

## 2023-06-22 DIAGNOSIS — D69.3 IMMUNE THROMBOCYTOPENIC PURPURA: ICD-10-CM

## 2023-06-22 DIAGNOSIS — F41.9 ANXIETY DISORDER, UNSPECIFIED: ICD-10-CM

## 2023-06-22 PROCEDURE — 99215 OFFICE O/P EST HI 40 MIN: CPT

## 2023-06-22 NOTE — ASSESSMENT
[FreeTextEntry1] : 89 yo female with new diagnosis of Stage IV DLBCL s/p treatment with RC and prednisone presents for follow up. Currently on gazyva, revlmid on hold since 9/2022 given thrombocytopenia. Overall, tolerating current treatment well without any significant side effects. \par \par #DLBCL\par -Pt was on RC and prednisone every 3 weeks,\par - CT scan of chest done 4/2/22: Newly noted 9 mm opacity in the periphery of the right upper lobe and interval increase in size of a lingular opacity along the fissure.\par - Discussed with patient and daughter Dinorah we would prefer to start treatment with Rituxan and Polatuzumab; Went over risk/side effects of Polatuzumab and consent signed today.\par -Pt is s/p Hugo-Rtixumab- cycle 1 on 4/28/22\par -She developed respiratory distress and was hospitalized and was noted to have pulm edema and concern for progressive lymphoma vs. infection\par -Considering lymphoma involvement in the lungs treated with Obinutuzumab on 5/20 test dose and 5/21 to complete infusion for total of 1000mg. She is s/p C1D8 on 6/9/22 and C1D15 on 6/16/22, then once monthly. Started Revlimid on 6/26/22 (pt has an eye surgery scheduled at that time) - risks/side effects discussed with patient, consent signed. 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 She is scheduled for next week\par -last PET/CT 3/23/23, stable, will plan for scans every 2-3 months\par -if progression of disease, will consider adding on revlimid 2.5 mg vs. loncatuximab tesirine x 12 cycles \par -was getting prednisone for suspected ITP, currently on 5mg daily -will d/c and monitor counts closely\par -CT of chest done for new cough with lower of steroids showed a PE, now on Eliquis\par -can continue IVIG given age and likelihood of hypogammaglobulinemia \par -c/w valacyclovir; bactrim\par -Continue methenamine hippurate for UTI prevention\par -Cont xanax as needed for anxiety -refilled today\par -RTC in 1wk to check CBC off prednisone\par

## 2023-06-22 NOTE — HISTORY OF PRESENT ILLNESS
[de-identified] : 89 year old woman with a PMH of COPD  was admitted to [facility] for worsening cough and lethargy despite treatment with antibiotics and steroids. She underwent R robotic VATS/pleural bx with placement of pleurex catheter on 07/15/21 after PET from 07/07/21 reveal b/l upper lobe opacities concerning for malignancy with pleural effusion. 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 Given her age and symptoms, patient and daughter opted for palliative chemotherapy. she received prednisone, followed by cytoxan (200 mg/m2) on 8/5-8/7/21 with marked improvement in respiratory status and mental status. Pleurex catheter stopped draining after initiation of cytoxan. She received rituximab which was complicated by infusion reaction. She received the full dose over 2 days. She was discharged to [place] [facility] and presents for follow up. She received 4 cycles of R-CP which was overall well tolerated. She has a fall in mid October and did not initially seek medical care. Her daughter Dinorah called the office on 10/20 and given her shoulder pain it was recommended she get xrays and an evaluation. She was noted to have a R proximal humerus fracture and was sent from Urgent care to the ER for admission. She was also noted to have a possible PNA. While admitted, she was noted to be tachy-lidia. She was also found to have parainflueza and was treated with ceftriaxone/azithromycin for PNA. \par She received C5 on 11/18, C6 on 12/9, C7 on 12/30, and C8 on 1/27 (delayed due to neutropenia). \par \par Pt was recently hospitalized for respiratory distress in May 2022. CTA chest 5/10: No pulmonary embolism from the main pulmonary artery up to and including the lobar branches. Several segmental and subsegmental branches cannot be assessed due to extensive respiratory motion. New groundglass in the upper lobes, suspicious for pulmonary edema. Infection is also a consideration. Increased mildly enlarged mediastinal lymph nodes may be reactive. Stable multilobar chronic consolidation.\par \par Considering lymphoma involvement in the lungs treated with Obinutuzumab on 5/20 test dose and 5/21 to complete infusion for total of 1000mg. She had C1D8 on 6/9/22, C1D15 on 6/16/22. She completed 6 cycles. She was started on revlmid, dose reduced to 2.5 mg every other day for thrombocytopenia which was eventually held. She was treated for possible ITP with marked improvement in platelets and has been on prednisone with bactrim ppx. She has been receiving IVIG for ITP (also for hypogammaglobulinemia in the setting of chronic B cell depletion and frequent viral URIs)\par \par  [de-identified] : 5/4/23: since last visit, pt's prednisone was reduced from 10mg to 5mg. Pt has noticed increase cough, she was rxed hycodan which she states is helping. Pt state she had an URI last week. Denies fevers, chills, night sweats, weight loss. \par \par CT chest done 5/31/23: Pulmonary embolus in the distal portion of the right main pulmonary artery.\par Ill-defined nodule in the right upper lobe has decreased in size when compared to previous exam.\par Stable patchy opacities in the right middle and both lower lobes when compared to previous exam.\par \par 6/22: We started her on Eliquis earlier this month. We held her Obi that was scheduled until after our visit today given new findings. \par She has been having back pain starting 6/5. She saw her PMD Dr. Bernal. CXR was done, given a short course of Abx. Pain is about a 5 now. \par Per pt and aide, she saw pulmonologist, cardiologist  -cough improved\par

## 2023-06-22 NOTE — PHYSICAL EXAM
[Ambulatory and capable of all self care but unable to carry out any work activities] : Status 2- Ambulatory and capable of all self care but unable to carry out any work activities. Up and about more than 50% of waking hours [Normal] : affect appropriate [de-identified] : no wheezing, symmetric chest rise [de-identified] : non tender to palpation

## 2023-06-27 ENCOUNTER — LABORATORY RESULT (OUTPATIENT)
Age: 88
End: 2023-06-27

## 2023-06-29 ENCOUNTER — APPOINTMENT (OUTPATIENT)
Dept: HEMATOLOGY ONCOLOGY | Facility: CLINIC | Age: 88
End: 2023-06-29
Payer: MEDICARE

## 2023-06-29 ENCOUNTER — APPOINTMENT (OUTPATIENT)
Dept: INFUSION THERAPY | Facility: HOSPITAL | Age: 88
End: 2023-06-29

## 2023-06-29 PROCEDURE — 99214 OFFICE O/P EST MOD 30 MIN: CPT

## 2023-06-30 DIAGNOSIS — Z51.11 ENCOUNTER FOR ANTINEOPLASTIC CHEMOTHERAPY: ICD-10-CM

## 2023-06-30 DIAGNOSIS — C85.80 OTHER SPECIFIED TYPES OF NON-HODGKIN LYMPHOMA, UNSPECIFIED SITE: ICD-10-CM

## 2023-06-30 DIAGNOSIS — R11.2 NAUSEA WITH VOMITING, UNSPECIFIED: ICD-10-CM

## 2023-07-04 ENCOUNTER — LABORATORY RESULT (OUTPATIENT)
Age: 88
End: 2023-07-04

## 2023-07-07 NOTE — PHYSICAL EXAM
[Ambulatory and capable of all self care but unable to carry out any work activities] : Status 2- Ambulatory and capable of all self care but unable to carry out any work activities. Up and about more than 50% of waking hours [Normal] : affect appropriate [de-identified] : no wheezing, symmetric chest rise [de-identified] : non tender to palpation

## 2023-07-07 NOTE — ASSESSMENT
[FreeTextEntry1] : 89 yo female with new diagnosis of Stage IV DLBCL s/p treatment with RC and prednisone presents for follow up. Currently on gazyva, revlmid on hold since 9/2022 given thrombocytopenia. Overall, tolerating current treatment well without any significant side effects. \par \par #DLBCL\par -Pt was on RC and prednisone every 3 weeks,\par - CT scan of chest done 4/2/22: Newly noted 9 mm opacity in the periphery of the right upper lobe and interval increase in size of a lingular opacity along the fissure.\par - Discussed with patient and daughter Dinorah we would prefer to start treatment with Rituxan and Polatuzumab; Went over risk/side effects of Polatuzumab and consent signed today.\par -Pt is s/p Hugo-Rtixumab- cycle 1 on 4/28/22\par -She developed respiratory distress and was hospitalized and was noted to have pulm edema and concern for progressive lymphoma vs. infection\par -Considering lymphoma involvement in the lungs treated with Obinutuzumab on 5/20 test dose and 5/21 to complete infusion for total of 1000mg. She is s/p C1D8 on 6/9/22 and C1D15 on 6/16/22, then once monthly. Started Revlimid on 6/26/22 (pt has an eye surgery scheduled at that time) - risks/side effects discussed with patient, consent signed. 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 She is scheduled for next week\par -last PET/CT 3/23/23, stable\par -if progression of disease, will consider adding on revlimid 2.5 mg vs. loncatuximab tesirine x 12 cycles \par -was getting prednisone for suspected ITP, was on 5mg daily then d/c on 6/22. CBC done by home draw on 6/27 showed Platelet stable 217. Cont close monitor. \par -CT of chest done for new cough with lower of steroids showed a PE, now on Eliquis\par -can continue IVIG given age and likelihood of hypogammaglobulinemia \par -c/w valacyclovir; bactrim \par -Continue methenamine hippurate for UTI prevention\par -Cont xanax as needed for anxiety -refilled today\par -RTC in 1 mon for count check off prednisone, in 2 mons for Obi\par \par Case and management discussed with Dr. Collins\par \par

## 2023-07-07 NOTE — HISTORY OF PRESENT ILLNESS
[de-identified] : 89 year old woman with a PMH of COPD  was admitted to [facility] for worsening cough and lethargy despite treatment with antibiotics and steroids. She underwent R robotic VATS/pleural bx with placement of pleurex catheter on 07/15/21 after PET from 07/07/21 reveal b/l upper lobe opacities concerning for malignancy with pleural effusion. 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 Given her age and symptoms, patient and daughter opted for palliative chemotherapy. she received prednisone, followed by cytoxan (200 mg/m2) on 8/5-8/7/21 with marked improvement in respiratory status and mental status. Pleurex catheter stopped draining after initiation of cytoxan. She received rituximab which was complicated by infusion reaction. She received the full dose over 2 days. She was discharged to [place] [facility] and presents for follow up. She received 4 cycles of R-CP which was overall well tolerated. She has a fall in mid October and did not initially seek medical care. Her daughter Dinorah called the office on 10/20 and given her shoulder pain it was recommended she get xrays and an evaluation. She was noted to have a R proximal humerus fracture and was sent from Urgent care to the ER for admission. She was also noted to have a possible PNA. While admitted, she was noted to be tachy-lidia. She was also found to have parainflueza and was treated with ceftriaxone/azithromycin for PNA. \par She received C5 on 11/18, C6 on 12/9, C7 on 12/30, and C8 on 1/27 (delayed due to neutropenia). \par \par Pt was recently hospitalized for respiratory distress in May 2022. CTA chest 5/10: No pulmonary embolism from the main pulmonary artery up to and including the lobar branches. Several segmental and subsegmental branches cannot be assessed due to extensive respiratory motion. New groundglass in the upper lobes, suspicious for pulmonary edema. Infection is also a consideration. Increased mildly enlarged mediastinal lymph nodes may be reactive. Stable multilobar chronic consolidation.\par \par Considering lymphoma involvement in the lungs treated with Obinutuzumab on 5/20 test dose and 5/21 to complete infusion for total of 1000mg. She had C1D8 on 6/9/22, C1D15 on 6/16/22. She completed 6 cycles. She was started on revlmid, dose reduced to 2.5 mg every other day for thrombocytopenia which was eventually held. She was treated for possible ITP with marked improvement in platelets and has been on prednisone with bactrim ppx. She has been receiving IVIG for ITP (also for hypogammaglobulinemia in the setting of chronic B cell depletion and frequent viral URIs)\par \par  [de-identified] : 5/4/23: since last visit, pt's prednisone was reduced from 10mg to 5mg. Pt has noticed increase cough, she was rxed hycodan which she states is helping. Pt state she had an URI last week. Denies fevers, chills, night sweats, weight loss. \par \par CT chest done 5/31/23: Pulmonary embolus in the distal portion of the right main pulmonary artery.\par Ill-defined nodule in the right upper lobe has decreased in size when compared to previous exam.\par Stable patchy opacities in the right middle and both lower lobes when compared to previous exam.\par \par 6/22: We started her on Eliquis earlier this month. We held her Obi that was scheduled until after our visit today given new findings. \par She has been having back pain starting 6/5. She saw her PMD Dr. Bernal. CXR was done, given a short course of Abx. Pain is about a 5 now. \par Per pt and aide, she saw pulmonologist, cardiologist  -cough improved\par \par 6/29: pt presented today for Obi treatment. She was seen today in the tx rm accompanied by her aid. Pt admitted cough and back pain improved. \par She stopped Prednisone last Thursday 6/22, home draw lab done on 6/27 showed CBC stable. \par \par \par

## 2023-07-11 ENCOUNTER — LABORATORY RESULT (OUTPATIENT)
Age: 88
End: 2023-07-11

## 2023-07-11 ENCOUNTER — INPATIENT (INPATIENT)
Facility: HOSPITAL | Age: 88
LOS: 6 days | Discharge: HOME HEALTH | End: 2023-07-18
Attending: HOSPITALIST | Admitting: HOSPITALIST
Payer: MEDICARE

## 2023-07-11 VITALS
OXYGEN SATURATION: 96 % | TEMPERATURE: 97 F | RESPIRATION RATE: 18 BRPM | SYSTOLIC BLOOD PRESSURE: 110 MMHG | HEART RATE: 98 BPM | DIASTOLIC BLOOD PRESSURE: 74 MMHG

## 2023-07-11 DIAGNOSIS — N39.0 URINARY TRACT INFECTION, SITE NOT SPECIFIED: ICD-10-CM

## 2023-07-11 DIAGNOSIS — Z96.641 PRESENCE OF RIGHT ARTIFICIAL HIP JOINT: Chronic | ICD-10-CM

## 2023-07-11 DIAGNOSIS — Z96.649 PRESENCE OF UNSPECIFIED ARTIFICIAL HIP JOINT: Chronic | ICD-10-CM

## 2023-07-11 LAB
ALBUMIN SERPL ELPH-MCNC: 3.9 G/DL — SIGNIFICANT CHANGE UP (ref 3.3–5)
ALP SERPL-CCNC: 148 U/L — HIGH (ref 40–120)
ALT FLD-CCNC: 20 U/L — SIGNIFICANT CHANGE UP (ref 4–33)
ANION GAP SERPL CALC-SCNC: 17 MMOL/L — HIGH (ref 7–14)
APPEARANCE UR: ABNORMAL
APTT BLD: 34.1 SEC — SIGNIFICANT CHANGE UP (ref 27–36.3)
AST SERPL-CCNC: 22 U/L — SIGNIFICANT CHANGE UP (ref 4–32)
BACTERIA # UR AUTO: ABNORMAL /HPF
BASE EXCESS BLDV CALC-SCNC: -12.9 MMOL/L — LOW (ref -2–3)
BASOPHILS # BLD AUTO: 0.05 K/UL — SIGNIFICANT CHANGE UP (ref 0–0.2)
BASOPHILS NFR BLD AUTO: 0.3 % — SIGNIFICANT CHANGE UP (ref 0–2)
BILIRUB SERPL-MCNC: 0.4 MG/DL — SIGNIFICANT CHANGE UP (ref 0.2–1.2)
BILIRUB UR-MCNC: NEGATIVE — SIGNIFICANT CHANGE UP
BLOOD GAS VENOUS COMPREHENSIVE RESULT: SIGNIFICANT CHANGE UP
BUN SERPL-MCNC: 25 MG/DL — HIGH (ref 7–23)
CALCIUM SERPL-MCNC: 10.2 MG/DL — SIGNIFICANT CHANGE UP (ref 8.4–10.5)
CAST: 4 /LPF — SIGNIFICANT CHANGE UP (ref 0–4)
CHLORIDE BLDV-SCNC: 113 MMOL/L — HIGH (ref 96–108)
CHLORIDE SERPL-SCNC: 111 MMOL/L — HIGH (ref 98–107)
CO2 BLDV-SCNC: 15.1 MMOL/L — LOW (ref 22–26)
CO2 SERPL-SCNC: 11 MMOL/L — LOW (ref 22–31)
COLOR SPEC: YELLOW — SIGNIFICANT CHANGE UP
CREAT SERPL-MCNC: 2.12 MG/DL — HIGH (ref 0.5–1.3)
DIFF PNL FLD: ABNORMAL
EGFR: 22 ML/MIN/1.73M2 — LOW
EOSINOPHIL # BLD AUTO: 0.04 K/UL — SIGNIFICANT CHANGE UP (ref 0–0.5)
EOSINOPHIL NFR BLD AUTO: 0.2 % — SIGNIFICANT CHANGE UP (ref 0–6)
GAS PNL BLDV: 136 MMOL/L — SIGNIFICANT CHANGE UP (ref 136–145)
GLUCOSE BLDV-MCNC: 125 MG/DL — HIGH (ref 70–99)
GLUCOSE SERPL-MCNC: 141 MG/DL — HIGH (ref 70–99)
GLUCOSE UR QL: NEGATIVE MG/DL — SIGNIFICANT CHANGE UP
HCO3 BLDV-SCNC: 14 MMOL/L — LOW (ref 22–29)
HCT VFR BLD CALC: 39 % — SIGNIFICANT CHANGE UP (ref 34.5–45)
HCT VFR BLDA CALC: 34 % — LOW (ref 34.5–46.5)
HGB BLD CALC-MCNC: 11.4 G/DL — LOW (ref 11.7–16.1)
HGB BLD-MCNC: 12.6 G/DL — SIGNIFICANT CHANGE UP (ref 11.5–15.5)
IANC: 14.63 K/UL — HIGH (ref 1.8–7.4)
IMM GRANULOCYTES NFR BLD AUTO: 0.9 % — SIGNIFICANT CHANGE UP (ref 0–0.9)
INR BLD: 1.51 RATIO — HIGH (ref 0.88–1.16)
KETONES UR-MCNC: NEGATIVE MG/DL — SIGNIFICANT CHANGE UP
LACTATE BLDV-MCNC: 1.5 MMOL/L — SIGNIFICANT CHANGE UP (ref 0.5–2)
LEUKOCYTE ESTERASE UR-ACNC: ABNORMAL
LYMPHOCYTES # BLD AUTO: 0.57 K/UL — LOW (ref 1–3.3)
LYMPHOCYTES # BLD AUTO: 3.3 % — LOW (ref 13–44)
MCHC RBC-ENTMCNC: 31.5 PG — SIGNIFICANT CHANGE UP (ref 27–34)
MCHC RBC-ENTMCNC: 32.3 GM/DL — SIGNIFICANT CHANGE UP (ref 32–36)
MCV RBC AUTO: 97.5 FL — SIGNIFICANT CHANGE UP (ref 80–100)
MONOCYTES # BLD AUTO: 1.6 K/UL — HIGH (ref 0–0.9)
MONOCYTES NFR BLD AUTO: 9.4 % — SIGNIFICANT CHANGE UP (ref 2–14)
NEUTROPHILS # BLD AUTO: 14.63 K/UL — HIGH (ref 1.8–7.4)
NEUTROPHILS NFR BLD AUTO: 85.9 % — HIGH (ref 43–77)
NITRITE UR-MCNC: NEGATIVE — SIGNIFICANT CHANGE UP
NRBC # BLD: 0 /100 WBCS — SIGNIFICANT CHANGE UP (ref 0–0)
NRBC # FLD: 0 K/UL — SIGNIFICANT CHANGE UP (ref 0–0)
PCO2 BLDV: 35 MMHG — LOW (ref 39–52)
PH BLDV: 7.21 — LOW (ref 7.32–7.43)
PH UR: 6 — SIGNIFICANT CHANGE UP (ref 5–8)
PLATELET # BLD AUTO: 147 K/UL — LOW (ref 150–400)
PO2 BLDV: 23 MMHG — LOW (ref 25–45)
POTASSIUM BLDV-SCNC: SIGNIFICANT CHANGE UP MMOL/L (ref 3.5–5.1)
POTASSIUM SERPL-MCNC: 4.6 MMOL/L — SIGNIFICANT CHANGE UP (ref 3.5–5.3)
POTASSIUM SERPL-SCNC: 4.6 MMOL/L — SIGNIFICANT CHANGE UP (ref 3.5–5.3)
PROT SERPL-MCNC: 6.5 G/DL — SIGNIFICANT CHANGE UP (ref 6–8.3)
PROT UR-MCNC: 100 MG/DL
PROTHROM AB SERPL-ACNC: 17.6 SEC — HIGH (ref 10.5–13.4)
RBC # BLD: 4 M/UL — SIGNIFICANT CHANGE UP (ref 3.8–5.2)
RBC # FLD: 14 % — SIGNIFICANT CHANGE UP (ref 10.3–14.5)
RBC CASTS # UR COMP ASSIST: 53 /HPF — HIGH (ref 0–4)
REVIEW: SIGNIFICANT CHANGE UP
SAO2 % BLDV: 35.4 % — LOW (ref 67–88)
SODIUM SERPL-SCNC: 139 MMOL/L — SIGNIFICANT CHANGE UP (ref 135–145)
SP GR SPEC: 1.01 — SIGNIFICANT CHANGE UP (ref 1–1.03)
SQUAMOUS # UR AUTO: 7 /HPF — HIGH (ref 0–5)
UROBILINOGEN FLD QL: 0.2 MG/DL — SIGNIFICANT CHANGE UP (ref 0.2–1)
WBC # BLD: 17.04 K/UL — HIGH (ref 3.8–10.5)
WBC # FLD AUTO: 17.04 K/UL — HIGH (ref 3.8–10.5)
WBC UR QL: 4375 /HPF — HIGH (ref 0–5)

## 2023-07-11 PROCEDURE — 73090 X-RAY EXAM OF FOREARM: CPT | Mod: 26,LT

## 2023-07-11 PROCEDURE — 71045 X-RAY EXAM CHEST 1 VIEW: CPT | Mod: 26

## 2023-07-11 PROCEDURE — 72125 CT NECK SPINE W/O DYE: CPT | Mod: 26,MA

## 2023-07-11 PROCEDURE — 72128 CT CHEST SPINE W/O DYE: CPT | Mod: 26,MA

## 2023-07-11 PROCEDURE — 73562 X-RAY EXAM OF KNEE 3: CPT | Mod: 26,50

## 2023-07-11 PROCEDURE — 99285 EMERGENCY DEPT VISIT HI MDM: CPT

## 2023-07-11 PROCEDURE — 70450 CT HEAD/BRAIN W/O DYE: CPT | Mod: 26,MA

## 2023-07-11 RX ORDER — SODIUM CHLORIDE 9 MG/ML
1000 INJECTION INTRAMUSCULAR; INTRAVENOUS; SUBCUTANEOUS ONCE
Refills: 0 | Status: DISCONTINUED | OUTPATIENT
Start: 2023-07-11 | End: 2023-07-11

## 2023-07-11 RX ORDER — LIDOCAINE 4 G/100G
1 CREAM TOPICAL ONCE
Refills: 0 | Status: COMPLETED | OUTPATIENT
Start: 2023-07-11 | End: 2023-07-11

## 2023-07-11 RX ORDER — SODIUM CHLORIDE 9 MG/ML
500 INJECTION INTRAMUSCULAR; INTRAVENOUS; SUBCUTANEOUS ONCE
Refills: 0 | Status: COMPLETED | OUTPATIENT
Start: 2023-07-11 | End: 2023-07-11

## 2023-07-11 RX ORDER — CEFTRIAXONE 500 MG/1
1000 INJECTION, POWDER, FOR SOLUTION INTRAMUSCULAR; INTRAVENOUS ONCE
Refills: 0 | Status: COMPLETED | OUTPATIENT
Start: 2023-07-11 | End: 2023-07-11

## 2023-07-11 RX ORDER — ACETAMINOPHEN 500 MG
1000 TABLET ORAL ONCE
Refills: 0 | Status: COMPLETED | OUTPATIENT
Start: 2023-07-11 | End: 2023-07-11

## 2023-07-11 RX ADMIN — Medication 400 MILLIGRAM(S): at 17:47

## 2023-07-11 RX ADMIN — LIDOCAINE 1 PATCH: 4 CREAM TOPICAL at 17:47

## 2023-07-11 RX ADMIN — SODIUM CHLORIDE 500 MILLILITER(S): 9 INJECTION INTRAMUSCULAR; INTRAVENOUS; SUBCUTANEOUS at 23:14

## 2023-07-11 RX ADMIN — CEFTRIAXONE 100 MILLIGRAM(S): 500 INJECTION, POWDER, FOR SOLUTION INTRAMUSCULAR; INTRAVENOUS at 22:50

## 2023-07-11 RX ADMIN — SODIUM CHLORIDE 500 MILLILITER(S): 9 INJECTION INTRAMUSCULAR; INTRAVENOUS; SUBCUTANEOUS at 20:21

## 2023-07-11 NOTE — ED PROVIDER NOTE - NS ED MD TWO NIGHTS YN
From: Elayne Chand  To: Amanda Saez  Sent: 11/21/2020 5:10 PM CST  Subject: Non-Urgent Medical Question    Hello, I received a call today from my pharmacy asking if I want to get the pneumonia vaccine. I thought I had it already, but they say I received the old one not the new 23 one. Should I be getting this one? Please let me know. Thank you.    Yes

## 2023-07-11 NOTE — ED ADULT NURSE REASSESSMENT NOTE - NS ED NURSE REASSESS COMMENT FT1
received report from covering RN, pt aox3 daughter @ bedside, pt noted to have multiple areas of ecchymosis on face BLE/BUE; skin tears on BUE/BLE, cleansed with saline mepilex applied to RUE tear. pt voided urine sent. +spont productive cough, c/o upper back pain, lidocaine patch noted on back in no acute distress

## 2023-07-11 NOTE — ED ADULT NURSE NOTE - NSFALLHARMRISKINTERV_ED_ALL_ED

## 2023-07-11 NOTE — ED PROVIDER NOTE - NS ED ROS FT
Constitutional: no fevers, chills  HEENT: no HA, vision changes, rhinorrhea, sore throat  Cardiac: no chest pain, palpitations  Respiratory: no SOB, cough or hemoptysis  GI: no n/v/d/c, abd pain, bloody or dark stools  : no dysuria, frequency, or hematuria  MSK: no joint pain, neck pain +thoracic back pain  Skin: no rashes, jaundice, pruritis  Neuro: no numbness/tingling, weakness, unsteady gait

## 2023-07-11 NOTE — ED ADULT TRIAGE NOTE - CHIEF COMPLAINT QUOTE
Pt brought in from home s/p fall last night. 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 Pt unsure of how she fell, did hit her head, no LOC. Pt refused to go to the hospital last night, so EMS bandaged her left arm and right knee due to abrasions. Pt noted with periorbital bruising. Hx of COPD with baseline cough.

## 2023-07-11 NOTE — ED PROVIDER NOTE - CLINICAL SUMMARY MEDICAL DECISION MAKING FREE TEXT BOX
frail female presents with scattered injuries after fall, feeling generally more weak than normal with no focal complaints aside from extremity pain and thoracic pain with obvious facial and extremity trauma. CT C/T-spine/CTH, ecg, XRs, wound care, UA due to age and vague complaint of weakness. up to date on tetanus.

## 2023-07-11 NOTE — ED ADULT NURSE NOTE - OBJECTIVE STATEMENT
89 year old female, received to spot 3A. Pt A&Ox4, ambulatory at baseline. Respirations equal and unlabored. Past medical history COPD, osteoarthritis. Pt on Eliquis, unsure the reason. Pt arrives to the ED s/p fall yesterday. Pt noted to have bruising around both eyes and nose. Pt also noted to have bruising to b/l arms. Pt endorsing back pain at this time. Pt states that she is unsure how she fell. Pt lives at home with 24 hour aides. Pt denies chest pain, SOB, palpitations, dizziness, blurry vision, headache, numbness, tingling, any other complaints. 20G IV placed to R antecubital space. Labs drawn and sent. Medicated as per EMR orders. No acute distress noted. Safety maintained, bed in lowest position, side rails raised, call bell in reach. Pending CT/XR.

## 2023-07-11 NOTE — ED PROVIDER NOTE - PHYSICAL EXAMINATION
General: non-toxic, NAD  HEENT: ecchymosis to nasal bridge/infraorbital bilaterally. PERRL, no conjunctival pallor. no rhinorrhea.   Cardiac: RRR, no murmurs, 2+ peripheral pulses  Resp: CTAB, tachypneic.   Abdomen: soft, non-distended, bowel sounds present, no ttp, no rebound or guarding. no organomegaly  MSK: no midline spinal tenderness. pelvis stable. full ROM of extremities, nonpainful. R knee ttp. L forearm ttp. no peripheral edema, calf tenderness, or leg size discrepancies. pel  Skin: no rashes. ecchymosis to R knee. skin avulsions to L forearm, R knee.   Neuro: AAOx4, 5+motor, sensation grossly intact. CN 2-12 intact.   Psych: mood and affect appropriate

## 2023-07-11 NOTE — ED PROVIDER NOTE - ATTENDING CONTRIBUTION TO CARE
89-year-old family history of lymphoma was on chemotherapy now in remission, history of COPD, history of heart failure with preserved EF currently on Eliquis for clot found on recent CT patient and family unsure what kind of clot presents with fall last night.  Patient does not recall how she fell states she did not have any dizziness or lightheadedness chest pain or shortness of breath but fell and hit her head.  Did not lose consciousness is not having any nausea or vomiting.  Patient's daughter found her this morning with bilateral raccoon eyes so brought her to the ER.  Patient sustained skin tears to her bilateral arms and right knee did not like herself no change in her mental status.  Per daughter patient has not been feeling well recently has had mid back pain as well as possible UTI and was treated with Cipro.  Patient states since falling her back pain has worsened.  Patient is well-appearing and not in any distress raccoon eyes noted no hematoma on head extraocular movements intact pupils equal and reactive no midline spine tenderness has left upper back tenderness.  Full range of motion of all extremities with no deformities ecchymosis noted on bilateral arms and knees with full range of motion and no abdominal tenderness.  Will get CT head given on Eliquis C-spine and thoracic spine.  Will check basic labs and UA to rule out any infection 89-year-old family history of lymphoma was on chemotherapy now in remission, history of COPD, history of heart failure with preserved EF currently on Eliquis for clot found on recent CT patient and family unsure what kind of clot presents with fall last night.  Patient does not recall how she fell states she did not have any dizziness or lightheadedness chest pain or shortness of breath but fell and hit her head.  Did not lose consciousness is not having any nausea or vomiting.  Patient's daughter found her this morning with bilateral raccoon eyes so brought her to the ER.  Patient sustained skin tears to her bilateral arms and right knee did not like herself no change in her mental status.  Per daughter patient has not been feeling well recently has had mid back pain as well as possible UTI and was treated with Cipro.  Patient states since falling her back pain has worsened.  Patient is well-appearing and not in any distress raccoon eyes noted no hematoma on head extraocular movements intact pupils equal and reactive no midline spine tenderness has left upper back tenderness.  Full range of motion of all extremities with no deformities ecchymosis noted on bilateral arms and knees with full range of motion and no abdominal tenderness.  Will get CT head given on Eliquis C-spine and thoracic spine.  Will check basic labs and UA to rule out any infection  tetanus UTD 89-year-old family history of lymphoma was on chemotherapy now in remission, history of COPD, history of heart failure with preserved EF currently on Eliquis for PE  patient and family unsure what kind of clot presents with fall last night.  Patient does not recall how she fell states she did not have any dizziness or lightheadedness chest pain or shortness of breath but fell and hit her head.  Did not lose consciousness is not having any nausea or vomiting.  Patient's daughter found her this morning with bilateral raccoon eyes so brought her to the ER.  Patient sustained skin tears to her bilateral arms and right knee did not like herself no change in her mental status.  Per daughter patient has not been feeling well recently has had mid back pain as well as possible UTI and was treated with Cipro.  Patient states since falling her back pain has worsened.  Patient is well-appearing and not in any distress raccoon eyes noted no hematoma on head extraocular movements intact pupils equal and reactive no midline spine tenderness has left upper back tenderness.  Full range of motion of all extremities with no deformities ecchymosis noted on bilateral arms and knees with full range of motion and no abdominal tenderness.  Will get CT head given on Eliquis C-spine and thoracic spine.  Will check basic labs and UA to rule out any infection  tetanus UTD

## 2023-07-11 NOTE — ED PROVIDER NOTE - OBJECTIVE STATEMENT
89y female hx lymphoma, on chemo (last 6/29?), COPD, pHTN, HFrEF presents with back pain after a fall forward onto her head 2d ago. denies prodromal headache cp lightheadedness. no LOC. on eliquis. R knee and upper back pain.

## 2023-07-12 DIAGNOSIS — I26.99 OTHER PULMONARY EMBOLISM WITHOUT ACUTE COR PULMONALE: ICD-10-CM

## 2023-07-12 DIAGNOSIS — E87.20 ACIDOSIS, UNSPECIFIED: ICD-10-CM

## 2023-07-12 DIAGNOSIS — N17.9 ACUTE KIDNEY FAILURE, UNSPECIFIED: ICD-10-CM

## 2023-07-12 DIAGNOSIS — N39.0 URINARY TRACT INFECTION, SITE NOT SPECIFIED: ICD-10-CM

## 2023-07-12 DIAGNOSIS — J44.9 CHRONIC OBSTRUCTIVE PULMONARY DISEASE, UNSPECIFIED: ICD-10-CM

## 2023-07-12 DIAGNOSIS — M54.9 DORSALGIA, UNSPECIFIED: ICD-10-CM

## 2023-07-12 DIAGNOSIS — F41.9 ANXIETY DISORDER, UNSPECIFIED: ICD-10-CM

## 2023-07-12 DIAGNOSIS — A41.9 SEPSIS, UNSPECIFIED ORGANISM: ICD-10-CM
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
## 2023-07-12 DIAGNOSIS — Z29.9 ENCOUNTER FOR PROPHYLACTIC MEASURES, UNSPECIFIED: ICD-10-CM

## 2023-07-12 DIAGNOSIS — C85.90 NON-HODGKIN LYMPHOMA, UNSPECIFIED, UNSPECIFIED SITE: ICD-10-CM

## 2023-07-12 LAB
ANION GAP SERPL CALC-SCNC: 11 MMOL/L — SIGNIFICANT CHANGE UP (ref 7–14)
ANION GAP SERPL CALC-SCNC: 14 MMOL/L — SIGNIFICANT CHANGE UP (ref 7–14)
B PERT DNA SPEC QL NAA+PROBE: SIGNIFICANT CHANGE UP
B PERT+PARAPERT DNA PNL SPEC NAA+PROBE: SIGNIFICANT CHANGE UP
BASOPHILS # BLD AUTO: 0.03 K/UL — SIGNIFICANT CHANGE UP (ref 0–0.2)
BASOPHILS NFR BLD AUTO: 0.2 % — SIGNIFICANT CHANGE UP (ref 0–2)
BLOOD GAS VENOUS COMPREHENSIVE RESULT: SIGNIFICANT CHANGE UP
BORDETELLA PARAPERTUSSIS (RAPRVP): SIGNIFICANT CHANGE UP
BUN SERPL-MCNC: 25 MG/DL — HIGH (ref 7–23)
BUN SERPL-MCNC: 25 MG/DL — HIGH (ref 7–23)
C PNEUM DNA SPEC QL NAA+PROBE: SIGNIFICANT CHANGE UP
CALCIUM SERPL-MCNC: 9.6 MG/DL — SIGNIFICANT CHANGE UP (ref 8.4–10.5)
CALCIUM SERPL-MCNC: 9.7 MG/DL — SIGNIFICANT CHANGE UP (ref 8.4–10.5)
CHLORIDE SERPL-SCNC: 113 MMOL/L — HIGH (ref 98–107)
CHLORIDE SERPL-SCNC: 115 MMOL/L — HIGH (ref 98–107)
CK SERPL-CCNC: 158 U/L — SIGNIFICANT CHANGE UP (ref 25–170)
CO2 SERPL-SCNC: 13 MMOL/L — LOW (ref 22–31)
CO2 SERPL-SCNC: 16 MMOL/L — LOW (ref 22–31)
CREAT SERPL-MCNC: 1.97 MG/DL — HIGH (ref 0.5–1.3)
CREAT SERPL-MCNC: 2.02 MG/DL — HIGH (ref 0.5–1.3)
EGFR: 23 ML/MIN/1.73M2 — LOW
EGFR: 24 ML/MIN/1.73M2 — LOW
EOSINOPHIL # BLD AUTO: 0.06 K/UL — SIGNIFICANT CHANGE UP (ref 0–0.5)
EOSINOPHIL NFR BLD AUTO: 0.5 % — SIGNIFICANT CHANGE UP (ref 0–6)
FLUAV SUBTYP SPEC NAA+PROBE: SIGNIFICANT CHANGE UP
FLUBV RNA SPEC QL NAA+PROBE: SIGNIFICANT CHANGE UP
GAS PNL BLDV: SIGNIFICANT CHANGE UP
GLUCOSE SERPL-MCNC: 142 MG/DL — HIGH (ref 70–99)
GLUCOSE SERPL-MCNC: 159 MG/DL — HIGH (ref 70–99)
HADV DNA SPEC QL NAA+PROBE: SIGNIFICANT CHANGE UP
HCOV 229E RNA SPEC QL NAA+PROBE: SIGNIFICANT CHANGE UP
HCOV HKU1 RNA SPEC QL NAA+PROBE: SIGNIFICANT CHANGE UP
HCOV NL63 RNA SPEC QL NAA+PROBE: SIGNIFICANT CHANGE UP
HCOV OC43 RNA SPEC QL NAA+PROBE: SIGNIFICANT CHANGE UP
HCT VFR BLD CALC: 33.3 % — LOW (ref 34.5–45)
HGB BLD-MCNC: 10.9 G/DL — LOW (ref 11.5–15.5)
HMPV RNA SPEC QL NAA+PROBE: SIGNIFICANT CHANGE UP
HPIV1 RNA SPEC QL NAA+PROBE: SIGNIFICANT CHANGE UP
HPIV2 RNA SPEC QL NAA+PROBE: SIGNIFICANT CHANGE UP
HPIV3 RNA SPEC QL NAA+PROBE: SIGNIFICANT CHANGE UP
HPIV4 RNA SPEC QL NAA+PROBE: SIGNIFICANT CHANGE UP
IANC: 10.2 K/UL — HIGH (ref 1.8–7.4)
IMM GRANULOCYTES NFR BLD AUTO: 1 % — HIGH (ref 0–0.9)
LYMPHOCYTES # BLD AUTO: 0.49 K/UL — LOW (ref 1–3.3)
LYMPHOCYTES # BLD AUTO: 4 % — LOW (ref 13–44)
M PNEUMO DNA SPEC QL NAA+PROBE: SIGNIFICANT CHANGE UP
MAGNESIUM SERPL-MCNC: 1.6 MG/DL — SIGNIFICANT CHANGE UP (ref 1.6–2.6)
MCHC RBC-ENTMCNC: 31.2 PG — SIGNIFICANT CHANGE UP (ref 27–34)
MCHC RBC-ENTMCNC: 32.7 GM/DL — SIGNIFICANT CHANGE UP (ref 32–36)
MCV RBC AUTO: 95.4 FL — SIGNIFICANT CHANGE UP (ref 80–100)
MONOCYTES # BLD AUTO: 1.23 K/UL — HIGH (ref 0–0.9)
MONOCYTES NFR BLD AUTO: 10.1 % — SIGNIFICANT CHANGE UP (ref 2–14)
MRSA PCR RESULT.: SIGNIFICANT CHANGE UP
NEUTROPHILS # BLD AUTO: 10.2 K/UL — HIGH (ref 1.8–7.4)
NEUTROPHILS NFR BLD AUTO: 84.2 % — HIGH (ref 43–77)
NRBC # BLD: 0 /100 WBCS — SIGNIFICANT CHANGE UP (ref 0–0)
NRBC # FLD: 0 K/UL — SIGNIFICANT CHANGE UP (ref 0–0)
NT-PROBNP SERPL-SCNC: 760 PG/ML — HIGH
PHOSPHATE SERPL-MCNC: 2.8 MG/DL — SIGNIFICANT CHANGE UP (ref 2.5–4.5)
PLATELET # BLD AUTO: 108 K/UL — LOW (ref 150–400)
POTASSIUM SERPL-MCNC: 4.3 MMOL/L — SIGNIFICANT CHANGE UP (ref 3.5–5.3)
POTASSIUM SERPL-MCNC: 4.4 MMOL/L — SIGNIFICANT CHANGE UP (ref 3.5–5.3)
POTASSIUM SERPL-SCNC: 4.3 MMOL/L — SIGNIFICANT CHANGE UP (ref 3.5–5.3)
POTASSIUM SERPL-SCNC: 4.4 MMOL/L — SIGNIFICANT CHANGE UP (ref 3.5–5.3)
PROCALCITONIN SERPL-MCNC: 0.26 NG/ML — HIGH (ref 0.02–0.1)
RAPID RVP RESULT: DETECTED
RBC # BLD: 3.49 M/UL — LOW (ref 3.8–5.2)
RBC # FLD: 14.2 % — SIGNIFICANT CHANGE UP (ref 10.3–14.5)
RSV RNA SPEC QL NAA+PROBE: SIGNIFICANT CHANGE UP
RV+EV RNA SPEC QL NAA+PROBE: DETECTED
S AUREUS DNA NOSE QL NAA+PROBE: SIGNIFICANT CHANGE UP
SARS-COV-2 RNA SPEC QL NAA+PROBE: SIGNIFICANT CHANGE UP
SODIUM SERPL-SCNC: 140 MMOL/L — SIGNIFICANT CHANGE UP (ref 135–145)
SODIUM SERPL-SCNC: 142 MMOL/L — SIGNIFICANT CHANGE UP (ref 135–145)
WBC # BLD: 12.13 K/UL — HIGH (ref 3.8–10.5)
WBC # FLD AUTO: 12.13 K/UL — HIGH (ref 3.8–10.5)

## 2023-07-12 PROCEDURE — 99223 1ST HOSP IP/OBS HIGH 75: CPT | Mod: GC

## 2023-07-12 PROCEDURE — 76770 US EXAM ABDO BACK WALL COMP: CPT | Mod: 26

## 2023-07-12 PROCEDURE — 12345: CPT | Mod: NC,GC

## 2023-07-12 RX ORDER — SODIUM BICARBONATE 1 MEQ/ML
0.22 SYRINGE (ML) INTRAVENOUS
Qty: 150 | Refills: 0 | Status: DISCONTINUED | OUTPATIENT
Start: 2023-07-12 | End: 2023-07-15

## 2023-07-12 RX ORDER — METOPROLOL TARTRATE 50 MG
1 TABLET ORAL
Qty: 0 | Refills: 0 | DISCHARGE

## 2023-07-12 RX ORDER — POTASSIUM CHLORIDE 20 MEQ
1 PACKET (EA) ORAL
Qty: 0 | Refills: 0 | DISCHARGE

## 2023-07-12 RX ORDER — ESCITALOPRAM OXALATE 10 MG/1
10 TABLET, FILM COATED ORAL DAILY
Refills: 0 | Status: DISCONTINUED | OUTPATIENT
Start: 2023-07-12 | End: 2023-07-18

## 2023-07-12 RX ORDER — BUDESONIDE AND FORMOTEROL FUMARATE DIHYDRATE 160; 4.5 UG/1; UG/1
2 AEROSOL RESPIRATORY (INHALATION)
Refills: 0 | Status: DISCONTINUED | OUTPATIENT
Start: 2023-07-12 | End: 2023-07-18

## 2023-07-12 RX ORDER — CEFTRIAXONE 500 MG/1
1000 INJECTION, POWDER, FOR SOLUTION INTRAMUSCULAR; INTRAVENOUS EVERY 24 HOURS
Refills: 0 | Status: COMPLETED | OUTPATIENT
Start: 2023-07-12 | End: 2023-07-14

## 2023-07-12 RX ORDER — ACETAMINOPHEN 500 MG
650 TABLET ORAL EVERY 6 HOURS
Refills: 0 | Status: DISCONTINUED | OUTPATIENT
Start: 2023-07-12 | End: 2023-07-18

## 2023-07-12 RX ORDER — CHLORHEXIDINE GLUCONATE 213 G/1000ML
1 SOLUTION TOPICAL DAILY
Refills: 0 | Status: DISCONTINUED | OUTPATIENT
Start: 2023-07-12 | End: 2023-07-18

## 2023-07-12 RX ORDER — METHENAMINE MANDELATE 1 G
1 TABLET ORAL
Qty: 0 | Refills: 0 | DISCHARGE

## 2023-07-12 RX ORDER — SODIUM BICARBONATE 1 MEQ/ML
50 SYRINGE (ML) INTRAVENOUS ONCE
Refills: 0 | Status: COMPLETED | OUTPATIENT
Start: 2023-07-12 | End: 2023-07-12

## 2023-07-12 RX ORDER — ALPRAZOLAM 0.25 MG
0.25 TABLET ORAL DAILY
Refills: 0 | Status: DISCONTINUED | OUTPATIENT
Start: 2023-07-12 | End: 2023-07-18

## 2023-07-12 RX ORDER — APIXABAN 2.5 MG/1
2.5 TABLET, FILM COATED ORAL
Refills: 0 | Status: DISCONTINUED | OUTPATIENT
Start: 2023-07-12 | End: 2023-07-12

## 2023-07-12 RX ORDER — APIXABAN 2.5 MG/1
5 TABLET, FILM COATED ORAL EVERY 12 HOURS
Refills: 0 | Status: DISCONTINUED | OUTPATIENT
Start: 2023-07-12 | End: 2023-07-18

## 2023-07-12 RX ORDER — MAGNESIUM SULFATE 500 MG/ML
1 VIAL (ML) INJECTION ONCE
Refills: 0 | Status: COMPLETED | OUTPATIENT
Start: 2023-07-12 | End: 2023-07-12

## 2023-07-12 RX ORDER — VALACYCLOVIR 500 MG/1
500 TABLET, FILM COATED ORAL DAILY
Refills: 0 | Status: DISCONTINUED | OUTPATIENT
Start: 2023-07-12 | End: 2023-07-18

## 2023-07-12 RX ORDER — VALACYCLOVIR 500 MG/1
1 TABLET, FILM COATED ORAL
Qty: 0 | Refills: 0 | DISCHARGE

## 2023-07-12 RX ORDER — ESCITALOPRAM OXALATE 10 MG/1
1.5 TABLET, FILM COATED ORAL
Qty: 0 | Refills: 0 | DISCHARGE

## 2023-07-12 RX ORDER — IPRATROPIUM/ALBUTEROL SULFATE 18-103MCG
3 AEROSOL WITH ADAPTER (GRAM) INHALATION EVERY 6 HOURS
Refills: 0 | Status: DISCONTINUED | OUTPATIENT
Start: 2023-07-12 | End: 2023-07-18

## 2023-07-12 RX ORDER — APIXABAN 2.5 MG/1
1 TABLET, FILM COATED ORAL
Refills: 0 | DISCHARGE

## 2023-07-12 RX ORDER — FLUTICASONE FUROATE, UMECLIDINIUM BROMIDE AND VILANTEROL TRIFENATATE 200; 62.5; 25 UG/1; UG/1; UG/1
1 POWDER RESPIRATORY (INHALATION)
Qty: 0 | Refills: 0 | DISCHARGE

## 2023-07-12 RX ADMIN — Medication 0.25 MILLIGRAM(S): at 17:59

## 2023-07-12 RX ADMIN — Medication 100 GRAM(S): at 06:45

## 2023-07-12 RX ADMIN — Medication 600 MILLIGRAM(S): at 17:13

## 2023-07-12 RX ADMIN — BUDESONIDE AND FORMOTEROL FUMARATE DIHYDRATE 2 PUFF(S): 160; 4.5 AEROSOL RESPIRATORY (INHALATION) at 09:26

## 2023-07-12 RX ADMIN — APIXABAN 5 MILLIGRAM(S): 2.5 TABLET, FILM COATED ORAL at 17:13

## 2023-07-12 RX ADMIN — CHLORHEXIDINE GLUCONATE 1 APPLICATION(S): 213 SOLUTION TOPICAL at 12:31

## 2023-07-12 RX ADMIN — Medication 3 MILLILITER(S): at 10:34

## 2023-07-12 RX ADMIN — VALACYCLOVIR 500 MILLIGRAM(S): 500 TABLET, FILM COATED ORAL at 13:31

## 2023-07-12 RX ADMIN — BUDESONIDE AND FORMOTEROL FUMARATE DIHYDRATE 2 PUFF(S): 160; 4.5 AEROSOL RESPIRATORY (INHALATION) at 22:44

## 2023-07-12 RX ADMIN — Medication 3 MILLILITER(S): at 02:00

## 2023-07-12 RX ADMIN — Medication 650 MILLIGRAM(S): at 04:31

## 2023-07-12 RX ADMIN — Medication 600 MILLIGRAM(S): at 03:31

## 2023-07-12 RX ADMIN — Medication 650 MILLIGRAM(S): at 03:31

## 2023-07-12 RX ADMIN — Medication 100 MEQ/KG/HR: at 03:31

## 2023-07-12 RX ADMIN — Medication 3 MILLILITER(S): at 20:19

## 2023-07-12 RX ADMIN — ESCITALOPRAM OXALATE 10 MILLIGRAM(S): 10 TABLET, FILM COATED ORAL at 12:31

## 2023-07-12 RX ADMIN — Medication 50 MILLIEQUIVALENT(S): at 03:31

## 2023-07-12 RX ADMIN — CEFTRIAXONE 100 MILLIGRAM(S): 500 INJECTION, POWDER, FOR SOLUTION INTRAMUSCULAR; INTRAVENOUS at 06:12

## 2023-07-12 RX ADMIN — Medication 1 TABLET(S): at 12:31

## 2023-07-12 RX ADMIN — Medication 3 MILLILITER(S): at 16:27

## 2023-07-12 RX ADMIN — APIXABAN 5 MILLIGRAM(S): 2.5 TABLET, FILM COATED ORAL at 06:11

## 2023-07-12 NOTE — PATIENT PROFILE ADULT - PATIENT'S PREFERRED PRONOUN
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

## 2023-07-12 NOTE — PROGRESS NOTE ADULT - PROBLEM SELECTOR PLAN 9
Lymphoma on palliative chemo   follows with Dr. Collins     Plan:  [facility]eme onc consult in AM Lymphoma on palliative chemo   Follows with Dr. Collins Diet: NPO until decision regarding BiPAP  DVT: c/w eliquis  Dispo: Pending PT eval  Code Status: DNR with Trial of Intubation

## 2023-07-12 NOTE — H&P ADULT - NSHPPHYSICALEXAM_GEN_ALL_CORE
T(C): 36.7 (07-12-23 @ 01:01), Max: 36.7 (07-11-23 @ 19:20)  HR: 106 (07-12-23 @ 01:01) (93 - 106)  BP: 114/76 (07-12-23 @ 01:01) (110/74 - 123/71)  RR: 18 (07-12-23 @ 01:01) (18 - 18)  SpO2: 96% (07-12-23 @ 01:01) (96% - 99%)    GENERAL: patient appears anxious, using accessory muscles to breath  EYES: sclera clear, no exudates  ENMT: oropharynx clear without erythema, no exudates, moist mucous membranes  NECK: supple, soft, no thyromegaly noted  LUNGS: high pitched wheezing heard in bilateral lung fields  HEART: soft S1/S2, regular rate and rhythm, no murmurs noted, no lower extremity edema  GASTROINTESTINAL: abdomen is soft, nontender, nondistended, normoactive bowel sounds, no palpable masses  INTEGUMENT: good skin turgor, no lesions noted  MUSCULOSKELETAL: no clubbing or cyanosis, no obvious deformity  NEUROLOGIC: awake, alert, oriented x3, good muscle tone in 4 extremities, no obvious sensory deficits  PSYCHIATRIC: mood is good, affect is congruent, linear and logical thought process  HEME/LYMPH: ecchymosis noted below both eyes, on bilateral LE and UE, appear to be of similar age/healing

## 2023-07-12 NOTE — H&P ADULT - CONVERSATION DETAILS
Patient A&Ox4 and able to communicate her wishes. Spoke with daughter and patient at bedside. Patient stated she would not like compressions if her heart should stop. Patient stated she would like trial of intubation if there is a chance she could be off the ventilator. Patient stated she would like to receive pressors if necessary.     Patient DNR with trial of intubation.   Daughter Dinorah Jacobsen is next of kin/HCP if patient is unable to make decisions for herself ([phone])    MOLST Updated and placed in chart.

## 2023-07-12 NOTE — CONSULT NOTE ADULT - SUBJECTIVE AND OBJECTIVE BOX
[facility] DIVISION OF KIDNEY DISEASES AND HYPERTENSION -- [phone]  -- INITIAL CONSULT NOTE  --------------------------------------------------------------------------------  HPI: 89F w/ PMH of Lymphoma, COPD, DVT/PE on eliquis, and osteoarthritis who presented to the [facility] after a fall. Patient admitted for UTI.  Nephrology service consulted for SHARAD and metabolic acidosis.    Upon review of [place] and [facility], pt's Scr on discharge from prior admission (5/16/22 to 5/22/22) noted to be 0.8 with peak of 1.3 on 5/16/22. On this admission, Scr elevated to 2.12 (7/11/23) and remained stable/elevated to 2.02 on repeat. SCO2 low at 11 (7/11/23) however remains improved but low at 13 on repeat.     Pt. seen and evaluated at bedside.  Pt. endorsed burning with urination for 2 days, fatigue, and mild shortness of breath. Pt. received IVFs and initiated on sodium bicarbonate infusion. Currently, denies fevers/chills, chest pain, abd pain, or lower extremity swelling.    PAST HISTORY  --------------------------------------------------------------------------------  PAST MEDICAL & SURGICAL HISTORY:  Cough  Osteoarthritis  Abnormal finding of lung  COPD, mild  Lymphoma  History of hip replacement  right    FAMILY HISTORY:  No pertinent family history in first degree relatives    PAST SOCIAL HISTORY:    ALLERGIES & MEDICATIONS  --------------------------------------------------------------------------------  Allergies  No Known Allergies    Intolerances    Standing Inpatient Medications  albuterol/ipratropium for Nebulization 3 milliLiter(s) Nebulizer every 6 hours  apixaban 5 milliGRAM(s) Oral every 12 hours  budesonide  80 MICROgram(s)/formoterol 4.5 MICROgram(s) Inhaler 2 Puff(s) Inhalation two times a day  cefTRIAXone   IVPB 1000 milliGRAM(s) IV Intermittent every 24 hours  chlorhexidine 2% Cloths 1 Application(s) Topical daily  escitalopram 10 milliGRAM(s) Oral daily  guaiFENesin  milliGRAM(s) Oral every 12 hours  sodium bicarbonate  Infusion 0.22 mEq/kG/Hr IV Continuous <Continuous>  trimethoprim  160 mG/sulfamethoxazole 800 mG 1 Tablet(s) Oral daily  valACYclovir 500 milliGRAM(s) Oral daily    PRN Inpatient Medications  acetaminophen     Tablet .. 650 milliGRAM(s) Oral every 6 hours PRN    REVIEW OF SYSTEMS  --------------------------------------------------------------------------------  Gen: No fevers/chills  Skin: No rashes  Head/Eyes/Ears: No HA  Respiratory: mild shortness of breath  CV: No chest pain  GI: No abdominal pain, diarrhea  : burning with urination  MSK: No edema    All other systems were reviewed and are negative, except as noted.    VITALS/PHYSICAL EXAM  --------------------------------------------------------------------------------  T(C): 36.5 (07-12-23 @ 10:00), Max: 36.7 (07-11-23 @ 19:20)  HR: 93 (07-12-23 @ 10:34) (93 - 106)  BP: 104/59 (07-12-23 @ 10:00) (104/59 - 123/71)  RR: 18 (07-12-23 @ 10:00) (18 - 18)  SpO2: 99% (07-12-23 @ 10:34) (96% - 99%)  Wt(kg): --  Weight (kg): 68.3 (07-12-23 @ 01:35)    Physical Exam:  Gen: NAD  HEENT: dry oral mucosa, B/L ecchymoses under eyes from fall  Pulm: CTA B/L  CV: S1S2  Abd: Soft, +BS   Ext: No LE edema B/L  Neuro: Awake  Skin: Warm and dry    LABS/STUDIES  --------------------------------------------------------------------------------              10.9   12.13 >-----------<  108      [07-12-23 @ 05:00]              33.3     142  |  115  |  25  ----------------------------<  159      [07-12-23 @ 05:00]  4.4   |  16  |  1.97        Ca     9.7     [07-12-23 @ 05:00]      Mg     1.60     [07-12-23 @ 05:00]      Phos  2.8     [07-12-23 @ 05:00]    TPro  6.5  /  Alb  3.9  /  TBili  0.4  /  DBili  x   /  AST  22  /  ALT  20  /  AlkPhos  148  [07-11-23 @ 17:22]          [07-12-23 @ 05:00]    Creatinine Trend:  SCr 1.97 [07-12 @ 05:00]  SCr 2.02 [07-12 @ 00:40]  SCr 2.12 [07-11 @ 17:22] [facility] DIVISION OF KIDNEY DISEASES AND HYPERTENSION -- [phone]  -- INITIAL CONSULT NOTE  --------------------------------------------------------------------------------  HPI: 89-year-old female with history of Lymphoma, COPD, DVT/PE on Eliquis and osteoarthritis who presented to the [facility] after a fall. Patient admitted for UTI.  Nephrology service consulted for SHARAD and metabolic acidosis.    On review of [place] and [facility], pt's Scr was WNL at 0.88 on 5/22/22. On current admission, Scr was elevated to 2.12 and SCO2 was low at 11 on 7/11/23. Pt. received IVFs and initiated on sodium bicarbonate infusion. Scr decreased to 1.97 and SCO2 improved to 16 today.      Pt. seen and evaluated at bedside.  Pt. endorsed burning with urination for 2 days, fatigue, and mild shortness of breath. Currently, denies fevers/chills, chest pain, abd pain, or lower extremity swelling.    PAST HISTORY  --------------------------------------------------------------------------------  PAST MEDICAL & SURGICAL HISTORY:  Cough  Osteoarthritis  Abnormal finding of lung  COPD, mild  Lymphoma  History of hip replacement  right    FAMILY HISTORY:  No pertinent family history in first degree relatives    PAST SOCIAL HISTORY:    ALLERGIES & MEDICATIONS  --------------------------------------------------------------------------------  Allergies  No Known Allergies    Intolerances    Standing Inpatient Medications  albuterol/ipratropium for Nebulization 3 milliLiter(s) Nebulizer every 6 hours  apixaban 5 milliGRAM(s) Oral every 12 hours  budesonide  80 MICROgram(s)/formoterol 4.5 MICROgram(s) Inhaler 2 Puff(s) Inhalation two times a day  cefTRIAXone   IVPB 1000 milliGRAM(s) IV Intermittent every 24 hours  chlorhexidine 2% Cloths 1 Application(s) Topical daily  escitalopram 10 milliGRAM(s) Oral daily  guaiFENesin  milliGRAM(s) Oral every 12 hours  sodium bicarbonate  Infusion 0.22 mEq/kG/Hr IV Continuous <Continuous>  trimethoprim  160 mG/sulfamethoxazole 800 mG 1 Tablet(s) Oral daily  valACYclovir 500 milliGRAM(s) Oral daily    PRN Inpatient Medications  acetaminophen     Tablet .. 650 milliGRAM(s) Oral every 6 hours PRN    REVIEW OF SYSTEMS  --------------------------------------------------------------------------------  Gen: No fevers/chills  Skin: No rashes  Head/Eyes/Ears: No HA  Respiratory: +Mild shortness of breath  CV: No chest pain  GI: No abdominal pain, diarrhea  : +Burning with urination  MSK: No edema    All other systems were reviewed and are negative, except as noted.    VITALS/PHYSICAL EXAM  --------------------------------------------------------------------------------  T(C): 36.5 (07-12-23 @ 10:00), Max: 36.7 (07-11-23 @ 19:20)  HR: 93 (07-12-23 @ 10:34) (93 - 106)  BP: 104/59 (07-12-23 @ 10:00) (104/59 - 123/71)  RR: 18 (07-12-23 @ 10:00) (18 - 18)  SpO2: 99% (07-12-23 @ 10:34) (96% - 99%)  Wt(kg): --  Weight (kg): 68.3 (07-12-23 @ 01:35)    Physical Exam:  Gen: resting  HEENT: dry oral mucosa, B/L ecchymoses under eyes from fall  Pulm: CTA B/L  CV: S1S2+  Abd: Soft, +BS   Ext: No LE edema B/L  Neuro: Awake, alert  Skin: Warm and dry    LABS/STUDIES  --------------------------------------------------------------------------------              10.9   12.13 >-----------<  108      [07-12-23 @ 05:00]              33.3     142  |  115  |  25  ----------------------------<  159      [07-12-23 @ 05:00]  4.4   |  16  |  1.97        Ca     9.7     [07-12-23 @ 05:00]      Mg     1.60     [07-12-23 @ 05:00]      Phos  2.8     [07-12-23 @ 05:00]    TPro  6.5  /  Alb  3.9  /  TBili  0.4  /  DBili  x   /  AST  22  /  ALT  20  /  AlkPhos  148  [07-11-23 @ 17:22]          [07-12-23 @ 05:00]    Creatinine Trend:  SCr 1.97 [07-12 @ 05:00]  SCr 2.02 [07-12 @ 00:40]  SCr 2.12 [07-11 @ 17:22]

## 2023-07-12 NOTE — PROGRESS NOTE ADULT - PROBLEM SELECTOR PLAN 7
Patient with hisotry of PE discovered 1 month ago    Plan:  >c/w eliquis 5 mg BID Diagnosed 1month ago  -Eliquis 5mg bid -On trelagy at home, not on O2  -Duonebs Q6h   -Symbicort 2 puffs BID  -No BiPAP currently

## 2023-07-12 NOTE — PROGRESS NOTE ADULT - SUBJECTIVE AND OBJECTIVE BOX
PROGRESS NOTE:   Authored by Chelsea Moody MD PGY-1  Internal Medicine      Patient is a 89y old  Female who presents with a chief complaint of Fall (12 Jul 2023 01:41)      SUBJECTIVE / OVERNIGHT EVENTS: ***, patient seen and examined at bedside    MEDICATIONS  (STANDING):  albuterol/ipratropium for Nebulization 3 milliLiter(s) Nebulizer every 6 hours  apixaban 5 milliGRAM(s) Oral every 12 hours  budesonide  80 MICROgram(s)/formoterol 4.5 MICROgram(s) Inhaler 2 Puff(s) Inhalation two times a day  cefTRIAXone   IVPB 1000 milliGRAM(s) IV Intermittent every 24 hours  chlorhexidine 2% Cloths 1 Application(s) Topical daily  escitalopram 10 milliGRAM(s) Oral daily  guaiFENesin  milliGRAM(s) Oral every 12 hours  sodium bicarbonate  Infusion 0.22 mEq/kG/Hr (100 mL/Hr) IV Continuous <Continuous>  trimethoprim  160 mG/sulfamethoxazole 800 mG 1 Tablet(s) Oral daily  valACYclovir 500 milliGRAM(s) Oral daily    MEDICATIONS  (PRN):  acetaminophen     Tablet .. 650 milliGRAM(s) Oral every 6 hours PRN Mild Pain (1 - 3), Moderate Pain (4 - 6)        PHYSICAL EXAM:  Vital Signs Last 24 Hrs  T(C): 36.5 (12 Jul 2023 10:00), Max: 36.7 (11 Jul 2023 19:20)  T(F): 97.7 (12 Jul 2023 10:00), Max: 98.1 (11 Jul 2023 19:20)  HR: 93 (12 Jul 2023 06:10) (93 - 106)  BP: 104/59 (12 Jul 2023 10:00) (104/59 - 123/71)  RR: 18 (12 Jul 2023 10:00) (18 - 18)  SpO2: 99% (12 Jul 2023 10:00) (96% - 99%)    Parameters below as of 12 Jul 2023 10:00  Patient On (Oxygen Delivery Method): room air      CONSTITUTIONAL: Well-groomed, in no apparent distress  RESPIRATORY: Breathing comfortably; no dullness to percussion; lungs CTA without wheeze/rhonchi/rales  CARDIOVASCULAR: +S1S2, RRR, no M/G/R; pedal pulses full and symmetric; no lower extremity edema  GASTROINTESTINAL: No palpable masses or tenderness, +BS throughout, no rebound/guarding; no hepatosplenomegaly; no hernia palpated  SKIN: No rashes or ulcers noted  NEUROLOGIC: A+O x 3, CN II-XII intact; sensation intact in LEs b/l to light touch    LABS:                        10.9   12.13 )-----------( 108      ( 12 Jul 2023 05:00 )             33.3     07-12    142  |  115<H>  |  25<H>  ----------------------------<  159<H>  4.4   |  16<L>  |  1.97<H>    Ca    9.7      12 Jul 2023 05:00  Phos  2.8     07-12  Mg     1.60     07-12    TPro  6.5  /  Alb  3.9  /  TBili  0.4  /  DBili  x   /  AST  22  /  ALT  20  /  AlkPhos  148<H>  07-11    PT/INR - ( 11 Jul 2023 17:40 )   PT: 17.6 sec;   INR: 1.51 ratio         PTT - ( 11 Jul 2023 17:40 )  PTT:34.1 sec            RADIOLOGY & ADDITIONAL TESTS:  Results Reviewed: CT head, C/T spine, CXR PROGRESS NOTE:   Authored by Chelsea Moody MD PGY-1  Internal Medicine      Patient is a 89y old  Female who presents with a chief complaint of Fall (12 Jul 2023 01:41)      SUBJECTIVE / OVERNIGHT EVENTS: NAEO. Pt deferred hx to daughter. Endorses feeling anxious. No chest pain, palpitations, LE edema, nausea, vomiting, and diarrhea.    MEDICATIONS  (STANDING):  albuterol/ipratropium for Nebulization 3 milliLiter(s) Nebulizer every 6 hours  apixaban 5 milliGRAM(s) Oral every 12 hours  budesonide  80 MICROgram(s)/formoterol 4.5 MICROgram(s) Inhaler 2 Puff(s) Inhalation two times a day  cefTRIAXone   IVPB 1000 milliGRAM(s) IV Intermittent every 24 hours  chlorhexidine 2% Cloths 1 Application(s) Topical daily  escitalopram 10 milliGRAM(s) Oral daily  guaiFENesin  milliGRAM(s) Oral every 12 hours  sodium bicarbonate  Infusion 0.22 mEq/kG/Hr (100 mL/Hr) IV Continuous <Continuous>  trimethoprim  160 mG/sulfamethoxazole 800 mG 1 Tablet(s) Oral daily  valACYclovir 500 milliGRAM(s) Oral daily    MEDICATIONS  (PRN):  acetaminophen     Tablet .. 650 milliGRAM(s) Oral every 6 hours PRN Mild Pain (1 - 3), Moderate Pain (4 - 6)        PHYSICAL EXAM:  Vital Signs Last 24 Hrs  T(C): 36.5 (12 Jul 2023 10:00), Max: 36.7 (11 Jul 2023 19:20)  T(F): 97.7 (12 Jul 2023 10:00), Max: 98.1 (11 Jul 2023 19:20)  HR: 93 (12 Jul 2023 06:10) (93 - 106)  BP: 104/59 (12 Jul 2023 10:00) (104/59 - 123/71)  RR: 18 (12 Jul 2023 10:00) (18 - 18)  SpO2: 99% (12 Jul 2023 10:00) (96% - 99%)    Parameters below as of 12 Jul 2023 10:00  Patient On (Oxygen Delivery Method): room air      CONSTITUTIONAL: Well-groomed, in no apparent distress  RESPIRATORY: Breathing comfortably; no dullness to percussion; lungs CTA without wheeze/rhonchi/rales  CARDIOVASCULAR: +S1S2, RRR, no M/G/R; pedal pulses full and symmetric; no lower extremity edema  GASTROINTESTINAL: No palpable masses or tenderness, +BS throughout, no rebound/guarding; no hepatosplenomegaly; no hernia palpated  SKIN: No rashes or ulcers noted  NEUROLOGIC: A+O x 3, CN II-XII intact; sensation intact in LEs b/l to light touch    LABS:                        10.9   12.13 )-----------( 108      ( 12 Jul 2023 05:00 )             33.3     07-12    142  |  115<H>  |  25<H>  ----------------------------<  159<H>  4.4   |  16<L>  |  1.97<H>    Ca    9.7      12 Jul 2023 05:00  Phos  2.8     07-12  Mg     1.60     07-12    TPro  6.5  /  Alb  3.9  /  TBili  0.4  /  DBili  x   /  AST  22  /  ALT  20  /  AlkPhos  148<H>  07-11    PT/INR - ( 11 Jul 2023 17:40 )   PT: 17.6 sec;   INR: 1.51 ratio         PTT - ( 11 Jul 2023 17:40 )  PTT:34.1 sec            RADIOLOGY & ADDITIONAL TESTS:  Results Reviewed: CT head, C/T spine, CXR

## 2023-07-12 NOTE — PROGRESS NOTE ADULT - PROBLEM SELECTOR PLAN 8
Diet: NPO until decision regarding BiPAP  DVT: c/w eliquis  Dispo: Pending PT eval  Code Status: DNR with Trial of Intubation Diagnosed 1month ago  -Eliquis 5mg bid

## 2023-07-12 NOTE — CONSULT NOTE ADULT - PROBLEM SELECTOR RECOMMENDATION 9
Pt. with SHARAD in the setting of infection. Upon review of [facility], pt's Scr on discharge from prior admission (5/16/22 to 5/22/22) noted to be 0.8 with peak of 1.3 on 5/16/22. On this admission, Scr elevated to 2.12 (7/11/23) and is improved but still elevated at 1.97 today (7/12/23). UOP not documented. Kidney sonogram shows layering debris in the bladder with no evidence of hydronephrosis. Recommend continuing sodium bicarbonate infusion. Monitor labs and urine output. Avoid nephrotoxins. Dose medications as per eGFR. Pt. with SHARAD in the setting of infection and fall. Upon review of [facility], pt's Scr on discharge from prior admission (5/16/22 to 5/22/22) noted to be 0.8 with peak of 1.3 on 5/16/22. On this admission, Scr elevated to 2.12 (7/11/23) and is improved but still elevated at 1.97 today (7/12/23). UOP not documented. Kidney sonogram shows layering debris in the bladder with no evidence of hydronephrosis. Recommend continuing sodium bicarbonate infusion. Check CK level to evaluate for rhabdomyolysis. Monitor labs and urine output. Avoid nephrotoxins. Dose medications as per eGFR. Pt. with SHARAD in the setting of infection/UTI. On review of [facility], pt's Scr was WNL at 0.88 on 5/22/22. On current admission, Scr was elevated to 2.12 on 7/11/23. Pt. received IVFs and initiated on sodium bicarbonate infusion. Scr decreased to 1.97 today (7/12/23). UOP not documented. Kidney sonogram shows layering debris in the bladder with no evidence of hydronephrosis. CK WNL (158) today. Recommend continuing sodium bicarbonate infusion. Monitor labs and urine output. Avoid nephrotoxins. Dose medications as per eGFR.

## 2023-07-12 NOTE — H&P ADULT - PROBLEM SELECTOR PLAN 3
Patient with history of COPD on trelagy at home; not on home O2   Patient with pCO2 45 on VBG (per winter's appropriate compensation for metabolic acidosis is 26-33)    Plan:  >c/w duonebs Q6h   >symbicort 2 puffs BID  >consider bipap if prolonged increased WOB  >MICU called, f/u recs

## 2023-07-12 NOTE — H&P ADULT - PROBLEM SELECTOR PLAN 5
Patient with noted positive UA in the ED   s/p CFTx1    Plan:  >c/w ceftriaxone   >f/u urine cultures Patient initially presenting with mechanical fall while on eliquis   CTH negative for hemorrhage, no acute fractures    Plan:   >c/w fall precautions  >tylenol for pain control (avoiding opiates given risk of respiratory depression; avoiding toradol given SHARAD)

## 2023-07-12 NOTE — H&P ADULT - TIME BILLING
Preparing to see the patient including review of tests and other providers' notes, confirming history with patient/family member, performing medical examination and evaluation, counseling and educating the patient/family/caregiver, ordering medications, tests and procedures, communicating with other health care professionals, documenting clinical information in the EMR, independently interpreting results and communicating results to the patient/family/caregiver, care coordination

## 2023-07-12 NOTE — H&P ADULT - PROBLEM SELECTOR PLAN 4
Patient initially presenting with mechanical fall while on eliquis   CTH negative for hemorrhage, no acute fractures    Plan:   >c/w fall precautions  >tylenol for pain control (avoiding opiates given risk of respiratory depression; avoiding toradol given SHARAD) Patient with noted positive UA in the ED   s/p CFTx1    Plan:  >c/w ceftriaxone   >f/u urine cultures

## 2023-07-12 NOTE — H&P ADULT - NSHPREVIEWOFSYSTEMS_GEN_ALL_CORE
REVIEW OF SYSTEMS:    CONSTITUTIONAL: No weakness, fevers or chills; anxiety   EYES/ENT: No visual changes;  No vertigo or throat pain   NECK: No pain or stiffness  RESPIRATORY: No cough, wheezing, hemoptysis; shortness of breath   CARDIOVASCULAR: No chest pain or palpitations  GASTROINTESTINAL: No abdominal or epigastric pain. No nausea, vomiting, or hematemesis; No diarrhea or constipation. No melena or hematochezia.  GENITOURINARY: No dysuria, frequency or hematuria  NEUROLOGICAL: No numbness or weakness  SKIN: bruises underneath eyes and on legs   MSK: Back pain Normal vision: sees adequately in most situations; can see medication labels, newsprint

## 2023-07-12 NOTE — H&P ADULT - PROBLEM SELECTOR PLAN 7
Diet: NPO until decision regarding BiPAP  DVT: c/w eliquis  Dispo: Pending PT eval  Code Status: DNR with Trial of Intubation

## 2023-07-12 NOTE — PROGRESS NOTE ADULT - PROBLEM SELECTOR PLAN 3
Initially presenting with mechanical fall while on eliquis   - CTH negative for hemorrhage  - CT spine showing T5 and T8 compression fractures  - No MRI at this time  - Assess baseline physical capacity after other acute problems resolve  - Fall precautions  - Tylenol for pain control (avoiding opiates given risk of respiratory depression; avoiding toradol given SHARAD) UA w/ WBC>4000 and + leukocyte esterase  -Ceftriaxone 1g qd

## 2023-07-12 NOTE — PROGRESS NOTE ADULT - PROBLEM SELECTOR PLAN 6
Patient intermittently fussy overnight, PRN Tylenol given x1 with some improvement. Able to sleep comfortably for a few hours at a time, prefers to be held. Voiding, passing gas but no stool. Tolerating feeds. Sitter at bedside. Continue POC.   Patient with history of COPD on trelagy at home; not on home O2   Patient with pCO2 45 on VBG (per winter's appropriate compensation for metabolic acidosis is 26-33)    Plan:  >c/w duonebs Q6h   >symbicort 2 puffs BID  >consider bipap if prolonged increased WOB  >MICU called, f/u recs -On trelagy at home, not on O2  -Duonebs Q6h   -Symbicort 2 puffs BID  -No BiPAP currently -Lexapro 10mg qd  -Home xanax?

## 2023-07-12 NOTE — H&P ADULT - NSICDXPASTMEDICALHX_GEN_ALL_CORE_FT
PAST MEDICAL HISTORY:  Abnormal finding of lung     COPD, mild     Cough     Lymphoma     Osteoarthritis

## 2023-07-12 NOTE — H&P ADULT - PROBLEM SELECTOR PLAN 2
Patient with SHARAD of unclear etiology possibly 2/2 hypotension/intrarenal disease  Reporting good urine output  Bladder Scan 262    Plan:   >Strict I/Os   >Nephrology consulted, appreciate recs  >f/u Renal ultrasound  >f/u urine lytes

## 2023-07-12 NOTE — PROGRESS NOTE ADULT - PROBLEM SELECTOR PLAN 5
-Lexapro 10mg qd  -Home xanax? Started prior to fall  -T5 and T8 compression fractures on CT  -No MRI at this time

## 2023-07-12 NOTE — PHYSICAL THERAPY INITIAL EVALUATION ADULT - ADDITIONAL COMMENTS
Patient states she lives with friend, has an aide from 10 Patient states she lives with friend, has an aide from 10-5pm/5 days week. Patient was getting assist with ADL prior to admission. Patient was not using an assistive device prior to admission.

## 2023-07-12 NOTE — PROGRESS NOTE ADULT - PROBLEM SELECTOR PLAN 4
Started prior to fall  -T5 and T8 compression fractures on CT  -No MRI at this time Initially presenting with mechanical fall while on eliquis   - CTH negative for hemorrhage  - CT spine showing T5 and T8 compression fractures  - No MRI at this time  - Assess baseline physical capacity after other acute problems resolve  - Fall precautions  - Tylenol for pain control (avoiding opiates given risk of respiratory depression; avoiding toradol given SHARAD)

## 2023-07-12 NOTE — CONSULT NOTE ADULT - PROBLEM SELECTOR RECOMMENDATION 2
Pt. with metabolic acidosis in the setting of SHARAD and infection. SCO2 low at 11 (7/11/23) on admission however remains improved but low at 17 on repeat. Recommend continuing serum bicarbonate infusion. Monitor SCO2.     If you have any questions, please feel free to contact me.  Álvaro Holt  Nephrology Fellow  [phone] / [employer] Teams (Preferred)  (After 4pm or on weekends, please call the on-call Fellow) Pt. with metabolic acidosis in the setting of SHARAD and infection. SCO2 low at 11 on admission (7/11/23), improved to 16 today. Continue serum bicarbonate infusion. Monitor SCO2.     If you have any questions, please feel free to contact me.  Álvaro Holt  Nephrology Fellow  [phone] / [employer] Teams (Preferred)  (After 4pm or on weekends, please call the on-call Fellow)

## 2023-07-12 NOTE — PROGRESS NOTE ADULT - PROBLEM SELECTOR PLAN 1
Likely 2/2 infection  - Improving on bicarb gtt  - Trend vBG, BMP  - Nephro consulted, recs appreciated  - Consider BiPAP if unable to compensate appropriately/prolonged increased work of breathing. Likely 2/2 infection  - Not properly compensated given hx of COPD  - Improving on bicarb gtt  - Trend vBG, BMP  - Nephro consulted, recs appreciated  - Consider BiPAP if unable to compensate appropriately/prolonged increased work of breathing.

## 2023-07-12 NOTE — H&P ADULT - ATTENDING COMMENTS
Pt with lymphoma, COPD, pulm HTN, PE p/w mechanical fall.  Found to have UTI with SHARAD and metabolic acidosis.  Pt currently without complaints  On exam: NAD, mildly tachypneic,  Heart RRR, lungs CTA B.  abd s/nt/nd BS normal.    Labs reviewed notable for SHARAD and metabolic acidosis with slightly increased anion gap.  + UA  CXR film reviewed: Clear lungs  Will continue ceftriaxone and f/u urine culture  nephrology consulted, will f/u recs.  Continue bicarb gtt for now  resp status improving,  Will hold off on bipap

## 2023-07-12 NOTE — H&P ADULT - NSHPLABSRESULTS_GEN_ALL_CORE
LABS: When present labs, imaging, and ECG were personally reviewed                          12.6   17.04 )-----------( 147      ( 11 Jul 2023 17:22 )             39.0       07-12    140  |  113<H>  |  25<H>  ----------------------------<  142<H>  4.3   |  13<L>  |  2.02<H>    Ca    9.6      12 Jul 2023 00:40    TPro  6.5  /  Alb  3.9  /  TBili  0.4  /  DBili  x   /  AST  22  /  ALT  20  /  AlkPhos  148<H>  07-11       LIVER FUNCTIONS - ( 11 Jul 2023 17:22 )  Alb: 3.9 g/dL / Pro: 6.5 g/dL / ALK PHOS: 148 U/L / ALT: 20 U/L / AST: 22 U/L / GGT: x                    Urinalysis Basic - ( 12 Jul 2023 00:40 )    Color: x / Appearance: x / SG: x / pH: x  Gluc: 142 mg/dL / Ketone: x  / Bili: x / Urobili: x   Blood: x / Protein: x / Nitrite: x   Leuk Esterase: x / RBC: x / WBC x   Sq Epi: x / Non Sq Epi: x / Bacteria: x        PT/INR - ( 11 Jul 2023 17:40 )   PT: 17.6 sec;   INR: 1.51 ratio         PTT - ( 11 Jul 2023 17:40 )  PTT:34.1 sec    Lactate Trend            CAPILLARY BLOOD GLUCOSE                RADIOLOGY & ADDITIONAL TESTS: < from: CT Thoracic Spine No Cont (07.11.23 @ 22:03) >      IMPRESSION:  Age-indeterminate but acute appearing moderate compression deformities of   T5 and T8.    Consider MRI for further evaluation.    < end of copied text >    < from: CT Head No Cont (07.11.23 @ 22:03) >    IMPRESSION:  CT head:  No acute intracranial hemorrhage or mass effect.    CT cervical spine:  No CT evidence of cervical spine fracture.    --- End of Report ---    < end of copied text >    < from: Xray Knee 3 Views, Bilateral (07.11.23 @ 20:11) >      IMPRESSION:  No acute fracture or dislocation.    < end of copied text >    < from: Xray Chest 1 View AP/PA (07.11.23 @ 18:33) >      IMPRESSION: No acute traumatic findings.    < end of copied text >

## 2023-07-12 NOTE — H&P ADULT - PROBLEM SELECTOR PLAN 6
Diet: NPO until decision regarding BiPAP  DVT:   Dispo: Pending PT eval  Code Status: DNR with Trial of Intubation Patient with hisotry of PE discovered 1 month ago    Plan:  >c/w eliquis 5 mg BID

## 2023-07-12 NOTE — H&P ADULT - ASSESSMENT
89 y/o  F PMH lymphoma (on palliative chemotherapy), COPD, prior PE on eliquis presenting for mechanical fall found to have UTI and metabolic acidosis.

## 2023-07-12 NOTE — PHYSICAL THERAPY INITIAL EVALUATION ADULT - GENERAL OBSERVATIONS, REHAB EVAL
Patient received semi-supine in bed, all lines intact, NAD, HR: Patient received semi-supine in bed, all lines intact, NAD, HR: 110 bpm

## 2023-07-12 NOTE — H&P ADULT - HISTORY OF PRESENT ILLNESS
89 y/o F PMH lymphoma (on palliative chemo), COPD, pHTN, recent PE (discovered in June, on eliquis) presenting after a mechanical fall in which she hit here head. Patient denies LOC, lightheadedness, incontinence during the event. Patient reporting pain after fall specifically in back. Per daughter, patient had been complaining about back pain for past week prior to the fall as well.     In the ED, patient noted to have positive UA with bacteria and LE, received 1 dose Ceftriaxone and 2 L IVF. T 98.1. HR , -123/70-90 on room air 100%. EKG 89 y/o F PMH lymphoma (on palliative chemo), COPD, pHTN, recent PE (discovered in June, on eliquis) presenting after a mechanical fall in which she hit here head. Patient denies LOC, lightheadedness, incontinence during the event. Patient reporting pain after fall specifically in back. Per daughter, patient had been complaining about back pain for past week prior to the fall as well. Patient also now reporting SOB and anxiety with some sputum production and cough.     In the ED, patient noted to have positive UA with bacteria and LE, received 1 dose Ceftriaxone and 2 L IVF. T 98.1. HR , -123/70-90 on room air 100%. EKG NSR.

## 2023-07-12 NOTE — PATIENT PROFILE ADULT - FALL HARM RISK - HARM RISK INTERVENTIONS

## 2023-07-12 NOTE — H&P ADULT - PROBLEM SELECTOR PLAN 1
Patient presenting with SHARAD Cr 2.12 on admission (prior 0.9)  Noted to have metabolic acidosis (11 on admission)  pH on VBG 7.21 --> 7.13   No significant anion gap, no elevated lactate     Plan:   >started on bicarbonate gtt 150/1000 @100cc  >f/u AM VBG, BMP  >nephrology consulted overnight, agreed with bicarbonate gtt; appreciate recs  >MICU consulted for worsening acidosis/decreased respiratory compensation; f/u recs  >consider BiPAP if unable to compensate appropriately/prolonged increased work of breathing.

## 2023-07-12 NOTE — CONSULT NOTE ADULT - ATTENDING COMMENTS
Pt. with SHARAD and metabolic acidosis. Scr elevated at 1.97 and SCO2 improved to 16 today. Assessment and plan for SHARAD and metabolic acidosis as outlined above. Monitor labs and urine output. Avoid any potential nephrotoxins. Dose medications as per eGFR.

## 2023-07-12 NOTE — PROGRESS NOTE ADULT - ASSESSMENT
89 y/o  F PMH lymphoma (on palliative chemotherapy), COPD, prior PE on eliquis presenting for mechanical fall found to have UTI and metabolic acidosis.  89 y/o  F with a hx of lymphoma (on palliative chemotherapy), COPD, anxiety, prior PE on eliquis presenting after mechanical fall with head impact, admitted for UTI and metabolic acidosis. Currently on ceftriaxone awaiting cultures. Pt stable with improving acidosis on bicarb gtt.

## 2023-07-13 LAB
ALBUMIN SERPL ELPH-MCNC: 3.5 G/DL — SIGNIFICANT CHANGE UP (ref 3.3–5)
ALP SERPL-CCNC: 131 U/L — HIGH (ref 40–120)
ALT FLD-CCNC: 15 U/L — SIGNIFICANT CHANGE UP (ref 4–33)
ANION GAP SERPL CALC-SCNC: 15 MMOL/L — HIGH (ref 7–14)
AST SERPL-CCNC: 14 U/L — SIGNIFICANT CHANGE UP (ref 4–32)
BASE EXCESS BLDV CALC-SCNC: -5.9 MMOL/L — LOW (ref -2–3)
BASOPHILS # BLD AUTO: 0.06 K/UL — SIGNIFICANT CHANGE UP (ref 0–0.2)
BASOPHILS NFR BLD AUTO: 0.4 % — SIGNIFICANT CHANGE UP (ref 0–2)
BILIRUB SERPL-MCNC: 0.2 MG/DL — SIGNIFICANT CHANGE UP (ref 0.2–1.2)
BLOOD GAS VENOUS COMPREHENSIVE RESULT: SIGNIFICANT CHANGE UP
BUN SERPL-MCNC: 21 MG/DL — SIGNIFICANT CHANGE UP (ref 7–23)
CALCIUM SERPL-MCNC: 9.8 MG/DL — SIGNIFICANT CHANGE UP (ref 8.4–10.5)
CHLORIDE BLDV-SCNC: 109 MMOL/L — HIGH (ref 96–108)
CHLORIDE SERPL-SCNC: 109 MMOL/L — HIGH (ref 98–107)
CO2 BLDV-SCNC: 21.5 MMOL/L — LOW (ref 22–26)
CO2 SERPL-SCNC: 17 MMOL/L — LOW (ref 22–31)
CREAT SERPL-MCNC: 1.66 MG/DL — HIGH (ref 0.5–1.3)
CULTURE RESULTS: SIGNIFICANT CHANGE UP
EGFR: 29 ML/MIN/1.73M2 — LOW
EOSINOPHIL # BLD AUTO: 0.15 K/UL — SIGNIFICANT CHANGE UP (ref 0–0.5)
EOSINOPHIL NFR BLD AUTO: 1 % — SIGNIFICANT CHANGE UP (ref 0–6)
GAS PNL BLDV: 139 MMOL/L — SIGNIFICANT CHANGE UP (ref 136–145)
GLUCOSE BLDV-MCNC: 145 MG/DL — HIGH (ref 70–99)
GLUCOSE SERPL-MCNC: 143 MG/DL — HIGH (ref 70–99)
HCO3 BLDV-SCNC: 20 MMOL/L — LOW (ref 22–29)
HCT VFR BLD CALC: 34.1 % — LOW (ref 34.5–45)
HCT VFR BLDA CALC: 35 % — SIGNIFICANT CHANGE UP (ref 34.5–46.5)
HGB BLD CALC-MCNC: 11.5 G/DL — LOW (ref 11.7–16.1)
HGB BLD-MCNC: 11.4 G/DL — LOW (ref 11.5–15.5)
IANC: 12.31 K/UL — HIGH (ref 1.8–7.4)
IMM GRANULOCYTES NFR BLD AUTO: 0.8 % — SIGNIFICANT CHANGE UP (ref 0–0.9)
LACTATE BLDV-MCNC: 1.7 MMOL/L — SIGNIFICANT CHANGE UP (ref 0.5–2)
LYMPHOCYTES # BLD AUTO: 0.68 K/UL — LOW (ref 1–3.3)
LYMPHOCYTES # BLD AUTO: 4.6 % — LOW (ref 13–44)
MAGNESIUM SERPL-MCNC: 1.8 MG/DL — SIGNIFICANT CHANGE UP (ref 1.6–2.6)
MCHC RBC-ENTMCNC: 32.1 PG — SIGNIFICANT CHANGE UP (ref 27–34)
MCHC RBC-ENTMCNC: 33.4 GM/DL — SIGNIFICANT CHANGE UP (ref 32–36)
MCV RBC AUTO: 96.1 FL — SIGNIFICANT CHANGE UP (ref 80–100)
MONOCYTES # BLD AUTO: 1.43 K/UL — HIGH (ref 0–0.9)
MONOCYTES NFR BLD AUTO: 9.7 % — SIGNIFICANT CHANGE UP (ref 2–14)
NEUTROPHILS # BLD AUTO: 12.31 K/UL — HIGH (ref 1.8–7.4)
NEUTROPHILS NFR BLD AUTO: 83.5 % — HIGH (ref 43–77)
NRBC # BLD: 0 /100 WBCS — SIGNIFICANT CHANGE UP (ref 0–0)
NRBC # FLD: 0 K/UL — SIGNIFICANT CHANGE UP (ref 0–0)
PCO2 BLDV: 41 MMHG — SIGNIFICANT CHANGE UP (ref 39–52)
PH BLDV: 7.3 — LOW (ref 7.32–7.43)
PHOSPHATE SERPL-MCNC: 3 MG/DL — SIGNIFICANT CHANGE UP (ref 2.5–4.5)
PLATELET # BLD AUTO: 122 K/UL — LOW (ref 150–400)
PO2 BLDV: 48 MMHG — HIGH (ref 25–45)
POTASSIUM BLDV-SCNC: 3.8 MMOL/L — SIGNIFICANT CHANGE UP (ref 3.5–5.1)
POTASSIUM SERPL-MCNC: 3.8 MMOL/L — SIGNIFICANT CHANGE UP (ref 3.5–5.3)
POTASSIUM SERPL-SCNC: 3.8 MMOL/L — SIGNIFICANT CHANGE UP (ref 3.5–5.3)
PROT SERPL-MCNC: 5.9 G/DL — LOW (ref 6–8.3)
RBC # BLD: 3.55 M/UL — LOW (ref 3.8–5.2)
RBC # FLD: 14.5 % — SIGNIFICANT CHANGE UP (ref 10.3–14.5)
SAO2 % BLDV: 77.7 % — SIGNIFICANT CHANGE UP (ref 67–88)
SODIUM SERPL-SCNC: 141 MMOL/L — SIGNIFICANT CHANGE UP (ref 135–145)
SPECIMEN SOURCE: SIGNIFICANT CHANGE UP
WBC # BLD: 14.75 K/UL — HIGH (ref 3.8–10.5)
WBC # FLD AUTO: 14.75 K/UL — HIGH (ref 3.8–10.5)

## 2023-07-13 PROCEDURE — 99233 SBSQ HOSP IP/OBS HIGH 50: CPT | Mod: GC

## 2023-07-13 PROCEDURE — 99222 1ST HOSP IP/OBS MODERATE 55: CPT

## 2023-07-13 PROCEDURE — 99232 SBSQ HOSP IP/OBS MODERATE 35: CPT | Mod: GC

## 2023-07-13 RX ORDER — ACETYLCYSTEINE 200 MG/ML
4 VIAL (ML) MISCELLANEOUS THREE TIMES A DAY
Refills: 0 | Status: DISCONTINUED | OUTPATIENT
Start: 2023-07-13 | End: 2023-07-18

## 2023-07-13 RX ORDER — PREDNISONE 5 MG/1
5 TABLET ORAL
Qty: 30 | Refills: 1 | Status: DISCONTINUED | COMMUNITY
Start: 2023-04-11 | End: 2023-07-13

## 2023-07-13 RX ORDER — ALPRAZOLAM 0.25 MG
0.25 TABLET ORAL ONCE
Refills: 0 | Status: DISCONTINUED | OUTPATIENT
Start: 2023-07-13 | End: 2023-07-13

## 2023-07-13 RX ADMIN — VALACYCLOVIR 500 MILLIGRAM(S): 500 TABLET, FILM COATED ORAL at 11:18

## 2023-07-13 RX ADMIN — Medication 1 TABLET(S): at 11:17

## 2023-07-13 RX ADMIN — Medication 4 MILLILITER(S): at 22:55

## 2023-07-13 RX ADMIN — CEFTRIAXONE 100 MILLIGRAM(S): 500 INJECTION, POWDER, FOR SOLUTION INTRAMUSCULAR; INTRAVENOUS at 06:59

## 2023-07-13 RX ADMIN — Medication 600 MILLIGRAM(S): at 06:59

## 2023-07-13 RX ADMIN — Medication 3 MILLILITER(S): at 22:55

## 2023-07-13 RX ADMIN — APIXABAN 5 MILLIGRAM(S): 2.5 TABLET, FILM COATED ORAL at 17:35

## 2023-07-13 RX ADMIN — Medication 3 MILLILITER(S): at 04:22

## 2023-07-13 RX ADMIN — BUDESONIDE AND FORMOTEROL FUMARATE DIHYDRATE 2 PUFF(S): 160; 4.5 AEROSOL RESPIRATORY (INHALATION) at 08:49

## 2023-07-13 RX ADMIN — APIXABAN 5 MILLIGRAM(S): 2.5 TABLET, FILM COATED ORAL at 06:59

## 2023-07-13 RX ADMIN — CHLORHEXIDINE GLUCONATE 1 APPLICATION(S): 213 SOLUTION TOPICAL at 11:18

## 2023-07-13 RX ADMIN — Medication 3 MILLILITER(S): at 15:40

## 2023-07-13 RX ADMIN — Medication 3 MILLILITER(S): at 10:18

## 2023-07-13 RX ADMIN — Medication 0.25 MILLIGRAM(S): at 11:18

## 2023-07-13 RX ADMIN — Medication 600 MILLIGRAM(S): at 17:35

## 2023-07-13 RX ADMIN — Medication 0.25 MILLIGRAM(S): at 22:48

## 2023-07-13 RX ADMIN — BUDESONIDE AND FORMOTEROL FUMARATE DIHYDRATE 2 PUFF(S): 160; 4.5 AEROSOL RESPIRATORY (INHALATION) at 22:49

## 2023-07-13 RX ADMIN — ESCITALOPRAM OXALATE 10 MILLIGRAM(S): 10 TABLET, FILM COATED ORAL at 11:18

## 2023-07-13 NOTE — PROGRESS NOTE ADULT - PROBLEM SELECTOR PLAN 3
UA w/ WBC>4000 and + leukocyte esterase  -Ceftriaxone 1g qd UA w/ WBC>4000 and + leukocyte esterase  -Ceftriaxone 1g qd  -BCx no growth  -UCx pending

## 2023-07-13 NOTE — CONSULT NOTE ADULT - ASSESSMENT
Assessment/Plan: 89 y/o  F with a hx of lymphoma (on palliative chemotherapy), COPD, anxiety, prior PE on eliquis presenting after mechanical fall with head impact, admitted for UTI and metabolic acidosis. Currently on ceftriaxone awaiting cultures. Pt stable with improving acidosis on bicarb gtt. No acute fractures or dislocations noted on imaging; no acute findings on CT head.    Wound Consult requested to assist w/ management of right breast wound; upon exam no noted ulcerations to right breast. Patient with superficial abrasions/category 3 skin tears to left arm and right dorsal hand.    Left arm and right dorsal hand category 3 skin tear/abrasion:  -Clean moist dermis, no associated cellulitis.  -Periwound skin thin, fragile.  -Topical recommendations: cleanse gently with NS, pat dry. Apply Liquid barrier film to periwound skin. Apply silicone foam with border, change every three days.    Additional recommendations:  -(+) audible rhonchi while at bedside; non productive cough after eating/drinking. Chest x-ray with scattered linear atelectasis otherwise no pulmonary consolidation, no acute traumatic finding. On Abx for UTI; WBC increasing currently 14.75 k/uL. Consider speech and swallow study r/o microaspiration. Management per primary team.  -Expressed feelings of anxiety; consider holistic nursing consult or medical management if needed; management per primary team.  -Sween 24 to bilateral upper and lower extremities daily.  -Continue to offload pressure; low airloss support surface, t&P per protocol with use of fluidized postioning devices, offload heels with complete cair boots.  -Nutrition Consult for optimization as tolerated     Patient seen with Dr. Godwin. Findings and plan discussed with patient and primary team Dr. Moody. All questions and concerns addressed to meet patient's satisfaction.   Wound Service line signing off; please reconsult as needed.    Thank you for this consult  ELDA Mcclendon CWN (pager #[phone])    If after 4PM or before 7:30AM on Mon-Friday or weekend/holiday please contact general surgery for urgent matters.   Team A- [id number]   Team B- [id number]  For non-urgent matters e-mail [email or web address]    I spent 55 minutes face to face with this patient of which more than 50% of the time was spent counseling & coordinating care of this pt Assessment/Plan: 89 y/o  F with a hx of lymphoma (on palliative chemotherapy), COPD, anxiety, prior PE on eliquis presenting after mechanical fall with head impact, admitted for UTI and metabolic acidosis. Currently on ceftriaxone awaiting cultures. Pt stable with improving acidosis on bicarb gtt. No acute fractures or dislocations noted on imaging; no acute findings on CT head.    Wound Consult requested to assist w/ management of right breast wound; upon exam no noted ulcerations to right breast. Patient with superficial abrasions/category 3 skin tears to left arm and right dorsal hand.    Left arm and right dorsal hand category 3 skin tear/abrasion:  -Clean moist dermis, no associated cellulitis.  -Periwound skin thin, fragile.  -Topical recommendations: cleanse gently with NS, pat dry. Apply Liquid barrier film to periwound skin. Apply silicone foam with border, change every three days.    Additional recommendations:  -(+) audible rhonchi while at bedside; non productive cough after eating/drinking. Chest x-ray with scattered linear atelectasis otherwise no pulmonary consolidation, no acute traumatic finding. On Abx for UTI; WBC increasing currently 14.75 k/uL. Consider speech and swallow study r/o microaspiration. Management per primary team.  -Expressed feelings of anxiety; consider holistic nursing consult or medical management if needed; management per primary team.  -Sween 24 to bilateral upper and lower extremities daily.  -Continue to offload pressure; low airloss support surface, t&P per protocol with use of fluidized postioning devices, offload heels with complete cair boots.  -Nutrition Consult for optimization as tolerated     Patient seen with Dr. Godwin. Findings and plan discussed with patient and primary team Dr. Moody. All questions and concerns addressed to meet patient's satisfaction.   Wound Service line signing off; please reconsult as needed.    Thank you for this consult  ELDA Mcclendon CWN (pager #[phone])    If after 4PM or before 7:30AM on Mon-Friday or weekend/holiday please contact general surgery for urgent matters.   Team A- [id number]   Team B- [id number]  For non-urgent matters e-mail [email or web address]

## 2023-07-13 NOTE — PROGRESS NOTE ADULT - ASSESSMENT
89 y/o  F with a hx of lymphoma (on palliative chemotherapy), COPD, anxiety, prior PE on eliquis presenting after mechanical fall with head impact, admitted for UTI and metabolic acidosis. Currently on ceftriaxone awaiting cultures. Pt stable with improving acidosis on bicarb gtt.  89 y/o  F with a hx of lymphoma (on immune therapy), COPD (not on home O2), anxiety, prior PE on eliquis presenting after mechanical fall with head impact, admitted for UTI and metabolic acidosis. Currently on ceftriaxone awaiting cultures. Pt stable with improving acidosis on bicarb gtt.

## 2023-07-13 NOTE — PROGRESS NOTE ADULT - PROBLEM SELECTOR PLAN 7
-On trelagy at home, not on O2  -Duonebs Q6h   -Symbicort 2 puffs BID  -No BiPAP currently -On trelagy at home, not on O2  -Duonebs Q6h   -Symbicort 2 puffs BID  -Satting appropriately on 2L NC  -No BiPAP currently

## 2023-07-13 NOTE — DIETITIAN INITIAL EVALUATION ADULT - PROBLEM SELECTOR PLAN 5
Patient initially presenting with mechanical fall while on eliquis   CTH negative for hemorrhage, no acute fractures    Plan:   >c/w fall precautions  >tylenol for pain control (avoiding opiates given risk of respiratory depression; avoiding toradol given SHARAD) Residential stability/Employment stability/Affective Stability/Insight into violence risk and need for management/treatment

## 2023-07-13 NOTE — DIETITIAN INITIAL EVALUATION ADULT - ORAL INTAKE PTA/DIET HISTORY
Reports to eating 3 meals a day but consuming <75% of most meals. Supplements with Ensure once daily.

## 2023-07-13 NOTE — PROGRESS NOTE ADULT - PROBLEM SELECTOR PLAN 2
Pt. with metabolic acidosis in the setting of SHARAD and infection. SCO2 low at 11 on admission (7/11/23), improved to 16 on 7/12/23, pending AM labs. Continue serum bicarbonate infusion. Monitor SCO2.     If you have any questions, please feel free to contact me.  Álvaro Holt  Nephrology Fellow  [phone] / [employer] Teams (Preferred)  (After 4pm or on weekends, please call the on-call Fellow). Pt. with metabolic acidosis in the setting of SHARAD and infection. SCO2 low at 11 on admission (7/11/23), improved to 17 today. Continue serum bicarbonate infusion. Monitor SCO2.     If you have any questions, please feel free to contact me.  Álvaro Holt  Nephrology Fellow  [phone] / [employer] Teams (Preferred)  (After 4pm or on weekends, please call the on-call Fellow).

## 2023-07-13 NOTE — DIETITIAN INITIAL EVALUATION ADULT - PERSON TAUGHT/METHOD
verbal instruction/patient instructed Gen: +fever, chills, body aches  Skin: denies rashes  HEENT: denies visual changes, ear pain, nasal congestion, throat pain  Respiratory: denies PARTIDA, SOB, cough  Cardiovascular: denies chest pain, palpitations  GI: denies abdominal pain, n/v/d  : denies dysuria, frequency, urgency, bowel/bladder incontinence  MSK: denies joint swelling/pain, back pain, neck pain  Neuro: +H/A. denies dizziness, LOC, weakness, numbness

## 2023-07-13 NOTE — PROGRESS NOTE ADULT - PROBLEM SELECTOR PLAN 2
Possibly 2/2 poor po intake  - Creat improving  - Renal US result pending Possibly 2/2 poor po intake  - Creat improving  - Renal US showed no hydronephrosis
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
## 2023-07-13 NOTE — DIETITIAN INITIAL EVALUATION ADULT - PROBLEM SELECTOR PLAN 4
Patient with noted positive UA in the ED   s/p CFTx1    Plan:  >c/w ceftriaxone   >f/u urine cultures

## 2023-07-13 NOTE — CONSULT NOTE ADULT - NS ATTEND AMEND GEN_ALL_CORE FT
Pt seen and examined with ACP.  Assessment and plan reviewed and discussed.  Agree with above.    Status of wounds and treatment recommendations d/w  pt.  All questions answered.   Pt expressed understanding.     I spent  55 minutes face to face w/ this pt of which more than 50% of the time was spent counseling & coordinating care of this pt.

## 2023-07-13 NOTE — DIETITIAN INITIAL EVALUATION ADULT - REASON INDICATOR FOR ASSESSMENT
Medicare Preventive Visit Patient Instructions  Thank you for completing your Welcome to Medicare Visit or Medicare Annual Wellness Visit today  Your next wellness visit will be due in one year (12/20/2023)  The screening/preventive services that you may require over the next 5-10 years are detailed below  Some tests may not apply to you based off risk factors and/or age  Screening tests ordered at today's visit but not completed yet may show as past due  Also, please note that scanned in results may not display below  Preventive Screenings:  Service Recommendations Previous Testing/Comments   Colorectal Cancer Screening  * Colonoscopy    * Fecal Occult Blood Test (FOBT)/Fecal Immunochemical Test (FIT)  * Fecal DNA/Cologuard Test  * Flexible Sigmoidoscopy Age: 39-70 years old   Colonoscopy: every 10 years (may be performed more frequently if at higher risk)  OR  FOBT/FIT: every 1 year  OR  Cologuard: every 3 years  OR  Sigmoidoscopy: every 5 years  Screening may be recommended earlier than age 39 if at higher risk for colorectal cancer  Also, an individualized decision between you and your healthcare provider will decide whether screening between the ages of 74-80 would be appropriate  Colonoscopy: 01/05/2019  FOBT/FIT: Not on file  Cologuard: Not on file  Sigmoidoscopy: Not on file    Screening Current     Breast Cancer Screening Age: 36 years old  Frequency: every 1-2 years  Not required if history of left and right mastectomy Mammogram: 06/23/2021    History Breast Cancer   Cervical Cancer Screening Between the ages of 21-29, pap smear recommended once every 3 years  Between the ages of 33-67, can perform pap smear with HPV co-testing every 5 years     Recommendations may differ for women with a history of total hysterectomy, cervical cancer, or abnormal pap smears in past  Pap Smear: 03/22/2022    Screening Current   Hepatitis C Screening Once for adults born between 1945 and 1965  More frequently in patients at high risk for Hepatitis C Hep C Antibody: Not on file        Diabetes Screening 1-2 times per year if you're at risk for diabetes or have pre-diabetes Fasting glucose: 96 mg/dL (3/15/2022)  A1C: 5 5 % (3/15/2022)  Screening Current   Cholesterol Screening Once every 5 years if you don't have a lipid disorder  May order more often based on risk factors  Lipid panel: 03/15/2022    Screening Not Indicated  History Lipid Disorder     Other Preventive Screenings Covered by Medicare:  1  Abdominal Aortic Aneurysm (AAA) Screening: covered once if your at risk  You're considered to be at risk if you have a family history of AAA  2  Lung Cancer Screening: covers low dose CT scan once per year if you meet all of the following conditions: (1) Age 50-69; (2) No signs or symptoms of lung cancer; (3) Current smoker or have quit smoking within the last 15 years; (4) You have a tobacco smoking history of at least 20 pack years (packs per day multiplied by number of years you smoked); (5) You get a written order from a healthcare provider  3  Glaucoma Screening: covered annually if you're considered high risk: (1) You have diabetes OR (2) Family history of glaucoma OR (3)  aged 48 and older OR (3)  American aged 72 and older  3  Osteoporosis Screening: covered every 2 years if you meet one of the following conditions: (1) You're estrogen deficient and at risk for osteoporosis based off medical history and other findings; (2) Have a vertebral abnormality; (3) On glucocorticoid therapy for more than 3 months; (4) Have primary hyperparathyroidism; (5) On osteoporosis medications and need to assess response to drug therapy  · Last bone density test (DXA Scan): 02/10/2021   5  HIV Screening: covered annually if you're between the age of 15-65  Also covered annually if you are younger than 13 and older than 72 with risk factors for HIV infection   For pregnant patients, it is covered up to 3 times per pregnancy  Immunizations:  Immunization Recommendations   Influenza Vaccine Annual influenza vaccination during flu season is recommended for all persons aged >= 6 months who do not have contraindications   Pneumococcal Vaccine   * Pneumococcal conjugate vaccine = PCV13 (Prevnar 13), PCV15 (Vaxneuvance), PCV20 (Prevnar 20)  * Pneumococcal polysaccharide vaccine = PPSV23 (Pneumovax) Adults 25-60 years old: 1-3 doses may be recommended based on certain risk factors  Adults 72 years old: 1-2 doses may be recommended based off what pneumonia vaccine you previously received   Hepatitis B Vaccine 3 dose series if at intermediate or high risk (ex: diabetes, end stage renal disease, liver disease)   Tetanus (Td) Vaccine - COST NOT COVERED BY MEDICARE PART B Following completion of primary series, a booster dose should be given every 10 years to maintain immunity against tetanus  Td may also be given as tetanus wound prophylaxis  Tdap Vaccine - COST NOT COVERED BY MEDICARE PART B Recommended at least once for all adults  For pregnant patients, recommended with each pregnancy  Shingles Vaccine (Shingrix) - COST NOT COVERED BY MEDICARE PART B  2 shot series recommended in those aged 48 and above     Health Maintenance Due:      Topic Date Due   • Hepatitis C Screening  Never done   • HIV Screening  Never done   • Colorectal Cancer Screening  01/05/2022   • Breast Cancer Screening: Mammogram  06/23/2022   • Cervical Cancer Screening  03/22/2025     Immunizations Due:      Topic Date Due   • Hepatitis B Vaccine (1 of 3 - 3-dose series) Never done   • COVID-19 Vaccine (4 - Booster for [place] series) 01/31/2022     Advance Directives   What are advance directives? Advance directives are legal documents that state your wishes and plans for medical care  These plans are made ahead of time in case you lose your ability to make decisions for yourself   Advance directives can apply to any medical decision, such as the treatments you want, and if you want to donate organs  What are the types of advance directives? There are many types of advance directives, and each state has rules about how to use them  You may choose a combination of any of the following:  · Living will: This is a written record of the treatment you want  You can also choose which treatments you do not want, which to limit, and which to stop at a certain time  This includes surgery, medicine, IV fluid, and tube feedings  · Durable power of  for healthcare [facility]): This is a written record that states who you want to make healthcare choices for you when you are unable to make them for yourself  This person, called a proxy, is usually a family member or a friend  You may choose more than 1 proxy  · Do not resuscitate (DNR) order:  A DNR order is used in case your heart stops beating or you stop breathing  It is a request not to have certain forms of treatment, such as CPR  A DNR order may be included in other types of advance directives  · Medical directive: This covers the care that you want if you are in a coma, near death, or unable to make decisions for yourself  You can list the treatments you want for each condition  Treatment may include pain medicine, surgery, blood transfusions, dialysis, IV or tube feedings, and a ventilator (breathing machine)  · Values history: This document has questions about your views, beliefs, and how you feel and think about life  This information can help others choose the care that you would choose  Why are advance directives important? An advance directive helps you control your care  Although spoken wishes may be used, it is better to have your wishes written down  Spoken wishes can be misunderstood, or not followed  Treatments may be given even if you do not want them  An advance directive may make it easier for your family to make difficult choices about your care     Weight Management   Why it is important to manage your weight:  Being overweight increases your risk of health conditions such as heart disease, high blood pressure, type 2 diabetes, and certain types of cancer  It can also increase your risk for osteoarthritis, sleep apnea, and other respiratory problems  Aim for a slow, steady weight loss  Even a small amount of weight loss can lower your risk of health problems  How to lose weight safely:  A safe and healthy way to lose weight is to eat fewer calories and get regular exercise  You can lose up about 1 pound a week by decreasing the number of calories you eat by 500 calories each day  Healthy meal plan for weight management:  A healthy meal plan includes a variety of foods, contains fewer calories, and helps you stay healthy  A healthy meal plan includes the following:  · Eat whole-grain foods more often  A healthy meal plan should contain fiber  Fiber is the part of grains, fruits, and vegetables that is not broken down by your body  Whole-grain foods are healthy and provide extra fiber in your diet  Some examples of whole-grain foods are whole-wheat breads and pastas, oatmeal, brown rice, and bulgur  · Eat a variety of vegetables every day  Include dark, leafy greens such as spinach, kale, dudley greens, and mustard greens  Eat yellow and orange vegetables such as carrots, sweet potatoes, and winter squash  · Eat a variety of fruits every day  Choose fresh or canned fruit (canned in its own juice or light syrup) instead of juice  Fruit juice has very little or no fiber  · Eat low-fat dairy foods  Drink fat-free (skim) milk or 1% milk  Eat fat-free yogurt and low-fat cottage cheese  Try low-fat cheeses such as mozzarella and other reduced-fat cheeses  · Choose meat and other protein foods that are low in fat  Choose beans or other legumes such as split peas or lentils  Choose fish, skinless poultry (chicken or turkey), or lean cuts of red meat (beef or pork)   Before you cook meat or poultry, cut off any visible fat  · Use less fat and oil  Try baking foods instead of frying them  Add less fat, such as margarine, sour cream, regular salad dressing and mayonnaise to foods  Eat fewer high-fat foods  Some examples of high-fat foods include french fries, doughnuts, ice cream, and cakes  · Eat fewer sweets  Limit foods and drinks that are high in sugar  This includes candy, cookies, regular soda, and sweetened drinks  Exercise:  Exercise at least 30 minutes per day on most days of the week  Some examples of exercise include walking, biking, dancing, and swimming  You can also fit in more physical activity by taking the stairs instead of the elevator or parking farther away from stores  Ask your healthcare provider about the best exercise plan for you  © Copyright [employer] 2018 Information is for End User's use only and may not be sold, redistributed or otherwise used for commercial purposes   All illustrations and images included in CareNotes® are the copyrighted property of A [employer] A M , Inc  or [address] [place] [address] MST Score 2 or >

## 2023-07-13 NOTE — PROGRESS NOTE ADULT - SUBJECTIVE AND OBJECTIVE BOX
PROGRESS NOTE:   Authored by Chelsea Moody MD PGY-1  Internal Medicine      Patient is a 89y old  Female who presents with a chief complaint of Fall (12 Jul 2023 12:17)      SUBJECTIVE / OVERNIGHT EVENTS: ***, patient seen and examined at bedside    MEDICATIONS  (STANDING):  albuterol/ipratropium for Nebulization 3 milliLiter(s) Nebulizer every 6 hours  apixaban 5 milliGRAM(s) Oral every 12 hours  budesonide  80 MICROgram(s)/formoterol 4.5 MICROgram(s) Inhaler 2 Puff(s) Inhalation two times a day  cefTRIAXone   IVPB 1000 milliGRAM(s) IV Intermittent every 24 hours  chlorhexidine 2% Cloths 1 Application(s) Topical daily  escitalopram 10 milliGRAM(s) Oral daily  guaiFENesin  milliGRAM(s) Oral every 12 hours  sodium bicarbonate  Infusion 0.22 mEq/kG/Hr (100 mL/Hr) IV Continuous <Continuous>  trimethoprim  160 mG/sulfamethoxazole 800 mG 1 Tablet(s) Oral daily  valACYclovir 500 milliGRAM(s) Oral daily    MEDICATIONS  (PRN):  acetaminophen     Tablet .. 650 milliGRAM(s) Oral every 6 hours PRN Mild Pain (1 - 3), Moderate Pain (4 - 6)  ALPRAZolam 0.25 milliGRAM(s) Oral daily PRN For anxiety      CAPILLARY BLOOD GLUCOSE        I&O's Summary    12 Jul 2023 07:01  -  13 Jul 2023 07:00  --------------------------------------------------------  IN: 0 mL / OUT: 700 mL / NET: -700 mL        PHYSICAL EXAM:  Vital Signs Last 24 Hrs  T(C): 36.9 (13 Jul 2023 06:53), Max: 37.1 (12 Jul 2023 23:48)  T(F): 98.5 (13 Jul 2023 06:53), Max: 98.8 (12 Jul 2023 23:48)  HR: 121 (13 Jul 2023 06:53) (93 - 128)  BP: 141/86 (13 Jul 2023 06:53) (104/59 - 141/86)  RR: 18 (13 Jul 2023 06:53) (18 - 20)  SpO2: 97% (13 Jul 2023 06:53) (95% - 99%)    Parameters below as of 13 Jul 2023 08:30  Patient On (Oxygen Delivery Method): nasal cannula      CONSTITUTIONAL: Well-groomed, in no apparent distress  RESPIRATORY: Breathing comfortably; no dullness to percussion; lungs CTA without wheeze/rhonchi/rales  CARDIOVASCULAR: +S1S2, RRR, no M/G/R; pedal pulses full and symmetric; no lower extremity edema  GASTROINTESTINAL: No palpable masses or tenderness, +BS throughout, no rebound/guarding; no hepatosplenomegaly; no hernia palpated  SKIN: No rashes or ulcers noted  NEUROLOGIC: A+O x 3, CN II-XII intact; sensation intact in LEs b/l to light touch    LABS:                        10.9   12.13 )-----------( 108      ( 12 Jul 2023 05:00 )             33.3     07-12    142  |  115<H>  |  25<H>  ----------------------------<  159<H>  4.4   |  16<L>  |  1.97<H>    Ca    9.7      12 Jul 2023 05:00  Phos  2.8     07-12  Mg     1.60     07-12    TPro  6.5  /  Alb  3.9  /  TBili  0.4  /  DBili  x   /  AST  22  /  ALT  20  /  AlkPhos  148<H>  07-11    PT/INR - ( 11 Jul 2023 17:40 )   PT: 17.6 sec;   INR: 1.51 ratio         PTT - ( 11 Jul 2023 17:40 )  PTT:34.1 sec  CARDIAC MARKERS ( 12 Jul 2023 05:00 )  x     / x     / 158 U/L / x     / x            Culture - Blood (collected 12 Jul 2023 00:40)  Source: .Blood Blood-Peripheral  Preliminary Report (13 Jul 2023 07:02):    No growth at 24 hours    Culture - Blood (collected 12 Jul 2023 00:40)  Source: .Blood Blood  Preliminary Report (13 Jul 2023 07:02):    No growth at 24 hours     PROGRESS NOTE:   Authored by Chelsea Moody MD PGY-1  Internal Medicine      Patient is a 89y old  Female who presents with a chief complaint of Fall (12 Jul 2023 12:17)      SUBJECTIVE / OVERNIGHT EVENTS: Pt was tachypneic overnight and was placed on 2L NC. Since that time, she notes her breathing has improved and she is less anxious. Pt was seen and examined at bedside.    MEDICATIONS  (STANDING):  albuterol/ipratropium for Nebulization 3 milliLiter(s) Nebulizer every 6 hours  apixaban 5 milliGRAM(s) Oral every 12 hours  budesonide  80 MICROgram(s)/formoterol 4.5 MICROgram(s) Inhaler 2 Puff(s) Inhalation two times a day  cefTRIAXone   IVPB 1000 milliGRAM(s) IV Intermittent every 24 hours  chlorhexidine 2% Cloths 1 Application(s) Topical daily  escitalopram 10 milliGRAM(s) Oral daily  guaiFENesin  milliGRAM(s) Oral every 12 hours  sodium bicarbonate  Infusion 0.22 mEq/kG/Hr (100 mL/Hr) IV Continuous <Continuous>  trimethoprim  160 mG/sulfamethoxazole 800 mG 1 Tablet(s) Oral daily  valACYclovir 500 milliGRAM(s) Oral daily    MEDICATIONS  (PRN):  acetaminophen     Tablet .. 650 milliGRAM(s) Oral every 6 hours PRN Mild Pain (1 - 3), Moderate Pain (4 - 6)  ALPRAZolam 0.25 milliGRAM(s) Oral daily PRN For anxiety      CAPILLARY BLOOD GLUCOSE        I&O's Summary    12 Jul 2023 07:01  -  13 Jul 2023 07:00  --------------------------------------------------------  IN: 0 mL / OUT: 700 mL / NET: -700 mL        PHYSICAL EXAM:  Vital Signs Last 24 Hrs  T(C): 36.9 (13 Jul 2023 06:53), Max: 37.1 (12 Jul 2023 23:48)  T(F): 98.5 (13 Jul 2023 06:53), Max: 98.8 (12 Jul 2023 23:48)  HR: 121 (13 Jul 2023 06:53) (93 - 128)  BP: 141/86 (13 Jul 2023 06:53) (104/59 - 141/86)  RR: 18 (13 Jul 2023 06:53) (18 - 20)  SpO2: 97% (13 Jul 2023 06:53) (95% - 99%)    Parameters below as of 13 Jul 2023 08:30  Patient On (Oxygen Delivery Method): nasal cannula      CONSTITUTIONAL: Well-groomed, in no apparent distress  RESPIRATORY: Breathing comfortably; no dullness to percussion; lungs CTA without wheeze/rhonchi/rales  CARDIOVASCULAR: +S1S2, RRR, no M/G/R; pedal pulses full and symmetric; no lower extremity edema  GASTROINTESTINAL: No palpable masses or tenderness, +BS throughout, no rebound/guarding; no hepatosplenomegaly; no hernia palpated  SKIN: No rashes or ulcers noted  NEUROLOGIC: A+O x 3, CN II-XII intact; sensation intact in LEs b/l to light touch    LABS:                        10.9   12.13 )-----------( 108      ( 12 Jul 2023 05:00 )             33.3     07-12    142  |  115<H>  |  25<H>  ----------------------------<  159<H>  4.4   |  16<L>  |  1.97<H>    Ca    9.7      12 Jul 2023 05:00  Phos  2.8     07-12  Mg     1.60     07-12    TPro  6.5  /  Alb  3.9  /  TBili  0.4  /  DBili  x   /  AST  22  /  ALT  20  /  AlkPhos  148<H>  07-11    PT/INR - ( 11 Jul 2023 17:40 )   PT: 17.6 sec;   INR: 1.51 ratio         PTT - ( 11 Jul 2023 17:40 )  PTT:34.1 sec      Culture - Blood (collected 12 Jul 2023 00:40)  Source: .Blood Blood-Peripheral  Preliminary Report (13 Jul 2023 07:02):    No growth at 24 hours    Culture - Blood (collected 12 Jul 2023 00:40)  Source: .Blood Blood  Preliminary Report (13 Jul 2023 07:02):    No growth at 24 hours

## 2023-07-13 NOTE — PROGRESS NOTE ADULT - PROBLEM SELECTOR PLAN 9
Diet: NPO until decision regarding BiPAP  DVT: c/w eliquis  Dispo: Pending PT eval  Code Status: DNR with Trial of Intubation Diet: Regular diet + Ensure  DVT: c/w eliquis  Dispo: Pending PT eval  Code Status: DNR with Trial of Intubation

## 2023-07-13 NOTE — PROGRESS NOTE ADULT - SUBJECTIVE AND OBJECTIVE BOX
[facility] Division of Kidney Diseases & Hypertension  FOLLOW UP NOTE  [phone]--------------------------------------------------------------------------------    HPI: 89-year-old female with history of Lymphoma, COPD, DVT/PE on Eliquis and osteoarthritis [facility] Patient admitted for UTI.  Nephrology service consulted for SHARAD and metabolic acidosis. On review of [place] and [facility], pt's Scr was WNL at 0.88 on 5/22/22. On current admission, Scr was elevated to 2.12 and SCO2 was low at 11 on 7/11/23. Pt. received IVFs and initiated on sodium bicarbonate infusion.    24 hour events/subjective: Endorses making good urine. Currently, denies fevers/chills, chest pain, abd pain, or lower extremity swelling.    PAST HISTORY  --------------------------------------------------------------------------------  No significant changes to PMH, PSH, FHx, SHx, unless otherwise noted    ALLERGIES & MEDICATIONS  --------------------------------------------------------------------------------  Allergies  No Known Allergies    Intolerances    Standing Inpatient Medications  albuterol/ipratropium for Nebulization 3 milliLiter(s) Nebulizer every 6 hours  apixaban 5 milliGRAM(s) Oral every 12 hours  budesonide  80 MICROgram(s)/formoterol 4.5 MICROgram(s) Inhaler 2 Puff(s) Inhalation two times a day  cefTRIAXone   IVPB 1000 milliGRAM(s) IV Intermittent every 24 hours  chlorhexidine 2% Cloths 1 Application(s) Topical daily  escitalopram 10 milliGRAM(s) Oral daily  guaiFENesin  milliGRAM(s) Oral every 12 hours  sodium bicarbonate  Infusion 0.22 mEq/kG/Hr IV Continuous <Continuous>  trimethoprim  160 mG/sulfamethoxazole 800 mG 1 Tablet(s) Oral daily  valACYclovir 500 milliGRAM(s) Oral daily    PRN Inpatient Medications  acetaminophen     Tablet .. 650 milliGRAM(s) Oral every 6 hours PRN  ALPRAZolam 0.25 milliGRAM(s) Oral daily PRN    REVIEW OF SYSTEMS  --------------------------------------------------------------------------------  Gen: No fevers/chills  Skin: No rashes, +facial bruising   Head/Eyes/Ears: No HA  Respiratory: shortness of breath improved with oxygen  CV: No chest pain  GI: No abdominal pain, diarrhea  : no dysuria  MSK: No edema    All other systems were reviewed and are negative, except as noted.    VITALS/PHYSICAL EXAM  --------------------------------------------------------------------------------  T(C): 36.9 (07-13-23 @ 06:53), Max: 37.1 (07-12-23 @ 23:48)  HR: 121 (07-13-23 @ 06:53) (93 - 128)  BP: 141/86 (07-13-23 @ 06:53) (116/64 - 141/86)  RR: 18 (07-13-23 @ 06:53) (18 - 20)  SpO2: 97% (07-13-23 @ 06:53) (95% - 99%)  Wt(kg): --  Weight (kg): 68.3 (07-12-23 @ 01:35)    07-12-23 @ 07:01  -  07-13-23 @ 07:00  --------------------------------------------------------  IN: 0 mL / OUT: 700 mL / NET: -700 mL    Physical Exam:  Gen: resting  HEENT: MMM, B/L ecchymoses under eyes from fall  Pulm: wheezing B/L  CV: S1S2+  Abd: Soft, +BS   Ext: No LE edema B/L  Neuro: Awake, alert  Skin: Warm and dry    LABS/STUDIES  --------------------------------------------------------------------------------              10.9   12.13 >-----------<  108      [07-12-23 @ 05:00]              33.3     142  |  115  |  25  ----------------------------<  159      [07-12-23 @ 05:00]  4.4   |  16  |  1.97        Ca     9.7     [07-12-23 @ 05:00]      Mg     1.60     [07-12-23 @ 05:00]      Phos  2.8     [07-12-23 @ 05:00]    TPro  6.5  /  Alb  3.9  /  TBili  0.4  /  DBili  x   /  AST  22  /  ALT  20  /  AlkPhos  148  [07-11-23 @ 17:22]          [07-12-23 @ 05:00]    Creatinine Trend:  SCr 1.97 [07-12 @ 05:00]  SCr 2.02 [07-12 @ 00:40]  SCr 2.12 [07-11 @ 17:22] [facility] Division of Kidney Diseases & Hypertension  FOLLOW UP NOTE  [phone]--------------------------------------------------------------------------------    HPI: 89-year-old female with history of Lymphoma, COPD, DVT/PE on Eliquis and osteoarthritis [facility] to [facility].  Nephrology service consulted for SHARAD and metabolic acidosis. On review of [place] and [facility], pt's Scr was WNL at 0.88 on 5/22/22. On current admission, Scr was elevated to 2.12 and SCO2 was low at 11 on 7/11/23. Pt. received IVFs and initiated on sodium bicarbonate infusion. Scr and SCO2 improved to 1.97 and 16 respectively (7/12/23).    24 hour events/subjective: Endorses making good urine. Currently, denies fevers/chills, chest pain, abd pain, or lower extremity swelling.    PAST HISTORY  --------------------------------------------------------------------------------  No significant changes to PMH, PSH, FHx, SHx, unless otherwise noted    ALLERGIES & MEDICATIONS  --------------------------------------------------------------------------------  Allergies  No Known Allergies    Intolerances    Standing Inpatient Medications  albuterol/ipratropium for Nebulization 3 milliLiter(s) Nebulizer every 6 hours  apixaban 5 milliGRAM(s) Oral every 12 hours  budesonide  80 MICROgram(s)/formoterol 4.5 MICROgram(s) Inhaler 2 Puff(s) Inhalation two times a day  cefTRIAXone   IVPB 1000 milliGRAM(s) IV Intermittent every 24 hours  chlorhexidine 2% Cloths 1 Application(s) Topical daily  escitalopram 10 milliGRAM(s) Oral daily  guaiFENesin  milliGRAM(s) Oral every 12 hours  sodium bicarbonate  Infusion 0.22 mEq/kG/Hr IV Continuous <Continuous>  trimethoprim  160 mG/sulfamethoxazole 800 mG 1 Tablet(s) Oral daily  valACYclovir 500 milliGRAM(s) Oral daily    PRN Inpatient Medications  acetaminophen     Tablet .. 650 milliGRAM(s) Oral every 6 hours PRN  ALPRAZolam 0.25 milliGRAM(s) Oral daily PRN    REVIEW OF SYSTEMS  --------------------------------------------------------------------------------  Gen: No fevers/chills  Skin: No rashes, +facial bruising   Head/Eyes/Ears: No HA  Respiratory: shortness of breath improved with oxygen  CV: No chest pain  GI: No abdominal pain, diarrhea  : no dysuria  MSK: No edema    All other systems were reviewed and are negative, except as noted.    VITALS/PHYSICAL EXAM  --------------------------------------------------------------------------------  T(C): 36.9 (07-13-23 @ 06:53), Max: 37.1 (07-12-23 @ 23:48)  HR: 121 (07-13-23 @ 06:53) (93 - 128)  BP: 141/86 (07-13-23 @ 06:53) (116/64 - 141/86)  RR: 18 (07-13-23 @ 06:53) (18 - 20)  SpO2: 97% (07-13-23 @ 06:53) (95% - 99%)  Wt(kg): --  Weight (kg): 68.3 (07-12-23 @ 01:35)    07-12-23 @ 07:01  -  07-13-23 @ 07:00  --------------------------------------------------------  IN: 0 mL / OUT: 700 mL / NET: -700 mL    Physical Exam:  Gen: resting  HEENT: MMM, B/L ecchymoses under eyes from fall  Pulm: wheezing B/L  CV: S1S2+  Abd: Soft, +BS   Ext: No LE edema B/L  Neuro: Awake, alert  Skin: Warm and dry    LABS/STUDIES  --------------------------------------------------------------------------------              10.9   12.13 >-----------<  108      [07-12-23 @ 05:00]              33.3     142  |  115  |  25  ----------------------------<  159      [07-12-23 @ 05:00]  4.4   |  16  |  1.97        Ca     9.7     [07-12-23 @ 05:00]      Mg     1.60     [07-12-23 @ 05:00]      Phos  2.8     [07-12-23 @ 05:00]    TPro  6.5  /  Alb  3.9  /  TBili  0.4  /  DBili  x   /  AST  22  /  ALT  20  /  AlkPhos  148  [07-11-23 @ 17:22]          [07-12-23 @ 05:00]    Creatinine Trend:  SCr 1.97 [07-12 @ 05:00]  SCr 2.02 [07-12 @ 00:40]  SCr 2.12 [07-11 @ 17:22] [facility] Division of Kidney Diseases & Hypertension  FOLLOW UP NOTE  [phone]--------------------------------------------------------------------------------    HPI: 89-year-old female with history of lymphoma, COPD, DVT/PE on Eliquis and osteoarthritis [facility]. Nephrology service consulted for SHARAD and metabolic acidosis. On review of [place] and [facility], pt's Scr was WNL at 0.88 on 5/22/22. On current admission, Scr was elevated to 2.12 and SCO2 was low at 11 on 7/11/23. Pt. received IVFs and initiated on sodium bicarbonate infusion. Scr decreased to 1.66 and SCO2 improved to 17 today (7/13/23).    24 hour events/subjective: Pt. feels better, reports making good urine. Currently, denies fevers/chills, chest pain, abd pain, or lower extremity swelling.    PAST HISTORY  --------------------------------------------------------------------------------  No significant changes to PMH, PSH, FHx, SHx, unless otherwise noted    ALLERGIES & MEDICATIONS  --------------------------------------------------------------------------------  Allergies  No Known Allergies    Intolerances    Standing Inpatient Medications  albuterol/ipratropium for Nebulization 3 milliLiter(s) Nebulizer every 6 hours  apixaban 5 milliGRAM(s) Oral every 12 hours  budesonide  80 MICROgram(s)/formoterol 4.5 MICROgram(s) Inhaler 2 Puff(s) Inhalation two times a day  cefTRIAXone   IVPB 1000 milliGRAM(s) IV Intermittent every 24 hours  chlorhexidine 2% Cloths 1 Application(s) Topical daily  escitalopram 10 milliGRAM(s) Oral daily  guaiFENesin  milliGRAM(s) Oral every 12 hours  sodium bicarbonate  Infusion 0.22 mEq/kG/Hr IV Continuous <Continuous>  trimethoprim  160 mG/sulfamethoxazole 800 mG 1 Tablet(s) Oral daily  valACYclovir 500 milliGRAM(s) Oral daily    PRN Inpatient Medications  acetaminophen     Tablet .. 650 milliGRAM(s) Oral every 6 hours PRN  ALPRAZolam 0.25 milliGRAM(s) Oral daily PRN    REVIEW OF SYSTEMS  --------------------------------------------------------------------------------  Gen: No fevers/chills  Skin: No rashes, +facial bruising   Head/Eyes/Ears: No HA  Respiratory: shortness of breath improved with oxygen  CV: No chest pain  GI: No abdominal pain, diarrhea  : no dysuria  MSK: No edema    All other systems were reviewed and are negative, except as noted.    VITALS/PHYSICAL EXAM  --------------------------------------------------------------------------------  T(C): 36.9 (07-13-23 @ 06:53), Max: 37.1 (07-12-23 @ 23:48)  HR: 121 (07-13-23 @ 06:53) (93 - 128)  BP: 141/86 (07-13-23 @ 06:53) (116/64 - 141/86)  RR: 18 (07-13-23 @ 06:53) (18 - 20)  SpO2: 97% (07-13-23 @ 06:53) (95% - 99%)  Wt(kg): --  Weight (kg): 68.3 (07-12-23 @ 01:35)    07-12-23 @ 07:01  -  07-13-23 @ 07:00  --------------------------------------------------------  IN: 0 mL / OUT: 700 mL / NET: -700 mL    Physical Exam:  Gen: resting  HEENT: MMM, B/L ecchymoses under eyes from fall  Pulm: wheezing B/L  CV: S1S2+  Abd: Soft, +BS   Ext: No LE edema B/L  Neuro: Awake, alert  Skin: Warm and dry    LABS/STUDIES  --------------------------------------------------------------------------------              10.9   12.13 >-----------<  108      [07-12-23 @ 05:00]              33.3     142  |  115  |  25  ----------------------------<  159      [07-12-23 @ 05:00]  4.4   |  16  |  1.97        Ca     9.7     [07-12-23 @ 05:00]      Mg     1.60     [07-12-23 @ 05:00]      Phos  2.8     [07-12-23 @ 05:00]    TPro  6.5  /  Alb  3.9  /  TBili  0.4  /  DBili  x   /  AST  22  /  ALT  20  /  AlkPhos  148  [07-11-23 @ 17:22]    07-13    141  |  109<H>  |  21  ----------------------------<  143<H>  3.8   |  17<L>  |  1.66<H>    Ca    9.8      13 Jul 2023 10:10  Phos  3.0     07-13  Mg     1.80     07-13    TPro  5.9<L>  /  Alb  3.5  /  TBili  0.2  /  DBili  x   /  AST  14  /  ALT  15  /  AlkPhos  131<H>  07-13          [07-12-23 @ 05:00]    Creatinine Trend:  SCr 1.97 [07-12 @ 05:00]  SCr 2.02 [07-12 @ 00:40]  SCr 2.12 [07-11 @ 17:22]

## 2023-07-13 NOTE — PROGRESS NOTE ADULT - PROBLEM SELECTOR PLAN 4
Initially presenting with mechanical fall while on eliquis   - CTH negative for hemorrhage  - CT spine showing T5 and T8 compression fractures  - No MRI at this time  - Assess baseline physical capacity after other acute problems resolve  - Fall precautions  - Tylenol for pain control (avoiding opiates given risk of respiratory depression; avoiding toradol given SHARAD)

## 2023-07-13 NOTE — DIETITIAN INITIAL EVALUATION ADULT - PERTINENT MEDS FT
MEDICATIONS  (STANDING):  albuterol/ipratropium for Nebulization 3 milliLiter(s) Nebulizer every 6 hours  apixaban 5 milliGRAM(s) Oral every 12 hours  budesonide  80 MICROgram(s)/formoterol 4.5 MICROgram(s) Inhaler 2 Puff(s) Inhalation two times a day  cefTRIAXone   IVPB 1000 milliGRAM(s) IV Intermittent every 24 hours  chlorhexidine 2% Cloths 1 Application(s) Topical daily  escitalopram 10 milliGRAM(s) Oral daily  guaiFENesin  milliGRAM(s) Oral every 12 hours  sodium bicarbonate  Infusion 0.22 mEq/kG/Hr (100 mL/Hr) IV Continuous <Continuous>  trimethoprim  160 mG/sulfamethoxazole 800 mG 1 Tablet(s) Oral daily  valACYclovir 500 milliGRAM(s) Oral daily    MEDICATIONS  (PRN):  acetaminophen     Tablet .. 650 milliGRAM(s) Oral every 6 hours PRN Mild Pain (1 - 3), Moderate Pain (4 - 6)  ALPRAZolam 0.25 milliGRAM(s) Oral daily PRN For anxiety

## 2023-07-13 NOTE — DIETITIAN INITIAL EVALUATION ADULT - OTHER INFO
Pt reports be being anxious (RN aware) and has poor po intakes. Family brings in foods from home but states she does not eat it.  Denies chewing, swallowing difficulties or any nausea, vomiting, diarrhea, constipation. Last bowel movement 7/12. Noted admit weight 150#. Pt reports this is likely inaccurate as usual weight was 120#. Pt weighed by RD on bed scale, weight obtained was 140#. Stated height 62 inches.

## 2023-07-13 NOTE — PROGRESS NOTE ADULT - PROBLEM SELECTOR PLAN 5
Started prior to fall  -T5 and T8 compression fractures on CT  -No MRI at this time Started prior to fall  -T5 and T8 compression fractures on CT  -No MRI at this time  -Discussed with orthopedics team, they reviewed images and recommended TSLO brace. They provided me with a number for their outside brace supplier (Tito Santooy). He will be seeing pt 7/14 at 11am for brace fitting.

## 2023-07-13 NOTE — PROGRESS NOTE ADULT - PROBLEM SELECTOR PLAN 1
Likely 2/2 infection  - Not properly compensated given hx of COPD  - Improving on bicarb gtt  - Trend vBG, BMP  - Nephro consulted, recs appreciated  - Consider BiPAP if unable to compensate appropriately/prolonged increased work of breathing. Likely 2/2 infection  - Not properly compensated given hx of COPD  - Improving on bicarb gtt  - Trend vBG, BMP  - Nephro consulted, recommended continuation of bicarb gtt at this time

## 2023-07-13 NOTE — DIETITIAN INITIAL EVALUATION ADULT - PERTINENT LABORATORY DATA
07-13    141  |  109<H>  |  21  ----------------------------<  143<H>  3.8   |  17<L>  |  1.66<H>    Ca    9.8      13 Jul 2023 10:10  Phos  3.0     07-13  Mg     1.80     07-13    TPro  5.9<L>  /  Alb  3.5  /  TBili  0.2  /  DBili  x   /  AST  14  /  ALT  15  /  AlkPhos  131<H>  07-13

## 2023-07-13 NOTE — PROGRESS NOTE ADULT - PROBLEM SELECTOR PLAN 1
Pt. with SHARAD in the setting of infection/UTI. On review of [place] and [facility], pt's Scr was WNL at 0.88 on 5/22/22. On current admission, Scr was elevated to 2.12 on 7/11/23. Pt. received IVFs and initiated on sodium bicarbonate infusion. Scr decreased to 1.97 (7/12/23), pending AM labs. UOP noted to be 700cc in last 24 hours. Kidney sonogram shows layering debris in the bladder with no evidence of hydronephrosis. CK WNL (158) on 7/12/23. Recommend continuing sodium bicarbonate infusion. Monitor labs and urine output. Avoid nephrotoxins. Dose medications as per eGFR. Pt. with SHARAD in the setting of infection/UTI. On review of [facility] and [facility], pt's Scr was WNL at 0.88 on 5/22/22. On current admission, Scr was elevated to 2.12 on 7/11/23. Pt. received IVFs and initiated on sodium bicarbonate infusion. Scr remains elevated but decreased to 1.97 (7/12/23), pending AM labs. UOP noted to be 700cc in last 24 hours. Kidney sonogram shows layering debris in the bladder with no evidence of hydronephrosis. CK WNL (158) on 7/12/23. Recommend continuing sodium bicarbonate infusion. Monitor labs and urine output. Avoid nephrotoxins. Dose medications as per eGFR. Pt. with SHARAD in the setting of infection/UTI. On review of [place] and [facility], pt's Scr was WNL at 0.88 on 5/22/22. On current admission, Scr was elevated to 2.12 on 7/11/23. Pt. received IVFs and initiated on sodium bicarbonate infusion. Scr decreased to 1.66 today. UOP noted to be 700cc in last 24 hours. Kidney sonogram shows layering debris in the bladder with no evidence of hydronephrosis. CK WNL (158) on 7/12/23. Recommend continuing sodium bicarbonate infusion. Monitor labs and urine output. Avoid nephrotoxins. Dose medications as per eGFR.

## 2023-07-13 NOTE — PROGRESS NOTE ADULT - TIME BILLING
Time-based billing (NON-critical care).     50 minutes spent on total encounter; more than 50% of the visit was spent counseling and / or coordinating care by the attending physician.  The necessity of the time spent during the encounter on this date of service was due to:     documentation in [place], reviewing chart and coordinating care with patient/ACP and interdisciplinary staff (such as , social workers, etc) as well as reviewing vitals, laboratory data, radiology, medication list, consultants' recommendations and prior records. Interventions were performed as documented above.
Time-based billing (NON-critical care).     38 minutes spent on total encounter; more than 50% of the visit was spent counseling and / or coordinating care by the attending physician.  The necessity of the time spent during the encounter on this date of service was due to:     documentation in [place], reviewing chart and coordinating care with patient/ACP and interdisciplinary staff (such as , social workers, etc) as well as reviewing vitals, laboratory data, radiology, medication list, consultants' recommendations and prior records. Interventions were performed as documented above.

## 2023-07-14 ENCOUNTER — TRANSCRIPTION ENCOUNTER (OUTPATIENT)
Age: 88
End: 2023-07-14

## 2023-07-14 LAB
ALBUMIN SERPL ELPH-MCNC: 3.2 G/DL — LOW (ref 3.3–5)
ALP SERPL-CCNC: 119 U/L — SIGNIFICANT CHANGE UP (ref 40–120)
ALT FLD-CCNC: 14 U/L — SIGNIFICANT CHANGE UP (ref 4–33)
ANION GAP SERPL CALC-SCNC: 11 MMOL/L — SIGNIFICANT CHANGE UP (ref 7–14)
AST SERPL-CCNC: 12 U/L — SIGNIFICANT CHANGE UP (ref 4–32)
BASE EXCESS BLDV CALC-SCNC: -5.2 MMOL/L — LOW (ref -2–3)
BASOPHILS # BLD AUTO: 0.04 K/UL — SIGNIFICANT CHANGE UP (ref 0–0.2)
BASOPHILS NFR BLD AUTO: 0.3 % — SIGNIFICANT CHANGE UP (ref 0–2)
BILIRUB SERPL-MCNC: <0.2 MG/DL — SIGNIFICANT CHANGE UP (ref 0.2–1.2)
BUN SERPL-MCNC: 21 MG/DL — SIGNIFICANT CHANGE UP (ref 7–23)
CALCIUM SERPL-MCNC: 9.7 MG/DL — SIGNIFICANT CHANGE UP (ref 8.4–10.5)
CHLORIDE SERPL-SCNC: 111 MMOL/L — HIGH (ref 98–107)
CO2 BLDV-SCNC: 21.1 MMOL/L — LOW (ref 22–26)
CO2 SERPL-SCNC: 19 MMOL/L — LOW (ref 22–31)
CREAT SERPL-MCNC: 1.52 MG/DL — HIGH (ref 0.5–1.3)
EGFR: 32 ML/MIN/1.73M2 — LOW
EOSINOPHIL # BLD AUTO: 0.28 K/UL — SIGNIFICANT CHANGE UP (ref 0–0.5)
EOSINOPHIL NFR BLD AUTO: 2.4 % — SIGNIFICANT CHANGE UP (ref 0–6)
GLUCOSE SERPL-MCNC: 110 MG/DL — HIGH (ref 70–99)
HCO3 BLDV-SCNC: 20 MMOL/L — LOW (ref 22–29)
HCT VFR BLD CALC: 30.5 % — LOW (ref 34.5–45)
HGB BLD-MCNC: 10.1 G/DL — LOW (ref 11.5–15.5)
IANC: 9.63 K/UL — HIGH (ref 1.8–7.4)
IMM GRANULOCYTES NFR BLD AUTO: 0.7 % — SIGNIFICANT CHANGE UP (ref 0–0.9)
LYMPHOCYTES # BLD AUTO: 0.62 K/UL — LOW (ref 1–3.3)
LYMPHOCYTES # BLD AUTO: 5.2 % — LOW (ref 13–44)
MAGNESIUM SERPL-MCNC: 1.9 MG/DL — SIGNIFICANT CHANGE UP (ref 1.6–2.6)
MCHC RBC-ENTMCNC: 32 PG — SIGNIFICANT CHANGE UP (ref 27–34)
MCHC RBC-ENTMCNC: 33.1 GM/DL — SIGNIFICANT CHANGE UP (ref 32–36)
MCV RBC AUTO: 96.5 FL — SIGNIFICANT CHANGE UP (ref 80–100)
MONOCYTES # BLD AUTO: 1.17 K/UL — HIGH (ref 0–0.9)
MONOCYTES NFR BLD AUTO: 9.9 % — SIGNIFICANT CHANGE UP (ref 2–14)
NEUTROPHILS # BLD AUTO: 9.63 K/UL — HIGH (ref 1.8–7.4)
NEUTROPHILS NFR BLD AUTO: 81.5 % — HIGH (ref 43–77)
NRBC # BLD: 0 /100 WBCS — SIGNIFICANT CHANGE UP (ref 0–0)
NRBC # FLD: 0 K/UL — SIGNIFICANT CHANGE UP (ref 0–0)
PCO2 BLDV: 37 MMHG — LOW (ref 39–52)
PH BLDV: 7.34 — SIGNIFICANT CHANGE UP (ref 7.32–7.43)
PHOSPHATE SERPL-MCNC: 2.8 MG/DL — SIGNIFICANT CHANGE UP (ref 2.5–4.5)
PLATELET # BLD AUTO: 101 K/UL — LOW (ref 150–400)
PO2 BLDV: 164 MMHG — HIGH (ref 25–45)
POTASSIUM SERPL-MCNC: 3.8 MMOL/L — SIGNIFICANT CHANGE UP (ref 3.5–5.3)
POTASSIUM SERPL-SCNC: 3.8 MMOL/L — SIGNIFICANT CHANGE UP (ref 3.5–5.3)
PROCALCITONIN SERPL-MCNC: 0.24 NG/ML — HIGH (ref 0.02–0.1)
PROT SERPL-MCNC: 5.6 G/DL — LOW (ref 6–8.3)
RBC # BLD: 3.16 M/UL — LOW (ref 3.8–5.2)
RBC # FLD: 14.6 % — HIGH (ref 10.3–14.5)
SAO2 % BLDV: 99.4 % — HIGH (ref 67–88)
SODIUM SERPL-SCNC: 141 MMOL/L — SIGNIFICANT CHANGE UP (ref 135–145)
WBC # BLD: 11.82 K/UL — HIGH (ref 3.8–10.5)
WBC # FLD AUTO: 11.82 K/UL — HIGH (ref 3.8–10.5)

## 2023-07-14 PROCEDURE — 99232 SBSQ HOSP IP/OBS MODERATE 35: CPT | Mod: GC

## 2023-07-14 PROCEDURE — 99233 SBSQ HOSP IP/OBS HIGH 50: CPT | Mod: GC

## 2023-07-14 PROCEDURE — 71046 X-RAY EXAM CHEST 2 VIEWS: CPT | Mod: 26

## 2023-07-14 RX ORDER — IPRATROPIUM/ALBUTEROL SULFATE 18-103MCG
3 AEROSOL WITH ADAPTER (GRAM) INHALATION ONCE
Refills: 0 | Status: COMPLETED | OUTPATIENT
Start: 2023-07-14 | End: 2023-07-14

## 2023-07-14 RX ADMIN — VALACYCLOVIR 500 MILLIGRAM(S): 500 TABLET, FILM COATED ORAL at 13:48

## 2023-07-14 RX ADMIN — CEFTRIAXONE 100 MILLIGRAM(S): 500 INJECTION, POWDER, FOR SOLUTION INTRAMUSCULAR; INTRAVENOUS at 07:48

## 2023-07-14 RX ADMIN — Medication 40 MILLIGRAM(S): at 13:51

## 2023-07-14 RX ADMIN — Medication 0.25 MILLIGRAM(S): at 13:56

## 2023-07-14 RX ADMIN — APIXABAN 5 MILLIGRAM(S): 2.5 TABLET, FILM COATED ORAL at 06:57

## 2023-07-14 RX ADMIN — CHLORHEXIDINE GLUCONATE 1 APPLICATION(S): 213 SOLUTION TOPICAL at 13:51

## 2023-07-14 RX ADMIN — Medication 3 MILLILITER(S): at 10:35

## 2023-07-14 RX ADMIN — Medication 600 MILLIGRAM(S): at 07:49

## 2023-07-14 RX ADMIN — APIXABAN 5 MILLIGRAM(S): 2.5 TABLET, FILM COATED ORAL at 17:27

## 2023-07-14 RX ADMIN — Medication 1 TABLET(S): at 13:48

## 2023-07-14 RX ADMIN — Medication 600 MILLIGRAM(S): at 17:27

## 2023-07-14 RX ADMIN — Medication 3 MILLILITER(S): at 15:48

## 2023-07-14 RX ADMIN — Medication 4 MILLILITER(S): at 15:48

## 2023-07-14 RX ADMIN — Medication 3 MILLILITER(S): at 22:58

## 2023-07-14 RX ADMIN — ESCITALOPRAM OXALATE 10 MILLIGRAM(S): 10 TABLET, FILM COATED ORAL at 13:47

## 2023-07-14 NOTE — DISCHARGE NOTE PROVIDER - NSDCMRMEDTOKEN_GEN_ALL_CORE_FT
Bactrim  mg-160 mg oral tablet: 1 orally once a day  Eliquis 5 mg oral tablet: 1 orally 2 times a day  escitalopram 10 mg oral tablet: 1.5 tab (15 mg) orally once a day    **Per pharmacy, rx directions state to take escitalopram 10 mg - 1 tablet orally once daily  folic acid 1 mg oral tablet: 1 tab(s) orally once a day  methenamine hippurate 1 g oral tablet: 1 tab(s) orally 2 times a day  Metoprolol Tartrate 25 mg oral tablet: 1 tab(s) orally 2 times a day  potassium chloride 10 mEq oral tablet, extended release: 1 tab(s) orally once a day  Trelegy Ellipta 100 mcg-62.5 mcg-25 mcg/inh inhalation powder: 1 puff(s) inhaled once a day  valACYclovir 500 mg oral tablet: 1 tab(s) orally once a day   ALPRAZolam 0.25 mg oral tablet: 1 tab(s) orally once a day As needed For anxiety  Bactrim  mg-160 mg oral tablet: 1 orally once a day  Eliquis 5 mg oral tablet: 1 orally 2 times a day  escitalopram 10 mg oral tablet: 1.5 tab (15 mg) orally once a day    **Per pharmacy, rx directions state to take escitalopram 10 mg - 1 tablet orally once daily  folic acid 1 mg oral tablet: 1 tab(s) orally once a day  Lasix 20 mg oral tablet: 1 tab(s) orally once a day  methenamine hippurate 1 g oral tablet: 1 tab(s) orally 2 times a day  Metoprolol Tartrate 25 mg oral tablet: 1 tab(s) orally 2 times a day  potassium chloride 10 mEq oral tablet, extended release: 1 tab(s) orally once a day  Trelegy Ellipta 100 mcg-62.5 mcg-25 mcg/inh inhalation powder: 1 puff(s) inhaled once a day  valACYclovir 500 mg oral tablet: 1 tab(s) orally once a day

## 2023-07-14 NOTE — DISCHARGE NOTE NURSING/CASE MANAGEMENT/SOCIAL WORK - NSSCCARECORD_GEN_ALL_CORE
Home Care Agency/[facility]/Durable Medical Equipment Agency/[facility]

## 2023-07-14 NOTE — PROGRESS NOTE ADULT - PROBLEM SELECTOR PLAN 3
Possibly 2/2 poor po intake  - Creat improving  - Renal US showed no hydronephrosis  - Will continue to monitor

## 2023-07-14 NOTE — PROVIDER CONTACT NOTE (OTHER) - ACTION/TREATMENT ORDERED:
hold prn xanax for now, I will come assess patient
P.MAURIZIO. made aware. oral suction done. O2 2L apply

## 2023-07-14 NOTE — DISCHARGE NOTE PROVIDER - NSDCCPTREATMENT_GEN_ALL_CORE_FT
PRINCIPAL PROCEDURE  Procedure: CT thoracic spine  Findings and Treatment: CT THORACIC SPINE   ORDERED BY: REGIS FAGAN   PROCEDURE DATE:  07/11/2023    INTERPRETATION:  CT THORACIC SPINE WITHOUT CONTRAST  HISTORY: fall back pain.  TECHNIQUE: CT scan of the thoracic spine without contrastwas performed.   Multiplanar reformations were made.  COMPARISON: Chest CT 5/31/2020.  FINDINGS:  There are age-indeterminate but acute appearing moderate compression   deformities of T5 and T8, and new for T5 and worsened for T8 since prior   chest CT dated 5/31/2023.  Exaggeration of the upper thoracic kyphosis. No spondylolisthesis. There   is diffuse osteopenia.  No additional fracture is identified.  IMPRESSION:  Age-indeterminate but acute appearing moderate compression deformities of   T5 and T8.

## 2023-07-14 NOTE — PROGRESS NOTE ADULT - PROBLEM SELECTOR PLAN 4
UA w/ WBC>4000 and + leukocyte esterase  -On Bactrim for PCP prophylaxis given hx of lymphoma s/p palliative chemo + immune therapy  -BCx no growth  -UCx contaminated  -Improving  -Continue bactrim

## 2023-07-14 NOTE — DISCHARGE NOTE NURSING/CASE MANAGEMENT/SOCIAL WORK - NSDCPEFALRISK_GEN_ALL_CORE
For information on Fall & Injury Prevention, visit: [email or web address] OR  [email or web address] OR  https://www.cdc.gov/steadi/patient.html

## 2023-07-14 NOTE — PROGRESS NOTE ADULT - PROBLEM SELECTOR PLAN 1
Pt. with SHARAD in the setting of infection/UTI. On review of [place] [facility], pt's Scr was WNL at 0.88 on 5/22/22. On current admission, Scr was elevated to 2.12 on 7/11/23. Pt. received IVFs and sodium bicarbonate infusion. Pt. now off all IVFs and Scr decreased to 1.58 today (7/14). UOP noted to be 800cc in last 24 hours. Kidney sonogram shows layering debris in the bladder with no evidence of hydronephrosis. CK WNL (158) on 7/12/23. Monitor labs and urine output. Avoid nephrotoxins. Dose medications as per eGFR. Pt. with SHRAAD in the setting of infection/UTI. [facility]'s Scr was WNL at 0.88 on 5/22/22. On current admission, Scr was elevated to 2.12 on 7/11/23. Pt. received IVFs and sodium bicarbonate infusion. Scr decreased to 1.58 today (7/14). UOP noted to be 800cc in last 24 hours. Kidney sonogram shows layering debris in the bladder with no evidence of hydronephrosis. CK WNL (158) on 7/12/23. Monitor labs and urine output. Avoid nephrotoxins. Dose medications as per eGFR.

## 2023-07-14 NOTE — PROGRESS NOTE ADULT - PROBLEM SELECTOR PLAN 1
On trelagy at home, not on O2  -Increasing cough, phlegm production  -Rhinovirus (+)  -Duonebs Q6h   -Symbicort 2 puffs BID  -Prednisone 40mg qd  -Mucomyst  -Repeat CXR pending

## 2023-07-14 NOTE — PROGRESS NOTE ADULT - PROBLEM SELECTOR PLAN 2
Likely 2/2 infection  - Not properly compensated given hx of COPD  - Improving on bicarb gtt  - Trend vBG, BMP  - D/c bicarb  - Nephro signing off

## 2023-07-14 NOTE — DISCHARGE NOTE PROVIDER - DETAILS OF MALNUTRITION DIAGNOSIS/DIAGNOSES
This patient has been assessed with a concern for Malnutrition and was treated during this hospitalization for the following Nutrition diagnosis/diagnoses:     -  07/13/2023: Moderate protein-calorie malnutrition

## 2023-07-14 NOTE — PROGRESS NOTE ADULT - PROBLEM SELECTOR PLAN 2
Pt. with metabolic acidosis in the setting of SHARAD and infection. SCO2 low at 11 on admission (7/11/23), improved to 19 today (7/14). Monitor SCO2.     Will discontinue follow up. Reconsult as needed.  If you have any questions, please feel free to contact me.  Álvaro Holt  Nephrology Fellow  [phone] / [employer] Teams (Preferred)  (After 4pm or on weekends, please call the on-call Fellow). Pt. with metabolic acidosis in the setting of SHARAD and infection. SCO2 low at 11 on admission (7/11/23), improved to 19 today (7/14). Monitor SCO2.     Will discontinue follow up. Reconsult as needed.    If you have any questions, please feel free to contact me.  Álvaro Holt  Nephrology Fellow  [phone] / [employer] Teams (Preferred)  (After 4pm or on weekends, please call the on-call Fellow).

## 2023-07-14 NOTE — PROVIDER CONTACT NOTE (OTHER) - ASSESSMENT
SOB/ nausea/vomiting
O2 sat 100% on 2 L NC, RR 24, , /73. received xanax 0.25 mg one time dose at 22:48 for anxiety

## 2023-07-14 NOTE — PROGRESS NOTE ADULT - SUBJECTIVE AND OBJECTIVE BOX
PROGRESS NOTE:   Authored by Chelsea Moody MD PGY-1  Internal Medicine      Patient is a 89y old  Female who presents with a chief complaint of Fall (14 Jul 2023 13:41)      SUBJECTIVE / OVERNIGHT EVENTS: Pt experienced increased difficulty breathing overnight and endorses feeling anxious. She was given xanax .25mg and felt relieved afterwards. This morning, pt noted she was feeling better than previous. Endorsed stable breathing with slightly increased cough and phlegm production than when she came in originally. She was seen and examined at bedside    MEDICATIONS  (STANDING):  acetylcysteine 10%  Inhalation 4 milliLiter(s) Inhalation three times a day  albuterol/ipratropium for Nebulization 3 milliLiter(s) Nebulizer every 6 hours  apixaban 5 milliGRAM(s) Oral every 12 hours  budesonide  80 MICROgram(s)/formoterol 4.5 MICROgram(s) Inhaler 2 Puff(s) Inhalation two times a day  chlorhexidine 2% Cloths 1 Application(s) Topical daily  escitalopram 10 milliGRAM(s) Oral daily  guaiFENesin  milliGRAM(s) Oral every 12 hours  predniSONE   Tablet 40 milliGRAM(s) Oral daily  sodium bicarbonate  Infusion 0.22 mEq/kG/Hr (100 mL/Hr) IV Continuous <Continuous>  trimethoprim  160 mG/sulfamethoxazole 800 mG 1 Tablet(s) Oral daily  valACYclovir 500 milliGRAM(s) Oral daily    MEDICATIONS  (PRN):  acetaminophen     Tablet .. 650 milliGRAM(s) Oral every 6 hours PRN Mild Pain (1 - 3), Moderate Pain (4 - 6)  ALPRAZolam 0.25 milliGRAM(s) Oral daily PRN For anxiety        PHYSICAL EXAM:  Vital Signs Last 24 Hrs  T(C): 36.4 (14 Jul 2023 13:44), Max: 36.7 (13 Jul 2023 17:10)  T(F): 97.6 (14 Jul 2023 13:44), Max: 98.1 (13 Jul 2023 17:10)  HR: 107 (14 Jul 2023 13:44) (92 - 112)  BP: 127/83 (14 Jul 2023 13:44) (109/74 - 127/83)  RR: 22 (14 Jul 2023 13:44) (17 - 24)  SpO2: 100% (14 Jul 2023 13:44) (95% - 100%)    Parameters below as of 14 Jul 2023 13:44  Patient On (Oxygen Delivery Method): nasal cannula      CONSTITUTIONAL: Well-groomed, in no apparent distress  RESPIRATORY: Wheezing in all air fields  CARDIOVASCULAR: +S1S2, RRR, no M/G/R; pedal pulses full and symmetric; no lower extremity edema  GASTROINTESTINAL: No palpable masses or tenderness, +BS throughout, no rebound/guarding; no hepatosplenomegaly; no hernia palpated  SKIN: No rashes or ulcers noted  NEUROLOGIC: A+O x 3, CN II-XII intact; sensation intact in LEs b/l to light touch    LABS:                        10.1   11.82 )-----------( 101      ( 14 Jul 2023 06:25 )             30.5     07-14    141  |  111<H>  |  21  ----------------------------<  110<H>  3.8   |  19<L>  |  1.52<H>    Ca    9.7      14 Jul 2023 06:25  Phos  2.8     07-14  Mg     1.90     07-14    TPro  5.6<L>  /  Alb  3.2<L>  /  TBili  <0.2  /  DBili  x   /  AST  12  /  ALT  14  /  AlkPhos  119  07-14        Culture - Blood (collected 12 Jul 2023 00:40)  Source: .Blood Blood-Peripheral  Preliminary Report (14 Jul 2023 07:01):    No growth at 48 Hours    Culture - Blood (collected 12 Jul 2023 00:40)  Source: .Blood Blood  Preliminary Report (14 Jul 2023 07:01):    No growth at 48 Hours    Culture - Urine (collected 11 Jul 2023 19:17)  Source: Clean Catch Clean Catch (Midstream)  Final Report (13 Jul 2023 18:00):    >=3 organisms. Probable collection contamination.

## 2023-07-14 NOTE — PROGRESS NOTE ADULT - PROBLEM SELECTOR PLAN 5
Initially presenting with mechanical fall while on eliquis   - CTH negative for hemorrhage  - CT spine showing T5 and T8 compression fractures  - No MRI at this time  - Fall precautions  - Tylenol for pain control (avoiding opiates given risk of respiratory depression; avoiding toradol given SHARAD)  - Home PT on discharge

## 2023-07-14 NOTE — PROVIDER CONTACT NOTE (OTHER) - BACKGROUND
admitted s/p fall with head impact for UTI and metabolic acidosis, hx of COPD, prior PE, hx lymphoma (on immune therapy)

## 2023-07-14 NOTE — DISCHARGE NOTE PROVIDER - HOSPITAL COURSE
89 y/o  F with a hx of lymphoma (on immune therapy), COPD (not on home O2), pulmonary htn, anxiety, prior PE on eliquis presenting after mechanical fall with head impact, admitted for UTI and metabolic acidosis. CT head negative for hemorrhage. Compression fractures T5 (new) and T8 (old), received TSLO brace. Received 2 doses of ceftriaxone and remained on her home bactrim regimen. Acidosis improved on bicarb gtt. Treated for COPD exacerbation with duonebs, symbicort, and 5 day course of prednisone, given increased cough, phlegm, and diffuse wheezing on exam and rhino/enterovirus (+). Encouraged incentive spirometry. O2 sat remained >95% on RA. Component of anxiety contributing to her tachypneic episodes as well. She was also given IV Lasix 20mg for hx of pulmonary hypertension and increased BNP since admission. Improved symptomatically and clinically. Medically stable for discharge. F/u with PCP within a week for repeat metabolic panel to assess kidney function.

## 2023-07-14 NOTE — DISCHARGE NOTE NURSING/CASE MANAGEMENT/SOCIAL WORK - PATIENT PORTAL LINK FT
You can access the FollowMyHealth Patient Portal offered by [facility] by registering at the following website: [email or web address]. By joining [facility]’s FollowMyHealth portal, you will also be able to view your health information using other applications (apps) compatible with our system.

## 2023-07-14 NOTE — DISCHARGE NOTE PROVIDER - NSDCCPCAREPLAN_GEN_ALL_CORE_FT
PRINCIPAL DISCHARGE DIAGNOSIS  Diagnosis: Chronic obstructive pulmonary disease, unspecified COPD type  Assessment and Plan of Treatment: If you have COPD, your usual shortness of breath could suddenly get worse. You may start coughing more and have more mucus. A lung infection or air pollution could set off an exacerbation. Sometimes it can happen after being around chemicals.  Quick treatment may help you prevent serious breathing problems. If you have a COPD exacerbation plan that you developed with your doctor, follow it.  Take your medicines.  Use your inhaler as directed by your doctor. If your symptoms do not get better after you use your medicine, have someone take you to the emergency room.   With inhaled medicines, a nebulizer may help you get more medicine to your lungs. Ask your doctor or pharmacist how to use them properly. Practice using the spacer in front of a mirror before you have an exacerbation. This may help you get the medicine into your lungs quickly.  Preventing an exacerbation:  Do not smoke.   Take your daily medicines as prescribed.  Avoid infections  Wash your hands often.  Stay up to date on your COVID-19 vaccines.  Get a flu vaccine each year, as soon as it is available. Ask those you live or work with to do the same, so they will not get the flu and infect you.  Get the pneumococcal and whooping cough vaccines.  Avoid second-hand smoke.  Try to stay inside with your windows closed when air pollution is bad.  Call 911 anytime you think you may need emergency care. For example, call if:  You have severe trouble breathing, severe chest pain, new or worse shortness of breath, new or worse chest pain, cough up blood, a fever, you have used your quick-relief medicine or followed your plan for what to do if your symptoms get worse, but you are still short of breath, coughing more deeply or more often, especially if you notice more mucus or a change in the colour of your mucus.        SECONDARY DISCHARGE DIAGNOSES  Diagnosis: SHARAD (acute kidney injury)  Assessment and Plan of Treatment: You had an SHARAD and metabolic acidosis (increased acid in your blood) when you first came to the hospital. This resolved with sodium bicarbonate medication and with improvement of your UTI.    Diagnosis: Pulmonary hypertension  Assessment and Plan of Treatment: Continue your Lasix    Diagnosis: Acute UTI  Assessment and Plan of Treatment: This resolved with antibiotic treatment.    Diagnosis: Pulmonary embolism  Assessment and Plan of Treatment: Continue your Eliquis for the lung clot that was diagnosed at your last hospitalization one month ago.    Diagnosis: Fall  Assessment and Plan of Treatment: Please wear your TSLO brace when you are moving around at home.     PRINCIPAL DISCHARGE DIAGNOSIS  Diagnosis: Chronic obstructive pulmonary disease, unspecified COPD type  Assessment and Plan of Treatment: If you have COPD, your usual shortness of breath could suddenly get worse. You may start coughing more and have more mucus. A lung infection or air pollution could set off an exacerbation. Sometimes it can happen after being around chemicals.  Quick treatment may help you prevent serious breathing problems. If you have a COPD exacerbation plan that you developed with your doctor, follow it.  Take your medicines.  Use your inhaler as directed by your doctor. If your symptoms do not get better after you use your medicine, have someone take you to the emergency room.   With inhaled medicines, a nebulizer may help you get more medicine to your lungs. Ask your doctor or pharmacist how to use them properly. Practice using the spacer in front of a mirror before you have an exacerbation. This may help you get the medicine into your lungs quickly.  Preventing an exacerbation:  Do not smoke.   Take your daily medicines as prescribed.  Avoid infections  Wash your hands often.  Stay up to date on your COVID-19 vaccines.  Get a flu vaccine each year, as soon as it is available. Ask those you live or work with to do the same, so they will not get the flu and infect you.  Get the pneumococcal and whooping cough vaccines.  Avoid second-hand smoke.  Try to stay inside with your windows closed when air pollution is bad.  Call 911 anytime you think you may need emergency care. For example, call if:  You have severe trouble breathing, severe chest pain, new or worse shortness of breath, new or worse chest pain, cough up blood, a fever, you have used your quick-relief medicine or followed your plan for what to do if your symptoms get worse, but you are still short of breath, coughing more deeply or more often, especially if you notice more mucus or a change in the colour of your mucus.        SECONDARY DISCHARGE DIAGNOSES  Diagnosis: Pulmonary hypertension  Assessment and Plan of Treatment: Continue your Lasix    Diagnosis: Acute UTI  Assessment and Plan of Treatment: This resolved with antibiotic treatment.    Diagnosis: SHARAD (acute kidney injury)  Assessment and Plan of Treatment: You had an SHARAD and metabolic acidosis (increased acid in your blood) when you first came to the hospital. This resolved with sodium bicarbonate medication and with improvement of your UTI.    Diagnosis: Pulmonary embolism  Assessment and Plan of Treatment: Continue your Eliquis for the lung clot that was diagnosed at your last hospitalization one month ago.    Diagnosis: Fall  Assessment and Plan of Treatment: Please wear your TSLO brace when you are moving around at home.    Diagnosis: Anxiety  Assessment and Plan of Treatment: Discuss stopping xanax with your psychiatrist

## 2023-07-14 NOTE — DISCHARGE NOTE PROVIDER - NSDCFUSCHEDAPPT_GEN_ALL_CORE_FT
[facility]  Scheduled Appointment: 07/27/2023    [facility]  [facility]  Scheduled Appointment: 08/24/2023    [facility]  [facility]  Scheduled Appointment: 08/24/2023    [facility]  [facility]  Scheduled Appointment: 08/24/2023

## 2023-07-14 NOTE — PROGRESS NOTE ADULT - PROBLEM SELECTOR PLAN 6
Started prior to fall  -T5 and T8 compression fractures on CT  -No MRI at this time  -TSLO brace fitting 7/14

## 2023-07-14 NOTE — DISCHARGE NOTE PROVIDER - CARE PROVIDER_API CALL
Tito Bernal  Internal Medicine  [address] [place] [id number]  Phone: [phone]  Fax: [phone]  Follow Up Time:

## 2023-07-14 NOTE — PROGRESS NOTE ADULT - PROBLEM SELECTOR PLAN 9
Diet: Regular diet + Ensure  DVT: c/w eliquis  Dispo: Pending PT eval  Code Status: DNR with Trial of Intubation

## 2023-07-14 NOTE — PROVIDER CONTACT NOTE (OTHER) - SITUATION
pt c/o shortness of breath and anxiety
patient with difficulty breathing Blood Pressure 126/76  O2 98% Temp 98.8

## 2023-07-14 NOTE — PROGRESS NOTE ADULT - ASSESSMENT
89 y/o  F with a hx of lymphoma (on immune therapy), COPD (not on home O2), anxiety, prior PE on eliquis presenting after mechanical fall with head impact, admitted for UTI and metabolic acidosis. Acidosis is improving on bicarb gtt. WBC count decreasing on bactrim. Concern for COPD exacerbation given increased cough, phlegm, and diffuse wheezing on exam. Component of anxiety contributing to her tachypneic episodes overnight as well.

## 2023-07-15 LAB
ALBUMIN SERPL ELPH-MCNC: 3.5 G/DL — SIGNIFICANT CHANGE UP (ref 3.3–5)
ALP SERPL-CCNC: 129 U/L — HIGH (ref 40–120)
ALT FLD-CCNC: 14 U/L — SIGNIFICANT CHANGE UP (ref 4–33)
ANION GAP SERPL CALC-SCNC: 11 MMOL/L — SIGNIFICANT CHANGE UP (ref 7–14)
AST SERPL-CCNC: 13 U/L — SIGNIFICANT CHANGE UP (ref 4–32)
BASE EXCESS BLDV CALC-SCNC: -5.5 MMOL/L — LOW (ref -2–3)
BASOPHILS # BLD AUTO: 0.01 K/UL — SIGNIFICANT CHANGE UP (ref 0–0.2)
BASOPHILS NFR BLD AUTO: 0.1 % — SIGNIFICANT CHANGE UP (ref 0–2)
BILIRUB SERPL-MCNC: <0.2 MG/DL — SIGNIFICANT CHANGE UP (ref 0.2–1.2)
BUN SERPL-MCNC: 25 MG/DL — HIGH (ref 7–23)
CALCIUM SERPL-MCNC: 10.4 MG/DL — SIGNIFICANT CHANGE UP (ref 8.4–10.5)
CHLORIDE SERPL-SCNC: 109 MMOL/L — HIGH (ref 98–107)
CO2 BLDV-SCNC: 22 MMOL/L — SIGNIFICANT CHANGE UP (ref 22–26)
CO2 SERPL-SCNC: 19 MMOL/L — LOW (ref 22–31)
CREAT SERPL-MCNC: 1.42 MG/DL — HIGH (ref 0.5–1.3)
EGFR: 35 ML/MIN/1.73M2 — LOW
EOSINOPHIL # BLD AUTO: 0 K/UL — SIGNIFICANT CHANGE UP (ref 0–0.5)
EOSINOPHIL NFR BLD AUTO: 0 % — SIGNIFICANT CHANGE UP (ref 0–6)
GAS PNL BLDV: SIGNIFICANT CHANGE UP
GLUCOSE SERPL-MCNC: 126 MG/DL — HIGH (ref 70–99)
HCO3 BLDV-SCNC: 21 MMOL/L — LOW (ref 22–29)
HCT VFR BLD CALC: 32.5 % — LOW (ref 34.5–45)
HGB BLD-MCNC: 10.8 G/DL — LOW (ref 11.5–15.5)
IANC: 9.02 K/UL — HIGH (ref 1.8–7.4)
IMM GRANULOCYTES NFR BLD AUTO: 1 % — HIGH (ref 0–0.9)
LYMPHOCYTES # BLD AUTO: 0.41 K/UL — LOW (ref 1–3.3)
LYMPHOCYTES # BLD AUTO: 4.1 % — LOW (ref 13–44)
MAGNESIUM SERPL-MCNC: 2.2 MG/DL — SIGNIFICANT CHANGE UP (ref 1.6–2.6)
MCHC RBC-ENTMCNC: 32.2 PG — SIGNIFICANT CHANGE UP (ref 27–34)
MCHC RBC-ENTMCNC: 33.2 GM/DL — SIGNIFICANT CHANGE UP (ref 32–36)
MCV RBC AUTO: 97 FL — SIGNIFICANT CHANGE UP (ref 80–100)
MONOCYTES # BLD AUTO: 0.35 K/UL — SIGNIFICANT CHANGE UP (ref 0–0.9)
MONOCYTES NFR BLD AUTO: 3.5 % — SIGNIFICANT CHANGE UP (ref 2–14)
NEUTROPHILS # BLD AUTO: 9.02 K/UL — HIGH (ref 1.8–7.4)
NEUTROPHILS NFR BLD AUTO: 91.3 % — HIGH (ref 43–77)
NRBC # BLD: 0 /100 WBCS — SIGNIFICANT CHANGE UP (ref 0–0)
NRBC # FLD: 0 K/UL — SIGNIFICANT CHANGE UP (ref 0–0)
PCO2 BLDV: 42 MMHG — SIGNIFICANT CHANGE UP (ref 39–52)
PH BLDV: 7.3 — LOW (ref 7.32–7.43)
PHOSPHATE SERPL-MCNC: 3.2 MG/DL — SIGNIFICANT CHANGE UP (ref 2.5–4.5)
PLATELET # BLD AUTO: 104 K/UL — LOW (ref 150–400)
PO2 BLDV: 40 MMHG — SIGNIFICANT CHANGE UP (ref 25–45)
POTASSIUM SERPL-MCNC: 4.8 MMOL/L — SIGNIFICANT CHANGE UP (ref 3.5–5.3)
POTASSIUM SERPL-SCNC: 4.8 MMOL/L — SIGNIFICANT CHANGE UP (ref 3.5–5.3)
PROT SERPL-MCNC: 6 G/DL — SIGNIFICANT CHANGE UP (ref 6–8.3)
RBC # BLD: 3.35 M/UL — LOW (ref 3.8–5.2)
RBC # FLD: 14.6 % — HIGH (ref 10.3–14.5)
SAO2 % BLDV: 65.7 % — LOW (ref 67–88)
SODIUM SERPL-SCNC: 139 MMOL/L — SIGNIFICANT CHANGE UP (ref 135–145)
WBC # BLD: 9.89 K/UL — SIGNIFICANT CHANGE UP (ref 3.8–10.5)
WBC # FLD AUTO: 9.89 K/UL — SIGNIFICANT CHANGE UP (ref 3.8–10.5)

## 2023-07-15 PROCEDURE — 99232 SBSQ HOSP IP/OBS MODERATE 35: CPT | Mod: GC

## 2023-07-15 RX ADMIN — Medication 1 TABLET(S): at 11:33

## 2023-07-15 RX ADMIN — Medication 600 MILLIGRAM(S): at 17:52

## 2023-07-15 RX ADMIN — BUDESONIDE AND FORMOTEROL FUMARATE DIHYDRATE 2 PUFF(S): 160; 4.5 AEROSOL RESPIRATORY (INHALATION) at 10:57

## 2023-07-15 RX ADMIN — BUDESONIDE AND FORMOTEROL FUMARATE DIHYDRATE 2 PUFF(S): 160; 4.5 AEROSOL RESPIRATORY (INHALATION) at 23:03

## 2023-07-15 RX ADMIN — Medication 0.25 MILLIGRAM(S): at 23:35

## 2023-07-15 RX ADMIN — Medication 4 MILLILITER(S): at 21:15

## 2023-07-15 RX ADMIN — Medication 3 MILLILITER(S): at 10:31

## 2023-07-15 RX ADMIN — ESCITALOPRAM OXALATE 10 MILLIGRAM(S): 10 TABLET, FILM COATED ORAL at 11:33

## 2023-07-15 RX ADMIN — Medication 40 MILLIGRAM(S): at 07:07

## 2023-07-15 RX ADMIN — Medication 4 MILLILITER(S): at 16:13

## 2023-07-15 RX ADMIN — CHLORHEXIDINE GLUCONATE 1 APPLICATION(S): 213 SOLUTION TOPICAL at 11:34

## 2023-07-15 RX ADMIN — APIXABAN 5 MILLIGRAM(S): 2.5 TABLET, FILM COATED ORAL at 17:52

## 2023-07-15 RX ADMIN — APIXABAN 5 MILLIGRAM(S): 2.5 TABLET, FILM COATED ORAL at 07:07

## 2023-07-15 RX ADMIN — VALACYCLOVIR 500 MILLIGRAM(S): 500 TABLET, FILM COATED ORAL at 11:33

## 2023-07-15 RX ADMIN — Medication 4 MILLILITER(S): at 10:31

## 2023-07-15 RX ADMIN — Medication 0.25 MILLIGRAM(S): at 13:57

## 2023-07-15 RX ADMIN — Medication 600 MILLIGRAM(S): at 07:07

## 2023-07-15 RX ADMIN — Medication 3 MILLILITER(S): at 16:14

## 2023-07-15 RX ADMIN — Medication 3 MILLILITER(S): at 03:28

## 2023-07-15 RX ADMIN — Medication 3 MILLILITER(S): at 21:15

## 2023-07-15 NOTE — PROGRESS NOTE ADULT - PROBLEM SELECTOR PLAN 1
On trelagy at home, not on O2  -Increasing cough, phlegm production  -Rhinovirus (+)  -Duonebs Q6h   -Symbicort 2 puffs BID  -Prednisone 40mg qd  -Mucomyst  -Repeat CXR pending COPD exacerbation likely worsened by + enterovirus (7/12/23)  On trelagy at home, not on O2  -Increasing cough, phlegm production  -Rhinovirus (+)  -Duonebs Q6h   -Symbicort 2 puffs BID  -Prednisone 40mg qd  -Mucomyst  -Repeat CXR pending

## 2023-07-15 NOTE — PROGRESS NOTE ADULT - ASSESSMENT
89 y/o  F with a hx of lymphoma (on immune therapy), COPD (not on home O2), anxiety, prior PE on eliquis presenting after mechanical fall with head impact, admitted for UTI and metabolic acidosis. Acidosis is improving on bicarb gtt. WBC count decreasing on bactrim. Concern for COPD exacerbation given increased cough, phlegm, and diffuse wheezing on exam. Component of anxiety contributing to her tachypneic episodes overnight as well.

## 2023-07-15 NOTE — PROGRESS NOTE ADULT - SUBJECTIVE AND OBJECTIVE BOX
PROGRESS NOTE:   Authored by ADRIANE Davidson PGY2 Pager: [facility] [phone] [facility] [phone]    Patient is a 89y old  Female who presents with a chief complaint of Fall (14 Jul 2023 14:21)      SUBJECTIVE / OVERNIGHT EVENTS:  Pt anxious overnight, seen at bedside. Stable.     MEDICATIONS  (STANDING):  acetylcysteine 10%  Inhalation 4 milliLiter(s) Inhalation three times a day  albuterol/ipratropium for Nebulization 3 milliLiter(s) Nebulizer every 6 hours  apixaban 5 milliGRAM(s) Oral every 12 hours  budesonide  80 MICROgram(s)/formoterol 4.5 MICROgram(s) Inhaler 2 Puff(s) Inhalation two times a day  chlorhexidine 2% Cloths 1 Application(s) Topical daily  escitalopram 10 milliGRAM(s) Oral daily  guaiFENesin  milliGRAM(s) Oral every 12 hours  predniSONE   Tablet 40 milliGRAM(s) Oral daily  sodium bicarbonate  Infusion 0.22 mEq/kG/Hr (100 mL/Hr) IV Continuous <Continuous>  trimethoprim  160 mG/sulfamethoxazole 800 mG 1 Tablet(s) Oral daily  valACYclovir 500 milliGRAM(s) Oral daily    MEDICATIONS  (PRN):  acetaminophen     Tablet .. 650 milliGRAM(s) Oral every 6 hours PRN Mild Pain (1 - 3), Moderate Pain (4 - 6)  ALPRAZolam 0.25 milliGRAM(s) Oral daily PRN For anxiety      CAPILLARY BLOOD GLUCOSE        I&O's Summary    14 Jul 2023 07:01  -  15 Jul 2023 07:00  --------------------------------------------------------  IN: 850 mL / OUT: 0 mL / NET: 850 mL        PHYSICAL EXAM:  Vital Signs Last 24 Hrs  T(C): 36.4 (15 Jul 2023 05:27), Max: 36.6 (14 Jul 2023 17:43)  T(F): 97.6 (15 Jul 2023 05:27), Max: 97.9 (14 Jul 2023 17:43)  HR: 99 (15 Jul 2023 05:27) (61 - 111)  BP: 151/59 (15 Jul 2023 05:27) (105/62 - 151/59)  BP(mean): --  RR: 18 (15 Jul 2023 05:27) (17 - 22)  SpO2: 95% (15 Jul 2023 01:00) (95% - 100%)    Parameters below as of 15 Jul 2023 05:27  Patient On (Oxygen Delivery Method): nasal cannula  O2 Flow (L/min): 4      CONSTITUTIONAL: NAD  EYES: conjunctiva and sclera clear  ENMT: Moist oral mucosa  NECK: Supple  RESPIRATORY: Normal respiratory effort; wheezing b/l  CARDIOVASCULAR: Regular rate and rhythm, normal S1 and S2, no murmur/rub/gallop; No lower extremity edema  ABDOMEN: Nontender to palpation, normoactive bowel sounds, no rebound/guarding  PSYCH: no focal deficits  NEUROLOGY: nonfocal  SKIN: No rashes      LABS:                        10.1   11.82 )-----------( 101      ( 14 Jul 2023 06:25 )             30.5     07-14    141  |  111<H>  |  21  ----------------------------<  110<H>  3.8   |  19<L>  |  1.52<H>    Ca    9.7      14 Jul 2023 06:25  Phos  2.8     07-14  Mg     1.90     07-14    TPro  5.6<L>  /  Alb  3.2<L>  /  TBili  <0.2  /  DBili  x   /  AST  12  /  ALT  14  /  AlkPhos  119  07-14          Urinalysis Basic - ( 14 Jul 2023 06:25 )    Color: x / Appearance: x / SG: x / pH: x  Gluc: 110 mg/dL / Ketone: x  / Bili: x / Urobili: x   Blood: x / Protein: x / Nitrite: x   Leuk Esterase: x / RBC: x / WBC x   Sq Epi: x / Non Sq Epi: x / Bacteria: x          RADIOLOGY & ADDITIONAL TESTS:  Results Reviewed:   Imaging Personally Reviewed:  Electrocardiogram Personally Reviewed:    COORDINATION OF CARE:  Care Discussed with Consultants/Other Providers [Y/N]:  Prior or Outpatient Records Reviewed [Y/N]:   PROGRESS NOTE:   Authored by ADRIANE Davidson PGY2 Pager: [facility] [phone] [facility] [phone]    Patient is a 89y old  Female who presents with a chief complaint of Fall (14 Jul 2023 14:21)      SUBJECTIVE / OVERNIGHT EVENTS:  Pt anxious overnight, seen at bedside. Stable.     MEDICATIONS  (STANDING):  acetylcysteine 10%  Inhalation 4 milliLiter(s) Inhalation three times a day  albuterol/ipratropium for Nebulization 3 milliLiter(s) Nebulizer every 6 hours  apixaban 5 milliGRAM(s) Oral every 12 hours  budesonide  80 MICROgram(s)/formoterol 4.5 MICROgram(s) Inhaler 2 Puff(s) Inhalation two times a day  chlorhexidine 2% Cloths 1 Application(s) Topical daily  escitalopram 10 milliGRAM(s) Oral daily  guaiFENesin  milliGRAM(s) Oral every 12 hours  predniSONE   Tablet 40 milliGRAM(s) Oral daily  sodium bicarbonate  Infusion 0.22 mEq/kG/Hr (100 mL/Hr) IV Continuous <Continuous>  trimethoprim  160 mG/sulfamethoxazole 800 mG 1 Tablet(s) Oral daily  valACYclovir 500 milliGRAM(s) Oral daily    MEDICATIONS  (PRN):  acetaminophen     Tablet .. 650 milliGRAM(s) Oral every 6 hours PRN Mild Pain (1 - 3), Moderate Pain (4 - 6)  ALPRAZolam 0.25 milliGRAM(s) Oral daily PRN For anxiety      CAPILLARY BLOOD GLUCOSE        I&O's Summary    14 Jul 2023 07:01  -  15 Jul 2023 07:00  --------------------------------------------------------  IN: 850 mL / OUT: 0 mL / NET: 850 mL        PHYSICAL EXAM:  Vital Signs Last 24 Hrs  T(C): 36.4 (15 Jul 2023 05:27), Max: 36.6 (14 Jul 2023 17:43)  T(F): 97.6 (15 Jul 2023 05:27), Max: 97.9 (14 Jul 2023 17:43)  HR: 99 (15 Jul 2023 05:27) (61 - 111)  BP: 151/59 (15 Jul 2023 05:27) (105/62 - 151/59)  BP(mean): --  RR: 18 (15 Jul 2023 05:27) (17 - 22)  SpO2: 95% (15 Jul 2023 01:00) (95% - 100%)    Parameters below as of 15 Jul 2023 05:27  Patient On (Oxygen Delivery Method): nasal cannula  O2 Flow (L/min): 4      CONSTITUTIONAL: NAD  EYES: conjunctiva and sclera clear  ENMT: Moist oral mucosa  NECK: Supple  RESPIRATORY: Normal respiratory effort; course breath sounds b/l on 2L   CARDIOVASCULAR: Regular rate and rhythm, normal S1 and S2, no murmur/rub/gallop; No lower extremity edema  ABDOMEN: Nontender to palpation, normoactive bowel sounds, no rebound/guarding  PSYCH: no focal deficits  NEUROLOGY: nonfocal  SKIN: No rashes      LABS:                        10.1   11.82 )-----------( 101      ( 14 Jul 2023 06:25 )             30.5     07-14    141  |  111<H>  |  21  ----------------------------<  110<H>  3.8   |  19<L>  |  1.52<H>    Ca    9.7      14 Jul 2023 06:25  Phos  2.8     07-14  Mg     1.90     07-14    TPro  5.6<L>  /  Alb  3.2<L>  /  TBili  <0.2  /  DBili  x   /  AST  12  /  ALT  14  /  AlkPhos  119  07-14          Urinalysis Basic - ( 14 Jul 2023 06:25 )    Color: x / Appearance: x / SG: x / pH: x  Gluc: 110 mg/dL / Ketone: x  / Bili: x / Urobili: x   Blood: x / Protein: x / Nitrite: x   Leuk Esterase: x / RBC: x / WBC x   Sq Epi: x / Non Sq Epi: x / Bacteria: x          RADIOLOGY & ADDITIONAL TESTS:  Results Reviewed:   Imaging Personally Reviewed:  Electrocardiogram Personally Reviewed:    COORDINATION OF CARE:  Care Discussed with Consultants/Other Providers [Y/N]:  Prior or Outpatient Records Reviewed [Y/N]:

## 2023-07-16 LAB
ALBUMIN SERPL ELPH-MCNC: 3.5 G/DL — SIGNIFICANT CHANGE UP (ref 3.3–5)
ALP SERPL-CCNC: 112 U/L — SIGNIFICANT CHANGE UP (ref 40–120)
ALT FLD-CCNC: 14 U/L — SIGNIFICANT CHANGE UP (ref 4–33)
ANION GAP SERPL CALC-SCNC: 12 MMOL/L — SIGNIFICANT CHANGE UP (ref 7–14)
AST SERPL-CCNC: 11 U/L — SIGNIFICANT CHANGE UP (ref 4–32)
BASE EXCESS BLDV CALC-SCNC: -4.2 MMOL/L — LOW (ref -2–3)
BASOPHILS # BLD AUTO: 0.03 K/UL — SIGNIFICANT CHANGE UP (ref 0–0.2)
BASOPHILS NFR BLD AUTO: 0.3 % — SIGNIFICANT CHANGE UP (ref 0–2)
BILIRUB SERPL-MCNC: <0.2 MG/DL — SIGNIFICANT CHANGE UP (ref 0.2–1.2)
BUN SERPL-MCNC: 32 MG/DL — HIGH (ref 7–23)
CALCIUM SERPL-MCNC: 9.9 MG/DL — SIGNIFICANT CHANGE UP (ref 8.4–10.5)
CHLORIDE SERPL-SCNC: 106 MMOL/L — SIGNIFICANT CHANGE UP (ref 98–107)
CO2 BLDV-SCNC: 22.2 MMOL/L — SIGNIFICANT CHANGE UP (ref 22–26)
CO2 SERPL-SCNC: 20 MMOL/L — LOW (ref 22–31)
CREAT SERPL-MCNC: 1.51 MG/DL — HIGH (ref 0.5–1.3)
EGFR: 32 ML/MIN/1.73M2 — LOW
EOSINOPHIL # BLD AUTO: 0.06 K/UL — SIGNIFICANT CHANGE UP (ref 0–0.5)
EOSINOPHIL NFR BLD AUTO: 0.5 % — SIGNIFICANT CHANGE UP (ref 0–6)
GAS PNL BLDV: SIGNIFICANT CHANGE UP
GLUCOSE SERPL-MCNC: 93 MG/DL — SIGNIFICANT CHANGE UP (ref 70–99)
HCO3 BLDV-SCNC: 21 MMOL/L — LOW (ref 22–29)
HCT VFR BLD CALC: 31.1 % — LOW (ref 34.5–45)
HGB BLD-MCNC: 9.8 G/DL — LOW (ref 11.5–15.5)
IANC: 9.38 K/UL — HIGH (ref 1.8–7.4)
IMM GRANULOCYTES NFR BLD AUTO: 1 % — HIGH (ref 0–0.9)
LYMPHOCYTES # BLD AUTO: 0.83 K/UL — LOW (ref 1–3.3)
LYMPHOCYTES # BLD AUTO: 7.3 % — LOW (ref 13–44)
MAGNESIUM SERPL-MCNC: 2.3 MG/DL — SIGNIFICANT CHANGE UP (ref 1.6–2.6)
MCHC RBC-ENTMCNC: 31.3 PG — SIGNIFICANT CHANGE UP (ref 27–34)
MCHC RBC-ENTMCNC: 31.5 GM/DL — LOW (ref 32–36)
MCV RBC AUTO: 99.4 FL — SIGNIFICANT CHANGE UP (ref 80–100)
MONOCYTES # BLD AUTO: 0.94 K/UL — HIGH (ref 0–0.9)
MONOCYTES NFR BLD AUTO: 8.3 % — SIGNIFICANT CHANGE UP (ref 2–14)
NEUTROPHILS # BLD AUTO: 9.38 K/UL — HIGH (ref 1.8–7.4)
NEUTROPHILS NFR BLD AUTO: 82.6 % — HIGH (ref 43–77)
NRBC # BLD: 0 /100 WBCS — SIGNIFICANT CHANGE UP (ref 0–0)
NRBC # FLD: 0 K/UL — SIGNIFICANT CHANGE UP (ref 0–0)
NT-PROBNP SERPL-SCNC: 1589 PG/ML — HIGH
PCO2 BLDV: 38 MMHG — LOW (ref 39–52)
PH BLDV: 7.35 — SIGNIFICANT CHANGE UP (ref 7.32–7.43)
PHOSPHATE SERPL-MCNC: 3.2 MG/DL — SIGNIFICANT CHANGE UP (ref 2.5–4.5)
PLATELET # BLD AUTO: 136 K/UL — LOW (ref 150–400)
PO2 BLDV: 67 MMHG — HIGH (ref 25–45)
POTASSIUM SERPL-MCNC: 4.4 MMOL/L — SIGNIFICANT CHANGE UP (ref 3.5–5.3)
POTASSIUM SERPL-SCNC: 4.4 MMOL/L — SIGNIFICANT CHANGE UP (ref 3.5–5.3)
PROT SERPL-MCNC: 5.6 G/DL — LOW (ref 6–8.3)
RBC # BLD: 3.13 M/UL — LOW (ref 3.8–5.2)
RBC # FLD: 14.9 % — HIGH (ref 10.3–14.5)
SAO2 % BLDV: 92.9 % — HIGH (ref 67–88)
SODIUM SERPL-SCNC: 138 MMOL/L — SIGNIFICANT CHANGE UP (ref 135–145)
WBC # BLD: 11.35 K/UL — HIGH (ref 3.8–10.5)
WBC # FLD AUTO: 11.35 K/UL — HIGH (ref 3.8–10.5)

## 2023-07-16 PROCEDURE — 99232 SBSQ HOSP IP/OBS MODERATE 35: CPT

## 2023-07-16 RX ORDER — PANTOPRAZOLE SODIUM 20 MG/1
40 TABLET, DELAYED RELEASE ORAL
Refills: 0 | Status: DISCONTINUED | OUTPATIENT
Start: 2023-07-16 | End: 2023-07-18

## 2023-07-16 RX ORDER — FUROSEMIDE 40 MG
20 TABLET ORAL ONCE
Refills: 0 | Status: COMPLETED | OUTPATIENT
Start: 2023-07-16 | End: 2023-07-16

## 2023-07-16 RX ORDER — ALPRAZOLAM 0.25 MG
0.25 TABLET ORAL ONCE
Refills: 0 | Status: DISCONTINUED | OUTPATIENT
Start: 2023-07-16 | End: 2023-07-16

## 2023-07-16 RX ADMIN — CHLORHEXIDINE GLUCONATE 1 APPLICATION(S): 213 SOLUTION TOPICAL at 13:22

## 2023-07-16 RX ADMIN — APIXABAN 5 MILLIGRAM(S): 2.5 TABLET, FILM COATED ORAL at 17:39

## 2023-07-16 RX ADMIN — Medication 600 MILLIGRAM(S): at 06:16

## 2023-07-16 RX ADMIN — Medication 4 MILLILITER(S): at 21:25

## 2023-07-16 RX ADMIN — Medication 3 MILLILITER(S): at 21:25

## 2023-07-16 RX ADMIN — Medication 1 TABLET(S): at 13:21

## 2023-07-16 RX ADMIN — Medication 3 MILLILITER(S): at 09:15

## 2023-07-16 RX ADMIN — BUDESONIDE AND FORMOTEROL FUMARATE DIHYDRATE 2 PUFF(S): 160; 4.5 AEROSOL RESPIRATORY (INHALATION) at 21:02

## 2023-07-16 RX ADMIN — Medication 3 MILLILITER(S): at 16:55

## 2023-07-16 RX ADMIN — Medication 0.25 MILLIGRAM(S): at 20:02

## 2023-07-16 RX ADMIN — Medication 20 MILLIGRAM(S): at 17:37

## 2023-07-16 RX ADMIN — Medication 40 MILLIGRAM(S): at 06:16

## 2023-07-16 RX ADMIN — APIXABAN 5 MILLIGRAM(S): 2.5 TABLET, FILM COATED ORAL at 06:16

## 2023-07-16 RX ADMIN — Medication 4 MILLILITER(S): at 16:55

## 2023-07-16 RX ADMIN — BUDESONIDE AND FORMOTEROL FUMARATE DIHYDRATE 2 PUFF(S): 160; 4.5 AEROSOL RESPIRATORY (INHALATION) at 10:13

## 2023-07-16 RX ADMIN — Medication 4 MILLILITER(S): at 09:14

## 2023-07-16 RX ADMIN — VALACYCLOVIR 500 MILLIGRAM(S): 500 TABLET, FILM COATED ORAL at 13:21

## 2023-07-16 RX ADMIN — Medication 600 MILLIGRAM(S): at 17:39

## 2023-07-16 RX ADMIN — ESCITALOPRAM OXALATE 10 MILLIGRAM(S): 10 TABLET, FILM COATED ORAL at 13:21

## 2023-07-16 NOTE — PROGRESS NOTE ADULT - PROBLEM SELECTOR PLAN 4
UA w/ WBC>4000 and + leukocyte esterase  -On Bactrim for PCP prophylaxis given hx of lymphoma s/p palliative chemo + immune therapy  -BCx no growth  -UCx contaminated  -Improving  -Continue bactrim UA w/ WBC>4000 and + leukocyte esterase  -On Bactrim for PCP prophylaxis given hx of lymphoma s/p palliative chemo + immune therapy  -BCx no growth  -UCx contaminated  -WBC count slightly increased, likely in the setting of prednisone initiation  -Continue bactrim  -Can start ceftriaxone if she spikes fevers

## 2023-07-16 NOTE — PROGRESS NOTE ADULT - ASSESSMENT
89 y/o  F with a hx of lymphoma (on immune therapy), COPD (not on home O2), anxiety, prior PE on eliquis presenting after mechanical fall with head impact, admitted for UTI and metabolic acidosis. Acidosis improved on bicarb gtt. WBC count decreasing on bactrim. Concern for COPD exacerbation given increased cough, phlegm, and diffuse wheezing on exam. Component of anxiety contributing to her tachypneic episodes overnight as well. 89 y/o  F with a hx of lymphoma (on immune therapy), COPD (not on home O2), anxiety, prior PE on eliquis presenting after mechanical fall with head impact, admitted for UTI and metabolic acidosis. Acidosis improved on bicarb gtt. Concern for COPD exacerbation given increased cough, phlegm, and diffuse wheezing on exam, started on prednisone. O2 sat >95% on RA. Component of anxiety contributing to her tachypneic episodes overnight as well.

## 2023-07-16 NOTE — PROGRESS NOTE ADULT - PROBLEM SELECTOR PLAN 2
Likely 2/2 infection  - Not properly compensated given hx of COPD  - Improved on bicarb gtt  - Trend vBG, BMP  - D/c bicarb  - Nephro signing off Likely 2/2 infection  - Improved on bicarb gtt  - Trend vBG, BMP  - D/c bicarb  - Nephro signing off

## 2023-07-16 NOTE — PROGRESS NOTE ADULT - SUBJECTIVE AND OBJECTIVE BOX
PROGRESS NOTE:   Authored by Chelsea Moody MD PGY-1  Internal Medicine      Patient is a 89y old  Female who presents with a chief complaint of Fall (15 Jul 2023 07:14)      SUBJECTIVE / OVERNIGHT EVENTS: ***, patient seen and examined at bedside    MEDICATIONS  (STANDING):  acetylcysteine 10%  Inhalation 4 milliLiter(s) Inhalation three times a day  albuterol/ipratropium for Nebulization 3 milliLiter(s) Nebulizer every 6 hours  apixaban 5 milliGRAM(s) Oral every 12 hours  budesonide  80 MICROgram(s)/formoterol 4.5 MICROgram(s) Inhaler 2 Puff(s) Inhalation two times a day  chlorhexidine 2% Cloths 1 Application(s) Topical daily  escitalopram 10 milliGRAM(s) Oral daily  guaiFENesin  milliGRAM(s) Oral every 12 hours  predniSONE   Tablet 40 milliGRAM(s) Oral daily  trimethoprim  160 mG/sulfamethoxazole 800 mG 1 Tablet(s) Oral daily  valACYclovir 500 milliGRAM(s) Oral daily    MEDICATIONS  (PRN):  acetaminophen     Tablet .. 650 milliGRAM(s) Oral every 6 hours PRN Mild Pain (1 - 3), Moderate Pain (4 - 6)  ALPRAZolam 0.25 milliGRAM(s) Oral daily PRN For anxiety      CAPILLARY BLOOD GLUCOSE        I&O's Summary    15 Jul 2023 07:01  -  16 Jul 2023 07:00  --------------------------------------------------------  IN: 0 mL / OUT: 700 mL / NET: -700 mL        PHYSICAL EXAM:  Vital Signs Last 24 Hrs  T(C): 36.4 (16 Jul 2023 06:10), Max: 37 (15 Jul 2023 09:25)  T(F): 97.6 (16 Jul 2023 06:10), Max: 98.6 (15 Jul 2023 09:25)  HR: 99 (16 Jul 2023 06:10) (76 - 110)  BP: 111/86 (16 Jul 2023 06:10) (103/88 - 134/92)  BP(mean): --  RR: 20 (16 Jul 2023 06:10) (19 - 22)  SpO2: 98% (16 Jul 2023 06:10) (94% - 100%)    Parameters below as of 16 Jul 2023 06:10  Patient On (Oxygen Delivery Method): nasal cannula      CONSTITUTIONAL: Well-groomed, in no apparent distress  RESPIRATORY: Breathing comfortably; no dullness to percussion; lungs CTA without wheeze/rhonchi/rales  CARDIOVASCULAR: +S1S2, RRR, no M/G/R; pedal pulses full and symmetric; no lower extremity edema  GASTROINTESTINAL: No palpable masses or tenderness, +BS throughout, no rebound/guarding; no hepatosplenomegaly; no hernia palpated  SKIN: No rashes or ulcers noted  NEUROLOGIC: A+O x 3, CN II-XII intact; sensation intact in LEs b/l to light touch    LABS:                        9.8    11.35 )-----------( 136      ( 16 Jul 2023 05:55 )             31.1     07-16    138  |  106  |  32<H>  ----------------------------<  93  4.4   |  20<L>  |  1.51<H>    Ca    9.9      16 Jul 2023 05:55  Phos  3.2     07-16  Mg     2.30     07-16    TPro  5.6<L>  /  Alb  3.5  /  TBili  <0.2  /  DBili  x   /  AST  11  /  ALT  14  /  AlkPhos  112  07-16   PROGRESS NOTE:   Authored by Chelsea Moody MD PGY-1  Internal Medicine      Patient is a 89y old  Female who presents with a chief complaint of Fall (15 Jul 2023 07:14)      SUBJECTIVE / OVERNIGHT EVENTS: ***, patient seen and examined at bedside    MEDICATIONS  (STANDING):  acetylcysteine 10%  Inhalation 4 milliLiter(s) Inhalation three times a day  albuterol/ipratropium for Nebulization 3 milliLiter(s) Nebulizer every 6 hours  apixaban 5 milliGRAM(s) Oral every 12 hours  budesonide  80 MICROgram(s)/formoterol 4.5 MICROgram(s) Inhaler 2 Puff(s) Inhalation two times a day  chlorhexidine 2% Cloths 1 Application(s) Topical daily  escitalopram 10 milliGRAM(s) Oral daily  guaiFENesin  milliGRAM(s) Oral every 12 hours  predniSONE   Tablet 40 milliGRAM(s) Oral daily  trimethoprim  160 mG/sulfamethoxazole 800 mG 1 Tablet(s) Oral daily  valACYclovir 500 milliGRAM(s) Oral daily    MEDICATIONS  (PRN):  acetaminophen     Tablet .. 650 milliGRAM(s) Oral every 6 hours PRN Mild Pain (1 - 3), Moderate Pain (4 - 6)  ALPRAZolam 0.25 milliGRAM(s) Oral daily PRN For anxiety      CAPILLARY BLOOD GLUCOSE        I&O's Summary    15 Jul 2023 07:01  -  16 Jul 2023 07:00  --------------------------------------------------------  IN: 0 mL / OUT: 700 mL / NET: -700 mL        PHYSICAL EXAM:  Vital Signs Last 24 Hrs  T(C): 36.4 (16 Jul 2023 06:10), Max: 37 (15 Jul 2023 09:25)  T(F): 97.6 (16 Jul 2023 06:10), Max: 98.6 (15 Jul 2023 09:25)  HR: 99 (16 Jul 2023 06:10) (76 - 110)  BP: 111/86 (16 Jul 2023 06:10) (103/88 - 134/92)  RR: 20 (16 Jul 2023 06:10) (19 - 22)  SpO2: 98% (16 Jul 2023 06:10) (94% - 100%)    Parameters below as of 16 Jul 2023 06:10  Patient On (Oxygen Delivery Method): nasal cannula      CONSTITUTIONAL: Well-groomed, in no apparent distress  RESPIRATORY:  ***  CARDIOVASCULAR: +S1S2, RRR, no M/G/R; pedal pulses full and symmetric; no lower extremity edema  GASTROINTESTINAL: No palpable masses or tenderness, +BS throughout, no rebound/guarding; no hepatosplenomegaly; no hernia palpated  SKIN: No rashes or ulcers noted  NEUROLOGIC: A+O x 3, CN II-XII intact; sensation intact in LEs b/l to light touch    LABS:                        9.8    11.35 )-----------( 136      ( 16 Jul 2023 05:55 )             31.1     07-16    138  |  106  |  32<H>  ----------------------------<  93  4.4   |  20<L>  |  1.51<H>    Ca    9.9      16 Jul 2023 05:55  Phos  3.2     07-16  Mg     2.30     07-16    TPro  5.6<L>  /  Alb  3.5  /  TBili  <0.2  /  DBili  x   /  AST  11  /  ALT  14  /  AlkPhos  112  07-16   PROGRESS NOTE:   Authored by Chelsea Moody MD PGY-1  Internal Medicine      Patient is a 89y old  Female who presents with a chief complaint of Fall (15 Jul 2023 07:14)      SUBJECTIVE / OVERNIGHT EVENTS: NAEO. No new symptoms or complaints. Pt anxious to go home. She was seen and examined at bedside    MEDICATIONS  (STANDING):  acetylcysteine 10%  Inhalation 4 milliLiter(s) Inhalation three times a day  albuterol/ipratropium for Nebulization 3 milliLiter(s) Nebulizer every 6 hours  apixaban 5 milliGRAM(s) Oral every 12 hours  budesonide  80 MICROgram(s)/formoterol 4.5 MICROgram(s) Inhaler 2 Puff(s) Inhalation two times a day  chlorhexidine 2% Cloths 1 Application(s) Topical daily  escitalopram 10 milliGRAM(s) Oral daily  guaiFENesin  milliGRAM(s) Oral every 12 hours  predniSONE   Tablet 40 milliGRAM(s) Oral daily  trimethoprim  160 mG/sulfamethoxazole 800 mG 1 Tablet(s) Oral daily  valACYclovir 500 milliGRAM(s) Oral daily    MEDICATIONS  (PRN):  acetaminophen     Tablet .. 650 milliGRAM(s) Oral every 6 hours PRN Mild Pain (1 - 3), Moderate Pain (4 - 6)  ALPRAZolam 0.25 milliGRAM(s) Oral daily PRN For anxiety          I&O's Summary    15 Jul 2023 07:01  -  16 Jul 2023 07:00  --------------------------------------------------------  IN: 0 mL / OUT: 700 mL / NET: -700 mL        PHYSICAL EXAM:  Vital Signs Last 24 Hrs  T(C): 36.4 (16 Jul 2023 06:10), Max: 37 (15 Jul 2023 09:25)  T(F): 97.6 (16 Jul 2023 06:10), Max: 98.6 (15 Jul 2023 09:25)  HR: 99 (16 Jul 2023 06:10) (76 - 110)  BP: 111/86 (16 Jul 2023 06:10) (103/88 - 134/92)  RR: 20 (16 Jul 2023 06:10) (19 - 22)  SpO2: 98% (16 Jul 2023 06:10) (94% - 100%)    Parameters below as of 16 Jul 2023 06:10  Patient On (Oxygen Delivery Method): nasal cannula      CONSTITUTIONAL: Well-groomed, in no apparent distress  RESPIRATORY: Diffuse wheezing  CARDIOVASCULAR: +S1S2, RRR, no M/G/R; pedal pulses full and symmetric; no lower extremity edema  GASTROINTESTINAL: No palpable masses or tenderness, +BS throughout, no rebound/guarding; no hepatosplenomegaly; no hernia palpated  NEUROLOGIC: A+O x 3  LABS:                        9.8    11.35 )-----------( 136      ( 16 Jul 2023 05:55 )             31.1     07-16    138  |  106  |  32<H>  ----------------------------<  93  4.4   |  20<L>  |  1.51<H>    Ca    9.9      16 Jul 2023 05:55  Phos  3.2     07-16  Mg     2.30     07-16    TPro  5.6<L>  /  Alb  3.5  /  TBili  <0.2  /  DBili  x   /  AST  11  /  ALT  14  /  AlkPhos  112  07-16

## 2023-07-16 NOTE — PROGRESS NOTE ADULT - PROBLEM SELECTOR PLAN 1
COPD exacerbation likely worsened by + enterovirus (7/12/23)  On trelagy at home, not on O2  -Increasing cough, phlegm production  -Rhinovirus (+)  -Duonebs Q6h   -Symbicort 2 puffs BID  -Prednisone 40mg qd  -Mucomyst  -Repeat CXR pending COPD exacerbation likely worsened by + entero/rhinovirus (7/12/23)  On trelagy at home, not on O2  -Increasing cough, phlegm production  -Rhinovirus (+)  -Repeat CXR showed small right pleural effusion  -Duonebs Q6h   -Symbicort 2 puffs BID  -Prednisone 40mg qd  -Mucomyst  -Encourage incentive spirometry 10+ times/hour  -Given Lasix 20mg for hx of pulmonary htn with increased bnp since admission

## 2023-07-16 NOTE — PROGRESS NOTE ADULT - PROBLEM SELECTOR PLAN 3
Possibly 2/2 poor po intake  - Creat improving  - Renal US showed no hydronephrosis  - Will continue to monitor Possibly 2/2 poor po intake  - Creat improved since admission, worsened slightly since yesterday  - Renal US showed no hydronephrosis  - Encouraged pt to increased fluid intake  - Will continue to monitor

## 2023-07-17 DIAGNOSIS — I27.20 PULMONARY HYPERTENSION, UNSPECIFIED: ICD-10-CM

## 2023-07-17 LAB
ALBUMIN SERPL ELPH-MCNC: 3.9 G/DL — SIGNIFICANT CHANGE UP (ref 3.3–5)
ALP SERPL-CCNC: 122 U/L — HIGH (ref 40–120)
ALT FLD-CCNC: 17 U/L — SIGNIFICANT CHANGE UP (ref 4–33)
ANION GAP SERPL CALC-SCNC: 15 MMOL/L — HIGH (ref 7–14)
AST SERPL-CCNC: 14 U/L — SIGNIFICANT CHANGE UP (ref 4–32)
BASOPHILS # BLD AUTO: 0.04 K/UL — SIGNIFICANT CHANGE UP (ref 0–0.2)
BASOPHILS NFR BLD AUTO: 0.3 % — SIGNIFICANT CHANGE UP (ref 0–2)
BILIRUB SERPL-MCNC: 0.2 MG/DL — SIGNIFICANT CHANGE UP (ref 0.2–1.2)
BUN SERPL-MCNC: 35 MG/DL — HIGH (ref 7–23)
CALCIUM SERPL-MCNC: 10.3 MG/DL — SIGNIFICANT CHANGE UP (ref 8.4–10.5)
CHLORIDE SERPL-SCNC: 102 MMOL/L — SIGNIFICANT CHANGE UP (ref 98–107)
CO2 SERPL-SCNC: 21 MMOL/L — LOW (ref 22–31)
CREAT SERPL-MCNC: 1.42 MG/DL — HIGH (ref 0.5–1.3)
CULTURE RESULTS: SIGNIFICANT CHANGE UP
CULTURE RESULTS: SIGNIFICANT CHANGE UP
EGFR: 35 ML/MIN/1.73M2 — LOW
EOSINOPHIL # BLD AUTO: 0.07 K/UL — SIGNIFICANT CHANGE UP (ref 0–0.5)
EOSINOPHIL NFR BLD AUTO: 0.4 % — SIGNIFICANT CHANGE UP (ref 0–6)
GLUCOSE SERPL-MCNC: 85 MG/DL — SIGNIFICANT CHANGE UP (ref 70–99)
HCT VFR BLD CALC: 37.2 % — SIGNIFICANT CHANGE UP (ref 34.5–45)
HGB BLD-MCNC: 11.9 G/DL — SIGNIFICANT CHANGE UP (ref 11.5–15.5)
IANC: 12.9 K/UL — HIGH (ref 1.8–7.4)
IMM GRANULOCYTES NFR BLD AUTO: 1.4 % — HIGH (ref 0–0.9)
LYMPHOCYTES # BLD AUTO: 1.04 K/UL — SIGNIFICANT CHANGE UP (ref 1–3.3)
LYMPHOCYTES # BLD AUTO: 6.6 % — LOW (ref 13–44)
MAGNESIUM SERPL-MCNC: 2.3 MG/DL — SIGNIFICANT CHANGE UP (ref 1.6–2.6)
MCHC RBC-ENTMCNC: 31.6 PG — SIGNIFICANT CHANGE UP (ref 27–34)
MCHC RBC-ENTMCNC: 32 GM/DL — SIGNIFICANT CHANGE UP (ref 32–36)
MCV RBC AUTO: 98.7 FL — SIGNIFICANT CHANGE UP (ref 80–100)
MONOCYTES # BLD AUTO: 1.39 K/UL — HIGH (ref 0–0.9)
MONOCYTES NFR BLD AUTO: 8.9 % — SIGNIFICANT CHANGE UP (ref 2–14)
NEUTROPHILS # BLD AUTO: 12.9 K/UL — HIGH (ref 1.8–7.4)
NEUTROPHILS NFR BLD AUTO: 82.4 % — HIGH (ref 43–77)
NRBC # BLD: 0 /100 WBCS — SIGNIFICANT CHANGE UP (ref 0–0)
NRBC # FLD: 0 K/UL — SIGNIFICANT CHANGE UP (ref 0–0)
PHOSPHATE SERPL-MCNC: 2.9 MG/DL — SIGNIFICANT CHANGE UP (ref 2.5–4.5)
PLATELET # BLD AUTO: 175 K/UL — SIGNIFICANT CHANGE UP (ref 150–400)
POTASSIUM SERPL-MCNC: 4.1 MMOL/L — SIGNIFICANT CHANGE UP (ref 3.5–5.3)
POTASSIUM SERPL-SCNC: 4.1 MMOL/L — SIGNIFICANT CHANGE UP (ref 3.5–5.3)
PROT SERPL-MCNC: 6.3 G/DL — SIGNIFICANT CHANGE UP (ref 6–8.3)
RBC # BLD: 3.77 M/UL — LOW (ref 3.8–5.2)
RBC # FLD: 14.9 % — HIGH (ref 10.3–14.5)
SODIUM SERPL-SCNC: 138 MMOL/L — SIGNIFICANT CHANGE UP (ref 135–145)
SPECIMEN SOURCE: SIGNIFICANT CHANGE UP
SPECIMEN SOURCE: SIGNIFICANT CHANGE UP
WBC # BLD: 15.66 K/UL — HIGH (ref 3.8–10.5)
WBC # FLD AUTO: 15.66 K/UL — HIGH (ref 3.8–10.5)

## 2023-07-17 PROCEDURE — 99232 SBSQ HOSP IP/OBS MODERATE 35: CPT | Mod: GC

## 2023-07-17 RX ADMIN — Medication 3 MILLILITER(S): at 15:29

## 2023-07-17 RX ADMIN — BUDESONIDE AND FORMOTEROL FUMARATE DIHYDRATE 2 PUFF(S): 160; 4.5 AEROSOL RESPIRATORY (INHALATION) at 13:02

## 2023-07-17 RX ADMIN — VALACYCLOVIR 500 MILLIGRAM(S): 500 TABLET, FILM COATED ORAL at 13:01

## 2023-07-17 RX ADMIN — Medication 600 MILLIGRAM(S): at 06:19

## 2023-07-17 RX ADMIN — Medication 1 TABLET(S): at 13:01

## 2023-07-17 RX ADMIN — Medication 600 MILLIGRAM(S): at 17:42

## 2023-07-17 RX ADMIN — APIXABAN 5 MILLIGRAM(S): 2.5 TABLET, FILM COATED ORAL at 06:19

## 2023-07-17 RX ADMIN — Medication 3 MILLILITER(S): at 20:34

## 2023-07-17 RX ADMIN — Medication 4 MILLILITER(S): at 20:34

## 2023-07-17 RX ADMIN — Medication 40 MILLIGRAM(S): at 06:20

## 2023-07-17 RX ADMIN — Medication 4 MILLILITER(S): at 15:29

## 2023-07-17 RX ADMIN — Medication 0.25 MILLIGRAM(S): at 20:47

## 2023-07-17 RX ADMIN — PANTOPRAZOLE SODIUM 40 MILLIGRAM(S): 20 TABLET, DELAYED RELEASE ORAL at 06:19

## 2023-07-17 RX ADMIN — ESCITALOPRAM OXALATE 10 MILLIGRAM(S): 10 TABLET, FILM COATED ORAL at 13:01

## 2023-07-17 RX ADMIN — Medication 3 MILLILITER(S): at 09:40

## 2023-07-17 RX ADMIN — CHLORHEXIDINE GLUCONATE 1 APPLICATION(S): 213 SOLUTION TOPICAL at 13:07

## 2023-07-17 RX ADMIN — Medication 4 MILLILITER(S): at 09:41

## 2023-07-17 RX ADMIN — APIXABAN 5 MILLIGRAM(S): 2.5 TABLET, FILM COATED ORAL at 17:42

## 2023-07-17 NOTE — PROGRESS NOTE ADULT - PROBLEM SELECTOR PLAN 4
Possibly 2/2 poor po intake  - Creat improved since admission, worsened slightly since yesterday  - Renal US showed no hydronephrosis  - Encouraged pt to increased fluid intake  - Will continue to monitor

## 2023-07-17 NOTE — PROGRESS NOTE ADULT - ASSESSMENT
89 y/o  F with a hx of lymphoma (on immune therapy), COPD (not on home O2), anxiety, prior PE on eliquis presenting after mechanical fall with head impact, admitted for UTI and metabolic acidosis. Acidosis improved on bicarb gtt. Concern for COPD exacerbation given increased cough, phlegm, and diffuse wheezing on exam, on prednisone. O2 sat >95% on RA. Component of anxiety contributing to her tachypneic episodes overnight as well. Given lasix 20mg 1x given CXR finding of small right pleural effusion, increased bnp since admission and hx of pulmonary hypertension.

## 2023-07-17 NOTE — PROGRESS NOTE ADULT - PROBLEM SELECTOR PLAN 5
UA w/ WBC>4000 and + leukocyte esterase  -On Bactrim for PCP prophylaxis given hx of lymphoma s/p palliative chemo + immune therapy  -BCx no growth  -UCx contaminated  -WBC count slightly increased, likely in the setting of prednisone initiation  -Continue bactrim  -Can start ceftriaxone if she spikes fevers

## 2023-07-17 NOTE — PROGRESS NOTE ADULT - PROBLEM SELECTOR PLAN 3
Likely 2/2 infection  - Improved on bicarb gtt  - Trend vBG, BMP  - D/c bicarb  - Nephro signing off

## 2023-07-17 NOTE — PROGRESS NOTE ADULT - PROBLEM SELECTOR PLAN 1
COPD exacerbation likely worsened by + entero/rhinovirus (7/12/23)  On trelagy at home, not on O2  -Increasing cough, phlegm production  -Rhinovirus (+)  -Repeat CXR showed small right pleural effusion  -Duonebs Q6h   -Symbicort 2 puffs BID  -Prednisone 40mg qd  -Mucomyst  -Encourage incentive spirometry 10+ times/hour

## 2023-07-17 NOTE — PROGRESS NOTE ADULT - SUBJECTIVE AND OBJECTIVE BOX
PROGRESS NOTE:   Authored by Chelsea Moody MD PGY-1  Internal Medicine      Patient is a 89y old  Female who presents with a chief complaint of Fall (16 Jul 2023 08:46)      SUBJECTIVE / OVERNIGHT EVENTS: ***, patient seen and examined at bedside    MEDICATIONS  (STANDING):  acetylcysteine 10%  Inhalation 4 milliLiter(s) Inhalation three times a day  albuterol/ipratropium for Nebulization 3 milliLiter(s) Nebulizer every 6 hours  apixaban 5 milliGRAM(s) Oral every 12 hours  budesonide  80 MICROgram(s)/formoterol 4.5 MICROgram(s) Inhaler 2 Puff(s) Inhalation two times a day  chlorhexidine 2% Cloths 1 Application(s) Topical daily  escitalopram 10 milliGRAM(s) Oral daily  guaiFENesin  milliGRAM(s) Oral every 12 hours  pantoprazole    Tablet 40 milliGRAM(s) Oral before breakfast  predniSONE   Tablet 40 milliGRAM(s) Oral daily  trimethoprim  160 mG/sulfamethoxazole 800 mG 1 Tablet(s) Oral daily  valACYclovir 500 milliGRAM(s) Oral daily    MEDICATIONS  (PRN):  acetaminophen     Tablet .. 650 milliGRAM(s) Oral every 6 hours PRN Mild Pain (1 - 3), Moderate Pain (4 - 6)  ALPRAZolam 0.25 milliGRAM(s) Oral daily PRN For anxiety      CAPILLARY BLOOD GLUCOSE        I&O's Summary    16 Jul 2023 07:01  -  17 Jul 2023 07:00  --------------------------------------------------------  IN: 500 mL / OUT: 2900 mL / NET: -2400 mL        PHYSICAL EXAM:  Vital Signs Last 24 Hrs  T(C): 36.5 (17 Jul 2023 06:18), Max: 36.8 (17 Jul 2023 01:00)  T(F): 97.7 (17 Jul 2023 06:18), Max: 98.2 (17 Jul 2023 01:00)  HR: 101 (17 Jul 2023 06:18) (68 - 112)  BP: 125/73 (17 Jul 2023 06:18) (111/91 - 136/90)  BP(mean): --  RR: 20 (17 Jul 2023 06:18) (20 - 21)  SpO2: 97% (17 Jul 2023 06:18) (93% - 97%)    Parameters below as of 17 Jul 2023 06:18  Patient On (Oxygen Delivery Method): room air      CONSTITUTIONAL: Well-groomed, in no apparent distress  RESPIRATORY: Breathing comfortably; no dullness to percussion; lungs CTA without wheeze/rhonchi/rales  CARDIOVASCULAR: +S1S2, RRR, no M/G/R; pedal pulses full and symmetric; no lower extremity edema  GASTROINTESTINAL: No palpable masses or tenderness, +BS throughout, no rebound/guarding; no hepatosplenomegaly; no hernia palpated  SKIN: No rashes or ulcers noted  NEUROLOGIC: A+O x 3, CN II-XII intact; sensation intact in LEs b/l to light touch    LABS:                        11.9   15.66 )-----------( 175      ( 17 Jul 2023 07:14 )             37.2     07-16    138  |  106  |  32<H>  ----------------------------<  93  4.4   |  20<L>  |  1.51<H>    Ca    9.9      16 Jul 2023 05:55  Phos  3.2     07-16  Mg     2.30     07-16    TPro  5.6<L>  /  Alb  3.5  /  TBili  <0.2  /  DBili  x   /  AST  11  /  ALT  14  /  AlkPhos  112  07-16     PROGRESS NOTE:   Authored by Chelsea Moody MD PGY-1  Internal Medicine      Patient is a 89y old  Female who presents with a chief complaint of Fall (16 Jul 2023 08:46)      SUBJECTIVE / OVERNIGHT EVENTS: NAEO. Pt continues to have episodes of anxiety. Notes she is using her incentive spirometer. She is eager to go home.    MEDICATIONS  (STANDING):  acetylcysteine 10%  Inhalation 4 milliLiter(s) Inhalation three times a day  albuterol/ipratropium for Nebulization 3 milliLiter(s) Nebulizer every 6 hours  apixaban 5 milliGRAM(s) Oral every 12 hours  budesonide  80 MICROgram(s)/formoterol 4.5 MICROgram(s) Inhaler 2 Puff(s) Inhalation two times a day  chlorhexidine 2% Cloths 1 Application(s) Topical daily  escitalopram 10 milliGRAM(s) Oral daily  guaiFENesin  milliGRAM(s) Oral every 12 hours  pantoprazole    Tablet 40 milliGRAM(s) Oral before breakfast  predniSONE   Tablet 40 milliGRAM(s) Oral daily  trimethoprim  160 mG/sulfamethoxazole 800 mG 1 Tablet(s) Oral daily  valACYclovir 500 milliGRAM(s) Oral daily    MEDICATIONS  (PRN):  acetaminophen   Tablet .. 650 milliGRAM(s) Oral every 6 hours PRN Mild Pain (1 - 3), Moderate Pain (4 - 6)  ALPRAZolam 0.25 milliGRAM(s) Oral daily PRN For anxiety      CAPILLARY BLOOD GLUCOSE        I&O's Summary    16 Jul 2023 07:01  -  17 Jul 2023 07:00  --------------------------------------------------------  IN: 500 mL / OUT: 2900 mL / NET: -2400 mL        PHYSICAL EXAM:  Vital Signs Last 24 Hrs  T(C): 36.5 (17 Jul 2023 06:18), Max: 36.8 (17 Jul 2023 01:00)  T(F): 97.7 (17 Jul 2023 06:18), Max: 98.2 (17 Jul 2023 01:00)  HR: 101 (17 Jul 2023 06:18) (68 - 112)  BP: 125/73 (17 Jul 2023 06:18) (111/91 - 136/90)  RR: 20 (17 Jul 2023 06:18) (20 - 21)  SpO2: 97% (17 Jul 2023 06:18) (93% - 97%)    Parameters below as of 17 Jul 2023 06:18  Patient On (Oxygen Delivery Method): room air      CONSTITUTIONAL: Well-groomed, in no apparent distress  RESPIRATORY: Wheezing  CARDIOVASCULAR: +S1S2, RRR, no M/G/R; pedal pulses full and symmetric; no lower extremity edema  GASTROINTESTINAL: No palpable masses or tenderness, +BS throughout, no rebound/guarding; no hepatosplenomegaly; no hernia palpated  NEUROLOGIC: A+O x 3    LABS:                        11.9   15.66 )-----------( 175      ( 17 Jul 2023 07:14 )             37.2     07-16    138  |  106  |  32<H>  ----------------------------<  93  4.4   |  20<L>  |  1.51<H>    Ca    9.9      16 Jul 2023 05:55  Phos  3.2     07-16  Mg     2.30     07-16    TPro  5.6<L>  /  Alb  3.5  /  TBili  <0.2  /  DBili  x   /  AST  11  /  ALT  14  /  AlkPhos  112  07-16

## 2023-07-17 NOTE — PROGRESS NOTE ADULT - PROBLEM SELECTOR PLAN 2
Hx of pulmonary htn  Increased proBNP since admission   -Given lasix 20mg qd  -Holding lasix for now to avoid further kidney injury

## 2023-07-18 VITALS
HEART RATE: 112 BPM | DIASTOLIC BLOOD PRESSURE: 66 MMHG | TEMPERATURE: 98 F | SYSTOLIC BLOOD PRESSURE: 96 MMHG | RESPIRATION RATE: 20 BRPM | OXYGEN SATURATION: 96 %

## 2023-07-18 DIAGNOSIS — J44.9 CHRONIC OBSTRUCTIVE PULMONARY DISEASE, UNSPECIFIED: ICD-10-CM

## 2023-07-18 DIAGNOSIS — J44.1 CHRONIC OBSTRUCTIVE PULMONARY DISEASE WITH (ACUTE) EXACERBATION: ICD-10-CM

## 2023-07-18 DIAGNOSIS — R53.81 OTHER MALAISE: ICD-10-CM

## 2023-07-18 DIAGNOSIS — Z71.89 OTHER SPECIFIED COUNSELING: ICD-10-CM

## 2023-07-18 DIAGNOSIS — Z51.5 ENCOUNTER FOR PALLIATIVE CARE: ICD-10-CM

## 2023-07-18 LAB
ALBUMIN SERPL ELPH-MCNC: 3.8 G/DL — SIGNIFICANT CHANGE UP (ref 3.3–5)
ALP SERPL-CCNC: 117 U/L — SIGNIFICANT CHANGE UP (ref 40–120)
ALT FLD-CCNC: 13 U/L — SIGNIFICANT CHANGE UP (ref 4–33)
ANION GAP SERPL CALC-SCNC: 13 MMOL/L — SIGNIFICANT CHANGE UP (ref 7–14)
AST SERPL-CCNC: 11 U/L — SIGNIFICANT CHANGE UP (ref 4–32)
BASOPHILS # BLD AUTO: 0.03 K/UL — SIGNIFICANT CHANGE UP (ref 0–0.2)
BASOPHILS NFR BLD AUTO: 0.2 % — SIGNIFICANT CHANGE UP (ref 0–2)
BILIRUB SERPL-MCNC: 0.2 MG/DL — SIGNIFICANT CHANGE UP (ref 0.2–1.2)
BUN SERPL-MCNC: 37 MG/DL — HIGH (ref 7–23)
CALCIUM SERPL-MCNC: 10 MG/DL — SIGNIFICANT CHANGE UP (ref 8.4–10.5)
CHLORIDE SERPL-SCNC: 104 MMOL/L — SIGNIFICANT CHANGE UP (ref 98–107)
CO2 SERPL-SCNC: 21 MMOL/L — LOW (ref 22–31)
CREAT SERPL-MCNC: 1.44 MG/DL — HIGH (ref 0.5–1.3)
EGFR: 34 ML/MIN/1.73M2 — LOW
EOSINOPHIL # BLD AUTO: 0.08 K/UL — SIGNIFICANT CHANGE UP (ref 0–0.5)
EOSINOPHIL NFR BLD AUTO: 0.5 % — SIGNIFICANT CHANGE UP (ref 0–6)
GLUCOSE SERPL-MCNC: 83 MG/DL — SIGNIFICANT CHANGE UP (ref 70–99)
HCT VFR BLD CALC: 35.2 % — SIGNIFICANT CHANGE UP (ref 34.5–45)
HGB BLD-MCNC: 11.3 G/DL — LOW (ref 11.5–15.5)
IANC: 14.35 K/UL — HIGH (ref 1.8–7.4)
IMM GRANULOCYTES NFR BLD AUTO: 1.3 % — HIGH (ref 0–0.9)
LYMPHOCYTES # BLD AUTO: 1.1 K/UL — SIGNIFICANT CHANGE UP (ref 1–3.3)
LYMPHOCYTES # BLD AUTO: 6.4 % — LOW (ref 13–44)
MAGNESIUM SERPL-MCNC: 2.3 MG/DL — SIGNIFICANT CHANGE UP (ref 1.6–2.6)
MCHC RBC-ENTMCNC: 31.9 PG — SIGNIFICANT CHANGE UP (ref 27–34)
MCHC RBC-ENTMCNC: 32.1 GM/DL — SIGNIFICANT CHANGE UP (ref 32–36)
MCV RBC AUTO: 99.4 FL — SIGNIFICANT CHANGE UP (ref 80–100)
MONOCYTES # BLD AUTO: 1.48 K/UL — HIGH (ref 0–0.9)
MONOCYTES NFR BLD AUTO: 8.6 % — SIGNIFICANT CHANGE UP (ref 2–14)
NEUTROPHILS # BLD AUTO: 14.35 K/UL — HIGH (ref 1.8–7.4)
NEUTROPHILS NFR BLD AUTO: 83 % — HIGH (ref 43–77)
NRBC # BLD: 0 /100 WBCS — SIGNIFICANT CHANGE UP (ref 0–0)
NRBC # FLD: 0 K/UL — SIGNIFICANT CHANGE UP (ref 0–0)
PHOSPHATE SERPL-MCNC: 3 MG/DL — SIGNIFICANT CHANGE UP (ref 2.5–4.5)
PLATELET # BLD AUTO: 182 K/UL — SIGNIFICANT CHANGE UP (ref 150–400)
POTASSIUM SERPL-MCNC: 4.2 MMOL/L — SIGNIFICANT CHANGE UP (ref 3.5–5.3)
POTASSIUM SERPL-SCNC: 4.2 MMOL/L — SIGNIFICANT CHANGE UP (ref 3.5–5.3)
PROT SERPL-MCNC: 5.9 G/DL — LOW (ref 6–8.3)
RBC # BLD: 3.54 M/UL — LOW (ref 3.8–5.2)
RBC # FLD: 14.8 % — HIGH (ref 10.3–14.5)
SODIUM SERPL-SCNC: 138 MMOL/L — SIGNIFICANT CHANGE UP (ref 135–145)
WBC # BLD: 17.26 K/UL — HIGH (ref 3.8–10.5)
WBC # FLD AUTO: 17.26 K/UL — HIGH (ref 3.8–10.5)

## 2023-07-18 PROCEDURE — 99223 1ST HOSP IP/OBS HIGH 75: CPT

## 2023-07-18 PROCEDURE — 99497 ADVNCD CARE PLAN 30 MIN: CPT | Mod: 25

## 2023-07-18 PROCEDURE — 99239 HOSP IP/OBS DSCHRG MGMT >30: CPT | Mod: GC

## 2023-07-18 RX ORDER — ALPRAZOLAM 0.25 MG
0.25 TABLET ORAL ONCE
Refills: 0 | Status: DISCONTINUED | OUTPATIENT
Start: 2023-07-18 | End: 2023-07-18

## 2023-07-18 RX ORDER — FUROSEMIDE 40 MG
20 TABLET ORAL ONCE
Refills: 0 | Status: COMPLETED | OUTPATIENT
Start: 2023-07-18 | End: 2023-07-18

## 2023-07-18 RX ORDER — ALPRAZOLAM 0.25 MG
1 TABLET ORAL
Qty: 0 | Refills: 0 | DISCHARGE
Start: 2023-07-18

## 2023-07-18 RX ORDER — FUROSEMIDE 40 MG
1 TABLET ORAL
Qty: 0 | Refills: 0 | DISCHARGE

## 2023-07-18 RX ADMIN — Medication 20 MILLIGRAM(S): at 09:16

## 2023-07-18 RX ADMIN — CHLORHEXIDINE GLUCONATE 1 APPLICATION(S): 213 SOLUTION TOPICAL at 11:57

## 2023-07-18 RX ADMIN — Medication 0.25 MILLIGRAM(S): at 14:38

## 2023-07-18 RX ADMIN — BUDESONIDE AND FORMOTEROL FUMARATE DIHYDRATE 2 PUFF(S): 160; 4.5 AEROSOL RESPIRATORY (INHALATION) at 09:16

## 2023-07-18 RX ADMIN — Medication 4 MILLILITER(S): at 15:27

## 2023-07-18 RX ADMIN — Medication 600 MILLIGRAM(S): at 05:27

## 2023-07-18 RX ADMIN — PANTOPRAZOLE SODIUM 40 MILLIGRAM(S): 20 TABLET, DELAYED RELEASE ORAL at 07:14

## 2023-07-18 RX ADMIN — Medication 3 MILLILITER(S): at 09:38

## 2023-07-18 RX ADMIN — Medication 650 MILLIGRAM(S): at 04:00

## 2023-07-18 RX ADMIN — Medication 1 TABLET(S): at 11:58

## 2023-07-18 RX ADMIN — Medication 0.25 MILLIGRAM(S): at 09:42

## 2023-07-18 RX ADMIN — APIXABAN 5 MILLIGRAM(S): 2.5 TABLET, FILM COATED ORAL at 05:27

## 2023-07-18 RX ADMIN — Medication 40 MILLIGRAM(S): at 05:27

## 2023-07-18 RX ADMIN — VALACYCLOVIR 500 MILLIGRAM(S): 500 TABLET, FILM COATED ORAL at 11:58

## 2023-07-18 RX ADMIN — Medication 3 MILLILITER(S): at 03:10

## 2023-07-18 RX ADMIN — Medication 650 MILLIGRAM(S): at 03:04

## 2023-07-18 RX ADMIN — ESCITALOPRAM OXALATE 10 MILLIGRAM(S): 10 TABLET, FILM COATED ORAL at 11:58

## 2023-07-18 RX ADMIN — Medication 3 MILLILITER(S): at 15:27

## 2023-07-18 NOTE — PROGRESS NOTE ADULT - PROBLEM SELECTOR PROBLEM 10
Lymphoma
Need for prophylactic measure
Lymphoma
Need for prophylactic measure
Lymphoma

## 2023-07-18 NOTE — CONSULT NOTE ADULT - PROBLEM SELECTOR RECOMMENDATION 3
GOC discussed during this encounter, however patient feels overwhelmed to make informed decision at this time, stated she would like to focus on getting home first and revisit at a later time    DONNA filled in chart, documenting DNR with trial of intubation if necessary - PPS 50%  - recent falls - PPS 50%  - recent falls  - PE, COPD, lymphoma

## 2023-07-18 NOTE — CONSULT NOTE ADULT - PROBLEM SELECTOR RECOMMENDATION 4
- Patient currently DNR with intubation with patient's consent. Per primary, daughter thinks DNR/DNI more appropriate   - Patient has capacity to make her own decisions   -7/18- See [facility] note. I spent 20 minutes addressing advanced care planning with patient and/or decision maker(s). Patient declined to discuss code status again, currently DNR with trial of intubation.

## 2023-07-18 NOTE — CONSULT NOTE ADULT - PROBLEM SELECTOR RECOMMENDATION 9
Continue with Alprazolam 0.25mg PRN for anxiety    Can follow up at Geriatric/Palliative team for further symptom burden management [phone] - 2/2 enterovirus   - on inhalers

## 2023-07-18 NOTE — PROGRESS NOTE ADULT - ATTENDING COMMENTS
Pt. with SHARAD and metabolic acidosis. Scr decreased to 1.66 and SCO2 improved to 17 today. Assessment and plan for SHARAD and metabolic acidosis as outlined above. Monitor labs and urine output. Avoid any potential nephrotoxins. Dose medications as per eGFR.
Pt. with SHARAD and metabolic acidosis. Scr decreased to 1.52 and SCO2 improved to 19 today. Assessment and plan for SHARAD and metabolic acidosis as outlined above. Monitor labs and urine output. Avoid any potential nephrotoxins. Dose medications as per eGFR.
Acidosis improving now s/p bicarb gtt. Suspect respiratory distress multifactorial and currently driven by back pain related to compression fractures leading to slumped over posture and mechanical impairment of lung recruitment. Will continue IS and position patient upright and preferably out of bed to avoid atelectasis. Continue steroid trial.
89F with a hx of lymphoma (on immune therapy), COPD (home O2), anxiety, prior PE on Eliquis presenting after mechanical fall with head impact, admitted for UTI and metabolic acidosis and SHARAD. Acidosis is improved s/p bicarb gtt. Concern for COPD exacerbation 2/2 entero/rhinovirus.     Patient seen and examined, still with course breath sounds/ rhonchi, tachypneic on talking. Will c/w prednisone 40mg and duonebs ATC + aerobika and incentive spirometry.   Will check proBNP, if elevated, will consider low dose lasix as patient has hx of severe pHTN on previous TTE in 2022  It is possible that there is a component of anxiety contributing to respiratory status as well as patient reports feeling very anxious in hospital.   Metabolic acidosis and SHARAD improved s/p bicarb gtt, continue to monitor BMP.  PT recs PAO, patient and family declined and would like for her to go home.
89F with a hx of lymphoma (on immune therapy), COPD (home O2), anxiety, prior PE on Eliquis presenting after mechanical fall with head impact, admitted for UTI and metabolic acidosis and SHARAD. Acidosis is improved s/p bicarb gtt. Concern for COPD exacerbation 2/2 entero/rhinovirus.     Patient seen and examined, course breath sounds, tachypneic and poor air movement. Will c/w prednisone 40mg and duonebs ATC.   Component of anxiety contributing as well.   Metabolic acidosis and SHARAD improving s/p bicarb gtt.   Patient wants to go home tomorrow, will need to clarify home O2 status. PT recs PAO, patient and family declined and would like for her to go home.
Pt continues to have subjective difficulty breathing, which she often attributes to anxiety. Pt has COPD, pulm HTN, and compression fractures limiting chest expansion. Continue steroids, nebs, supportive measures. The anticipated course is that pt will continue to have symptoms at home. Will suggest palliative care consult to address symptoms and goals of care.
Pt much more comfortable today s/p additional diuresis. Pain controlled. Pt agreeable to d/c home with services and f/u. Time planning discharge 35 minutes.
Pt clinically improving. Appreciate ongoing Nephrology recs. Continue bicarb gtt.
Metabolic acidosis improving, will continue bicarbonate gtt. CK is normal, ruling out rhabdomyolysis. Continue empiric ceftriaxone pending cultures.

## 2023-07-18 NOTE — PROGRESS NOTE ADULT - PROBLEM/PLAN-10
Additional Notes: Patient consent was obtained to proceed with the visit and recommended plan of care after discussion of all risks and benefits, including the risks of COVID-19 exposure.
Detail Level: Simple
DISPLAY PLAN FREE TEXT

## 2023-07-18 NOTE — PROGRESS NOTE ADULT - SUBJECTIVE AND OBJECTIVE BOX
PROGRESS NOTE:   Authored by Chelsea Moody MD PGY-1  Internal Medicine      Patient is a 89y old  Female who presents with a chief complaint of Fall (18 Jul 2023 09:39)      SUBJECTIVE / OVERNIGHT EVENTS: NAEO. Pt is feeling better. She notes her breathing has improved. She was seen and examined at bedside    MEDICATIONS  (STANDING):  acetylcysteine 10%  Inhalation 4 milliLiter(s) Inhalation three times a day  albuterol/ipratropium for Nebulization 3 milliLiter(s) Nebulizer every 6 hours  apixaban 5 milliGRAM(s) Oral every 12 hours  budesonide  80 MICROgram(s)/formoterol 4.5 MICROgram(s) Inhaler 2 Puff(s) Inhalation two times a day  chlorhexidine 2% Cloths 1 Application(s) Topical daily  escitalopram 10 milliGRAM(s) Oral daily  guaiFENesin  milliGRAM(s) Oral every 12 hours  pantoprazole    Tablet 40 milliGRAM(s) Oral before breakfast  predniSONE   Tablet 40 milliGRAM(s) Oral daily  trimethoprim  160 mG/sulfamethoxazole 800 mG 1 Tablet(s) Oral daily  valACYclovir 500 milliGRAM(s) Oral daily    MEDICATIONS  (PRN):  acetaminophen     Tablet .. 650 milliGRAM(s) Oral every 6 hours PRN Mild Pain (1 - 3), Moderate Pain (4 - 6)  ALPRAZolam 0.25 milliGRAM(s) Oral daily PRN For anxiety      PHYSICAL EXAM:  Vital Signs Last 24 Hrs  T(C): 36.4 (18 Jul 2023 05:20), Max: 36.6 (17 Jul 2023 13:51)  T(F): 97.6 (18 Jul 2023 05:20), Max: 97.8 (17 Jul 2023 13:51)  HR: 99 (18 Jul 2023 05:20) (98 - 108)  BP: 110/77 (18 Jul 2023 05:20) (109/65 - 123/72)  RR: 18 (18 Jul 2023 05:20) (18 - 22)  SpO2: 97% (18 Jul 2023 05:20) (96% - 98%)    Parameters below as of 18 Jul 2023 05:20  Patient On (Oxygen Delivery Method): room air      CONSTITUTIONAL: Well-groomed, in no apparent distress  RESPIRATORY: Wheezing improved  CARDIOVASCULAR: +S1S2, RRR, no M/G/R; pedal pulses full and symmetric; no lower extremity edema  GASTROINTESTINAL: No palpable masses or tenderness, +BS throughout, no rebound/guarding; no hepatosplenomegaly; no hernia palpated  SKIN: No rashes or ulcers noted  NEUROLOGIC: A+O x 3, CN II-XII intact; sensation intact in LEs b/l to light touch    LABS:                        11.3   17.26 )-----------( 182      ( 18 Jul 2023 06:00 )             35.2     07-18    138  |  104  |  37<H>  ----------------------------<  83  4.2   |  21<L>  |  1.44<H>    Ca    10.0      18 Jul 2023 06:00  Phos  3.0     07-18  Mg     2.30     07-18    TPro  5.9<L>  /  Alb  3.8  /  TBili  0.2  /  DBili  x   /  AST  11  /  ALT  13  /  AlkPhos  117  07-18

## 2023-07-18 NOTE — PROGRESS NOTE ADULT - ASSESSMENT
89 y/o  F with a hx of lymphoma (on immune therapy), COPD (not on home O2), pulmonary htn, anxiety, prior PE on eliquis presenting after mechanical fall with head impact, admitted for UTI and metabolic acidosis. Acidosis improved on bicarb gtt. Treated for COPD exacerbation given increased cough, phlegm, and diffuse wheezing on exam and rhino/enterovirus (+). O2 sat >95% on RA. Component of anxiety contributing to her tachypneic episodes overnight as well. Given IV Lasix 20mg for hx of pulmonary hypertension and increased BNP since admission. Improved symptomatically. Satting appropriately on RA. Medically stable for discharge.

## 2023-07-18 NOTE — PROGRESS NOTE ADULT - PROBLEM SELECTOR PLAN 7
Started prior to fall  -T5 and T8 compression fractures on CT  -No MRI at this time  -TSLO brace given 7/14

## 2023-07-18 NOTE — CONSULT NOTE ADULT - CONSULT REASON
Patient is currently DNR with trial of intubation (Patient decision). Daughter would prefer no intubation in order to avoid unneccessary suffering. Considering chronic conditions patient at increased risk for readmission, respiratory decline, and possibly future intubation  89 y/o  F with a hx of lymphoma (on immune therapy), COPD (not on home O2), anxiety, prior PE on eliquis presenting after mechanical fall with head impact, admitted for UTI and metabolic acidosis. Acidosis improved on bicarb gtt. Concern for COPD exacerbation given increased cough, phlegm, and diffuse wheezing on exam, on prednisone. O2 sat >95% on RA. Component of anxiety contributing to her tachypneic episodes overnight as well. Given lasix 20mg 1x given CXR finding of small right pleural effusion, increased bnp since admission and hx of pulmonary hypertension. complex medical decision making in the setting of serious illness

## 2023-07-18 NOTE — CONSULT NOTE ADULT - CONVERSATION DETAILS
Patient seen at bedside with fellow and attending. Patient was asked to elaborate on what brought her to the hospital and stated she is here because of her fall, UTI, and her COPD exacerbation. She notes being hospitalized over the past year. At this time she states she would like to go home and endorses some difficulty with breathing as if she feels as if she is not breathing deep enough, though states it may be due to her anxiety. It was discussed with the patient that given her age and medical conditions, she may be more susceptible to readmissions in the future for her medical problems, and most especially her COPD. Patient states she has a primary care physician that she follows with. When Medical Orders for Life-Sustaining Treatment was presented to the patient, she states she is very anxious at this time to think about or discuss these matters. Her main goal would be to go home. Emotional support was given to the patient and she was offered follow up for her chronic conditions for symptomatic management in the outpatient setting. Patient was appreciative of the encounter and states she is eager to go home and hopefully her anxiety improves once she is in a more familiar environment. Patient seen at bedside with fellow and attending. Patient was asked to elaborate on what brought her to the hospital and stated she is here because of her fall, UTI, and her COPD exacerbation. She notes being hospitalized over the past year. At this time she states she would like to go home and endorses some difficulty with breathing as if she feels as if she is not breathing deep enough, though states it may be due to her anxiety. It was discussed with the patient that given her age and medical conditions, she may be more susceptible to readmissions in the future for her medical problems, and most especially her COPD. Patient states she has a primary care physician that she follows with. Discussed code status, patient already DNR with trial of intubation- recommended DNR/DNI as CPR/intubation have poor outcomes in a patient with such serious illness and add more burdens at end of life. She states she is very anxious at this time and does not thinking timing is good for such a topic. Her main goal would be to go home. Emotional support was given to the patient and she was offered follow up for her chronic conditions for symptomatic management in the outpatient setting. Patient was appreciative of the encounter and states she is eager to go home and hopefully her anxiety improves once she is in a more familiar environment. Patient seen at bedside with fellow and attending. Patient was asked to elaborate on what brought her to the hospital and stated she is here because of her fall, UTI, and her COPD exacerbation. At this time she states she would like to go home and endorses some difficulty with breathing as if she feels as if she is not breathing deep enough, though states it may be due to her anxiety. It was discussed with the patient that given her age and medical conditions, she may be more susceptible to readmissions in the future for her medical problems, and most especially her COPD. Patient states she has a primary care physician that she follows with. Discussed code status, patient already DNR with trial of intubation- recommended DNR/DNI as CPR/intubation have poor outcomes in a patient with such serious illness and add more burdens at end of life. She states she is very anxious at this time and does not thinking timing is good for such a topic. Her main goal would be to go home. Emotional support was given to the patient and she was offered follow up for her chronic conditions for symptomatic management in the outpatient setting. Patient was appreciative of the encounter and states she is eager to go home and hopefully her anxiety improves once she is in a more familiar environment.

## 2023-07-18 NOTE — CONSULT NOTE ADULT - PROBLEM SELECTOR RECOMMENDATION 5
Consulted for symptom management and CMDM     Patient seen on day of discharge. Can follow up at Geriatric/Palliative team for further symptom burden management [phone] Consulted for symptom management and CMDM     Patient seen on day of discharge. Can follow up at outpatient Geriatric/Palliative clinic for further symptom burden management [phone]

## 2023-07-18 NOTE — PROGRESS NOTE ADULT - PROBLEM SELECTOR PROBLEM 1
SHARAD (acute kidney injury)
Chronic obstructive pulmonary disease, unspecified COPD type
Metabolic acidosis
SHARAD (acute kidney injury)
Chronic obstructive pulmonary disease, unspecified COPD type
Metabolic acidosis
Chronic obstructive pulmonary disease, unspecified COPD type

## 2023-07-18 NOTE — PROGRESS NOTE ADULT - NUTRITIONAL ASSESSMENT
This patient has been assessed with a concern for Malnutrition and has been determined to have a diagnosis/diagnoses of Moderate protein-calorie malnutrition.    This patient is being managed with:   Diet Regular-  Supplement Feeding Modality:  Oral  Ensure Enlive Cans or Servings Per Day:  3       Frequency:  Three Times a day  Entered: Jul 13 2023  3:44PM  
This patient has been assessed with a concern for Malnutrition and has been determined to have a diagnosis/diagnoses of Moderate protein-calorie malnutrition.    This patient is being managed with:   Diet Regular-  Supplement Feeding Modality:  Oral  Ensure Enlive Cans or Servings Per Day:  3       Frequency:  Three Times a day  Entered: Jul 13 2023  3:44PM    This patient has been assessed with a concern for Malnutrition and has been determined to have a diagnosis/diagnoses of Moderate protein-calorie malnutrition.    This patient is being managed with:   Diet Regular-  Supplement Feeding Modality:  Oral  Ensure Enlive Cans or Servings Per Day:  3       Frequency:  Three Times a day  Entered: Jul 13 2023  3:44PM  
This patient has been assessed with a concern for Malnutrition and has been determined to have a diagnosis/diagnoses of Moderate protein-calorie malnutrition.    This patient is being managed with:   Diet Regular-  Supplement Feeding Modality:  Oral  Ensure Enlive Cans or Servings Per Day:  3       Frequency:  Three Times a day  Entered: Jul 13 2023  3:44PM

## 2023-07-18 NOTE — CONSULT NOTE ADULT - PROBLEM SELECTOR RECOMMENDATION 2
GOC discussed during this encounter, however patient feels overwhelmed to make informed decision at this time, stated she would like to focus on getting home first and revisit at a later time    DONNA filled in chart, documenting DNR with trial of intubation if necessary Continue with Alprazolam 0.25mg PRN for anxiety    Can follow up at Geriatric/Palliative team for further symptom burden management [phone] - On Alprazolam 0.25mg PRN for anxiety

## 2023-07-18 NOTE — PROGRESS NOTE ADULT - PROBLEM SELECTOR PROBLEM 2
Metabolic acidosis
SHARAD (acute kidney injury)
Metabolic acidosis
Metabolic acidosis
Pulmonary hypertension
SHARAD (acute kidney injury)
Pulmonary hypertension

## 2023-07-18 NOTE — PROGRESS NOTE ADULT - PROBLEM SELECTOR PLAN 1
COPD exacerbation was likely worsened by + entero/rhinovirus (7/12/23)  On trelagy at home, not on O2  -Rhinovirus (+)  -Repeat CXR showed small right pleural effusion  -Duonebs Q6h   -Symbicort 2 puffs BID  -Prednisone 40mg qd completed 7/18  -Improved  -Encourage incentive spirometry 10+ times/hour  -Medically stable for discharge

## 2023-07-18 NOTE — PROGRESS NOTE ADULT - PROBLEM SELECTOR PLAN 11
Lymphoma previously treated with palliative chemo and immune therapy  Follows with Dr. Collins
Lymphoma previously treated with palliative chemo and immune therapy  Follows with Dr. Collins

## 2023-07-18 NOTE — CONSULT NOTE ADULT - ASSESSMENT
89 y/o  F with a PMH of lymphoma (on immune therapy), COPD, anxiety, prior PE who presented after mechanical fall with head impact, admitted for UTI and metabolic acidosis. She was also found to have T5 and T8 compression fractures. Hospital course was complicated by COPD exacerbation in the setting of enterovirus as well. Geriatrics and palliative was consulted for supportive care, symptom management, and GOC.

## 2023-07-18 NOTE — PROGRESS NOTE ADULT - PROBLEM SELECTOR PLAN 4
Possibly 2/2 poor po intake  - Creat improved since admission  - Renal US showed no hydronephrosis  - Encouraged pt to increased fluid intake

## 2023-07-18 NOTE — CONSULT NOTE ADULT - SUBJECTIVE AND OBJECTIVE BOX
HPI:  89 y/o F PMH lymphoma (on palliative chemo), COPD, pHTN, recent PE (discovered in June, on eliquis) presenting after a mechanical fall in which she hit her head. Patient denied LOC, lightheadedness, incontinence during the event. Patient reporting pain after fall specifically in back. Per daughter, patient had been complaining about back pain for past week prior to the fall as well. Patient also now reporting SOB and anxiety with some sputum production and cough.     In the ED, patient noted to have positive UA with bacteria and LE, received 1 dose Ceftriaxone and 2 L IVF. T 98.1. HR , -123/70-90 on room air 100%. EKG NSR. (12 Jul 2023 01:41)    Patient was admitted to medicine for UTI and metabolic acidosis. Complicated by COPD exacerbation in the setting of enterovirus as well. Geriatrics and palliative was consulted for supportive care, symptom management, and GOC.    PERTINENT PM/SXH:   No pertinent past medical history    Cough    Osteoarthritis    Abnormal finding of lung    COPD, mild    Lymphoma      No significant past surgical history    History of hip replacement      FAMILY HISTORY:  No pertinent family history in first degree relatives      ------------------------------------------------------------------------------------------------------------  ITEMS NOT CHECKED ARE NOT PRESENT    SOCIAL HISTORY:   Living Situation: [ ]Home  [ ]Long term care  [ ]Rehab [ ]Other  Support:     Substance hx:  [ ]   Tobacco hx:  [ ]   Alcohol hx: [ ]   Family Hx substance abuse [ ]yes [ ]no    Mandaen/Spirituality:  PCSSQ[Palliative Care Spiritual Screening Question]   Severity (0-10): 0  Score of 4 or > indicate consideration of Chaplaincy referral.  Chaplaincy Referral: [ ] yes [X ] refused [ ] following    Anticipatory Grief present?:  [ ] yes [X ] no  [ ] Deferred                      Other Referrals:    [ ]Hospice   [ ]Caregiver [place] Support  [ ]Child Life    [ ]Patient & Family Centered Care Referral  [ ]Holistic Therapy     ------------------------------------------------------------------------------------------------------------    PRESENT SYMPTOMS:    [ ] No     [ ] Unable to self-report      [ ] CPOT (ICU)     [ ] PAINADs     [ ] RDOS    [ ] Yes     Source if other than patient:  [ ]Family   [ ]Team     PAIN:   If blank, patient unable to specify   [ ]yes [ ]no  QOL impact-   Location -                    Aggravating factors -  Quality -  Radiation -  Timing-  Pain at most severe level (0-10 scale):  Pain at minimal acceptable level/Pain Goal (0-10 scale):     SYMPTOMS:   Dyspnea:                           [ ]Mild [ ]Moderate [ ]Severe  Anxiety:                             [ ]Mild [ ]Moderate [ ]Severe  Fatigue:                             [ ]Mild [ ]Moderate [ ]Severe  Nausea/Vomiting:              [ ]Mild [ ]Moderate [ ]Severe  Loss of appetite:                [ ]Mild [ ]Moderate [ ]Severe  Constipation:                     [ ]Mild [ ]Moderate [ ]Severe    Other Symptoms:  [X ]All other review of systems negative     Home Medications for symptoms if any:  I-Stop Reference No:    ------------------------------------------------------------------------------------------------------------    FUNCTIONAL STATUS:     Baseline ADL (prior to admission):  [ ] Independent  [ ] Moderate Assistance [ ] Dependent  Palliative Performance Score:     [ ]PPSV2 < or = to 30%     NUTRITIONAL STATUS:     Protein Calorie Malnutrition Present:   [ ]mild   [ ]moderate   [ ]severe   [ ]poor nutritional intake   [ ]artificial nutrition      Weight:   [ ]underweight (BMI 18.5 or less)   [ ]morbid obesity (BMI 30 or higher)   [ ]anasarca  [ ]significant weight loss     Height (cm): 160.02 (05-04-23 @ 15:01)  Weight (kg): 68.3 (07-12-23 @ 01:35), 63.5 (05-23-23 @ 16:33)  BMI (kg/m2): 26.7 (07-12-23 @ 01:35), 24.8 (05-23-23 @ 16:33)    ------------------------------------------------------------------------------------------------------------    PRIOR ADVANCE DIRECTIVES:    [ ] DNR/MOLST    [ ] Living Will    [ ] Health Care Proxy(s)    DECISION MAKER(s):  [ ] Patient    [ ] Surrogate(s)  [ ] HCP   [ ] Guardian             ------------------------------------------------------------------------------------------------------------  PHYSICAL EXAM:  Vital Signs Last 24 Hrs  T(C): 36.4 (18 Jul 2023 05:20), Max: 36.6 (17 Jul 2023 13:51)  T(F): 97.6 (18 Jul 2023 05:20), Max: 97.8 (17 Jul 2023 13:51)  HR: 99 (18 Jul 2023 05:20) (97 - 108)  BP: 110/77 (18 Jul 2023 05:20) (109/65 - 123/72)  BP(mean): --  RR: 18 (18 Jul 2023 05:20) (18 - 22)  SpO2: 97% (18 Jul 2023 05:20) (96% - 98%)    Parameters below as of 18 Jul 2023 05:20  Patient On (Oxygen Delivery Method): room air     I&O's Summary      GENERAL:  [ ]Cachexia  [ ] Frail  [ ]Awake  [ ]Oriented x   [ ]Lethargic  [ ]Unarousable  [ ]Verbal  [ ]Non-Verbal    BEHAVIORAL:   [ ] Anxiety  [ ] Delirium [ ] Agitation [ ] Other    HEENT:   [ ]Normal   [ ]Dry mouth   [ ]ET Tube/Trach  [ ]Oral lesions    PULMONARY:   [ ]Clear [ ]Tachypnea  [ ]Audible excessive secretions   [ ]Rhonchi        [ ]Right [ ]Left [ ]Bilateral  [ ]Crackles        [ ]Right [ ]Left [ ]Bilateral  [ ]Wheezing     [ ]Right [ ]Left [ ]Bilateral  [ ]Diminished breath sounds [ ]right [ ]left [ ]bilateral    CARDIOVASCULAR:    [ ]Regular [ ]Irregular [ ]Tachy  [ ]Alvin [ ]Murmur [ ]Other    GASTROINTESTINAL:  [ ]Soft  [ ]Distended   [ ]+BS  [ ]Non tender [ ]Tender  [ ]Other [ ]PEG [ ]OGT/ NGT      GENITOURINARY:  [ ]Normal [ ] Incontinent   [ ]Oliguria/Anuria   [ ]Camilo    MUSCULOSKELETAL:   [ ]Normal   [ ]Weakness  [ ]Bed/Wheelchair bound [ ]Edema    NEUROLOGIC:   [ ]No focal deficits  [ ]Cognitive impairment  [ ]Dysphagia [ ]Dysarthria [ ]Paresis [ ]Other     SKIN:   [ ]Normal  [ ]Rash  [ ]Other  [ ]Pressure ulcer(s)       Present on admission [ ]y [ ]n    ------------------------------------------------------------------------------------------------------------    LABS:                        11.3   17.26 )-----------( 182      ( 18 Jul 2023 06:00 )             35.2   07-18    138  |  104  |  37<H>  ----------------------------<  83  4.2   |  21<L>  |  1.44<H>    Ca    10.0      18 Jul 2023 06:00  Phos  3.0     07-18  Mg     2.30     07-18    TPro  5.9<L>  /  Alb  3.8  /  TBili  0.2  /  DBili  x   /  AST  11  /  ALT  13  /  AlkPhos  117  07-18      Urinalysis Basic - ( 18 Jul 2023 06:00 )    Color: x / Appearance: x / SG: x / pH: x  Gluc: 83 mg/dL / Ketone: x  / Bili: x / Urobili: x   Blood: x / Protein: x / Nitrite: x   Leuk Esterase: x / RBC: x / WBC x   Sq Epi: x / Non Sq Epi: x / Bacteria: x        ------------------------------------------------------------------------------------------------------------    CRITICAL CARE:  [ ]Shock Present  [ ]Septic [ ]Cardiogenic [ ]Neurologic [ ]Hypovolemic [ ] Undifferentiated/Mixed   [ ]Vasopressors [ ]Inotropes     [ ]Respiratory failure present: [ ]Acute  [ ]Chronic [ ]Hypoxic  [ ]Hypercarbic   [ ]Mechanical ventilation   [ ]Trach collar   [ ]Non-invasive ventilatory support   [ ]High flow    [ ]Non-rebreather/Venti     [ ]Other organ failure     ------------------------------------------------------------------------------------------------------------    RADIOLOGY & ADDITIONAL STUDIES:      ------------------------------------------------------------------------------------------------------------    ALLERGIES:  Allergies    No Known Allergies    Intolerances        MEDICATIONS  (STANDING):  acetylcysteine 10%  Inhalation 4 milliLiter(s) Inhalation three times a day  albuterol/ipratropium for Nebulization 3 milliLiter(s) Nebulizer every 6 hours  apixaban 5 milliGRAM(s) Oral every 12 hours  budesonide  80 MICROgram(s)/formoterol 4.5 MICROgram(s) Inhaler 2 Puff(s) Inhalation two times a day  chlorhexidine 2% Cloths 1 Application(s) Topical daily  escitalopram 10 milliGRAM(s) Oral daily  guaiFENesin  milliGRAM(s) Oral every 12 hours  pantoprazole    Tablet 40 milliGRAM(s) Oral before breakfast  predniSONE   Tablet 40 milliGRAM(s) Oral daily  trimethoprim  160 mG/sulfamethoxazole 800 mG 1 Tablet(s) Oral daily  valACYclovir 500 milliGRAM(s) Oral daily    MEDICATIONS  (PRN):  acetaminophen     Tablet .. 650 milliGRAM(s) Oral every 6 hours PRN Mild Pain (1 - 3), Moderate Pain (4 - 6)  ALPRAZolam 0.25 milliGRAM(s) Oral daily PRN For anxiety           HPI:  89 y/o F PMH lymphoma (on palliative chemo), COPD, pHTN, recent PE (discovered in June, on eliquis) presenting after a mechanical fall in which she hit her head. Patient denied LOC, lightheadedness, incontinence during the event. Patient reporting pain after fall specifically in back. Per daughter, patient had been complaining about back pain for past week prior to the fall as well. Patient also now reporting SOB and anxiety with some sputum production and cough.     In the ED, patient noted to have positive UA with bacteria and LE, received 1 dose Ceftriaxone and 2 L IVF. T 98.1. HR , -123/70-90 on room air 100%. EKG NSR. (12 Jul 2023 01:41)    Patient was admitted to medicine for UTI and metabolic acidosis. Complicated by COPD exacerbation in the setting of enterovirus as well. Geriatrics and palliative was consulted for supportive care, symptom management, and GOC.    PERTINENT PM/SXH:   No pertinent past medical history    Cough    Osteoarthritis    Abnormal finding of lung    COPD, mild    Lymphoma      No significant past surgical history    History of hip replacement      FAMILY HISTORY:  No pertinent family history in first degree relatives      ------------------------------------------------------------------------------------------------------------  ITEMS NOT CHECKED ARE NOT PRESENT    SOCIAL HISTORY:   Living Situation: [x ]Home  [ ]Long term care  [ ]Rehab [ ]Other  Lives in [place] in a co-op, 4 steps to enter with rails  has HHA 10am-4pm 5d/week  Also lives with a "male " who helps her  She is dependent on some ADLs otherwise able to maneuver inside the house on her own  Has wheelchair, RW  Support: has a daughter that lives in [place] and son that lives in [place]      Uatsdin/Spirituality:  PCSSQ[Palliative Care Spiritual Screening Question]   Severity (0-10): 0  Score of 4 or > indicate consideration of Chaplaincy referral.  Chaplaincy Referral: [ ] yes [X ] refused [ ] following    Anticipatory Grief present?:  [ ] yes [X ] no  [ ] Deferred                      Other Referrals:    [ ]Hospice   [ ]Caregiver [place] Support  [ ]Child Life    [ ]Patient & Family Centered Care Referral  [ ]Holistic Therapy     ------------------------------------------------------------------------------------------------------------    PRESENT SYMPTOMS:    [ ] No     [ ] Unable to self-report      [ ] CPOT (ICU)     [ ] PAINADs     [ ] RDOS    [ ] Yes     Source if other than patient:  [ ]Family   [ ]Team     PAIN:   If blank, patient unable to specify   [ ]yes [x ]no  QOL impact-   Location -                    Aggravating factors -  Quality -  Radiation -  Timing-  Pain at most severe level (0-10 scale):  Pain at minimal acceptable level/Pain Goal (0-10 scale):     SYMPTOMS:   Dyspnea:                           [x ]Mild [ ]Moderate [ ]Severe  Anxiety:                             [ ]Mild [x ]Moderate [ ]Severe  Fatigue:                             [ ]Mild [ ]Moderate [ ]Severe  Nausea/Vomiting:              [ ]Mild [ ]Moderate [ ]Severe  Loss of appetite:                [ ]Mild [ ]Moderate [ ]Severe  Constipation:                     [ ]Mild [ ]Moderate [ ]Severe    Other Symptoms:  [X ]All other review of systems negative     Home Medications for symptoms if any:  I-Stop Reference No:[id number]    A	Y	B	06/22/2023	06/22/2023	alprazolam 0.5 mg tablet	15	30	Sue Collins	[id number]	Medicare	[facility]  A	N	O	06/07/2023	06/07/2023	tramadol hcl 50 mg tablet	10	5	Tito Bernal MD	[id number]	Medicare	[id number]  A	N	O	04/27/2023	05/01/2023	hydrocodone-homatropine soln	450	30	Celina Nelson S	[id number]	Cash	[facility]  A	N	O	09/29/2022	09/29/2022	hydromet 5 mg-1.5 mg/5 ml soln	150ml	10	Rossy Esparza MD	[id number]	[id number]    ------------------------------------------------------------------------------------------------------------    FUNCTIONAL STATUS:     Baseline ADL (prior to admission):  see above  Palliative Performance Score:     [ ]PPSV2 < or = to 30%     NUTRITIONAL STATUS:     Protein Calorie Malnutrition Present:   [ ]mild   [ ]moderate   [ ]severe   [ ]poor nutritional intake   [ ]artificial nutrition      Weight:   [ ]underweight (BMI 18.5 or less)   [ ]morbid obesity (BMI 30 or higher)   [ ]anasarca  [ ]significant weight loss     Height (cm): 160.02 (05-04-23 @ 15:01)  Weight (kg): 68.3 (07-12-23 @ 01:35), 63.5 (05-23-23 @ 16:33)  BMI (kg/m2): 26.7 (07-12-23 @ 01:35), 24.8 (05-23-23 @ 16:33)    ------------------------------------------------------------------------------------------------------------    PRIOR ADVANCE DIRECTIVES:    [x ] DNR/MOLST    [ ] Living Will    [ ] Health Care Proxy(s)    DECISION MAKER(s):  [x ] Patient    [ ] Surrogate(s)  [ ] HCP   [ ] Guardian             ------------------------------------------------------------------------------------------------------------  PHYSICAL EXAM:  Vital Signs Last 24 Hrs  T(C): 36.4 (18 Jul 2023 05:20), Max: 36.6 (17 Jul 2023 13:51)  T(F): 97.6 (18 Jul 2023 05:20), Max: 97.8 (17 Jul 2023 13:51)  HR: 99 (18 Jul 2023 05:20) (97 - 108)  BP: 110/77 (18 Jul 2023 05:20) (109/65 - 123/72)  BP(mean): --  RR: 18 (18 Jul 2023 05:20) (18 - 22)  SpO2: 97% (18 Jul 2023 05:20) (96% - 98%)    Parameters below as of 18 Jul 2023 05:20  Patient On (Oxygen Delivery Method): room air     I&O's Summary      GENERAL:  [ ]Cachexia  [ ] Frail  [x ]Awake  [x ]Oriented x 3   [ ]Lethargic  [ ]Unarousable  [ ]Verbal  [ ]Non-Verbal    BEHAVIORAL:   [ x] Anxiety  [ ] Delirium [ ] Agitation [ ] Other    HEENT:   [ ]Normal   [ ]Dry mouth   [ ]ET Tube/Trach  [ ]Oral lesions    PULMONARY:   [ ]Clear [ ]Tachypnea  [ ]Audible excessive secretions   [ ]Rhonchi        [ ]Right [ ]Left [ ]Bilateral  [ ]Crackles        [ ]Right [ ]Left [ ]Bilateral  [ ]Wheezing     [ ]Right [ ]Left [ ]Bilateral  [ ]Diminished breath sounds [ ]right [ ]left [x ]bilateral    CARDIOVASCULAR:    [x ]Regular [ ]Irregular [ ]Tachy  [ ]Alvin [ ]Murmur [ ]Other    GASTROINTESTINAL:  [x ]Soft  [ ]Distended   [ ]+BS  [ ]Non tender [ ]Tender  [ ]Other [ ]PEG [ ]OGT/ NGT      GENITOURINARY:  [ ]Normal [ ] Incontinent   [ ]Oliguria/Anuria   [ ]Camilo    MUSCULOSKELETAL:   [ ]Normal   [x ]Weakness  [ ]Bed/Wheelchair bound [ ]Edema    NEUROLOGIC:   [ ]No focal deficits  [ ]Cognitive impairment  [ ]Dysphagia [ ]Dysarthria [ ]Paresis [ ]Other     SKIN:   [ ]Normal  [ ]Rash  [ ]Other  [ ]Pressure ulcer(s)       Present on admission [ ]y [ ]n    ------------------------------------------------------------------------------------------------------------    LABS:                        11.3   17.26 )-----------( 182      ( 18 Jul 2023 06:00 )             35.2   07-18    138  |  104  |  37<H>  ----------------------------<  83  4.2   |  21<L>  |  1.44<H>    Ca    10.0      18 Jul 2023 06:00  Phos  3.0     07-18  Mg     2.30     07-18    TPro  5.9<L>  /  Alb  3.8  /  TBili  0.2  /  DBili  x   /  AST  11  /  ALT  13  /  AlkPhos  117  07-18      Urinalysis Basic - ( 18 Jul 2023 06:00 )    Color: x / Appearance: x / SG: x / pH: x  Gluc: 83 mg/dL / Ketone: x  / Bili: x / Urobili: x   Blood: x / Protein: x / Nitrite: x   Leuk Esterase: x / RBC: x / WBC x   Sq Epi: x / Non Sq Epi: x / Bacteria: x        ------------------------------------------------------------------------------------------------------------    RADIOLOGY & ADDITIONAL STUDIES:      ------------------------------------------------------------------------------------------------------------    ALLERGIES:  Allergies    No Known Allergies    Intolerances        MEDICATIONS  (STANDING):  acetylcysteine 10%  Inhalation 4 milliLiter(s) Inhalation three times a day  albuterol/ipratropium for Nebulization 3 milliLiter(s) Nebulizer every 6 hours  apixaban 5 milliGRAM(s) Oral every 12 hours  budesonide  80 MICROgram(s)/formoterol 4.5 MICROgram(s) Inhaler 2 Puff(s) Inhalation two times a day  chlorhexidine 2% Cloths 1 Application(s) Topical daily  escitalopram 10 milliGRAM(s) Oral daily  guaiFENesin  milliGRAM(s) Oral every 12 hours  pantoprazole    Tablet 40 milliGRAM(s) Oral before breakfast  predniSONE   Tablet 40 milliGRAM(s) Oral daily  trimethoprim  160 mG/sulfamethoxazole 800 mG 1 Tablet(s) Oral daily  valACYclovir 500 milliGRAM(s) Oral daily    MEDICATIONS  (PRN):  acetaminophen     Tablet .. 650 milliGRAM(s) Oral every 6 hours PRN Mild Pain (1 - 3), Moderate Pain (4 - 6)  ALPRAZolam 0.25 milliGRAM(s) Oral daily PRN For anxiety           HPI:  Patient is a 89 y/o F PMH lymphoma (on palliative chemo), COPD, pHTN, recent PE (discovered in June, on eliquis) presenting after a mechanical fall in which she hit her head. Patient denied LOC, lightheadedness, incontinence during the event. Patient reporting pain after fall specifically in back. Per daughter, patient had been complaining about back pain for past week prior to the fall as well. Patient also now reporting SOB and anxiety with some sputum production and cough. In the ED, patient noted to have positive UA with bacteria and LE, received 1 dose Ceftriaxone and 2 L IVF. T 98.1. HR , -123/70-90 on room air 100%. EKG NSR. Patient was admitted to medicine for UTI and metabolic acidosis. Complicated by COPD exacerbation in the setting of enterovirus as well. Palliative consulted for complex medical decision making in the setting of serious illness.     Patient seen this AM, feeling well in no distress. Patient is planned for discharge today.     PERTINENT PM/SXH:   No pertinent past medical history    Cough    Osteoarthritis    Abnormal finding of lung    COPD, mild    Lymphoma      No significant past surgical history    History of hip replacement      FAMILY HISTORY:  No pertinent family history in first degree relatives      ------------------------------------------------------------------------------------------------------------  ITEMS NOT CHECKED ARE NOT PRESENT    SOCIAL HISTORY:   Living Situation: [x ]Home  [ ]Long term care  [ ]Rehab [ ]Other  Support: has a daughter Dinorah that lives in [place] and son that lives in [place]    Hoahaoism/Spirituality:  PCSSQ[Palliative Care Spiritual Screening Question]   Severity (0-10): 0  Score of 4 or > indicate consideration of Chaplaincy referral.  Chaplaincy Referral: [ ] yes [X ] refused [ ] following    Anticipatory Grief present?:  [ ] yes [X ] no  [ ] Deferred                      Other Referrals:    [ ]Hospice   [ ]Caregiver [place] Support  [ ]Child Life    [ ]Patient & Family Centered Care Referral  [ ]Holistic Therapy     ------------------------------------------------------------------------------------------------------------    PRESENT SYMPTOMS:    [ ] No     [ ] Unable to self-report      [ ] CPOT (ICU)     [ ] PAINADs     [ ] RDOS    [X ] Yes     Source if other than patient:  [ ]Family   [ ]Team     PAIN:   If blank, patient unable to specify   [ ]yes [x ]no  QOL impact-   Location -                    Aggravating factors -  Quality -  Radiation -  Timing-  Pain at most severe level (0-10 scale):  Pain at minimal acceptable level/Pain Goal (0-10 scale):     SYMPTOMS:   Dyspnea:                           [x ]Mild [ ]Moderate [ ]Severe  Anxiety:                             [ ]Mild [x ]Moderate [ ]Severe  Fatigue:                             [ ]Mild [ ]Moderate [ ]Severe  Nausea/Vomiting:              [ ]Mild [ ]Moderate [ ]Severe  Loss of appetite:                [ ]Mild [ ]Moderate [ ]Severe  Constipation:                     [ ]Mild [ ]Moderate [ ]Severe    Other Symptoms:  [X ]All other review of systems negative     Home Medications for symptoms if any:  I-Stop Reference No:[id number]    A	Y	B	06/22/2023	06/22/2023	alprazolam 0.5 mg tablet	15	30	Sue Collins	[id number]	Medicare	[facility]  A	N	O	06/07/2023	06/07/2023	tramadol hcl 50 mg tablet	10	5	Tito Bernal MD	[id number]	Medicare	[id number]  A	N	O	04/27/2023	05/01/2023	hydrocodone-homatropine soln	450	30	Celina Nelson S	[id number]	Cash	[facility]  A	N	O	09/29/2022	09/29/2022	hydromet 5 mg-1.5 mg/5 ml soln	150ml	10	Rossy Esparza MD	[id number]	[id number]    ------------------------------------------------------------------------------------------------------------    FUNCTIONAL STATUS:     Baseline ADL (prior to admission):  see above  Palliative Performance Score:   has HHA 10am-4pm 5d/week  Also lives with a "male " who helps her  She is dependent on some ADLs otherwise able to maneuver inside the house on her own  Has wheelchair, RW    [ ]PPSV2 < or = to 30%     NUTRITIONAL STATUS:     Protein Calorie Malnutrition Present:   [ ]mild   [ ]moderate   [ ]severe   [ ]poor nutritional intake   [ ]artificial nutrition      Weight:   [ ]underweight (BMI 18.5 or less)   [ ]morbid obesity (BMI 30 or higher)   [ ]anasarca  [ ]significant weight loss     Height (cm): 160.02 (05-04-23 @ 15:01)  Weight (kg): 68.3 (07-12-23 @ 01:35), 63.5 (05-23-23 @ 16:33)  BMI (kg/m2): 26.7 (07-12-23 @ 01:35), 24.8 (05-23-23 @ 16:33)    ------------------------------------------------------------------------------------------------------------    PRIOR ADVANCE DIRECTIVES:    [x ] DNR/MOLST  - DNR with trial of intubation   [ ] Living Will    [ ] Health Care Proxy(s)    DECISION MAKER(s):  [x ] Patient    [ ] Surrogate(s)  [ ] HCP   [ ] Guardian             ------------------------------------------------------------------------------------------------------------  PHYSICAL EXAM:  Vital Signs Last 24 Hrs  T(C): 36.4 (18 Jul 2023 05:20), Max: 36.6 (17 Jul 2023 13:51)  T(F): 97.6 (18 Jul 2023 05:20), Max: 97.8 (17 Jul 2023 13:51)  HR: 99 (18 Jul 2023 05:20) (97 - 108)  BP: 110/77 (18 Jul 2023 05:20) (109/65 - 123/72)  BP(mean): --  RR: 18 (18 Jul 2023 05:20) (18 - 22)  SpO2: 97% (18 Jul 2023 05:20) (96% - 98%)    Parameters below as of 18 Jul 2023 05:20  Patient On (Oxygen Delivery Method): room air     I&O's Summary      GENERAL:  [ ]Cachexia  [ ] Frail  [x ]Awake  [x ]Oriented x 3   [ ]Lethargic  [ ]Unarousable  [ ]Verbal  [ ]Non-Verbal    BEHAVIORAL:   [ x] Anxiety  [ ] Delirium [ ] Agitation [ ] Other    HEENT:   [ ]Normal   [ ]Dry mouth   [ ]ET Tube/Trach  [ ]Oral lesions    PULMONARY:   [ ]Clear [ ]Tachypnea  [ ]Audible excessive secretions   [ ]Rhonchi        [ ]Right [ ]Left [ ]Bilateral  [ ]Crackles        [ ]Right [ ]Left [ ]Bilateral  [ ]Wheezing     [ ]Right [ ]Left [ ]Bilateral  [ ]Diminished breath sounds [ ]right [ ]left [x ]bilateral    CARDIOVASCULAR:    [x ]Regular [ ]Irregular [ ]Tachy  [ ]Alvin [ ]Murmur [ ]Other    GASTROINTESTINAL:  [x ]Soft  [ ]Distended   [ ]+BS  [ ]Non tender [ ]Tender  [ ]Other [ ]PEG [ ]OGT/ NGT      GENITOURINARY:  [x ]Normal [ ] Incontinent   [ ]Oliguria/Anuria   [ ]Camilo    MUSCULOSKELETAL:   [ ]Normal   [x ]Weakness  [ ]Bed/Wheelchair bound [ ]Edema    NEUROLOGIC:   [x ]No focal deficits  [ ]Cognitive impairment  [ ]Dysphagia [ ]Dysarthria [ ]Paresis [ ]Other     SKIN:   [x ]Normal  [ ]Rash  [ ]Other  [ ]Pressure ulcer(s)       Present on admission [ ]y [ ]n    ------------------------------------------------------------------------------------------------------------    LABS:                        11.3   17.26 )-----------( 182      ( 18 Jul 2023 06:00 )             35.2   07-18    138  |  104  |  37<H>  ----------------------------<  83  4.2   |  21<L>  |  1.44<H>    Ca    10.0      18 Jul 2023 06:00  Phos  3.0     07-18  Mg     2.30     07-18    TPro  5.9<L>  /  Alb  3.8  /  TBili  0.2  /  DBili  x   /  AST  11  /  ALT  13  /  AlkPhos  117  07-18    Urinalysis Basic - ( 18 Jul 2023 06:00 )    Color: x / Appearance: x / SG: x / pH: x  Gluc: 83 mg/dL / Ketone: x  / Bili: x / Urobili: x   Blood: x / Protein: x / Nitrite: x   Leuk Esterase: x / RBC: x / WBC x   Sq Epi: x / Non Sq Epi: x / Bacteria: x    ------------------------------------------------------------------------------------------------------------    RADIOLOGY & ADDITIONAL STUDIES:    < from: Xray Chest 1 View AP/PA (07.11.23 @ 18:33) >  FINDINGS:    Scattered linear atelectasis otherwise no pulmonary consolidation. No   pleural effusion. No pneumothorax. Cardiac silhouette size cannot be   accurately assessed on this projection. Chronic changes of the right   proximal humerus.    IMPRESSION: No acute traumatic findings.    < end of copied text >    < from: Xray Knee 3 Views, Bilateral (07.11.23 @ 20:11) >  FINDINGS:  There is diffuse severe osteopenia.  No acute fracture or dislocations.  Both knees demonstrate tricompartment small osteophytes and mild medial   compartment joint space narrowing.  No knee joint effusion identified.    IMPRESSION:  No acute fracture or dislocation.    < end of copied text >    < from: CT Head No Cont (07.11.23 @ 22:03) >    IMPRESSION:  CT head:  No acute intracranial hemorrhage or mass effect.    CT cervical spine:  No CT evidence of cervical spine fracture.    < end of copied text >    < from: US Kidney and Bladder (07.12.23 @ 08:42) >  IMPRESSION:  No hydronephrosis.    Bladder debris. Correlate with urinalysis.    < end of copied text >    < from: Xray Chest 2 Views PA/Lat (07.14.23 @ 13:15) >  FINDINGS: Some slightly more prominent parahilar interstitial markings   since previous exam on a limited inspiration. ASCVD. There is suggestion   of some atelectatic change and small effusion at the right base. Trace   effusion and platelikeatelectatic change at the left base. Deformity of   the right humeral head neck region compatible with old trauma as before   with degenerative changes.    IMPRESSION: Positive findings as above. Continued follow-up suggested.    < end of copied text >    ------------------------------------------------------------------------------------------------------------    ALLERGIES:  Allergies    No Known Allergies    Intolerances    MEDICATIONS  (STANDING):  acetylcysteine 10%  Inhalation 4 milliLiter(s) Inhalation three times a day  albuterol/ipratropium for Nebulization 3 milliLiter(s) Nebulizer every 6 hours  apixaban 5 milliGRAM(s) Oral every 12 hours  budesonide  80 MICROgram(s)/formoterol 4.5 MICROgram(s) Inhaler 2 Puff(s) Inhalation two times a day  chlorhexidine 2% Cloths 1 Application(s) Topical daily  escitalopram 10 milliGRAM(s) Oral daily  guaiFENesin  milliGRAM(s) Oral every 12 hours  pantoprazole    Tablet 40 milliGRAM(s) Oral before breakfast  predniSONE   Tablet 40 milliGRAM(s) Oral daily  trimethoprim  160 mG/sulfamethoxazole 800 mG 1 Tablet(s) Oral daily  valACYclovir 500 milliGRAM(s) Oral daily    MEDICATIONS  (PRN):  acetaminophen     Tablet .. 650 milliGRAM(s) Oral every 6 hours PRN Mild Pain (1 - 3), Moderate Pain (4 - 6)  ALPRAZolam 0.25 milliGRAM(s) Oral daily PRN For anxiety           HPI:  Patient is a 89 y/o F PMH lymphoma (on palliative chemo), COPD, pHTN, recent PE (discovered in June, on eliquis) presenting after a mechanical fall in which she hit her head. Patient denied LOC, lightheadedness, incontinence during the event. Patient reporting pain after fall specifically in back. Per daughter, patient had been complaining about back pain for past week prior to the fall as well. Patient also now reporting SOB and anxiety with some sputum production and cough. In the ED, patient noted to have positive UA with bacteria and LE, received 1 dose Ceftriaxone and 2 L IVF. T 98.1. HR , -123/70-90 on room air 100%. EKG NSR. Patient was admitted to medicine for UTI and metabolic acidosis. Complicated by COPD exacerbation in the setting of enterovirus as well. Palliative consulted for complex medical decision making in the setting of serious illness.     Patient seen this AM, feeling well in no distress. Patient is planned for discharge today. See below for GOC.     PERTINENT PM/SXH:   No pertinent past medical history    Cough    Osteoarthritis    Abnormal finding of lung    COPD, mild    Lymphoma      No significant past surgical history    History of hip replacement      FAMILY HISTORY:  No pertinent family history in first degree relatives    ------------------------------------------------------------------------------------------------------------  ITEMS NOT CHECKED ARE NOT PRESENT    SOCIAL HISTORY:   Living Situation: [x ]Home  [ ]Long term care  [ ]Rehab [ ]Other  Support: has a daughter Dinorah that lives in [place] and son that lives in [place]    Hindu/Spirituality:  PCSSQ[Palliative Care Spiritual Screening Question]   Severity (0-10): 0  Score of 4 or > indicate consideration of Chaplaincy referral.  Chaplaincy Referral: [ ] yes [X ] refused [ ] following    Anticipatory Grief present?:  [ ] yes [X ] no  [ ] Deferred                      Other Referrals:    [ ]Hospice   [ ]Caregiver [place] Support  [ ]Child Life    [ ]Patient & Family Centered Care Referral  [ ]Holistic Therapy     ------------------------------------------------------------------------------------------------------------    PRESENT SYMPTOMS:    [ ] No     [ ] Unable to self-report      [ ] CPOT (ICU)     [ ] PAINADs     [ ] RDOS    [X ] Yes     Source if other than patient:  [ ]Family   [ ]Team     PAIN:   If blank, patient unable to specify   [ ]yes [x ]no  QOL impact-   Location -                    Aggravating factors -  Quality -  Radiation -  Timing-  Pain at most severe level (0-10 scale):  Pain at minimal acceptable level/Pain Goal (0-10 scale):     SYMPTOMS:   Dyspnea:                           [x ]Mild [ ]Moderate [ ]Severe  Anxiety:                             [ ]Mild [x ]Moderate [ ]Severe  Fatigue:                             [ ]Mild [ ]Moderate [ ]Severe  Nausea/Vomiting:              [ ]Mild [ ]Moderate [ ]Severe  Loss of appetite:                [ ]Mild [ ]Moderate [ ]Severe  Constipation:                     [ ]Mild [ ]Moderate [ ]Severe    Other Symptoms:  [X ]All other review of systems negative     Home Medications for symptoms if any:  I-Stop Reference No:[id number]    A	Y	B	06/22/2023	06/22/2023	alprazolam 0.5 mg tablet	15	30	Sue Collins	[id number]	Medicare	[facility]  A	N	O	06/07/2023	06/07/2023	tramadol hcl 50 mg tablet	10	5	Tito Bernal MD	[id number]	Medicare	[id number]  A	N	O	04/27/2023	05/01/2023	hydrocodone-homatropine soln	450	30	Celina Nelson S	[id number]	[facility]  A	N	O	09/29/2022	09/29/2022	hydromet 5 mg-1.5 mg/5 ml soln	150ml	10	Rossy Esparza MD	[id number]	[id number]    ------------------------------------------------------------------------------------------------------------    FUNCTIONAL STATUS:     Baseline ADL (prior to admission):  see above  Palliative Performance Score:   has HHA 10am-4pm 5d/week  Also lives with a "male " who helps her  She is dependent on some ADLs otherwise able to maneuver inside the house on her own  Has wheelchair, RW    [ ]PPSV2 < or = to 30%     NUTRITIONAL STATUS:     Protein Calorie Malnutrition Present:   [ ]mild   [ ]moderate   [ ]severe   [ ]poor nutritional intake   [ ]artificial nutrition      Weight:   [ ]underweight (BMI 18.5 or less)   [ ]morbid obesity (BMI 30 or higher)   [ ]anasarca  [ ]significant weight loss     Height (cm): 160.02 (05-04-23 @ 15:01)  Weight (kg): 68.3 (07-12-23 @ 01:35), 63.5 (05-23-23 @ 16:33)  BMI (kg/m2): 26.7 (07-12-23 @ 01:35), 24.8 (05-23-23 @ 16:33)    ------------------------------------------------------------------------------------------------------------    PRIOR ADVANCE DIRECTIVES:    [x ] DNR/MOLST  - DNR with trial of intubation   [ ] Living Will    [ ] Health Care Proxy(s)    DECISION MAKER(s):  [x ] Patient    [ ] Surrogate(s)  [ ] HCP   [ ] Guardian             ------------------------------------------------------------------------------------------------------------  PHYSICAL EXAM:  Vital Signs Last 24 Hrs  T(C): 36.4 (18 Jul 2023 05:20), Max: 36.6 (17 Jul 2023 13:51)  T(F): 97.6 (18 Jul 2023 05:20), Max: 97.8 (17 Jul 2023 13:51)  HR: 99 (18 Jul 2023 05:20) (97 - 108)  BP: 110/77 (18 Jul 2023 05:20) (109/65 - 123/72)  BP(mean): --  RR: 18 (18 Jul 2023 05:20) (18 - 22)  SpO2: 97% (18 Jul 2023 05:20) (96% - 98%)    Parameters below as of 18 Jul 2023 05:20  Patient On (Oxygen Delivery Method): room air     I&O's Summary      GENERAL:  [ ]Cachexia  [ ] Frail  [x ]Awake  [x ]Oriented x 3   [ ]Lethargic  [ ]Unarousable  [ ]Verbal  [ ]Non-Verbal    BEHAVIORAL:   [ x] Anxiety  [ ] Delirium [ ] Agitation [ ] Other    HEENT:   [ ]Normal   [ ]Dry mouth   [ ]ET Tube/Trach  [ ]Oral lesions    PULMONARY:   [ ]Clear [ ]Tachypnea  [ ]Audible excessive secretions   [ ]Rhonchi        [ ]Right [ ]Left [ ]Bilateral  [ ]Crackles        [ ]Right [ ]Left [ ]Bilateral  [ ]Wheezing     [ ]Right [ ]Left [ ]Bilateral  [ ]Diminished breath sounds [ ]right [ ]left [x ]bilateral    CARDIOVASCULAR:    [x ]Regular [ ]Irregular [ ]Tachy  [ ]Alvin [ ]Murmur [ ]Other    GASTROINTESTINAL:  [x ]Soft  [ ]Distended   [ ]+BS  [ ]Non tender [ ]Tender  [ ]Other [ ]PEG [ ]OGT/ NGT      GENITOURINARY:  [x ]Normal [ ] Incontinent   [ ]Oliguria/Anuria   [ ]Camilo    MUSCULOSKELETAL:   [ ]Normal   [x ]Weakness  [ ]Bed/Wheelchair bound [ ]Edema    NEUROLOGIC:   [x ]No focal deficits  [ ]Cognitive impairment  [ ]Dysphagia [ ]Dysarthria [ ]Paresis [ ]Other     SKIN:   [x ]Normal  [ ]Rash  [ ]Other  [ ]Pressure ulcer(s)       Present on admission [ ]y [ ]n    ------------------------------------------------------------------------------------------------------------    LABS:                        11.3   17.26 )-----------( 182      ( 18 Jul 2023 06:00 )             35.2   07-18    138  |  104  |  37<H>  ----------------------------<  83  4.2   |  21<L>  |  1.44<H>    Ca    10.0      18 Jul 2023 06:00  Phos  3.0     07-18  Mg     2.30     07-18    TPro  5.9<L>  /  Alb  3.8  /  TBili  0.2  /  DBili  x   /  AST  11  /  ALT  13  /  AlkPhos  117  07-18    Urinalysis Basic - ( 18 Jul 2023 06:00 )    Color: x / Appearance: x / SG: x / pH: x  Gluc: 83 mg/dL / Ketone: x  / Bili: x / Urobili: x   Blood: x / Protein: x / Nitrite: x   Leuk Esterase: x / RBC: x / WBC x   Sq Epi: x / Non Sq Epi: x / Bacteria: x    ------------------------------------------------------------------------------------------------------------    RADIOLOGY & ADDITIONAL STUDIES:    < from: Xray Chest 1 View AP/PA (07.11.23 @ 18:33) >  FINDINGS:    Scattered linear atelectasis otherwise no pulmonary consolidation. No   pleural effusion. No pneumothorax. Cardiac silhouette size cannot be   accurately assessed on this projection. Chronic changes of the right   proximal humerus.    IMPRESSION: No acute traumatic findings.    < end of copied text >    < from: Xray Knee 3 Views, Bilateral (07.11.23 @ 20:11) >  FINDINGS:  There is diffuse severe osteopenia.  No acute fracture or dislocations.  Both knees demonstrate tricompartment small osteophytes and mild medial   compartment joint space narrowing.  No knee joint effusion identified.    IMPRESSION:  No acute fracture or dislocation.    < end of copied text >    < from: CT Head No Cont (07.11.23 @ 22:03) >    IMPRESSION:  CT head:  No acute intracranial hemorrhage or mass effect.    CT cervical spine:  No CT evidence of cervical spine fracture.    < end of copied text >    < from: US Kidney and Bladder (07.12.23 @ 08:42) >  IMPRESSION:  No hydronephrosis.    Bladder debris. Correlate with urinalysis.    < end of copied text >    < from: Xray Chest 2 Views PA/Lat (07.14.23 @ 13:15) >  FINDINGS: Some slightly more prominent parahilar interstitial markings   since previous exam on a limited inspiration. ASCVD. There is suggestion   of some atelectatic change and small effusion at the right base. Trace   effusion and platelikeatelectatic change at the left base. Deformity of   the right humeral head neck region compatible with old trauma as before   with degenerative changes.    IMPRESSION: Positive findings as above. Continued follow-up suggested.    < end of copied text >    ------------------------------------------------------------------------------------------------------------    ALLERGIES:  Allergies    No Known Allergies    Intolerances    MEDICATIONS  (STANDING):  acetylcysteine 10%  Inhalation 4 milliLiter(s) Inhalation three times a day  albuterol/ipratropium for Nebulization 3 milliLiter(s) Nebulizer every 6 hours  apixaban 5 milliGRAM(s) Oral every 12 hours  budesonide  80 MICROgram(s)/formoterol 4.5 MICROgram(s) Inhaler 2 Puff(s) Inhalation two times a day  chlorhexidine 2% Cloths 1 Application(s) Topical daily  escitalopram 10 milliGRAM(s) Oral daily  guaiFENesin  milliGRAM(s) Oral every 12 hours  pantoprazole    Tablet 40 milliGRAM(s) Oral before breakfast  predniSONE   Tablet 40 milliGRAM(s) Oral daily  trimethoprim  160 mG/sulfamethoxazole 800 mG 1 Tablet(s) Oral daily  valACYclovir 500 milliGRAM(s) Oral daily    MEDICATIONS  (PRN):  acetaminophen     Tablet .. 650 milliGRAM(s) Oral every 6 hours PRN Mild Pain (1 - 3), Moderate Pain (4 - 6)  ALPRAZolam 0.25 milliGRAM(s) Oral daily PRN For anxiety           HPI:  Patient is a 89 y/o F PMH lymphoma (on palliative chemo), COPD, pHTN, recent PE (discovered in June, on eliquis) presenting after a mechanical fall in which she hit her head. Patient denied LOC, lightheadedness, incontinence during the event. Patient reporting pain after fall specifically in back. Per daughter, patient had been complaining about back pain for past week prior to the fall as well. Patient also now reporting SOB and anxiety with some sputum production and cough. In the ED, patient noted to have positive UA with bacteria and LE, received 1 dose Ceftriaxone and 2 L IVF. T 98.1. HR , -123/70-90 on room air 100%. EKG NSR. Patient was admitted to medicine for UTI and metabolic acidosis. Complicated by COPD exacerbation in the setting of enterovirus as well. Palliative consulted for complex medical decision making in the setting of serious illness.     Patient seen this AM, feeling well in no distress. Patient is planned for discharge today. See below for GOC.     PERTINENT PM/SXH:   No pertinent past medical history    Cough    Osteoarthritis    Abnormal finding of lung    COPD, mild    Lymphoma      No significant past surgical history    History of hip replacement      FAMILY HISTORY:  No pertinent family history in first degree relatives    ------------------------------------------------------------------------------------------------------------  ITEMS NOT CHECKED ARE NOT PRESENT    SOCIAL HISTORY:   Living Situation: [x ]Home  [ ]Long term care  [ ]Rehab [ ]Other  Support: has a daughter Dinorah that lives in [place] and son that lives in [place]    Mandaen/Spirituality:  PCSSQ[Palliative Care Spiritual Screening Question]   Severity (0-10): 0  Score of 4 or > indicate consideration of Chaplaincy referral.  Chaplaincy Referral: [ ] yes [X ] refused [ ] following    Anticipatory Grief present?:  [ ] yes [X ] no  [ ] Deferred                      Other Referrals:    [ ]Hospice   [ ]Caregiver [place] Support  [ ]Child Life    [ ]Patient & Family Centered Care Referral  [ ]Holistic Therapy     ------------------------------------------------------------------------------------------------------------    PRESENT SYMPTOMS:    [ ] No     [ ] Unable to self-report      [ ] CPOT (ICU)     [ ] PAINADs     [ ] RDOS    [X ] Yes     Source if other than patient:  [ ]Family   [ ]Team     PAIN:   If blank, patient unable to specify   [ ]yes [x ]no  QOL impact-   Location -                    Aggravating factors -  Quality -  Radiation -  Timing-  Pain at most severe level (0-10 scale):  Pain at minimal acceptable level/Pain Goal (0-10 scale):     SYMPTOMS:   Dyspnea:                           [x ]Mild [ ]Moderate [ ]Severe  Anxiety:                             [ ]Mild [x ]Moderate [ ]Severe  Fatigue:                             [ ]Mild [ ]Moderate [ ]Severe  Nausea/Vomiting:              [ ]Mild [ ]Moderate [ ]Severe  Loss of appetite:                [ ]Mild [ ]Moderate [ ]Severe  Constipation:                     [ ]Mild [ ]Moderate [ ]Severe    Other Symptoms:  [X ]All other review of systems negative     Home Medications for symptoms if any:  I-Stop Reference No:[id number]    A	Y	B	06/22/2023	06/22/2023	alprazolam 0.5 mg tablet	15	30	Sue Collins	[id number]	Medicare	[facility]  A	N	O	06/07/2023	06/07/2023	tramadol hcl 50 mg tablet	10	5	Tito Bernal MD	[id number]	Medicare	[id number]  A	N	O	04/27/2023	05/01/2023	hydrocodone-homatropine soln	450	30	Celina Nelson S	[id number]	[facility]  A	N	O	09/29/2022	09/29/2022	hydromet 5 mg-1.5 mg/5 ml soln	150ml	10	Rossy Esparza MD	[id number]	[id number]    ------------------------------------------------------------------------------------------------------------    FUNCTIONAL STATUS:     Baseline ADL (prior to admission):  see above  Palliative Performance Score:   has HHA 10am-4pm 5d/week  Also lives with a "male " who helps her  She is dependent on some ADLs otherwise able to maneuver inside the house on her own  Has wheelchair, RW    [ ]PPSV2 < or = to 30%     NUTRITIONAL STATUS:     Protein Calorie Malnutrition Present:   [ ]mild   [ ]moderate   [ ]severe   [ ]poor nutritional intake   [ ]artificial nutrition      Weight:   [ ]underweight (BMI 18.5 or less)   [ ]morbid obesity (BMI 30 or higher)   [ ]anasarca  [ ]significant weight loss     Height (cm): 160.02 (05-04-23 @ 15:01)  Weight (kg): 68.3 (07-12-23 @ 01:35), 63.5 (05-23-23 @ 16:33)  BMI (kg/m2): 26.7 (07-12-23 @ 01:35), 24.8 (05-23-23 @ 16:33)    ------------------------------------------------------------------------------------------------------------    PRIOR ADVANCE DIRECTIVES:    [x ] DNR/MOLST  - DNR with trial of intubation   [ ] Living Will    [ ] Health Care Proxy(s)    DECISION MAKER(s):  [x ] Patient    [ ] Surrogate(s)  [ ] HCP   [ ] Guardian             ------------------------------------------------------------------------------------------------------------  PHYSICAL EXAM:  Vital Signs Last 24 Hrs  T(C): 36.4 (18 Jul 2023 05:20), Max: 36.6 (17 Jul 2023 13:51)  T(F): 97.6 (18 Jul 2023 05:20), Max: 97.8 (17 Jul 2023 13:51)  HR: 99 (18 Jul 2023 05:20) (97 - 108)  BP: 110/77 (18 Jul 2023 05:20) (109/65 - 123/72)  BP(mean): --  RR: 18 (18 Jul 2023 05:20) (18 - 22)  SpO2: 97% (18 Jul 2023 05:20) (96% - 98%)    Parameters below as of 18 Jul 2023 05:20  Patient On (Oxygen Delivery Method): room air     I&O's Summary      GENERAL:  [ ]Cachexia  [ ] Frail  [x ]Awake  [x ]Oriented x 3   [ ]Lethargic  [ ]Unarousable  [ ]Verbal  [ ]Non-Verbal    BEHAVIORAL:   [ x] Anxiety  [ ] Delirium [ ] Agitation [ ] Other    HEENT:   [ ]Normal   [ ]Dry mouth   [ ]ET Tube/Trach  [ ]Oral lesions    PULMONARY:   [ ]Clear [ ]Tachypnea  [ ]Audible excessive secretions   [ ]Rhonchi        [ ]Right [ ]Left [ ]Bilateral  [ ]Crackles        [ ]Right [ ]Left [ ]Bilateral  [ ]Wheezing     [ ]Right [ ]Left [ ]Bilateral  [ ]Diminished breath sounds [ ]right [ ]left [x ]bilateral    CARDIOVASCULAR:    [x ]Regular [ ]Irregular [ ]Tachy  [ ]Alvin [ ]Murmur [ ]Other    GASTROINTESTINAL:  [x ]Soft  [ ]Distended   [ ]+BS  [ ]Non tender [ ]Tender  [ ]Other [ ]PEG [ ]OGT/ NGT      GENITOURINARY:  [x ]Normal [ ] Incontinent   [ ]Oliguria/Anuria   [ ]Camilo    MUSCULOSKELETAL:   [ ]Normal   [x ]Weakness  [ ]Bed/Wheelchair bound [ ]Edema    NEUROLOGIC:   [x ]No focal deficits  [ ]Cognitive impairment  [ ]Dysphagia [ ]Dysarthria [ ]Paresis [ ]Other     SKIN:   [ ]Normal  [ ]Rash  [X ]Other- diffuse bruises on arms, cheeks   [ ]Pressure ulcer(s)       Present on admission [ ]y [ ]n    ------------------------------------------------------------------------------------------------------------    LABS:                        11.3   17.26 )-----------( 182      ( 18 Jul 2023 06:00 )             35.2   07-18    138  |  104  |  37<H>  ----------------------------<  83  4.2   |  21<L>  |  1.44<H>    Ca    10.0      18 Jul 2023 06:00  Phos  3.0     07-18  Mg     2.30     07-18    TPro  5.9<L>  /  Alb  3.8  /  TBili  0.2  /  DBili  x   /  AST  11  /  ALT  13  /  AlkPhos  117  07-18    Urinalysis Basic - ( 18 Jul 2023 06:00 )    Color: x / Appearance: x / SG: x / pH: x  Gluc: 83 mg/dL / Ketone: x  / Bili: x / Urobili: x   Blood: x / Protein: x / Nitrite: x   Leuk Esterase: x / RBC: x / WBC x   Sq Epi: x / Non Sq Epi: x / Bacteria: x    ------------------------------------------------------------------------------------------------------------    RADIOLOGY & ADDITIONAL STUDIES:    < from: Xray Chest 1 View AP/PA (07.11.23 @ 18:33) >  FINDINGS:    Scattered linear atelectasis otherwise no pulmonary consolidation. No   pleural effusion. No pneumothorax. Cardiac silhouette size cannot be   accurately assessed on this projection. Chronic changes of the right   proximal humerus.    IMPRESSION: No acute traumatic findings.    < end of copied text >    < from: Xray Knee 3 Views, Bilateral (07.11.23 @ 20:11) >  FINDINGS:  There is diffuse severe osteopenia.  No acute fracture or dislocations.  Both knees demonstrate tricompartment small osteophytes and mild medial   compartment joint space narrowing.  No knee joint effusion identified.    IMPRESSION:  No acute fracture or dislocation.    < end of copied text >    < from: CT Head No Cont (07.11.23 @ 22:03) >    IMPRESSION:  CT head:  No acute intracranial hemorrhage or mass effect.    CT cervical spine:  No CT evidence of cervical spine fracture.    < end of copied text >    < from: US Kidney and Bladder (07.12.23 @ 08:42) >  IMPRESSION:  No hydronephrosis.    Bladder debris. Correlate with urinalysis.    < end of copied text >    < from: Xray Chest 2 Views PA/Lat (07.14.23 @ 13:15) >  FINDINGS: Some slightly more prominent parahilar interstitial markings   since previous exam on a limited inspiration. ASCVD. There is suggestion   of some atelectatic change and small effusion at the right base. Trace   effusion and platelikeatelectatic change at the left base. Deformity of   the right humeral head neck region compatible with old trauma as before   with degenerative changes.    IMPRESSION: Positive findings as above. Continued follow-up suggested.    < end of copied text >    ------------------------------------------------------------------------------------------------------------    ALLERGIES:  Allergies    No Known Allergies    Intolerances    MEDICATIONS  (STANDING):  acetylcysteine 10%  Inhalation 4 milliLiter(s) Inhalation three times a day  albuterol/ipratropium for Nebulization 3 milliLiter(s) Nebulizer every 6 hours  apixaban 5 milliGRAM(s) Oral every 12 hours  budesonide  80 MICROgram(s)/formoterol 4.5 MICROgram(s) Inhaler 2 Puff(s) Inhalation two times a day  chlorhexidine 2% Cloths 1 Application(s) Topical daily  escitalopram 10 milliGRAM(s) Oral daily  guaiFENesin  milliGRAM(s) Oral every 12 hours  pantoprazole    Tablet 40 milliGRAM(s) Oral before breakfast  predniSONE   Tablet 40 milliGRAM(s) Oral daily  trimethoprim  160 mG/sulfamethoxazole 800 mG 1 Tablet(s) Oral daily  valACYclovir 500 milliGRAM(s) Oral daily    MEDICATIONS  (PRN):  acetaminophen     Tablet .. 650 milliGRAM(s) Oral every 6 hours PRN Mild Pain (1 - 3), Moderate Pain (4 - 6)  ALPRAZolam 0.25 milliGRAM(s) Oral daily PRN For anxiety

## 2023-07-18 NOTE — PROGRESS NOTE ADULT - PROBLEM SELECTOR PLAN 2
Hx of pulmonary htn  Increased proBNP since admission   -Breathing improved on lasix 20mg IV  -Confusion of home lasix use cleared, will restart home lasix 20mg qd on discharge

## 2023-07-18 NOTE — CHART NOTE - NSCHARTNOTEFT_GEN_A_CORE
Home commode is necessary for the patient as she is confined to single level/floor without a bathroom.

## 2023-07-18 NOTE — PROGRESS NOTE ADULT - PROBLEM SELECTOR PLAN 10
Lymphoma previously treated with palliative chemo and immune therapy  Follows with Dr. Collins
Lymphoma on palliative chemo   Follows with Dr. Collins
Diet: Regular diet + Ensure  DVT: c/w eliquis  Dispo: PAO however pt and family would like home PT  Code Status: DNR with Trial of Intubation  Will discuss with daughter the need for palliative consult
Diet: Regular diet + Ensure  DVT: c/w eliquis  Dispo: Home care with home PT  Code Status: DNR with Trial of Intubation  Pt will follow up with geriatrics-palliative clinic
Lymphoma on palliative chemo   Follows with Dr. Collins

## 2023-07-18 NOTE — PROGRESS NOTE ADULT - PROBLEM SELECTOR PLAN 5
UA w/ WBC>4000 and + leukocyte esterase  -On Bactrim for PCP prophylaxis given hx of lymphoma s/p palliative chemo + immune therapy  -Abx course complete

## 2023-07-18 NOTE — PROGRESS NOTE ADULT - PROVIDER SPECIALTY LIST ADULT
Internal Medicine
Internal Medicine
Nephrology
Nephrology
Internal Medicine

## 2023-07-20 PROBLEM — C85.90 NON-HODGKIN LYMPHOMA, UNSPECIFIED, UNSPECIFIED SITE: Chronic | Status: ACTIVE | Noted: 2023-07-12

## 2023-07-24 RX ORDER — METHENAMINE HIPPURATE 1 G/1
1 TABLET ORAL
Qty: 60 | Refills: 0 | Status: ACTIVE | COMMUNITY
Start: 2022-04-04 | End: 1900-01-01

## 2023-07-25 ENCOUNTER — LABORATORY RESULT (OUTPATIENT)
Age: 88
End: 2023-07-25

## 2023-07-27 ENCOUNTER — APPOINTMENT (OUTPATIENT)
Dept: HEMATOLOGY ONCOLOGY | Facility: CLINIC | Age: 88
End: 2023-07-27
Payer: MEDICARE

## 2023-07-27 VITALS
WEIGHT: 125 LBS | HEART RATE: 110 BPM | TEMPERATURE: 97 F | RESPIRATION RATE: 20 BRPM | BODY MASS INDEX: 22.86 KG/M2 | DIASTOLIC BLOOD PRESSURE: 64 MMHG | SYSTOLIC BLOOD PRESSURE: 95 MMHG | OXYGEN SATURATION: 98 %

## 2023-07-27 PROCEDURE — 99213 OFFICE O/P EST LOW 20 MIN: CPT

## 2023-07-27 NOTE — ASSESSMENT
[FreeTextEntry1] : 89 yo female with new diagnosis of Stage IV DLBCL s/p treatment with RC and prednisone presents for follow up. Currently on gazyva, revlmid on hold since 9/2022 given thrombocytopenia. Overall, tolerating current treatment well without any significant side effects. \par \par #DLBCL\par -Pt was on RC and prednisone every 3 weeks,\par - CT scan of chest done 4/2/22: Newly noted 9 mm opacity in the periphery of the right upper lobe and interval increase in size of a lingular opacity along the fissure.\par - Discussed with patient and daughter Dinorah we would prefer to start treatment with Rituxan and Polatuzumab; Went over risk/side effects of Polatuzumab and consent signed today.\par -Pt is s/p Hugo-Rtixumab- cycle 1 on 4/28/22\par -She developed respiratory distress and was hospitalized and was noted to have pulm edema and concern for progressive lymphoma vs. infection\par -Considering lymphoma involvement in the lungs treated with Obinutuzumab on 5/20 test dose and 5/21 to complete infusion for total of 1000mg. She is s/p C1D8 on 6/9/22 and C1D15 on 6/16/22, then once monthly. Started Revlimid on 6/26/22 (pt has an eye surgery scheduled at that time) - risks/side effects discussed with patient, consent signed. 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 She is scheduled for next week\par -last PET/CT 3/23/23, stable\par -if progression of disease, will consider adding on revlimid 2.5 mg vs. loncatuximab tesirine x 12 cycles \par -was getting prednisone for suspected ITP, was on 5mg daily then d/c on 6/22/2023. CBC done by home draw on 7/25/23 showed Platelet stable 138k. Cont close monitor. \par -CT of chest done for new cough with lower of steroids showed a PE, now on Eliquis\par -can continue IVIG given age and likelihood of hypogammaglobulinemia. will add IVIG monthly starting next month. \par -c/w valacyclovir; bactrim \par -Continue methenamine hippurate for UTI prevention\par -Cont xanax as needed for anxiety\par -RTC in 1 mon  for Obi\par \par Case and management discussed with Dr. Collins\par \par

## 2023-07-27 NOTE — HISTORY OF PRESENT ILLNESS
[de-identified] : 89 year old woman with a PMH of COPD  was admitted to [facility] for worsening cough and lethargy despite treatment with antibiotics and steroids. She underwent R robotic VATS/pleural bx with placement of pleurex catheter on 07/15/21 after PET from 07/07/21 reveal b/l upper lobe opacities concerning for malignancy with pleural effusion. 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 Given her age and symptoms, patient and daughter opted for palliative chemotherapy. she received prednisone, followed by cytoxan (200 mg/m2) on 8/5-8/7/21 with marked improvement in respiratory status and mental status. Pleurex catheter stopped draining after initiation of cytoxan. She received rituximab which was complicated by infusion reaction. She received the full dose over 2 days. She was discharged to [facility] and presents for follow up. She received 4 cycles of R-CP which was overall well tolerated. She has a fall in mid October and did not initially seek medical care. Her daughter Dinorah called the office on 10/20 and given her shoulder pain it was recommended she get xrays and an evaluation. She was noted to have a R proximal humerus fracture and was sent from Urgent care to the ER for admission. She was also noted to have a possible PNA. While admitted, she was noted to be tachy-lidia. She was also found to have parainflueza and was treated with ceftriaxone/azithromycin for PNA. \par She received C5 on 11/18, C6 on 12/9, C7 on 12/30, and C8 on 1/27 (delayed due to neutropenia). \par \par Pt was recently hospitalized for respiratory distress in May 2022. CTA chest 5/10: No pulmonary embolism from the main pulmonary artery up to and including the lobar branches. Several segmental and subsegmental branches cannot be assessed due to extensive respiratory motion. New groundglass in the upper lobes, suspicious for pulmonary edema. Infection is also a consideration. Increased mildly enlarged mediastinal lymph nodes may be reactive. Stable multilobar chronic consolidation.\par \par Considering lymphoma involvement in the lungs treated with Obinutuzumab on 5/20 test dose and 5/21 to complete infusion for total of 1000mg. She had C1D8 on 6/9/22, C1D15 on 6/16/22. She completed 6 cycles. She was started on revlmid, dose reduced to 2.5 mg every other day for thrombocytopenia which was eventually held. She was treated for possible ITP with marked improvement in platelets and has been on prednisone with bactrim ppx. She has been receiving IVIG for ITP (also for hypogammaglobulinemia in the setting of chronic B cell depletion and frequent viral URIs)\par \par  [de-identified] : 5/4/23: since last visit, pt's prednisone was reduced from 10mg to 5mg. Pt has noticed increase cough, she was rxed hycodan which she states is helping. Pt state she had an URI last week. Denies fevers, chills, night sweats, weight loss. \par \par CT chest done 5/31/23: Pulmonary embolus in the distal portion of the right main pulmonary artery.\par Ill-defined nodule in the right upper lobe has decreased in size when compared to previous exam.\par Stable patchy opacities in the right middle and both lower lobes when compared to previous exam.\par \par 6/22: We started her on Eliquis earlier this month. We held her Obi that was scheduled until after our visit today given new findings. \par She has been having back pain starting 6/5. She saw her PMD Dr. Bernal. CXR was done, given a short course of Abx. Pain is about a 5 now. \par Per pt and aide, she saw pulmonologist, cardiologist  -cough improved\par \par 6/29: pt presented today for Obi treatment. She was seen today in the tx rm accompanied by her aid. Pt admitted cough and back pain improved. \par She stopped Prednisone last Thursday 6/22, home draw lab done on 6/27 showed CBC stable. \par \par 7/27:  Patient is seen today for a f/u apt accompanied by her aid. Pt was sent to ED on 7/11 after mechanical fall with head impact, admitted for UTI and metabolic acidosis. Acidosis improved on bicarb gtt. Treated for COPD exacerbation given increased cough, phlegm, and diffuse wheezing on exam and rhino/enterovirus (+). O2 sat >95% on RA. Component of anxiety contributing to her tachypneic episodes overnight as well. Given IV Lasix 20mg for hx of pulmonary hypertension and increased BNP since admission. Improved symptomatically. \par \par \par \par

## 2023-07-27 NOTE — PHYSICAL EXAM
[Ambulatory and capable of all self care but unable to carry out any work activities] : Status 2- Ambulatory and capable of all self care but unable to carry out any work activities. Up and about more than 50% of waking hours [Normal] : affect appropriate [de-identified] : no wheezing, symmetric chest rise [de-identified] : non tender to palpation

## 2023-08-07 ENCOUNTER — LABORATORY RESULT (OUTPATIENT)
Age: 88
End: 2023-08-07

## 2023-08-08 ENCOUNTER — LABORATORY RESULT (OUTPATIENT)
Age: 88
End: 2023-08-08

## 2023-08-16 ENCOUNTER — LABORATORY RESULT (OUTPATIENT)
Age: 88
End: 2023-08-16

## 2023-08-16 ENCOUNTER — OUTPATIENT (OUTPATIENT)
Dept: OUTPATIENT SERVICES | Facility: HOSPITAL | Age: 88
LOS: 1 days | Discharge: HOME | End: 2023-08-16

## 2023-08-16 DIAGNOSIS — Z96.641 PRESENCE OF RIGHT ARTIFICIAL HIP JOINT: Chronic | ICD-10-CM

## 2023-08-16 DIAGNOSIS — Z96.649 PRESENCE OF UNSPECIFIED ARTIFICIAL HIP JOINT: Chronic | ICD-10-CM

## 2023-08-16 DIAGNOSIS — C83.30 DIFFUSE LARGE B-CELL LYMPHOMA, UNSPECIFIED SITE: ICD-10-CM

## 2023-08-22 ENCOUNTER — LABORATORY RESULT (OUTPATIENT)
Age: 88
End: 2023-08-22

## 2023-08-24 ENCOUNTER — RESULT REVIEW (OUTPATIENT)
Age: 88
End: 2023-08-24

## 2023-08-24 ENCOUNTER — APPOINTMENT (OUTPATIENT)
Dept: HEMATOLOGY ONCOLOGY | Facility: CLINIC | Age: 88
End: 2023-08-24

## 2023-08-24 ENCOUNTER — APPOINTMENT (OUTPATIENT)
Dept: INFUSION THERAPY | Facility: HOSPITAL | Age: 88
End: 2023-08-24

## 2023-08-24 ENCOUNTER — APPOINTMENT (OUTPATIENT)
Dept: HEMATOLOGY ONCOLOGY | Facility: CLINIC | Age: 88
End: 2023-08-24
Payer: MEDICARE

## 2023-08-24 ENCOUNTER — LABORATORY RESULT (OUTPATIENT)
Age: 88
End: 2023-08-24

## 2023-08-24 DIAGNOSIS — E87.5 HYPERKALEMIA: ICD-10-CM

## 2023-08-24 LAB
ALBUMIN SERPL ELPH-MCNC: 3.5 G/DL — SIGNIFICANT CHANGE UP (ref 3.3–5)
ALP SERPL-CCNC: 84 U/L — SIGNIFICANT CHANGE UP (ref 40–120)
ALT FLD-CCNC: 13 U/L — SIGNIFICANT CHANGE UP (ref 10–45)
ANION GAP SERPL CALC-SCNC: 13 MMOL/L — SIGNIFICANT CHANGE UP (ref 5–17)
AST SERPL-CCNC: 37 U/L — SIGNIFICANT CHANGE UP (ref 10–40)
BILIRUB SERPL-MCNC: 0.2 MG/DL — SIGNIFICANT CHANGE UP (ref 0.2–1.2)
BUN SERPL-MCNC: 35 MG/DL — HIGH (ref 7–23)
CALCIUM SERPL-MCNC: 8.8 MG/DL — SIGNIFICANT CHANGE UP (ref 8.4–10.5)
CHLORIDE SERPL-SCNC: 115 MMOL/L — HIGH (ref 96–108)
CO2 SERPL-SCNC: 15 MMOL/L — LOW (ref 22–31)
CREAT SERPL-MCNC: 1.54 MG/DL — HIGH (ref 0.5–1.3)
EGFR: 32 ML/MIN/1.73M2 — LOW
GLUCOSE SERPL-MCNC: 105 MG/DL — HIGH (ref 70–99)
POTASSIUM SERPL-MCNC: 5.5 MMOL/L — HIGH (ref 3.5–5.3)
POTASSIUM SERPL-SCNC: 5.5 MMOL/L — HIGH (ref 3.5–5.3)
PROT SERPL-MCNC: 5.8 G/DL — LOW (ref 6–8.3)
SODIUM SERPL-SCNC: 142 MMOL/L — SIGNIFICANT CHANGE UP (ref 135–145)

## 2023-08-24 PROCEDURE — 99214 OFFICE O/P EST MOD 30 MIN: CPT

## 2023-08-25 ENCOUNTER — LABORATORY RESULT (OUTPATIENT)
Age: 88
End: 2023-08-25

## 2023-08-25 ENCOUNTER — NON-APPOINTMENT (OUTPATIENT)
Age: 88
End: 2023-08-25

## 2023-08-25 DIAGNOSIS — Z51.11 ENCOUNTER FOR ANTINEOPLASTIC CHEMOTHERAPY: ICD-10-CM

## 2023-08-25 DIAGNOSIS — R11.2 NAUSEA WITH VOMITING, UNSPECIFIED: ICD-10-CM

## 2023-08-25 DIAGNOSIS — C85.80 OTHER SPECIFIED TYPES OF NON-HODGKIN LYMPHOMA, UNSPECIFIED SITE: ICD-10-CM

## 2023-08-25 PROBLEM — E87.5 HYPERKALEMIA: Status: ACTIVE | Noted: 2023-08-25

## 2023-08-25 RX ORDER — SODIUM ZIRCONIUM CYCLOSILICATE 10 G/10G
10 POWDER, FOR SUSPENSION ORAL DAILY
Qty: 30 | Refills: 0 | Status: ACTIVE | COMMUNITY
Start: 2023-08-25 | End: 1900-01-01

## 2023-08-25 NOTE — HISTORY OF PRESENT ILLNESS
[de-identified] : 89 year old woman with a PMH of COPD  was admitted to [facility] for worsening cough and lethargy despite treatment with antibiotics and steroids. She underwent R robotic VATS/pleural bx with placement of pleurex catheter on 07/15/21 after PET from 07/07/21 reveal b/l upper lobe opacities concerning for malignancy with pleural effusion. 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 Given her age and symptoms, patient and daughter opted for palliative chemotherapy. she received prednisone, followed by cytoxan (200 mg/m2) on 8/5-8/7/21 with marked improvement in respiratory status and mental status. Pleurex catheter stopped draining after initiation of cytoxan. She received rituximab which was complicated by infusion reaction. She received the full dose over 2 days. She was discharged to [place] [facility] and presents for follow up. She received 4 cycles of R-CP which was overall well tolerated. She has a fall in mid October and did not initially seek medical care. Her daughter Dinorah called the office on 10/20 and given her shoulder pain it was recommended she get xrays and an evaluation. She was noted to have a R proximal humerus fracture and was sent from Urgent care to the ER for admission. She was also noted to have a possible PNA. While admitted, she was noted to be tachy-lidia. She was also found to have parainflueza and was treated with ceftriaxone/azithromycin for PNA. \par  She received C5 on 11/18, C6 on 12/9, C7 on 12/30, and C8 on 1/27 (delayed due to neutropenia). \par  \par  Pt was recently hospitalized for respiratory distress in May 2022. CTA chest 5/10: No pulmonary embolism from the main pulmonary artery up to and including the lobar branches. Several segmental and subsegmental branches cannot be assessed due to extensive respiratory motion. New groundglass in the upper lobes, suspicious for pulmonary edema. Infection is also a consideration. Increased mildly enlarged mediastinal lymph nodes may be reactive. Stable multilobar chronic consolidation.\par  \par  Considering lymphoma involvement in the lungs treated with Obinutuzumab on 5/20 test dose and 5/21 to complete infusion for total of 1000mg. She had C1D8 on 6/9/22, C1D15 on 6/16/22. She completed 6 cycles. She was started on revlmid, dose reduced to 2.5 mg every other day for thrombocytopenia which was eventually held. She was treated for possible ITP with marked improvement in platelets and has been on prednisone with bactrim ppx. She has been receiving IVIG for ITP (also for hypogammaglobulinemia in the setting of chronic B cell depletion and frequent viral URIs)\par  \par   [de-identified] : 5/4/23: since last visit, pt's prednisone was reduced from 10mg to 5mg. Pt has noticed increase cough, she was rxed hycodan which she states is helping. Pt state she had an URI last week. Denies fevers, chills, night sweats, weight loss.   CT chest done 5/31/23: Pulmonary embolus in the distal portion of the right main pulmonary artery. Ill-defined nodule in the right upper lobe has decreased in size when compared to previous exam. Stable patchy opacities in the right middle and both lower lobes when compared to previous exam.  6/22: We started her on Eliquis earlier this month. We held her Obi that was scheduled until after our visit today given new findings.  She has been having back pain starting 6/5. She saw her PMD Dr. Bernal. CXR was done, given a short course of Abx. Pain is about a 5 now.  Per pt and aide, she saw pulmonologist, cardiologist  -cough improved  6/29: pt presented today for Obi treatment. She was seen today in the tx  accompanied by her aid. Pt admitted cough and back pain improved.  She stopped Prednisone last Thursday 6/22, home draw lab done on 6/27 showed CBC stable.   7/27:  Patient is seen today for a f/u apt accompanied by her aid. Pt was sent to ED on 7/11 after mechanical fall with head impact, admitted for UTI and metabolic acidosis. Acidosis improved on bicarb gtt. Treated for COPD exacerbation given increased cough, phlegm, and diffuse wheezing on exam and rhino/enterovirus (+). O2 sat >95% on RA. Component of anxiety contributing to her tachypneic episodes overnight as well. Given IV Lasix 20mg for hx of pulmonary hypertension and increased BNP since admission. Improved symptomatically.   8/24: Patient is seen today for a f/u apt accompanied by her aid and with the daughter Dinorah on the phone.  Pt denied any urinary complaints, denied fever chill dysuria. Denied increased frequency of urination.  But pt's daughter was worried about her asymptomatic UTI?. She requested monthly urine tests to monitor. I had a lengthy discussion with Dinorah the daughter, explained we will do one-time check today but pt will need f/u with her PMD for routine management of possible UTI.  She agreed.

## 2023-08-25 NOTE — ASSESSMENT
[FreeTextEntry1] : 89 yo female with new diagnosis of Stage IV DLBCL s/p treatment with RC and prednisone presents for follow up. Currently on gazyva, revlmid on hold since 9/2022 given thrombocytopenia. Overall, tolerating current treatment well without any significant side effects.   #DLBCL -Pt was on RC and prednisone every 3 weeks, - CT scan of chest done 4/2/22: Newly noted 9 mm opacity in the periphery of the right upper lobe and interval increase in size of a lingular opacity along the fissure. - Discussed with patient and daughter Dinorah we would prefer to start treatment with Rituxan and Polatuzumab; Went over risk/side effects of Polatuzumab and consent signed today. -Pt is s/p Hugo-Rtixumab- cycle 1 on 4/28/22 -She developed respiratory distress and was hospitalized and was noted to have pulm edema and concern for progressive lymphoma vs. infection -Considering lymphoma involvement in the lungs treated with Obinutuzumab on 5/20 test dose and 5/21 to complete infusion for total of 1000mg. She is s/p C1D8 on 6/9/22 and C1D15 on 6/16/22, then once monthly. Started Revlimid on 6/26/22 (pt has an eye surgery scheduled at that time) - risks/side effects discussed with patient, consent signed. Rx for ASA 81mg -currently revlimid on hold since 9/2022 due to thrombocytopenia, suspected there was a component of ITP given improvement with dexamethasone and IVIG -c/w obinutuzumab every 2 months for 2 years (started 7/2022) for maintenance. Will have Obi today. Next dose on 10/19/23 -last PET/CT 3/23/23, stable -if progression of disease, will consider adding on revlimid 2.5 mg vs. loncatuximab tesirine x 12 cycles  -was getting prednisone for suspected ITP, was on 5mg daily then d/c on 6/22/2023. CBC done by home draw on 7/25/23 showed Platelet stable 138k. Cont close monitor.  -CT of chest done for new cough with lower of steroids showed a PE, now on Eliquis since 6/7/23.  -CBC today stable, reviewed w/Dinorah the daughter, a copy printed out for the pt and aid -will hold  IVIG given her thrombocytopenia resolved after tapering off prednisone.  -will d/c weekly home-draw set up by Dr. Esparza, will start home draw every other month between her Obi tx apts, will check labs when she comes for Obi in the tx rm;  Will add home-draw apt as needed.   -c/w valacyclovir; bactrim  -Continue methenamine hippurate for UTI prevention, pt is asymtomatic for UTI, UA/UC ordered per Daughter Dinorah's request. Pt will need f/u PMD for future management of UTI.  -Cont xanax as needed for anxiety -RTC in 2 mon  for Obi  Case and management discussed with Dr. Collins

## 2023-08-25 NOTE — PHYSICAL EXAM
[Ambulatory and capable of all self care but unable to carry out any work activities] : Status 2- Ambulatory and capable of all self care but unable to carry out any work activities. Up and about more than 50% of waking hours [Normal] : affect appropriate [de-identified] : no wheezing, symmetric chest rise [de-identified] : non tender to palpation

## 2023-08-31 RX ORDER — NITROFURANTOIN (MONOHYDRATE/MACROCRYSTALS) 25; 75 MG/1; MG/1
100 CAPSULE ORAL TWICE DAILY
Qty: 14 | Refills: 0 | Status: ACTIVE | COMMUNITY
Start: 2022-03-14 | End: 1900-01-01

## 2023-09-01 LAB
APPEARANCE: ABNORMAL
BILIRUBIN URINE: NEGATIVE
BLOOD URINE: ABNORMAL
COLOR: YELLOW
GLUCOSE QUALITATIVE U: NEGATIVE MG/DL
KETONES URINE: NEGATIVE MG/DL
LEUKOCYTE ESTERASE URINE: ABNORMAL
NITRITE URINE: NEGATIVE
PH URINE: 6
PROTEIN URINE: 100 MG/DL
SPECIFIC GRAVITY URINE: 1.02
UROBILINOGEN URINE: 0.2 MG/DL

## 2023-09-21 ENCOUNTER — LABORATORY RESULT (OUTPATIENT)
Age: 88
End: 2023-09-21

## 2023-09-25 NOTE — DIETITIAN INITIAL EVALUATION ADULT. - CHIEF COMPLAINT
REQUESTING PHYSICIAN:  Viet Lopez TERI ESPINAL MD     The patient is a 89y Female complaining of fall.

## 2023-09-28 ENCOUNTER — APPOINTMENT (OUTPATIENT)
Dept: UROGYNECOLOGY | Facility: CLINIC | Age: 88
End: 2023-09-28
Payer: MEDICARE

## 2023-09-28 VITALS
HEIGHT: 63 IN | WEIGHT: 125 LBS | HEART RATE: 96 BPM | BODY MASS INDEX: 22.15 KG/M2 | SYSTOLIC BLOOD PRESSURE: 113 MMHG | DIASTOLIC BLOOD PRESSURE: 76 MMHG

## 2023-09-28 DIAGNOSIS — N95.8 OTHER SPECIFIED MENOPAUSAL AND PERIMENOPAUSAL DISORDERS: ICD-10-CM

## 2023-09-28 DIAGNOSIS — Z85.72 PERSONAL HISTORY OF NON-HODGKIN LYMPHOMAS: ICD-10-CM

## 2023-09-28 DIAGNOSIS — R82.71 BACTERIURIA: ICD-10-CM

## 2023-09-28 DIAGNOSIS — Z51.11 ENCOUNTER FOR ANTINEOPLASTIC CHEMOTHERAPY: ICD-10-CM

## 2023-09-28 DIAGNOSIS — Z63.4 DISAPPEARANCE AND DEATH OF FAMILY MEMBER: ICD-10-CM

## 2023-09-28 DIAGNOSIS — N39.41 URGE INCONTINENCE: ICD-10-CM

## 2023-09-28 PROCEDURE — 99205 OFFICE O/P NEW HI 60 MIN: CPT

## 2023-09-28 SDOH — SOCIAL STABILITY - SOCIAL INSECURITY: DISSAPEARANCE AND DEATH OF FAMILY MEMBER: Z63.4

## 2023-09-29 PROBLEM — R82.71 ASYMPTOMATIC BACTERIURIA: Status: ACTIVE | Noted: 2023-09-29

## 2023-09-29 PROBLEM — N95.8 GENITOURINARY SYNDROME OF MENOPAUSE: Status: ACTIVE | Noted: 2023-09-29

## 2023-09-29 PROBLEM — N39.41 URGE INCONTINENCE OF URINE: Status: ACTIVE | Noted: 2023-09-29

## 2023-09-29 RX ORDER — ESTRADIOL 0.1 MG/G
0.1 CREAM VAGINAL
Qty: 1 | Refills: 3 | Status: ACTIVE | COMMUNITY
Start: 2023-09-29 | End: 1900-01-01

## 2023-10-03 PROBLEM — Z85.72 HISTORY OF LYMPHOMA: Status: RESOLVED | Noted: 2023-10-03 | Resolved: 2023-10-03

## 2023-10-03 PROBLEM — Z51.11 CHEMOTHERAPY MANAGEMENT, ENCOUNTER FOR: Status: RESOLVED | Noted: 2023-10-03 | Resolved: 2023-10-03

## 2023-10-03 PROBLEM — Z63.4 WIDOWED: Status: ACTIVE | Noted: 2023-10-03

## 2023-10-03 RX ORDER — ESCITALOPRAM OXALATE 10 MG/1
10 TABLET ORAL
Refills: 0 | Status: ACTIVE | COMMUNITY

## 2023-10-03 RX ORDER — PREDNISONE 50 MG/1
TABLET ORAL
Refills: 0 | Status: ACTIVE | COMMUNITY

## 2023-10-12 NOTE — PHYSICAL THERAPY INITIAL EVALUATION ADULT - PLANNED THERAPY INTERVENTIONS, PT EVAL
Home
stair negotiation/balance training/bed mobility training/gait training/strengthening/transfer training

## 2023-10-13 RX ORDER — AZITHROMYCIN 250 MG/1
250 TABLET, FILM COATED ORAL
Qty: 1 | Refills: 0 | Status: ACTIVE | COMMUNITY
Start: 2023-10-13 | End: 1900-01-01

## 2023-10-17 ENCOUNTER — OUTPATIENT (OUTPATIENT)
Dept: OUTPATIENT SERVICES | Facility: HOSPITAL | Age: 88
LOS: 1 days | Discharge: HOME | End: 2023-10-17

## 2023-10-17 DIAGNOSIS — C83.30 DIFFUSE LARGE B-CELL LYMPHOMA, UNSPECIFIED SITE: ICD-10-CM

## 2023-10-17 DIAGNOSIS — Z96.641 PRESENCE OF RIGHT ARTIFICIAL HIP JOINT: Chronic | ICD-10-CM

## 2023-10-17 DIAGNOSIS — D69.3 IMMUNE THROMBOCYTOPENIC PURPURA: ICD-10-CM

## 2023-10-17 DIAGNOSIS — Z96.649 PRESENCE OF UNSPECIFIED ARTIFICIAL HIP JOINT: Chronic | ICD-10-CM

## 2023-10-20 RX ORDER — FUROSEMIDE 20 MG/1
20 TABLET ORAL
Qty: 90 | Refills: 0 | Status: ACTIVE | COMMUNITY
Start: 2022-05-10 | End: 1900-01-01

## 2023-10-23 ENCOUNTER — LABORATORY RESULT (OUTPATIENT)
Age: 88
End: 2023-10-23

## 2023-10-24 DIAGNOSIS — I26.99 OTHER PULMONARY EMBOLISM W/OUT ACUTE COR PULMONALE: ICD-10-CM

## 2023-10-24 RX ORDER — APIXABAN 5 MG (74)
5 KIT ORAL
Qty: 60 | Refills: 2 | Status: DISCONTINUED | COMMUNITY
Start: 2023-06-07 | End: 2023-10-24

## 2023-11-02 ENCOUNTER — APPOINTMENT (OUTPATIENT)
Dept: INFUSION THERAPY | Facility: HOSPITAL | Age: 88
End: 2023-11-02

## 2023-11-02 ENCOUNTER — RESULT REVIEW (OUTPATIENT)
Age: 88
End: 2023-11-02

## 2023-11-02 ENCOUNTER — APPOINTMENT (OUTPATIENT)
Dept: HEMATOLOGY ONCOLOGY | Facility: CLINIC | Age: 88
End: 2023-11-02
Payer: MEDICARE

## 2023-11-02 ENCOUNTER — APPOINTMENT (OUTPATIENT)
Dept: HEMATOLOGY ONCOLOGY | Facility: CLINIC | Age: 88
End: 2023-11-02

## 2023-11-02 LAB
ALBUMIN SERPL ELPH-MCNC: 3.2 G/DL — LOW (ref 3.3–5)
ALBUMIN SERPL ELPH-MCNC: 3.2 G/DL — LOW (ref 3.3–5)
ALP SERPL-CCNC: 76 U/L — SIGNIFICANT CHANGE UP (ref 40–120)
ALP SERPL-CCNC: 76 U/L — SIGNIFICANT CHANGE UP (ref 40–120)
ALT FLD-CCNC: <5 U/L — LOW (ref 10–45)
ALT FLD-CCNC: <5 U/L — LOW (ref 10–45)
ANION GAP SERPL CALC-SCNC: 15 MMOL/L — SIGNIFICANT CHANGE UP (ref 5–17)
ANION GAP SERPL CALC-SCNC: 15 MMOL/L — SIGNIFICANT CHANGE UP (ref 5–17)
AST SERPL-CCNC: 20 U/L — SIGNIFICANT CHANGE UP (ref 10–40)
AST SERPL-CCNC: 20 U/L — SIGNIFICANT CHANGE UP (ref 10–40)
BASOPHILS # BLD AUTO: 0.05 K/UL — SIGNIFICANT CHANGE UP (ref 0–0.2)
BASOPHILS # BLD AUTO: 0.05 K/UL — SIGNIFICANT CHANGE UP (ref 0–0.2)
BASOPHILS NFR BLD AUTO: 0.3 % — SIGNIFICANT CHANGE UP (ref 0–2)
BASOPHILS NFR BLD AUTO: 0.3 % — SIGNIFICANT CHANGE UP (ref 0–2)
BILIRUB SERPL-MCNC: 0.3 MG/DL — SIGNIFICANT CHANGE UP (ref 0.2–1.2)
BILIRUB SERPL-MCNC: 0.3 MG/DL — SIGNIFICANT CHANGE UP (ref 0.2–1.2)
BUN SERPL-MCNC: 30 MG/DL — HIGH (ref 7–23)
BUN SERPL-MCNC: 30 MG/DL — HIGH (ref 7–23)
CALCIUM SERPL-MCNC: 8.2 MG/DL — LOW (ref 8.4–10.5)
CALCIUM SERPL-MCNC: 8.2 MG/DL — LOW (ref 8.4–10.5)
CHLORIDE SERPL-SCNC: 109 MMOL/L — HIGH (ref 96–108)
CHLORIDE SERPL-SCNC: 109 MMOL/L — HIGH (ref 96–108)
CO2 SERPL-SCNC: 18 MMOL/L — LOW (ref 22–31)
CO2 SERPL-SCNC: 18 MMOL/L — LOW (ref 22–31)
CREAT SERPL-MCNC: 1.86 MG/DL — HIGH (ref 0.5–1.3)
CREAT SERPL-MCNC: 1.86 MG/DL — HIGH (ref 0.5–1.3)
EGFR: 25 ML/MIN/1.73M2 — LOW
EGFR: 25 ML/MIN/1.73M2 — LOW
EOSINOPHIL # BLD AUTO: 0.15 K/UL — SIGNIFICANT CHANGE UP (ref 0–0.5)
EOSINOPHIL # BLD AUTO: 0.15 K/UL — SIGNIFICANT CHANGE UP (ref 0–0.5)
EOSINOPHIL NFR BLD AUTO: 1 % — SIGNIFICANT CHANGE UP (ref 0–6)
EOSINOPHIL NFR BLD AUTO: 1 % — SIGNIFICANT CHANGE UP (ref 0–6)
GLUCOSE SERPL-MCNC: 194 MG/DL — HIGH (ref 70–99)
GLUCOSE SERPL-MCNC: 194 MG/DL — HIGH (ref 70–99)
HCT VFR BLD CALC: 33.5 % — LOW (ref 34.5–45)
HCT VFR BLD CALC: 33.5 % — LOW (ref 34.5–45)
HGB BLD-MCNC: 11.3 G/DL — LOW (ref 11.5–15.5)
HGB BLD-MCNC: 11.3 G/DL — LOW (ref 11.5–15.5)
IMM GRANULOCYTES NFR BLD AUTO: 0.6 % — SIGNIFICANT CHANGE UP (ref 0–0.9)
IMM GRANULOCYTES NFR BLD AUTO: 0.6 % — SIGNIFICANT CHANGE UP (ref 0–0.9)
LDH SERPL L TO P-CCNC: 209 U/L — SIGNIFICANT CHANGE UP (ref 50–242)
LDH SERPL L TO P-CCNC: 209 U/L — SIGNIFICANT CHANGE UP (ref 50–242)
LYMPHOCYTES # BLD AUTO: 0.75 K/UL — LOW (ref 1–3.3)
LYMPHOCYTES # BLD AUTO: 0.75 K/UL — LOW (ref 1–3.3)
LYMPHOCYTES # BLD AUTO: 5.1 % — LOW (ref 13–44)
LYMPHOCYTES # BLD AUTO: 5.1 % — LOW (ref 13–44)
MCHC RBC-ENTMCNC: 32.2 PG — SIGNIFICANT CHANGE UP (ref 27–34)
MCHC RBC-ENTMCNC: 32.2 PG — SIGNIFICANT CHANGE UP (ref 27–34)
MCHC RBC-ENTMCNC: 33.7 G/DL — SIGNIFICANT CHANGE UP (ref 32–36)
MCHC RBC-ENTMCNC: 33.7 G/DL — SIGNIFICANT CHANGE UP (ref 32–36)
MCV RBC AUTO: 95.4 FL — SIGNIFICANT CHANGE UP (ref 80–100)
MCV RBC AUTO: 95.4 FL — SIGNIFICANT CHANGE UP (ref 80–100)
MONOCYTES # BLD AUTO: 1.36 K/UL — HIGH (ref 0–0.9)
MONOCYTES # BLD AUTO: 1.36 K/UL — HIGH (ref 0–0.9)
MONOCYTES NFR BLD AUTO: 9.3 % — SIGNIFICANT CHANGE UP (ref 2–14)
MONOCYTES NFR BLD AUTO: 9.3 % — SIGNIFICANT CHANGE UP (ref 2–14)
NEUTROPHILS # BLD AUTO: 12.3 K/UL — HIGH (ref 1.8–7.4)
NEUTROPHILS # BLD AUTO: 12.3 K/UL — HIGH (ref 1.8–7.4)
NEUTROPHILS NFR BLD AUTO: 83.7 % — HIGH (ref 43–77)
NEUTROPHILS NFR BLD AUTO: 83.7 % — HIGH (ref 43–77)
NRBC # BLD: 0 /100 WBCS — SIGNIFICANT CHANGE UP (ref 0–0)
NRBC # BLD: 0 /100 WBCS — SIGNIFICANT CHANGE UP (ref 0–0)
PLATELET # BLD AUTO: 221 K/UL — SIGNIFICANT CHANGE UP (ref 150–400)
PLATELET # BLD AUTO: 221 K/UL — SIGNIFICANT CHANGE UP (ref 150–400)
POTASSIUM SERPL-MCNC: 3.1 MMOL/L — LOW (ref 3.5–5.3)
POTASSIUM SERPL-MCNC: 3.1 MMOL/L — LOW (ref 3.5–5.3)
POTASSIUM SERPL-SCNC: 3.1 MMOL/L — LOW (ref 3.5–5.3)
POTASSIUM SERPL-SCNC: 3.1 MMOL/L — LOW (ref 3.5–5.3)
PROT SERPL-MCNC: 5.4 G/DL — LOW (ref 6–8.3)
PROT SERPL-MCNC: 5.4 G/DL — LOW (ref 6–8.3)
RBC # BLD: 3.51 M/UL — LOW (ref 3.8–5.2)
RBC # BLD: 3.51 M/UL — LOW (ref 3.8–5.2)
RBC # FLD: 19.7 % — HIGH (ref 10.3–14.5)
RBC # FLD: 19.7 % — HIGH (ref 10.3–14.5)
SODIUM SERPL-SCNC: 142 MMOL/L — SIGNIFICANT CHANGE UP (ref 135–145)
SODIUM SERPL-SCNC: 142 MMOL/L — SIGNIFICANT CHANGE UP (ref 135–145)
WBC # BLD: 14.7 K/UL — HIGH (ref 3.8–10.5)
WBC # BLD: 14.7 K/UL — HIGH (ref 3.8–10.5)
WBC # FLD AUTO: 14.7 K/UL — HIGH (ref 3.8–10.5)
WBC # FLD AUTO: 14.7 K/UL — HIGH (ref 3.8–10.5)

## 2023-11-02 PROCEDURE — 99214 OFFICE O/P EST MOD 30 MIN: CPT

## 2023-11-02 RX ORDER — ALPRAZOLAM 0.5 MG/1
0.5 TABLET ORAL AT BEDTIME
Qty: 15 | Refills: 0 | Status: ACTIVE | COMMUNITY
Start: 2023-11-02 | End: 1900-01-01

## 2023-11-02 RX ORDER — VALACYCLOVIR 500 MG/1
500 TABLET, FILM COATED ORAL
Qty: 90 | Refills: 0 | Status: ACTIVE | COMMUNITY
Start: 2021-08-19 | End: 1900-01-01

## 2023-11-02 RX ORDER — ALPRAZOLAM 0.5 MG/1
0.5 TABLET ORAL
Qty: 30 | Refills: 0 | Status: ACTIVE | COMMUNITY
Start: 2023-06-22 | End: 1900-01-01

## 2023-11-03 DIAGNOSIS — Z51.11 ENCOUNTER FOR ANTINEOPLASTIC CHEMOTHERAPY: ICD-10-CM

## 2023-11-03 DIAGNOSIS — R11.2 NAUSEA WITH VOMITING, UNSPECIFIED: ICD-10-CM

## 2023-11-03 DIAGNOSIS — C85.80 OTHER SPECIFIED TYPES OF NON-HODGKIN LYMPHOMA, UNSPECIFIED SITE: ICD-10-CM

## 2023-11-03 RX ORDER — POTASSIUM CHLORIDE 750 MG/1
10 TABLET, FILM COATED, EXTENDED RELEASE ORAL
Qty: 60 | Refills: 0 | Status: ACTIVE | COMMUNITY
Start: 2021-09-17 | End: 1900-01-01

## 2023-11-06 RX ORDER — CALCIUM CIT/MAG/D3/ZN/COP/MANG 250-40-125
TABLET ORAL DAILY
Qty: 30 | Refills: 0 | Status: ACTIVE | COMMUNITY
Start: 2023-11-06 | End: 1900-01-01

## 2023-11-09 NOTE — DISCHARGE NOTE PROVIDER - DISCHARGE DIET
Patient requests all Lab, Cardiology, and Radiology Results on their Discharge Instructions Regular Diet - No restrictions

## 2023-11-16 ENCOUNTER — LABORATORY RESULT (OUTPATIENT)
Age: 88
End: 2023-11-16

## 2023-11-16 NOTE — PHYSICAL THERAPY INITIAL EVALUATION ADULT - GENERAL OBSERVATIONS, REHAB EVAL
Local Anesthesia Pre Procedure Assessment    Informed Consent:    Consent Obtained: Yes       Procedure Assessment:    Preop Diagnosis/Indications for Procedure: Thoracic radiculopathy  Planned Procedure: TESI  Planned Anesthetic: Local    Medical History/Comorbid Conditions:    Significant Medical/Surgical History: No  Normal Mental Status: Yes    Examination Pertinent to Procedure Being Performed:    Evaluation of Operative Site: No sign of infection at injection site    Other Findings:    Reviewed Current Medications and Allergies: Yes    Pertinent Lab/Diagnostic Tests:    Pertinent Lab / Diagnostic Tests: Other (comment) ([facility] reviewed)      Andrew Bae MD  11/16/2023    
Patient received semi-supine in bed, all lines intact, NAD.

## 2023-11-28 RX ORDER — METOPROLOL TARTRATE 25 MG/1
25 TABLET, FILM COATED ORAL
Qty: 90 | Refills: 1 | Status: ACTIVE | COMMUNITY
Start: 2021-11-24 | End: 1900-01-01

## 2023-12-04 RX ORDER — FOLIC ACID 1 MG/1
1 TABLET ORAL
Qty: 90 | Refills: 0 | Status: ACTIVE | COMMUNITY
Start: 2021-10-08 | End: 1900-01-01

## 2023-12-12 ENCOUNTER — OUTPATIENT (OUTPATIENT)
Dept: OUTPATIENT SERVICES | Facility: HOSPITAL | Age: 88
LOS: 1 days | Discharge: HOME | End: 2023-12-12

## 2023-12-12 DIAGNOSIS — Z96.649 PRESENCE OF UNSPECIFIED ARTIFICIAL HIP JOINT: Chronic | ICD-10-CM

## 2023-12-12 DIAGNOSIS — C83.30 DIFFUSE LARGE B-CELL LYMPHOMA, UNSPECIFIED SITE: ICD-10-CM

## 2023-12-12 DIAGNOSIS — Z96.641 PRESENCE OF RIGHT ARTIFICIAL HIP JOINT: Chronic | ICD-10-CM

## 2023-12-12 DIAGNOSIS — D69.3 IMMUNE THROMBOCYTOPENIC PURPURA: ICD-10-CM

## 2023-12-21 ENCOUNTER — LABORATORY RESULT (OUTPATIENT)
Age: 88
End: 2023-12-21

## 2023-12-27 ENCOUNTER — RESULT REVIEW (OUTPATIENT)
Age: 88
End: 2023-12-27

## 2023-12-27 ENCOUNTER — APPOINTMENT (OUTPATIENT)
Dept: INFUSION THERAPY | Facility: HOSPITAL | Age: 88
End: 2023-12-27

## 2023-12-27 ENCOUNTER — APPOINTMENT (OUTPATIENT)
Dept: HEMATOLOGY ONCOLOGY | Facility: CLINIC | Age: 88
End: 2023-12-27
Payer: MEDICARE

## 2023-12-27 VITALS
HEIGHT: 63 IN | BODY MASS INDEX: 22.5 KG/M2 | WEIGHT: 126.99 LBS | OXYGEN SATURATION: 98 % | DIASTOLIC BLOOD PRESSURE: 67 MMHG | HEART RATE: 97 BPM | RESPIRATION RATE: 16 BRPM | SYSTOLIC BLOOD PRESSURE: 90 MMHG | TEMPERATURE: 96.8 F

## 2023-12-27 DIAGNOSIS — R05.9 COUGH, UNSPECIFIED: ICD-10-CM

## 2023-12-27 DIAGNOSIS — C83.30 DIFFUSE LARGE B-CELL LYMPHOMA, UNSPECIFIED SITE: ICD-10-CM

## 2023-12-27 LAB
BASOPHILS # BLD AUTO: 0.05 K/UL — SIGNIFICANT CHANGE UP (ref 0–0.2)
BASOPHILS # BLD AUTO: 0.05 K/UL — SIGNIFICANT CHANGE UP (ref 0–0.2)
BASOPHILS NFR BLD AUTO: 0.3 % — SIGNIFICANT CHANGE UP (ref 0–2)
BASOPHILS NFR BLD AUTO: 0.3 % — SIGNIFICANT CHANGE UP (ref 0–2)
EOSINOPHIL # BLD AUTO: 0.02 K/UL — SIGNIFICANT CHANGE UP (ref 0–0.5)
EOSINOPHIL # BLD AUTO: 0.02 K/UL — SIGNIFICANT CHANGE UP (ref 0–0.5)
EOSINOPHIL NFR BLD AUTO: 0.1 % — SIGNIFICANT CHANGE UP (ref 0–6)
EOSINOPHIL NFR BLD AUTO: 0.1 % — SIGNIFICANT CHANGE UP (ref 0–6)
HCT VFR BLD CALC: 40.2 % — SIGNIFICANT CHANGE UP (ref 34.5–45)
HCT VFR BLD CALC: 40.2 % — SIGNIFICANT CHANGE UP (ref 34.5–45)
HGB BLD-MCNC: 13.1 G/DL — SIGNIFICANT CHANGE UP (ref 11.5–15.5)
HGB BLD-MCNC: 13.1 G/DL — SIGNIFICANT CHANGE UP (ref 11.5–15.5)
IMM GRANULOCYTES NFR BLD AUTO: 0.8 % — SIGNIFICANT CHANGE UP (ref 0–0.9)
IMM GRANULOCYTES NFR BLD AUTO: 0.8 % — SIGNIFICANT CHANGE UP (ref 0–0.9)
LYMPHOCYTES # BLD AUTO: 0.14 K/UL — LOW (ref 1–3.3)
LYMPHOCYTES # BLD AUTO: 0.14 K/UL — LOW (ref 1–3.3)
LYMPHOCYTES # BLD AUTO: 0.7 % — LOW (ref 13–44)
LYMPHOCYTES # BLD AUTO: 0.7 % — LOW (ref 13–44)
MCHC RBC-ENTMCNC: 31 PG — SIGNIFICANT CHANGE UP (ref 27–34)
MCHC RBC-ENTMCNC: 31 PG — SIGNIFICANT CHANGE UP (ref 27–34)
MCHC RBC-ENTMCNC: 32.6 G/DL — SIGNIFICANT CHANGE UP (ref 32–36)
MCHC RBC-ENTMCNC: 32.6 G/DL — SIGNIFICANT CHANGE UP (ref 32–36)
MCV RBC AUTO: 95 FL — SIGNIFICANT CHANGE UP (ref 80–100)
MCV RBC AUTO: 95 FL — SIGNIFICANT CHANGE UP (ref 80–100)
MONOCYTES # BLD AUTO: 0.85 K/UL — SIGNIFICANT CHANGE UP (ref 0–0.9)
MONOCYTES # BLD AUTO: 0.85 K/UL — SIGNIFICANT CHANGE UP (ref 0–0.9)
MONOCYTES NFR BLD AUTO: 4.3 % — SIGNIFICANT CHANGE UP (ref 2–14)
MONOCYTES NFR BLD AUTO: 4.3 % — SIGNIFICANT CHANGE UP (ref 2–14)
NEUTROPHILS # BLD AUTO: 18.64 K/UL — HIGH (ref 1.8–7.4)
NEUTROPHILS # BLD AUTO: 18.64 K/UL — HIGH (ref 1.8–7.4)
NEUTROPHILS NFR BLD AUTO: 93.8 % — HIGH (ref 43–77)
NEUTROPHILS NFR BLD AUTO: 93.8 % — HIGH (ref 43–77)
NRBC # BLD: 0 /100 WBCS — SIGNIFICANT CHANGE UP (ref 0–0)
NRBC # BLD: 0 /100 WBCS — SIGNIFICANT CHANGE UP (ref 0–0)
PLATELET # BLD AUTO: 235 K/UL — SIGNIFICANT CHANGE UP (ref 150–400)
PLATELET # BLD AUTO: 235 K/UL — SIGNIFICANT CHANGE UP (ref 150–400)
RBC # BLD: 4.23 M/UL — SIGNIFICANT CHANGE UP (ref 3.8–5.2)
RBC # BLD: 4.23 M/UL — SIGNIFICANT CHANGE UP (ref 3.8–5.2)
RBC # FLD: 16.9 % — HIGH (ref 10.3–14.5)
RBC # FLD: 16.9 % — HIGH (ref 10.3–14.5)
WBC # BLD: 19.85 K/UL — HIGH (ref 3.8–10.5)
WBC # BLD: 19.85 K/UL — HIGH (ref 3.8–10.5)
WBC # FLD AUTO: 19.85 K/UL — HIGH (ref 3.8–10.5)
WBC # FLD AUTO: 19.85 K/UL — HIGH (ref 3.8–10.5)

## 2023-12-27 PROCEDURE — 99215 OFFICE O/P EST HI 40 MIN: CPT

## 2023-12-27 RX ORDER — APIXABAN 5 MG/1
5 TABLET, FILM COATED ORAL
Qty: 180 | Refills: 2 | Status: ACTIVE | COMMUNITY
Start: 2023-10-24 | End: 1900-01-01

## 2023-12-27 RX ORDER — CEFDINIR 300 MG/1
300 CAPSULE ORAL TWICE DAILY
Qty: 14 | Refills: 0 | Status: ACTIVE | COMMUNITY
Start: 2023-12-27 | End: 1900-01-01

## 2023-12-27 NOTE — ASSESSMENT
[FreeTextEntry1] : 89 yo female with new diagnosis of Stage IV DLBCL s/p treatment with RC and prednisone presents for follow up. Currently on gazyva, revlmid on hold since 9/2022 given thrombocytopenia. Overall, tolerating current treatment well without any significant side effects.  #DLBCL -Pt was on RC and prednisone every 3 weeks, - CT scan of chest done 4/2/22: Newly noted 9 mm opacity in the periphery of the right upper lobe and interval increase in size of a lingular opacity along the fissure. - Discussed with patient and daughter Dinorah we would prefer to start treatment with Rituxan and Polatuzumab; Went over risk/side effects of Polatuzumab and consent signed today. -Pt is s/p Hugo-Rtixumab- cycle 1 on 4/28/22 -She developed respiratory distress and was hospitalized and was noted to have pulm edema and concern for progressive lymphoma vs. infection -Considering lymphoma involvement in the lungs treated with Obinutuzumab on 5/20 test dose and 5/21 to complete infusion for total of 1000mg. She is s/p C1D8 on 6/9/22 and C1D15 on 6/16/22, then once monthly. Started Revlimid on 6/26/22 (pt has an eye surgery scheduled at that time) - risks/side effects discussed with patient; consent signed. 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 CBC today showed Platelet stable 235k. Cont close monitor. -c/w valacyclovir; bactrim was d/c since 9/28 per GYN/URO  -c/w obinutuzumab every 2 months for 2 years (started 7/2022) for maintenance.  -CT of chest done for new cough with lower of steroids showed a PE, now on Eliquis since 6/7/23. -c/o worsening cough with productive sputum. She is currently on prednisone for cough prescribed by pulmonology Dr. Larkin a few weeks ago, will taper down per Dr. Larkin's instruction. Will Rx Abx again. Check IgG levels today - ? hypogam. At this time will hold further obinutuzumab as may be contributing to cough, may also increase risk of URI/PNA  -check CT C/A/P  -RTC in 1 mo -All questions answered

## 2023-12-27 NOTE — PHYSICAL EXAM
[Ambulatory and capable of all self care but unable to carry out any work activities] : Status 2- Ambulatory and capable of all self care but unable to carry out any work activities. Up and about more than 50% of waking hours [Normal] : clear to auscultation bilaterally, no dullness, no wheezing [de-identified] : in wheelchair

## 2023-12-27 NOTE — HISTORY OF PRESENT ILLNESS
[de-identified] : 89 year old woman with a PMH of COPD  was admitted to [facility] for worsening cough and lethargy despite treatment with antibiotics and steroids. She underwent R robotic VATS/pleural bx with placement of pleurex catheter on 07/15/21 after PET from 07/07/21 reveal b/l upper lobe opacities concerning for malignancy with pleural effusion. 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 Given her age and symptoms, patient and daughter opted for palliative chemotherapy. she received prednisone, followed by cytoxan (200 mg/m2) on 8/5-8/7/21 with marked improvement in respiratory status and mental status. Pleurex catheter stopped draining after initiation of cytoxan. She received rituximab which was complicated by infusion reaction. She received the full dose over 2 days. She was discharged to [facility] and presents for follow up. She received 4 cycles of R-CP which was overall well tolerated. She has a fall in mid October and did not initially seek medical care. Her daughter Dinorah called the office on 10/20 and given her shoulder pain it was recommended she get xrays and an evaluation. She was noted to have a R proximal humerus fracture and was sent from Urgent care to the ER for admission. She was also noted to have a possible PNA. While admitted, she was noted to be tachy-lidia. She was also found to have parainflueza and was treated with ceftriaxone/azithromycin for PNA. \par  She received C5 on 11/18, C6 on 12/9, C7 on 12/30, and C8 on 1/27 (delayed due to neutropenia). \par  \par  Pt was recently hospitalized for respiratory distress in May 2022. CTA chest 5/10: No pulmonary embolism from the main pulmonary artery up to and including the lobar branches. Several segmental and subsegmental branches cannot be assessed due to extensive respiratory motion. New groundglass in the upper lobes, suspicious for pulmonary edema. Infection is also a consideration. Increased mildly enlarged mediastinal lymph nodes may be reactive. Stable multilobar chronic consolidation.\par  \par  Considering lymphoma involvement in the lungs treated with Obinutuzumab on 5/20 test dose and 5/21 to complete infusion for total of 1000mg. She had C1D8 on 6/9/22, C1D15 on 6/16/22. She completed 6 cycles. She was started on revlmid, dose reduced to 2.5 mg every other day for thrombocytopenia which was eventually held. She was treated for possible ITP with marked improvement in platelets and has been on prednisone with bactrim ppx. She has been receiving IVIG for ITP (also for hypogammaglobulinemia in the setting of chronic B cell depletion and frequent viral URIs)\par  \par   [de-identified] : 5/4/23: since last visit, pt's prednisone was reduced from 10mg to 5mg. Pt has noticed increase cough, she was rxed hycodan which she states is helping. Pt state she had an URI last week. Denies fevers, chills, night sweats, weight loss.   CT chest done 5/31/23: Pulmonary embolus in the distal portion of the right main pulmonary artery. Ill-defined nodule in the right upper lobe has decreased in size when compared to previous exam. Stable patchy opacities in the right middle and both lower lobes when compared to previous exam.  6/22: We started her on Eliquis earlier this month. We held her Obi that was scheduled until after our visit today given new findings.  She has been having back pain starting 6/5. She saw her PMD Dr. Bernal. CXR was done, given a short course of Abx. Pain is about a 5 now.  Per pt and aide, she saw pulmonologist, cardiologist  -cough improved  6/29: pt presented today for Obi treatment. She was seen today in the tx  accompanied by her aid. Pt admitted cough and back pain improved.  She stopped Prednisone last Thursday 6/22, home draw lab done on 6/27 showed CBC stable.   7/27:  Patient is seen today for a f/u apt accompanied by her aid. Pt was sent to ED on 7/11 after mechanical fall with head impact, admitted for UTI and metabolic acidosis. Acidosis improved on bicarb gtt. Treated for COPD exacerbation given increased cough, phlegm, and diffuse wheezing on exam and rhino/enterovirus (+). O2 sat >95% on RA. Component of anxiety contributing to her tachypneic episodes overnight as well. Given IV Lasix 20mg for hx of pulmonary hypertension and increased BNP since admission. Improved symptomatically.   8/24: Patient is seen today for a f/u apt accompanied by her aid and with the daughter Dinorah on the phone. ya Pt denied any urinary complaints, denied fever chill dysuria. Denied increased frequency of urination.  But pt's daughter was worried about her asymptomatic UTI?. She requested monthly urine tests to monitor. I had a lengthy discussion with Dinorah the daughter, explained we will do one-time check today but pt will need f/u with her PMD for routine management of possible UTI.  She agreed.   11/2/23:  Patient is seen today for a f/u apt accompanied by her aid in the tx. She feels well overall. Denied any new complaints. She saw Zev Mena the pulmonologist for cough last Tuesday, is currently on prednisone 10mg daily, will taper down per Dr. Larkin's instruction.  Pt accidently banged her head on the wall of the bathroom, denied any fall, denied any headache or fracture. She had a large burse on her left face since then, admitted its fading and improved. The aid stated pt usually have bruises due to the use of Eliquis;  Pt saw GYN/URO for recurrent UTI on 9/28, pt was advised to d/c bactrim and Methenamine. She has been off both meds since then. She is still on valacyclovir. Her K+ was high 5.6 on 8/22, we advised pt hold KCL supplement, she completed a course of Lokelma, repeated CMP showed K+ normal 3.6 on 9/21.  Pt canceled last home draw visit on 10/23.   12/27/23: She complaints of cough productive of yellow sputum. Also has SOB. Has f/u w/ pulm next week

## 2023-12-27 NOTE — REASON FOR VISIT
[Follow-Up Visit] : a follow-up visit for [Lymphoma] : lymphoma [Thrombocytosis] : thrombocytosis [Formal Caregiver] : formal caregiver [Family Member] : family member [FreeTextEntry2] : Obinutuzumab maintenance every 2 months.

## 2023-12-28 ENCOUNTER — APPOINTMENT (OUTPATIENT)
Dept: HEMATOLOGY ONCOLOGY | Facility: CLINIC | Age: 88
End: 2023-12-28

## 2023-12-29 LAB
ALBUMIN SERPL ELPH-MCNC: 3.9 G/DL
ALP BLD-CCNC: 132 U/L
ALT SERPL-CCNC: 26 U/L
ANION GAP SERPL CALC-SCNC: 18 MMOL/L
AST SERPL-CCNC: 17 U/L
BILIRUB SERPL-MCNC: 0.3 MG/DL
BUN SERPL-MCNC: 35 MG/DL
CALCIUM SERPL-MCNC: 9.2 MG/DL
CHLORIDE SERPL-SCNC: 108 MMOL/L
CO2 SERPL-SCNC: 17 MMOL/L
CREAT SERPL-MCNC: 2.11 MG/DL
DEPRECATED KAPPA LC FREE/LAMBDA SER: 1.14 RATIO
EGFR: 22 ML/MIN/1.73M2
GLUCOSE SERPL-MCNC: 121 MG/DL
IGA SER QL IEP: 40 MG/DL
IGG SER QL IEP: 135 MG/DL
IGM SER QL IEP: <10 MG/DL
KAPPA LC CSF-MCNC: 0.74 MG/DL
KAPPA LC SERPL-MCNC: 0.84 MG/DL
LDH SERPL-CCNC: 178 U/L
POTASSIUM SERPL-SCNC: 4.2 MMOL/L
PROT SERPL-MCNC: 5.6 G/DL
SODIUM SERPL-SCNC: 143 MMOL/L

## 2024-01-03 RX ORDER — OMEPRAZOLE 20 MG/1
20 CAPSULE, DELAYED RELEASE ORAL
Qty: 90 | Refills: 0 | Status: ACTIVE | COMMUNITY
Start: 2022-07-22 | End: 1900-01-01

## 2024-01-05 ENCOUNTER — APPOINTMENT (OUTPATIENT)
Dept: CT IMAGING | Facility: IMAGING CENTER | Age: 89
End: 2024-01-05
Payer: MEDICARE

## 2024-01-05 ENCOUNTER — OUTPATIENT (OUTPATIENT)
Dept: OUTPATIENT SERVICES | Facility: HOSPITAL | Age: 89
LOS: 1 days | End: 2024-01-05
Payer: MEDICARE

## 2024-01-05 DIAGNOSIS — Z96.649 PRESENCE OF UNSPECIFIED ARTIFICIAL HIP JOINT: Chronic | ICD-10-CM

## 2024-01-05 DIAGNOSIS — Z96.641 PRESENCE OF RIGHT ARTIFICIAL HIP JOINT: Chronic | ICD-10-CM

## 2024-01-05 DIAGNOSIS — R05.9 COUGH, UNSPECIFIED: ICD-10-CM

## 2024-01-05 PROCEDURE — 74177 CT ABD & PELVIS W/CONTRAST: CPT | Mod: 26

## 2024-01-05 PROCEDURE — 74177 CT ABD & PELVIS W/CONTRAST: CPT

## 2024-01-05 PROCEDURE — 71260 CT THORAX DX C+: CPT | Mod: 26

## 2024-01-05 PROCEDURE — 71260 CT THORAX DX C+: CPT

## 2024-01-18 ENCOUNTER — APPOINTMENT (OUTPATIENT)
Dept: INFUSION THERAPY | Facility: HOSPITAL | Age: 89
End: 2024-01-18

## 2024-01-31 ENCOUNTER — APPOINTMENT (OUTPATIENT)
Dept: HEMATOLOGY ONCOLOGY | Facility: CLINIC | Age: 89
End: 2024-01-31

## 2024-02-02 NOTE — PATIENT PROFILE ADULT - HISTORY OF COVID-19 VACCINATION
[de-identified] : 40 year old woman presents for a weight loss medication follow-up. 5 lb weight loss since last visit; on Wegovy 2.4 mg/week; reports no side effects. Reports no abdominal pain, nausea/vomiting, constipation, diarrhea, reflux/heartburn. Patient eating 3 protein-rich meals/day, drinking adequate zero-calorie fluids/day, and exercising.   Anti-obesity medication(s): Wegovy Start date: 6/2/23 Current dose: Wegovy 2.4 mg Side-effects from anti-obesity medication(s): None Obesity comorbidities: HTN Comorbidities improved/resolved: N/A History of bariatric surgery: No Yes

## 2024-04-05 NOTE — ASU PATIENT PROFILE, ADULT - AS SC BRADEN ACTIVITY
Clinical Update:                                                    A chart review was conducted for Michele Altamirano.    Reason for Chart Review: Trikafta Lab Monitoring     Discussion: Michele has been on Trikafta since 7/20/20. Per chart review, Michele continues full dose Trikafta.    Labs were reviewed from  4/3/24  at  [facility] . All labs are within normal limits.            Plan:  1. Continue Trikafta   2. Recheck hepatic panel and CK in 6 months . Must attend appt 4/9/24 for additional refills      Liset Cee PharmD  Cystic Fibrosis MTM Pharmacist  [facility]  Voicemail: [phone]         (4) walks frequently

## 2024-04-29 RX ORDER — FLUTICASONE FUROATE, UMECLIDINIUM BROMIDE AND VILANTEROL TRIFENATATE 200; 62.5; 25 UG/1; UG/1; UG/1
1 POWDER RESPIRATORY (INHALATION)
Qty: 0 | Refills: 0 | DISCHARGE

## 2024-04-29 RX ORDER — FOLIC ACID 0.8 MG
1 TABLET ORAL
Qty: 0 | Refills: 0 | DISCHARGE

## 2024-04-29 RX ORDER — VALACYCLOVIR 500 MG/1
1 TABLET, FILM COATED ORAL
Qty: 0 | Refills: 0 | DISCHARGE

## 2024-04-29 RX ORDER — ESCITALOPRAM OXALATE 10 MG/1
1 TABLET, FILM COATED ORAL
Qty: 0 | Refills: 0 | DISCHARGE

## 2024-04-29 RX ORDER — OMEPRAZOLE 10 MG/1
1 CAPSULE, DELAYED RELEASE ORAL
Qty: 0 | Refills: 0 | DISCHARGE

## 2024-04-29 RX ORDER — AMIODARONE HYDROCHLORIDE 400 MG/1
1 TABLET ORAL
Qty: 0 | Refills: 0 | DISCHARGE

## 2024-12-18 NOTE — PROGRESS NOTE ADULT - SUBJECTIVE AND OBJECTIVE BOX
Hematology Oncology Follow-up    INTERVAL HPI/OVERNIGHT EVENTS:  No o/n events, patient resting comfortably. No complaints at this time. Patient specifically denies fever, chills, dizziness, weakness, CP, palpitations, SOB, cough, N/V/D/C, dysuria, changes in bowel movements, LE edema.    Review of Systems:  General: denies fevers/chills, night sweats, malaise, changes in appetite  Head: denies HA  Eyes: denies vision change  ENT: denies oral lesions, rhinorrhea, epistaxys, sore throat, dysphagia  Respiratory: denies cough, shortness of breath, pleurisy  Cardiovascular: denies chest pain, palpitaitons, DESHPANDE  Gastrointestinal: denies nausea, vomiting, abdominal pain, constipation, diarrhea, melena, hematochezia  MSK: denies joint pain or muscle pain  Neuro: denies headache, weakness, or parasthesias  Skin: denies rash, petichiae, echymoses  Psych: denies anxiety or sleep disturbances    VITAL SIGNS:  T(F): 98.1 (08-13-21 @ 06:03)  HR: 91 (08-13-21 @ 06:03)  BP: 114/60 (08-13-21 @ 06:03)  RR: 18 (08-13-21 @ 06:03)  SpO2: 100% (08-13-21 @ 06:03)  Wt(kg): --      PHYSICAL EXAM:    Constitutional: AAOx3, NAD  Eyes: PERRL, EOMI, sclera non-icteric  Neck: supple, no masses, no JVD, no lymphadenopathy  Respiratory: CTA b/l, no wheezing, rhonchi, rales, with normal respiratory effort  Cardiovascular: RRR, normal S1S2, no M/R/G  Gastrointestinal: soft, NTND, no masses palpable, BS normal in all four quadrants, no HSM  Extremities:  no edema  MSK: no obvious abnormalities, normal ROM, no lymphadenopathy  Neurological: Grossly intact  Skin: Normal temperature, no rash, no echymoses, no petichiae  Psych: normal affect    MEDICATIONS  (STANDING):  aMIOdarone    Tablet 200 milliGRAM(s) Oral daily  diphenhydrAMINE   Injectable 50 milliGRAM(s) IV Push once  enoxaparin Injectable 40 milliGRAM(s) SubCutaneous daily  febuxostat 40 milliGRAM(s) Oral daily  FIRST- Mouthwash  BLM 5 milliLiter(s) Swish and Swallow every 6 hours  lidocaine   5% Patch 1 Patch Transdermal daily  multivitamin 1 Tablet(s) Oral daily  sodium chloride 0.9%. 1000 milliLiter(s) (50 mL/Hr) IV Continuous <Continuous>  triamcinolone 0.1% Cream 1 Application(s) Topical two times a day    MEDICATIONS  (PRN):  acetaminophen   Tablet .. 650 milliGRAM(s) Oral every 6 hours PRN Temp greater or equal to 38.5C (101.3F), Mild Pain (1 - 3), Moderate Pain (4 - 6), Severe Pain (7 - 10)  ALPRAZolam 0.25 milliGRAM(s) Oral two times a day PRN anxiety  aluminum hydroxide/magnesium hydroxide/simethicone Suspension 30 milliLiter(s) Oral every 4 hours PRN Dyspepsia  diphenhydrAMINE   Injectable 25 milliGRAM(s) IV Push once PRN infusion reaction  diphenhydrAMINE   Injectable 25 milliGRAM(s) IV Push every 6 hours PRN Allergy symptoms  hydrocortisone sodium succinate Injectable 100 milliGRAM(s) IV Push once PRN infusion reaction  melatonin 3 milliGRAM(s) Oral at bedtime PRN Insomnia  meperidine     Injectable 25 milliGRAM(s) IV Push once PRN infusion reaction  metoclopramide Injectable 10 milliGRAM(s) IV Push every 6 hours PRN Nausea  ondansetron Injectable 6 milliGRAM(s) IV Push every 8 hours PRN Nausea  ondansetron Injectable 4 milliGRAM(s) IV Push every 8 hours PRN Nausea and/or Vomiting  polyethylene glycol 3350 17 Gram(s) Oral daily PRN Constipation      Zosyn (Rash)      LABS:                        7.4    10.65 )-----------( 261      ( 12 Aug 2021 07:06 )             24.0     08-12    140  |  105  |  24<H>  ----------------------------<  84  3.7   |  22  |  0.73    Ca    8.9      12 Aug 2021 07:06  Phos  3.0     08-12  Mg     1.80     08-12    TPro  4.4<L>  /  Alb  2.4<L>  /  TBili  0.2  /  DBili  x   /  AST  14  /  ALT  24  /  AlkPhos  91  08-12                     RADIOLOGY & ADDITIONAL TESTS:  Studies reviewed. - c/w finasteride for now (though pt stated he is on tamsulosin at home) - c/w finasteride for now (though pt stated he is on tamsulosin at home)  - f/u UA/UCx to r/o UTI given pt reports recent dysuria

## 2024-12-31 NOTE — PHYSICAL THERAPY INITIAL EVALUATION ADULT - IMPAIRMENTS FOUND, PT EVAL
Dx in Nov 2023.   Off anticoagulation due to hematuria.   May stay off AC at this time.        aerobic capacity/endurance/gait, locomotion, and balance/muscle strength

## 2025-02-25 NOTE — ED ADULT NURSE NOTE - NS ED NURSE DISCH DISPOSITION
----- Message from Nahed Ignacio PA-C sent at 2/25/2025  2:44 PM CST -----  Please update patient that  labs show elevation his liver enzymes consistent with his alcohol use.  His kidney function appears normal.  His blood counts show no evidence of anemia and platelets are normal.  Workup for viral hepatitis and autoimmune causes of liver disease was negative.  AFP, a tumor marker for liver cancer was also normal.      Thanks,  Nahed Ignacio PA-C      
Attempt was made to contact patient to discuss results, however, he did not answer.     I left a voice mail with our office phone number to have his return my call at earliest convenience.      
Patient informed as per Nahed Ignacio's PA-C notation.     Spoke to patient and discussed his lab  results.  Patient verbalized understanding no questions at this time.  
Admitted

## 2025-03-17 NOTE — H&P PST ADULT - MS EXT PE MLT D E PC
[TextBox_4] : Ms. MCGUIRE  is a 62-year-old female with a history of SOB, COPD/Asthma, LPR/GERD, ?ESTELLE, Lung CA Screening presenting to the office today for a follow-up pulmonary evaluation. Her chief complaint is  -she notes exercising daily (at [employer]) without limitations -she denies coughing/wheezing -she denies sinus congestion, rhinitis, or PNDrip  -she doesn't use the inhaler unless her SOB is severe -she notes she quit smoking for 4 months after she had PNA in [place]. She's now smoking 2 cigarettes per day at night with her  -she notes she had more energy when she quit smoking 4 months ago -she notes she's fatigued, which she attributes to being very physically active (exercise class, laundry, food shopping) -she notes sleeping for 7-8 hours -she notes getting enough sleep  -she sometimes needs an afternoon nap -she notes weight is stable, but high. She has trouble losing weight -she attributes her inability to lose weight to a history of hepatitis B  -she denies any headaches, nausea, emesis, fever, chills, sweats, chest pain, chest pressure, palpitations, diarrhea, constipation, dysphagia, vertigo, arthralgias, myalgias, leg swelling, itchy eyes, itchy ears, heartburn, reflux, or sour taste in the mouth. 
no pedal edema

## 2025-04-11 NOTE — PROGRESS NOTE ADULT - PROBLEM SELECTOR PLAN 2
Requiring 2L NC, s/p VATS with pleurex placement, possible PNA s/p therapy   - Drain Pleurex 2-3x/week- plan to drain today  - Mucinex, chest PT, IS, OOB into chair as possible   - Trial of Lasix 20 FS blood sugar DOS Never

## 2025-04-16 NOTE — PROGRESS NOTE ADULT - ASSESSMENT
This is a 88 y/o F with PMH of lymphoma on active chemo (last 2 weeks ago) presents s/p fall 12 days ago found to have an acute fracture of the right proximal humerus on XR currently in RUE sling.     #Diffuse Large B-Cell Lymphoma  - Admitted to [facility] in July for worsening cough & lethargy, PET revealed b/l upper lobe opacities concerning for malignancy with pleural effusion, underwent R robotic VATS/pleural bx with placement of PleurX catheter at that time  - Pathology revealed non-GCB DLBCL  - Received cycle 1 of palliative chemo per choice of patient & family inpatient with prednisone, cyclophosphamide, rituximab  - PleurX removed with improvement of symptoms, no longer required supplemental O2  - D/c'ed to rehab, followed in clinic by Dr. Rossy Esparza  - Plan to continue RC & prednisone q3 weeks - patient is due for next cycle of chemotherapy this week. Plan to give it inpatient tomorrow 10/28. Plan for Rituxan 375mg/m2, Cytoxan 500mg/m2 and Prednisone 1mg/kg x 5 days. Please ensure pt has prescription for remainder of Prednisone doses sent to her pharmacy upon discharge.   - Will secure Neulasta appointment for pt upon discharge       #R proximal humerus fracture, currently in sling  - Mechanical in nature  - Pain control per primary team    Indira Serna MD  Hematology Oncology Fellow, PGY-6  [facility] Pager: [phone]/ [facility] Pager: [phone]     This is a 88 y/o F with PMH of lymphoma on active chemo (last 2 weeks ago) presents s/p fall 12 days ago found to have an acute fracture of the right proximal humerus on XR currently in RUE sling.     #Diffuse Large B-Cell Lymphoma  - Admitted to [facility] in July for worsening cough & lethargy, PET revealed b/l upper lobe opacities concerning for malignancy with pleural effusion, underwent R robotic VATS/pleural bx with placement of PleurX catheter at that time  - Pathology revealed non-GCB DLBCL  - Received cycle 1 of palliative chemo per choice of patient & family inpatient with prednisone, cyclophosphamide, rituximab  - PleurX removed with improvement of symptoms, no longer required supplemental O2  - D/c'ed to rehab, followed in clinic by Dr. Rossy Esparza  - Plan to continue RC & prednisone q3 weeks - patient is due for next cycle of chemotherapy this week. Plan to give it inpatient tomorrow 10/28. Plan for Rituxan 375mg/m2, Cytoxan 500mg/m2 and Prednisone 1mg/kg x 5 days (will start Prednisone 50mg daily today, for 5 days). Please ensure pt has prescription for remainder of Prednisone doses sent to her pharmacy upon discharge.   - Will secure Neulasta appointment for pt upon discharge       #R proximal humerus fracture, currently in sling  - Mechanical in nature  - Pain control per primary team    Indira Serna MD  Hematology Oncology Fellow, PGY-6  [facility] Pager: [phone]/ [facility] Pager: [phone]     Detail Level: Zone Render Risk Assessment In Note?: no Additional Notes: Pt is completed.

## 2025-06-13 NOTE — CHART NOTE - NSCHARTNOTEFT_GEN_A_CORE
Patient continues to rip off monitoring equipment.  ED provider notified.   Dermatology consulted for macular rash. Consult appreciated.    Alexander Mendiola PA-C  Medicine ACP, pgr [phone]  Available on [employer] Teams

## 2025-06-16 NOTE — ED ADULT TRIAGE NOTE - PAIN: PRESENCE, MLM
Arterial Line    Performed by: Rivka Wall MD  Authorized by: Rivka Wall MD    Patient Location:  OR  Indication*:  Continuous blood pressure monitoring  Laterality*:  Left  Site*:  Radial  Max Sterile Barrier Technique*:  Sterile gloves, Sterile dressing applied and Cap/Mask  Local Anesthetic:  None  Prep*:  Chlorhexidine gluconate (CHG)  Catheter Size*:  20 G  Catheter Length*:  5 cm  Catheter Type:  Arrow  Ultrasound-Guided*: No    Line Secured*:  Tape and Transparent dressing  Events: patient tolerated procedure well with no complications         complains of pain/discomfort

## 2025-06-24 NOTE — PROGRESS NOTE ADULT - PROBLEM SELECTOR PLAN 6
Plan:  -continue home omeprazole, escitalopram, KCl  GOC: f/u daughter DNR/DNI status  DVT: SQH  Diet: regular  Bowel regimen:   PT: home with PT  PCP: Vilma   Family:   Dispo: home with PT 1055 am PT attempted to see pt for eval/ tx however, pt appears very somnolent, currently on supplemental O2 via HFNC. + B/L hand mittens. Plan:  -continue home omeprazole, escitalopram, KCl  GOC: DNR/SNI  DVT: SQH  Diet: regular  Bowel regimen:   PT: home with PT  PCP: Vilma   Family:   Dispo: home with PT